# Patient Record
Sex: FEMALE | Race: WHITE | NOT HISPANIC OR LATINO | Employment: OTHER | URBAN - METROPOLITAN AREA
[De-identification: names, ages, dates, MRNs, and addresses within clinical notes are randomized per-mention and may not be internally consistent; named-entity substitution may affect disease eponyms.]

---

## 2018-02-07 ENCOUNTER — HOSPITAL ENCOUNTER (INPATIENT)
Facility: HOSPITAL | Age: 83
LOS: 5 days | Discharge: NURSING, REHAB OR LONG-TERM CARE | End: 2018-02-12
Attending: EMERGENCY MEDICINE | Admitting: INTERNAL MEDICINE

## 2018-02-07 ENCOUNTER — APPOINTMENT (OUTPATIENT)
Dept: GENERAL RADIOLOGY | Facility: HOSPITAL | Age: 83
End: 2018-02-07

## 2018-02-07 DIAGNOSIS — S72.142A CLOSED DISPLACED INTERTROCHANTERIC FRACTURE OF LEFT FEMUR, INITIAL ENCOUNTER (HCC): Primary | ICD-10-CM

## 2018-02-07 DIAGNOSIS — R26.2 DIFFICULTY WALKING: ICD-10-CM

## 2018-02-07 PROBLEM — I10 HTN (HYPERTENSION): Status: ACTIVE | Noted: 2018-02-07

## 2018-02-07 PROBLEM — E87.5 HYPERKALEMIA: Status: ACTIVE | Noted: 2018-02-07

## 2018-02-07 PROBLEM — N17.9 AKI (ACUTE KIDNEY INJURY): Status: ACTIVE | Noted: 2018-02-07

## 2018-02-07 LAB
ANION GAP SERPL CALCULATED.3IONS-SCNC: 13.5 MMOL/L
BASOPHILS # BLD AUTO: 0.06 10*3/MM3 (ref 0–0.2)
BASOPHILS NFR BLD AUTO: 0.3 % (ref 0–1.5)
BUN BLD-MCNC: 38 MG/DL (ref 8–23)
BUN/CREAT SERPL: 32.2 (ref 7–25)
CALCIUM SPEC-SCNC: 9.2 MG/DL (ref 8.6–10.5)
CHLORIDE SERPL-SCNC: 100 MMOL/L (ref 98–107)
CO2 SERPL-SCNC: 23.5 MMOL/L (ref 22–29)
CREAT BLD-MCNC: 1.18 MG/DL (ref 0.57–1)
DEPRECATED RDW RBC AUTO: 47.1 FL (ref 37–54)
EOSINOPHIL # BLD AUTO: 0.3 10*3/MM3 (ref 0–0.7)
EOSINOPHIL NFR BLD AUTO: 1.7 % (ref 0.3–6.2)
ERYTHROCYTE [DISTWIDTH] IN BLOOD BY AUTOMATED COUNT: 13.5 % (ref 11.7–13)
GFR SERPL CREATININE-BSD FRML MDRD: 44 ML/MIN/1.73
GLUCOSE BLD-MCNC: 117 MG/DL (ref 65–99)
HCT VFR BLD AUTO: 38.7 % (ref 35.6–45.5)
HGB BLD-MCNC: 12.5 G/DL (ref 11.9–15.5)
IMM GRANULOCYTES # BLD: 0.11 10*3/MM3 (ref 0–0.03)
IMM GRANULOCYTES NFR BLD: 0.6 % (ref 0–0.5)
INR PPP: 1.01 (ref 0.9–1.1)
LYMPHOCYTES # BLD AUTO: 2.88 10*3/MM3 (ref 0.9–4.8)
LYMPHOCYTES NFR BLD AUTO: 16.2 % (ref 19.6–45.3)
MCH RBC QN AUTO: 30.7 PG (ref 26.9–32)
MCHC RBC AUTO-ENTMCNC: 32.3 G/DL (ref 32.4–36.3)
MCV RBC AUTO: 95.1 FL (ref 80.5–98.2)
MONOCYTES # BLD AUTO: 1.46 10*3/MM3 (ref 0.2–1.2)
MONOCYTES NFR BLD AUTO: 8.2 % (ref 5–12)
NEUTROPHILS # BLD AUTO: 12.92 10*3/MM3 (ref 1.9–8.1)
NEUTROPHILS NFR BLD AUTO: 73 % (ref 42.7–76)
PLATELET # BLD AUTO: 302 10*3/MM3 (ref 140–500)
PMV BLD AUTO: 11.6 FL (ref 6–12)
POTASSIUM BLD-SCNC: 5.4 MMOL/L (ref 3.5–5.2)
PROTHROMBIN TIME: 13.1 SECONDS (ref 11.7–14.2)
RBC # BLD AUTO: 4.07 10*6/MM3 (ref 3.9–5.2)
SODIUM BLD-SCNC: 137 MMOL/L (ref 136–145)
WBC NRBC COR # BLD: 17.73 10*3/MM3 (ref 4.5–10.7)

## 2018-02-07 PROCEDURE — 93010 ELECTROCARDIOGRAM REPORT: CPT | Performed by: INTERNAL MEDICINE

## 2018-02-07 PROCEDURE — 80048 BASIC METABOLIC PNL TOTAL CA: CPT | Performed by: NURSE PRACTITIONER

## 2018-02-07 PROCEDURE — 99285 EMERGENCY DEPT VISIT HI MDM: CPT

## 2018-02-07 PROCEDURE — 93005 ELECTROCARDIOGRAM TRACING: CPT | Performed by: NURSE PRACTITIONER

## 2018-02-07 PROCEDURE — 36415 COLL VENOUS BLD VENIPUNCTURE: CPT

## 2018-02-07 PROCEDURE — 73502 X-RAY EXAM HIP UNI 2-3 VIEWS: CPT

## 2018-02-07 PROCEDURE — 25010000002 ONDANSETRON PER 1 MG: Performed by: NURSE PRACTITIONER

## 2018-02-07 PROCEDURE — 25010000002 HYDROMORPHONE PER 4 MG: Performed by: EMERGENCY MEDICINE

## 2018-02-07 PROCEDURE — 25010000002 HYDROMORPHONE PER 4 MG: Performed by: NURSE PRACTITIONER

## 2018-02-07 PROCEDURE — 85610 PROTHROMBIN TIME: CPT | Performed by: NURSE PRACTITIONER

## 2018-02-07 PROCEDURE — 85025 COMPLETE CBC W/AUTO DIFF WBC: CPT | Performed by: NURSE PRACTITIONER

## 2018-02-07 PROCEDURE — 71045 X-RAY EXAM CHEST 1 VIEW: CPT

## 2018-02-07 PROCEDURE — 25010000002 ONDANSETRON PER 1 MG: Performed by: INTERNAL MEDICINE

## 2018-02-07 RX ORDER — AMLODIPINE BESYLATE 5 MG/1
5 TABLET ORAL
Status: DISCONTINUED | OUTPATIENT
Start: 2018-02-08 | End: 2018-02-08

## 2018-02-07 RX ORDER — ONDANSETRON 2 MG/ML
4 INJECTION INTRAMUSCULAR; INTRAVENOUS ONCE
Status: COMPLETED | OUTPATIENT
Start: 2018-02-07 | End: 2018-02-07

## 2018-02-07 RX ORDER — SODIUM CHLORIDE 9 MG/ML
75 INJECTION, SOLUTION INTRAVENOUS CONTINUOUS
Status: DISCONTINUED | OUTPATIENT
Start: 2018-02-07 | End: 2018-02-09

## 2018-02-07 RX ORDER — HYDRALAZINE HYDROCHLORIDE 25 MG/1
25 TABLET, FILM COATED ORAL 3 TIMES DAILY
Status: DISCONTINUED | OUTPATIENT
Start: 2018-02-07 | End: 2018-02-12 | Stop reason: HOSPADM

## 2018-02-07 RX ORDER — ATORVASTATIN CALCIUM 10 MG/1
10 TABLET, FILM COATED ORAL DAILY
Status: DISCONTINUED | OUTPATIENT
Start: 2018-02-08 | End: 2018-02-12 | Stop reason: HOSPADM

## 2018-02-07 RX ORDER — ASPIRIN 81 MG/1
81 TABLET ORAL DAILY
COMMUNITY
End: 2018-02-12 | Stop reason: HOSPADM

## 2018-02-07 RX ORDER — SODIUM POLYSTYRENE SULFONATE 15 G/60ML
15 SUSPENSION ORAL; RECTAL ONCE
Status: COMPLETED | OUTPATIENT
Start: 2018-02-07 | End: 2018-02-07

## 2018-02-07 RX ORDER — AMLODIPINE BESYLATE 5 MG/1
5 TABLET ORAL DAILY
COMMUNITY
End: 2018-02-07

## 2018-02-07 RX ORDER — MELATONIN
1000 DAILY
Status: DISCONTINUED | OUTPATIENT
Start: 2018-02-08 | End: 2018-02-12 | Stop reason: HOSPADM

## 2018-02-07 RX ORDER — LISINOPRIL 20 MG/1
20 TABLET ORAL DAILY
COMMUNITY
End: 2018-02-12 | Stop reason: HOSPADM

## 2018-02-07 RX ORDER — LISINOPRIL AND HYDROCHLOROTHIAZIDE 25; 20 MG/1; MG/1
1 TABLET ORAL DAILY
COMMUNITY
End: 2018-02-12 | Stop reason: HOSPADM

## 2018-02-07 RX ORDER — PRAVASTATIN SODIUM 40 MG
40 TABLET ORAL NIGHTLY
COMMUNITY

## 2018-02-07 RX ORDER — HYDROMORPHONE HYDROCHLORIDE 1 MG/ML
0.5 INJECTION, SOLUTION INTRAMUSCULAR; INTRAVENOUS; SUBCUTANEOUS ONCE
Status: COMPLETED | OUTPATIENT
Start: 2018-02-07 | End: 2018-02-07

## 2018-02-07 RX ORDER — MELOXICAM 15 MG/1
15 TABLET ORAL DAILY
COMMUNITY
End: 2018-02-12 | Stop reason: HOSPADM

## 2018-02-07 RX ORDER — MELATONIN
1000 DAILY
COMMUNITY

## 2018-02-07 RX ORDER — SODIUM CHLORIDE 0.9 % (FLUSH) 0.9 %
1-10 SYRINGE (ML) INJECTION AS NEEDED
Status: DISCONTINUED | OUTPATIENT
Start: 2018-02-07 | End: 2018-02-12 | Stop reason: HOSPADM

## 2018-02-07 RX ORDER — LISINOPRIL 20 MG/1
20 TABLET ORAL DAILY
Status: DISCONTINUED | OUTPATIENT
Start: 2018-02-08 | End: 2018-02-08

## 2018-02-07 RX ORDER — HYDRALAZINE HYDROCHLORIDE 25 MG/1
25 TABLET, FILM COATED ORAL 3 TIMES DAILY
COMMUNITY
End: 2021-12-10 | Stop reason: SDUPTHER

## 2018-02-07 RX ORDER — ACETAMINOPHEN 325 MG/1
650 TABLET ORAL EVERY 4 HOURS PRN
Status: DISCONTINUED | OUTPATIENT
Start: 2018-02-07 | End: 2018-02-12 | Stop reason: HOSPADM

## 2018-02-07 RX ORDER — HYDROMORPHONE HYDROCHLORIDE 1 MG/ML
0.5 INJECTION, SOLUTION INTRAMUSCULAR; INTRAVENOUS; SUBCUTANEOUS
Status: DISCONTINUED | OUTPATIENT
Start: 2018-02-07 | End: 2018-02-08

## 2018-02-07 RX ORDER — AMLODIPINE BESYLATE 5 MG/1
5 TABLET ORAL 2 TIMES DAILY
COMMUNITY
End: 2018-02-12 | Stop reason: HOSPADM

## 2018-02-07 RX ORDER — SODIUM CHLORIDE 0.9 % (FLUSH) 0.9 %
10 SYRINGE (ML) INJECTION AS NEEDED
Status: DISCONTINUED | OUTPATIENT
Start: 2018-02-07 | End: 2018-02-12 | Stop reason: HOSPADM

## 2018-02-07 RX ADMIN — SODIUM POLYSTYRENE SULFONATE 15 G: 15 SUSPENSION ORAL; RECTAL at 22:14

## 2018-02-07 RX ADMIN — HYDROMORPHONE HYDROCHLORIDE 1 MG: 2 INJECTION INTRAMUSCULAR; INTRAVENOUS; SUBCUTANEOUS at 17:04

## 2018-02-07 RX ADMIN — ONDANSETRON 4 MG: 2 INJECTION INTRAMUSCULAR; INTRAVENOUS at 22:57

## 2018-02-07 RX ADMIN — HYDROMORPHONE HYDROCHLORIDE 0.5 MG: 10 INJECTION INTRAMUSCULAR; INTRAVENOUS; SUBCUTANEOUS at 21:09

## 2018-02-07 RX ADMIN — HYDROMORPHONE HYDROCHLORIDE 0.5 MG: 10 INJECTION INTRAMUSCULAR; INTRAVENOUS; SUBCUTANEOUS at 23:20

## 2018-02-07 RX ADMIN — HYDRALAZINE HYDROCHLORIDE 25 MG: 25 TABLET, FILM COATED ORAL at 22:14

## 2018-02-07 RX ADMIN — HYDROMORPHONE HYDROCHLORIDE 0.5 MG: 1 INJECTION, SOLUTION INTRAMUSCULAR; INTRAVENOUS; SUBCUTANEOUS at 17:37

## 2018-02-07 RX ADMIN — ONDANSETRON 4 MG: 2 INJECTION INTRAMUSCULAR; INTRAVENOUS at 16:53

## 2018-02-07 RX ADMIN — SODIUM CHLORIDE 75 ML/HR: 9 INJECTION, SOLUTION INTRAVENOUS at 22:14

## 2018-02-07 NOTE — ED NOTES
Pt updated. Face appears calm and relaxed. Pt thanked for their patience.      Debby Dela Cruz RN  02/07/18 3712

## 2018-02-07 NOTE — ED NOTES
IV attemp x2 without success. RN chanrge notified. IV Thx called     Debby Dela Cruz RN  02/07/18 9400

## 2018-02-07 NOTE — ED PROVIDER NOTES
EMERGENCY DEPARTMENT ENCOUNTER    Room Number:  38/38  Date seen:  2/7/2018  Time seen: 4:48 PM  PCP: Ambrose Ashley MD    HPI:  Chief complaint: L hip pain   Context:Vonda Jay is a 82 y.o. female who presents to the ED with c/o L hip pain s/p fall just PTA. Per EMS, the pt was tripped and that a chair she was using for assistance moved, causing her to fall. Pt states a Hx of hypertension controlled by medication. Pt denies a Hx of cardiac disease.     Timing:constant   Duration: since just PTA   Location:L hip   Radiation: none stated   Quality: pain   Intensity/Severity: moderate   Associated Symptoms: none stated   Aggravating Factors: none stated   Alleviating Factors: none stated   Previous Episodes: none stated   Treatment before arrival: none     MEDICAL RECORD REVIEW    ALLERGIES  Review of patient's allergies indicates no known allergies.    PAST MEDICAL HISTORY  Active Ambulatory Problems     Diagnosis Date Noted   • No Active Ambulatory Problems     Resolved Ambulatory Problems     Diagnosis Date Noted   • No Resolved Ambulatory Problems     Past Medical History:   Diagnosis Date   • Arthritis    • Hyperlipidemia    • Hypertension        PAST SURGICAL HISTORY  Past Surgical History:   Procedure Laterality Date   • BREAST LUMPECTOMY  1998   • CAROTID ARTERY ANGIOPLASTY  2000, 2013   • CHOLECYSTECTOMY  1979   • HYSTERECTOMY  1989       FAMILY HISTORY  History reviewed. No pertinent family history.    SOCIAL HISTORY  Social History     Social History   • Marital status:      Spouse name: N/A   • Number of children: N/A   • Years of education: N/A     Occupational History   • Not on file.     Social History Main Topics   • Smoking status: Not on file   • Smokeless tobacco: Not on file   • Alcohol use Not on file   • Drug use: Not on file   • Sexual activity: Not on file     Other Topics Concern   • Not on file     Social History Narrative   • No narrative on file       REVIEW OF  SYSTEMS  Review of Systems   Constitutional: Negative for activity change, appetite change, diaphoresis and fever.   HENT: Negative for trouble swallowing.    Eyes: Negative for visual disturbance.   Respiratory: Negative for cough, chest tightness, shortness of breath and wheezing.    Cardiovascular: Negative for chest pain, palpitations and leg swelling.   Gastrointestinal: Negative for abdominal pain, diarrhea, nausea and vomiting.   Genitourinary: Negative for dysuria.   Musculoskeletal: Positive for arthralgias (R hip). Negative for back pain.   Skin: Negative for rash.   Neurological: Negative for dizziness, speech difficulty and light-headedness.       PHYSICAL EXAM  ED Triage Vitals   Temp Pulse Resp BP SpO2   -- -- -- -- --             Temp src Heart Rate Source Patient Position BP Location FiO2 (%)   -- -- -- -- --            Physical Exam   Constitutional: She is oriented to person, place, and time. She appears distressed (pt crying out in pain).   HENT:   Head: Normocephalic and atraumatic.   Mouth/Throat: Uvula is midline and mucous membranes are normal.   Neck: Normal range of motion. Neck supple.   Cardiovascular: S1 normal, S2 normal and normal heart sounds.  Exam reveals no gallop and no friction rub.    No murmur heard.  Pulmonary/Chest: Effort normal and breath sounds normal. She has no decreased breath sounds. She has no wheezes. She has no rhonchi. She has no rales.   Abdominal: Soft. Normal appearance. There is no rebound and no guarding.   Musculoskeletal: Normal range of motion. She exhibits tenderness (L hip).   Pt immobilized in a sheet splint. RLE appears externally rotated.    Neurological: She is alert and oriented to person, place, and time.   Skin: Skin is warm, dry and intact.   Psychiatric: Affect and judgment normal.   Nursing note and vitals reviewed.      LAB RESULTS  Recent Results (from the past 24 hour(s))   CBC Auto Differential    Collection Time: 02/07/18  4:53 PM   Result  Value Ref Range    WBC 17.73 (H) 4.50 - 10.70 10*3/mm3    RBC 4.07 3.90 - 5.20 10*6/mm3    Hemoglobin 12.5 11.9 - 15.5 g/dL    Hematocrit 38.7 35.6 - 45.5 %    MCV 95.1 80.5 - 98.2 fL    MCH 30.7 26.9 - 32.0 pg    MCHC 32.3 (L) 32.4 - 36.3 g/dL    RDW 13.5 (H) 11.7 - 13.0 %    RDW-SD 47.1 37.0 - 54.0 fl    MPV 11.6 6.0 - 12.0 fL    Platelets 302 140 - 500 10*3/mm3    Neutrophil % 73.0 42.7 - 76.0 %    Lymphocyte % 16.2 (L) 19.6 - 45.3 %    Monocyte % 8.2 5.0 - 12.0 %    Eosinophil % 1.7 0.3 - 6.2 %    Basophil % 0.3 0.0 - 1.5 %    Immature Grans % 0.6 (H) 0.0 - 0.5 %    Neutrophils, Absolute 12.92 (H) 1.90 - 8.10 10*3/mm3    Lymphocytes, Absolute 2.88 0.90 - 4.80 10*3/mm3    Monocytes, Absolute 1.46 (H) 0.20 - 1.20 10*3/mm3    Eosinophils, Absolute 0.30 0.00 - 0.70 10*3/mm3    Basophils, Absolute 0.06 0.00 - 0.20 10*3/mm3    Immature Grans, Absolute 0.11 (H) 0.00 - 0.03 10*3/mm3   Basic Metabolic Panel    Collection Time: 02/07/18  6:15 PM   Result Value Ref Range    Glucose 117 (H) 65 - 99 mg/dL    BUN 38 (H) 8 - 23 mg/dL    Creatinine 1.18 (H) 0.57 - 1.00 mg/dL    Sodium 137 136 - 145 mmol/L    Potassium 5.4 (H) 3.5 - 5.2 mmol/L    Chloride 100 98 - 107 mmol/L    CO2 23.5 22.0 - 29.0 mmol/L    Calcium 9.2 8.6 - 10.5 mg/dL    eGFR Non African Amer 44 (L) >60 mL/min/1.73    BUN/Creatinine Ratio 32.2 (H) 7.0 - 25.0    Anion Gap 13.5 mmol/L   Protime-INR    Collection Time: 02/07/18  6:15 PM   Result Value Ref Range    Protime 13.1 11.7 - 14.2 Seconds    INR 1.01 0.90 - 1.10       I ordered the above labs and reviewed the results    RADIOLOGY  XR Hip With or Without Pelvis 2 - 3 View Left   Final Result      XR Chest 1 View    (Results Pending)   XR R hip: There is an acute intertrochanteric fracture involving the left proximal  femur without significant displacement.    I ordered the above noted radiological studies and reviewed the images on the PACS system.     MEDICATIONS GIVEN IN ER  Medications   sodium chloride  "0.9 % flush 10 mL (not administered)   ondansetron (ZOFRAN) injection 4 mg (4 mg Intravenous Given 2/7/18 1653)   HYDROmorphone (DILAUDID) injection 1 mg (1 mg Intravenous Given 2/7/18 1704)   HYDROmorphone (DILAUDID) injection 0.5 mg (0.5 mg Intravenous Given 2/7/18 1737)       EKG  EKG           EKG time: 1928  Rhythm/Rate: sinus rhythm 79  P waves and IA: normal IA  QRS, axis: normal QRS, normal axis   ST and T waves: no ST elevation      Interpreted Contemporaneously by me, independently viewed  No prior EKG    PROCEDURES  Procedures    COURSE & MEDICAL DECISION MAKING  Pertinent Labs and Imaging studies that were ordered and reviewed are noted above.  Results were reviewed/discussed with the patient and they were also made aware of online assess.  Pt also made aware that some labs, such as cultures, will not be resulted during ER visit and follow up with PMD is necessary.     PROGRESS AND CONSULTS    Progress Notes:    ED Course   1920  Rechecked pt, who is resting comfortably. Discussed the pt's XR findings of a L hip fracture. Plan to admit the pt for hip repair. Pt understands and agrees with the plan, and all questions were answered.   1928  Reviewed pt's history and workup with Dr. Lewis.  After a bedside evaluation; Dr Lewis agrees with the plan of care  1954  Received a call from Dr. Shepherd and discussed pt's case. Dr. Shepherd agreed to admit the pt.  1955  Based on the patient's lab findings and presenting symptoms, the doctor and I feel it is appropriate to admit the patient for further management, evaluation, and treatment.  I have discussed this with the admitting team.  I have also discussed this with the patient/family.  They are in agreement with admission.    2020  Received a call from Dr. Fleming and discussed pt's case. Dr. Fleming agreed to consult with the pt.    Disposition vitals:  /72  Pulse 76  Temp 97.3 °F (36.3 °C) (Oral)   Resp 18  Ht 157.5 cm (62\")  Wt 81.6 kg (180 lb)  SpO2 " 98%  BMI 32.92 kg/m2      DIAGNOSIS  Final diagnoses:   Closed displaced intertrochanteric fracture of left femur, initial encounter       ADMISSION  ADMISSION by Dr. Shepherd    Discussed treatment plan and reason for admission with pt/family and admitting physician.  Pt/family voiced understanding of the plan for admission for further testing/treatment as needed.     Documentation assistance provided by eugene Velazquez for ELVIE Cameron.  Information recorded by the eugene was done at my direction and has been verified and validated by me.  Electronically signed by Isac Velazquez on 2/7/2018 at time 7:55 PM        Isac Velazquez  02/07/18 2023       ELVIE Kennedy  02/07/18 9339

## 2018-02-08 ENCOUNTER — ANESTHESIA EVENT (OUTPATIENT)
Dept: PERIOP | Facility: HOSPITAL | Age: 83
End: 2018-02-08

## 2018-02-08 ENCOUNTER — ANESTHESIA (OUTPATIENT)
Dept: PERIOP | Facility: HOSPITAL | Age: 83
End: 2018-02-08

## 2018-02-08 ENCOUNTER — APPOINTMENT (OUTPATIENT)
Dept: GENERAL RADIOLOGY | Facility: HOSPITAL | Age: 83
End: 2018-02-08

## 2018-02-08 LAB
ABO GROUP BLD: NORMAL
ALBUMIN SERPL-MCNC: 4 G/DL (ref 3.5–5.2)
ALBUMIN/GLOB SERPL: 1.6 G/DL
ALP SERPL-CCNC: 59 U/L (ref 39–117)
ALT SERPL W P-5'-P-CCNC: 32 U/L (ref 1–33)
ANION GAP SERPL CALCULATED.3IONS-SCNC: 11.6 MMOL/L
ANION GAP SERPL CALCULATED.3IONS-SCNC: 13.3 MMOL/L
ANION GAP SERPL CALCULATED.3IONS-SCNC: 14.7 MMOL/L
AST SERPL-CCNC: 29 U/L (ref 1–32)
BACTERIA UR QL AUTO: ABNORMAL /HPF
BASOPHILS # BLD AUTO: 0.04 10*3/MM3 (ref 0–0.2)
BASOPHILS NFR BLD AUTO: 0.3 % (ref 0–1.5)
BILIRUB SERPL-MCNC: 0.4 MG/DL (ref 0.1–1.2)
BILIRUB UR QL STRIP: NEGATIVE
BLD GP AB SCN SERPL QL: NEGATIVE
BUN BLD-MCNC: 45 MG/DL (ref 8–23)
BUN BLD-MCNC: 46 MG/DL (ref 8–23)
BUN BLD-MCNC: 46 MG/DL (ref 8–23)
BUN/CREAT SERPL: 23.2 (ref 7–25)
BUN/CREAT SERPL: 24.3 (ref 7–25)
BUN/CREAT SERPL: 25.3 (ref 7–25)
CALCIUM SPEC-SCNC: 8.6 MG/DL (ref 8.6–10.5)
CALCIUM SPEC-SCNC: 8.6 MG/DL (ref 8.6–10.5)
CALCIUM SPEC-SCNC: 8.7 MG/DL (ref 8.6–10.5)
CHLORIDE SERPL-SCNC: 101 MMOL/L (ref 98–107)
CHLORIDE SERPL-SCNC: 104 MMOL/L (ref 98–107)
CHLORIDE SERPL-SCNC: 105 MMOL/L (ref 98–107)
CHLORIDE UR-SCNC: 110 MMOL/L
CK SERPL-CCNC: 68 U/L (ref 20–180)
CLARITY UR: CLEAR
CO2 SERPL-SCNC: 22.3 MMOL/L (ref 22–29)
CO2 SERPL-SCNC: 22.7 MMOL/L (ref 22–29)
CO2 SERPL-SCNC: 23.4 MMOL/L (ref 22–29)
COLOR UR: YELLOW
CREAT BLD-MCNC: 1.78 MG/DL (ref 0.57–1)
CREAT BLD-MCNC: 1.89 MG/DL (ref 0.57–1)
CREAT BLD-MCNC: 1.98 MG/DL (ref 0.57–1)
CREAT UR-MCNC: 73.7 MG/DL
DEPRECATED RDW RBC AUTO: 49.6 FL (ref 37–54)
EOSINOPHIL # BLD AUTO: 0.1 10*3/MM3 (ref 0–0.7)
EOSINOPHIL NFR BLD AUTO: 0.7 % (ref 0.3–6.2)
ERYTHROCYTE [DISTWIDTH] IN BLOOD BY AUTOMATED COUNT: 14.1 % (ref 11.7–13)
GFR SERPL CREATININE-BSD FRML MDRD: 24 ML/MIN/1.73
GFR SERPL CREATININE-BSD FRML MDRD: 25 ML/MIN/1.73
GFR SERPL CREATININE-BSD FRML MDRD: 27 ML/MIN/1.73
GLOBULIN UR ELPH-MCNC: 2.5 GM/DL
GLUCOSE BLD-MCNC: 109 MG/DL (ref 65–99)
GLUCOSE BLD-MCNC: 138 MG/DL (ref 65–99)
GLUCOSE BLD-MCNC: 144 MG/DL (ref 65–99)
GLUCOSE UR STRIP-MCNC: NEGATIVE MG/DL
HCT VFR BLD AUTO: 33.8 % (ref 35.6–45.5)
HGB BLD-MCNC: 10.7 G/DL (ref 11.9–15.5)
HGB UR QL STRIP.AUTO: NEGATIVE
HYALINE CASTS UR QL AUTO: ABNORMAL /LPF
IMM GRANULOCYTES # BLD: 0.07 10*3/MM3 (ref 0–0.03)
IMM GRANULOCYTES NFR BLD: 0.5 % (ref 0–0.5)
KETONES UR QL STRIP: NEGATIVE
LEUKOCYTE ESTERASE UR QL STRIP.AUTO: ABNORMAL
LYMPHOCYTES # BLD AUTO: 1.59 10*3/MM3 (ref 0.9–4.8)
LYMPHOCYTES NFR BLD AUTO: 10.5 % (ref 19.6–45.3)
MAGNESIUM SERPL-MCNC: 2.3 MG/DL (ref 1.6–2.4)
MCH RBC QN AUTO: 30.6 PG (ref 26.9–32)
MCHC RBC AUTO-ENTMCNC: 31.7 G/DL (ref 32.4–36.3)
MCV RBC AUTO: 96.6 FL (ref 80.5–98.2)
MONOCYTES # BLD AUTO: 1.33 10*3/MM3 (ref 0.2–1.2)
MONOCYTES NFR BLD AUTO: 8.8 % (ref 5–12)
NEUTROPHILS # BLD AUTO: 11.98 10*3/MM3 (ref 1.9–8.1)
NEUTROPHILS NFR BLD AUTO: 79.2 % (ref 42.7–76)
NITRITE UR QL STRIP: POSITIVE
PH UR STRIP.AUTO: 5.5 [PH] (ref 5–8)
PLATELET # BLD AUTO: 265 10*3/MM3 (ref 140–500)
PMV BLD AUTO: 11 FL (ref 6–12)
POTASSIUM BLD-SCNC: 5.3 MMOL/L (ref 3.5–5.2)
POTASSIUM BLD-SCNC: 5.3 MMOL/L (ref 3.5–5.2)
POTASSIUM BLD-SCNC: 6.3 MMOL/L (ref 3.5–5.2)
PROT SERPL-MCNC: 6.5 G/DL (ref 6–8.5)
PROT UR QL STRIP: NEGATIVE
RBC # BLD AUTO: 3.5 10*6/MM3 (ref 3.9–5.2)
RBC # UR: ABNORMAL /HPF
REF LAB TEST METHOD: ABNORMAL
RH BLD: POSITIVE
SODIUM BLD-SCNC: 136 MMOL/L (ref 136–145)
SODIUM BLD-SCNC: 141 MMOL/L (ref 136–145)
SODIUM BLD-SCNC: 141 MMOL/L (ref 136–145)
SODIUM UR-SCNC: 86 MMOL/L
SP GR UR STRIP: 1.01 (ref 1–1.03)
SQUAMOUS #/AREA URNS HPF: ABNORMAL /HPF
UROBILINOGEN UR QL STRIP: ABNORMAL
WBC NRBC COR # BLD: 15.11 10*3/MM3 (ref 4.5–10.7)
WBC UR QL AUTO: ABNORMAL /HPF

## 2018-02-08 PROCEDURE — 76000 FLUOROSCOPY <1 HR PHYS/QHP: CPT

## 2018-02-08 PROCEDURE — 25010000002 NEOSTIGMINE PER 0.5 MG: Performed by: ANESTHESIOLOGY

## 2018-02-08 PROCEDURE — 25010000002 ONDANSETRON PER 1 MG: Performed by: INTERNAL MEDICINE

## 2018-02-08 PROCEDURE — 84300 ASSAY OF URINE SODIUM: CPT | Performed by: INTERNAL MEDICINE

## 2018-02-08 PROCEDURE — 25010000002 HYDROMORPHONE PER 4 MG: Performed by: ANESTHESIOLOGY

## 2018-02-08 PROCEDURE — 82550 ASSAY OF CK (CPK): CPT | Performed by: INTERNAL MEDICINE

## 2018-02-08 PROCEDURE — 86900 BLOOD TYPING SEROLOGIC ABO: CPT | Performed by: ORTHOPAEDIC SURGERY

## 2018-02-08 PROCEDURE — 25010000002 MIDAZOLAM PER 1 MG: Performed by: ANESTHESIOLOGY

## 2018-02-08 PROCEDURE — 63710000001 INSULIN REGULAR HUMAN PER 5 UNITS: Performed by: INTERNAL MEDICINE

## 2018-02-08 PROCEDURE — C1713 ANCHOR/SCREW BN/BN,TIS/BN: HCPCS | Performed by: ORTHOPAEDIC SURGERY

## 2018-02-08 PROCEDURE — 25010000002 PROPOFOL 10 MG/ML EMULSION: Performed by: ANESTHESIOLOGY

## 2018-02-08 PROCEDURE — 86850 RBC ANTIBODY SCREEN: CPT | Performed by: ORTHOPAEDIC SURGERY

## 2018-02-08 PROCEDURE — 0QH706Z INSERTION OF INTRAMEDULLARY INTERNAL FIXATION DEVICE INTO LEFT UPPER FEMUR, OPEN APPROACH: ICD-10-PCS | Performed by: ORTHOPAEDIC SURGERY

## 2018-02-08 PROCEDURE — 81001 URINALYSIS AUTO W/SCOPE: CPT | Performed by: INTERNAL MEDICINE

## 2018-02-08 PROCEDURE — 25010000002 FUROSEMIDE PER 20 MG: Performed by: INTERNAL MEDICINE

## 2018-02-08 PROCEDURE — 25010000002 ONDANSETRON PER 1 MG: Performed by: ANESTHESIOLOGY

## 2018-02-08 PROCEDURE — 80053 COMPREHEN METABOLIC PANEL: CPT | Performed by: INTERNAL MEDICINE

## 2018-02-08 PROCEDURE — 82570 ASSAY OF URINE CREATININE: CPT | Performed by: INTERNAL MEDICINE

## 2018-02-08 PROCEDURE — 73502 X-RAY EXAM HIP UNI 2-3 VIEWS: CPT

## 2018-02-08 PROCEDURE — 85025 COMPLETE CBC W/AUTO DIFF WBC: CPT | Performed by: INTERNAL MEDICINE

## 2018-02-08 PROCEDURE — 82436 ASSAY OF URINE CHLORIDE: CPT | Performed by: INTERNAL MEDICINE

## 2018-02-08 PROCEDURE — 99222 1ST HOSP IP/OBS MODERATE 55: CPT | Performed by: ORTHOPAEDIC SURGERY

## 2018-02-08 PROCEDURE — 27245 TREAT THIGH FRACTURE: CPT | Performed by: ORTHOPAEDIC SURGERY

## 2018-02-08 PROCEDURE — 25010000002 FENTANYL CITRATE (PF) 100 MCG/2ML SOLUTION: Performed by: ANESTHESIOLOGY

## 2018-02-08 PROCEDURE — 25010000003 CEFAZOLIN IN DEXTROSE 2-4 GM/100ML-% SOLUTION: Performed by: ORTHOPAEDIC SURGERY

## 2018-02-08 PROCEDURE — 25010000002 HYDROMORPHONE PER 4 MG: Performed by: INTERNAL MEDICINE

## 2018-02-08 PROCEDURE — 83735 ASSAY OF MAGNESIUM: CPT | Performed by: INTERNAL MEDICINE

## 2018-02-08 PROCEDURE — 86901 BLOOD TYPING SEROLOGIC RH(D): CPT | Performed by: ORTHOPAEDIC SURGERY

## 2018-02-08 DEVICE — IMPLANTABLE DEVICE: Type: IMPLANTABLE DEVICE | Site: FEMUR | Status: FUNCTIONAL

## 2018-02-08 DEVICE — SCRW CANN TFN ADV TI 10.35X85MM STRL: Type: IMPLANTABLE DEVICE | Site: FEMUR | Status: FUNCTIONAL

## 2018-02-08 RX ORDER — FLUMAZENIL 0.1 MG/ML
0.2 INJECTION INTRAVENOUS AS NEEDED
Status: DISCONTINUED | OUTPATIENT
Start: 2018-02-08 | End: 2018-02-08 | Stop reason: HOSPADM

## 2018-02-08 RX ORDER — SODIUM CHLORIDE 0.9 % (FLUSH) 0.9 %
1-10 SYRINGE (ML) INJECTION AS NEEDED
Status: DISCONTINUED | OUTPATIENT
Start: 2018-02-08 | End: 2018-02-08 | Stop reason: HOSPADM

## 2018-02-08 RX ORDER — MEPERIDINE HYDROCHLORIDE 25 MG/ML
25 INJECTION INTRAMUSCULAR; INTRAVENOUS; SUBCUTANEOUS ONCE
Status: DISCONTINUED | OUTPATIENT
Start: 2018-02-08 | End: 2018-02-08 | Stop reason: HOSPADM

## 2018-02-08 RX ORDER — HYDRALAZINE HYDROCHLORIDE 20 MG/ML
5 INJECTION INTRAMUSCULAR; INTRAVENOUS
Status: DISCONTINUED | OUTPATIENT
Start: 2018-02-08 | End: 2018-02-08 | Stop reason: HOSPADM

## 2018-02-08 RX ORDER — ONDANSETRON 2 MG/ML
4 INJECTION INTRAMUSCULAR; INTRAVENOUS EVERY 6 HOURS PRN
Status: DISCONTINUED | OUTPATIENT
Start: 2018-02-08 | End: 2018-02-12 | Stop reason: HOSPADM

## 2018-02-08 RX ORDER — PROMETHAZINE HYDROCHLORIDE 25 MG/ML
12.5 INJECTION, SOLUTION INTRAMUSCULAR; INTRAVENOUS ONCE AS NEEDED
Status: DISCONTINUED | OUTPATIENT
Start: 2018-02-08 | End: 2018-02-08 | Stop reason: HOSPADM

## 2018-02-08 RX ORDER — SODIUM POLYSTYRENE SULFONATE 15 G/60ML
15 SUSPENSION ORAL; RECTAL ONCE
Status: DISCONTINUED | OUTPATIENT
Start: 2018-02-08 | End: 2018-02-11

## 2018-02-08 RX ORDER — LABETALOL HYDROCHLORIDE 5 MG/ML
5 INJECTION, SOLUTION INTRAVENOUS
Status: DISCONTINUED | OUTPATIENT
Start: 2018-02-08 | End: 2018-02-08 | Stop reason: HOSPADM

## 2018-02-08 RX ORDER — CEFAZOLIN SODIUM 2 G/100ML
2 INJECTION, SOLUTION INTRAVENOUS ONCE
Status: DISCONTINUED | OUTPATIENT
Start: 2018-02-08 | End: 2018-02-09 | Stop reason: HOSPADM

## 2018-02-08 RX ORDER — FAMOTIDINE 10 MG/ML
20 INJECTION, SOLUTION INTRAVENOUS ONCE
Status: COMPLETED | OUTPATIENT
Start: 2018-02-08 | End: 2018-02-08

## 2018-02-08 RX ORDER — MEPERIDINE HYDROCHLORIDE 50 MG/ML
INJECTION INTRAMUSCULAR; INTRAVENOUS; SUBCUTANEOUS
Status: COMPLETED
Start: 2018-02-08 | End: 2018-02-08

## 2018-02-08 RX ORDER — HYDROMORPHONE HCL 110MG/55ML
0.5 PATIENT CONTROLLED ANALGESIA SYRINGE INTRAVENOUS
Status: DISCONTINUED | OUTPATIENT
Start: 2018-02-08 | End: 2018-02-08 | Stop reason: HOSPADM

## 2018-02-08 RX ORDER — PROMETHAZINE HYDROCHLORIDE 25 MG/1
25 TABLET ORAL ONCE AS NEEDED
Status: DISCONTINUED | OUTPATIENT
Start: 2018-02-08 | End: 2018-02-08 | Stop reason: HOSPADM

## 2018-02-08 RX ORDER — HYDROCODONE BITARTRATE AND ACETAMINOPHEN 7.5; 325 MG/1; MG/1
1 TABLET ORAL ONCE AS NEEDED
Status: DISCONTINUED | OUTPATIENT
Start: 2018-02-08 | End: 2018-02-08 | Stop reason: HOSPADM

## 2018-02-08 RX ORDER — NALOXONE HCL 0.4 MG/ML
0.4 VIAL (ML) INJECTION
Status: DISCONTINUED | OUTPATIENT
Start: 2018-02-08 | End: 2018-02-12 | Stop reason: HOSPADM

## 2018-02-08 RX ORDER — ASPIRIN 325 MG
325 TABLET, DELAYED RELEASE (ENTERIC COATED) ORAL DAILY
Status: DISCONTINUED | OUTPATIENT
Start: 2018-02-09 | End: 2018-02-12 | Stop reason: HOSPADM

## 2018-02-08 RX ORDER — PROMETHAZINE HYDROCHLORIDE 25 MG/1
12.5 TABLET ORAL ONCE AS NEEDED
Status: DISCONTINUED | OUTPATIENT
Start: 2018-02-08 | End: 2018-02-08 | Stop reason: HOSPADM

## 2018-02-08 RX ORDER — BISACODYL 10 MG
10 SUPPOSITORY, RECTAL RECTAL DAILY PRN
Status: DISCONTINUED | OUTPATIENT
Start: 2018-02-08 | End: 2018-02-12 | Stop reason: HOSPADM

## 2018-02-08 RX ORDER — GLYCOPYRROLATE 0.2 MG/ML
INJECTION INTRAMUSCULAR; INTRAVENOUS AS NEEDED
Status: DISCONTINUED | OUTPATIENT
Start: 2018-02-08 | End: 2018-02-08 | Stop reason: SURG

## 2018-02-08 RX ORDER — MAGNESIUM HYDROXIDE 1200 MG/15ML
LIQUID ORAL AS NEEDED
Status: DISCONTINUED | OUTPATIENT
Start: 2018-02-08 | End: 2018-02-08 | Stop reason: HOSPADM

## 2018-02-08 RX ORDER — ONDANSETRON 4 MG/1
4 TABLET, FILM COATED ORAL EVERY 6 HOURS PRN
Status: DISCONTINUED | OUTPATIENT
Start: 2018-02-08 | End: 2018-02-12 | Stop reason: HOSPADM

## 2018-02-08 RX ORDER — LIDOCAINE HYDROCHLORIDE 10 MG/ML
0.5 INJECTION, SOLUTION EPIDURAL; INFILTRATION; INTRACAUDAL; PERINEURAL ONCE AS NEEDED
Status: DISCONTINUED | OUTPATIENT
Start: 2018-02-08 | End: 2018-02-08 | Stop reason: HOSPADM

## 2018-02-08 RX ORDER — ONDANSETRON 2 MG/ML
4 INJECTION INTRAMUSCULAR; INTRAVENOUS EVERY 4 HOURS PRN
Status: DISCONTINUED | OUTPATIENT
Start: 2018-02-08 | End: 2018-02-12 | Stop reason: HOSPADM

## 2018-02-08 RX ORDER — SODIUM CHLORIDE, SODIUM LACTATE, POTASSIUM CHLORIDE, CALCIUM CHLORIDE 600; 310; 30; 20 MG/100ML; MG/100ML; MG/100ML; MG/100ML
9 INJECTION, SOLUTION INTRAVENOUS CONTINUOUS
Status: DISCONTINUED | OUTPATIENT
Start: 2018-02-08 | End: 2018-02-09

## 2018-02-08 RX ORDER — DEXTROSE MONOHYDRATE 25 G/50ML
50 INJECTION, SOLUTION INTRAVENOUS ONCE
Status: COMPLETED | OUTPATIENT
Start: 2018-02-08 | End: 2018-02-08

## 2018-02-08 RX ORDER — SODIUM CHLORIDE 0.9 % (FLUSH) 0.9 %
1-10 SYRINGE (ML) INJECTION AS NEEDED
Status: DISCONTINUED | OUTPATIENT
Start: 2018-02-08 | End: 2018-02-12 | Stop reason: HOSPADM

## 2018-02-08 RX ORDER — MIDAZOLAM HYDROCHLORIDE 1 MG/ML
1 INJECTION INTRAMUSCULAR; INTRAVENOUS
Status: DISCONTINUED | OUTPATIENT
Start: 2018-02-08 | End: 2018-02-08 | Stop reason: HOSPADM

## 2018-02-08 RX ORDER — HYDROCODONE BITARTRATE AND ACETAMINOPHEN 7.5; 325 MG/1; MG/1
2 TABLET ORAL EVERY 4 HOURS PRN
Status: DISCONTINUED | OUTPATIENT
Start: 2018-02-08 | End: 2018-02-12

## 2018-02-08 RX ORDER — FENTANYL CITRATE 50 UG/ML
50 INJECTION, SOLUTION INTRAMUSCULAR; INTRAVENOUS
Status: DISCONTINUED | OUTPATIENT
Start: 2018-02-08 | End: 2018-02-08 | Stop reason: HOSPADM

## 2018-02-08 RX ORDER — LIDOCAINE HYDROCHLORIDE 20 MG/ML
INJECTION, SOLUTION INFILTRATION; PERINEURAL AS NEEDED
Status: DISCONTINUED | OUTPATIENT
Start: 2018-02-08 | End: 2018-02-08 | Stop reason: SURG

## 2018-02-08 RX ORDER — OXYCODONE AND ACETAMINOPHEN 7.5; 325 MG/1; MG/1
1 TABLET ORAL ONCE AS NEEDED
Status: DISCONTINUED | OUTPATIENT
Start: 2018-02-08 | End: 2018-02-08 | Stop reason: HOSPADM

## 2018-02-08 RX ORDER — CEFAZOLIN SODIUM 2 G/100ML
2 INJECTION, SOLUTION INTRAVENOUS ONCE
Status: COMPLETED | OUTPATIENT
Start: 2018-02-09 | End: 2018-02-09

## 2018-02-08 RX ORDER — NALOXONE HCL 0.4 MG/ML
0.1 VIAL (ML) INJECTION
Status: DISCONTINUED | OUTPATIENT
Start: 2018-02-08 | End: 2018-02-12 | Stop reason: HOSPADM

## 2018-02-08 RX ORDER — EPHEDRINE SULFATE 50 MG/ML
5 INJECTION, SOLUTION INTRAVENOUS ONCE AS NEEDED
Status: DISCONTINUED | OUTPATIENT
Start: 2018-02-08 | End: 2018-02-08 | Stop reason: HOSPADM

## 2018-02-08 RX ORDER — HYDROMORPHONE HYDROCHLORIDE 1 MG/ML
0.5 INJECTION, SOLUTION INTRAMUSCULAR; INTRAVENOUS; SUBCUTANEOUS
Status: DISCONTINUED | OUTPATIENT
Start: 2018-02-08 | End: 2018-02-12 | Stop reason: HOSPADM

## 2018-02-08 RX ORDER — PROMETHAZINE HYDROCHLORIDE 25 MG/1
25 SUPPOSITORY RECTAL ONCE AS NEEDED
Status: DISCONTINUED | OUTPATIENT
Start: 2018-02-08 | End: 2018-02-08 | Stop reason: HOSPADM

## 2018-02-08 RX ORDER — NALOXONE HCL 0.4 MG/ML
0.2 VIAL (ML) INJECTION AS NEEDED
Status: DISCONTINUED | OUTPATIENT
Start: 2018-02-08 | End: 2018-02-08 | Stop reason: HOSPADM

## 2018-02-08 RX ORDER — SODIUM CHLORIDE 9 MG/ML
100 INJECTION, SOLUTION INTRAVENOUS CONTINUOUS
Status: DISCONTINUED | OUTPATIENT
Start: 2018-02-08 | End: 2018-02-09

## 2018-02-08 RX ORDER — ONDANSETRON 4 MG/1
4 TABLET, ORALLY DISINTEGRATING ORAL EVERY 6 HOURS PRN
Status: DISCONTINUED | OUTPATIENT
Start: 2018-02-08 | End: 2018-02-12 | Stop reason: HOSPADM

## 2018-02-08 RX ORDER — MIDAZOLAM HYDROCHLORIDE 1 MG/ML
2 INJECTION INTRAMUSCULAR; INTRAVENOUS
Status: DISCONTINUED | OUTPATIENT
Start: 2018-02-08 | End: 2018-02-08 | Stop reason: HOSPADM

## 2018-02-08 RX ORDER — ONDANSETRON 2 MG/ML
4 INJECTION INTRAMUSCULAR; INTRAVENOUS ONCE AS NEEDED
Status: DISCONTINUED | OUTPATIENT
Start: 2018-02-08 | End: 2018-02-08 | Stop reason: HOSPADM

## 2018-02-08 RX ORDER — DIPHENHYDRAMINE HYDROCHLORIDE 50 MG/ML
12.5 INJECTION INTRAMUSCULAR; INTRAVENOUS
Status: DISCONTINUED | OUTPATIENT
Start: 2018-02-08 | End: 2018-02-08 | Stop reason: HOSPADM

## 2018-02-08 RX ORDER — MORPHINE SULFATE 2 MG/ML
4 INJECTION, SOLUTION INTRAMUSCULAR; INTRAVENOUS
Status: DISCONTINUED | OUTPATIENT
Start: 2018-02-08 | End: 2018-02-12 | Stop reason: HOSPADM

## 2018-02-08 RX ORDER — PROPOFOL 10 MG/ML
VIAL (ML) INTRAVENOUS AS NEEDED
Status: DISCONTINUED | OUTPATIENT
Start: 2018-02-08 | End: 2018-02-08 | Stop reason: SURG

## 2018-02-08 RX ORDER — ONDANSETRON 2 MG/ML
INJECTION INTRAMUSCULAR; INTRAVENOUS AS NEEDED
Status: DISCONTINUED | OUTPATIENT
Start: 2018-02-08 | End: 2018-02-08 | Stop reason: SURG

## 2018-02-08 RX ORDER — FUROSEMIDE 10 MG/ML
80 INJECTION INTRAMUSCULAR; INTRAVENOUS ONCE
Status: COMPLETED | OUTPATIENT
Start: 2018-02-08 | End: 2018-02-08

## 2018-02-08 RX ORDER — CEFAZOLIN SODIUM 2 G/100ML
2 INJECTION, SOLUTION INTRAVENOUS EVERY 8 HOURS
Status: COMPLETED | OUTPATIENT
Start: 2018-02-09 | End: 2018-02-09

## 2018-02-08 RX ORDER — ROCURONIUM BROMIDE 10 MG/ML
INJECTION, SOLUTION INTRAVENOUS AS NEEDED
Status: DISCONTINUED | OUTPATIENT
Start: 2018-02-08 | End: 2018-02-08 | Stop reason: SURG

## 2018-02-08 RX ORDER — DOCUSATE SODIUM 100 MG/1
100 CAPSULE, LIQUID FILLED ORAL 2 TIMES DAILY PRN
Status: DISCONTINUED | OUTPATIENT
Start: 2018-02-08 | End: 2018-02-11

## 2018-02-08 RX ADMIN — HYDROMORPHONE HYDROCHLORIDE 1 MG: 1 INJECTION, SOLUTION INTRAMUSCULAR; INTRAVENOUS; SUBCUTANEOUS at 10:30

## 2018-02-08 RX ADMIN — SODIUM CHLORIDE, POTASSIUM CHLORIDE, SODIUM LACTATE AND CALCIUM CHLORIDE 9 ML/HR: 600; 310; 30; 20 INJECTION, SOLUTION INTRAVENOUS at 17:40

## 2018-02-08 RX ADMIN — HYDROMORPHONE HYDROCHLORIDE 0.5 MG: 2 INJECTION INTRAMUSCULAR; INTRAVENOUS; SUBCUTANEOUS at 21:05

## 2018-02-08 RX ADMIN — HYDROMORPHONE HYDROCHLORIDE 0.5 MG: 2 INJECTION INTRAMUSCULAR; INTRAVENOUS; SUBCUTANEOUS at 20:44

## 2018-02-08 RX ADMIN — HYDROMORPHONE HYDROCHLORIDE 1 MG: 1 INJECTION, SOLUTION INTRAMUSCULAR; INTRAVENOUS; SUBCUTANEOUS at 12:47

## 2018-02-08 RX ADMIN — CEFAZOLIN SODIUM 2 G: 2 INJECTION, SOLUTION INTRAVENOUS at 23:40

## 2018-02-08 RX ADMIN — HYDROMORPHONE HYDROCHLORIDE 1 MG: 1 INJECTION, SOLUTION INTRAMUSCULAR; INTRAVENOUS; SUBCUTANEOUS at 14:35

## 2018-02-08 RX ADMIN — FAMOTIDINE 20 MG: 10 INJECTION, SOLUTION INTRAVENOUS at 18:00

## 2018-02-08 RX ADMIN — ONDANSETRON 4 MG: 2 INJECTION INTRAMUSCULAR; INTRAVENOUS at 10:31

## 2018-02-08 RX ADMIN — HYDROMORPHONE HYDROCHLORIDE 1 MG: 1 INJECTION, SOLUTION INTRAMUSCULAR; INTRAVENOUS; SUBCUTANEOUS at 21:33

## 2018-02-08 RX ADMIN — MEPERIDINE HYDROCHLORIDE 25 MG: 25 INJECTION INTRAMUSCULAR; INTRAVENOUS; SUBCUTANEOUS at 21:00

## 2018-02-08 RX ADMIN — FENTANYL CITRATE 50 MCG: 50 INJECTION INTRAMUSCULAR; INTRAVENOUS at 20:47

## 2018-02-08 RX ADMIN — ONDANSETRON 4 MG: 2 INJECTION INTRAMUSCULAR; INTRAVENOUS at 19:45

## 2018-02-08 RX ADMIN — HYDROMORPHONE HYDROCHLORIDE 1 MG: 1 INJECTION, SOLUTION INTRAMUSCULAR; INTRAVENOUS; SUBCUTANEOUS at 23:36

## 2018-02-08 RX ADMIN — MEPERIDINE HYDROCHLORIDE 25 MG: 50 INJECTION INTRAMUSCULAR; INTRAVENOUS; SUBCUTANEOUS at 20:25

## 2018-02-08 RX ADMIN — ONDANSETRON 4 MG: 2 INJECTION INTRAMUSCULAR; INTRAVENOUS at 14:35

## 2018-02-08 RX ADMIN — PROPOFOL 100 MG: 10 INJECTION, EMULSION INTRAVENOUS at 18:56

## 2018-02-08 RX ADMIN — GLYCOPYRROLATE 0.2 MG: 0.2 INJECTION INTRAMUSCULAR; INTRAVENOUS at 20:00

## 2018-02-08 RX ADMIN — FENTANYL CITRATE 50 MCG: 50 INJECTION INTRAMUSCULAR; INTRAVENOUS at 20:35

## 2018-02-08 RX ADMIN — FENTANYL CITRATE 50 MCG: 50 INJECTION INTRAMUSCULAR; INTRAVENOUS at 17:45

## 2018-02-08 RX ADMIN — SODIUM CHLORIDE 75 ML/HR: 9 INJECTION, SOLUTION INTRAVENOUS at 11:53

## 2018-02-08 RX ADMIN — HYDROMORPHONE HYDROCHLORIDE 0.5 MG: 10 INJECTION INTRAMUSCULAR; INTRAVENOUS; SUBCUTANEOUS at 02:04

## 2018-02-08 RX ADMIN — ROCURONIUM BROMIDE 20 MG: 10 INJECTION INTRAVENOUS at 18:56

## 2018-02-08 RX ADMIN — HUMAN INSULIN 8 UNITS: 100 INJECTION, SOLUTION SUBCUTANEOUS at 08:09

## 2018-02-08 RX ADMIN — DEXTROSE MONOHYDRATE 50 ML: 25 INJECTION, SOLUTION INTRAVENOUS at 08:09

## 2018-02-08 RX ADMIN — HYDROMORPHONE HYDROCHLORIDE 0.5 MG: 10 INJECTION INTRAMUSCULAR; INTRAVENOUS; SUBCUTANEOUS at 04:45

## 2018-02-08 RX ADMIN — HYDROMORPHONE HYDROCHLORIDE 1 MG: 1 INJECTION, SOLUTION INTRAMUSCULAR; INTRAVENOUS; SUBCUTANEOUS at 06:56

## 2018-02-08 RX ADMIN — LIDOCAINE HYDROCHLORIDE 60 MG: 20 INJECTION, SOLUTION INFILTRATION; PERINEURAL at 18:56

## 2018-02-08 RX ADMIN — MIDAZOLAM 1 MG: 1 INJECTION INTRAMUSCULAR; INTRAVENOUS at 17:45

## 2018-02-08 RX ADMIN — ONDANSETRON 4 MG: 2 INJECTION INTRAMUSCULAR; INTRAVENOUS at 21:33

## 2018-02-08 RX ADMIN — FUROSEMIDE 80 MG: 10 INJECTION, SOLUTION INTRAMUSCULAR; INTRAVENOUS at 08:21

## 2018-02-08 RX ADMIN — FENTANYL CITRATE 50 MCG: 50 INJECTION INTRAMUSCULAR; INTRAVENOUS at 19:26

## 2018-02-08 RX ADMIN — CEFAZOLIN SODIUM 2 G: 2 INJECTION, SOLUTION INTRAVENOUS at 23:36

## 2018-02-08 RX ADMIN — NEOSTIGMINE METHYLSULFATE 2 MG: 1 INJECTION INTRAMUSCULAR; INTRAVENOUS; SUBCUTANEOUS at 20:00

## 2018-02-08 NOTE — PROGRESS NOTES
Discharge Planning Assessment  [facility]     Patient Name: Vonda Jay  MRN: [id number]  Today's Date: 2/8/2018    Admit Date: 2/7/2018          Discharge Needs Assessment       02/08/18 1706    Living Environment    Lives With spouse    Living Arrangements house    Home Accessibility bed and bath on same level;no concerns    Stair Railings at Home outside, present at both sides    Transportation Available family or friend will provide;car    Living Environment    Quality Of Family Relationships supportive    Discharge Needs Assessment    Equipment Currently Used at Home none    Discharge Planning Comments Spoke with pt, her , and her son at bedside.  Facesheet and pharmacy info confirmed.  Pt lives in a house with her , is usually IADLs and can drive.  She has a cane and walker at home if needed.  No hx of HH or SNF.  Discussed DC options and pt requests referrals to [facility] and [facility]. Referrals called.              Discharge Plan       02/08/18 1710    Case Management/Social Work Plan    Plan Referrals to [facility] and [facility].  [place] pending.        Discharge Placement     Facility/Agency Request Status Selected? Address Phone Number Fax Number    [facility] Pending - Request Sent     [address] [address] [phone]    [facility] Pending - Request Sent     [address] [address] [phone]    [facility] Pending - Request Sent     [address] [address] [phone]                Demographic Summary       02/08/18 1703    Referral Information    Admission Type inpatient    Arrived From home or self-care    Referral Source admission list    Reason For Consult discharge planning    Record Reviewed medical record    Primary Care Physician Information    Name Ambrose Ashley MD             Functional Status       02/08/18 1705    Functional Status Current    Ambulation 4-->completely dependent    Transferring 4-->completely dependent    Toileting 4-->completely dependent    Bathing 2-->assistive person    Dressing 2-->assistive person    Eating 0-->independent    Communication 0-->understands/communicates without difficulty    Swallowing (if score 2 or more for any item, consult Rehab Services) 0-->swallows foods/liquids without difficulty    Functional Status Prior    Ambulation 0-->independent    Transferring 0-->independent    Toileting 0-->independent    Bathing 0-->independent    Dressing 0-->independent    Eating 0-->independent    Communication 0-->understands/communicates without difficulty    Swallowing 0-->swallows foods/liquids without difficulty    IADL    Medications independent    Meal Preparation independent    Housekeeping independent    Laundry independent    Shopping independent    Oral Care independent    Cognitive/Perceptual/Developmental    Current Mental Status/Cognitive Functioning no deficits noted            Patient Forms       02/08/18 1710    Patient Forms    Provider Choice List Delivered    Delivered to Patient    Method of delivery In person          Marissa Velasquez, RN

## 2018-02-08 NOTE — ED PROVIDER NOTES
Pt presents to the ED c/o L hip pain s/p fall just PTA. On exam, the pt's L leg is shortened and externally rotated, but has a good pulse.  Pt's L Hip XR shows acute intertrochanteric fracture with no other injuries.  The pt's pain seems to be controlled.  I agree with the plan to admit the pt for surgery of her L hip.      Attestation:  I supervised care provided by the midlevel provider.    We have discussed this patient's history, physical exam, and treatment plan.   I have reviewed the note and personally saw and examined the patient and agree with the plan of care.    Documentation assistance provided by eugene Carrasco for Dr. Lewis. Information recorded by the scribe was done at my direction and has been verified and validated by me.     So Carrasco  02/07/18 2000       Tera Lewis MD  02/07/18 0220

## 2018-02-08 NOTE — DISCHARGE PLACEMENT REQUEST
"ChelsieCatrachito birch (82 y.o. Female)     Date of Birth Social Security Number Address Home Phone MRN    1935  [id number]  [place] [address] [phone] [id number]    Anabaptism Marital Status          Mormonism        Admission Date Admission Type Admitting Provider Attending Provider Department, Room/Bed    2/7/18 Emergency Cyn Shepherd MD Schmitt, Christopher E, MD [address], P886/1    Discharge Date Discharge Disposition Discharge Destination                      Attending Provider: Mike Edmonds MD     Allergies:  No Known Allergies    Isolation:  None   Infection:  None   Code Status:  FULL    Ht:  157.5 cm (62\")   Wt:  83.2 kg (183 lb 8 oz)    Admission Cmt:  None   Principal Problem:  None                Active Insurance as of 2/7/2018     Primary Coverage     Payor Plan Insurance Group Employer/Plan Group    MEDICARE MEDICARE A & B      Payor Plan Address Payor Plan Phone Number Effective From Effective To    [address] [phone] 9/1/2000     [place] [address]       Subscriber Name Subscriber Birth Date Member ID       CATRACHITO JAY 1935 [id number]           Secondary Coverage     Payor Plan Insurance Group Employer/Plan Group    [facility] [id number]     Payor Plan Address Payor Plan Phone Number Effective From Effective To    [address]  12/1/2016     [place] [address]       Subscriber Name Subscriber Birth Date Member ID       CATRACHITO JAY 1935 [id number]                 Emergency Contacts      (Rel.) Home Phone Work Phone Mobile Phone    Geovany Jay (Spouse) [phone] -- --    MistyAngus (Son) [phone] -- --          "

## 2018-02-08 NOTE — PLAN OF CARE
Problem: Perioperative Period (Adult)  Goal: Signs and Symptoms of Listed Potential Problems Will be Absent or Manageable (Perioperative Period)  Outcome: Ongoing (interventions implemented as appropriate)   02/08/18 5655   Perioperative Period   Problems Assessed (Perioperative Period) pain;situational response   Problems Present (Perioperative Period) pain;situational response

## 2018-02-08 NOTE — ED NOTES
Attempted to call report.  Was told that nurse would have to call me back     Pk Sosa RN  02/07/18 2021

## 2018-02-08 NOTE — H&P
Internal medicine history and physical    INTERNAL MEDICINE   [facility]       Patient Identification:  Name: Vonda Jay  Age: 82 y.o.  Sex: female  :  1935  MRN: [id number]                   Primary Care Physician: Ambrose Ashley MD                                   Chief Complaint: Pain in the left hip after a fall while playing cards this afternoon.    History of Present Illness:   Patient is a 82-year-old female whose past medical history as noted below was feeling fine and was playing cards with her friends.  In the middle of the game she decided to get up and use the chair to support herself to turn around in the chair gave out and she landed on her left side on the ground.  Since that fall she could not get up and had severe pain in her left hip and was eventually brought to the emergency room revealed that she has a closed left intertrochanteric fracture.  Patient is being admitted for further care.  Prior to fall patient did not have any symptoms of chest pain shortness of breath nausea vomiting or diarrhea and since the fall she is nauseated after taking pain medications but denies any chest pain or shortness of breath.      Past Medical History:  Past Medical History:   Diagnosis Date   • Arthritis    • Hyperlipidemia    • Hypertension      Past Surgical History:  Past Surgical History:   Procedure Laterality Date   • BREAST LUMPECTOMY     • CAROTID ARTERY ANGIOPLASTY  ,    • CHOLECYSTECTOMY     • HYSTERECTOMY        Home Meds:    (Not in a hospital admission)  Current Meds:     Current Facility-Administered Medications:   •  Insert peripheral IV, , , Once **AND** sodium chloride 0.9 % flush 10 mL, 10 mL, Intravenous, PRN, Mer Garcia, APRN    Current Outpatient Prescriptions:   •  amLODIPine (NORVASC) 5 MG tablet, Take 5 mg by mouth 2 (Two) Times a Day., Disp: , Rfl:   •  aspirin 81 MG EC tablet, Take 81 mg by mouth Daily., Disp: , Rfl:   •   "cholecalciferol (VITAMIN D3) 1000 units tablet, Take 1,000 Units by mouth Daily., Disp: , Rfl:   •  CRANBERRY, VACC OXYCOCCUS, PO, Take 1 tablet by mouth Daily., Disp: , Rfl:   •  hydrALAZINE (APRESOLINE) 25 MG tablet, Take 25 mg by mouth 3 (Three) Times a Day., Disp: , Rfl:   •  lisinopril (PRINIVIL,ZESTRIL) 20 MG tablet, Take 20 mg by mouth Daily., Disp: , Rfl:   •  lisinopril-hydrochlorothiazide (PRINZIDE,ZESTORETIC) 20-25 MG per tablet, Take 1 tablet by mouth Daily., Disp: , Rfl:   •  meloxicam (MOBIC) 15 MG tablet, Take 15 mg by mouth Daily., Disp: , Rfl:   •  pravastatin (PRAVACHOL) 40 MG tablet, Take 40 mg by mouth Daily., Disp: , Rfl:   Allergies:  No Known Allergies  Social History:   Social History   Substance Use Topics   • Smoking status: Not on file   • Smokeless tobacco: Not on file   • Alcohol use Not on file      Family History:  History reviewed. No pertinent family history.       Review of Systems  See history of present illness and past medical history..  Constitutional: Negative for activity change, appetite change, diaphoresis and fever.   HENT: Negative for trouble swallowing.    Eyes: Negative for visual disturbance.   Respiratory: Negative for cough, chest tightness, shortness of breath and wheezing.    Cardiovascular: Negative for chest pain, palpitations and leg swelling.   Gastrointestinal: Negative for abdominal pain, diarrhea, nausea and vomiting.   Genitourinary: Negative for dysuria.   Musculoskeletal: Positive for arthralgias (R hip). Negative for back pain.   Skin: Negative for rash.   Neurological: Negative for dizziness, speech difficulty and light-headedness.        Vitals:   /72  Pulse 76  Temp 97.3 °F (36.3 °C) (Oral)   Resp 20  Ht 157.5 cm (62\")  Wt 81.6 kg (180 lb)  SpO2 98%  BMI 32.92 kg/m2  I/O: No intake or output data in the 24 hours ending 02/07/18 2012  Exam:  General Appearance:    Alert, cooperative, no distress, appears stated age   Head:    " Normocephalic, without obvious abnormality, atraumatic   Eyes:    PERRL, conjunctiva/corneas clear, EOM's intact, both eyes   Ears:    Normal external ear canals, both ears   Nose:   Nares normal, septum midline, mucosa normal, no drainage    or sinus tenderness   Throat:   Lips, tongue, gums normal; oral mucosa pink and moist   Neck:   Supple, symmetrical, trachea midline, no adenopathy;     thyroid:  no enlargement/tenderness/nodules; no carotid    bruit or JVD   Back:     Symmetric, no curvature, ROM normal, no CVA tenderness   Lungs:     Clear to auscultation bilaterally, respirations unlabored   Chest Wall:    No tenderness or deformity    Heart:    Regular rate and rhythm, S1 and S2 normal, no murmur, rub   or gallop   Abdomen:     Soft, non-tender, bowel sounds active all four quadrants,     no masses, no hepatomegaly, no splenomegaly   Extremities:    left lower extremity Externally rotated and tender    Pulses:   Pulses palpable in all extremities; symmetric all extremities   Skin:   Skin color normal, Skin is warm and dry,  no rashes or palpable lesions   Neurologic:   CNII-XII intact, motor strength grossly intact, sensation grossly intact to light touch, no focal deficits noted       Data Review:      I reviewed the patient's new clinical results.    Results from last 7 days  Lab Units 02/07/18  1653   WBC 10*3/mm3 17.73*   HEMOGLOBIN g/dL 12.5   PLATELETS 10*3/mm3 302       Results from last 7 days  Lab Units 02/07/18  1815   SODIUM mmol/L 137   POTASSIUM mmol/L 5.4*   CHLORIDE mmol/L 100   CO2 mmol/L 23.5   BUN mg/dL 38*   CREATININE mg/dL 1.18*   CALCIUM mg/dL 9.2   GLUCOSE mg/dL 117*     EKG shows sinus rhythm heart rate 79 bpm no acute ischemic changes noted.  Assessment:  Active Hospital Problems (** Indicates Principal Problem)    Diagnosis Date Noted   • Closed displaced intertrochanteric fracture of left femur [S72.142A] 02/07/2018   • SANYA (acute kidney injury) [N17.9] 02/07/2018   •  Hyperkalemia [E87.5] 02/07/2018   • HTN (hypertension) [I10] 02/07/2018      Resolved Hospital Problems    Diagnosis Date Noted Date Resolved   No resolved problems to display.       Plan:  Mechanical fall  Closed displaced left intertrochanteric fracture - uncontrolled orthopedic surgery consultation and DVT prophylaxis with SCDs.  Acute kidney injury with underlying chronic kidney disease and dehydration-IV fluids  Hypertension continue her home medications avoid Zestril and that could contribute to hyperkalemia instead use beta blockers.  Continue her Norvasc.  Hyperkalemia multifactorial-Kayexalate ×1 dose  Risk stratification and surgical clearance: Patient does not have any symptoms of cardiac decompensation should be able to undergo needed surgical repair of left intertrochanteric fracture and this cleared for surgery from internal medicine standpoint acceptable cardiovascular risk and close intraoperative and postoperative monitoring as well as electrolyte management.    Cyn Shepherd MD   2/7/2018  8:12 PM  Much of this encounter note is an electronic transcription/translation of spoken language to printed text. The electronic translation of spoken language may permit erroneous, or at times, nonsensical words or phrases to be inadvertently transcribed; Although I have reviewed the note for such errors, some may still exist

## 2018-02-08 NOTE — CONSULTS
Referring Provider: Dr. Martins   Reason for Consultation: SANYA/hyperkalemia    Subjective     Chief complaint   Chief Complaint   Patient presents with   • Fall   • Hip Pain       History of present illness:    82-year-old woman with history of hypertension who presented to the emergency by EMS after she has fallen and sustained a fracture to her left hip.  She was in severe pain.  She was admitted for further treatment and evaluation.  Surgery is evaluating her.  It was noted that her creatinine is up to 1.78 today from 1.1 and her potassium is 6.3.   is in severe pain.  Denied shortness of breath or chest pain.  She's been relatively healthy and quite active.  Her blood pressure has been on the low side, systolic between 1900.  Recorded 325 cc of urine output.  She has no recollection of any kidney disease no history of kidney stones.  There is no family history of kidney disease either.  Patient has orders to receive Kayexalate, D50 and insulin.   Among her home medications were lisinopril and potassium chloride over-the-counter which she was taking for leg cramps.  Did have one episode of vomiting just prior to her interview.    Past Medical History:   Diagnosis Date   • Arthritis    • Hyperlipidemia    • Hypertension      Past Surgical History:   Procedure Laterality Date   • BREAST LUMPECTOMY  1998   • CAROTID ARTERY ANGIOPLASTY  2000, 2013   • CHOLECYSTECTOMY  1979   • HYSTERECTOMY  1989     History reviewed. No pertinent family history.    Social History   Substance Use Topics   • Smoking status: None   • Smokeless tobacco: None   • Alcohol use None     Prescriptions Prior to Admission   Medication Sig Dispense Refill Last Dose   • amLODIPine (NORVASC) 5 MG tablet Take 5 mg by mouth 2 (Two) Times a Day.   2/7/2018 at 0900   • aspirin 81 MG EC tablet Take 81 mg by mouth Daily.   2/7/2018 at Unknown time   • cholecalciferol (VITAMIN D3) 1000 units tablet Take 1,000 Units by mouth Daily.   2/7/2018 at  "Unknown time   • CRANBERRY, VACC OXYCOCCUS, PO Take 1 tablet by mouth Daily.   2/7/2018 at Unknown time   • hydrALAZINE (APRESOLINE) 25 MG tablet Take 25 mg by mouth 3 (Three) Times a Day.   2/7/2018 at 1200   • lisinopril (PRINIVIL,ZESTRIL) 20 MG tablet Take 20 mg by mouth Daily.   2/7/2018 at Unknown time   • lisinopril-hydrochlorothiazide (PRINZIDE,ZESTORETIC) 20-25 MG per tablet Take 1 tablet by mouth Daily.   2/7/2018 at Unknown time   • meloxicam (MOBIC) 15 MG tablet Take 15 mg by mouth Daily.   2/7/2018 at Unknown time   • pravastatin (PRAVACHOL) 40 MG tablet Take 40 mg by mouth Daily.   2/6/2018 at 2100     Allergies:  Review of patient's allergies indicates no known allergies.    Review of Systems  Review of systems is negative other than what is mentioned in the history of present illness.    Objective     Vital Signs  Temp:  [97.3 °F (36.3 °C)-99.2 °F (37.3 °C)] 99.2 °F (37.3 °C)  Heart Rate:  [] 79  Resp:  [18-20] 18  BP: ()/(48-79) 103/58    Flowsheet Rows         First Filed Value    Admission Height  157.5 cm (62\") Documented at 02/07/2018 1656    Admission Weight  81.6 kg (180 lb) Documented at 02/07/2018 1656              I/O last 3 completed shifts:  In: 351.7 [I.V.:351.7]  Out: 325 [Urine:325]    Intake/Output Summary (Last 24 hours) at 02/08/18 1246  Last data filed at 02/08/18 0700   Gross per 24 hour   Intake            351.7 ml   Output              325 ml   Net             26.7 ml       Physical Exam:  Krueger is in severe pain.  Trying to avoid movement.  Oromucosa is dry.  Mild pallor.  No icterus.  No JVD.  Lungs are clear to auscultation bilaterally.  Heart is regular rate and rhythm slightly tachycardic.  Abdomen is soft, nontender, nondistended.  No hepatosplenomegaly.  Extremities without edema or calf tenderness.  I did not examine her left hip area.  mood and affect were appropriate.  Mental status is clear.    Results Review:  Results for orders placed or performed during " the hospital encounter of 02/07/18   Basic Metabolic Panel   Result Value Ref Range    Glucose 117 (H) 65 - 99 mg/dL    BUN 38 (H) 8 - 23 mg/dL    Creatinine 1.18 (H) 0.57 - 1.00 mg/dL    Sodium 137 136 - 145 mmol/L    Potassium 5.4 (H) 3.5 - 5.2 mmol/L    Chloride 100 98 - 107 mmol/L    CO2 23.5 22.0 - 29.0 mmol/L    Calcium 9.2 8.6 - 10.5 mg/dL    eGFR Non African Amer 44 (L) >60 mL/min/1.73    BUN/Creatinine Ratio 32.2 (H) 7.0 - 25.0    Anion Gap 13.5 mmol/L   Protime-INR   Result Value Ref Range    Protime 13.1 11.7 - 14.2 Seconds    INR 1.01 0.90 - 1.10   CBC Auto Differential   Result Value Ref Range    WBC 17.73 (H) 4.50 - 10.70 10*3/mm3    RBC 4.07 3.90 - 5.20 10*6/mm3    Hemoglobin 12.5 11.9 - 15.5 g/dL    Hematocrit 38.7 35.6 - 45.5 %    MCV 95.1 80.5 - 98.2 fL    MCH 30.7 26.9 - 32.0 pg    MCHC 32.3 (L) 32.4 - 36.3 g/dL    RDW 13.5 (H) 11.7 - 13.0 %    RDW-SD 47.1 37.0 - 54.0 fl    MPV 11.6 6.0 - 12.0 fL    Platelets 302 140 - 500 10*3/mm3    Neutrophil % 73.0 42.7 - 76.0 %    Lymphocyte % 16.2 (L) 19.6 - 45.3 %    Monocyte % 8.2 5.0 - 12.0 %    Eosinophil % 1.7 0.3 - 6.2 %    Basophil % 0.3 0.0 - 1.5 %    Immature Grans % 0.6 (H) 0.0 - 0.5 %    Neutrophils, Absolute 12.92 (H) 1.90 - 8.10 10*3/mm3    Lymphocytes, Absolute 2.88 0.90 - 4.80 10*3/mm3    Monocytes, Absolute 1.46 (H) 0.20 - 1.20 10*3/mm3    Eosinophils, Absolute 0.30 0.00 - 0.70 10*3/mm3    Basophils, Absolute 0.06 0.00 - 0.20 10*3/mm3    Immature Grans, Absolute 0.11 (H) 0.00 - 0.03 10*3/mm3   Comprehensive Metabolic Panel   Result Value Ref Range    Glucose 138 (H) 65 - 99 mg/dL    BUN 45 (H) 8 - 23 mg/dL    Creatinine 1.78 (H) 0.57 - 1.00 mg/dL    Sodium 136 136 - 145 mmol/L    Potassium 6.3 (C) 3.5 - 5.2 mmol/L    Chloride 101 98 - 107 mmol/L    CO2 23.4 22.0 - 29.0 mmol/L    Calcium 8.7 8.6 - 10.5 mg/dL    Total Protein 6.5 6.0 - 8.5 g/dL    Albumin 4.00 3.50 - 5.20 g/dL    ALT (SGPT) 32 1 - 33 U/L    AST (SGOT) 29 1 - 32 U/L    Alkaline  Phosphatase 59 39 - 117 U/L    Total Bilirubin 0.4 0.1 - 1.2 mg/dL    eGFR Non African Amer 27 (L) >60 mL/min/1.73    Globulin 2.5 gm/dL    A/G Ratio 1.6 g/dL    BUN/Creatinine Ratio 25.3 (H) 7.0 - 25.0    Anion Gap 11.6 mmol/L   CBC Auto Differential   Result Value Ref Range    WBC 15.11 (H) 4.50 - 10.70 10*3/mm3    RBC 3.50 (L) 3.90 - 5.20 10*6/mm3    Hemoglobin 10.7 (L) 11.9 - 15.5 g/dL    Hematocrit 33.8 (L) 35.6 - 45.5 %    MCV 96.6 80.5 - 98.2 fL    MCH 30.6 26.9 - 32.0 pg    MCHC 31.7 (L) 32.4 - 36.3 g/dL    RDW 14.1 (H) 11.7 - 13.0 %    RDW-SD 49.6 37.0 - 54.0 fl    MPV 11.0 6.0 - 12.0 fL    Platelets 265 140 - 500 10*3/mm3    Neutrophil % 79.2 (H) 42.7 - 76.0 %    Lymphocyte % 10.5 (L) 19.6 - 45.3 %    Monocyte % 8.8 5.0 - 12.0 %    Eosinophil % 0.7 0.3 - 6.2 %    Basophil % 0.3 0.0 - 1.5 %    Immature Grans % 0.5 0.0 - 0.5 %    Neutrophils, Absolute 11.98 (H) 1.90 - 8.10 10*3/mm3    Lymphocytes, Absolute 1.59 0.90 - 4.80 10*3/mm3    Monocytes, Absolute 1.33 (H) 0.20 - 1.20 10*3/mm3    Eosinophils, Absolute 0.10 0.00 - 0.70 10*3/mm3    Basophils, Absolute 0.04 0.00 - 0.20 10*3/mm3    Immature Grans, Absolute 0.07 (H) 0.00 - 0.03 10*3/mm3   Magnesium   Result Value Ref Range    Magnesium 2.3 1.6 - 2.4 mg/dL   Basic Metabolic Panel   Result Value Ref Range    Glucose 109 (H) 65 - 99 mg/dL    BUN 46 (H) 8 - 23 mg/dL    Creatinine 1.98 (H) 0.57 - 1.00 mg/dL    Sodium 141 136 - 145 mmol/L    Potassium 5.3 (H) 3.5 - 5.2 mmol/L    Chloride 105 98 - 107 mmol/L    CO2 22.7 22.0 - 29.0 mmol/L    Calcium 8.6 8.6 - 10.5 mg/dL    eGFR Non African Amer 24 (L) >60 mL/min/1.73    BUN/Creatinine Ratio 23.2 7.0 - 25.0    Anion Gap 13.3 mmol/L     Imaging Results (last 72 hours)     Procedure Component Value Units Date/Time    XR Hip With or Without Pelvis 2 - 3 View Left [[id number]] Collected:  02/07/18 1906     Updated:  02/07/18 1955    Narrative:       TWO-VIEW LEFT HIP AND 1-VIEW AP PELVIS     HISTORY: Fell. Pain.      There is an acute intertrochanteric fracture involving the left proximal  femur without significant displacement.     This report was finalized on 2/7/2018 7:52 PM by Dr. Mo Parker MD.       XR Chest 1 View [[id number]] Collected:  02/07/18 2003     Updated:  02/07/18 2023    Narrative:       ONE VIEW PORTABLE CHEST     HISTORY: Hip fracture. Fell. Chest pain.     The lungs are moderately well-expanded and grossly clear. There are  calcified right hilar and mediastinal lymph nodes. The heart is mildly  enlarged and no acute abnormality is seen.     This report was finalized on 2/7/2018 8:20 PM by Dr. Mo Parker MD.                 atorvastatin 10 mg Oral Daily   cholecalciferol 1,000 Units Oral Daily   hydrALAZINE 25 mg Oral TID   sodium polystyrene 15 g Oral Once       sodium chloride 75 mL/hr Last Rate: 75 mL/hr (02/08/18 1153)       Assessment/Plan     Active Problems:    Closed displaced intertrochanteric fracture of left femur    SANYA (acute kidney injury)    Hyperkalemia    HTN (hypertension)    - Acute kidney injury with life-threatening hyperkalemia in the setting of hip fracture and hypotension.  Patient will be given IV fluid to support blood pressure and monitor.  I ordered a bolus of 500 cc of normal saline and continue with 75 cc an hour after that.  We'll check CPK level.  Holding ACEI, meloxican and potassium supplements.  I also held her amlodipine due to hypotension.  Will check urinalysis.  Will insert Philip catheter as well.  I'm not sure if she has an underlying CK D.  - Hyperkalemia, treated aggressively. Kayexalate has not been given yet.  We'll hold off since she's going to the OR possibly this afternoon.  Repeat stat basic metabolic panel at 1:00 and I asked to be called with results.    - Anemia, multifactorial monitor.  Transfuse when necessary.    - History of hypertension, blood pressure is on the low side.  IV fluid support blood pressure for now.    - Left hip fracture, per  ortho.  Discussed with patient and daughter at bedside in details.  Discussed with nursing staff.      Thank you very much for consultation.  Will follow.    Phillip Dasilva MD  02/08/18  12:46 PM

## 2018-02-08 NOTE — CONSULTS
Orthopedic Consult      Patient: Vonda Jay    Date of Admission: 2/7/2018  4:42 PM    YOB: 1935    Medical Record Number: [id number]    Attending Physician: Mike Edmonds MD    Chief Complaints: Left intertrochanteric femur fracture    History of Present Illness: 82 y.o. female admitted to [facility] to services of Mike Edmonds MD with an unstable intertrochanteric femur fracture. Patient reports that injury was sustained in a fall while playing cards. She landed awkwardly on the affected hip. Noticed immediate pain and inability to bear weight on the leg. Was brought to the ER and diagnosed with a displaced intertrochanteric femur fracture. Localizes the pain to the groin. Describes pain as severe, constant, worse with any movement but alleviated by rest. Denies LOC. Denies any other associated injury or complaint.    Allergies: No Known Allergies    Medications:   Home Medications:    Current Facility-Administered Medications:   •  [MAR Hold] acetaminophen (TYLENOL) tablet 650 mg, 650 mg, Oral, Q4H PRN, Cyn Shepherd MD  •  atorvastatin (LIPITOR) tablet 10 mg, 10 mg, Oral, Daily, Cyn Shepherd MD  •  ceFAZolin in dextrose (ANCEF) IVPB solution 2 g, 2 g, Intravenous, Once, Zheng Fleming MD  •  cholecalciferol (VITAMIN D3) tablet 1,000 Units, 1,000 Units, Oral, Daily, Cyn Shepherd MD  •  fentaNYL citrate (PF) (SUBLIMAZE) injection 50 mcg, 50 mcg, Intravenous, Q10 Min PRN, Compa Chen MD, 50 mcg at 02/08/18 1745  •  hydrALAZINE (APRESOLINE) tablet 25 mg, 25 mg, Oral, TID, Cyn Shepherd MD, 25 mg at 02/07/18 2214  •  [MAR Hold] HYDROmorphone (DILAUDID) injection 1 mg, 1 mg, Intravenous, Q2H PRN, Pollo Martins MD, 1 mg at 02/08/18 1435  •  lactated ringers infusion, 9 mL/hr, Intravenous, Continuous, Compa Chen MD, Last Rate: 9 mL/hr at 02/08/18 1740, 9 mL/hr at 02/08/18 1740  •  lidocaine PF 1% (XYLOCAINE) injection 0.5 mL, 0.5 mL, Injection, Once  PRN, Compa Chen MD  •  midazolam (VERSED) injection 1 mg, 1 mg, Intravenous, Q5 Min PRN, 1 mg at 02/08/18 1745 **OR** midazolam (VERSED) injection 2 mg, 2 mg, Intravenous, Q5 Min PRN, Compa Chen MD  •  [MAR Hold] ondansetron (ZOFRAN) injection 4 mg, 4 mg, Intravenous, Q4H PRN, Mike Edmonds MD, 4 mg at 02/08/18 1435  •  [MAR Hold] sodium chloride 0.9 % flush 1-10 mL, 1-10 mL, Intravenous, PRN, Cyn Shepherd MD  •  sodium chloride 0.9 % flush 1-10 mL, 1-10 mL, Intravenous, PRN, Zheng Fleming MD  •  sodium chloride 0.9 % flush 1-10 mL, 1-10 mL, Intravenous, PRN, Compa Chen MD  •  Insert peripheral IV, , , Once **AND** sodium chloride 0.9 % flush 10 mL, 10 mL, Intravenous, PRN, Mer Garcia, APRN  •  sodium chloride 0.9 % infusion, 75 mL/hr, Intravenous, Continuous, Jawevaristo Shepherd MD, Last Rate: 75 mL/hr at 02/08/18 1153, 75 mL/hr at 02/08/18 1153  •  [MAR Hold] sodium polystyrene (KAYEXALATE) 15 GM/60ML suspension 15 g, 15 g, Oral, Once, Pollo Martins MD, Stopped at 02/08/18 1230    Current Medications:  Scheduled Meds:  atorvastatin 10 mg Oral Daily   ceFAZolin 2 g Intravenous Once   cholecalciferol 1,000 Units Oral Daily   hydrALAZINE 25 mg Oral TID   [MAR Hold] sodium polystyrene 15 g Oral Once     Continuous Infusions:  lactated ringers 9 mL/hr Last Rate: 9 mL/hr (02/08/18 1740)   sodium chloride 75 mL/hr Last Rate: 75 mL/hr (02/08/18 1153)     PRN Meds:.•  [MAR Hold] acetaminophen  •  fentanyl  •  [MAR Hold] HYDROmorphone  •  lidocaine PF 1%  •  midazolam **OR** midazolam  •  [MAR Hold] ondansetron  •  [MAR Hold] sodium chloride  •  sodium chloride  •  sodium chloride  •  Insert peripheral IV **AND** sodium chloride    Past Medical History:   Diagnosis Date   • Arthritis    • Hyperlipidemia    • Hypertension        Past Surgical History:   Procedure Laterality Date   • BREAST LUMPECTOMY  1998   • CAROTID ARTERY ANGIOPLASTY  2000, 2013   • CHOLECYSTECTOMY  1979    • HYSTERECTOMY  1989       Social History     Occupational History   • Not on file.     Social History Main Topics   • Smoking status: Not on file   • Smokeless tobacco: Not on file   • Alcohol use Not on file   • Drug use: Not on file   • Sexual activity: Defer    Social History     Social History Narrative       History reviewed. No pertinent family history.    Review of Systems:      Constitutional: Denies fever, shaking or chills   Eyes: Denies change in visual acuity   HEENT: Denies nasal congestion or sore throat   Respiratory: Denies cough or shortness of breath   Cardiovascular: Denies chest pain or edema  Endocrine: Denies tremors, palpitations, intolerance of heat or cold, polyuria, polydipsia.  GI: Denies abdominal pain, nausea, vomiting, bloody stools or diarrhea  : Denies frequency, urgency, incontinence, retention, or nocturia.  Musculoskeletal: Denies numbness tingling or loss of motor function except as above  Integument: Denies rash, lesion or ulceration   Neurologic: Denies headache or focal weakness, deficits  Heme: Denies epistaxis, spontaneous or excessive bleeding, epistaxis, hematuria, melena, fatigue, enlarged or tender lymph nodes.      All other pertinent positives and negatives as noted above in HPI.    Physical Exam: 82 y.o. female     Vitals:    02/08/18 1100 02/08/18 1356 02/08/18 1429 02/08/18 1741   BP: 103/58 (!) 88/58 135/64 131/68   BP Location: Right arm Right arm  Right arm   Patient Position: Lying Lying  Lying   Pulse: 79 81 90 93   Resp: 18 18  20   Temp: 99.2 °F (37.3 °C) 98.3 °F (36.8 °C)  98.5 °F (36.9 °C)   TempSrc: Oral Oral  Oral   SpO2: 100% 94% 99% 99%   Weight:       Height:         General:  Awake, alert. No acute distress.      Head/Neck:  Normocephalic, atraumatic.  Conjunctiva and sclera clear.  Hearing adequate for the exam.  Neck is supple with normal ROM.    Psych:  Affect and demeanor appropriate.    CV:  Regular rate and rhythm.  Hemodynamically  stable.    Lungs:  Good chest expansion, breathing unlabored.    Abdomen:  Soft.  Non-tender, non-distended.    Extremities:      Left lower extremity: Skin appears benign without obvious lacerations, ulcerations or lesions. Leg appears shortened and externally rotated. Compartments soft without evidence for DVT or compartment syndrome. No atrophy. No palpable masses or adenopathy. Focal TTP over hip. ROM of hip extremely limited and uncomfortable. No pain with passive motion of knee, ankle. Strength well-preserved distally. Sensation to light touch grossly intact distally. Good skin turgor, brisk cap refill and good pulses distally.    All other extremities atraumatic without gross abnormality. Contralateral leg demonstrates no bony tenderness. Good hip, knee motion. No instability. Good motor and sensory function distally with brisk cap refill.    Diagnostic Tests:    Admission on 02/07/2018   Component Date Value Ref Range Status   • Glucose 02/07/2018 117* 65 - 99 mg/dL Final   • BUN 02/07/2018 38* 8 - 23 mg/dL Final   • Creatinine 02/07/2018 1.18* 0.57 - 1.00 mg/dL Final   • Sodium 02/07/2018 137  136 - 145 mmol/L Final   • Potassium 02/07/2018 5.4* 3.5 - 5.2 mmol/L Final   • Chloride 02/07/2018 100  98 - 107 mmol/L Final   • CO2 02/07/2018 23.5  22.0 - 29.0 mmol/L Final   • Calcium 02/07/2018 9.2  8.6 - 10.5 mg/dL Final   • eGFR Non African Amer 02/07/2018 44* >60 mL/min/1.73 Final   • BUN/Creatinine Ratio 02/07/2018 32.2* 7.0 - 25.0 Final   • Anion Gap 02/07/2018 13.5  mmol/L Final   • Protime 02/07/2018 13.1  11.7 - 14.2 Seconds Final   • INR 02/07/2018 1.01  0.90 - 1.10 Final   • WBC 02/07/2018 17.73* 4.50 - 10.70 10*3/mm3 Final   • RBC 02/07/2018 4.07  3.90 - 5.20 10*6/mm3 Final   • Hemoglobin 02/07/2018 12.5  11.9 - 15.5 g/dL Final   • Hematocrit 02/07/2018 38.7  35.6 - 45.5 % Final   • MCV 02/07/2018 95.1  80.5 - 98.2 fL Final   • MCH 02/07/2018 30.7  26.9 - 32.0 pg Final   • MCHC 02/07/2018 32.3* 32.4 -  36.3 g/dL Final   • RDW 02/07/2018 13.5* 11.7 - 13.0 % Final   • RDW-SD 02/07/2018 47.1  37.0 - 54.0 fl Final   • MPV 02/07/2018 11.6  6.0 - 12.0 fL Final   • Platelets 02/07/2018 302  140 - 500 10*3/mm3 Final   • Neutrophil % 02/07/2018 73.0  42.7 - 76.0 % Final   • Lymphocyte % 02/07/2018 16.2* 19.6 - 45.3 % Final   • Monocyte % 02/07/2018 8.2  5.0 - 12.0 % Final   • Eosinophil % 02/07/2018 1.7  0.3 - 6.2 % Final   • Basophil % 02/07/2018 0.3  0.0 - 1.5 % Final   • Immature Grans % 02/07/2018 0.6* 0.0 - 0.5 % Final   • Neutrophils, Absolute 02/07/2018 12.92* 1.90 - 8.10 10*3/mm3 Final   • Lymphocytes, Absolute 02/07/2018 2.88  0.90 - 4.80 10*3/mm3 Final   • Monocytes, Absolute 02/07/2018 1.46* 0.20 - 1.20 10*3/mm3 Final   • Eosinophils, Absolute 02/07/2018 0.30  0.00 - 0.70 10*3/mm3 Final   • Basophils, Absolute 02/07/2018 0.06  0.00 - 0.20 10*3/mm3 Final   • Immature Grans, Absolute 02/07/2018 0.11* 0.00 - 0.03 10*3/mm3 Final   • Glucose 02/08/2018 138* 65 - 99 mg/dL Final   • BUN 02/08/2018 45* 8 - 23 mg/dL Final   • Creatinine 02/08/2018 1.78* 0.57 - 1.00 mg/dL Final   • Sodium 02/08/2018 136  136 - 145 mmol/L Final   • Potassium 02/08/2018 6.3* 3.5 - 5.2 mmol/L Final   • Chloride 02/08/2018 101  98 - 107 mmol/L Final   • CO2 02/08/2018 23.4  22.0 - 29.0 mmol/L Final   • Calcium 02/08/2018 8.7  8.6 - 10.5 mg/dL Final   • Total Protein 02/08/2018 6.5  6.0 - 8.5 g/dL Final   • Albumin 02/08/2018 4.00  3.50 - 5.20 g/dL Final   • ALT (SGPT) 02/08/2018 32  1 - 33 U/L Final   • AST (SGOT) 02/08/2018 29  1 - 32 U/L Final   • Alkaline Phosphatase 02/08/2018 59  39 - 117 U/L Final   • Total Bilirubin 02/08/2018 0.4  0.1 - 1.2 mg/dL Final   • eGFR Non African Amer 02/08/2018 27* >60 mL/min/1.73 Final   • Globulin 02/08/2018 2.5  gm/dL Final   • A/G Ratio 02/08/2018 1.6  g/dL Final   • BUN/Creatinine Ratio 02/08/2018 25.3* 7.0 - 25.0 Final   • Anion Gap 02/08/2018 11.6  mmol/L Final   • WBC 02/08/2018 15.11* 4.50 - 10.70  10*3/mm3 Final   • RBC 02/08/2018 3.50* 3.90 - 5.20 10*6/mm3 Final   • Hemoglobin 02/08/2018 10.7* 11.9 - 15.5 g/dL Final   • Hematocrit 02/08/2018 33.8* 35.6 - 45.5 % Final   • MCV 02/08/2018 96.6  80.5 - 98.2 fL Final   • MCH 02/08/2018 30.6  26.9 - 32.0 pg Final   • MCHC 02/08/2018 31.7* 32.4 - 36.3 g/dL Final   • RDW 02/08/2018 14.1* 11.7 - 13.0 % Final   • RDW-SD 02/08/2018 49.6  37.0 - 54.0 fl Final   • MPV 02/08/2018 11.0  6.0 - 12.0 fL Final   • Platelets 02/08/2018 265  140 - 500 10*3/mm3 Final   • Neutrophil % 02/08/2018 79.2* 42.7 - 76.0 % Final   • Lymphocyte % 02/08/2018 10.5* 19.6 - 45.3 % Final   • Monocyte % 02/08/2018 8.8  5.0 - 12.0 % Final   • Eosinophil % 02/08/2018 0.7  0.3 - 6.2 % Final   • Basophil % 02/08/2018 0.3  0.0 - 1.5 % Final   • Immature Grans % 02/08/2018 0.5  0.0 - 0.5 % Final   • Neutrophils, Absolute 02/08/2018 11.98* 1.90 - 8.10 10*3/mm3 Final   • Lymphocytes, Absolute 02/08/2018 1.59  0.90 - 4.80 10*3/mm3 Final   • Monocytes, Absolute 02/08/2018 1.33* 0.20 - 1.20 10*3/mm3 Final   • Eosinophils, Absolute 02/08/2018 0.10  0.00 - 0.70 10*3/mm3 Final   • Basophils, Absolute 02/08/2018 0.04  0.00 - 0.20 10*3/mm3 Final   • Immature Grans, Absolute 02/08/2018 0.07* 0.00 - 0.03 10*3/mm3 Final   • Magnesium 02/08/2018 2.3  1.6 - 2.4 mg/dL Final   • Glucose 02/08/2018 109* 65 - 99 mg/dL Final   • BUN 02/08/2018 46* 8 - 23 mg/dL Final   • Creatinine 02/08/2018 1.98* 0.57 - 1.00 mg/dL Final   • Sodium 02/08/2018 141  136 - 145 mmol/L Final   • Potassium 02/08/2018 5.3* 3.5 - 5.2 mmol/L Final   • Chloride 02/08/2018 105  98 - 107 mmol/L Final   • CO2 02/08/2018 22.7  22.0 - 29.0 mmol/L Final   • Calcium 02/08/2018 8.6  8.6 - 10.5 mg/dL Final   • eGFR Non African Amer 02/08/2018 24* >60 mL/min/1.73 Final   • BUN/Creatinine Ratio 02/08/2018 23.2  7.0 - 25.0 Final   • Anion Gap 02/08/2018 13.3  mmol/L Final   • Color, UA 02/08/2018 Yellow  Yellow, Straw Final   • Appearance, UA 02/08/2018 Clear   Clear Final   • pH, UA 02/08/2018 5.5  5.0 - 8.0 Final   • Specific Gravity, UA 02/08/2018 1.015  1.005 - 1.030 Final   • Glucose, UA 02/08/2018 Negative  Negative Final   • Ketones, UA 02/08/2018 Negative  Negative Final   • Bilirubin, UA 02/08/2018 Negative  Negative Final   • Blood, UA 02/08/2018 Negative  Negative Final   • Protein, UA 02/08/2018 Negative  Negative Final   • Leuk Esterase, UA 02/08/2018 Small (1+)* Negative Final   • Nitrite, UA 02/08/2018 Positive* Negative Final   • Urobilinogen, UA 02/08/2018 0.2 E.U./dL  0.2 - 1.0 E.U./dL Final   • Creatine Kinase 02/08/2018 68  20 - 180 U/L Final   • ABO Type 02/08/2018 O   Final   • RH type 02/08/2018 Positive   Final   • Antibody Screen 02/08/2018 Negative   Final   • Sodium, Urine 02/08/2018 86  mmol/L Final   • Creatinine, Urine 02/08/2018 73.7  mg/dL Final   • Chloride, Urine 02/08/2018 110  mmol/L Final   • RBC, UA 02/08/2018 None Seen  None Seen, 0-2 /HPF Final   • WBC, UA 02/08/2018 3-5* None Seen, 0-2 /HPF Final   • Bacteria, UA 02/08/2018 1+* None Seen /HPF Final   • Squamous Epithelial Cells, UA 02/08/2018 0-2  None Seen, 0-2 /HPF Final   • Hyaline Casts, UA 02/08/2018 0-2  None Seen /LPF Final   • Methodology 02/08/2018 Manual Light Microscopy   Final     Lab Results (last 24 hours)     Procedure Component Value Units Date/Time    Protime-INR [[id number]]  (Normal) Collected:  02/07/18 1815    Specimen:  Blood Updated:  02/07/18 1841     Protime 13.1 Seconds      INR 1.01    Basic Metabolic Panel [[id number]]  (Abnormal) Collected:  02/07/18 1815    Specimen:  Blood Updated:  02/07/18 1844     Glucose 117 (H) mg/dL      BUN 38 (H) mg/dL      Creatinine 1.18 (H) mg/dL      Sodium 137 mmol/L      Potassium 5.4 (H) mmol/L      Chloride 100 mmol/L      CO2 23.5 mmol/L      Calcium 9.2 mg/dL      eGFR Non African Amer 44 (L) mL/min/1.73      BUN/Creatinine Ratio 32.2 (H)     Anion Gap 13.5 mmol/L     Narrative:       The MDRD GFR formula is only valid  for adults with stable renal function between ages 18 and 70.    CBC Auto Differential [[id number]]  (Abnormal) Collected:  02/08/18 0355    Specimen:  Blood Updated:  02/08/18 0506     WBC 15.11 (H) 10*3/mm3      RBC 3.50 (L) 10*6/mm3      Hemoglobin 10.7 (L) g/dL      Hematocrit 33.8 (L) %      MCV 96.6 fL      MCH 30.6 pg      MCHC 31.7 (L) g/dL      RDW 14.1 (H) %      RDW-SD 49.6 fl      MPV 11.0 fL      Platelets 265 10*3/mm3      Neutrophil % 79.2 (H) %      Lymphocyte % 10.5 (L) %      Monocyte % 8.8 %      Eosinophil % 0.7 %      Basophil % 0.3 %      Immature Grans % 0.5 %      Neutrophils, Absolute 11.98 (H) 10*3/mm3      Lymphocytes, Absolute 1.59 10*3/mm3      Monocytes, Absolute 1.33 (H) 10*3/mm3      Eosinophils, Absolute 0.10 10*3/mm3      Basophils, Absolute 0.04 10*3/mm3      Immature Grans, Absolute 0.07 (H) 10*3/mm3     Magnesium [[id number]]  (Normal) Collected:  02/08/18 0355    Specimen:  Blood Updated:  02/08/18 0533     Magnesium 2.3 mg/dL     Comprehensive Metabolic Panel [[id number]]  (Abnormal) Collected:  02/08/18 0355    Specimen:  Blood Updated:  02/08/18 0540     Glucose 138 (H) mg/dL      BUN 45 (H) mg/dL      Creatinine 1.78 (H) mg/dL      Sodium 136 mmol/L      Potassium 6.3 (C) mmol/L      Chloride 101 mmol/L      CO2 23.4 mmol/L      Calcium 8.7 mg/dL      Total Protein 6.5 g/dL      Albumin 4.00 g/dL      ALT (SGPT) 32 U/L      AST (SGOT) 29 U/L      Alkaline Phosphatase 59 U/L      Total Bilirubin 0.4 mg/dL      eGFR Non African Amer 27 (L) mL/min/1.73      Globulin 2.5 gm/dL      A/G Ratio 1.6 g/dL      BUN/Creatinine Ratio 25.3 (H)     Anion Gap 11.6 mmol/L     Narrative:       The MDRD GFR formula is only valid for adults with stable renal function between ages 18 and 70.    Basic Metabolic Panel [[id number]]  (Abnormal) Collected:  02/08/18 1115    Specimen:  Blood Updated:  02/08/18 1227     Glucose 109 (H) mg/dL      BUN 46 (H) mg/dL      Creatinine 1.98 (H) mg/dL      Sodium  141 mmol/L      Potassium 5.3 (H) mmol/L      Chloride 105 mmol/L      CO2 22.7 mmol/L      Calcium 8.6 mg/dL      eGFR Non African Amer 24 (L) mL/min/1.73      BUN/Creatinine Ratio 23.2     Anion Gap 13.3 mmol/L     Narrative:       The MDRD GFR formula is only valid for adults with stable renal function between ages 18 and 70.    CK [[id number]]  (Normal) Collected:  02/08/18 1115    Specimen:  Blood Updated:  02/08/18 1300     Creatine Kinase 68 U/L     Urinalysis, Microscopic Only - Urine, Clean Catch [[id number]]  (Abnormal) Collected:  02/08/18 1215    Specimen:  Urine from Urine, Clean Catch Updated:  02/08/18 1508     RBC, UA None Seen /HPF      WBC, UA 3-5 (A) /HPF      Bacteria, UA 1+ (A) /HPF      Squamous Epithelial Cells, UA 0-2 /HPF      Hyaline Casts, UA 0-2 /LPF      Methodology Manual Light Microscopy    Urinalysis With / Microscopic If Indicated - Urine, Clean Catch [[id number]]  (Abnormal) Collected:  02/08/18 1215    Specimen:  Urine from Urine, Clean Catch Updated:  02/08/18 1508     Color, UA Yellow     Appearance, UA Clear     pH, UA 5.5     Specific Gravity, UA 1.015     Glucose, UA Negative     Ketones, UA Negative     Bilirubin, UA Negative     Blood, UA Negative     Protein, UA Negative     Leuk Esterase, UA Small (1+) (A)     Nitrite, UA Positive (A)     Urobilinogen, UA 0.2 E.U./dL    Creatinine, Urine, Random - Urine, Clean Catch [[id number]] Collected:  02/08/18 1215    Specimen:  Urine from Urine, Clean Catch Updated:  02/08/18 1535     Creatinine, Urine 73.7 mg/dL     Narrative:       Reference intervals for random urine have not been established.  Clinical usage is dependent upon physician's interpretation in combination with other laboratory tests.     Chloride, Urine, Random - Urine, Clean Catch [[id number]] Collected:  02/08/18 1215    Specimen:  Urine from Urine, Clean Catch Updated:  02/08/18 1535     Chloride, Urine 110 mmol/L     Narrative:       Reference intervals for random  urine have not been established.  Clinical usage is dependent upon physician's interpretation in combination with other laboratory tests.     Sodium, Urine, Random - Urine, Clean Catch [[id number]] Collected:  02/08/18 1215    Specimen:  Urine from Urine, Clean Catch Updated:  02/08/18 1535     Sodium, Urine 86 mmol/L     Narrative:       Reference intervals for random urine have not been established.  Clinical usage is dependent upon physician's interpretation in combination with other laboratory tests.         Imaging:  AP pelvis and lateral views of the left hip are available for review on the [employer] system along with the associated report. The films show an intertrochanteric femur fracture.    Assessment:  Left intertrochanteric femur fracture    Plan:  We had a thorough discussion regarding the risks, benefits and alternatives to non-surgical management versus surgery. I explained that surgical risks include infection, hematoma, hardware related complications including failure of fixation, loosening, fracture, persistent pain and/or loss of motion, nonunion, malunion, pain necessitating conversion to an arthroplasty, post-operative arthrofibrosis, iatrogenic nerve and/or blood vessel injury resulting in permanent weakness, numbness or dysfunction, RSD, DVT, PE, positioning related neuropraxia, and anesthesia related complications resulting in death. Lastly, we discussed the rehab and all that will be expected of the patient post operatively to ensure an optimal outcome. Ms. Jay voiced understanding of the risks, benefits, and alternative forms of treatment that were discussed and has consented to proceed with surgical fixation of the fracture.    Date: 2/8/2018    Zheng Fleming MD    CC: Cyn Shepherd MD

## 2018-02-08 NOTE — BRIEF OP NOTE
FEMUR INTRAMEDULLARY NAILING/RODDING  Progress Note    Vonda Jay  2/7/2018 - 2/8/2018    Pre-op Diagnosis:   Left intertrochanteric femur fracture       Post-Op Diagnosis Codes:   Left intertrochanteric femur fracture    Procedure/CPT® Codes:      Procedure(s):  FEMUR INTRAMEDULLARY NAILING/RODDING    Surgeon(s):  Zheng Fleming MD    Anesthesia: General    Staff:   Circulator: Adeola Latham RN  Scrub Person: Mateusz Porras    Estimated Blood Loss: minimal    Urine Voided: 100 mL    Specimens:                None      Drains:   Urethral Catheter 02/08/18 1059 16 (Active)   Daily Indications Required activity restriction from trauma, surgery, (e.g. unstable spine, fracture, hemodynamics) 2/8/2018  2:35 PM   Securement secured to upper leg with adhesive device 2/8/2018  2:35 PM   Urine Output (mL) 600 2/8/2018  3:00 PM           Findings: See dictation    Complications: None      Zheng Flemnig MD     Date: 2/8/2018  Time: 6:47 PM

## 2018-02-08 NOTE — PROGRESS NOTES
"  Name: Vonda Jay  ADMIT: 2018   Age/Sex: 82 y.o.female LOS:  LOS: 1 day    :    1935     ROOM: [facility]   MRN:    [id number]    PCP:    Ambrose Ashley MD     Subjective   Pain controlled. Had some emesis earlier but feels fine now after zofran.     Objective   Vital Signs  Temp:  [97.3 °F (36.3 °C)-98.3 °F (36.8 °C)] 97.7 °F (36.5 °C)  Heart Rate:  [] 90  Resp:  [18-20] 18  BP: ()/(48-79) 126/74  Body mass index is 33.56 kg/(m^2).    Objective:  General Appearance:  Comfortable and well-appearing.    Vital signs: (most recent): Blood pressure 126/74, pulse 90, temperature 97.7 °F (36.5 °C), temperature source Oral, resp. rate 18, height 157.5 cm (62\"), weight 83.2 kg (183 lb 8 oz), SpO2 100 %.  Vital signs are normal.    HEENT: Normal HEENT exam.    Lungs:  Normal effort.  There are decreased breath sounds.    Heart: Normal rate.  Regular rhythm.    Abdomen: Abdomen is soft and non-distended.  Bowel sounds are normal.   There is no abdominal tenderness.     Extremities: There is no deformity or dependent edema.    Neurological: Patient is alert and oriented to person, place and time.    Skin:  Warm and dry.  No rash.             Results Review:       I reviewed the patient's new clinical results.    Results from last 7 days  Lab Units 18  0355 18  1653   WBC 10*3/mm3 15.11* 17.73*   HEMOGLOBIN g/dL 10.7* 12.5   PLATELETS 10*3/mm3 265 302     Results from last 7 days  Lab Units 18  0355 18  1815   SODIUM mmol/L 136 137   POTASSIUM mmol/L 6.3* 5.4*   CHLORIDE mmol/L 101 100   CO2 mmol/L 23.4 23.5   BUN mg/dL 45* 38*   CREATININE mg/dL 1.78* 1.18*   GLUCOSE mg/dL 138* 117*   Estimated Creatinine Clearance: 24.3 mL/min (by C-G formula based on Cr of 1.78).  Results from last 7 days  Lab Units 18  0355 18  1815   CALCIUM mg/dL 8.7 9.2   ALBUMIN g/dL 4.00  --    MAGNESIUM mg/dL 2.3  --        RADIOLOGY  2018  Pending      atorvastatin 10 mg Oral " Daily   cholecalciferol 1,000 Units Oral Daily   hydrALAZINE 25 mg Oral TID   sodium polystyrene 15 g Oral Once       sodium chloride 75 mL/hr Last Rate: 75 mL/hr (02/08/18 0700)   NPO Diet  NPO Diet NPO Except: Sips With Meds      Assessment/Plan   Assessment:   Active Problems:    Closed displaced intertrochanteric fracture of left femur    SANYA (acute kidney injury)    Hyperkalemia    HTN (hypertension)        Plan:   1. Left hip fracture  - await ortho consult  - cont pain control  - try to improve K and renal function prior to surgery    2. SANYA  - check u/a, urine electrolytes   - on IVF currently  - defer the rest to renal    3. Hyperkalemia  - 2/2 #2 plus ACEi and NSAID use  - cont IVF  - given D50 and insulin plus 80mg IV lasix as well as kayexalate last night  - recheck later today   - agree with transfer to monitored unit    4. HTN  - hydralazine restarted  - monitor BP      Disposition  TBD.      Mike Edmonds MD  [facility]  02/08/18  11:10 AM

## 2018-02-08 NOTE — OP NOTE
Orthopaedic Operative Note    Facility: [facility]    Patient: Vonda Jay     Medical Record Number: [id number]     YOB: 1935     Dictating Surgeon: Zheng Fleming M.D.*     Primary Care Physician: Ambrose Ashley MD     Date of Operation: 2/8/2018     PREOPERATIVE DIAGNOSIS:  Left Intertrochanteric femur fracture.       POSTOPERATIVE DIAGNOSIS: Left Intertrochanteric femur fracture.       PROCEDURE PERFORMED: Cephalomedullary fixation of left intertrochanteric femur fracture      SURGEON: Zheng Fleming MD       ASSISTANT:       ANESTHESIA: General.       SPECIMENS: None.       COMPLICATIONS: None.       ESTIMATED BLOOD LOSS: Less than 50 mL.    IMPLANTS:   Synthes size 10 by 360 TFNA with 85 mm proximal compression screw and associated de-rotation screw.    INDICATIONS: The patient is a 82 y.o. female who sustained an intertrochanteric femur fracture. The risks, benefits, and alternatives to cephalomedullary fixation were discussed with the patient in detail including infection, wound healing problems, hematoma, nonunion, malunion, failure of fixation, cutout of the compression screw, positioning related injury or neuropraxia, iatrogenic injury to major nerves or blood vessels potentially resulting in permanent dysfunction or deficits, DVT, PE, and anesthesia related complications resulting in death or the need for further surgery. The patient acknowledged understanding of information and consented to proceed.     DESCRIPTION OF PROCEDURE: Patient and operative site were identified in the preoperative holding area. The surgical site was marked. Following this, the patient was taken to the operating room and placed in the supine position. Adequate general anesthesia was administered and then patient was repositioned on the operating table on the Brickeys bed. Timeout was taken. Preoperative antibiotics were administered. Following this, the non-operative leg was placed in a  well-padded well leg mart. The operative leg was placed into traction. A combination of slight traction, adduction, and internal rotation were utilized to perform a closed reduction of the hip. I confirmed this fluoroscopically in both the AP and lateral planes. Once I was satisfied that the hip was sufficiently well reduced, the hip was then prepped and draped in a standard sterile fashion. I cleaned the extremity with an alcohol solution. A Hibiclens scrub was performed and the extremity was prepped with ChloraPrep x2. I allowed those to dry for 3 minutes before the extremity was draped.     Next, an approximately 3 cm incision was fashioned just proximal to the tip of the greater trochanter. I carefully dissected down through skin and subcutaneous tissues. The fascia was split in line. A guide pin was guided down to the tip of the trochanter and then driven down the intramedullary canal to the level of the lesser under direct fluoroscopic guidance in both the AP and lateral planes. Once we had the guide pin in good position, I reamed an opening and then placed the guidewire. This is guided down to the level of the knee. I measured off that. I determined that a 360 length nail would be appropriate. I then directed my attention to the reaming process. I reamed up to an 10 where some chatter was achieved. I over-reamed to a 12 and elected to place an 10 nail. An appropriate length and diameter nail was selected for use. This was impacted down into position. The guidewire was removed. I directed my attention to placement of the proximal compression screw. A separate small incision was fashioned along the lateral aspect of the upper thigh. The guide for the pin was abutted directly adjacent to the bone and then the guide pin for the compression screw was carefully guided up into the center-center position of the femoral head taking care to maintain a tip to apex distance of as little as possible in the combined AP  and lateral planes. Once we had the guide pin in good position and that was confirmed radiographically, I reamed up into the head and placed the 85 mm compression screw which seemed to get excellent purchase in the bone and seat well. The de-rotation screw was applied and maximally tightened.     Having completed the placement of the hardware, final images were taken and saved for later review. I was satisfied we had an excellent reduction of the fracture. Hardware appeared to be well-positioned. I therefore directed my attention to closure. Final images were taken and saved. The wounds were copiously irrigated with sterile saline mixed with bacitracin. Wounds were sequentially closed in a layered fashion using Monocryl for the deep tissues and staples for the skin. Sterile dressings were applied to wound and drapes withdrawn. The patient was awakened, transferred to a standard bed, and taken to the recovery room. The patient tolerated the procedure well. There were no complications. The patient was taken to the recovery room in good condition.    Zheng Fleming M.D.    Cc: Ambrose Ashley MD  2/8/2018

## 2018-02-08 NOTE — PROGRESS NOTES
Clinical Pharmacy Services: Medication History    Vonda Jay is a 82 y.o. female presenting to [facility] for Closed displaced intertrochanteric fracture of left femur, initial encounter [V83.925M]    She  has a past medical history of Arthritis; Hyperlipidemia; and Hypertension.    Allergies as of 02/07/2018   • (No Known Allergies)       Medication information was obtained from: Patient, and home pharmacy  Pharmacy and Phone Number: [facility] [phone]    Prior to Admission Medications     Prescriptions Last Dose Informant Patient Reported? Taking?    amLODIPine (NORVASC) 5 MG tablet 2/7/2018 Self Yes Yes    Take 5 mg by mouth 2 (Two) Times a Day.    aspirin 81 MG EC tablet 2/7/2018  Yes Yes    Take 81 mg by mouth Daily.    cholecalciferol (VITAMIN D3) 1000 units tablet 2/7/2018  Yes Yes    Take 1,000 Units by mouth Daily.    CRANBERRY, VACC OXYCOCCUS, PO 2/7/2018 Self Yes Yes    Take 1 tablet by mouth Daily.    hydrALAZINE (APRESOLINE) 25 MG tablet 2/7/2018  Yes Yes    Take 25 mg by mouth 3 (Three) Times a Day.    lisinopril (PRINIVIL,ZESTRIL) 20 MG tablet 2/7/2018  Yes Yes    Take 20 mg by mouth Daily.    lisinopril-hydrochlorothiazide (PRINZIDE,ZESTORETIC) 20-25 MG per tablet 2/7/2018  Yes Yes    Take 1 tablet by mouth Daily.    meloxicam (MOBIC) 15 MG tablet 2/7/2018  Yes Yes    Take 15 mg by mouth Daily.    pravastatin (PRAVACHOL) 40 MG tablet 2/6/2018  Yes Yes    Take 40 mg by mouth Daily.            Medication notes:   Patient states taking both Lisinopril 20mg and Lisinopril-HCTZ 20-25mg once daily. Both were prescribed by PCP, Ambrose Oneill.    This medication list is complete to the best of my knowledge as of 2/7/2018    Marcia Espino, PharmD Candidate  2/7/2018 8:26 PM

## 2018-02-08 NOTE — PLAN OF CARE
Problem: Patient Care Overview (Adult)  Goal: Plan of Care Review  Outcome: Ongoing (interventions implemented as appropriate)   02/08/18 1631   Coping/Psychosocial Response Interventions   Plan Of Care Reviewed With patient   Outcome Evaluation   Outcome Summary/Follow up Plan Pt going to surgery soon, pre op checklist complete, family at bs, pt A&Ox4, intermittent nausea, prn pain meds and zofran given, pt refusing to log roll or turn/refusing full skin assessment of back/trunk, suction at bs, will monitor.    Patient Care Overview   Progress improving       Problem: Pain, Acute (Adult)  Goal: Identify Related Risk Factors and Signs and Symptoms  Outcome: Outcome(s) achieved Date Met: 02/08/18    Goal: Acceptable Pain Control/Comfort Level  Outcome: Ongoing (interventions implemented as appropriate)      Problem: Fall Risk (Adult)  Goal: Identify Related Risk Factors and Signs and Symptoms  Outcome: Outcome(s) achieved Date Met: 02/08/18    Goal: Absence of Falls  Outcome: Ongoing (interventions implemented as appropriate)      Problem: Renal Failure/Kidney Injury, Acute (Adult)  Goal: Signs and Symptoms of Listed Potential Problems Will be Absent or Manageable (Renal Failure/Kidney Injury, Acute)  Outcome: Ongoing (interventions implemented as appropriate)

## 2018-02-08 NOTE — ANESTHESIA PREPROCEDURE EVALUATION
Anesthesia Evaluation     Patient summary reviewed and Nursing notes reviewed   NPO Solid Status: > 8 hours  NPO Liquid Status: > 8 hours           Airway   Mallampati: II  TM distance: >3 FB  Neck ROM: full  Dental - normal exam     Pulmonary - negative pulmonary ROS and normal exam   Cardiovascular - normal exam    (+) hypertension, hyperlipidemia      Neuro/Psych- negative ROS  GI/Hepatic/Renal/Endo    (+) obesity,      Musculoskeletal     Abdominal    Substance History - negative use     OB/GYN negative ob/gyn ROS         Other   (+) arthritis                   Anesthesia Plan    ASA 3     general     Anesthetic plan and risks discussed with patient.

## 2018-02-08 NOTE — PLAN OF CARE
Problem: Patient Care Overview (Adult)  Goal: Plan of Care Review  Outcome: Ongoing (interventions implemented as appropriate)   02/08/18 0827   Coping/Psychosocial Response Interventions   Plan Of Care Reviewed With patient;family   Outcome Evaluation   Outcome Summary/Follow up Plan Pt VSS, c/o left hip pain continuously which is helped with IV pain med, also c/o nausea at times which is relieved with IV med, pt potassium high given medication PO with no result worse today, see MD orders, Renal consult, and also ortho consult to see today, will continue to monitor educated pt and family on renal function and labs and consults and medications that were ordered and given per MD. Will continue to monitor.        Problem: Pain, Acute (Adult)  Goal: Identify Related Risk Factors and Signs and Symptoms  Outcome: Ongoing (interventions implemented as appropriate)   02/08/18 0827   Pain, Acute   Related Risk Factors (Acute Pain) disease process;patient perception;persistent pain;positioning;trauma   Signs and Symptoms (Acute Pain) fatigue/weakness;fear of reinjury;guarding/abnormal posturing/positioning;impaired thought process/concentration;nausea/vomiting/anorexia;questions meaning of pain;sleep pattern alteration;verbalization of pain descriptors     Goal: Acceptable Pain Control/Comfort Level  Outcome: Ongoing (interventions implemented as appropriate)   02/08/18 0827   Pain, Acute (Adult)   Acceptable Pain Control/Comfort Level making progress toward outcome       Problem: Fall Risk (Adult)  Goal: Identify Related Risk Factors and Signs and Symptoms  Outcome: Ongoing (interventions implemented as appropriate)   02/08/18 0827   Fall Risk   Fall Risk: Related Risk Factors bladder function altered;fatigue/slow reaction;fear of falling;gait/mobility problems;history of falls;sleep pattern alteration;environment unfamiliar   Fall Risk: Signs and Symptoms presence of risk factors     Goal: Absence of Falls  Outcome:  Ongoing (interventions implemented as appropriate)   02/08/18 0827   Fall Risk (Adult)   Absence of Falls making progress toward outcome       Problem: Renal Failure/Kidney Injury, Acute (Adult)  Goal: Signs and Symptoms of Listed Potential Problems Will be Absent or Manageable (Renal Failure/Kidney Injury, Acute)  Outcome: Ongoing (interventions implemented as appropriate)   02/08/18 0827   Renal Failure/Kidney Injury, Acute   Problems Assessed (Acute Renal Failure/Kidney Injury) all   Problems Present (Acute Renal Failure/Kidney Injury) electrolyte imbalance;fluid imbalance;situational response

## 2018-02-09 LAB
ALBUMIN SERPL-MCNC: 3.5 G/DL (ref 3.5–5.2)
ANION GAP SERPL CALCULATED.3IONS-SCNC: 11.6 MMOL/L
BUN BLD-MCNC: 47 MG/DL (ref 8–23)
BUN/CREAT SERPL: 27.6 (ref 7–25)
CALCIUM SPEC-SCNC: 8.1 MG/DL (ref 8.6–10.5)
CHLORIDE SERPL-SCNC: 102 MMOL/L (ref 98–107)
CO2 SERPL-SCNC: 23.4 MMOL/L (ref 22–29)
CREAT BLD-MCNC: 1.7 MG/DL (ref 0.57–1)
DEPRECATED RDW RBC AUTO: 49.9 FL (ref 37–54)
ERYTHROCYTE [DISTWIDTH] IN BLOOD BY AUTOMATED COUNT: 13.8 % (ref 11.7–13)
GFR SERPL CREATININE-BSD FRML MDRD: 29 ML/MIN/1.73
GLUCOSE BLD-MCNC: 133 MG/DL (ref 65–99)
HCT VFR BLD AUTO: 31 % (ref 35.6–45.5)
HGB BLD-MCNC: 9.7 G/DL (ref 11.9–15.5)
MCH RBC QN AUTO: 30.8 PG (ref 26.9–32)
MCHC RBC AUTO-ENTMCNC: 31.3 G/DL (ref 32.4–36.3)
MCV RBC AUTO: 98.4 FL (ref 80.5–98.2)
PHOSPHATE SERPL-MCNC: 4.3 MG/DL (ref 2.5–4.5)
PLATELET # BLD AUTO: 203 10*3/MM3 (ref 140–500)
PMV BLD AUTO: 10.2 FL (ref 6–12)
POTASSIUM BLD-SCNC: 5.1 MMOL/L (ref 3.5–5.2)
RBC # BLD AUTO: 3.15 10*6/MM3 (ref 3.9–5.2)
SODIUM BLD-SCNC: 137 MMOL/L (ref 136–145)
WBC NRBC COR # BLD: 16.34 10*3/MM3 (ref 4.5–10.7)

## 2018-02-09 PROCEDURE — 25010000002 ONDANSETRON PER 1 MG: Performed by: ORTHOPAEDIC SURGERY

## 2018-02-09 PROCEDURE — 80069 RENAL FUNCTION PANEL: CPT | Performed by: INTERNAL MEDICINE

## 2018-02-09 PROCEDURE — 97110 THERAPEUTIC EXERCISES: CPT

## 2018-02-09 PROCEDURE — 25010000003 CEFAZOLIN IN DEXTROSE 2-4 GM/100ML-% SOLUTION: Performed by: ORTHOPAEDIC SURGERY

## 2018-02-09 PROCEDURE — 97162 PT EVAL MOD COMPLEX 30 MIN: CPT

## 2018-02-09 PROCEDURE — 25010000002 HYDROMORPHONE PER 4 MG: Performed by: INTERNAL MEDICINE

## 2018-02-09 PROCEDURE — 85027 COMPLETE CBC AUTOMATED: CPT | Performed by: INTERNAL MEDICINE

## 2018-02-09 RX ORDER — FAMOTIDINE 20 MG/1
20 TABLET, FILM COATED ORAL DAILY
Status: DISCONTINUED | OUTPATIENT
Start: 2018-02-09 | End: 2018-02-12 | Stop reason: HOSPADM

## 2018-02-09 RX ORDER — SODIUM CHLORIDE 9 MG/ML
75 INJECTION, SOLUTION INTRAVENOUS CONTINUOUS
Status: ACTIVE | OUTPATIENT
Start: 2018-02-09 | End: 2018-02-09

## 2018-02-09 RX ORDER — ALUMINA, MAGNESIA, AND SIMETHICONE 2400; 2400; 240 MG/30ML; MG/30ML; MG/30ML
30 SUSPENSION ORAL ONCE
Status: COMPLETED | OUTPATIENT
Start: 2018-02-09 | End: 2018-02-09

## 2018-02-09 RX ORDER — CALCIUM CARBONATE 200(500)MG
1 TABLET,CHEWABLE ORAL 3 TIMES DAILY PRN
Status: DISCONTINUED | OUTPATIENT
Start: 2018-02-09 | End: 2018-02-12 | Stop reason: HOSPADM

## 2018-02-09 RX ADMIN — ASPIRIN 325 MG: 325 TABLET, COATED ORAL at 08:02

## 2018-02-09 RX ADMIN — HYDRALAZINE HYDROCHLORIDE 25 MG: 25 TABLET, FILM COATED ORAL at 16:19

## 2018-02-09 RX ADMIN — VITAMIN D, TAB 1000IU (100/BT) 1000 UNITS: 25 TAB at 08:03

## 2018-02-09 RX ADMIN — HYDROCODONE BITARTRATE AND ACETAMINOPHEN 2 TABLET: 7.5; 325 TABLET ORAL at 17:43

## 2018-02-09 RX ADMIN — FAMOTIDINE 20 MG: 20 TABLET, FILM COATED ORAL at 17:43

## 2018-02-09 RX ADMIN — HYDRALAZINE HYDROCHLORIDE 25 MG: 25 TABLET, FILM COATED ORAL at 20:49

## 2018-02-09 RX ADMIN — HYDROCODONE BITARTRATE AND ACETAMINOPHEN 2 TABLET: 7.5; 325 TABLET ORAL at 00:34

## 2018-02-09 RX ADMIN — ONDANSETRON HYDROCHLORIDE 4 MG: 2 SOLUTION INTRAMUSCULAR; INTRAVENOUS at 04:04

## 2018-02-09 RX ADMIN — HYDROCODONE BITARTRATE AND ACETAMINOPHEN 2 TABLET: 7.5; 325 TABLET ORAL at 13:26

## 2018-02-09 RX ADMIN — Medication 1 TABLET: at 21:42

## 2018-02-09 RX ADMIN — HYDRALAZINE HYDROCHLORIDE 25 MG: 25 TABLET, FILM COATED ORAL at 08:03

## 2018-02-09 RX ADMIN — HYDROCODONE BITARTRATE AND ACETAMINOPHEN 2 TABLET: 7.5; 325 TABLET ORAL at 09:13

## 2018-02-09 RX ADMIN — ALUMINUM HYDROXIDE, MAGNESIUM HYDROXIDE, AND DIMETHICONE 30 ML: 400; 400; 40 SUSPENSION ORAL at 23:14

## 2018-02-09 RX ADMIN — HYDROMORPHONE HYDROCHLORIDE 1 MG: 1 INJECTION, SOLUTION INTRAMUSCULAR; INTRAVENOUS; SUBCUTANEOUS at 04:01

## 2018-02-09 RX ADMIN — ATORVASTATIN CALCIUM 10 MG: 10 TABLET, FILM COATED ORAL at 08:03

## 2018-02-09 RX ADMIN — HYDROMORPHONE HYDROCHLORIDE 1 MG: 1 INJECTION, SOLUTION INTRAMUSCULAR; INTRAVENOUS; SUBCUTANEOUS at 01:25

## 2018-02-09 RX ADMIN — HYDROMORPHONE HYDROCHLORIDE 0.5 MG: 10 INJECTION INTRAMUSCULAR; INTRAVENOUS; SUBCUTANEOUS at 06:01

## 2018-02-09 RX ADMIN — CEFAZOLIN SODIUM 2 G: 2 INJECTION, SOLUTION INTRAVENOUS at 08:02

## 2018-02-09 RX ADMIN — SODIUM CHLORIDE 75 ML/HR: 9 INJECTION, SOLUTION INTRAVENOUS at 11:30

## 2018-02-09 RX ADMIN — CEFAZOLIN SODIUM 2 G: 2 INJECTION, SOLUTION INTRAVENOUS at 16:19

## 2018-02-09 NOTE — PLAN OF CARE
Problem: Patient Care Overview (Adult)  Goal: Plan of Care Review  Outcome: Ongoing (interventions implemented as appropriate)   02/09/18 1593   Coping/Psychosocial Response Interventions   Plan Of Care Reviewed With patient   Outcome Evaluation   Outcome Summary/Follow up Plan Pt denies nausea today, giving po pain meds prn, pepcid added for c/o stomach acid, worked with PT up in chair now will monitor.    Patient Care Overview   Progress improving       Problem: Pain, Acute (Adult)  Goal: Acceptable Pain Control/Comfort Level  Outcome: Ongoing (interventions implemented as appropriate)      Problem: Fall Risk (Adult)  Goal: Absence of Falls  Outcome: Ongoing (interventions implemented as appropriate)      Problem: Renal Failure/Kidney Injury, Acute (Adult)  Goal: Signs and Symptoms of Listed Potential Problems Will be Absent or Manageable (Renal Failure/Kidney Injury, Acute)  Outcome: Ongoing (interventions implemented as appropriate)

## 2018-02-09 NOTE — PLAN OF CARE
Problem: Perioperative Period (Adult)  Goal: Signs and Symptoms of Listed Potential Problems Will be Absent or Manageable (Perioperative Period)  Outcome: Ongoing (interventions implemented as appropriate)  Pt very anxious in recovery.  Sleeping mostly but moaning and yelling in pain when awake, then drifting back to sleep.  Vital signs stable. Pain meds given as ordered.

## 2018-02-09 NOTE — PROGRESS NOTES
"   LOS: 2 days    Patient Care Team:  Ambrose Ashley MD as PCP - General (Family Medicine)    Chief Complaint:    Chief Complaint   Patient presents with   • Fall   • Hip Pain       Subjective     Interval History:    feeling a lot better. Little pain. Some nausea earlier.  S/p surgery yesterday. Tolerating po. No SOA.     Objective     Vital Signs  Temp:  [98 °F (36.7 °C)-99.3 °F (37.4 °C)] 98 °F (36.7 °C)  Heart Rate:  [] 77  Resp:  [16-20] 16  BP: (113-165)/() 123/70    Flowsheet Rows         First Filed Value    Admission Height  157.5 cm (62\") Documented at 02/07/2018 1656    Admission Weight  81.6 kg (180 lb) Documented at 02/07/2018 1656             I/O last 3 completed shifts:  In: 1260.7 [I.V.:1260.7]  Out: 1400 [Urine:1300; Blood:100]    Intake/Output Summary (Last 24 hours) at 02/09/18 1602  Last data filed at 02/08/18 2300   Gross per 24 hour   Intake              909 ml   Output              475 ml   Net              434 ml       Physical Exam:  Oromucosa is dry.  Mild pallor.  No icterus.  No JVD.  Lungs are clear to auscultation bilaterally.    Heart is regular rate and rhythm.  Abdomen is soft, nontender, nondistended.  No hepatosplenomegaly.  Extremities without edema or calf tenderness.  I did not examine her left hip area.  mood and affect were appropriate.  Mental status is clear.      Results Review:      Results from last 7 days  Lab Units 02/09/18  0444 02/08/18  2150 02/08/18  1115 02/08/18  0355   SODIUM mmol/L 137 141 141 136   POTASSIUM mmol/L 5.1 5.3* 5.3* 6.3*   CHLORIDE mmol/L 102 104 105 101   CO2 mmol/L 23.4 22.3 22.7 23.4   BUN mg/dL 47* 46* 46* 45*   CREATININE mg/dL 1.70* 1.89* 1.98* 1.78*   CALCIUM mg/dL 8.1* 8.6 8.6 8.7   BILIRUBIN mg/dL  --   --   --  0.4   ALK PHOS U/L  --   --   --  59   ALT (SGPT) U/L  --   --   --  32   AST (SGOT) U/L  --   --   --  29   GLUCOSE mg/dL 133* 144* 109* 138*       Estimated Creatinine Clearance: 25.5 mL/min (by C-G formula " based on Cr of 1.7).      Results from last 7 days  Lab Units 02/09/18  0444 02/08/18  0355   MAGNESIUM mg/dL  --  2.3   PHOSPHORUS mg/dL 4.3  --                Results from last 7 days  Lab Units 02/09/18  0444 02/08/18  0355 02/07/18  1653   WBC 10*3/mm3 16.34* 15.11* 17.73*   HEMOGLOBIN g/dL 9.7* 10.7* 12.5   PLATELETS 10*3/mm3 203 265 302         Results from last 7 days  Lab Units 02/07/18  1815   INR  1.01         Imaging Results (last 24 hours)     Procedure Component Value Units Date/Time    FL C Arm During Surgery [[id number]] Resulted:  02/08/18 2017     Updated:  02/08/18 2017    Narrative:       This procedure was auto-finalized with no dictation required.    XR Hip With or Without Pelvis 2 - 3 View Left [[id number]] Collected:  02/08/18 2058     Updated:  02/09/18 0745    Narrative:       LEFT HIP     HISTORY: IM nail placement.     TECHNIQUE: Four intraoperative views are obtained for localization and  control during Gamma nail fixation of the left proximal femoral  fracture. Fluoroscopy time 1 minute 7 seconds.     This report was finalized on 2/9/2018 7:42 AM by Dr. Aramis Nuñez MD.             aspirin 325 mg Oral Daily   atorvastatin 10 mg Oral Daily   ceFAZolin 2 g Intravenous Q8H   cholecalciferol 1,000 Units Oral Daily   famotidine 20 mg Oral Daily   hydrALAZINE 25 mg Oral TID   sodium polystyrene 15 g Oral Once       sodium chloride 75 mL/hr Last Rate: 75 mL/hr (02/09/18 1130)       Medication Review:   Current Facility-Administered Medications   Medication Dose Route Frequency Provider Last Rate Last Dose   • acetaminophen (TYLENOL) tablet 650 mg  650 mg Oral Q4H PRN Cyn Shepherd MD       • aspirin EC tablet 325 mg  325 mg Oral Daily Zheng Fleming MD   325 mg at 02/09/18 0802   • atorvastatin (LIPITOR) tablet 10 mg  10 mg Oral Daily Cyn Shepherd MD   10 mg at 02/09/18 0803   • bisacodyl (DULCOLAX) suppository 10 mg  10 mg Rectal Daily PRN Zheng Fleming MD       • calcium carbonate  (TUMS) chewable tablet 500 mg (200 mg elemental)  1 tablet Oral TID PRN Mike Edmonds MD       • ceFAZolin in dextrose (ANCEF) IVPB solution 2 g  2 g Intravenous Q8H Zheng Fleming MD   2 g at 02/09/18 0802   • cholecalciferol (VITAMIN D3) tablet 1,000 Units  1,000 Units Oral Daily Jawevaristo Shepherd MD   1,000 Units at 02/09/18 0803   • docusate sodium (COLACE) capsule 100 mg  100 mg Oral BID PRN Zheng Fleming MD       • famotidine (PEPCID) tablet 20 mg  20 mg Oral Daily iMke Edmonds MD       • hydrALAZINE (APRESOLINE) tablet 25 mg  25 mg Oral TID Jawevaristo Shepherd MD   25 mg at 02/09/18 0803   • HYDROcodone-acetaminophen (NORCO) 7.5-325 MG per tablet 2 tablet  2 tablet Oral Q4H PRN Zheng Fleming MD   2 tablet at 02/09/18 1326   • HYDROmorphone (DILAUDID) injection 0.5 mg  0.5 mg Intravenous Q2H PRN Zheng Fleming MD   0.5 mg at 02/09/18 0601    And   • naloxone (NARCAN) injection 0.1 mg  0.1 mg Intravenous Q5 Min PRN Zheng Fleming MD       • HYDROmorphone (DILAUDID) injection 1 mg  1 mg Intravenous Q2H PRN Pollo Martins MD   1 mg at 02/09/18 0401   • morphine injection 4 mg  4 mg Intravenous Q2H PRN Zheng Fleming MD        And   • naloxone (NARCAN) injection 0.4 mg  0.4 mg Intravenous Q5 Min PRN Zheng Fleming MD       • ondansetron (ZOFRAN) injection 4 mg  4 mg Intravenous Q4H PRN Mike Edmonds MD   4 mg at 02/08/18 2133   • ondansetron (ZOFRAN) tablet 4 mg  4 mg Oral Q6H PRN Zheng Fleming MD        Or   • ondansetron ODT (ZOFRAN-ODT) disintegrating tablet 4 mg  4 mg Oral Q6H PRN Zheng Fleming MD        Or   • ondansetron (ZOFRAN) injection 4 mg  4 mg Intravenous Q6H PRN Zheng Fleming MD   4 mg at 02/09/18 0404   • sodium chloride 0.9 % flush 1-10 mL  1-10 mL Intravenous PRN Cyn Shepherd MD       • sodium chloride 0.9 % flush 1-10 mL  1-10 mL Intravenous PRN Zheng Fleming MD       • sodium chloride 0.9 % flush 10 mL  10 mL Intravenous PRN Mer Garcia APRN        • sodium chloride 0.9 % infusion  75 mL/hr Intravenous Continuous Phillip Dasilva MD 75 mL/hr at 02/09/18 1130 75 mL/hr at 02/09/18 1130   • sodium polystyrene (KAYEXALATE) 15 GM/60ML suspension 15 g  15 g Oral Once Pollo Martins MD   Stopped at 02/08/18 1230       Assessment/Plan       Active Problems:    Closed displaced intertrochanteric fracture of left femur    SANYA (acute kidney injury)    Hyperkalemia    HTN (hypertension)    - SANYA with life-threatening hyperkalemia in the setting of hip fracture and hypotension. Better. Will stop IVF after this bag. Tolerating po. Holding ACEI, meloxican and potassium supplements.  I also held her amlodipine due to hypotension. UA is fairly bland. Philip is out. Possible underlying CKD.  - Hyperkalemia, treated aggressively. Resolved. Monitor.     - Anemia, multifactorial monitor. Transfuse when necessary.    - History of hypertension, blood pressure is controlled and no longer low.   - Left hip fracture, s/p ORIF, per ortho.  Discussed with patient and family.     Phillip Dasilva MD  02/09/18  4:02 PM

## 2018-02-09 NOTE — NURSING NOTE
Attempted to medicate pt shortly ago with PO pain meds but patient soon afterward became nauseas and vomited both the pills up.  Will remedicate with IV when time is due.

## 2018-02-09 NOTE — ANESTHESIA PROCEDURE NOTES
Airway  Urgency: elective      General Information and Staff    Patient location during procedure: OR  Anesthesiologist: RODNEY RODRIGUEZ    Indications and Patient Condition    Preoxygenated: yes      Final Airway Details  Final airway type: endotracheal airway      Successful airway: ETT  Cuffed: yes   Successful intubation technique: direct laryngoscopy  Endotracheal tube insertion site: oral  Blade: Amy  Blade size: #3  ETT size: 7.0 mm  Cormack-Lehane Classification: grade I - full view of glottis  Placement verified by: chest auscultation   Number of attempts at approach: 1

## 2018-02-09 NOTE — ANESTHESIA POSTPROCEDURE EVALUATION
"Patient: Vonda Jay    Procedure Summary     Date Anesthesia Start Anesthesia Stop Room / Location    02/08/18 1852 2023  [facility] /  [facility]       Procedure Diagnosis Surgeon Provider    FEMUR INTRAMEDULLARY NAILING (Left Thigh) No diagnosis on file. MD Louis Sharif MD          Anesthesia Type: general  Last vitals  BP   139/50 (02/08/18 2117)   Temp   37.4 °C (99.3 °F) (02/08/18 2117)   Pulse   82 (02/08/18 2117)   Resp   16 (02/08/18 2117)     SpO2   100 % (02/08/18 2117)     Post Anesthesia Care and Evaluation    Patient location during evaluation: bedside  Patient participation: complete - patient participated  Level of consciousness: awake  Pain score: 2  Pain management: adequate  Airway patency: patent  Anesthetic complications: No anesthetic complications  PONV Status: none  Cardiovascular status: acceptable  Respiratory status: acceptable  Hydration status: acceptable    Comments: /50 (BP Location: Right arm, Patient Position: Lying)  Pulse 82  Temp 37.4 °C (99.3 °F) (Oral)   Resp 16  Ht 157.5 cm (62\")  Wt 83.2 kg (183 lb 8 oz)  SpO2 100%  BMI 33.56 kg/m2        "

## 2018-02-09 NOTE — NURSING NOTE
"Pt returned from Surgery in an alarmed state; evidently a post- anesthesia \"like\" complication- per the PACU nurse Darlyn.  Pt with complaints of pain and nausea; ice pack applied, medicated with pain and nausea meds.  Pt furthermore, had a \"fattened\" left lower lip.  I have to assume this was where she bit her breathing tube constantly during surgery.  Family and patient reassured of her status.  Will monitor.  "

## 2018-02-09 NOTE — PROGRESS NOTES
Continued Stay Note  [facility]     Patient Name: Vonda Jay  MRN: [id number]  Today's Date: 2/9/2018    Admit Date: 2/7/2018          Discharge Plan       02/09/18 1043    Case Management/Social Work Plan    Plan Referrals to [facility].  [facility]vals pending.  Partial packet placed on chart.    Additional Comments Spoke with Marissa/ Benny - no beds available at [facility] over the weekend, but will followup on Monday.  [facility]vals pending for [facility].                 Marissa Velasquez RN

## 2018-02-09 NOTE — PLAN OF CARE
Problem: Patient Care Overview (Adult)  Goal: Plan of Care Review   02/09/18 1525   Coping/Psychosocial Response Interventions   Plan Of Care Reviewed With patient;spouse   Outcome Evaluation   Outcome Summary/Follow up Plan Pt presents with impaired bed mob, txfs, gait, L LE strength, & balance following L hip pinning. She will benefit from skilled PT to address deficits to facilitate recovery.       Problem: Inpatient Physical Therapy  Goal: Bed Mobility Goal LTG- PT   02/09/18 1525   Bed Mobility PT LTG   Bed Mobility PT LTG, Activity Type all bed mobility   Bed Mobility PT LTG, [place] Level minimum assist (75% patient effort)     Goal: Transfer Training Goal 1 LTG- PT   02/09/18 1525   Transfer Training PT LTG   Transfer Training PT LTG, Activity Type all transfers   Transfer Training PT LTG, [place] Level minimum assist (75% patient effort);2 person assist required   Transfer Training PT LTG, Assist Device walker, standard     Goal: Gait Training Goal LTG- PT   02/09/18 1525   Gait Training PT LTG   Gait Training Goal PT LTG, [place] Level contact guard assist;minimum assist (75% patient effort);2 person assist required   Gait Training Goal PT LTG, Assist Device walker, standard   Gait Training Goal PT LTG, Distance to Achieve 15

## 2018-02-09 NOTE — THERAPY EVALUATION
Acute Care - Physical Therapy Initial Evaluation  [facility]     Patient Name: Vonda Jay  : 1935  MRN: [id number]  Today's Date: 2018   Onset of Illness/Injury or Date of Surgery Date: 18  Date of Referral to PT: 18         Admit Date: 2018     Visit Dx:    ICD-10-CM ICD-9-CM   1. Closed displaced intertrochanteric fracture of left femur, initial encounter S72.142A 820.21   2. Difficulty walking R26.2 719.7     Patient Active Problem List   Diagnosis   • Closed displaced intertrochanteric fracture of left femur   • SANYA (acute kidney injury)   • Hyperkalemia   • HTN (hypertension)     Past Medical History:   Diagnosis Date   • Arthritis    • Hyperlipidemia    • Hypertension      Past Surgical History:   Procedure Laterality Date   • BREAST LUMPECTOMY     • CAROTID ARTERY ANGIOPLASTY  ,    • CHOLECYSTECTOMY     • FEMUR IM NAILING/RODDING Left 2018    Procedure: FEMUR INTRAMEDULLARY NAILING;  Surgeon: Zheng Fleming MD;  Location: [facility] MAIN OR;  Service:    • HYSTERECTOMY            PT ASSESSMENT (last 72 hours)      PT Evaluation       18 1518 18 1106    Rehab Evaluation    Document Type evaluation  -EF     Subjective Information agree to therapy;complains of;weakness;pain  -EF     Evaluation Not Performed  other (see comments)   OT ordered per ortho. Noted d/c plans are for snu. Defer OT consult until on snu  -SG    Patient Effort, Rehab Treatment good  -EF     Symptoms Noted During/After Treatment fatigue;increased pain  -EF     General Information    Patient Profile Review yes  -EF     Onset of Illness/Injury or Date of Surgery Date 18  -EF     General Observations supine in bed  -EF     Pertinent History Of Current Problem s/p L hip ORIF after fall  -EF     Precautions/Limitations fall precautions   WBAT L LE  -EF     Prior Level of Function independent:  -EF     Plans/Goals Discussed With patient and family;agreed upon  -EF      Clinical Impression    Date of Referral to PT 02/09/18  -EF     Criteria for Skilled Therapeutic Interventions Met yes;treatment indicated  -EF     Impairments Found (describe specific impairments) aerobic capacity/endurance;gait, locomotion, and balance;joint integrity and mobility;muscle performance  -EF     Rehab Potential good, to achieve stated therapy goals  -EF     Predicted Duration of Therapy Intervention (days/wks) 1 week  -EF     Pain Assessment    Pain Assessment 0-10  -EF     Pain Score 10   varies from 0 to 10  -EF     Pain Type Acute pain;Surgical pain  -EF     Pain Location Hip  -EF     Pain Orientation Left  -EF     Pain Intervention(s) Medication (See MAR);Repositioned;Ambulation/increased activity  -EF     Cognitive Assessment/Intervention    Current Cognitive/Communication Assessment functional  -EF     Orientation Status oriented x 4  -EF     Follows Commands/Answers Questions 100% of the time  -EF     Personal Safety --   needs assist of 2  -EF     Personal Safety Interventions fall prevention program maintained;gait belt;muscle strengthening facilitated;nonskid shoes/slippers when out of bed  -EF     ROM (Range of Motion)    General ROM Detail WFL except L hip  -EF     MMT (Manual Muscle Testing)    General MMT Assessment Detail WFL except L hip  -EF     Bed Mobility, Assessment/Treatment    Bed Mobility, Assistive Device bed rails;draw sheet  -EF     Bed Mob, Supine to Sit, [place] verbal cues required;nonverbal cues required (demo/gesture);moderate assist (50% patient effort);2 person assist required  -EF     Bed Mobility, Impairments ROM decreased;strength decreased;impaired balance;pain  -EF     Transfer Assessment/Treatment    Transfers, Bed-Chair [place] moderate assist (50% patient effort);2 person assist required  -EF     Transfers, Bed-Chair-Bed, Assist Device standard walker  -EF     Transfers, Sit-Stand [place] verbal cues required;moderate assist (50% patient  effort);maximum assist (25% patient effort);2 person assist required  -EF     Transfers, Stand-Sit [place] verbal cues required;moderate assist (50% patient effort);2 person assist required  -EF     Transfers, Sit-Stand-Sit, Assist Device standard walker  -EF     Transfer, Safety Issues sequencing ability decreased;step length decreased;weight-shifting ability decreased  -EF     Transfer, Impairments ROM decreased;strength decreased;impaired balance;pain  -EF     Gait Assessment/Treatment    Gait, [place] Level moderate assist (50% patient effort);2 person assist required  -EF     Gait, Assistive Device standard walker  -EF     Gait, Distance (Feet) --   scooted several small steps to chair  -EF     Gait, Gait Deviations sena decreased;left:;decreased heel strike;step length decreased;weight-shifting ability decreased  -EF     Gait, Safety Issues sequencing ability decreased;step length decreased;weight-shifting ability decreased  -EF     Gait, Impairments ROM decreased;strength decreased;impaired balance;pain  -EF     Therapy Exercises    Exercise Protocols hip ORIF  -EF     Hip ORIF Exercises left:;5 reps;with assist;ankle pumps/circles;glut set;hip abduction  -EF     Positioning and Restraints    Pre-Treatment Position in bed  -EF     Post Treatment Position chair  -EF     In Chair notified nsg;reclined;call light within reach;encouraged to call for assist  -EF       02/08/18 1706 02/08/18 0058    General Information    Equipment Currently Used at Home none  -SK     Living Environment    Lives With spouse  -SK spouse  -CS    Living Arrangements [place]  -SK house  -    Home Accessibility bed and bath on same level;no concerns  -SK no concerns  -CS    Stair Railings at Home outside, present at both sides  -SK outside, present at both sides  -    Type of Financial/Environmental Concern  none  -CS    Transportation Available family or friend will provide;car  -SK family or friend will provide;car  -CS       02/08/18 0056       General Information    Equipment Currently Used at Home none  -CS       User Key  (r) = Recorded By, (t) = Taken By, (c) = Cosigned By    Initials Name Provider Type    ARASH Ledesma, OTR Occupational Therapist     Mimi Venegas, PT Physical Therapist    CS Chasity Burns, RN Registered Nurse    SK Marissa Velasquez, DELILAH Case Manager          Physical Therapy Education     Title: PT OT SLP Therapies (Active)     Topic: Physical Therapy (Active)     Point: Mobility training (Active)    Learning Progress Summary    Learner Readiness Method Response Comment Documented by Status   Patient Acceptance E NR   02/09/18 1525 Active               Point: Home exercise program (Active)    Learning Progress Summary    Learner Readiness Method Response Comment Documented by Status   Patient Acceptance E NR   02/09/18 1525 Active               Point: Body mechanics (Active)    Learning Progress Summary    Learner Readiness Method Response Comment Documented by Status   Patient Acceptance E NR   02/09/18 1525 Active               Point: Precautions (Active)    Learning Progress Summary    Learner Readiness Method Response Comment Documented by Status   Patient Acceptance E NR   02/09/18 1525 Active                      User Key     Initials Effective Dates Name Provider Type Discipline     04/24/15 -  Mimi Venegas, PT Physical Therapist PT                PT Recommendation and Plan  Anticipated Discharge Disposition: skilled nursing facility  PT Frequency: daily  Plan of Care Review  Plan Of Care Reviewed With: patient, spouse  Outcome Summary/Follow up Plan: Pt presents with impaired bed mob, txfs, gait, L LE strength, & balance following L hip pinning. She will benefit from skilled PT to address deficits to facilitate recovery.          IP PT Goals       02/09/18 1525          Bed Mobility PT LTG    Bed Mobility PT LTG, Activity Type all bed mobility  -EF      Bed Mobility  PT LTG, [place] Level minimum assist (75% patient effort)  -EF      Transfer Training PT LTG    Transfer Training PT LTG, Activity Type all transfers  -EF      Transfer Training PT LTG, [place] Level minimum assist (75% patient effort);2 person assist required  -EF      Transfer Training PT LTG, Assist Device walker, standard  -EF      Gait Training PT LTG    Gait Training Goal PT LTG, [place] Level contact guard assist;minimum assist (75% patient effort);2 person assist required  -EF      Gait Training Goal PT LTG, Assist Device walker, standard  -EF      Gait Training Goal PT LTG, Distance to Achieve 15  -EF        User Key  (r) = Recorded By, (t) = Taken By, (c) = Cosigned By    Initials Name Provider Type    LI Venegas PT Physical Therapist                Outcome Measures       02/09/18 1500          How much help from another person do you currently need...    Turning from your back to your side while in flat bed without using bedrails? 2  -EF      Moving from lying on back to sitting on the side of a flat bed without bedrails? 2  -EF      Moving to and from a bed to a chair (including a wheelchair)? 2  -EF      Standing up from a chair using your arms (e.g., wheelchair, bedside chair)? 2  -EF      Climbing 3-5 steps with a railing? 1  -EF      To walk in hospital room? 2  -EF      AM-PAC 6 Clicks Score 11  -EF      Functional Assessment    Outcome Measure Options AM-PAC 6 Clicks Basic Mobility (PT)  -EF        User Key  (r) = Recorded By, (t) = Taken By, (c) = Cosigned By    Initials Name Provider Type    LI Venegas PT Physical Therapist           Time Calculation:         PT Charges       02/09/18 1528          Time Calculation    Start Time 1445  -EF      Stop Time 1515  -EF      Time Calculation (min) 30 min  -EF      PT Received On 02/09/18  -EF      PT - Next Appointment 02/10/18  -EF      PT Goal Re-Cert Due Date 02/16/18  -EF        User Key  (r) = Recorded By, (t) =  Taken By, (c) = Cosigned By    Initials Name Provider Type    EF Mmii Venegas, PT Physical Therapist          Therapy Charges for Today     Code Description Service Date Service Provider Modifiers Qty    66628535608 HC PT EVAL MOD COMPLEXITY 2 2/9/2018 Mimi Venegas, PT GP 1    46764490351 HC PT THER PROC EA 15 MIN 2/9/2018 Mimi Venegas, PT GP 1    35122910777 HC PT THER SUPP EA 15 MIN 2/9/2018 Mimi Venegas, PT GP 1          PT G-Codes  Outcome Measure Options: AM-PAC 6 Clicks Basic Mobility (PT)      Mimi Venegas, PT  2/9/2018

## 2018-02-09 NOTE — PROGRESS NOTES
"Patients Name:  Vonda Jay  YOB: 1935  Medical Records Number:  [id number]    HPI:  No complaints or issues.  Tolerating regular diet.  Pain adequately controlled.    Exam:  /70 (BP Location: Right arm, Patient Position: Lying)  Pulse 77  Temp 98 °F (36.7 °C) (Oral)   Resp 16  Ht 157.5 cm (62\")  Wt 83.2 kg (183 lb 8 oz)  SpO2 93%  BMI 33.56 kg/m2     Incision: Clean, intact.   Extremity:  Calf soft.  Negative Homans sign.  Good motor and sensory function in foot.  Good pedal pulses with brisk cap refill.    Lab Results (last 24 hours)     Procedure Component Value Units Date/Time    Basic Metabolic Panel [[id number]]  (Abnormal) Collected:  02/08/18 2150    Specimen:  Blood from Arm, Right Updated:  02/08/18 2230     Glucose 144 (H) mg/dL      BUN 46 (H) mg/dL      Creatinine 1.89 (H) mg/dL      Sodium 141 mmol/L      Potassium 5.3 (H) mmol/L      Chloride 104 mmol/L      CO2 22.3 mmol/L      Calcium 8.6 mg/dL      eGFR Non African Amer 25 (L) mL/min/1.73      BUN/Creatinine Ratio 24.3     Anion Gap 14.7 mmol/L     Narrative:       The MDRD GFR formula is only valid for adults with stable renal function between ages 18 and 70.    CBC (No Diff) [[id number]]  (Abnormal) Collected:  02/09/18 0444    Specimen:  Blood Updated:  02/09/18 0520     WBC 16.34 (H) 10*3/mm3      RBC 3.15 (L) 10*6/mm3      Hemoglobin 9.7 (L) g/dL      Hematocrit 31.0 (L) %      MCV 98.4 (H) fL      MCH 30.8 pg      MCHC 31.3 (L) g/dL      RDW 13.8 (H) %      RDW-SD 49.9 fl      MPV 10.2 fL      Platelets 203 10*3/mm3     Renal Function Panel [[id number]]  (Abnormal) Collected:  02/09/18 0444    Specimen:  Blood Updated:  02/09/18 0545     Glucose 133 (H) mg/dL      BUN 47 (H) mg/dL      Creatinine 1.70 (H) mg/dL      Sodium 137 mmol/L      Potassium 5.1 mmol/L      Chloride 102 mmol/L      CO2 23.4 mmol/L      Calcium 8.1 (L) mg/dL      Albumin 3.50 g/dL      Phosphorus 4.3 mg/dL      Anion Gap 11.6 mmol/L      " BUN/Creatinine Ratio 27.6 (H)     eGFR Non  Amer 29 (L) mL/min/1.73     Narrative:       The MDRD GFR formula is only valid for adults with stable renal function between ages 18 and 70.          Plan:   1.  POD#1 s/p CM fixation of left hip   2.  Continue PT for mobilization, ROM--weight bearing as tolerated   3.  ECASA for DVT prophylaxis    Zheng Fleming MD

## 2018-02-09 NOTE — PROGRESS NOTES
"  Name: Vonda Jay  ADMIT: 2018   Age/Sex: 82 y.o.female LOS:  LOS: 2 days    :    1935     ROOM: [facility]   MRN:    [id number]    PCP:    Ambrose Ashley MD     Subjective   Doing well today. Tolerating po. Pain controlled.     Objective   Vital Signs  Temp:  [98.1 °F (36.7 °C)-99.3 °F (37.4 °C)] 98.1 °F (36.7 °C)  Heart Rate:  [] 79  Resp:  [16-20] 18  BP: ()/() 121/53  Body mass index is 33.56 kg/(m^2).    Objective:  General Appearance:  Comfortable and well-appearing.    Vital signs: (most recent): Blood pressure 121/53, pulse 79, temperature 98.1 °F (36.7 °C), temperature source Oral, resp. rate 18, height 157.5 cm (62\"), weight 83.2 kg (183 lb 8 oz), SpO2 100 %.  Vital signs are normal.    HEENT: Normal HEENT exam.    Lungs:  Normal effort.  Breath sounds clear to auscultation.    Heart: Normal rate.  Regular rhythm.    Abdomen: Abdomen is soft and non-distended.  Bowel sounds are normal.   There is no abdominal tenderness.     Extremities: Normal range of motion.  There is no dependent edema.    Neurological: Patient is alert and oriented to person, place and time.    Skin:  Warm and dry.  No rash.             Results Review:       I reviewed the patient's new clinical results.    Results from last 7 days  Lab Units 18  0444 18  0355 18  1653   WBC 10*3/mm3 16.34* 15.11* 17.73*   HEMOGLOBIN g/dL 9.7* 10.7* 12.5   PLATELETS 10*3/mm3 203 265 302       Results from last 7 days  Lab Units 18  0444 18  2150 18  1115 18  0355 18  1815   SODIUM mmol/L 137 141 141 136 137   POTASSIUM mmol/L 5.1 5.3* 5.3* 6.3* 5.4*   CHLORIDE mmol/L 102 104 105 101 100   CO2 mmol/L 23.4 22.3 22.7 23.4 23.5   BUN mg/dL 47* 46* 46* 45* 38*   CREATININE mg/dL 1.70* 1.89* 1.98* 1.78* 1.18*   GLUCOSE mg/dL 133* 144* 109* 138* 117*   Estimated Creatinine Clearance: 25.5 mL/min (by C-G formula based on Cr of 1.7).    Results from last 7 days  Lab Units " 02/09/18  0444 02/08/18  2150 02/08/18  1115 02/08/18  0355 02/07/18  1815   CALCIUM mg/dL 8.1* 8.6 8.6 8.7 9.2   ALBUMIN g/dL 3.50  --   --  4.00  --    MAGNESIUM mg/dL  --   --   --  2.3  --    PHOSPHORUS mg/dL 4.3  --   --   --   --        RADIOLOGY  2/9/2018  Pending      aspirin 325 mg Oral Daily   atorvastatin 10 mg Oral Daily   ceFAZolin 2 g Intravenous Q8H   cholecalciferol 1,000 Units Oral Daily   hydrALAZINE 25 mg Oral TID   sodium polystyrene 15 g Oral Once       sodium chloride 75 mL/hr Last Rate: Stopped (02/08/18 2130)   Diet Regular      Assessment/Plan   Assessment:   Active Problems:    Closed displaced intertrochanteric fracture of left femur    SANYA (acute kidney injury)    Hyperkalemia    HTN (hypertension)        Plan:   1. Left hip fracture  - s/p Cephalomedullary fixation of left intertrochanteric femur fracture on 2/8  - cont pain control  - monitor H/H     2. SANYA  - Cr coming down. K improved  - cont IVF   - renal on board     3. Hyperkalemia  - 2/2 #2 plus ACEi, K supplements and NSAID use  - cont IVF  - given D50 and insulin plus 80mg IV lasix   - recheck later today   - improved     4. HTN  - hydralazine restarted  - monitor BP       Disposition  Ok to transfer to ortho floor in [facility]      Mike Edmonds MD  [facility]  02/09/18  9:21 AM

## 2018-02-09 NOTE — SIGNIFICANT NOTE
02/09/18 1106   Rehab Treatment   Discipline occupational therapist   Rehab Evaluation   Evaluation Not Performed other (see comments)  (OT ordered per ortho. Noted d/c plans are for snu. Defer OT consult until on snu)

## 2018-02-09 NOTE — PLAN OF CARE
Problem: Patient Care Overview (Adult)  Goal: Plan of Care Review  Outcome: Ongoing (interventions implemented as appropriate)   02/09/18 0258   Coping/Psychosocial Response Interventions   Plan Of Care Reviewed With patient   Outcome Evaluation   Outcome Summary/Follow up Plan Pt with frequent complaints of pain and nausea; wouldn't tolerate PO pain meds without immediately vomiting them up. Pt's grand-daughter at bedside to ensure safety. Will Mon   Patient Care Overview   Progress improving       Problem: Pain, Acute (Adult)  Goal: Acceptable Pain Control/Comfort Level  Outcome: Ongoing (interventions implemented as appropriate)   02/09/18 0258   Pain, Acute (Adult)   Acceptable Pain Control/Comfort Level making progress toward outcome       Problem: Fall Risk (Adult)  Goal: Absence of Falls  Outcome: Ongoing (interventions implemented as appropriate)   02/09/18 0258   Fall Risk (Adult)   Absence of Falls making progress toward outcome       Problem: Renal Failure/Kidney Injury, Acute (Adult)  Goal: Signs and Symptoms of Listed Potential Problems Will be Absent or Manageable (Renal Failure/Kidney Injury, Acute)  Outcome: Ongoing (interventions implemented as appropriate)   02/09/18 0258   Renal Failure/Kidney Injury, Acute   Problems Assessed (Acute Renal Failure/Kidney Injury) all   Problems Present (Acute Renal Failure/Kidney Injury) drug/nephrotoxicity;fluid imbalance

## 2018-02-10 PROBLEM — D72.829 LEUKOCYTOSIS: Status: ACTIVE | Noted: 2018-02-10

## 2018-02-10 PROBLEM — E87.5 HYPERKALEMIA: Status: RESOLVED | Noted: 2018-02-07 | Resolved: 2018-02-10

## 2018-02-10 PROBLEM — D62 ACUTE BLOOD LOSS ANEMIA: Status: ACTIVE | Noted: 2018-02-10

## 2018-02-10 LAB
ANION GAP SERPL CALCULATED.3IONS-SCNC: 8.9 MMOL/L
BASOPHILS # BLD AUTO: 0.04 10*3/MM3 (ref 0–0.2)
BASOPHILS NFR BLD AUTO: 0.3 % (ref 0–1.5)
BUN BLD-MCNC: 34 MG/DL (ref 8–23)
BUN/CREAT SERPL: 27 (ref 7–25)
CALCIUM SPEC-SCNC: 8.2 MG/DL (ref 8.6–10.5)
CHLORIDE SERPL-SCNC: 100 MMOL/L (ref 98–107)
CO2 SERPL-SCNC: 24.1 MMOL/L (ref 22–29)
CREAT BLD-MCNC: 1.26 MG/DL (ref 0.57–1)
DEPRECATED RDW RBC AUTO: 48.7 FL (ref 37–54)
EOSINOPHIL # BLD AUTO: 0.35 10*3/MM3 (ref 0–0.7)
EOSINOPHIL NFR BLD AUTO: 2.8 % (ref 0.3–6.2)
ERYTHROCYTE [DISTWIDTH] IN BLOOD BY AUTOMATED COUNT: 13.7 % (ref 11.7–13)
GFR SERPL CREATININE-BSD FRML MDRD: 41 ML/MIN/1.73
GLUCOSE BLD-MCNC: 112 MG/DL (ref 65–99)
HCT VFR BLD AUTO: 26.5 % (ref 35.6–45.5)
HGB BLD-MCNC: 8.4 G/DL (ref 11.9–15.5)
IMM GRANULOCYTES # BLD: 0.05 10*3/MM3 (ref 0–0.03)
IMM GRANULOCYTES NFR BLD: 0.4 % (ref 0–0.5)
LYMPHOCYTES # BLD AUTO: 1.74 10*3/MM3 (ref 0.9–4.8)
LYMPHOCYTES NFR BLD AUTO: 13.7 % (ref 19.6–45.3)
MCH RBC QN AUTO: 30.8 PG (ref 26.9–32)
MCHC RBC AUTO-ENTMCNC: 31.7 G/DL (ref 32.4–36.3)
MCV RBC AUTO: 97.1 FL (ref 80.5–98.2)
MONOCYTES # BLD AUTO: 1.38 10*3/MM3 (ref 0.2–1.2)
MONOCYTES NFR BLD AUTO: 10.9 % (ref 5–12)
NEUTROPHILS # BLD AUTO: 9.12 10*3/MM3 (ref 1.9–8.1)
NEUTROPHILS NFR BLD AUTO: 71.9 % (ref 42.7–76)
PLATELET # BLD AUTO: 172 10*3/MM3 (ref 140–500)
PMV BLD AUTO: 10.5 FL (ref 6–12)
POTASSIUM BLD-SCNC: 4.6 MMOL/L (ref 3.5–5.2)
RBC # BLD AUTO: 2.73 10*6/MM3 (ref 3.9–5.2)
SODIUM BLD-SCNC: 133 MMOL/L (ref 136–145)
WBC NRBC COR # BLD: 12.68 10*3/MM3 (ref 4.5–10.7)

## 2018-02-10 PROCEDURE — 85025 COMPLETE CBC W/AUTO DIFF WBC: CPT | Performed by: INTERNAL MEDICINE

## 2018-02-10 PROCEDURE — 97110 THERAPEUTIC EXERCISES: CPT

## 2018-02-10 PROCEDURE — 80048 BASIC METABOLIC PNL TOTAL CA: CPT | Performed by: INTERNAL MEDICINE

## 2018-02-10 PROCEDURE — 25010000002 ONDANSETRON PER 1 MG: Performed by: INTERNAL MEDICINE

## 2018-02-10 RX ORDER — ALUMINA, MAGNESIA, AND SIMETHICONE 2400; 2400; 240 MG/30ML; MG/30ML; MG/30ML
15 SUSPENSION ORAL EVERY 6 HOURS PRN
Status: DISCONTINUED | OUTPATIENT
Start: 2018-02-10 | End: 2018-02-12 | Stop reason: HOSPADM

## 2018-02-10 RX ADMIN — HYDROCODONE BITARTRATE AND ACETAMINOPHEN 2 TABLET: 7.5; 325 TABLET ORAL at 08:25

## 2018-02-10 RX ADMIN — HYDRALAZINE HYDROCHLORIDE 25 MG: 25 TABLET, FILM COATED ORAL at 08:25

## 2018-02-10 RX ADMIN — HYDRALAZINE HYDROCHLORIDE 25 MG: 25 TABLET, FILM COATED ORAL at 17:16

## 2018-02-10 RX ADMIN — ACETAMINOPHEN 650 MG: 325 TABLET, FILM COATED ORAL at 02:05

## 2018-02-10 RX ADMIN — DOCUSATE SODIUM 100 MG: 100 CAPSULE, LIQUID FILLED ORAL at 11:21

## 2018-02-10 RX ADMIN — HYDROCODONE BITARTRATE AND ACETAMINOPHEN 2 TABLET: 7.5; 325 TABLET ORAL at 21:14

## 2018-02-10 RX ADMIN — HYDROCODONE BITARTRATE AND ACETAMINOPHEN 2 TABLET: 7.5; 325 TABLET ORAL at 12:23

## 2018-02-10 RX ADMIN — ALUMINUM HYDROXIDE, MAGNESIUM HYDROXIDE, AND DIMETHICONE 15 ML: 400; 400; 40 SUSPENSION ORAL at 11:18

## 2018-02-10 RX ADMIN — ATORVASTATIN CALCIUM 10 MG: 10 TABLET, FILM COATED ORAL at 08:25

## 2018-02-10 RX ADMIN — ASPIRIN 325 MG: 325 TABLET, COATED ORAL at 08:25

## 2018-02-10 RX ADMIN — HYDRALAZINE HYDROCHLORIDE 25 MG: 25 TABLET, FILM COATED ORAL at 22:55

## 2018-02-10 RX ADMIN — HYDROCODONE BITARTRATE AND ACETAMINOPHEN 2 TABLET: 7.5; 325 TABLET ORAL at 15:47

## 2018-02-10 RX ADMIN — FAMOTIDINE 20 MG: 20 TABLET, FILM COATED ORAL at 08:25

## 2018-02-10 RX ADMIN — VITAMIN D, TAB 1000IU (100/BT) 1000 UNITS: 25 TAB at 08:25

## 2018-02-10 RX ADMIN — ONDANSETRON 4 MG: 2 INJECTION INTRAMUSCULAR; INTRAVENOUS at 18:58

## 2018-02-10 NOTE — PROGRESS NOTES
Continued Stay Note  [facility]     Patient Name: Vonda Jay  MRN: [id number]  Today's Date: 2/9/2018    Admit Date: 2/7/2018          Discharge Plan       02/09/18 1916    Case Management/Social Work Plan    Plan Plan is for skilled bed at [facility].  Bed available Monday 2/12/18.    Additional Comments Notified by Marissa/ Benny that bed will be available at [facility] on Monday.  Notified By Stephanie/ Terri that bed will be available at [facility] on Monday.   Pt notified and has decided on [facility].  Packet is in [facility] office          Marissa Velasquez RN

## 2018-02-10 NOTE — PLAN OF CARE
Problem: Patient Care Overview (Adult)  Goal: Plan of Care Review  Outcome: Ongoing (interventions implemented as appropriate)   02/10/18 1648 02/10/18 0309   Coping/Psychosocial Response Interventions   Plan Of Care Reviewed With patient --    Outcome Evaluation   Outcome Summary/Follow up Plan --  vitals stable. pain control issues today, not wanting to take pain medication, but crying in pain, educated patient on pain management. difficulty getting up with PT.    Patient Care Overview   Progress no change --        Problem: Pain, Acute (Adult)  Goal: Acceptable Pain Control/Comfort Level  Outcome: Ongoing (interventions implemented as appropriate)      Problem: Fall Risk (Adult)  Goal: Absence of Falls  Outcome: Ongoing (interventions implemented as appropriate)      Problem: Renal Failure/Kidney Injury, Acute (Adult)  Goal: Signs and Symptoms of Listed Potential Problems Will be Absent or Manageable (Renal Failure/Kidney Injury, Acute)  Outcome: Ongoing (interventions implemented as appropriate)

## 2018-02-10 NOTE — PLAN OF CARE
Problem: Patient Care Overview (Adult)  Goal: Plan of Care Review  Outcome: Ongoing (interventions implemented as appropriate)   02/10/18 0331   Coping/Psychosocial Response Interventions   Plan Of Care Reviewed With patient   Outcome Evaluation   Outcome Summary/Follow up Plan VSS; no complaints of pain noted; Pt C/O heartburn-received Nando and mylanta with positive results; IV fluid was discontinued; family at bedside; will continue to monitor   Patient Care Overview   Progress improving       Problem: Pain, Acute (Adult)  Goal: Acceptable Pain Control/Comfort Level  Outcome: Ongoing (interventions implemented as appropriate)      Problem: Fall Risk (Adult)  Goal: Absence of Falls  Outcome: Ongoing (interventions implemented as appropriate)      Problem: Renal Failure/Kidney Injury, Acute (Adult)  Goal: Signs and Symptoms of Listed Potential Problems Will be Absent or Manageable (Renal Failure/Kidney Injury, Acute)  Outcome: Ongoing (interventions implemented as appropriate)

## 2018-02-10 NOTE — PLAN OF CARE
Problem: Patient Care Overview (Adult)  Goal: Plan of Care Review   02/10/18 9398   Coping/Psychosocial Response Interventions   Plan Of Care Reviewed With patient   Outcome Evaluation   Outcome Summary/Follow up Plan Continued to require mod assist for supine to sit, and sit to stand. Was only able to pivot (versus raising feet) to 3 in 1 commode and then recliner.    Patient Care Overview   Progress no change

## 2018-02-10 NOTE — THERAPY TREATMENT NOTE
Acute Care - Physical Therapy Progress Note  [facility]     Patient Name: Vonda Jay  : 1935  MRN: [id number]  Today's Date: 2/10/2018  Onset of Illness/Injury or Date of Surgery Date: 18  Date of Referral to PT: 18       Admit Date: 2018    Visit Dx:    ICD-10-CM ICD-9-CM   1. Closed displaced intertrochanteric fracture of left femur, initial encounter S72.142A 820.21   2. Difficulty walking R26.2 719.7     Patient Active Problem List   Diagnosis   • Closed displaced intertrochanteric fracture of left femur   • SANYA (acute kidney injury)   • Hyperkalemia   • HTN (hypertension)               Adult Rehabilitation Note       02/10/18 1600          Rehab Assessment/Intervention    Discipline physical therapist  -MM      Document Type therapy note (daily note)  -MM      Subjective Information agree to therapy;complains of;pain  -MM      Patient Effort, Rehab Treatment adequate  -MM      Symptoms Noted During/After Treatment fatigue;increased pain  -MM      Precautions/Limitations fall precautions  -MM      Recorded by [MM] Sherrill Jesus, PT      Pain Assessment    Pain Assessment 0-10  -MM      Pain Score 8  -MM      Pain Type Acute pain;Surgical pain  -MM      Pain Location Hip  -MM      Pain Orientation Left  -MM      Pain Intervention(s) Medication (See MAR)  -MM      Recorded by [MM] Sherrill Jesus, PT      Cognitive Assessment/Intervention    Current Cognitive/Communication Assessment functional  -MM      Orientation Status oriented x 4  -MM      Follows Commands/Answers Questions 100% of the time  -MM      Personal Safety Interventions fall prevention program maintained  -MM      Recorded by [MM] Sherrill Jesus, PT      Transfer Assessment/Treatment    Transfers, Bed-Chair [place] moderate assist (50% patient effort);2 person assist required  -MM      Transfers, Bed-Chair-Bed, Assist Device rolling walker  -MM      Transfers, Sit-Stand [place] verbal cues  required;moderate assist (50% patient effort)  -MM      Transfers, Stand-Sit [place] moderate assist (50% patient effort)  -MM      Transfers, Sit-Stand-Sit, Assist Device standard walker  -MM      Recorded by [MM] Sherrill Jesus, PT      Gait Assessment/Treatment    Gait, [place] Level moderate assist (50% patient effort)  -MM      Gait, Assistive Device standard walker  -MM      Gait, Gait Deviations step length decreased  -MM      Recorded by [MM] Sherrill Jesus PT      Therapy Exercises    Exercise Protocols hip ORIF  -MM      Hip ORIF Exercises left:;10 reps;with assist  -MM      Recorded by [MM] Sherrill Jesus PT      Positioning and Restraints    Pre-Treatment Position in bed  -MM      Post Treatment Position chair  -MM      In Chair notified nsg;sitting;call light within reach;with family/caregiver  -MM      Recorded by [MM] Sherrill Jesus, PT        User Key  (r) = Recorded By, (t) = Taken By, (c) = Cosigned By    Initials Name Effective Dates    MM Sherrill Jesus, PT 07/05/16 -                 IP PT Goals       02/09/18 1525          Bed Mobility PT LTG    Bed Mobility PT LTG, Activity Type all bed mobility  -EF      Bed Mobility PT LTG, [place] Level minimum assist (75% patient effort)  -EF      Transfer Training PT LTG    Transfer Training PT LTG, Activity Type all transfers  -EF      Transfer Training PT LTG, [place] Level minimum assist (75% patient effort);2 person assist required  -EF      Transfer Training PT LTG, Assist Device walker, standard  -EF      Gait Training PT LTG    Gait Training Goal PT LTG, [place] Level contact guard assist;minimum assist (75% patient effort);2 person assist required  -EF      Gait Training Goal PT LTG, Assist Device walker, standard  -EF      Gait Training Goal PT LTG, Distance to Achieve 15  -EF        User Key  (r) = Recorded By, (t) = Taken By, (c) = Cosigned By    Initials Name Provider Type    LI Venegas, PT Physical  Therapist          Physical Therapy Education     Title: PT OT SLP Therapies (Active)     Topic: Physical Therapy (Active)     Point: Mobility training (Done)    Learning Progress Summary    Learner Readiness Method Response Comment Documented by Status   Patient Acceptance E VU,NR  MM 02/10/18 1647 Done    Acceptance E NR  EF 02/09/18 1525 Active               Point: Home exercise program (Active)    Learning Progress Summary    Learner Readiness Method Response Comment Documented by Status   Patient Acceptance E NR  EF 02/09/18 1525 Active               Point: Body mechanics (Active)    Learning Progress Summary    Learner Readiness Method Response Comment Documented by Status   Patient Acceptance E NR  EF 02/09/18 1525 Active               Point: Precautions (Active)    Learning Progress Summary    Learner Readiness Method Response Comment Documented by Status   Patient Acceptance E NR  EF 02/09/18 1525 Active                      User Key     Initials Effective Dates Name Provider Type Discipline     04/24/15 -  Mimi Venegas, PT Physical Therapist PT     07/05/16 -  Sherrill Jesus PT Physical Therapist PT                    PT Recommendation and Plan  Anticipated Discharge Disposition: skilled nursing facility  PT Frequency: daily  Plan of Care Review  Plan Of Care Reviewed With: patient  Progress: no change  Outcome Summary/Follow up Plan: Continued to require mod assist for supine to sit, and sit to stand.  Was only able to pivot  (versus raising feet) to 3 in 1 commode and then recliner.              Outcome Measures       02/10/18 1600 02/09/18 1500       How much help from another person do you currently need...    Turning from your back to your side while in flat bed without using bedrails? 2  -MM 2  -EF     Moving from lying on back to sitting on the side of a flat bed without bedrails? 2  -MM 2  -EF     Moving to and from a bed to a chair (including a wheelchair)? 2  -MM 2  -EF     Standing up  from a chair using your arms (e.g., wheelchair, bedside chair)? 2  -MM 2  -EF     Climbing 3-5 steps with a railing? 1  -MM 1  -EF     To walk in hospital room? 2  -MM 2  -EF     AM-PAC 6 Clicks Score 11  -MM 11  -EF     Functional Assessment    Outcome Measure Options AM-PAC 6 Clicks Basic Mobility (PT)  -MM AM-PAC 6 Clicks Basic Mobility (PT)  -EF       User Key  (r) = Recorded By, (t) = Taken By, (c) = Cosigned By    Initials Name Provider Type    EF Mimi Venegas, PT Physical Therapist    MM Sherrill Jesus, PT Physical Therapist           Time Calculation:         PT Charges       02/10/18 1649          Time Calculation    Start Time 1615  -MM      Stop Time 1645  -MM      Time Calculation (min) 30 min  -MM      PT Received On 02/10/18  -MM      PT - Next Appointment 02/11/18  -MM        User Key  (r) = Recorded By, (t) = Taken By, (c) = Cosigned By    Initials Name Provider Type    MM Sherrill Jesus, PT Physical Therapist          Therapy Charges for Today     Code Description Service Date Service Provider Modifiers Qty    58349740804 HC PT THER PROC EA 15 MIN 2/10/2018 Sherrill Jesus, PT GP 2          PT G-Codes  Outcome Measure Options: AM-PAC 6 Clicks Basic Mobility (PT)    Sherrill Jesus, PT  2/10/2018

## 2018-02-10 NOTE — PROGRESS NOTES
"   LOS: 3 days    Patient Care Team:  Ambrose Ashley MD as PCP - General (Family Medicine)    Chief Complaint:    Chief Complaint   Patient presents with   • Fall   • Hip Pain       Subjective     Interval History:    feeling a lot better. Little pain. No more nausea.    Tolerating po. No SOA.     Objective     Vital Signs  Temp:  [98 °F (36.7 °C)-99.1 °F (37.3 °C)] 98.9 °F (37.2 °C)  Heart Rate:  [77-89] 88  Resp:  [16-18] 18  BP: (116-134)/(56-70) 116/57    Flowsheet Rows         First Filed Value    Admission Height  157.5 cm (62\") Documented at 02/07/2018 1656    Admission Weight  81.6 kg (180 lb) Documented at 02/07/2018 1656             I/O last 3 completed shifts:  In: 1740 [P.O.:840; I.V.:900]  Out: 825 [Urine:725; Blood:100]    Intake/Output Summary (Last 24 hours) at 02/10/18 0802  Last data filed at 02/09/18 1800   Gross per 24 hour   Intake              840 ml   Output              350 ml   Net              490 ml       Physical Exam:  No distress.   Mild pallor.  No icterus.  No JVD.  Lungs are clear to auscultation bilaterally.    Heart is regular rate and rhythm.  Abdomen is soft, nontender, nondistended.  No hepatosplenomegaly.  Extremities without edema or calf tenderness.  I did not examine her left hip area.  mood and affect were appropriate.  Mental status is clear.      Results Review:      Results from last 7 days  Lab Units 02/10/18  0519 02/09/18  0444 02/08/18  2150  02/08/18  0355   SODIUM mmol/L 133* 137 141  < > 136   POTASSIUM mmol/L 4.6 5.1 5.3*  < > 6.3*   CHLORIDE mmol/L 100 102 104  < > 101   CO2 mmol/L 24.1 23.4 22.3  < > 23.4   BUN mg/dL 34* 47* 46*  < > 45*   CREATININE mg/dL 1.26* 1.70* 1.89*  < > 1.78*   CALCIUM mg/dL 8.2* 8.1* 8.6  < > 8.7   BILIRUBIN mg/dL  --   --   --   --  0.4   ALK PHOS U/L  --   --   --   --  59   ALT (SGPT) U/L  --   --   --   --  32   AST (SGOT) U/L  --   --   --   --  29   GLUCOSE mg/dL 112* 133* 144*  < > 138*   < > = values in this interval not " displayed.    Estimated Creatinine Clearance: 35.9 mL/min (by C-G formula based on Cr of 1.26).      Results from last 7 days  Lab Units 02/09/18  0444 02/08/18  0355   MAGNESIUM mg/dL  --  2.3   PHOSPHORUS mg/dL 4.3  --                Results from last 7 days  Lab Units 02/10/18  0519 02/09/18  0444 02/08/18  0355 02/07/18  1653   WBC 10*3/mm3 12.68* 16.34* 15.11* 17.73*   HEMOGLOBIN g/dL 8.4* 9.7* 10.7* 12.5   PLATELETS 10*3/mm3 172 203 265 302         Results from last 7 days  Lab Units 02/07/18  1815   INR  1.01         Imaging Results (last 24 hours)     ** No results found for the last 24 hours. **          aspirin 325 mg Oral Daily   atorvastatin 10 mg Oral Daily   cholecalciferol 1,000 Units Oral Daily   famotidine 20 mg Oral Daily   hydrALAZINE 25 mg Oral TID   sodium polystyrene 15 g Oral Once          Medication Review:   Current Facility-Administered Medications   Medication Dose Route Frequency Provider Last Rate Last Dose   • acetaminophen (TYLENOL) tablet 650 mg  650 mg Oral Q4H PRN Cyn Shepherd MD   650 mg at 02/10/18 0205   • aspirin EC tablet 325 mg  325 mg Oral Daily Zheng Fleming MD   325 mg at 02/09/18 0802   • atorvastatin (LIPITOR) tablet 10 mg  10 mg Oral Daily Cyn Shepherd MD   10 mg at 02/09/18 0803   • bisacodyl (DULCOLAX) suppository 10 mg  10 mg Rectal Daily PRN Zheng Fleming MD       • calcium carbonate (TUMS) chewable tablet 500 mg (200 mg elemental)  1 tablet Oral TID PRN Mike Edmonds MD   1 tablet at 02/09/18 2142   • cholecalciferol (VITAMIN D3) tablet 1,000 Units  1,000 Units Oral Daily Cyn Shepherd MD   1,000 Units at 02/09/18 0803   • docusate sodium (COLACE) capsule 100 mg  100 mg Oral BID PRN Zheng Fleming MD       • famotidine (PEPCID) tablet 20 mg  20 mg Oral Daily Mike Edmonds MD   20 mg at 02/09/18 1743   • hydrALAZINE (APRESOLINE) tablet 25 mg  25 mg Oral TID Cyn Shepherd MD   25 mg at 02/09/18 2049   • HYDROcodone-acetaminophen (NORCO) 7.5-325  MG per tablet 2 tablet  2 tablet Oral Q4H PRN Zheng Fleming MD   2 tablet at 02/09/18 1743   • HYDROmorphone (DILAUDID) injection 0.5 mg  0.5 mg Intravenous Q2H PRN Zheng Fleming MD   0.5 mg at 02/09/18 0601    And   • naloxone (NARCAN) injection 0.1 mg  0.1 mg Intravenous Q5 Min PRN Zheng Fleming MD       • HYDROmorphone (DILAUDID) injection 1 mg  1 mg Intravenous Q2H PRN Pollo Martins MD   1 mg at 02/09/18 0401   • morphine injection 4 mg  4 mg Intravenous Q2H PRN Zheng Fleming MD        And   • naloxone (NARCAN) injection 0.4 mg  0.4 mg Intravenous Q5 Min PRN Zheng Fleming MD       • ondansetron (ZOFRAN) injection 4 mg  4 mg Intravenous Q4H PRN Mike Edmonds MD   4 mg at 02/08/18 2133   • ondansetron (ZOFRAN) tablet 4 mg  4 mg Oral Q6H PRN Zheng Fleming MD        Or   • ondansetron ODT (ZOFRAN-ODT) disintegrating tablet 4 mg  4 mg Oral Q6H PRN Zheng Fleming MD        Or   • ondansetron (ZOFRAN) injection 4 mg  4 mg Intravenous Q6H PRN Zheng Fleming MD   4 mg at 02/09/18 0404   • sodium chloride 0.9 % flush 1-10 mL  1-10 mL Intravenous PRN Cyn Shepherd MD       • sodium chloride 0.9 % flush 1-10 mL  1-10 mL Intravenous PRN Zheng Fleming MD       • sodium chloride 0.9 % flush 10 mL  10 mL Intravenous PRN ELVIE Kennedy       • sodium polystyrene (KAYEXALATE) 15 GM/60ML suspension 15 g  15 g Oral Once Pollo Martins MD   Stopped at 02/08/18 1230       Assessment/Plan       Active Problems:    Closed displaced intertrochanteric fracture of left femur    SANYA (acute kidney injury)    Hyperkalemia    HTN (hypertension)    - SANYA with life-threatening hyperkalemia in the setting of hip fracture and hypotension. Better. Off IVF. Tolerating po. Holding ACEI, amlodipine, meloxicam and potassium supplements. UA is fairly bland. Unsure if she has underlying CKD. Cr is better.   - Hyperkalemia, treated aggressively. Resolved. Monitor.     - Anemia, multifactorial monitor.  Transfuse when necessary.    - History of hypertension, blood pressure is controlled and no longer low.   - Left hip fracture, s/p ORIF, per ortho.  Discussed with patient and family.   Will follow.     Phillip Dasilva MD  02/10/18  8:02 AM

## 2018-02-10 NOTE — PROGRESS NOTES
Ortho rounds with dr pope    Pod 2 s/p cm fixation of the left hip  Overall doing well.  Afeb. vss  Tolerating diet  Pain managed  No complaints  Dressing intact. Neuro intact.  Continue therapy / mobilization      jayme Grimaldo

## 2018-02-11 LAB
ANION GAP SERPL CALCULATED.3IONS-SCNC: 5.9 MMOL/L
BUN BLD-MCNC: 32 MG/DL (ref 8–23)
BUN/CREAT SERPL: 27.4 (ref 7–25)
CALCIUM SPEC-SCNC: 8.4 MG/DL (ref 8.6–10.5)
CHLORIDE SERPL-SCNC: 102 MMOL/L (ref 98–107)
CO2 SERPL-SCNC: 27.1 MMOL/L (ref 22–29)
CREAT BLD-MCNC: 1.17 MG/DL (ref 0.57–1)
DEPRECATED RDW RBC AUTO: 47.4 FL (ref 37–54)
ERYTHROCYTE [DISTWIDTH] IN BLOOD BY AUTOMATED COUNT: 13.4 % (ref 11.7–13)
GFR SERPL CREATININE-BSD FRML MDRD: 44 ML/MIN/1.73
GLUCOSE BLD-MCNC: 98 MG/DL (ref 65–99)
HCT VFR BLD AUTO: 27.4 % (ref 35.6–45.5)
HGB BLD-MCNC: 8.7 G/DL (ref 11.9–15.5)
MCH RBC QN AUTO: 30.7 PG (ref 26.9–32)
MCHC RBC AUTO-ENTMCNC: 31.8 G/DL (ref 32.4–36.3)
MCV RBC AUTO: 96.8 FL (ref 80.5–98.2)
PLATELET # BLD AUTO: 179 10*3/MM3 (ref 140–500)
PMV BLD AUTO: 10.2 FL (ref 6–12)
POTASSIUM BLD-SCNC: 4.9 MMOL/L (ref 3.5–5.2)
RBC # BLD AUTO: 2.83 10*6/MM3 (ref 3.9–5.2)
SODIUM BLD-SCNC: 135 MMOL/L (ref 136–145)
WBC NRBC COR # BLD: 11.32 10*3/MM3 (ref 4.5–10.7)

## 2018-02-11 PROCEDURE — 94799 UNLISTED PULMONARY SVC/PX: CPT

## 2018-02-11 PROCEDURE — 85027 COMPLETE CBC AUTOMATED: CPT | Performed by: INTERNAL MEDICINE

## 2018-02-11 PROCEDURE — 80048 BASIC METABOLIC PNL TOTAL CA: CPT | Performed by: INTERNAL MEDICINE

## 2018-02-11 PROCEDURE — 97110 THERAPEUTIC EXERCISES: CPT

## 2018-02-11 PROCEDURE — 25010000002 ONDANSETRON PER 1 MG: Performed by: INTERNAL MEDICINE

## 2018-02-11 RX ORDER — CALCIUM CARBONATE 200(500)MG
1 TABLET,CHEWABLE ORAL DAILY
Status: DISCONTINUED | OUTPATIENT
Start: 2018-02-11 | End: 2018-02-12 | Stop reason: HOSPADM

## 2018-02-11 RX ORDER — BISACODYL 10 MG
10 SUPPOSITORY, RECTAL RECTAL DAILY
Status: DISCONTINUED | OUTPATIENT
Start: 2018-02-11 | End: 2018-02-12 | Stop reason: HOSPADM

## 2018-02-11 RX ORDER — SENNA AND DOCUSATE SODIUM 50; 8.6 MG/1; MG/1
2 TABLET, FILM COATED ORAL 2 TIMES DAILY
Status: DISCONTINUED | OUTPATIENT
Start: 2018-02-11 | End: 2018-02-12 | Stop reason: HOSPADM

## 2018-02-11 RX ORDER — IPRATROPIUM BROMIDE AND ALBUTEROL SULFATE 2.5; .5 MG/3ML; MG/3ML
3 SOLUTION RESPIRATORY (INHALATION)
Status: DISCONTINUED | OUTPATIENT
Start: 2018-02-11 | End: 2018-02-12 | Stop reason: HOSPADM

## 2018-02-11 RX ORDER — LACTULOSE 10 G/15ML
10 SOLUTION ORAL 2 TIMES DAILY PRN
Status: DISCONTINUED | OUTPATIENT
Start: 2018-02-11 | End: 2018-02-12 | Stop reason: HOSPADM

## 2018-02-11 RX ADMIN — IPRATROPIUM BROMIDE AND ALBUTEROL SULFATE 3 ML: .5; 3 SOLUTION RESPIRATORY (INHALATION) at 12:20

## 2018-02-11 RX ADMIN — HYDROCODONE BITARTRATE AND ACETAMINOPHEN 2 TABLET: 7.5; 325 TABLET ORAL at 06:36

## 2018-02-11 RX ADMIN — HYDRALAZINE HYDROCHLORIDE 25 MG: 25 TABLET, FILM COATED ORAL at 21:16

## 2018-02-11 RX ADMIN — ONDANSETRON 4 MG: 4 TABLET, FILM COATED ORAL at 19:31

## 2018-02-11 RX ADMIN — HYDROCODONE BITARTRATE AND ACETAMINOPHEN 2 TABLET: 7.5; 325 TABLET ORAL at 10:48

## 2018-02-11 RX ADMIN — DOCUSATE SODIUM -SENNOSIDES 2 TABLET: 50; 8.6 TABLET, COATED ORAL at 14:05

## 2018-02-11 RX ADMIN — HYDROCODONE BITARTRATE AND ACETAMINOPHEN 2 TABLET: 7.5; 325 TABLET ORAL at 14:05

## 2018-02-11 RX ADMIN — ONDANSETRON 4 MG: 4 TABLET, FILM COATED ORAL at 10:49

## 2018-02-11 RX ADMIN — VITAMIN D, TAB 1000IU (100/BT) 1000 UNITS: 25 TAB at 08:23

## 2018-02-11 RX ADMIN — ONDANSETRON 4 MG: 2 INJECTION INTRAMUSCULAR; INTRAVENOUS at 06:39

## 2018-02-11 RX ADMIN — HYDRALAZINE HYDROCHLORIDE 25 MG: 25 TABLET, FILM COATED ORAL at 16:23

## 2018-02-11 RX ADMIN — LACTULOSE 10 G: 10 SOLUTION ORAL at 15:12

## 2018-02-11 RX ADMIN — ATORVASTATIN CALCIUM 10 MG: 10 TABLET, FILM COATED ORAL at 08:23

## 2018-02-11 RX ADMIN — HYDRALAZINE HYDROCHLORIDE 25 MG: 25 TABLET, FILM COATED ORAL at 08:23

## 2018-02-11 RX ADMIN — HYDROCODONE BITARTRATE AND ACETAMINOPHEN 2 TABLET: 7.5; 325 TABLET ORAL at 01:33

## 2018-02-11 RX ADMIN — Medication 1 TABLET: at 14:05

## 2018-02-11 RX ADMIN — BISACODYL 10 MG: 10 SUPPOSITORY RECTAL at 18:00

## 2018-02-11 RX ADMIN — DOCUSATE SODIUM -SENNOSIDES 2 TABLET: 50; 8.6 TABLET, COATED ORAL at 21:16

## 2018-02-11 RX ADMIN — IPRATROPIUM BROMIDE AND ALBUTEROL SULFATE 3 ML: .5; 3 SOLUTION RESPIRATORY (INHALATION) at 16:41

## 2018-02-11 RX ADMIN — HYDROCODONE BITARTRATE AND ACETAMINOPHEN 2 TABLET: 7.5; 325 TABLET ORAL at 19:31

## 2018-02-11 RX ADMIN — ONDANSETRON 4 MG: 2 INJECTION INTRAMUSCULAR; INTRAVENOUS at 01:38

## 2018-02-11 RX ADMIN — ASPIRIN 325 MG: 325 TABLET, COATED ORAL at 08:23

## 2018-02-11 RX ADMIN — FAMOTIDINE 20 MG: 20 TABLET, FILM COATED ORAL at 08:23

## 2018-02-11 RX ADMIN — IPRATROPIUM BROMIDE AND ALBUTEROL SULFATE 3 ML: .5; 3 SOLUTION RESPIRATORY (INHALATION) at 21:44

## 2018-02-11 NOTE — PLAN OF CARE
Problem: Patient Care Overview (Adult)  Goal: Plan of Care Review   02/11/18 1515   Coping/Psychosocial Response Interventions   Plan Of Care Reviewed With patient   Patient Care Overview   Progress improving

## 2018-02-11 NOTE — THERAPY TREATMENT NOTE
Acute Care - Physical Therapy Treatment Note  [facility]     Patient Name: Vonda Jay  : 1935  MRN: [id number]  Today's Date: 2018  Onset of Illness/Injury or Date of Surgery Date: 18  Date of Referral to PT: 18       Admit Date: 2018    Visit Dx:    ICD-10-CM ICD-9-CM   1. Closed displaced intertrochanteric fracture of left femur, initial encounter S72.142A 820.21   2. Difficulty walking R26.2 719.7     Patient Active Problem List   Diagnosis   • Closed displaced intertrochanteric fracture of left femur   • SANYA (acute kidney injury)   • HTN (hypertension)   • Acute blood loss anemia   • Leukocytosis               Adult Rehabilitation Note       18 1443 02/10/18 1600       Rehab Assessment/Intervention    Discipline physical therapist  -KH physical therapist  -MM     Document Type therapy note (daily note)  - therapy note (daily note)  -MM     Subjective Information agree to therapy;complains of;fatigue  -KH agree to therapy;complains of;pain  -MM     Patient Effort, Rehab Treatment good  -KH adequate  -MM     Symptoms Noted During/After Treatment fatigue   pt reports feeling like she may faint and feeling nauseous   -KH fatigue;increased pain  -MM     Precautions/Limitations fall precautions  -KH fall precautions  -MM     Recorded by [KH] Elvi Davis, PT [MM] Sherrill Jesus, PT     Vital Signs    Pre SpO2 (%) 96  -KH      O2 Delivery Pre Treatment room air   pt on room air upon arrival  -KH      Intra SpO2 (%) 92  -KH      O2 Delivery Intra Treatment room air  -KH      Post SpO2 (%) 95  -KH      O2 Delivery Post Treatment room air  -KH      Recorded by [KH] Elvi Davis, PT      Pain Assessment    Pain Assessment No/denies pain   reports had just taken pain medication   - 0-10  -MM     Pain Score  8  -MM     Pain Type  Acute pain;Surgical pain  -MM     Pain Location  Hip  -MM     Pain Orientation  Left  -MM     Pain Intervention(s) Medication (See MAR)  -  Medication (See MAR)  -MM     Recorded by [KH] Elvi Davis, PT [MM] Sherrill Jesus, PT     Cognitive Assessment/Intervention    Current Cognitive/Communication Assessment functional  -KH functional  -MM     Orientation Status oriented x 4  -KH oriented x 4  -MM     Follows Commands/Answers Questions 100% of the time  -% of the time  -MM     Personal Safety WNL/WFL  -KH      Personal Safety Interventions fall prevention program maintained;gait belt;nonskid shoes/slippers when out of bed  -KH fall prevention program maintained  -MM     Recorded by [KH] Elvi Davis, PT [MM] Sherrill Jesus, PT     Bed Mobility, Assessment/Treatment    Bed Mobility, Scoot/Bridge, [place] minimum assist (75% patient effort);verbal cues required  -      Bed Mob, Supine to Sit, [place] not tested   up in chair  -KH      Bed Mob, Sit to Supine, [place] minimum assist (75% patient effort);moderate assist (50% patient effort)   LLE  -KH      Bed Mobility, Impairments ROM decreased;strength decreased   endurance decreased  -KH      Recorded by [KH] Elvi Davis PT      Transfer Assessment/Treatment    Transfers, Bed-Chair [place] not tested   up in chair  - moderate assist (50% patient effort);2 person assist required  -MM     Transfers, Chair-Bed [place] verbal cues required;nonverbal cues required (demo/gesture);minimum assist (75% patient effort)  -      Transfers, Bed-Chair-Bed, Assist Device standard walker  -KH rolling walker  -MM     Transfers, Sit-Stand [place] verbal cues required;minimum assist (75% patient effort);moderate assist (50% patient effort)  - verbal cues required;moderate assist (50% patient effort)  -MM     Transfers, Stand-Sit [place] verbal cues required;minimum assist (75% patient effort)  - moderate assist (50% patient effort)  -MM     Transfers, Sit-Stand-Sit, Assist Device standard walker  - standard walker  -MM     Transfer, Safety Issues sequencing  ability decreased;weight-shifting ability decreased;step length decreased  -      Transfer, Impairments ROM decreased;strength decreased;impaired balance   decreased endurance   -      Transfer, Comment pt reported feeling as though she may faint during transfer. 2 person assist for safety  -      Recorded by [JAMILA] Elvi Davis, PT [MM] Sherrill Jesus, PT     Gait Assessment/Treatment    Gait, [place] Level minimum assist (75% patient effort);moderate assist (50% patient effort);verbal cues required  - moderate assist (50% patient effort)  -MM     Gait, Assistive Device standard walker  - standard walker  -MM     Gait, Distance (Feet) --   took a few small steps from chair to bed  -      Gait, Gait Deviations sen[place] decreased;step length decreased  - step length decreased  -     Gait, Safety Issues sequencing ability decreased;step length decreased  -      Recorded by [JAMILA] Elvi Davis, PT [MM] Sherrill Jesus, PT     Motor Skills/Interventions    Additional Documentation Balance Skills Training (Group)  -      Recorded by [JAMILA] Elvi Davis PT      Balance Skills Training    Standing-Level of Assistance Contact guard  -      Static Standing Balance Support assistive device  -      Standing-Balance Activities Weight Shift R-L  -      Recorded by [JAMILA] Elvi Davis, PT      Therapy Exercises    Exercise Protocols hip ORIF  - hip ORIF  -     Hip ORIF Exercises left:;10 reps  - left:;10 reps;with assist  -MM     Recorded by [JAMILA] Elvi Davis, PT [MM] Sherrill Jesus, PT     Positioning and Restraints    Pre-Treatment Position sitting in chair/recliner  - in bed  -     Post Treatment Position bed  - chair  -     In Bed supine;call light within reach;encouraged to call for assist;notified nsg  -      In Chair --  - notified nsg;sitting;call light within reach;with family/caregiver  -MM     Recorded by [JAMILA] Elvi Davis, PT [MM] Sherrill Jesus, PT        User Key  (r) = Recorded By, (t) = Taken By, (c) = Cosigned By    Initials Name Effective Dates    KH Elvi Davis, PT 12/01/15 -     MM Sherrill Jesus, PT 07/05/16 -                 IP PT Goals       02/09/18 1525          Bed Mobility PT LTG    Bed Mobility PT LTG, Activity Type all bed mobility  -EF      Bed Mobility PT LTG, [place] Level minimum assist (75% patient effort)  -EF      Transfer Training PT LTG    Transfer Training PT LTG, Activity Type all transfers  -EF      Transfer Training PT LTG, [place] Level minimum assist (75% patient effort);2 person assist required  -EF      Transfer Training PT LTG, Assist Device walker, standard  -EF      Gait Training PT LTG    Gait Training Goal PT LTG, [place] Level contact guard assist;minimum assist (75% patient effort);2 person assist required  -EF      Gait Training Goal PT LTG, Assist Device walker, standard  -EF      Gait Training Goal PT LTG, Distance to Achieve 15  -EF        User Key  (r) = Recorded By, (t) = Taken By, (c) = Cosigned By    Initials Name Provider Type    EF Mimi Venegas, PT Physical Therapist          Physical Therapy Education     Title: PT OT SLP Therapies (Active)     Topic: Physical Therapy (Active)     Point: Mobility training (Done)    Learning Progress Summary    Learner Readiness Method Response Comment Documented by Status   Patient Acceptance E VU  KH 02/11/18 1515 Done    Acceptance E LEN [place]  MM 02/10/18 1647 Done    Acceptance E NR  EF 02/09/18 1525 Active               Point: Home exercise program (Done)    Learning Progress Summary    Learner Readiness Method Response Comment Documented by Status   Patient Acceptance E VU  KH 02/11/18 1515 Done    Acceptance E NR  EF 02/09/18 1525 Active               Point: Body mechanics (Active)    Learning Progress Summary    Learner Readiness Method Response Comment Documented by Status   Patient Acceptance E NR  EF 02/09/18 1525 Active               Point:  Precautions (Active)    Learning Progress Summary    Learner Readiness Method Response Comment Documented by Status   Patient Acceptance E NR  EF 02/09/18 1525 Active                      User Key     Initials Effective Dates Name Provider Type Discipline    EF 04/24/15 -  Mimi Venegas, PT Physical Therapist PT     12/01/15 -  Elvi Davis, PT Physical Therapist PT    MM 07/05/16 -  Sherrill eJsus, PT Physical Therapist PT                    PT Recommendation and Plan  Anticipated Discharge Disposition: skilled nursing facility  PT Frequency: daily  Plan of Care Review  Plan Of Care Reviewed With: patient  Progress: improving          Outcome Measures       02/11/18 1515 02/10/18 1600 02/09/18 1500    How much help from another person do you currently need...    Turning from your back to your side while in flat bed without using bedrails? 2  -KH 2  -MM 2  -EF    Moving from lying on back to sitting on the side of a flat bed without bedrails? 2  -KH 2  -MM 2  -EF    Moving to and from a bed to a chair (including a wheelchair)? 3  -KH 2  -MM 2  -EF    Standing up from a chair using your arms (e.g., wheelchair, bedside chair)? 3  -KH 2  -MM 2  -EF    Climbing 3-5 steps with a railing? 1  -KH 1  -MM 1  -EF    To walk in hospital room? 2  -KH 2  -MM 2  -EF    AM-PAC 6 Clicks Score 13  -KH 11  -MM 11  -EF    Functional Assessment    Outcome Measure Options AM-PAC 6 Clicks Basic Mobility (PT)  -KH AM-PAC 6 Clicks Basic Mobility (PT)  -MM AM-PAC 6 Clicks Basic Mobility (PT)  -EF      User Key  (r) = Recorded By, (t) = Taken By, (c) = Cosigned By    Initials Name Provider Type    EF Mimi Venegas, PT Physical Therapist     Elvi Davis, PT Physical Therapist    MM Sherrill Jesus, PT Physical Therapist           Time Calculation:         PT Charges       02/11/18 1516          Time Calculation    Start Time 1435  -KH      Stop Time 1504  -KH      Time Calculation (min) 29 min  -KH      PT Received On  02/11/18  -JAMILA      PT - Next Appointment 02/12/18  -JAMILA        User Key  (r) = Recorded By, (t) = Taken By, (c) = Cosigned By    Initials Name Provider Type    JAMILA Davis PT Physical Therapist          Therapy Charges for Today     Code Description Service Date Service Provider Modifiers Qty    94613804151 HC PT THER PROC EA 15 MIN 2/11/2018 Elvi Davis, PT GP 2    71374172538 HC PT THER SUPP EA 15 MIN 2/11/2018 Elvi Davis, PT GP 2          PT G-Codes  Outcome Measure Options: AM-PAC 6 Clicks Basic Mobility (PT)    Elvi Davis, PT  2/11/2018

## 2018-02-11 NOTE — PROGRESS NOTES
"   LOS: 4 days    Patient Care Team:  mAbrose Ashley MD as PCP - General (Family Medicine)    Chief Complaint:    Chief Complaint   Patient presents with   • Fall   • Hip Pain       Subjective     Interval History:    feeling better. Little pain.    Tolerating po. No SOA.     Objective     Vital Signs  Temp:  [98.7 °F (37.1 °C)-99 °F (37.2 °C)] 99 °F (37.2 °C)  Heart Rate:  [78-85] 85  Resp:  [18-20] 18  BP: (113-150)/(58-86) 128/59    Flowsheet Rows         First Filed Value    Admission Height  157.5 cm (62\") Documented at 02/07/2018 1656    Admission Weight  81.6 kg (180 lb) Documented at 02/07/2018 1656             I/O last 3 completed shifts:  In: 220 [P.O.:220]  Out: 300 [Urine:300]    Intake/Output Summary (Last 24 hours) at 02/11/18 0845  Last data filed at 02/11/18 0643   Gross per 24 hour   Intake              220 ml   Output              300 ml   Net              -80 ml       Physical Exam:  No distress.   Mild pallor.  No icterus.  No JVD.  Lungs are clear to auscultation bilaterally.    Heart is regular rate and rhythm.  Abdomen is soft, nontender, nondistended.  No hepatosplenomegaly.  Extremities without edema or calf tenderness.  I did not examine her left hip area.  mood and affect were appropriate.  Mental status is clear.      Results Review:      Results from last 7 days  Lab Units 02/11/18  0554 02/10/18  0519 02/09/18  0444  02/08/18  0355   SODIUM mmol/L 135* 133* 137  < > 136   POTASSIUM mmol/L 4.9 4.6 5.1  < > 6.3*   CHLORIDE mmol/L 102 100 102  < > 101   CO2 mmol/L 27.1 24.1 23.4  < > 23.4   BUN mg/dL 32* 34* 47*  < > 45*   CREATININE mg/dL 1.17* 1.26* 1.70*  < > 1.78*   CALCIUM mg/dL 8.4* 8.2* 8.1*  < > 8.7   BILIRUBIN mg/dL  --   --   --   --  0.4   ALK PHOS U/L  --   --   --   --  59   ALT (SGPT) U/L  --   --   --   --  32   AST (SGOT) U/L  --   --   --   --  29   GLUCOSE mg/dL 98 112* 133*  < > 138*   < > = values in this interval not displayed.    Estimated Creatinine Clearance: " 38.7 mL/min (by C-G formula based on Cr of 1.17).      Results from last 7 days  Lab Units 02/09/18  0444 02/08/18  0355   MAGNESIUM mg/dL  --  2.3   PHOSPHORUS mg/dL 4.3  --                Results from last 7 days  Lab Units 02/11/18  0554 02/10/18  0519 02/09/18  0444 02/08/18  0355 02/07/18  1653   WBC 10*3/mm3 11.32* 12.68* 16.34* 15.11* 17.73*   HEMOGLOBIN g/dL 8.7* 8.4* 9.7* 10.7* 12.5   PLATELETS 10*3/mm3 179 172 203 265 302         Results from last 7 days  Lab Units 02/07/18  1815   INR  1.01         Imaging Results (last 24 hours)     ** No results found for the last 24 hours. **          aspirin 325 mg Oral Daily   atorvastatin 10 mg Oral Daily   cholecalciferol 1,000 Units Oral Daily   famotidine 20 mg Oral Daily   hydrALAZINE 25 mg Oral TID   sodium polystyrene 15 g Oral Once          Medication Review:   Current Facility-Administered Medications   Medication Dose Route Frequency Provider Last Rate Last Dose   • acetaminophen (TYLENOL) tablet 650 mg  650 mg Oral Q4H PRN Cyn Shepherd MD   650 mg at 02/10/18 0205   • aluminum-magnesium hydroxide-simethicone (MAALOX MAX) 400-400-40 MG/5ML suspension 15 mL  15 mL Oral Q6H PRN Annemarie Fagan MD   15 mL at 02/10/18 1118   • aspirin EC tablet 325 mg  325 mg Oral Daily Zheng Fleming MD   325 mg at 02/11/18 0823   • atorvastatin (LIPITOR) tablet 10 mg  10 mg Oral Daily Cyn Shepherd MD   10 mg at 02/11/18 0823   • bisacodyl (DULCOLAX) suppository 10 mg  10 mg Rectal Daily PRN Zheng Fleming MD       • calcium carbonate (TUMS) chewable tablet 500 mg (200 mg elemental)  1 tablet Oral TID PRN Mike Edmonds MD   1 tablet at 02/09/18 2142   • cholecalciferol (VITAMIN D3) tablet 1,000 Units  1,000 Units Oral Daily Cyn Shepherd MD   1,000 Units at 02/11/18 0823   • docusate sodium (COLACE) capsule 100 mg  100 mg Oral BID PRN Zheng Fleming MD   100 mg at 02/10/18 1121   • famotidine (PEPCID) tablet 20 mg  20 mg Oral Daily Mike Edmonds MD   20  mg at 02/11/18 0823   • hydrALAZINE (APRESOLINE) tablet 25 mg  25 mg Oral TID Annemarie Fagan MD   25 mg at 02/11/18 0823   • HYDROcodone-acetaminophen (NORCO) 7.5-325 MG per tablet 2 tablet  2 tablet Oral Q4H PRN Zheng Fleming MD   2 tablet at 02/11/18 0636   • HYDROmorphone (DILAUDID) injection 0.5 mg  0.5 mg Intravenous Q2H PRN Zheng Fleming MD   0.5 mg at 02/09/18 0601    And   • naloxone (NARCAN) injection 0.1 mg  0.1 mg Intravenous Q5 Min PRN Zheng Fleming MD       • HYDROmorphone (DILAUDID) injection 1 mg  1 mg Intravenous Q2H PRN Pollo Martins MD   1 mg at 02/09/18 0401   • morphine injection 4 mg  4 mg Intravenous Q2H PRN Zheng Fleming MD        And   • naloxone (NARCAN) injection 0.4 mg  0.4 mg Intravenous Q5 Min PRN Zheng Fleming MD       • ondansetron (ZOFRAN) injection 4 mg  4 mg Intravenous Q4H PRN Mike Edmonds MD   4 mg at 02/11/18 0639   • ondansetron (ZOFRAN) tablet 4 mg  4 mg Oral Q6H PRN Zheng Fleming MD        Or   • ondansetron ODT (ZOFRAN-ODT) disintegrating tablet 4 mg  4 mg Oral Q6H PRN Zheng Fleming MD        Or   • ondansetron (ZOFRAN) injection 4 mg  4 mg Intravenous Q6H PRN Zheng Fleming MD   4 mg at 02/09/18 0404   • sodium chloride 0.9 % flush 1-10 mL  1-10 mL Intravenous PRN Cyn Shepherd MD       • sodium chloride 0.9 % flush 1-10 mL  1-10 mL Intravenous PRN Zheng Fleming MD       • sodium chloride 0.9 % flush 10 mL  10 mL Intravenous PRN ELVIE Kennedy       • sodium polystyrene (KAYEXALATE) 15 GM/60ML suspension 15 g  15 g Oral Once Pollo Martins MD   Stopped at 02/08/18 1230       Assessment/Plan       Principal Problem:    Closed displaced intertrochanteric fracture of left femur  Active Problems:    SANYA (acute kidney injury)    HTN (hypertension)    Acute blood loss anemia    Leukocytosis    - SANYA with life-threatening hyperkalemia in the setting of hip fracture and hypotension. Better. Off IVF. Tolerating po. Holding ACEI,  amlodipine, meloxicam and potassium supplements. UA is fairly bland. Unsure if she has underlying CKD (probably mild CKD). Cr is stable.    - Hyperkalemia, treated aggressively. Resolved. Monitor.     - Anemia, multifactorial monitor. Transfuse when necessary.    - History of hypertension, blood pressure is controlled and no longer low.   - Left hip fracture, s/p ORIF, per ortho.  Discussed with patient and family.   If dc'd, would avoid k supplements and meloxicam or other NSAIDs.   I would like to see her in office in 4 wks or so for f/u.   Will follow.     Phillip Dasilva MD  02/11/18  8:45 AM

## 2018-02-11 NOTE — PROGRESS NOTES
"     LOS: 3 days   Primary Care Physician: Ambrose Ashley MD     Subjective   Doing okay.  Multiple family members at bedside.    Vital Signs  Body mass index is 36.34 kg/(m^2).  Temp:  [98.9 °F (37.2 °C)-99.1 °F (37.3 °C)] 98.9 °F (37.2 °C)  Heart Rate:  [78-89] 78  Resp:  [16-18] 18  BP: (116-135)/(56-86) 135/86      Objective:  General Appearance:  In no acute distress (Morbidly obese).    Vital signs: (most recent): Blood pressure 135/86, pulse 78, temperature 98.9 °F (37.2 °C), temperature source Oral, resp. rate 18, height 157.5 cm (62\"), weight 90.1 kg (198 lb 11.2 oz), SpO2 94 %.    Lungs:  There are decreased breath sounds.  No wheezes, rales or rhonchi.    Heart: Normal rate.  Regular rhythm.  Positive for murmur.  (3/6 early systolic murmur best heard right upper sternal border)  Abdomen: Abdomen is soft and non-distended.  Bowel sounds are normal.   There is no abdominal tenderness.   There is no splenomegaly. There is no hepatomegaly.   Extremities: There is dependent edema.  (Trace)  Neurological: Patient is alert.          Results Review:    I reviewed the patient's new clinical results.      Results from last 7 days  Lab Units 02/10/18  0519 02/09/18  0444   WBC 10*3/mm3 12.68* 16.34*   HEMOGLOBIN g/dL 8.4* 9.7*   PLATELETS 10*3/mm3 172 203       Results from last 7 days  Lab Units 02/10/18  0519 02/09/18  0444   SODIUM mmol/L 133* 137   POTASSIUM mmol/L 4.6 5.1   CHLORIDE mmol/L 100 102   CO2 mmol/L 24.1 23.4   BUN mg/dL 34* 47*   CREATININE mg/dL 1.26* 1.70*   CALCIUM mg/dL 8.2* 8.1*   GLUCOSE mg/dL 112* 133*       Results from last 7 days  Lab Units 02/07/18  1815   INR  1.01     Hemoglobin A1C:No results found for: HGBA1C    Glucose Range:No results found for: POCGLU    No results found for: [id number]    No results found for: TSH    Assessment & Plan      Medication Review: Yes    Active Hospital Problems (** Indicates Principal Problem)    Diagnosis Date Noted   • **Closed displaced " intertrochanteric fracture of left femur [S72.142A] 02/07/2018   • Acute blood loss anemia [D62] 02/10/2018   • Leukocytosis [D72.829] 02/10/2018   • SANYA (acute kidney injury) [N17.9] 02/07/2018   • HTN (hypertension) [I10] 02/07/2018      Resolved Hospital Problems    Diagnosis Date Noted Date Resolved   • Hyperkalemia [E87.5] 02/07/2018 02/10/2018       Assessment/Plan  1.  Acute kidney injury with hyperkalemia.  Improved.  Recheck labs in a.m.  Stay off lisinopril, potassium and diuretic.  Parameters written for which to hold hydralazine.  2.  History of resistant hypertension.  Discussed with patient and family members at length.  Continue to follow  3.  Expected acute blood loss anemia.  Asymptomatic.  Recheck in a.m.  4.  Leukocytosis, probably reactive.  Improving.  5.  Left femur fracture, postop day 2.  Plan transferred to [facility] for rehabilitation on 2/12/18    Annemarie Fagan MD  02/10/18  7:32 PM

## 2018-02-11 NOTE — PLAN OF CARE
Problem: Patient Care Overview (Adult)  Goal: Plan of Care Review  Outcome: Ongoing (interventions implemented as appropriate)   02/11/18 0146   Coping/Psychosocial Response Interventions   Plan Of Care Reviewed With patient   Outcome Evaluation   Outcome Summary/Follow up Plan VSS; pt received pain meds x2; zofran given x1; hydralazine changed to tid with new parameters; dressing is c/d/i; will continue to monitor   Patient Care Overview   Progress no change       Problem: Pain, Acute (Adult)  Goal: Acceptable Pain Control/Comfort Level  Outcome: Ongoing (interventions implemented as appropriate)      Problem: Fall Risk (Adult)  Goal: Absence of Falls  Outcome: Ongoing (interventions implemented as appropriate)      Problem: Renal Failure/Kidney Injury, Acute (Adult)  Goal: Signs and Symptoms of Listed Potential Problems Will be Absent or Manageable (Renal Failure/Kidney Injury, Acute)  Outcome: Ongoing (interventions implemented as appropriate)

## 2018-02-11 NOTE — PLAN OF CARE
Problem: Patient Care Overview (Adult)  Goal: Plan of Care Review  Outcome: Ongoing (interventions implemented as appropriate)   02/11/18 9313   Coping/Psychosocial Response Interventions   Plan Of Care Reviewed With patient   Outcome Evaluation   Outcome Summary/Follow up Plan vitals stable. still c/o pain, medicated frequently. also c/o nausea- po zofran given x1. c/o of constipation, lactulose and sennokot ordered as well as bisacodyl.    Patient Care Overview   Progress no change       Problem: Pain, Acute (Adult)  Goal: Acceptable Pain Control/Comfort Level  Outcome: Ongoing (interventions implemented as appropriate)      Problem: Fall Risk (Adult)  Goal: Absence of Falls  Outcome: Ongoing (interventions implemented as appropriate)      Problem: Renal Failure/Kidney Injury, Acute (Adult)  Goal: Signs and Symptoms of Listed Potential Problems Will be Absent or Manageable (Renal Failure/Kidney Injury, Acute)  Outcome: Ongoing (interventions implemented as appropriate)

## 2018-02-12 VITALS
BODY MASS INDEX: 34.04 KG/M2 | SYSTOLIC BLOOD PRESSURE: 136 MMHG | WEIGHT: 185 LBS | TEMPERATURE: 98.4 F | OXYGEN SATURATION: 98 % | HEART RATE: 88 BPM | DIASTOLIC BLOOD PRESSURE: 90 MMHG | HEIGHT: 62 IN | RESPIRATION RATE: 18 BRPM

## 2018-02-12 PROBLEM — K59.03 DRUG-INDUCED CONSTIPATION: Status: ACTIVE | Noted: 2018-02-12

## 2018-02-12 LAB
ANION GAP SERPL CALCULATED.3IONS-SCNC: 11.2 MMOL/L
BUN BLD-MCNC: 29 MG/DL (ref 8–23)
BUN/CREAT SERPL: 26.4 (ref 7–25)
CALCIUM SPEC-SCNC: 8.7 MG/DL (ref 8.6–10.5)
CHLORIDE SERPL-SCNC: 102 MMOL/L (ref 98–107)
CO2 SERPL-SCNC: 23.8 MMOL/L (ref 22–29)
CREAT BLD-MCNC: 1.1 MG/DL (ref 0.57–1)
GFR SERPL CREATININE-BSD FRML MDRD: 48 ML/MIN/1.73
GLUCOSE BLD-MCNC: 99 MG/DL (ref 65–99)
POTASSIUM BLD-SCNC: 4.7 MMOL/L (ref 3.5–5.2)
SODIUM BLD-SCNC: 137 MMOL/L (ref 136–145)

## 2018-02-12 PROCEDURE — 80048 BASIC METABOLIC PNL TOTAL CA: CPT | Performed by: INTERNAL MEDICINE

## 2018-02-12 PROCEDURE — 94799 UNLISTED PULMONARY SVC/PX: CPT

## 2018-02-12 RX ORDER — HYDROCODONE BITARTRATE AND ACETAMINOPHEN 5; 325 MG/1; MG/1
1 TABLET ORAL EVERY 4 HOURS PRN
Qty: 12 TABLET | Refills: 0 | Status: SHIPPED | OUTPATIENT
Start: 2018-02-12 | End: 2018-02-22

## 2018-02-12 RX ORDER — CALCIUM CARBONATE 200(500)MG
1 TABLET,CHEWABLE ORAL DAILY
Start: 2018-02-13 | End: 2018-06-01 | Stop reason: SDUPTHER

## 2018-02-12 RX ORDER — HYDROCODONE BITARTRATE AND ACETAMINOPHEN 5; 325 MG/1; MG/1
1 TABLET ORAL EVERY 4 HOURS PRN
Status: DISCONTINUED | OUTPATIENT
Start: 2018-02-12 | End: 2018-02-12 | Stop reason: HOSPADM

## 2018-02-12 RX ORDER — ACETAMINOPHEN 325 MG/1
650 TABLET ORAL EVERY 6 HOURS PRN
Start: 2018-02-12 | End: 2022-06-06

## 2018-02-12 RX ORDER — SENNA AND DOCUSATE SODIUM 50; 8.6 MG/1; MG/1
2 TABLET, FILM COATED ORAL 2 TIMES DAILY
Start: 2018-02-12 | End: 2021-06-04

## 2018-02-12 RX ORDER — IPRATROPIUM BROMIDE AND ALBUTEROL SULFATE 2.5; .5 MG/3ML; MG/3ML
3 SOLUTION RESPIRATORY (INHALATION)
Qty: 360 ML
Start: 2018-02-12 | End: 2018-02-15

## 2018-02-12 RX ORDER — FAMOTIDINE 20 MG/1
20 TABLET, FILM COATED ORAL DAILY
Start: 2018-02-13 | End: 2022-06-06

## 2018-02-12 RX ADMIN — IPRATROPIUM BROMIDE AND ALBUTEROL SULFATE 3 ML: .5; 3 SOLUTION RESPIRATORY (INHALATION) at 09:05

## 2018-02-12 RX ADMIN — HYDROCODONE BITARTRATE AND ACETAMINOPHEN 2 TABLET: 7.5; 325 TABLET ORAL at 08:14

## 2018-02-12 RX ADMIN — HYDROCODONE BITARTRATE AND ACETAMINOPHEN 2 TABLET: 7.5; 325 TABLET ORAL at 00:36

## 2018-02-12 RX ADMIN — HYDRALAZINE HYDROCHLORIDE 25 MG: 25 TABLET, FILM COATED ORAL at 08:15

## 2018-02-12 RX ADMIN — VITAMIN D, TAB 1000IU (100/BT) 1000 UNITS: 25 TAB at 08:16

## 2018-02-12 RX ADMIN — ATORVASTATIN CALCIUM 10 MG: 10 TABLET, FILM COATED ORAL at 08:16

## 2018-02-12 RX ADMIN — ONDANSETRON 4 MG: 4 TABLET, FILM COATED ORAL at 08:15

## 2018-02-12 RX ADMIN — DOCUSATE SODIUM -SENNOSIDES 2 TABLET: 50; 8.6 TABLET, COATED ORAL at 08:16

## 2018-02-12 RX ADMIN — Medication 1 TABLET: at 08:15

## 2018-02-12 RX ADMIN — ASPIRIN 325 MG: 325 TABLET, COATED ORAL at 08:15

## 2018-02-12 RX ADMIN — HYDROCODONE BITARTRATE AND ACETAMINOPHEN 1 TABLET: 5; 325 TABLET ORAL at 12:38

## 2018-02-12 RX ADMIN — FAMOTIDINE 20 MG: 20 TABLET, FILM COATED ORAL at 08:16

## 2018-02-12 NOTE — PROGRESS NOTES
Continued Stay Note  [facility]     Patient Name: Vonda Jay  MRN: [id number]  Today's Date: 2/12/2018    Admit Date: 2/7/2018          Discharge Plan       02/12/18 1546    Case Management/Social Work Plan    Additional Comments DC orders noted.  Spoke with Marissa/ [facility], bed remains available.  Spoke with pt who is in agreement with DC to [facility] today.  Rx x1 copied to scan and placed in pkt.  DC summary and DC pkt given to RN.  [facility] arranged for 1300.    Final Note    Final Note Pt DC'd to skilled bed at [facility]              Discharge Codes       02/12/18 1547    Discharge Codes    Discharge Codes 03  Discharged/transferred to skilled nursing facility (SNF) with Medicare certification in anticipation of skilled care   [facility]        Expected Discharge Date and Time     Expected Discharge Date Expected Discharge Time    Feb 12, 2018             Marissa Velasquez RN

## 2018-02-12 NOTE — PLAN OF CARE
Problem: Patient Care Overview (Adult)  Goal: Plan of Care Review  Outcome: Ongoing (interventions implemented as appropriate)   02/12/18 0400   Coping/Psychosocial Response Interventions   Plan Of Care Reviewed With patient;family   Outcome Evaluation   Outcome Summary/Follow up Plan Pt alert and oriented, extremely anxious especially right now about having a bowel movement, she had been given 3 different meds yesterday 2 PO an 1 suppository, pt did have small BM last night and again early this am, used bedpan, Pt c/o pain in left hip and nausea at times, PO pain meds seem to alleviate pain and zofran PO effective for nausea, pt hopeful to be d/c to [facility] today for rehab, will continue to monitor and follow md orders.        Problem: Pain, Acute (Adult)  Goal: Acceptable Pain Control/Comfort Level  Outcome: Ongoing (interventions implemented as appropriate)   02/12/18 0400   Pain, Acute (Adult)   Acceptable Pain Control/Comfort Level making progress toward outcome       Problem: Fall Risk (Adult)  Goal: Absence of Falls  Outcome: Ongoing (interventions implemented as appropriate)   02/12/18 0400   Fall Risk (Adult)   Absence of Falls making progress toward outcome       Problem: Renal Failure/Kidney Injury, Acute (Adult)  Goal: Signs and Symptoms of Listed Potential Problems Will be Absent or Manageable (Renal Failure/Kidney Injury, Acute)  Outcome: Ongoing (interventions implemented as appropriate)   02/12/18 0400   Renal Failure/Kidney Injury, Acute   Problems Assessed (Acute Renal Failure/Kidney Injury) all   Problems Present (Acute Renal Failure/Kidney Injury) fluid imbalance;hypertension;situational response

## 2018-02-12 NOTE — PROGRESS NOTES
Case Management Discharge Note    Final Note: Pt DC'd to skilled bed at [facility]    Discharge Placement     Facility/Agency Request Status Selected? Address Phone Number Fax Number    [facility] Accepted    Yes [address] [facility] [address] [phone]        Marissa Velasquez, RN 2/9/2018 10:41    Referral to Marissa.  No beds available over the weekend, but will followup on Monday.               [facility] Pending - Request Sent     [address] [address] [phone]        Marissa Velasquez, RN 2/9/2018 10:41    Referral to [facility] pending.               [facility] Pending - Request Sent     [address] [address] [phone]        Marissa Velasquez, RN 2/9/2018 10:41    Referral to Cyndy [facility]on pending.                        Discharge Codes: 03  Discharged/transferred to skilled nursing facility (SNF) with Medicare certification in anticipation of skilled care ([facility])

## 2018-02-12 NOTE — PROGRESS NOTES
"     LOS: 4 days   Primary Care Physician: Ambrose Ashley MD     Subjective   Constipated.  O2 sats drop.  She does snore.  Previous history of fairly heavy cigarette smoking    Vital Signs  Body mass index is 36.34 kg/(m^2).  Temp:  [98.5 °F (36.9 °C)-99 °F (37.2 °C)] 98.5 °F (36.9 °C)  Heart Rate:  [] 77  Resp:  [16-18] 18  BP: (128-150)/(52-67) 132/52      Objective:  General Appearance:  In no acute distress (Obese).    Vital signs: (most recent): Blood pressure 132/52, pulse 77, temperature 98.5 °F (36.9 °C), temperature source Oral, resp. rate 18, height 157.5 cm (62\"), weight 90.1 kg (198 lb 11.2 oz), SpO2 97 %.    HEENT: (No JVD.  Neck supple.  No lymphadenopathy.  Trachea midline)    Lungs:  There are decreased breath sounds.  No wheezes, rales or rhonchi.    Heart: Normal rate.  Regular rhythm.  Positive for murmur.    Abdomen: Abdomen is soft and distended.  (Obese)Bowel sounds are normal.   There is no abdominal tenderness.   There is no splenomegaly. There is no hepatomegaly.   Extremities: There is dependent edema.  (Trace)  Neurological: Patient is alert.          Results Review:    I reviewed the patient's new clinical results.      Results from last 7 days  Lab Units 02/11/18  0554 02/10/18  0519   WBC 10*3/mm3 11.32* 12.68*   HEMOGLOBIN g/dL 8.7* 8.4*   PLATELETS 10*3/mm3 179 172       Results from last 7 days  Lab Units 02/11/18  0554 02/10/18  0519   SODIUM mmol/L 135* 133*   POTASSIUM mmol/L 4.9 4.6   CHLORIDE mmol/L 102 100   CO2 mmol/L 27.1 24.1   BUN mg/dL 32* 34*   CREATININE mg/dL 1.17* 1.26*   CALCIUM mg/dL 8.4* 8.2*   GLUCOSE mg/dL 98 112*       Results from last 7 days  Lab Units 02/07/18  1815   INR  1.01     Hemoglobin A1C:No results found for: HGBA1C    Glucose Range:No results found for: POCGLU    No results found for: [id number]    No results found for: TSH    Assessment & Plan      Medication Review: Yes    Active Hospital Problems (** Indicates Principal Problem)    " Diagnosis Date Noted   • **Closed displaced intertrochanteric fracture of left femur [S72.142A] 02/07/2018   • Acute blood loss anemia [D62] 02/10/2018   • Leukocytosis [D72.829] 02/10/2018   • SANYA (acute kidney injury) [N17.9] 02/07/2018   • HTN (hypertension) [I10] 02/07/2018      Resolved Hospital Problems    Diagnosis Date Noted Date Resolved   • Hyperkalemia [E87.5] 02/07/2018 02/10/2018       Assessment/Plan  1.  Acute kidney injury, improving.  2.  Hypertension, controlled  3.  Expected acute blood loss anemia, hemoglobin stable and asymptomatic  4.  Leukocytosis, improving.  5.  Constipation.  Medications ordered  6.  Left hip fracture with fixation 2/8/18.  Tentatively to rehabilitation tomorrow    Annemarie Fagan MD  02/11/18  7:12 PM

## 2018-02-12 NOTE — DISCHARGE SUMMARY
Date of Admission: 2/7/2018  Date of Discharge:  2/12/2018  Primary Care Physician: Ambrose Ashley MD     Discharge Diagnosis:  Active Hospital Problems (** Indicates Principal Problem)    Diagnosis Date Noted   • **Closed displaced intertrochanteric fracture of left femur [S72.142A] 02/07/2018   • Drug-induced constipation [K59.03] 02/12/2018   • Acute blood loss anemia [D62] 02/10/2018   • Leukocytosis [D72.829] 02/10/2018   • SANYA (acute kidney injury) [N17.9] 02/07/2018   • HTN (hypertension) [I10] 02/07/2018      Resolved Hospital Problems    Diagnosis Date Noted Date Resolved   • Hyperkalemia [E87.5] 02/07/2018 02/10/2018       Presenting Problem/History of Present Illness:  Closed displaced intertrochanteric fracture of left femur, initial encounter [S72.142A]     Hospital Course:  The patient is a 82 y.o. woman who presented with left hip fracture and was found to be hyperkalemic with SANYA.  Potassium, lisinopril, Prinzide and low back were all discontinued. She was followed by nephrology.  Hyperkalemia resolved with treatment.  She had surgery and did well.  Kidney function has improved.  Constipation was treated yesterday with good results.  She is having some nausea from 7.5 mg hydrocodone tablets.  Dosage changed from 2 every 6 hours to a 5 mg tablet one every 4 hours.  She is ready for transfer to subacute rehabilitation.     Stable condition; good prognosis    Exam Today: Gets nausea after has pain medicines.  She is getting 2 tablets at a time.  She thinks may be a lower dose and more frequently will work better.  Bowels moved ×2 yesterday.  Vital signs noted.  No distress.  She is eating some dry toast.  Heart is regular with a grade 3/6 systolic murmur right and left sternal borders.  Lungs are clear, breath sounds decreased but equal.  Abdomen soft and nontender, obese but nondistended.  Extremities trace edema    Procedures Performed:  Procedure(s):  FEMUR INTRAMEDULLARY NAILING 2/8/18        Labs:  Lab Results   Component Value Date    WBC 11.32 (H) 02/11/2018    HGB 8.7 (L) 02/11/2018    HCT 27.4 (L) 02/11/2018     02/11/2018     Lab Results   Component Value Date     02/12/2018    K 4.7 02/12/2018     02/12/2018    CO2 23.8 02/12/2018    BUN 29 (H) 02/12/2018    CREATININE 1.10 (H) 02/12/2018    GLUCOSE 99 02/12/2018             Consults:   Dr. Zheng Dasilva     Discharge Disposition:  Skilled Nursing Facility ([place] - External)    Discharge Medications:   Vonda Jay   Home Medication Instructions ANALISA:[id number]    Printed on:02/12/18 0958   Medication Information                      acetaminophen (TYLENOL) 325 MG tablet  Take 2 tablets by mouth Every 6 (Six) Hours As Needed for Mild Pain .             aspirin  MG EC tablet  Take 1 tablet by mouth Daily.             calcium carbonate (TUMS) 500 MG chewable tablet  Chew 500 mg Daily.             cholecalciferol (VITAMIN D3) 1000 units tablet  Take 1,000 Units by mouth Daily.             famotidine (PEPCID) 20 MG tablet  Take 1 tablet by mouth Daily.             hydrALAZINE (APRESOLINE) 25 MG tablet  Take 25 mg by mouth 3 (Three) Times a Day.             HYDROcodone-acetaminophen (NORCO) 5-325 MG per tablet  Take 1 tablet by mouth Every 4 (Four) Hours As Needed for Moderate Pain  for up to 10 days.             ipratropium-albuterol (DUO-NEB) 0.5-2.5 mg/mL nebulizer  Take 3 mL by nebulization 4 (Four) Times a Day for 3 days.             pravastatin (PRAVACHOL) 40 MG tablet  Take 40 mg by mouth Daily.             sennosides-docusate sodium (SENOKOT-S) 8.6-50 MG tablet  Take 2 tablets by mouth 2 (Two) Times a Day.                 Discharge Diet:   Diet Instructions     Diet: Specialty Diet; Thin Liquids, No Restrictions; Low Fat       Discharge Diet:  Specialty Diet   Fluid Consistency:  Thin Liquids, No Restrictions   Specialty Diets:  Low Fat                 Activity at Discharge:   Activity Instructions      Other Instructions (Specify)       Activity and incision care per ortho                 Follow-up Appointments:  No future appointments.  Follow-up Information     Follow up with [facility] Follow up in 1 day(s).    Specialties:  Skilled Nursing Facility, Intermediate Care Facility    Contact information:    [address] [facility] [id number] [phone]        Follow up with Phillip Dasilva MD. Schedule an appointment as soon as possible for a visit in 1 month(s).    Specialty:  Nephrology    Contact information:    [address]  [address]  [place] [address]  [phone]          Follow up with Ambrose Ashley MD Follow up in 3 week(s).    Specialty:  Family Medicine    Why:  1-2wks after released from rehab    Contact information:    [address] [facility] [address]  [place] [address]  [phone]          Follow up with Zheng Fleming MD Follow up in 2 week(s).    Specialty:  Orthopedic Surgery    Why:  postop left hip    Contact information:    [address]  [facility] [address]  [place] [address]  [phone]              Test Results Pending at Discharge: None       Annemarie Fagan MD  02/12/18  9:33 AM    Time Spent on Discharge Activities: 35 minutes.  Discussed with patient and with CCP.  Medical record reviewed

## 2018-02-26 ENCOUNTER — OFFICE VISIT (OUTPATIENT)
Dept: ORTHOPEDIC SURGERY | Facility: CLINIC | Age: 83
End: 2018-02-26

## 2018-02-26 VITALS — WEIGHT: 180 LBS | BODY MASS INDEX: 33.13 KG/M2 | HEIGHT: 62 IN

## 2018-02-26 DIAGNOSIS — Z98.890 STATUS POST HIP SURGERY: Primary | ICD-10-CM

## 2018-02-26 PROCEDURE — 73502 X-RAY EXAM HIP UNI 2-3 VIEWS: CPT | Performed by: ORTHOPAEDIC SURGERY

## 2018-02-26 PROCEDURE — 99024 POSTOP FOLLOW-UP VISIT: CPT | Performed by: ORTHOPAEDIC SURGERY

## 2018-02-26 PROCEDURE — 73560 X-RAY EXAM OF KNEE 1 OR 2: CPT | Performed by: ORTHOPAEDIC SURGERY

## 2018-02-26 RX ORDER — AMLODIPINE BESYLATE 5 MG/1
5 TABLET ORAL
COMMUNITY
Start: 2018-01-02 | End: 2018-04-02

## 2018-02-27 NOTE — PROGRESS NOTES
Vonda Jay     : 1935     MRN: [id number]     DATE: 2018    CC:  2 weeks status post cephalomedullary fixation of femur fracture    There were no vitals filed for this visit.    HPI: Patient returns to clinic today, now 2 weeks out from surgery.  Reports doing well. Pain is well-controlled.  Denies any new problems or concerns.    Physical exam: Incisions are well-approximated.  No erythema or drainage.  Motion is limited but appropriate.  Negative Homans.  Normal motor and sensory function distally.  Good pedal pulses with brisk cap refill.    Imaging  2 views of the left hip and knee (including AP and lateral) are ordered and reviewed for comparison purposes and to evaluate healing.  X-rays show alignment unchanged.     Impression:  2 weeks s/p cephalomedullary fixation of left femur fracture    Plan:    1.  Continue PT  2.  ECASA for DVT prophylaxis  3.  Follow up in 4 weeks with repeat 2v x-rays of hip and knee.    Zheng Fleming MD

## 2018-04-13 ENCOUNTER — OFFICE VISIT (OUTPATIENT)
Dept: ORTHOPEDIC SURGERY | Facility: CLINIC | Age: 83
End: 2018-04-13

## 2018-04-13 VITALS — HEIGHT: 62 IN | TEMPERATURE: 97.3 F | WEIGHT: 180 LBS | BODY MASS INDEX: 33.13 KG/M2

## 2018-04-13 DIAGNOSIS — Z98.890 STATUS POST HIP SURGERY: Primary | ICD-10-CM

## 2018-04-13 DIAGNOSIS — Z09 SURGERY FOLLOW-UP: ICD-10-CM

## 2018-04-13 PROCEDURE — 73560 X-RAY EXAM OF KNEE 1 OR 2: CPT | Performed by: ORTHOPAEDIC SURGERY

## 2018-04-13 PROCEDURE — 73502 X-RAY EXAM HIP UNI 2-3 VIEWS: CPT | Performed by: ORTHOPAEDIC SURGERY

## 2018-04-13 PROCEDURE — 99024 POSTOP FOLLOW-UP VISIT: CPT | Performed by: ORTHOPAEDIC SURGERY

## 2018-04-13 RX ORDER — LISINOPRIL AND HYDROCHLOROTHIAZIDE 25; 20 MG/1; MG/1
1 TABLET ORAL DAILY
Refills: 2 | COMMUNITY
Start: 2018-03-08 | End: 2022-09-21 | Stop reason: HOSPADM

## 2018-04-16 NOTE — PROGRESS NOTES
Vonda Jay     : 1935     MRN: [id number]     DATE: 4/15/2018    CC:  6 weeks status post cephalo-medullary fixation of IT femur fracture    Vitals:    18 1207   Temp: 97.3 °F (36.3 °C)       HPI: Patient returns to clinic today, now 6 weeks out from surgery.  Reports doing well. Pain is well-controlled.  Ambulatory status is steadily improving.  Denies any new problems or concerns.    Physical exam:  Incisions are healed.  No erythema or drainage.  Motion is much better.  Negative Homans.  Normal motor and sensory function distally.  Good pedal pulses with brisk cap refill.      Imaging  2 views left hip and knee (including AP and lateral) are ordered and reviewed for comparison purposes and to evaluate healing.  X-rays show alignment unchanged. There has been some interval callus formation, particularly on the lateral x-ray.    Impression:  6 weeks s/p cephalo-medullary fixation of left IT femur fracture    Plan:    1.  Continue PT  2.  OK to discontinue DVT prophylaxis  3.  Follow up in 4 weeks with repeat 2v x-rays of hip and knee.

## 2018-05-22 ENCOUNTER — TELEPHONE (OUTPATIENT)
Dept: ORTHOPEDIC SURGERY | Facility: CLINIC | Age: 83
End: 2018-05-22

## 2018-06-01 ENCOUNTER — OFFICE VISIT (OUTPATIENT)
Dept: ORTHOPEDIC SURGERY | Facility: CLINIC | Age: 83
End: 2018-06-01

## 2018-06-01 VITALS — WEIGHT: 172 LBS | BODY MASS INDEX: 31.65 KG/M2 | HEIGHT: 62 IN

## 2018-06-01 DIAGNOSIS — Z09 SURGERY FOLLOW-UP: Primary | ICD-10-CM

## 2018-06-01 PROCEDURE — 99212 OFFICE O/P EST SF 10 MIN: CPT | Performed by: ORTHOPAEDIC SURGERY

## 2018-06-01 RX ORDER — MELOXICAM 15 MG/1
15 TABLET ORAL
COMMUNITY
Start: 2018-05-06 | End: 2022-09-21 | Stop reason: HOSPADM

## 2018-06-03 NOTE — PROGRESS NOTES
Vonda Jay     : 1935     MRN: [id number]     DATE: 6/3/2018    CC:  3 months status post cephalo-medullary fixation of IT femur fracture    There were no vitals filed for this visit.    HPI: Patient returns to clinic today, now 3 months out from surgery.  Reports doing well.  The hip pain is nearly resolved.  She is still having some intermittent knee pain.  Ambulatory status is steadily improving.  She is still using a cane.  Denies any new problems or concerns.    Physical exam:  Incisions are healed.  No erythema or drainage.    Hip motion is nearly normal.  She has mild medial joint line tenderness at the knee.  Full knee motion.  Normal motor and sensory function distally.  Good pedal pulses with brisk cap refill.      Imaging  2 views left hip and knee (including AP and lateral) are ordered and reviewed for comparison purposes and to evaluate healing.  X-rays show alignment unchanged.  There has been interval callous formation.     Impression:  3 months s/p cephalo-medullary fixation of left IT femur fracture    Plan:    Her fracture seems to be healing.  She can progress her activity as tolerated.  I offered her an injection for the knee but she declined this.  I will see her back in 3 months.

## 2018-09-07 ENCOUNTER — OFFICE VISIT (OUTPATIENT)
Dept: ORTHOPEDIC SURGERY | Facility: CLINIC | Age: 83
End: 2018-09-07

## 2018-09-07 VITALS — TEMPERATURE: 97.7 F | WEIGHT: 174 LBS | BODY MASS INDEX: 32.02 KG/M2 | HEIGHT: 62 IN

## 2018-09-07 DIAGNOSIS — Z09 SURGERY FOLLOW-UP: Primary | ICD-10-CM

## 2018-09-07 PROCEDURE — 73552 X-RAY EXAM OF FEMUR 2/>: CPT | Performed by: ORTHOPAEDIC SURGERY

## 2018-09-07 PROCEDURE — 99212 OFFICE O/P EST SF 10 MIN: CPT | Performed by: ORTHOPAEDIC SURGERY

## 2018-09-09 NOTE — PROGRESS NOTES
Chief Complaint:  Follow-up status post cephalo-medullary fixation of left femur fracture    HPI:  Ms. Jay is now 7 months out from surgery.  She reports that she is doing well.  She is no longer ambulating without any assist device.  She reports that her strength is still an issue but she does not have any significant pain at this point    Exam:  Her incisions are healed.  No tenderness.  Good hip motion without pain.  Gait is nonantalgic.    Imaging:  AP and lateral views of the left femur are ordered, reviewed, and compared to previous x-rays.  Hardware appears in unchanged position.  There does appear to be significant callous formation and bony healing.    Assessment:  7 months status post cephalo-medullary fixation of left femur fracture    Plan:  She is doing well.  I've encouraged her that she is to continue her therapy efforts.  She will follow-up with me as needed.    Zheng Fleming MD  09/07/2018

## 2019-01-28 ENCOUNTER — TELEPHONE (OUTPATIENT)
Dept: ORTHOPEDIC SURGERY | Facility: CLINIC | Age: 84
End: 2019-01-28

## 2019-01-28 NOTE — TELEPHONE ENCOUNTER
Patient says she has been having Rt knee pain. Patient's  has a new pat appt on 2/8 @ 3:20, and she would like to know if she could be seen for the Rt knee along with her . It was explained to the patient that there were no open appts on this day in order to scheldue her, but patient is very persistent on asking [facility] if she could be seen. Please Advise

## 2019-01-29 NOTE — TELEPHONE ENCOUNTER
Spoke with patient informing her that [facility] would see her on 2/8 for Rt knee pain. Patient's  has an appt at 3:20 and an appt was made at 3:30.

## 2019-02-08 ENCOUNTER — OFFICE VISIT (OUTPATIENT)
Dept: ORTHOPEDIC SURGERY | Facility: CLINIC | Age: 84
End: 2019-02-08

## 2019-02-08 VITALS — BODY MASS INDEX: 32.76 KG/M2 | HEIGHT: 62 IN | TEMPERATURE: 98.2 F | WEIGHT: 178 LBS

## 2019-02-08 DIAGNOSIS — M25.551 HIP PAIN, ACUTE, RIGHT: Primary | ICD-10-CM

## 2019-02-08 DIAGNOSIS — M25.552 LEFT HIP PAIN: ICD-10-CM

## 2019-02-08 PROCEDURE — 73502 X-RAY EXAM HIP UNI 2-3 VIEWS: CPT | Performed by: ORTHOPAEDIC SURGERY

## 2019-02-08 PROCEDURE — 99214 OFFICE O/P EST MOD 30 MIN: CPT | Performed by: ORTHOPAEDIC SURGERY

## 2019-02-08 RX ORDER — ALENDRONATE SODIUM 70 MG/1
TABLET ORAL
Refills: 1 | COMMUNITY
Start: 2019-01-11 | End: 2021-12-10 | Stop reason: SDUPTHER

## 2019-02-10 RX ORDER — TRAMADOL HYDROCHLORIDE 50 MG/1
50 TABLET ORAL EVERY 4 HOURS PRN
Qty: 30 TABLET | Refills: 0 | Status: SHIPPED | OUTPATIENT
Start: 2019-02-10 | End: 2021-06-04

## 2019-02-10 NOTE — PROGRESS NOTES
Patient: Vonda Jay    YOB: 1935    Medical Record Number: [id number]    Chief Complaints:  Back and left hip and leg pain    History of Present Illness:     83 y.o. female patient who presents for a new complaint.  She reports a 1 month history of pain shooting into the hip and buttock.  She describes pain as severe, intermittent and stabbing.  She has occasional spasms shooting down the leg as well.  Of note, she does have a history of spinal stenosis.  She has had epidurals in the past which helped.  She does get occasional numbness and paresthesias.  No weakness.  No bowel or bladder dysfunction.    Allergies: No Known Allergies    Home Medications:    Current Outpatient Medications:   •  acetaminophen (TYLENOL) 325 MG tablet, Take 2 tablets by mouth Every 6 (Six) Hours As Needed for Mild Pain ., Disp: , Rfl:   •  alendronate (FOSAMAX) 70 MG tablet, TAKE 1 TABLET PO EVERY 7 DAYS, Disp: , Rfl: 1  •  aspirin 81 MG tablet, Take 81 mg by mouth Daily., Disp: , Rfl:   •  Calcium Carb-Cholecalciferol 1000-800 MG-UNIT tablet, Take  by mouth., Disp: , Rfl:   •  cholecalciferol (VITAMIN D3) 1000 units tablet, Take 1,000 Units by mouth Daily., Disp: , Rfl:   •  famotidine (PEPCID) 20 MG tablet, Take 1 tablet by mouth Daily., Disp: , Rfl:   •  hydrALAZINE (APRESOLINE) 25 MG tablet, Take 25 mg by mouth 3 (Three) Times a Day., Disp: , Rfl:   •  lisinopril-hydrochlorothiazide (PRINZIDE,ZESTORETIC) 20-25 MG per tablet, Take 1 tablet by mouth Daily., Disp: , Rfl: 2  •  meloxicam (MOBIC) 15 MG tablet, Take 15 mg by mouth., Disp: , Rfl:   •  pravastatin (PRAVACHOL) 40 MG tablet, Take 40 mg by mouth Daily., Disp: , Rfl:   •  sennosides-docusate sodium (SENOKOT-S) 8.6-50 MG tablet, Take 2 tablets by mouth 2 (Two) Times a Day., Disp: , Rfl:     Past Medical History:   Diagnosis Date   • Arthritis    • Hyperlipidemia    • Hypertension        Past Surgical History:   Procedure Laterality Date   • BREAST LUMPECTOMY   "   • CAROTID ARTERY ANGIOPLASTY  ,    • CHOLECYSTECTOMY  1979   • FEMUR IM NAILING/RODDING Left 2018    Procedure: FEMUR INTRAMEDULLARY NAILING;  Surgeon: Zheng Fleming MD;  Location: [facility] OR;  Service:    • HYSTERECTOMY         Social History     Occupational History   • Not on file   Tobacco Use   • Smoking status: Former Smoker     Years: 47.00     Last attempt to quit:      Years since quittin.1   • Smokeless tobacco: Never Used   Substance and Sexual Activity   • Alcohol use: No   • Drug use: Defer   • Sexual activity: Defer      Social History     Social History Narrative   • Not on file       History reviewed. No pertinent family history.    Review of Systems:      Constitutional: Denies fever, shaking or chills   Eyes: Denies change in visual acuity   HEENT: Denies nasal congestion or sore throat   Respiratory: Denies cough or shortness of breath   Cardiovascular: Denies chest pain or edema  Endocrine: Denies tremors, palpitations, intolerance of heat or cold, polyuria, polydipsia.  GI: Denies abdominal pain, nausea, vomiting, bloody stools or diarrhea  : Denies frequency, urgency, incontinence, retention, or nocturia.  Musculoskeletal: Denies numbness, tingling or loss of motor function except as above  Integument: Denies rash, lesion or ulceration   Neurologic: Denies headache or focal weakness, deficits  Heme: Denies spontaneous or excessive bleeding, epistaxis, hematuria, melena, fatigue, enlarged or tender lymph nodes.      All other pertinent positives and negatives as noted above in HPI.    Physical Exam: 83 y.o. female  Vitals:    19 1621   Temp: 98.2 °F (36.8 °C)   Weight: 80.7 kg (178 lb)   Height: 157.5 cm (62\")       General:  Patient is awake and alert.  Appears in no acute distress or discomfort.    Psych:  Affect and demeanor are appropriate.    Eyes:  Conjunctiva and sclera appear grossly normal.  Eyes track well and EOM seem to be " intact.    Ears:  No gross abnormalities.  Hearing adequate for the exam.    Cardiovascular:  Regular rate and rhythm.    Lungs:  Good chest expansion.  Breathing unlabored.    Back: Nontender.  No step-offs.  Mild tenderness over the left SI joint.  No effusion.  A straight leg raise on the left does reproduce buttock and left-sided low back pain.    Extremities: Left hip is examined.  Skin is benign.  Incisions are healed.  Good hip motion.  Negative Stinchfield.  Good strength with hip flexion, knee extension, ankle and toe plantarflexion and dorsiflexion.  Sensation is intact throughout the leg.  Palpable pedal pulses.  Brisk capillary refill.  No clonus in the foot.    Imaging:  AP and lateral views of the left hip are ordered and reviewed.  These are compared to previous x-rays.  Her fracture appears healed.  Hardware appears in unchanged position.  Incidental note of mild to moderate low lumbar degenerative disc disease.    Assessment/Plan:  Lumbar spinal stenosis    I have agreed to refer her for a new MRI of the back.  She may be a candidate for repeat epidurals.  I told her that I or my nurse practitioner will contact her with the results of the MRI and we will come up with a plan.  I've agreed to give her a limited prescription for tramadol to take as needed.  Risk were discussed.    Zheng Fleming MD    02/08/2019

## 2019-02-14 ENCOUNTER — HOSPITAL ENCOUNTER (OUTPATIENT)
Dept: MRI IMAGING | Facility: HOSPITAL | Age: 84
Discharge: HOME | End: 2019-02-14
Admitting: ORTHOPAEDIC SURGERY

## 2019-02-14 DIAGNOSIS — M25.552 LEFT HIP PAIN: ICD-10-CM

## 2019-02-14 DIAGNOSIS — M25.551 HIP PAIN, ACUTE, RIGHT: ICD-10-CM

## 2019-02-14 PROCEDURE — 72148 MRI LUMBAR SPINE W/O DYE: CPT

## 2019-02-21 ENCOUNTER — TELEPHONE (OUTPATIENT)
Dept: ORTHOPEDIC SURGERY | Facility: CLINIC | Age: 84
End: 2019-02-21

## 2019-02-25 ENCOUNTER — TELEPHONE (OUTPATIENT)
Dept: ORTHOPEDIC SURGERY | Facility: CLINIC | Age: 84
End: 2019-02-25

## 2019-02-25 DIAGNOSIS — M51.36 DDD (DEGENERATIVE DISC DISEASE), LUMBAR: Primary | ICD-10-CM

## 2019-02-25 NOTE — TELEPHONE ENCOUNTER
Patient called to check on status of previous message from 02/21 - patient called to request results from Lumbar MRI done 02/14/19. Thanks /srh

## 2019-02-26 NOTE — TELEPHONE ENCOUNTER
I spoke to Ms. Misty.  She tells me she is no better.  Epidural injections have helped her in the past and she would like to have repeat injections.  She is requesting these to be done at the [facility] on [address].  Also, I recommended she have further evaluation by Dr. Mederos, but she would like to try the epidurals first.  I have entered the order for the epidurals.

## 2019-04-18 ENCOUNTER — TELEPHONE (OUTPATIENT)
Dept: ORTHOPEDIC SURGERY | Facility: CLINIC | Age: 84
End: 2019-04-18

## 2019-04-19 NOTE — TELEPHONE ENCOUNTER
I have reviewed the findings.  Please let her know that the x-rays and CT scan did not seem to suggest a new fracture.  I can see her week after next.  Thanks.

## 2019-04-24 ENCOUNTER — TELEPHONE (OUTPATIENT)
Dept: ORTHOPEDIC SURGERY | Facility: CLINIC | Age: 84
End: 2019-04-24

## 2019-04-24 NOTE — TELEPHONE ENCOUNTER
"Patient fell Mon 4/15 on LEFT HIP had XR & CT scan done 4/15 at [facility] in [place], [place] - brought CT scan in for [facility] to review; per [facility] message 4/19 \"the x-rays and CT scan did not seem to suggest a new fracture\" - but son Angus asking if [facility] wants to order MRI to be done prior to 5/03/19 appt? since patient is immobile & unable to weight bear on Left hip. Please advise. Thanks /srh  "

## 2019-04-24 NOTE — TELEPHONE ENCOUNTER
Unfortunately, insurance will not allow me to order tests until I have actually evaluated her for that specific problem.  I did review her x-rays and CT scan and saw no evidence for a new fracture.  We may very well have to get a MRI but this will have to wait until after I see her.  Thanks.n

## 2019-05-03 ENCOUNTER — OFFICE VISIT (OUTPATIENT)
Dept: ORTHOPEDIC SURGERY | Facility: CLINIC | Age: 84
End: 2019-05-03

## 2019-05-03 VITALS — TEMPERATURE: 97.3 F | BODY MASS INDEX: 32.76 KG/M2 | HEIGHT: 62 IN | WEIGHT: 178 LBS

## 2019-05-03 DIAGNOSIS — M48.00 SPINAL STENOSIS, UNSPECIFIED SPINAL REGION: ICD-10-CM

## 2019-05-03 DIAGNOSIS — M25.552 HIP PAIN, LEFT: Primary | ICD-10-CM

## 2019-05-03 PROCEDURE — 99213 OFFICE O/P EST LOW 20 MIN: CPT | Performed by: ORTHOPAEDIC SURGERY

## 2019-05-03 PROCEDURE — 73502 X-RAY EXAM HIP UNI 2-3 VIEWS: CPT | Performed by: ORTHOPAEDIC SURGERY

## 2019-05-03 NOTE — PROGRESS NOTES
Patient:Vonda Jay    YOB: 1935    Medical Record Number:[id number]    Chief Complaints: Left hip pain    History of Present Illness:     83 y.o. female patient who presents for her left hip.  She fell in her home on April 15, aggravating her left hip and buttock.  She had x-rays taken that were reportedly negative.  She reports pain from the low back shooting into the buttock.  Occasional pain down to the lower leg.  Of note, she had an epidural steroid injection in her lumbar spine on April 12.  She tells me that it helped tremendously.  She is scheduled for another epidural on May 21.  Current pain is described as moderate and aching.  Driving and walking seem to aggravate her pain.  Denies any weakness, numbness, bowel or bladder dysfunction.    Allergies:No Known Allergies    Home Medications:    Current Outpatient Medications:   •  acetaminophen (TYLENOL) 325 MG tablet, Take 2 tablets by mouth Every 6 (Six) Hours As Needed for Mild Pain ., Disp: , Rfl:   •  alendronate (FOSAMAX) 70 MG tablet, TAKE 1 TABLET PO EVERY 7 DAYS, Disp: , Rfl: 1  •  aspirin 81 MG tablet, Take 81 mg by mouth Daily., Disp: , Rfl:   •  Calcium Carb-Cholecalciferol 1000-800 MG-UNIT tablet, Take  by mouth., Disp: , Rfl:   •  cholecalciferol (VITAMIN D3) 1000 units tablet, Take 1,000 Units by mouth Daily., Disp: , Rfl:   •  famotidine (PEPCID) 20 MG tablet, Take 1 tablet by mouth Daily., Disp: , Rfl:   •  hydrALAZINE (APRESOLINE) 25 MG tablet, Take 25 mg by mouth 3 (Three) Times a Day., Disp: , Rfl:   •  lisinopril-hydrochlorothiazide (PRINZIDE,ZESTORETIC) 20-25 MG per tablet, Take 1 tablet by mouth Daily., Disp: , Rfl: 2  •  meloxicam (MOBIC) 15 MG tablet, Take 15 mg by mouth., Disp: , Rfl:   •  pravastatin (PRAVACHOL) 40 MG tablet, Take 40 mg by mouth Daily., Disp: , Rfl:   •  sennosides-docusate sodium (SENOKOT-S) 8.6-50 MG tablet, Take 2 tablets by mouth 2 (Two) Times a Day., Disp: , Rfl:   •  traMADol (ULTRAM) 50 MG  tablet, Take 1 tablet by mouth Every 4 (Four) Hours As Needed for Moderate Pain ., Disp: 30 tablet, Rfl: 0    Past Medical History:   Diagnosis Date   • Arthritis    • Hyperlipidemia    • Hypertension        Past Surgical History:   Procedure Laterality Date   • BREAST LUMPECTOMY     • CAROTID ARTERY ANGIOPLASTY  ,    • CHOLECYSTECTOMY  1979   • FEMUR IM NAILING/RODDING Left 2018    Procedure: FEMUR INTRAMEDULLARY NAILING;  Surgeon: Zheng Fleming MD;  Location: [facility] OR;  Service:    • HYSTERECTOMY         Social History     Occupational History   • Not on file   Tobacco Use   • Smoking status: Former Smoker     Years: 47.00     Last attempt to quit:      Years since quittin.3   • Smokeless tobacco: Never Used   Substance and Sexual Activity   • Alcohol use: No   • Drug use: Defer   • Sexual activity: Defer      Social History     Social History Narrative   • Not on file       History reviewed. No pertinent family history.    Review of Systems:      Constitutional: Denies fever, shaking or chills   Eyes: Denies change in visual acuity   HEENT: Denies nasal congestion or sore throat   Respiratory: Denies cough or shortness of breath   Cardiovascular: Denies chest pain or edema  Endocrine: Denies tremors, palpitations, intolerance of heat or cold, polyuria, polydipsia.  GI: Denies abdominal pain, nausea, vomiting, bloody stools or diarrhea  : Denies frequency, urgency, incontinence, retention, or nocturia.  Musculoskeletal: Denies numbness, tingling or loss of motor function except as above  Integument: Denies rash, lesion or ulceration   Neurologic: Denies headache or focal weakness, deficits  Heme: Denies spontaneous or excessive bleeding, epistaxis, hematuria, melena, fatigue, enlarged or tender lymph nodes.      All other pertinent positives and negatives as noted above in HPI.    Physical Exam:83 y.o. female  Vitals:    19 0826   Temp: 97.3 °F (36.3 °C)   Weight: 80.7  "kg (178 lb)   Height: 157.5 cm (62\")       General:  Patient is awake and alert.  Appears in no acute distress or discomfort.    Psych:  Affect and demeanor are appropriate.    Extremities: Left hip and back are examined.  The back is nontender.  There are no step-offs.  She is tender in her left buttock without any palpable defects or step-offs.  Tenderness seems to be predominantly over the ischial tuberosity and hamstring origin.  A straight leg raise does reproduce some buttock pain with no radicular pain down the leg.  Logrolling the hip is well-tolerated.  Stinchfield maneuver is negative.  Thigh and calf are soft.  She has good strength with hip flexion, knee extension, ankle and toe plantarflexion and dorsiflexion.  Sensation is intact.  No clonus.  Palpable pedal pulses.    Imaging: Multiple outside x-rays are brought in by the patient and reviewed.  I have also reviewed the CT scan of her left hip and the associated report.  I see no acute abnormalities.  The hardware appears to be well-positioned and her previous fracture appears to healed.  There is mention of a questionable abnormality at the lesser trochanter which the radiologist states is concerning for possible pathologic lesion given her history of breast cancer.  Looking at it myself, it looks like normal callus from healing.  I do not see any lytic lesions or obvious tumor.  I repeated AP and lateral views of the left hip today for comparison purposes.  Again, the fracture appears healed.  Hardware appears well-positioned.  No concerning findings noted.    Assessment/Plan: Left hip and buttock pain status post fall    I think she has a contusion and may be a hamstring injury but I will think there is a new fracture.  She has some pre-existing back problems which responded well to previous epidural.  Those may have been aggravated as well.  I think getting a new epidural is a good idea for her.  I recommend getting her into physical therapy as " well.  Going to have her come back in a month for repeat x-rays just to make sure that there is nothing concerning on the x-rays for pathologic lesion but I think that is very unlikely.  I do not think she has a tumor there but will keep an eye on it.  This was all discussed with her and her family.  They agree.    Zheng Fleming MD    05/03/2019

## 2019-06-14 ENCOUNTER — OFFICE VISIT (OUTPATIENT)
Dept: ORTHOPEDIC SURGERY | Facility: CLINIC | Age: 84
End: 2019-06-14

## 2019-06-14 VITALS — HEIGHT: 62 IN | BODY MASS INDEX: 32.02 KG/M2 | WEIGHT: 174 LBS

## 2019-06-14 DIAGNOSIS — M25.552 HIP PAIN, LEFT: Primary | ICD-10-CM

## 2019-06-14 PROCEDURE — 99212 OFFICE O/P EST SF 10 MIN: CPT | Performed by: ORTHOPAEDIC SURGERY

## 2019-06-14 PROCEDURE — 73502 X-RAY EXAM HIP UNI 2-3 VIEWS: CPT | Performed by: ORTHOPAEDIC SURGERY

## 2019-06-14 RX ORDER — HYDRALAZINE HYDROCHLORIDE 50 MG/1
TABLET, FILM COATED ORAL
COMMUNITY
Start: 2019-05-15 | End: 2021-12-10 | Stop reason: SDUPTHER

## 2019-06-14 RX ORDER — CEPHALEXIN 500 MG/1
CAPSULE ORAL
Refills: 0 | COMMUNITY
Start: 2019-05-14 | End: 2021-06-04

## 2019-06-14 RX ORDER — LISINOPRIL AND HYDROCHLOROTHIAZIDE 25; 20 MG/1; MG/1
1 TABLET ORAL DAILY
COMMUNITY
Start: 2019-05-15 | End: 2020-05-14

## 2019-06-14 NOTE — PROGRESS NOTES
Chief Complaint:  Left hip pain    HPI:  Ms. Jay reports that her hip pain is significantly better.  Therapy has helped.  She is still having some occasional back and buttock pain.  She is debating whether to get another epidural or not.    Exam: Left hip is examined.  No tenderness.  No swelling or masses.  She is ambulating with a cane today.  Gait is mildly antalgic.    Imaging: AP and lateral views of the left hip are ordered and reviewed.  I do not see any changes.  There was some concern about a possible tumor at the time of her last evaluation.  I do not see any progressive lysis or other evidence for a pathologic process.  Her fracture appears healed.    Assessment: Left hip pain, resolving    Plan: If her back and buttock pain persist, I recommend that she consider getting the epidural.  I do think that therapy is helping and I recommend she continue that as well.  I will release her to follow-up with me as needed.    Zheng Fleming MD  06/14/2019

## 2021-06-04 ENCOUNTER — OFFICE VISIT (OUTPATIENT)
Dept: ORTHOPEDIC SURGERY | Facility: CLINIC | Age: 86
End: 2021-06-04

## 2021-06-04 VITALS — HEIGHT: 62 IN | BODY MASS INDEX: 32.02 KG/M2 | WEIGHT: 174 LBS | TEMPERATURE: 98.4 F

## 2021-06-04 DIAGNOSIS — R52 PAIN: Primary | ICD-10-CM

## 2021-06-04 DIAGNOSIS — M75.120 COMPLETE TEAR OF ROTATOR CUFF, UNSPECIFIED LATERALITY, UNSPECIFIED WHETHER TRAUMATIC: ICD-10-CM

## 2021-06-04 PROCEDURE — 99214 OFFICE O/P EST MOD 30 MIN: CPT | Performed by: ORTHOPAEDIC SURGERY

## 2021-06-04 PROCEDURE — 20610 DRAIN/INJ JOINT/BURSA W/O US: CPT | Performed by: ORTHOPAEDIC SURGERY

## 2021-06-04 PROCEDURE — 73030 X-RAY EXAM OF SHOULDER: CPT | Performed by: ORTHOPAEDIC SURGERY

## 2021-06-04 RX ORDER — CITALOPRAM 10 MG/1
10 TABLET ORAL DAILY
COMMUNITY

## 2021-06-04 RX ADMIN — METHYLPREDNISOLONE ACETATE 80 MG: 80 INJECTION, SUSPENSION INTRA-ARTICULAR; INTRALESIONAL; INTRAMUSCULAR; SOFT TISSUE at 15:05

## 2021-06-04 NOTE — PROGRESS NOTES
Patient:Vonda Jay    YOB: 1935    Medical Record Number:[id number]    Chief Complaints: New complaint of bilateral shoulder pain    History of Present Illness:     85 y.o. female patient who presents for evaluation of bilateral shoulder pain.  She reports worsening problems over the past few years.  Both bother her but the right is the worst of the 2.  She reports weakness and trouble with overhead activities.  She does report some night pain.  No numbness or tingling.    Allergies:No Known Allergies    Home Medications:    Current Outpatient Medications:   •  acetaminophen (TYLENOL) 325 MG tablet, Take 2 tablets by mouth Every 6 (Six) Hours As Needed for Mild Pain ., Disp: , Rfl:   •  alendronate (FOSAMAX) 70 MG tablet, TAKE 1 TABLET PO EVERY 7 DAYS, Disp: , Rfl: 1  •  aspirin 81 MG tablet, Take 81 mg by mouth Daily., Disp: , Rfl:   •  Calcium Carb-Cholecalciferol 1000-800 MG-UNIT tablet, Take  by mouth., Disp: , Rfl:   •  cholecalciferol (VITAMIN D3) 1000 units tablet, Take 1,000 Units by mouth Daily., Disp: , Rfl:   •  citalopram (CeleXA) 10 MG tablet, Take 10 mg by mouth Daily., Disp: , Rfl:   •  famotidine (PEPCID) 20 MG tablet, Take 1 tablet by mouth Daily., Disp: , Rfl:   •  hydrALAZINE (APRESOLINE) 25 MG tablet, Take 25 mg by mouth 3 (Three) Times a Day., Disp: , Rfl:   •  hydrALAZINE (APRESOLINE) 50 MG tablet, TAKE 1 TABLET BY MOUTH THREE TIMES DAILY, Disp: , Rfl:   •  lisinopril-hydrochlorothiazide (PRINZIDE,ZESTORETIC) 20-25 MG per tablet, Take 1 tablet by mouth Daily., Disp: , Rfl: 2  •  meloxicam (MOBIC) 15 MG tablet, Take 15 mg by mouth., Disp: , Rfl:   •  pravastatin (PRAVACHOL) 40 MG tablet, Take 40 mg by mouth Daily., Disp: , Rfl:     Past Medical History:   Diagnosis Date   • Arthritis    • Hyperlipidemia    • Hypertension        Past Surgical History:   Procedure Laterality Date   • BREAST LUMPECTOMY  1998   • CAROTID ARTERY ANGIOPLASTY  2000, 2013   • CHOLECYSTECTOMY  1979   •  "FEMUR IM NAILING/RODDING Left 2018    Procedure: FEMUR INTRAMEDULLARY NAILING;  Surgeon: Zheng Fleming MD;  Location: [facility] OR;  Service:    • HYSTERECTOMY         Social History     Occupational History   • Not on file   Tobacco Use   • Smoking status: Former Smoker     Years: 47.00     Quit date:      Years since quittin.4   • Smokeless tobacco: Never Used   Vaping Use   • Vaping Use: Never used   Substance and Sexual Activity   • Alcohol use: No   • Drug use: Defer   • Sexual activity: Defer      Social History     Social History Narrative   • Not on file       History reviewed. No pertinent family history.    Review of Systems:      Constitutional: Denies fever, shaking or chills   Eyes: Denies change in visual acuity   HEENT: Denies nasal congestion or sore throat   Respiratory: Denies cough or shortness of breath   Cardiovascular: Denies chest pain or edema  Endocrine: Denies tremors, palpitations, intolerance of heat or cold, polyuria, polydipsia.  GI: Denies abdominal pain, nausea, vomiting, bloody stools or diarrhea  : Denies frequency, urgency, incontinence, retention, or nocturia.  Musculoskeletal: Denies numbness, tingling or loss of motor function except as above  Integument: Denies rash, lesion or ulceration   Neurologic: Denies headache or focal weakness, deficits  Heme: Denies spontaneous or excessive bleeding, epistaxis, hematuria, melena, fatigue, enlarged or tender lymph nodes.      All other pertinent positives and negatives as noted above in HPI.    Physical Exam:85 y.o. female  Vitals:    21 1437   Temp: 98.4 °F (36.9 °C)   Weight: 78.9 kg (174 lb)   Height: 157.5 cm (62\")       General:  Patient is awake and alert.  Appears in no acute distress or discomfort.    Psych:  Affect and demeanor are appropriate.    Extremities: Both shoulders are examined.  Skin is benign.  No atrophy, swelling or masses.  No focal areas of tenderness.  She has full motion of both " shoulders.  She has pain and weakness with elevation in the scapular plane bilaterally.  She has pain and weakness with external rotation on the right.  Negative external rotation lag sign bilaterally.  Intact motor and sensory function in her hands and wrists.  Palpable radial pulses bilaterally.  Brisk capillary refill.    Imaging: AP, scapular Y and axillary views of both shoulders are ordered and reviewed to evaluate her complaint.  No comparison films are available.  She has moderate rotator cuff tear arthropathy on the right with a diminished acromiohumeral interval, subchondral sclerosis and osteophyte formation.  She also has advanced acromioclavicular arthritis.  On the left, she has some hypertrophic bone at the rotator cuff insertion.  Acromiohumeral interval measures normal.  Incidental note of vascular clips in the left axilla.    Assessment/Plan: 1.  Right rotator cuff tear arthropathy 2.  Left shoulder pain and weakness, suspected rotator cuff tear    We discussed the natural history of both conditions and her options including both surgical and nonsurgical treatment.  She elected for bilateral subacromial injections.  The risk, benefits and alternatives were discussed.  She consented and the injections were performed as described below.  I offered to refer her to physical therapy but she declined.  She will follow-up as needed.    Zheng Fleming MD    06/04/2021    Large Joint Arthrocentesis: R subacromial bursa  Date/Time: 6/4/2021 3:05 PM  Consent given by: patient  Site marked: site marked  Timeout: Immediately prior to procedure a time out was called to verify the correct patient, procedure, equipment, support staff and site/side marked as required   Supporting Documentation  Indications: pain   Procedure Details  Location: shoulder - R subacromial bursa  Preparation: Patient was prepped and draped in the usual sterile fashion  Needle gauge: 21G.  Approach: posterior  Medications administered:  2 mL lidocaine (cardiac); 80 mg methylPREDNISolone acetate 80 MG/ML  Patient tolerance: patient tolerated the procedure well with no immediate complications    Large Joint Arthrocentesis: L subacromial bursa  Date/Time: 6/4/2021 3:05 PM  Consent given by: patient  Site marked: site marked  Timeout: Immediately prior to procedure a time out was called to verify the correct patient, procedure, equipment, support staff and site/side marked as required   Supporting Documentation  Indications: pain   Procedure Details  Location: shoulder - L subacromial bursa  Preparation: Patient was prepped and draped in the usual sterile fashion  Needle gauge: 21G.  Approach: posterior  Medications administered: 2 mL lidocaine (cardiac); 80 mg methylPREDNISolone acetate 80 MG/ML  Patient tolerance: patient tolerated the procedure well with no immediate complications

## 2021-06-07 RX ORDER — METHYLPREDNISOLONE ACETATE 80 MG/ML
80 INJECTION, SUSPENSION INTRA-ARTICULAR; INTRALESIONAL; INTRAMUSCULAR; SOFT TISSUE
Status: COMPLETED | OUTPATIENT
Start: 2021-06-04 | End: 2021-06-04

## 2021-10-28 ENCOUNTER — TELEPHONE (OUTPATIENT)
Dept: ORTHOPEDIC SURGERY | Facility: CLINIC | Age: 86
End: 2021-10-28

## 2021-10-28 NOTE — TELEPHONE ENCOUNTER
Provider: DR. CHOWDARY     Caller: PATIENT    Relationship to Patient: SELF       Phone Number: [phone] -HM#     Reason for Call:  PT. IS ASKING IF SHE CAN BE REFERRED TO PHYSICAL THERAPY. STATES THAT SHE JUST FEELS WEEK AND NEEDS TO INCREASE HER STRENGH- HAS GENERAL PAIN. SHE WAS LAST SEEN 06/04/21.   SHE IS ASKING IF A REFERRAL CAN BE SENT TO [facility] PHYSICAL THERAPY- # [phone]  PLEASE CALL TO ADVISE     When was the patient last seen:  06/04/21

## 2021-11-04 ENCOUNTER — TELEPHONE (OUTPATIENT)
Dept: ORTHOPEDIC SURGERY | Facility: CLINIC | Age: 86
End: 2021-11-04

## 2021-11-04 NOTE — TELEPHONE ENCOUNTER
Caller: CATRACHITO BARTLETT     Relationship to patient: SELF    Best call back number:  [phone]    Chief complaint: LEFT SHOULDER PAIN     Type of visit: NEW PROBLEM    Additional notes: PATIENT HURT LEFT SHOULDER IN FALL AND WENT TO NON [facility] AT [place], XRAYS DONE SAID BEEN FEW DAYS SINCE FALL

## 2021-11-10 ENCOUNTER — OFFICE VISIT (OUTPATIENT)
Dept: ORTHOPEDIC SURGERY | Facility: CLINIC | Age: 86
End: 2021-11-10

## 2021-11-10 VITALS — BODY MASS INDEX: 32.1 KG/M2 | HEIGHT: 61 IN | TEMPERATURE: 97.8 F | WEIGHT: 170 LBS

## 2021-11-10 DIAGNOSIS — M25.562 LEFT KNEE PAIN, UNSPECIFIED CHRONICITY: Primary | ICD-10-CM

## 2021-11-10 DIAGNOSIS — R26.89 BALANCE DISORDER: ICD-10-CM

## 2021-11-10 DIAGNOSIS — M25.512 LEFT SHOULDER PAIN, UNSPECIFIED CHRONICITY: ICD-10-CM

## 2021-11-10 DIAGNOSIS — R53.1 WEAKNESS: ICD-10-CM

## 2021-11-10 PROCEDURE — 73030 X-RAY EXAM OF SHOULDER: CPT | Performed by: ORTHOPAEDIC SURGERY

## 2021-11-10 PROCEDURE — 99214 OFFICE O/P EST MOD 30 MIN: CPT | Performed by: ORTHOPAEDIC SURGERY

## 2021-11-10 PROCEDURE — 20610 DRAIN/INJ JOINT/BURSA W/O US: CPT | Performed by: ORTHOPAEDIC SURGERY

## 2021-11-10 RX ORDER — METHYLPREDNISOLONE ACETATE 80 MG/ML
160 INJECTION, SUSPENSION INTRA-ARTICULAR; INTRALESIONAL; INTRAMUSCULAR; SOFT TISSUE
Status: COMPLETED | OUTPATIENT
Start: 2021-11-10 | End: 2021-11-10

## 2021-11-10 RX ADMIN — METHYLPREDNISOLONE ACETATE 160 MG: 80 INJECTION, SUSPENSION INTRA-ARTICULAR; INTRALESIONAL; INTRAMUSCULAR; SOFT TISSUE at 15:02

## 2021-11-10 NOTE — PROGRESS NOTES
Patient:Vonda Jay    YOB: 1935    Medical Record Number:[id number]    Chief Complaints:  New left shoulder injury    History of Present Illness:     86 y.o. female patient who presents for her left shoulder.  I saw her for this issue back in June and did an injection.  It helped tremendously.  She was doing well up until about 2 weeks ago.  She was helping a blind friend when she accidentally tripped and fell.  She attempted to catch herself with the left arm.  She had severe pain in the left arm at the time.  She went to [facility] emergency room and had x-rays taken which were reportedly negative.  The following day she developed bruising all down the arm.  She continues to have moderate to severe constant aching and throbbing pain.  Her biggest complaint is increased dysfunction.  She is now unable to raise her left arm without the assistance of the right.  She says that she could easily raise the arm overhead prior to the fall.    Allergies:No Known Allergies    Home Medications:    Current Outpatient Medications:   •  acetaminophen (TYLENOL) 325 MG tablet, Take 2 tablets by mouth Every 6 (Six) Hours As Needed for Mild Pain ., Disp: , Rfl:   •  alendronate (FOSAMAX) 70 MG tablet, TAKE 1 TABLET PO EVERY 7 DAYS, Disp: , Rfl: 1  •  aspirin 81 MG tablet, Take 81 mg by mouth Daily., Disp: , Rfl:   •  Calcium Carb-Cholecalciferol 1000-800 MG-UNIT tablet, Take  by mouth., Disp: , Rfl:   •  cholecalciferol (VITAMIN D3) 1000 units tablet, Take 1,000 Units by mouth Daily., Disp: , Rfl:   •  citalopram (CeleXA) 10 MG tablet, Take 10 mg by mouth Daily., Disp: , Rfl:   •  famotidine (PEPCID) 20 MG tablet, Take 1 tablet by mouth Daily., Disp: , Rfl:   •  hydrALAZINE (APRESOLINE) 25 MG tablet, Take 25 mg by mouth 3 (Three) Times a Day., Disp: , Rfl:   •  hydrALAZINE (APRESOLINE) 50 MG tablet, TAKE 1 TABLET BY MOUTH THREE TIMES DAILY, Disp: , Rfl:   •  lisinopril-hydrochlorothiazide (PRINZIDE,ZESTORETIC) 20-25 MG  per tablet, Take 1 tablet by mouth Daily., Disp: , Rfl: 2  •  meloxicam (MOBIC) 15 MG tablet, Take 15 mg by mouth., Disp: , Rfl:   •  pravastatin (PRAVACHOL) 40 MG tablet, Take 40 mg by mouth Daily., Disp: , Rfl:     Past Medical History:   Diagnosis Date   • Arthritis    • Hyperlipidemia    • Hypertension        Past Surgical History:   Procedure Laterality Date   • BREAST LUMPECTOMY     • CAROTID ARTERY ANGIOPLASTY  ,    • CHOLECYSTECTOMY  1979   • FEMUR IM NAILING/RODDING Left 2018    Procedure: FEMUR INTRAMEDULLARY NAILING;  Surgeon: Zheng Fleming MD;  [facility] OR;  Service:    • HYSTERECTOMY         Social History     Occupational History   • Not on file   Tobacco Use   • Smoking status: Former Smoker     Years: 47.00     Quit date:      Years since quittin.8   • Smokeless tobacco: Never Used   Vaping Use   • Vaping Use: Never used   Substance and Sexual Activity   • Alcohol use: No   • Drug use: Defer   • Sexual activity: Defer      Social History     Social History Narrative   • Not on file       History reviewed. No pertinent family history.    Review of Systems:      Constitutional: Denies fever, shaking or chills   Eyes: Denies change in visual acuity   HEENT: Denies nasal congestion or sore throat   Respiratory: Denies cough or shortness of breath   Cardiovascular: Denies chest pain or edema  Endocrine: Denies tremors, palpitations, intolerance of heat or cold, polyuria, polydipsia.  GI: Denies abdominal pain, nausea, vomiting, bloody stools or diarrhea  : Denies frequency, urgency, incontinence, retention, or nocturia.  Musculoskeletal: Denies numbness, tingling or loss of motor function except as above  Integument: Denies rash, lesion or ulceration   Neurologic: Denies headache or focal weakness, deficits  Heme: Denies spontaneous or excessive bleeding, epistaxis, hematuria, melena, fatigue, enlarged or tender lymph nodes.      All other pertinent positives  "and negatives as noted above in HPI.    Physical Exam:86 y.o. female  Vitals:    11/10/21 1413   Temp: 97.8 °F (36.6 °C)   Weight: 77.1 kg (170 lb)   Height: 154.9 cm (61\")       General:  Patient is awake and alert.  Appears in no acute distress or discomfort.    Psych:  Affect and demeanor are appropriate.    Extremities: Her left arm and shoulder are examined.  She has ecchymosis extending all the way down the arm to the forearm.  She has edema around the shoulder compared to the contralateral side.  Mild anterior tenderness without effusion.  She has good passive motion of the shoulder but her active motion is very limited.  She can abduct about 30 degrees and elevate about 30 degrees as well.  She has a positive external rotation lag sign.  Could not really assess her rotator cuff strength due to pain and guarding.  She does have intact axillary nerve sensation.    Imaging: AP and scapular Y views of the left shoulder are ordered and reviewed today to evaluate her complaint.  These are compared to her previous x-rays.  She brought in her ER records and I have reviewed those as well.  There is a report of x-rays of the left humerus and left shoulder that are included with those records.  I have reviewed those reports as well.  Her x-rays today show no significant changes relative to previous films.  Acromiohumeral interval measures 8 mm.  I do not see any acute abnormalities.  The report of her outside x-rays is negative for acute abnormality as well.    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  She very much wants to avoid any surgery.  I suggested we try another injection and some therapy.  The risk, benefits and alternatives to the injection were discussed.  She consented and the injection was performed as described below.  I am referring her to physical therapy.  I will check her again in 1 " month.    She also mentions she is having balance issues and generalized weakness.  She would like to try therapy for those issues as well.  I assured her that I will include those issues with the therapy order.    Large Joint Arthrocentesis: L subacromial bursa  Date/Time: 11/10/2021 3:02 PM  Consent given by: patient  Site marked: site marked  Timeout: Immediately prior to procedure a time out was called to verify the correct patient, procedure, equipment, support staff and site/side marked as required   Supporting Documentation  Indications: pain   Procedure Details  Location: shoulder - L subacromial bursa  Preparation: Patient was prepped and draped in the usual sterile fashion  Needle size: 25 G  Approach: posterior  Medications administered: 160 mg methylPREDNISolone acetate 80 MG/ML; 2 mL lidocaine (cardiac)  Patient tolerance: patient tolerated the procedure well with no immediate complications        Zheng Fleming MD    11/10/2021

## 2021-12-10 ENCOUNTER — OFFICE VISIT (OUTPATIENT)
Dept: ORTHOPEDIC SURGERY | Facility: CLINIC | Age: 86
End: 2021-12-10

## 2021-12-10 VITALS — BODY MASS INDEX: 32.1 KG/M2 | HEIGHT: 61 IN | WEIGHT: 170 LBS | TEMPERATURE: 97.8 F

## 2021-12-10 DIAGNOSIS — G89.29 CHRONIC LEFT SHOULDER PAIN: Primary | ICD-10-CM

## 2021-12-10 DIAGNOSIS — M25.512 CHRONIC LEFT SHOULDER PAIN: Primary | ICD-10-CM

## 2021-12-10 PROCEDURE — 99212 OFFICE O/P EST SF 10 MIN: CPT | Performed by: ORTHOPAEDIC SURGERY

## 2021-12-10 RX ORDER — ALENDRONATE SODIUM 70 MG/1
1 TABLET ORAL
COMMUNITY
Start: 2021-10-10 | End: 2022-10-10

## 2021-12-10 RX ORDER — HYDRALAZINE HYDROCHLORIDE 100 MG/1
100 TABLET, FILM COATED ORAL 3 TIMES DAILY
COMMUNITY

## 2021-12-10 NOTE — PROGRESS NOTES
CC: Follow-up left rotator cuff tear    Ms. Jay follows up for her left shoulder.  The therapy seems to be helping.  She says it is better.  The shot helped with the pain and still seems to be working fairly well.    Her motion is better today.  She still hikes her shoulder when she tries to elevate but she can raise her arm up to about 120 degrees.  She can briefly maintain it.  She can abduct about 60.    Assessment: Left rotator cuff tear    Plan: We will have her continue the therapy as it seems to be helping quite a bit.  I told her we can consider another injection for her in the future if her pain recurs.  She would like to go ahead and set up an appointment for that purpose.  We can always cancel it if her symptoms are improved.  No plans for surgery at this point given her improvement.    Zheng Fleming MD

## 2022-02-11 ENCOUNTER — OFFICE VISIT (OUTPATIENT)
Dept: ORTHOPEDIC SURGERY | Facility: CLINIC | Age: 87
End: 2022-02-11

## 2022-02-11 VITALS — WEIGHT: 175 LBS | RESPIRATION RATE: 18 BRPM | TEMPERATURE: 97.1 F | HEIGHT: 61 IN | BODY MASS INDEX: 33.04 KG/M2

## 2022-02-11 DIAGNOSIS — G89.29 CHRONIC LEFT SHOULDER PAIN: Primary | ICD-10-CM

## 2022-02-11 DIAGNOSIS — M25.512 CHRONIC LEFT SHOULDER PAIN: Primary | ICD-10-CM

## 2022-02-11 PROCEDURE — 20610 DRAIN/INJ JOINT/BURSA W/O US: CPT | Performed by: ORTHOPAEDIC SURGERY

## 2022-02-11 PROCEDURE — 99212 OFFICE O/P EST SF 10 MIN: CPT | Performed by: ORTHOPAEDIC SURGERY

## 2022-02-11 RX ADMIN — TRIAMCINOLONE ACETONIDE 80 MG: 40 INJECTION, SUSPENSION INTRA-ARTICULAR; INTRAMUSCULAR at 17:06

## 2022-02-11 NOTE — PROGRESS NOTES
CC:  Left shoulder pain and weakness    Ms. Jay follows up for her left shoulder.  She has been going to therapy.  She feels like she has plateaued.  She continues to have significant pain and dysfunction.  She struggles to raise the arm overhead.    Her skin is benign.  No effusion.  She has good passive motion but active motion is a struggle for her.  She can abduct about 60 or 70.  She can raise her arm up to about 100 degrees but only if she leans back and kind of swings the arm up.  Her deltoid does fire.  Axillary nerve sensation is intact.    Assessment: Left rotator cuff tear    Plan: We discussed her options.  She does not want to even consider the idea of any surgical options.  She would like to try another injection.  Her last injection was in November so it would be fine to repeat that today.  The risk, benefits and alternatives were discussed but she consented and the injection was performed as described below.  I encouraged her to continue the therapy.  I will have her follow-up with me as needed.    Zheng Fleming MD     Large Joint Arthrocentesis: L subacromial bursa  Date/Time: 2/11/2022 5:06 PM  Consent given by: patient  Site marked: site marked  Timeout: Immediately prior to procedure a time out was called to verify the correct patient, procedure, equipment, support staff and site/side marked as required   Supporting Documentation  Indications: pain   Procedure Details  Location: shoulder - L subacromial bursa  Preparation: Patient was prepped and draped in the usual sterile fashion  Needle gauge: 21G.  Approach: posterior  Medications administered: 80 mg triamcinolone acetonide 40 MG/ML; 2 mL lidocaine (cardiac)  Patient tolerance: patient tolerated the procedure well with no immediate complications

## 2022-02-15 RX ORDER — TRIAMCINOLONE ACETONIDE 40 MG/ML
80 INJECTION, SUSPENSION INTRA-ARTICULAR; INTRAMUSCULAR
Status: COMPLETED | OUTPATIENT
Start: 2022-02-11 | End: 2022-02-11

## 2022-03-25 ENCOUNTER — OFFICE VISIT (OUTPATIENT)
Dept: CARDIOLOGY | Facility: CLINIC | Age: 87
End: 2022-03-25

## 2022-03-25 VITALS
SYSTOLIC BLOOD PRESSURE: 124 MMHG | HEIGHT: 61 IN | WEIGHT: 169 LBS | DIASTOLIC BLOOD PRESSURE: 80 MMHG | BODY MASS INDEX: 31.91 KG/M2 | HEART RATE: 63 BPM

## 2022-03-25 DIAGNOSIS — R01.1 SYSTOLIC MURMUR: Primary | ICD-10-CM

## 2022-03-25 DIAGNOSIS — R09.89 LABILE BLOOD PRESSURE: ICD-10-CM

## 2022-03-25 DIAGNOSIS — I10 ESSENTIAL HYPERTENSION: ICD-10-CM

## 2022-03-25 DIAGNOSIS — R77.8 ELEVATED TROPONIN: ICD-10-CM

## 2022-03-25 PROCEDURE — 99204 OFFICE O/P NEW MOD 45 MIN: CPT | Performed by: INTERNAL MEDICINE

## 2022-03-25 PROCEDURE — 93000 ELECTROCARDIOGRAM COMPLETE: CPT | Performed by: INTERNAL MEDICINE

## 2022-03-25 NOTE — PROGRESS NOTES
Date of Office Visit: 2022  Encounter Provider: Vidal Clifford MD  Place of Service: [facility] CARDIOLOGY  Patient Name: Vonda Jay  :1935    Chief complaint: Recently elevated troponin, hypertension, systolic murmur.    History of Present Illness:     I had the pleasure of seeing the patient in cardiology office on 3/25/2022.  She is a very pleasant 86 year-old female with a history of carotid artery disease, hypertension, and prior breast cancer who presents for evaluation.  I also have seen the patient's sister for many years, and she has multiple cardiac issues.    The patient states that her blood pressure has been labile at times recently.  It can spike without warning, although it mainly remains fairly well controlled.  She recently had an abscessed tooth, and her systolic blood pressure spiked to 240 mmHg at home.  She felt flushed and had a headache at the time.  She subsequently went to the [facility].  Her troponin was elevated in the emergency department, although she had no chest pain.  She did not have any EKG changes.  There were 3 separate high-sensitivity troponins, all of which were elevated.  Since discharge, her blood pressure has been much better.  Today, it is 124/80.  She had her tooth pulled, and it seems to have improved.  She does have a significant systolic murmur which is suggestive of aortic stenosis.  Again, she has not had any symptoms consistent with aortic stenosis.  It was recommended that she be transferred for further evaluation, although she did not desire to be admitted at that time.    Past Medical History:   Diagnosis Date   • Arthritis    • History of breast cancer    • History of carotid artery disease    • Hyperlipidemia    • Hypertension        Past Surgical History:   Procedure Laterality Date   • BREAST LUMPECTOMY     • CAROTID ARTERY ANGIOPLASTY  ,    • CHOLECYSTECTOMY     •  FEMUR IM NAILING/RODDING Left 2018    Procedure: FEMUR INTRAMEDULLARY NAILING;  Surgeon: Zheng Fleming MD;  Location: [facility];  Service:    • HYSTERECTOMY  1989       Current Outpatient Medications on File Prior to Visit   Medication Sig Dispense Refill   • acetaminophen (TYLENOL) 325 MG tablet Take 2 tablets by mouth Every 6 (Six) Hours As Needed for Mild Pain .     • alendronate (FOSAMAX) 70 MG tablet Take 1 tablet by mouth Every 7 (Seven) Days.     • aspirin 81 MG tablet Take 81 mg by mouth Daily.     • B Complex Vitamins (VITAMIN B COMPLEX PO) Take  by mouth.     • Calcium Carb-Cholecalciferol 1000-800 MG-UNIT tablet Take  by mouth.     • cholecalciferol (VITAMIN D3) 1000 units tablet Take 1,000 Units by mouth Daily.     • famotidine (PEPCID) 20 MG tablet Take 1 tablet by mouth Daily. (Patient taking differently: Take 20 mg by mouth As Needed.)     • hydrALAZINE (APRESOLINE) 100 MG tablet Take 100 mg by mouth 3 (Three) Times a Day.     • lisinopril-hydrochlorothiazide (PRINZIDE,ZESTORETIC) 20-25 MG per tablet Take 1 tablet by mouth Daily.  2   • meloxicam (MOBIC) 15 MG tablet Take 15 mg by mouth.     • pravastatin (PRAVACHOL) 40 MG tablet Take 40 mg by mouth Daily.     • citalopram (CeleXA) 10 MG tablet Take 10 mg by mouth Daily.       No current facility-administered medications on file prior to visit.     Allergies as of 2022   • (No Known Allergies)     Social History     Socioeconomic History   • Marital status:    Tobacco Use   • Smoking status: Former Smoker     Years: 47.00     Quit date:      Years since quittin.2   • Smokeless tobacco: Never Used   • Tobacco comment: caffeine use- coffee   Vaping Use   • Vaping Use: Never used   Substance and Sexual Activity   • Alcohol use: No   • Drug use: Defer   • Sexual activity: Defer     Family History   Problem Relation Age of Onset   • Hypertension Mother    • Heart valve disorder Sister    • Lung cancer Sister    • Lymphoma  "Sister    • Skin cancer Sister    • Stroke Brother 57       Review of Systems   Neurological: Positive for headaches.   All other systems reviewed and are negative.     Objective:     Vitals:    03/25/22 1316   BP: 124/80   BP Location: Left arm   Pulse: 63   Weight: 76.7 kg (169 lb)   Height: 154.9 cm (61\")     Body mass index is 31.93 kg/m².    Constitutional:       Appearance: Well-developed.   Eyes:      Conjunctiva/sclera: Conjunctivae normal.   HENT:      Head: Normocephalic and atraumatic.   Pulmonary:      Effort: Pulmonary effort is normal.      Breath sounds: Normal breath sounds.   Cardiovascular:      Normal rate. Regular rhythm.      Murmurs: There is a grade 3/6 harsh mid to late systolic murmur at the URSB and ULSB.      No gallop.   Edema:     Peripheral edema absent.   Abdominal:      Palpations: Abdomen is soft.      Tenderness: There is no abdominal tenderness.   Musculoskeletal:      Cervical back: Neck supple. Skin:     General: Skin is warm.   Neurological:      Mental Status: Alert and oriented to person, place, and time.   Psychiatric:         Behavior: Behavior normal.       Lab Review:     ECG 12 Lead    Date/Time: 3/25/2022 2:39 PM  Performed by: Vidal Clifford MD  Authorized by: Vidal Clifford MD   Comparison: compared with previous ECG from 2/7/2018  Similar to previous ECG  Rhythm: sinus rhythm  Rate: normal  BPM: 63    Clinical impression: normal ECG            Cardiac Procedures:      Assessment:       Diagnosis Plan   1. Systolic murmur  Adult Transthoracic Echo Complete w/ Color, Spectral and Contrast if Necessary Per Protocol   2. Elevated troponin  Adult Transthoracic Echo Complete w/ Color, Spectral and Contrast if Necessary Per Protocol   3. Labile blood pressure  Adult Transthoracic Echo Complete w/ Color, Spectral and Contrast if Necessary Per Protocol   4. Essential hypertension  Adult Transthoracic Echo Complete w/ Color, Spectral and Contrast if Necessary Per " Protocol     Plan:       I suspect that the patient's blood pressure spiked because of the abscessed tooth.  I told her that it is fairly normal to have labile blood pressure at her age.  However, since the tooth was pulled, her blood pressure has been much better.  Her troponin was elevated in the emergency department on 3 separate sets.  However, she had not had any chest discomfort or shortness of breath.  She has a murmur consistent with severe aortic stenosis, and her sister also has had a TAVR.  I suspect that she had a hypertensive emergency, which coupled with the severe aortic stenosis, caused the troponin elevation from a supply demand mismatch.  She certainly warrants an echocardiogram at this time.  This will be not only to evaluate the left ventricular wall motion and function, but also to evaluate the aortic stenosis.  I discussed this with the patient and her son today, and she was in agreement with the plan.  Further plans will be made pending the results of the echocardiogram.

## 2022-04-11 ENCOUNTER — HOSPITAL ENCOUNTER (OUTPATIENT)
Dept: CARDIOLOGY | Facility: HOSPITAL | Age: 87
Discharge: HOME | End: 2022-04-11
Admitting: INTERNAL MEDICINE

## 2022-04-11 VITALS
WEIGHT: 169 LBS | SYSTOLIC BLOOD PRESSURE: 158 MMHG | BODY MASS INDEX: 31.91 KG/M2 | HEART RATE: 84 BPM | HEIGHT: 61 IN | DIASTOLIC BLOOD PRESSURE: 72 MMHG

## 2022-04-11 DIAGNOSIS — R77.8 ELEVATED TROPONIN: ICD-10-CM

## 2022-04-11 DIAGNOSIS — R01.1 SYSTOLIC MURMUR: ICD-10-CM

## 2022-04-11 DIAGNOSIS — R09.89 LABILE BLOOD PRESSURE: ICD-10-CM

## 2022-04-11 DIAGNOSIS — I10 ESSENTIAL HYPERTENSION: ICD-10-CM

## 2022-04-11 LAB
AORTIC ARCH: 2.2 CM
AORTIC DIMENSIONLESS INDEX: 0.3 (DI)
BH CV ECHO LEFT VENTRICLE GLOBAL LONGITUDINAL STRAIN: -26.1 %
BH CV ECHO MEAS - ACS: 0.88 CM
BH CV ECHO MEAS - AO MAX PG: 52 MMHG
BH CV ECHO MEAS - AO MEAN PG: 29 MMHG
BH CV ECHO MEAS - AO ROOT DIAM: 2.8 CM
BH CV ECHO MEAS - AO V2 MAX: 360.2 CM/SEC
BH CV ECHO MEAS - AO V2 VTI: 85.1 CM
BH CV ECHO MEAS - AVA(I,D): 0.95 CM2
BH CV ECHO MEAS - EDV(CUBED): 48.7 ML
BH CV ECHO MEAS - EDV(MOD-SP2): 55 ML
BH CV ECHO MEAS - EDV(MOD-SP4): 67 ML
BH CV ECHO MEAS - EF(MOD-BP): 68 %
BH CV ECHO MEAS - EF(MOD-SP2): 61.8 %
BH CV ECHO MEAS - EF(MOD-SP4): 71.6 %
BH CV ECHO MEAS - ESV(CUBED): 13.6 ML
BH CV ECHO MEAS - ESV(MOD-SP2): 21 ML
BH CV ECHO MEAS - ESV(MOD-SP4): 19 ML
BH CV ECHO MEAS - FS: 34.6 %
BH CV ECHO MEAS - IVS/LVPW: 1.02 CM
BH CV ECHO MEAS - IVSD: 1.1 CM
BH CV ECHO MEAS - LAT PEAK E' VEL: 10 CM/SEC
BH CV ECHO MEAS - LV DIASTOLIC VOL/BSA (35-75): 38.1 CM2
BH CV ECHO MEAS - LV MASS(C)D: 124.1 GRAMS
BH CV ECHO MEAS - LV MAX PG: 4.3 MMHG
BH CV ECHO MEAS - LV MEAN PG: 2.8 MMHG
BH CV ECHO MEAS - LV SYSTOLIC VOL/BSA (12-30): 10.8 CM2
BH CV ECHO MEAS - LV V1 MAX: 103.3 CM/SEC
BH CV ECHO MEAS - LV V1 VTI: 25.2 CM
BH CV ECHO MEAS - LVIDD: 3.7 CM
BH CV ECHO MEAS - LVIDS: 2.39 CM
BH CV ECHO MEAS - LVOT AREA: 3.2 CM2
BH CV ECHO MEAS - LVOT DIAM: 2.02 CM
BH CV ECHO MEAS - LVPWD: 1.07 CM
BH CV ECHO MEAS - MED PEAK E' VEL: 9.2 CM/SEC
BH CV ECHO MEAS - MV A DUR: 0.18 SEC
BH CV ECHO MEAS - MV A MAX VEL: 142.1 CM/SEC
BH CV ECHO MEAS - MV DEC SLOPE: 475.3 CM/SEC2
BH CV ECHO MEAS - MV DEC TIME: 0.15 MSEC
BH CV ECHO MEAS - MV E MAX VEL: 129 CM/SEC
BH CV ECHO MEAS - MV E/A: 0.91
BH CV ECHO MEAS - MV MAX PG: 9.2 MMHG
BH CV ECHO MEAS - MV MEAN PG: 4 MMHG
BH CV ECHO MEAS - MV V2 VTI: 40.4 CM
BH CV ECHO MEAS - MVA(VTI): 2.01 CM2
BH CV ECHO MEAS - PA ACC TIME: 0.11 SEC
BH CV ECHO MEAS - PA PR(ACCEL): 30.3 MMHG
BH CV ECHO MEAS - PA V2 MAX: 120.8 CM/SEC
BH CV ECHO MEAS - PULM A REVS DUR: 0.14 SEC
BH CV ECHO MEAS - PULM A REVS VEL: 35.5 CM/SEC
BH CV ECHO MEAS - PULM DIAS VEL: 54.2 CM/SEC
BH CV ECHO MEAS - PULM SYS VEL: 92.5 CM/SEC
BH CV ECHO MEAS - RAP SYSTOLE: 3 MMHG
BH CV ECHO MEAS - RV MAX PG: 2.7 MMHG
BH CV ECHO MEAS - RV V1 MAX: 82.7 CM/SEC
BH CV ECHO MEAS - RV V1 VTI: 18.7 CM
BH CV ECHO MEAS - RVOT DIAM: 1.87 CM
BH CV ECHO MEAS - SI(MOD-SP2): 19.3 ML/M2
BH CV ECHO MEAS - SI(MOD-SP4): 27.3 ML/M2
BH CV ECHO MEAS - SUP REN AO DIAM: 1.9 CM
BH CV ECHO MEAS - SV(LVOT): 81.1 ML
BH CV ECHO MEAS - SV(MOD-SP2): 34 ML
BH CV ECHO MEAS - SV(MOD-SP4): 48 ML
BH CV ECHO MEAS - SV(RVOT): 51.5 ML
BH CV ECHO MEAS - TAPSE (>1.6): 2.7 CM
BH CV ECHO MEASUREMENTS AVERAGE E/E' RATIO: 13.44
BH CV XLRA - RV BASE: 2.9 CM
BH CV XLRA - RV LENGTH: 6.4 CM
BH CV XLRA - RV MID: 2.5 CM
BH CV XLRA - TDI S': 15.9 CM/SEC
LEFT ATRIUM VOLUME INDEX: 15.5 ML/M2
MAXIMAL PREDICTED HEART RATE: 134 BPM
SINUS: 2.16 CM
STJ: 1.99 CM
STRESS TARGET HR: 114 BPM

## 2022-04-11 PROCEDURE — 93356 MYOCRD STRAIN IMG SPCKL TRCK: CPT

## 2022-04-11 PROCEDURE — 93356 MYOCRD STRAIN IMG SPCKL TRCK: CPT | Performed by: INTERNAL MEDICINE

## 2022-04-11 PROCEDURE — 93306 TTE W/DOPPLER COMPLETE: CPT

## 2022-04-11 PROCEDURE — 93306 TTE W/DOPPLER COMPLETE: CPT | Performed by: INTERNAL MEDICINE

## 2022-04-13 NOTE — PROGRESS NOTES
I called and gave her the results.  Moderate aortic stenosis and asymptomatic.  She needs another echocardiogram in 1 year.  I will arrange for 6-month follow-up with me.    CHRISTO

## 2022-05-31 ENCOUNTER — TELEPHONE (OUTPATIENT)
Dept: CARDIOLOGY | Facility: CLINIC | Age: 87
End: 2022-05-31

## 2022-05-31 RX ORDER — AMLODIPINE BESYLATE 5 MG/1
5 TABLET ORAL DAILY
Qty: 30 TABLET | Refills: 11 | Status: SHIPPED | OUTPATIENT
Start: 2022-05-31 | End: 2022-07-07 | Stop reason: SINTOL

## 2022-06-01 NOTE — TELEPHONE ENCOUNTER
Sent Vonda Laird message reviewing Dr. Clifford's recommendations as I have been unable to get in contact with her via phone.    Thank you,  Lainey Ybarra RN  Triage Nurse G

## 2022-06-01 NOTE — TELEPHONE ENCOUNTER
Vonda Misty returned call.  Patient stated she picked up the new medication.  Patient's questions were answered and she verbalized understanding.     Encouraged patient to contact office with any additional questions or concerns.  Vonda stated she will.    Thank you,  Lainey Ybarra RN  Triage Nurse [facility]

## 2022-06-01 NOTE — TELEPHONE ENCOUNTER
Left voicemail for Vonda Jay requesting callback.    Thank you,  Lainey Ybarra RN  Triage Nurse G

## 2022-06-06 ENCOUNTER — OFFICE VISIT (OUTPATIENT)
Dept: CARDIOLOGY | Facility: CLINIC | Age: 87
End: 2022-06-06

## 2022-06-06 ENCOUNTER — PATIENT MESSAGE (OUTPATIENT)
Dept: CARDIOLOGY | Facility: CLINIC | Age: 87
End: 2022-06-06

## 2022-06-06 VITALS
HEART RATE: 72 BPM | SYSTOLIC BLOOD PRESSURE: 192 MMHG | HEIGHT: 62 IN | OXYGEN SATURATION: 99 % | BODY MASS INDEX: 31.73 KG/M2 | WEIGHT: 172.4 LBS | DIASTOLIC BLOOD PRESSURE: 76 MMHG

## 2022-06-06 DIAGNOSIS — I10 ESSENTIAL HYPERTENSION: Primary | ICD-10-CM

## 2022-06-06 PROCEDURE — 99214 OFFICE O/P EST MOD 30 MIN: CPT | Performed by: NURSE PRACTITIONER

## 2022-06-06 RX ORDER — NEBIVOLOL 5 MG/1
5 TABLET ORAL DAILY
Qty: 30 TABLET | Refills: 11 | Status: SHIPPED | OUTPATIENT
Start: 2022-06-06 | End: 2022-06-07 | Stop reason: SDUPTHER

## 2022-06-06 NOTE — PROGRESS NOTES
"    CARDIOLOGY        Patient Name: Vonda Jay  :1935  Age: 86 y.o.  Primary Cardiologist: Juan Clifford MD  Encounter Provider:  ELVIE Archibald    Date of Service: 22          CHIEF COMPLAINT / REASON FOR OFFICE VISIT     Hypertension (1 wk f/u/)      HISTORY OF PRESENT ILLNESS       HPI  Vonda Jay is a 86 y.o. female who presents today for reevaluation of hypertension.     Pt has a  history significant for breast cancer, SANYA, hypertension, hyperlipidemia.    Patient called the office last week with complaints of uncontrolled blood pressure.  At that time amlodipine was added to her regimen.  Patient states that even with adding the amlodipine her blood pressure is still uncontrolled averaging in the 170s-180s at home.  She is also compliant with hydralazine 100 mg 3 times daily and lisinopril HCTZ 20/25 mg/day.  Patient states that at times she feels lightheaded and flushed.  We did go through patient's daily intake of food where she is eating foods high in sodium such as bologna, ham, pickles.  Denies chest pain, dyspnea, palpitations, edema, fatigue.      The following portions of the patient's history were reviewed and updated as appropriate: allergies, current medications, past family history, past medical history, past social history, past surgical history and problem list.      VITAL SIGNS     Visit Vitals  BP (!) 192/76 (BP Location: Right arm, Patient Position: Sitting, Cuff Size: Adult)   Pulse 72   Ht 157.5 cm (62\")   Wt 78.2 kg (172 lb 6.4 oz)   SpO2 99%   BMI 31.53 kg/m²         Wt Readings from Last 3 Encounters:   22 78.2 kg (172 lb 6.4 oz)   22 76.7 kg (169 lb)   22 76.7 kg (169 lb)     Body mass index is 31.53 kg/m².      REVIEW OF SYSTEMS   Review of Systems   Constitutional: Negative for chills, fever, weight gain and weight loss.   Cardiovascular: Negative for leg swelling.   Respiratory: Negative for cough, snoring and wheezing.  "   Hematologic/Lymphatic: Negative for bleeding problem. Does not bruise/bleed easily.   Skin: Negative for color change.   Musculoskeletal: Negative for falls, joint pain and myalgias.   Gastrointestinal: Negative for melena.   Genitourinary: Negative for hematuria.   Neurological: Positive for light-headedness. Negative for excessive daytime sleepiness.   Psychiatric/Behavioral: Negative for depression. The patient is not nervous/anxious.            PHYSICAL EXAMINATION     Vitals and nursing note reviewed.   Constitutional:       Appearance: Normal appearance. Well-developed.   Eyes:      Conjunctiva/sclera: Conjunctivae normal.   Neck:      Vascular: No carotid bruit.   Pulmonary:      Breath sounds: Normal breath sounds.   Cardiovascular:      Normal rate. Regular rhythm. Normal S1 with normal intensity. Normal S2 with normal intensity.      Murmurs: There is a grade 2/6 systolic murmur.      No gallop. No click. No rub.   Edema:     Peripheral edema absent.   Musculoskeletal: Normal range of motion. Skin:     General: Skin is warm and dry.   Neurological:      Mental Status: Alert and oriented to person, place, and time.      GCS: GCS eye subscore is 4. GCS verbal subscore is 5. GCS motor subscore is 6.   Psychiatric:         Speech: Speech normal.         Behavior: Behavior normal.         Thought Content: Thought content normal.         Judgment: Judgment normal.           REVIEWED DATA     Procedures    Cardiac Procedures:  1. Echocardiogram 4/11/2022.  LVEF 66-70%.  Mild LVH.  Grade 1 diastolic dysfunction.  Moderate to severe aortic valve stenosis with mean pressure gradient 29 mmHg.  Moderate MAC.  Mild mitral valve stenosis.  Mild mitral valve regurgitation.      BUN   Date Value Ref Range Status   11/24/2021 44 (H) 7 - 20 mg/dL Final     Creatinine   Date Value Ref Range Status   11/24/2021 1.2 0.7 - 1.5 mg/dL Final     Potassium   Date Value Ref Range Status   11/24/2021 5.1 3.5 - 5.1 mmol/L Final      ALT (SGPT)   Date Value Ref Range Status   11/21/2019 9 (L) 13 - 69 U/L Final     AST (SGOT)   Date Value Ref Range Status   11/21/2019 17 15 - 46 U/L Final           ASSESSMENT & PLAN      Diagnosis Plan   1. Essential hypertension           SUMMARY/DISCUSSION  1. Hypertension.  Patient's blood pressure remains elevated at 192/76.  Patient does not feel that she is eating much salt because she is not using a saltshaker however, most of her daily intake is foods that are high in sodium such as bologna, pickles, ham.  We discussed the importance of reading labels for sodium and patient states that she will attempt to reduce her sodium intake.  At this point she will continue amlodipine 5 mg/day, hydralazine 100 mg 3 times daily, lisinopril HCTZ 20/25 mg/day.  I am hesitant to increase her amlodipine as I am afraid it will cause lower extremity edema.  I cannot increase lisinopril portion of lisinopril HCTZ as her most recent creatinine is slightly elevated.  Patient has been on metoprolol succinate in the past and this was discontinued secondary to adverse effects.  I do think that she would benefit from Bystolic 5 mg/day.  I prefer this over other beta-blockers as it will not have the adverse effect of fatigue in this elderly patient and I will also not lower her heart rate.  I am hopeful that this will be able to be afforded with her current prescription plan.  They will call the office if there are any cost constraints.  2. Follow-up with me in 1 month for reevaluation of hypertension.    Time Spent: I spent 46 minutes caring for Vonda on this date of service. This time includes time spent by me in the following activities: preparing for the visit, reviewing tests, performing a medically appropriate examination and/or evaluation, counseling and educating the patient/family/caregiver, ordering medications, tests, or procedures, referring and communicating with other health care professionals, documenting  information in the medical record, independently interpreting results and communicating that information with the patient/family/caregiver and care coordination.             MEDICATIONS         Discharge Medications          Accurate as of June 6, 2022  3:21 PM. If you have any questions, ask your nurse or doctor.            New Medications      Instructions Start Date   nebivolol 5 MG tablet  Commonly known as: Bystolic  Started by: ELVIE Archibald   5 mg, Oral, Daily         Continue These Medications      Instructions Start Date   alendronate 70 MG tablet  Commonly known as: FOSAMAX   1 tablet, Oral, Every 7 Days      amLODIPine 5 MG tablet  Commonly known as: NORVASC   5 mg, Oral, Daily      aspirin 81 MG tablet   81 mg, Oral, Daily      Calcium Carb-Cholecalciferol 1000-800 MG-UNIT tablet   Oral      cholecalciferol 25 MCG (1000 UT) tablet  Commonly known as: VITAMIN D3   1,000 Units, Oral, Daily      citalopram 10 MG tablet  Commonly known as: CeleXA   10 mg, Oral, Daily      hydrALAZINE 100 MG tablet  Commonly known as: APRESOLINE   100 mg, Oral, 3 Times Daily      lisinopril-hydrochlorothiazide 20-25 MG per tablet  Commonly known as: PRINZIDE,ZESTORETIC   1 tablet, Oral, Daily      meloxicam 15 MG tablet  Commonly known as: MOBIC   15 mg, Oral      pravastatin 40 MG tablet  Commonly known as: PRAVACHOL   40 mg, Oral, Daily         Stop These Medications    acetaminophen 325 MG tablet  Commonly known as: TYLENOL  Stopped by: ELVIE Archibald     famotidine 20 MG tablet  Commonly known as: PEPCID  Stopped by: ELVIE Archibald     VITAMIN B COMPLEX PO  Stopped by: ELVIE Archibald                **Dragon Disclaimer:   Much of this encounter note is an electronic transcription/translation of spoken language to printed text. The electronic translation of spoken language may permit erroneous, or at times, nonsensical words or phrases to be inadvertently transcribed. Although I have  reviewed the note for such errors, some may still exist.

## 2022-06-07 RX ORDER — NEBIVOLOL 5 MG/1
5 TABLET ORAL DAILY
Qty: 30 TABLET | Refills: 11 | Status: SHIPPED | OUTPATIENT
Start: 2022-06-07 | End: 2022-10-11 | Stop reason: SDUPTHER

## 2022-06-12 ENCOUNTER — APPOINTMENT (OUTPATIENT)
Dept: CT IMAGING | Facility: HOSPITAL | Age: 87
End: 2022-06-12

## 2022-06-12 ENCOUNTER — HOSPITAL ENCOUNTER (EMERGENCY)
Facility: HOSPITAL | Age: 87
Discharge: HOME | End: 2022-06-12
Attending: EMERGENCY MEDICINE | Admitting: EMERGENCY MEDICINE

## 2022-06-12 VITALS
HEART RATE: 62 BPM | OXYGEN SATURATION: 95 % | TEMPERATURE: 97.5 F | DIASTOLIC BLOOD PRESSURE: 84 MMHG | SYSTOLIC BLOOD PRESSURE: 187 MMHG | RESPIRATION RATE: 18 BRPM

## 2022-06-12 DIAGNOSIS — S09.90XA INJURY OF HEAD, INITIAL ENCOUNTER: ICD-10-CM

## 2022-06-12 DIAGNOSIS — S00.03XA CONTUSION OF SCALP, INITIAL ENCOUNTER: Primary | ICD-10-CM

## 2022-06-12 PROCEDURE — 99282 EMERGENCY DEPT VISIT SF MDM: CPT

## 2022-06-12 PROCEDURE — 70450 CT HEAD/BRAIN W/O DYE: CPT

## 2022-06-12 NOTE — ED PROVIDER NOTES
EMERGENCY DEPARTMENT ENCOUNTER    Room Number:  23/23  PCP: Ambrose Ashley MD  Historian: Patient  History Limited By: Nothing      HPI  Chief Complaint: Head injury  Context: Vonda Jay is a 86 y.o. female who presents to the ED c/o head injury.  Patient states she fell and hit the back of her head.  States person in front of her fell back into her and caused her to fall.  Patient did not lose consciousness but did feel dizzy afterwards.  Patient has no neck pain.  No chest pain or abdominal pain.  Has no pain in her shoulders or hips.  Patient is not on blood thinners.      Location: Head injury  Radiation: None  Character: Aching  Duration: 2 hours  Severity: Moderate  Progression: Not improving  Aggravating Factors: Direct pressure  Alleviating Factors: Nothing        MEDICAL RECORD REVIEW    Patient with history of hypertension          PAST MEDICAL HISTORY  Active Ambulatory Problems     Diagnosis Date Noted   • Closed displaced intertrochanteric fracture of left femur (HCC) 02/07/2018   • SANYA (acute kidney injury) (HCC) 02/07/2018   • HTN (hypertension) 02/07/2018   • Acute blood loss anemia 02/10/2018   • Leukocytosis 02/10/2018   • Drug-induced constipation 02/12/2018     Resolved Ambulatory Problems     Diagnosis Date Noted   • Hyperkalemia 02/07/2018     Past Medical History:   Diagnosis Date   • Arthritis    • History of breast cancer    • History of carotid artery disease    • Hyperlipidemia    • Hypertension          PAST SURGICAL HISTORY  Past Surgical History:   Procedure Laterality Date   • BREAST LUMPECTOMY  1998   • CAROTID ARTERY ANGIOPLASTY  2000, 2013   • CHOLECYSTECTOMY  1979   • FEMUR IM NAILING/RODDING Left 2/8/2018    Procedure: FEMUR INTRAMEDULLARY NAILING;  Surgeon: Zheng Fleming MD;  Location: [facility] OR;  Service:    • HYSTERECTOMY  1989         FAMILY HISTORY  Family History   Problem Relation Age of Onset   • Hypertension Mother    • Heart valve disorder Sister    •  Lung cancer Sister    • Lymphoma Sister    • Skin cancer Sister    • Stroke Brother 57         SOCIAL HISTORY  Social History     Socioeconomic History   • Marital status:    Tobacco Use   • Smoking status: Former Smoker     Years: 47.00     Quit date:      Years since quittin.4   • Smokeless tobacco: Never Used   • Tobacco comment: caffeine use- coffee   Vaping Use   • Vaping Use: Never used   Substance and Sexual Activity   • Alcohol use: No   • Drug use: Defer   • Sexual activity: Defer         ALLERGIES  Patient has no known allergies.        REVIEW OF SYSTEMS  Review of Systems   Constitutional: Negative for fever.   HENT: Negative for sore throat.    Eyes: Negative.    Respiratory: Negative for cough and shortness of breath.    Cardiovascular: Negative for chest pain.   Gastrointestinal: Negative for abdominal pain, diarrhea and vomiting.   Genitourinary: Negative for dysuria.   Musculoskeletal: Negative for neck pain.   Skin: Negative for rash.   Allergic/Immunologic: Negative.    Neurological: Positive for headaches. Negative for weakness and numbness.   Hematological: Negative.    Psychiatric/Behavioral: Negative.    All other systems reviewed and are negative.           PHYSICAL EXAM  ED Triage Vitals [22 1841]   Temp Heart Rate Resp BP SpO2   97.5 °F (36.4 °C) 69 16 -- 96 %      Temp src Heart Rate Source Patient Position BP Location FiO2 (%)   Tympanic Monitor -- -- --       Physical Exam  Vitals and nursing note reviewed.   Constitutional:       General: She is not in acute distress.  HENT:      Head: Normocephalic.      Comments: Hematoma to posterior scalp  Eyes:      Pupils: Pupils are equal, round, and reactive to light.   Cardiovascular:      Rate and Rhythm: Normal rate and regular rhythm.      Heart sounds: Normal heart sounds.   Pulmonary:      Effort: Pulmonary effort is normal. No respiratory distress.      Breath sounds: Normal breath sounds.   Abdominal:       Palpations: Abdomen is soft.      Tenderness: There is no abdominal tenderness. There is no guarding or rebound.   Musculoskeletal:         General: Normal range of motion.      Cervical back: Normal range of motion and neck supple.   Skin:     General: Skin is warm and dry.      Findings: No rash.   Neurological:      Mental Status: She is alert and oriented to person, place, and time.      Sensory: Sensation is intact. No sensory deficit.      Motor: No weakness.   Psychiatric:         Mood and Affect: Mood and affect normal.       Patient was wearing a face mask when I entered the room and they continued to wear a mask throughout their stay in the ED.  I wore PPE, including  gloves, face mask with shield or face mask with goggles whenever I was in the room with patient.       LAB RESULTS  No results found for this or any previous visit (from the past 24 hour(s)).    Ordered the above labs and reviewed the results.        RADIOLOGY  CT Head Without Contrast   Final Result   No acute intracranial findings.       Radiation dose reduction techniques were utilized, including automated   exposure control and exposure modulation based on body size.       This report was finalized on 6/12/2022 9:30 PM by Dr. Nedra Rodrigues M.D.               Ordered the above noted radiological studies. Reviewed by me in PACS.          PROCEDURES  Procedures            MEDICATIONS GIVEN IN ER  Medications - No data to display          PROGRESS AND CONSULTS  ED Course as of 06/12/22 2351   Sun Jun 12, 2022   2154 21:54 EDT  Patient with fall and head injury.  Patient has scalp hematoma however CT scan negative for fracture or bleeding.  Patient has no neck pain or tenderness.  Patient will be discharged home.  Instructed to follow-up with primary doctor.  Ice. [SL]      ED Course User Index  [SL] Dario Hong MD           MEDICAL DECISION MAKING      MDM  Number of Diagnoses or Management Options     Amount and/or Complexity of  Data Reviewed  Tests in the radiology section of CPT®: reviewed and ordered (CT head negative)               DIAGNOSIS  Final diagnoses:   Contusion of scalp, initial encounter   Injury of head, initial encounter           DISPOSITION  DISCHARGE    Patient discharged in stable condition.    Reviewed implications of results, diagnosis, meds, responsibility to follow up, warning signs and symptoms of possible worsening, potential complications and reasons to return to ER, including worsening symptoms.    Patient/Family voiced understanding of above instructions.    Discussed plan for discharge, as there is no emergent indication for admission. Patient referred to primary care provider for BP management due to today's BP. Pt/family is agreeable and understands need for follow up and repeat testing.  Pt is aware that discharge does not mean that nothing is wrong but it indicates no emergency is present that requires admission and they must continue care with follow-up as given below or physician of their choice.     FOLLOW-UP  Ambrose Ashley MD  [address] [place] [address]  [phone]    Schedule an appointment as soon as possible for a visit            Medication List      No changes were made to your prescriptions during this visit.             Latest Documented Vital Signs:  As of 23:51 EDT  BP- (!) 187/84 HR- 62 Temp- 97.5 °F (36.4 °C) (Tympanic) O2 sat- 95%                       Dario Hong MD  06/12/22 9735

## 2022-06-12 NOTE — ED TRIAGE NOTES
Pt states that she tripped and fell backwards hitting the back of her head. States that she did not get knocked out but did see stars. Denies blood thinners. Complains of a headache.     Patient masked at arrival and triage staff wore all appropriate PPE during entire encounter with patient.

## 2022-07-02 ENCOUNTER — TELEPHONE (OUTPATIENT)
Dept: CARDIOLOGY | Facility: CLINIC | Age: 87
End: 2022-07-02

## 2022-07-03 NOTE — TELEPHONE ENCOUNTER
Patient's daughter, Nusrat paged the service [employer].  The patient is also on speaker phone.  They are concerned that she continues to have worsening lower extremity edema to the point that the patient is unable to walk.  Her amlodipine was discontinued on 6/27 and it has not improved, actually worsen.    I am concerned that she has chronic kidney disease with her last creatinine of 1.4 back in March.  I am hesitant to prescribe a diuretic more than the HCTZ she is already on over the telephone without actually assessing the ankle or checking her creatinine.    Her blood pressure currently is running 170 systolic.  I offered increasing the bisoprolol from 5 to 10 mg daily, but explained that this would help with blood pressure, but not necessarily the swelling.  They feel that she is following a low-sodium diet.    I have recommended that she present to the ED for evaluation of this lower extremity edema which may require IV diuretics and we definitely need to check her creatinine.  The patient told her daughter that she will not go to the ED.  I urged them to reassess in the morning.    If not, the patient would like to be seen on Tuesday by ELVIE Blank.  She may also be able to come to our [facility] that day and I will pass the message along.

## 2022-07-07 ENCOUNTER — HOSPITAL ENCOUNTER (OUTPATIENT)
Dept: CARDIOLOGY | Facility: HOSPITAL | Age: 87
Discharge: HOME | End: 2022-07-07

## 2022-07-07 ENCOUNTER — LAB (OUTPATIENT)
Dept: LAB | Facility: HOSPITAL | Age: 87
End: 2022-07-07

## 2022-07-07 ENCOUNTER — OFFICE VISIT (OUTPATIENT)
Dept: CARDIOLOGY | Facility: CLINIC | Age: 87
End: 2022-07-07

## 2022-07-07 VITALS
SYSTOLIC BLOOD PRESSURE: 142 MMHG | WEIGHT: 174 LBS | HEIGHT: 62 IN | BODY MASS INDEX: 32.02 KG/M2 | DIASTOLIC BLOOD PRESSURE: 72 MMHG | HEART RATE: 54 BPM | OXYGEN SATURATION: 97 %

## 2022-07-07 DIAGNOSIS — I35.0 NONRHEUMATIC AORTIC VALVE STENOSIS: ICD-10-CM

## 2022-07-07 DIAGNOSIS — I50.33 ACUTE ON CHRONIC DIASTOLIC CHF (CONGESTIVE HEART FAILURE): ICD-10-CM

## 2022-07-07 DIAGNOSIS — I50.33 ACUTE ON CHRONIC DIASTOLIC CHF (CONGESTIVE HEART FAILURE): Primary | ICD-10-CM

## 2022-07-07 DIAGNOSIS — I10 PRIMARY HYPERTENSION: ICD-10-CM

## 2022-07-07 DIAGNOSIS — I35.0 NONRHEUMATIC AORTIC VALVE STENOSIS: Primary | ICD-10-CM

## 2022-07-07 LAB
ANION GAP SERPL CALCULATED.3IONS-SCNC: 12 MMOL/L (ref 5–15)
AORTIC ARCH: 2.6 CM
AORTIC DIMENSIONLESS INDEX: 0.3 (DI)
ASCENDING AORTA: 3.3 CM
BH CV ECHO LEFT ATRIAL STRAIN: 20.6 %
BH CV ECHO MEAS - ACS: 0.87 CM
BH CV ECHO MEAS - AO MAX PG: 55.8 MMHG
BH CV ECHO MEAS - AO MEAN PG: 31.7 MMHG
BH CV ECHO MEAS - AO ROOT DIAM: 2.8 CM
BH CV ECHO MEAS - AO V2 MAX: 373.4 CM/SEC
BH CV ECHO MEAS - AO V2 VTI: 104.6 CM
BH CV ECHO MEAS - AVA(I,D): 1.02 CM2
BH CV ECHO MEAS - EDV(CUBED): 66.9 ML
BH CV ECHO MEAS - EDV(MOD-SP2): 59 ML
BH CV ECHO MEAS - EDV(MOD-SP4): 65 ML
BH CV ECHO MEAS - EF(MOD-BP): 69 %
BH CV ECHO MEAS - EF(MOD-SP2): 67.8 %
BH CV ECHO MEAS - EF(MOD-SP4): 69.2 %
BH CV ECHO MEAS - EF_3D-VOL: 56 %
BH CV ECHO MEAS - ESV(CUBED): 23 ML
BH CV ECHO MEAS - ESV(MOD-SP2): 19 ML
BH CV ECHO MEAS - ESV(MOD-SP4): 20 ML
BH CV ECHO MEAS - FS: 29.9 %
BH CV ECHO MEAS - IVS/LVPW: 1.01 CM
BH CV ECHO MEAS - IVSD: 1.09 CM
BH CV ECHO MEAS - LA 3D VOL INDEX: 57
BH CV ECHO MEAS - LAT PEAK E' VEL: 5.8 CM/SEC
BH CV ECHO MEAS - LV DIASTOLIC VOL/BSA (35-75): 36.1 CM2
BH CV ECHO MEAS - LV MASS(C)D: 146.4 GRAMS
BH CV ECHO MEAS - LV MAX PG: 4.2 MMHG
BH CV ECHO MEAS - LV MEAN PG: 2.6 MMHG
BH CV ECHO MEAS - LV SYSTOLIC VOL/BSA (12-30): 11.1 CM2
BH CV ECHO MEAS - LV V1 MAX: 102.2 CM/SEC
BH CV ECHO MEAS - LV V1 VTI: 31.6 CM
BH CV ECHO MEAS - LVIDD: 4.1 CM
BH CV ECHO MEAS - LVIDS: 2.8 CM
BH CV ECHO MEAS - LVOT AREA: 3.4 CM2
BH CV ECHO MEAS - LVOT DIAM: 2.07 CM
BH CV ECHO MEAS - LVPWD: 1.08 CM
BH CV ECHO MEAS - MED PEAK E' VEL: 6.4 CM/SEC
BH CV ECHO MEAS - MV A DUR: 0.12 SEC
BH CV ECHO MEAS - MV A MAX VEL: 106.9 CM/SEC
BH CV ECHO MEAS - MV DEC SLOPE: 514 CM/SEC2
BH CV ECHO MEAS - MV DEC TIME: 0.24 MSEC
BH CV ECHO MEAS - MV E MAX VEL: 131 CM/SEC
BH CV ECHO MEAS - MV E/A: 1.23
BH CV ECHO MEAS - MV MAX PG: 8.9 MMHG
BH CV ECHO MEAS - MV MEAN PG: 3.5 MMHG
BH CV ECHO MEAS - MV P1/2T: 79.7 MSEC
BH CV ECHO MEAS - MV V2 VTI: 53.2 CM
BH CV ECHO MEAS - MVA(P1/2T): 2.8 CM2
BH CV ECHO MEAS - MVA(VTI): 2 CM2
BH CV ECHO MEAS - PA ACC TIME: 0.08 SEC
BH CV ECHO MEAS - PA PR(ACCEL): 42.6 MMHG
BH CV ECHO MEAS - PA V2 MAX: 107.2 CM/SEC
BH CV ECHO MEAS - PULM A REVS DUR: 0.12 SEC
BH CV ECHO MEAS - PULM A REVS VEL: 28.1 CM/SEC
BH CV ECHO MEAS - PULM DIAS VEL: 52.8 CM/SEC
BH CV ECHO MEAS - PULM S/D: 1.36
BH CV ECHO MEAS - PULM SYS VEL: 71.9 CM/SEC
BH CV ECHO MEAS - QP/QS: 0.61
BH CV ECHO MEAS - RAP SYSTOLE: 8 MMHG
BH CV ECHO MEAS - RV MAX PG: 4.1 MMHG
BH CV ECHO MEAS - RV V1 MAX: 101.6 CM/SEC
BH CV ECHO MEAS - RV V1 VTI: 21.1 CM
BH CV ECHO MEAS - RVOT DIAM: 1.97 CM
BH CV ECHO MEAS - RVSP: 25 MMHG
BH CV ECHO MEAS - SI(MOD-SP2): 22.2 ML/M2
BH CV ECHO MEAS - SI(MOD-SP4): 25 ML/M2
BH CV ECHO MEAS - SUP REN AO DIAM: 1.8 CM
BH CV ECHO MEAS - SV(LVOT): 106.2 ML
BH CV ECHO MEAS - SV(MOD-SP2): 40 ML
BH CV ECHO MEAS - SV(MOD-SP4): 45 ML
BH CV ECHO MEAS - SV(RVOT): 64.5 ML
BH CV ECHO MEAS - TAPSE (>1.6): 2.12 CM
BH CV ECHO MEAS - TR MAX PG: 17 MMHG
BH CV ECHO MEAS - TR MAX VEL: 206.3 CM/SEC
BH CV ECHO MEASUREMENTS AVERAGE E/E' RATIO: 21.48
BH CV VAS BP RIGHT ARM: NORMAL MMHG
BH CV XLRA - RV BASE: 3 CM
BH CV XLRA - RV LENGTH: 7.6 CM
BH CV XLRA - RV MID: 3.7 CM
BH CV XLRA - TDI S': 11.1 CM/SEC
BUN SERPL-MCNC: 30 MG/DL (ref 8–23)
BUN/CREAT SERPL: 23.4 (ref 7–25)
CALCIUM SPEC-SCNC: 9.6 MG/DL (ref 8.6–10.5)
CHLORIDE SERPL-SCNC: 102 MMOL/L (ref 98–107)
CO2 SERPL-SCNC: 24 MMOL/L (ref 22–29)
CREAT SERPL-MCNC: 1.28 MG/DL (ref 0.57–1)
EGFRCR SERPLBLD CKD-EPI 2021: 40.9 ML/MIN/1.73
GLUCOSE SERPL-MCNC: 99 MG/DL (ref 65–99)
LEFT ATRIUM VOLUME INDEX: 38.2 ML/M2
MAXIMAL PREDICTED HEART RATE: 134 BPM
POTASSIUM SERPL-SCNC: 5.2 MMOL/L (ref 3.5–5.2)
SINUS: 2.5 CM
SODIUM SERPL-SCNC: 138 MMOL/L (ref 136–145)
STJ: 3 CM
STRESS TARGET HR: 114 BPM

## 2022-07-07 PROCEDURE — 36415 COLL VENOUS BLD VENIPUNCTURE: CPT

## 2022-07-07 PROCEDURE — 93306 TTE W/DOPPLER COMPLETE: CPT

## 2022-07-07 PROCEDURE — 93306 TTE W/DOPPLER COMPLETE: CPT | Performed by: INTERNAL MEDICINE

## 2022-07-07 PROCEDURE — 99214 OFFICE O/P EST MOD 30 MIN: CPT | Performed by: INTERNAL MEDICINE

## 2022-07-07 PROCEDURE — 80048 BASIC METABOLIC PNL TOTAL CA: CPT

## 2022-07-07 RX ORDER — POTASSIUM CHLORIDE 750 MG/1
10 TABLET, FILM COATED, EXTENDED RELEASE ORAL DAILY
Qty: 30 TABLET | Refills: 11 | Status: SHIPPED | OUTPATIENT
Start: 2022-07-07 | End: 2022-09-21 | Stop reason: HOSPADM

## 2022-07-07 RX ORDER — FUROSEMIDE 20 MG/1
20 TABLET ORAL DAILY
Qty: 30 TABLET | Refills: 11 | Status: SHIPPED | OUTPATIENT
Start: 2022-07-07 | End: 2022-09-21 | Stop reason: HOSPADM

## 2022-07-07 NOTE — PROGRESS NOTES
I called and gave her the results.  Renal function is reasonable currently.  I am going to start her on Lasix 20 mg/day along with potassium 10 mEq/day.  I will plan on rechecking a BMP in 1 week.    GM

## 2022-07-14 ENCOUNTER — LAB (OUTPATIENT)
Dept: LAB | Facility: HOSPITAL | Age: 87
End: 2022-07-14

## 2022-07-14 DIAGNOSIS — I50.33 ACUTE ON CHRONIC DIASTOLIC CHF (CONGESTIVE HEART FAILURE): ICD-10-CM

## 2022-07-14 LAB
ANION GAP SERPL CALCULATED.3IONS-SCNC: 12.3 MMOL/L (ref 5–15)
BUN SERPL-MCNC: 40 MG/DL (ref 8–23)
BUN/CREAT SERPL: 25.8 (ref 7–25)
CALCIUM SPEC-SCNC: 9.5 MG/DL (ref 8.6–10.5)
CHLORIDE SERPL-SCNC: 100 MMOL/L (ref 98–107)
CO2 SERPL-SCNC: 26.7 MMOL/L (ref 22–29)
CREAT SERPL-MCNC: 1.55 MG/DL (ref 0.57–1)
EGFRCR SERPLBLD CKD-EPI 2021: 32.5 ML/MIN/1.73
GLUCOSE SERPL-MCNC: 109 MG/DL (ref 65–99)
POTASSIUM SERPL-SCNC: 5 MMOL/L (ref 3.5–5.2)
SODIUM SERPL-SCNC: 139 MMOL/L (ref 136–145)

## 2022-07-14 PROCEDURE — 80048 BASIC METABOLIC PNL TOTAL CA: CPT

## 2022-07-14 PROCEDURE — 36415 COLL VENOUS BLD VENIPUNCTURE: CPT

## 2022-07-15 DIAGNOSIS — I50.32 CHRONIC DIASTOLIC CONGESTIVE HEART FAILURE: Primary | ICD-10-CM

## 2022-07-15 NOTE — PROGRESS NOTES
I tried calling her several times before I went out of town, although I was unsuccessful in reaching her.  Her renal function is slightly worse, although not terrible.  However, I wanted her to start taking the Lasix 20 mg every other day instead of every day.  I wanted to check a basic metabolic panel approximately 1 week after she makes this change.  I have placed the order for the BMP.  Could you please let her know about the plan?  I also went to see if her edema was better since starting the Lasix.  Thanks.    GM

## 2022-07-18 NOTE — PROGRESS NOTES
Reviewed results and recommendations with Vonda Jay.  Patient verbalized understanding of results and recommendations.    Patient stated she will start taking lasix every other day and will come for lab draw next week.       Thank you,  Lainey Ybarra RN  Triage Nurse JAKI

## 2022-07-18 NOTE — PROGRESS NOTES
Date of Office Visit: 2022  Encounter Provider: Vidal Clifford MD  Place of Service: [facility] CARDIOLOGY  Patient Name: Vonda Jay  :1935    Chief complaint: Follow-up for aortic stenosis, hypertension.    History of Present Illness:    I again had the pleasure of seeing the patient in cardiology office on 2022.  She is a very pleasant 86 year-old female with a history of carotid artery disease, hypertension, prior breast cancer, and aortic stenosis who presents for follow-up.  I also take care of the patient's sister.    I initially saw her in the office on 3/25/2022.  She had been having labile blood pressure, and her blood pressure had been spiking without warning.  She had recently had an abscessed tooth, and her systolic blood pressure had spiked to 240 mmHg at home.  She felt flushed and had a headache at the same time.  She was seen at the [facility], where her troponin was elevated, although she had not had any chest pain.  She also had no EKG changes.  There were 3 separate high-sensitivity troponins, all of which were elevated.  It was felt that this may have been from a hypertensive emergency which caused a supply demand mismatch.  She also had a significant systolic murmur, and a subsequent echocardiogram on 2022 revealed moderate to severe aortic stenosis with a calculated aortic valve area of 0.95 cm², peak gradient 52 mmHg, and mean gradient 39 mmHg.    The patient presents today for follow-up.  She has had several issues.  She has still had very labile blood pressure which has mainly been elevated.  When she was seen in the office at the end of May 2022, her systolic blood pressure was over 190.  She has also had intermittent lower extremity edema, although she had been placed on amlodipine, which made this worse.  It is better since stopping this.  She has not had any chest pain or shortness of breath.  Her blood  pressure is reasonable today, although does vary.    Past Medical History:   Diagnosis Date   • Arthritis    • History of breast cancer    • History of carotid artery disease    • Hyperlipidemia    • Hypertension        Past Surgical History:   Procedure Laterality Date   • BREAST LUMPECTOMY     • CAROTID ARTERY ANGIOPLASTY  ,    • CHOLECYSTECTOMY  1979   • FEMUR IM NAILING/RODDING Left 2018    Procedure: FEMUR INTRAMEDULLARY NAILING;  Surgeon: Zheng Fleming MD;  Location: [facility];  Service:    • HYSTERECTOMY         Current Outpatient Medications on File Prior to Visit   Medication Sig Dispense Refill   • alendronate (FOSAMAX) 70 MG tablet Take 1 tablet by mouth Every 7 (Seven) Days.     • aspirin 81 MG tablet Take 81 mg by mouth Daily.     • Calcium Carb-Cholecalciferol 1000-800 MG-UNIT tablet Take  by mouth.     • cholecalciferol (VITAMIN D3) 1000 units tablet Take 1,000 Units by mouth Daily.     • hydrALAZINE (APRESOLINE) 100 MG tablet Take 100 mg by mouth 3 (Three) Times a Day.     • lisinopril-hydrochlorothiazide (PRINZIDE,ZESTORETIC) 20-25 MG per tablet Take 1 tablet by mouth Daily.  2   • meloxicam (MOBIC) 15 MG tablet Take 15 mg by mouth.     • nebivolol (Bystolic) 5 MG tablet Take 1 tablet by mouth Daily. 30 tablet 11   • pravastatin (PRAVACHOL) 40 MG tablet Take 40 mg by mouth Daily.     • citalopram (CeleXA) 10 MG tablet Take 10 mg by mouth Daily.       No current facility-administered medications on file prior to visit.     Allergies as of 2022   • (No Known Allergies)     Social History     Socioeconomic History   • Marital status:    Tobacco Use   • Smoking status: Former Smoker     Years: 47.00     Quit date:      Years since quittin.5   • Smokeless tobacco: Never Used   • Tobacco comment: caffeine use- coffee   Vaping Use   • Vaping Use: Never used   Substance and Sexual Activity   • Alcohol use: No   • Drug use: Defer   • Sexual activity: Defer  "    Family History   Problem Relation Age of Onset   • Hypertension Mother    • Heart valve disorder Sister    • Lung cancer Sister    • Lymphoma Sister    • Skin cancer Sister    • Stroke Brother 57       Review of Systems   Constitutional: Positive for malaise/fatigue.   Cardiovascular: Positive for leg swelling.   All other systems reviewed and are negative.     Objective:     Vitals:    07/07/22 1006   BP: 142/72   Pulse: 54   SpO2: 97%   Weight: 78.9 kg (174 lb)   Height: 157.5 cm (62.01\")     Body mass index is 31.82 kg/m².    Constitutional:       Appearance: Healthy appearance. Well-developed.   Eyes:      Conjunctiva/sclera: Conjunctivae normal.   HENT:      Head: Normocephalic and atraumatic.   Pulmonary:      Effort: Pulmonary effort is normal.      Breath sounds: Normal breath sounds.   Cardiovascular:      Normal rate. Regular rhythm.      Murmurs: There is a grade 3/6 systolic murmur at the URSB and ULSB.      No gallop.   Edema:     Peripheral edema absent.   Abdominal:      Palpations: Abdomen is soft.      Tenderness: There is no abdominal tenderness.   Musculoskeletal:      Cervical back: Neck supple.      Comments: 1+ edema of the lower extremities bilaterally Skin:     General: Skin is warm.   Neurological:      Mental Status: Alert and oriented to person, place, and time.   Psychiatric:         Behavior: Behavior normal.       Lab Review:   Procedures       Cardiac Procedures:  1.  Echocardiogram on 4/11/2022: The ejection fraction was 65 to 70%.  There was mild LVH.  There was grade 1 diastolic dysfunction.  There was moderate to severe aortic stenosis (calculated aortic valve area 0.95 cm², peak gradient 52 mmHg, mean gradient 39 mmHg).  There was moderate mitral annular calcification.  There was mild mitral regurgitation.  There was a small pericardial effusion.  2.  Echocardiogram on 7/7/2022: The ejection fraction was 65 to 70%.  There was mild LVH.  There was grade 2 diastolic dysfunction. "  The left atrium was moderately enlarged.  There was moderate to severe aortic stenosis (calculated aortic valve area 1.02 cm², peak gradient 56 mmHg, mean gradient 32 mmHg).  There was moderate mitral annular calcification.  There was mild mitral regurgitation.    Assessment:       Diagnosis Plan   1. Nonrheumatic aortic valve stenosis  Adult Transthoracic Echo Complete w/ Color, Spectral and Contrast if Necessary Per Protocol    Basic Metabolic Panel   2. Acute on chronic diastolic CHF (congestive heart failure) (HCC)  Adult Transthoracic Echo Complete w/ Color, Spectral and Contrast if Necessary Per Protocol    Basic Metabolic Panel   3. Primary hypertension       Plan:       Again, the patient has had multiple issues with her blood pressure since I initially saw her.  She was placed on amlodipine, which actually made her lower extremity edema much worse.  This has been stopped on 6/27/2022, and Bystolic was started.  The edema is somewhat better, although her blood pressure is still labile.  She does have edema today, and I am going to start her on a loop diuretic, which may also help her blood pressure to some degree.  I am going to start her on Lasix 20 mg/day.  I will check a basic metabolic panel in the next 1 to 2 weeks.  In addition, she will remain on the hydralazine 100 mg 3 times a day, Zestoretic 20/25 mg/day, and Bystolic 5 mg/day.      I feel that some of this is from diastolic congestive heart failure.  I did recheck an echocardiogram today, and reviewed this personally before she went home.  There is really no difference in the aortic stenosis, which is still moderate to severe.  I do not feel that this is from symptomatic aortic stenosis at this point, although it needs to be watched closely.  She did have grade 2 diastolic dysfunction on this study as opposed to grade 1 on the previous study.  She may need some evaluation of her renal arteries if her blood pressure continues to be labile.  She  already has a follow-up with ELVIE Blank, within the next several weeks.

## 2022-07-20 ENCOUNTER — OFFICE VISIT (OUTPATIENT)
Dept: CARDIOLOGY | Facility: CLINIC | Age: 87
End: 2022-07-20

## 2022-07-20 VITALS
HEART RATE: 57 BPM | BODY MASS INDEX: 31.95 KG/M2 | SYSTOLIC BLOOD PRESSURE: 156 MMHG | DIASTOLIC BLOOD PRESSURE: 74 MMHG | HEIGHT: 61 IN | OXYGEN SATURATION: 97 % | WEIGHT: 169.2 LBS

## 2022-07-20 DIAGNOSIS — I10 PRIMARY HYPERTENSION: Primary | ICD-10-CM

## 2022-07-20 DIAGNOSIS — I50.32 CHRONIC DIASTOLIC CONGESTIVE HEART FAILURE: ICD-10-CM

## 2022-07-20 DIAGNOSIS — I35.0 NONRHEUMATIC AORTIC VALVE STENOSIS: ICD-10-CM

## 2022-07-20 PROCEDURE — 99214 OFFICE O/P EST MOD 30 MIN: CPT | Performed by: NURSE PRACTITIONER

## 2022-07-20 NOTE — PROGRESS NOTES
"    CARDIOLOGY        Patient Name: Vonda Jay  :1935  Age: 86 y.o.  Primary Cardiologist: Juan Clifford MD  Encounter Provider:  ELVIE Archibald    Date of Service: 22            CHIEF COMPLAINT / REASON FOR OFFICE VISIT     Congestive Heart Failure (2 wk f/u)      HISTORY OF PRESENT ILLNESS       HPI  Vonda Jay is a 86 y.o. female who presents today for 2-week reevaluation.     Pt has a  history significant for carotid artery disease, hypertension, prior breast cancer, aortic valve stenosis.    Patient was recently evaluated by Dr. Clifford on 2022.  At that time she was having difficulty with labile blood pressure as well as lower extremity edema.  She was placed on furosemide 20 mg/day.  Basic metabolic panel revealed elevated creatinine and patient's dose was decreased down to 20 mg every other day.  Patient presents today for follow-up.  She brings with her a very detailed blood pressure diary where after medications blood pressure has been averaging in the 140s.  She states that since starting furosemide she has not noticed any further lower extremity edema.  She reports that she has not yet had her midday dose of hydralazine today.  Denies any chest discomfort, dyspnea at rest or with exertion, palpitations, lightheadedness, edema, fatigue.      The following portions of the patient's history were reviewed and updated as appropriate: allergies, current medications, past family history, past medical history, past social history, past surgical history and problem list.      VITAL SIGNS     Visit Vitals  /74 (BP Location: Right arm, Patient Position: Sitting, Cuff Size: Adult)   Pulse 57   Ht 154.9 cm (61\")   Wt 76.7 kg (169 lb 3.2 oz)   SpO2 97%   BMI 31.97 kg/m²         Wt Readings from Last 3 Encounters:   22 76.7 kg (169 lb 3.2 oz)   22 78.9 kg (174 lb)   22 78.2 kg (172 lb 6.4 oz)     Body mass index is 31.97 kg/m².      REVIEW OF SYSTEMS "   Review of Systems   Constitutional: Negative for chills, fever, weight gain and weight loss.   Cardiovascular: Negative for leg swelling.   Respiratory: Negative for cough, snoring and wheezing.    Hematologic/Lymphatic: Negative for bleeding problem. Does not bruise/bleed easily.   Skin: Negative for color change.   Musculoskeletal: Negative for falls, joint pain and myalgias.   Gastrointestinal: Negative for melena.   Genitourinary: Negative for hematuria.   Neurological: Negative for excessive daytime sleepiness.   Psychiatric/Behavioral: Negative for depression. The patient is not nervous/anxious.            PHYSICAL EXAMINATION     Vitals and nursing note reviewed.   Constitutional:       Appearance: Normal appearance. Well-developed.   Eyes:      Conjunctiva/sclera: Conjunctivae normal.   Neck:      Vascular: No carotid bruit.   Pulmonary:      Breath sounds: Normal breath sounds.   Cardiovascular:      Normal rate. Regular rhythm. Normal S1 with normal intensity. Normal S2 with normal intensity.      Murmurs: There is a grade 3/6 systolic murmur at the URSB and ULSB.      No gallop. No click. No rub.   Edema:     Peripheral edema absent.   Musculoskeletal: Normal range of motion. Skin:     General: Skin is warm and dry.   Neurological:      Mental Status: Alert and oriented to person, place, and time.      GCS: GCS eye subscore is 4. GCS verbal subscore is 5. GCS motor subscore is 6.   Psychiatric:         Speech: Speech normal.         Behavior: Behavior normal.         Thought Content: Thought content normal.         Judgment: Judgment normal.           REVIEWED DATA     Procedures    Cardiac Procedures:  1. Echocardiogram 7/7/2022.  LVEF 66-70%.  Mild LVH.  Grade 2 diastolic dysfunction.  Moderate to severe aortic valve stenosis with maximum pressure gradient 55.8 mmHg, mean pressure gradient 31.7 mmHg.  Calculated RVSP 25 mmHg.      BUN   Date Value Ref Range Status   07/14/2022 40 (H) 8 - 23 mg/dL  Final   11/24/2021 44 (H) 7 - 20 mg/dL Final     Creatinine   Date Value Ref Range Status   07/14/2022 1.55 (H) 0.57 - 1.00 mg/dL Final   11/24/2021 1.2 0.7 - 1.5 mg/dL Final     Potassium   Date Value Ref Range Status   07/14/2022 5.0 3.5 - 5.2 mmol/L Final   11/24/2021 5.1 3.5 - 5.1 mmol/L Final     ALT (SGPT)   Date Value Ref Range Status   11/21/2019 9 (L) 13 - 69 U/L Final     AST (SGOT)   Date Value Ref Range Status   11/21/2019 17 15 - 46 U/L Final           ASSESSMENT & PLAN     Diagnoses and all orders for this visit:    1. Primary hypertension (Primary)  · Blood pressure slightly elevated today at 156/74, however patient has not had midday dose of hydralazine today  · Blood pressure at home after medications is averaging in the 140s  · Continue Bystolic 5 mg/day, hydralazine 100 mg 3 times daily, lisinopril HCTZ 20/25 mg/day, furosemide 20 mg every other day  · Repeat BMP in 1 week after reduction of furosemide dosing    2. Chronic diastolic congestive heart failure (HCC)  · Denies dyspnea at rest or with exertion.  Appears euvolemic on exam  · Continue Bystolic, lisinopril HCTZ, furosemide    3. Nonrheumatic aortic valve stenosis  · Moderate to severe on most recent echocardiogram  · Currently asymptomatic  · Plan for surveillance echocardiogram annually          Return in about 4 months (around 11/20/2022) for Dr. Singleton- Routine.  Patient is transitioning care from Dr. Clifford.    Future Appointments       Provider Department Center    11/23/2022 9:40 AM Vidal Clifford MD [facility] CARDIOLOGY [place]    12/6/2022 1:40 PM Adam Singleton MD [facility] CARDIOLOGY [place]                MEDICATIONS         Discharge Medications          Accurate as of July 20, 2022  3:33 PM. If you have any questions, ask your nurse or doctor.            Continue These Medications      Instructions Start Date   alendronate 70 MG tablet  Commonly known as: FOSAMAX   1 tablet, Oral,  Every 7 Days      aspirin 81 MG tablet   81 mg, Oral, Daily      Calcium Carb-Cholecalciferol 1000-800 MG-UNIT tablet   Oral      cholecalciferol 25 MCG (1000 UT) tablet  Commonly known as: VITAMIN D3   1,000 Units, Oral, Daily      citalopram 10 MG tablet  Commonly known as: CeleXA   10 mg, Oral, Daily      furosemide 20 MG tablet  Commonly known as: LASIX   20 mg, Oral, Daily      hydrALAZINE 100 MG tablet  Commonly known as: APRESOLINE   100 mg, Oral, 3 Times Daily      lisinopril-hydrochlorothiazide 20-25 MG per tablet  Commonly known as: PRINZIDE,ZESTORETIC   1 tablet, Oral, Daily      meloxicam 15 MG tablet  Commonly known as: MOBIC   15 mg, Oral      nebivolol 5 MG tablet  Commonly known as: Bystolic   5 mg, Oral, Daily      potassium chloride 10 MEQ CR tablet   10 mEq, Oral, Daily      pravastatin 40 MG tablet  Commonly known as: PRAVACHOL   40 mg, Oral, Daily                 **Treon Disclaimer:   Much of this encounter note is an electronic transcription/translation of spoken language to printed text. The electronic translation of spoken language may permit erroneous, or at times, nonsensical words or phrases to be inadvertently transcribed. Although I have reviewed the note for such errors, some may still exist.

## 2022-07-25 ENCOUNTER — LAB (OUTPATIENT)
Dept: LAB | Facility: HOSPITAL | Age: 87
End: 2022-07-25

## 2022-07-25 DIAGNOSIS — I50.32 CHRONIC DIASTOLIC CONGESTIVE HEART FAILURE: ICD-10-CM

## 2022-07-25 LAB
ANION GAP SERPL CALCULATED.3IONS-SCNC: 12 MMOL/L (ref 5–15)
BUN SERPL-MCNC: 49 MG/DL (ref 8–23)
BUN/CREAT SERPL: 29.5 (ref 7–25)
CALCIUM SPEC-SCNC: 9.7 MG/DL (ref 8.6–10.5)
CHLORIDE SERPL-SCNC: 103 MMOL/L (ref 98–107)
CO2 SERPL-SCNC: 25 MMOL/L (ref 22–29)
CREAT SERPL-MCNC: 1.66 MG/DL (ref 0.57–1)
EGFRCR SERPLBLD CKD-EPI 2021: 29.9 ML/MIN/1.73
GLUCOSE SERPL-MCNC: 103 MG/DL (ref 65–99)
POTASSIUM SERPL-SCNC: 5 MMOL/L (ref 3.5–5.2)
SODIUM SERPL-SCNC: 140 MMOL/L (ref 136–145)

## 2022-07-25 PROCEDURE — 36415 COLL VENOUS BLD VENIPUNCTURE: CPT

## 2022-07-25 PROCEDURE — 80048 BASIC METABOLIC PNL TOTAL CA: CPT

## 2022-07-26 DIAGNOSIS — I50.33 ACUTE ON CHRONIC DIASTOLIC CHF (CONGESTIVE HEART FAILURE): Primary | ICD-10-CM

## 2022-07-28 NOTE — PROGRESS NOTES
I called and spoke with her on the phone on 7/26/2022.  I am having her hold the Lasix until 8/2/2022.  I will repeat a BMP at that time.  I may have her drop the Lasix at some point down to twice a week as it does seem to help her, although her renal function seems to be very sensitive to this.    GM

## 2022-08-02 ENCOUNTER — LAB (OUTPATIENT)
Dept: LAB | Facility: HOSPITAL | Age: 87
End: 2022-08-02

## 2022-08-02 DIAGNOSIS — I50.32 CHRONIC DIASTOLIC CONGESTIVE HEART FAILURE: Primary | ICD-10-CM

## 2022-08-02 DIAGNOSIS — I50.33 ACUTE ON CHRONIC DIASTOLIC CHF (CONGESTIVE HEART FAILURE): ICD-10-CM

## 2022-08-02 LAB
ANION GAP SERPL CALCULATED.3IONS-SCNC: 8.4 MMOL/L (ref 5–15)
BUN SERPL-MCNC: 39 MG/DL (ref 8–23)
BUN/CREAT SERPL: 25.5 (ref 7–25)
CALCIUM SPEC-SCNC: 9 MG/DL (ref 8.6–10.5)
CHLORIDE SERPL-SCNC: 103 MMOL/L (ref 98–107)
CO2 SERPL-SCNC: 25.6 MMOL/L (ref 22–29)
CREAT SERPL-MCNC: 1.53 MG/DL (ref 0.57–1)
EGFRCR SERPLBLD CKD-EPI 2021: 33 ML/MIN/1.73
GLUCOSE SERPL-MCNC: 109 MG/DL (ref 65–99)
POTASSIUM SERPL-SCNC: 4.9 MMOL/L (ref 3.5–5.2)
SODIUM SERPL-SCNC: 137 MMOL/L (ref 136–145)

## 2022-08-02 PROCEDURE — 36415 COLL VENOUS BLD VENIPUNCTURE: CPT

## 2022-08-02 PROCEDURE — 80048 BASIC METABOLIC PNL TOTAL CA: CPT

## 2022-08-02 NOTE — PROGRESS NOTES
Reviewed results and recommendations with Vonda Jay.  Patient verbalized understanding of results and recommendations, stating she will do them.    Thank you,  Lainey Ybarra RN  Triage Nurse [facility] [place]

## 2022-08-02 NOTE — PROGRESS NOTES
Can you please let her know that her kidney function is better, but still not back to her baseline?  I would like her to start taking the Lasix 1 time a week only.  I would like to repeat a basic metabolic panel in 2 weeks, which I have ordered.  Thanks.    GM

## 2022-08-16 ENCOUNTER — TELEPHONE (OUTPATIENT)
Dept: CARDIOLOGY | Facility: CLINIC | Age: 87
End: 2022-08-16

## 2022-08-16 ENCOUNTER — LAB (OUTPATIENT)
Dept: LAB | Facility: HOSPITAL | Age: 87
End: 2022-08-16

## 2022-08-16 DIAGNOSIS — I50.32 CHRONIC DIASTOLIC CONGESTIVE HEART FAILURE: Primary | ICD-10-CM

## 2022-08-16 DIAGNOSIS — I50.32 CHRONIC DIASTOLIC CONGESTIVE HEART FAILURE: ICD-10-CM

## 2022-08-16 LAB
ANION GAP SERPL CALCULATED.3IONS-SCNC: 9.8 MMOL/L (ref 5–15)
BUN SERPL-MCNC: 36 MG/DL (ref 8–23)
BUN/CREAT SERPL: 25.7 (ref 7–25)
CALCIUM SPEC-SCNC: 9.5 MG/DL (ref 8.6–10.5)
CHLORIDE SERPL-SCNC: 102 MMOL/L (ref 98–107)
CO2 SERPL-SCNC: 26.2 MMOL/L (ref 22–29)
CREAT SERPL-MCNC: 1.4 MG/DL (ref 0.57–1)
EGFRCR SERPLBLD CKD-EPI 2021: 36.7 ML/MIN/1.73
GLUCOSE SERPL-MCNC: 87 MG/DL (ref 65–99)
POTASSIUM SERPL-SCNC: 5.4 MMOL/L (ref 3.5–5.2)
SODIUM SERPL-SCNC: 138 MMOL/L (ref 136–145)

## 2022-08-16 PROCEDURE — 36415 COLL VENOUS BLD VENIPUNCTURE: CPT

## 2022-08-16 PROCEDURE — 80048 BASIC METABOLIC PNL TOTAL CA: CPT

## 2022-08-16 NOTE — PROGRESS NOTES
Can you please let her know that her renal function is slowly getting better, but still is not back to normal?  At this point, I want her to stop the Lasix altogether (she was only taking it once a week recently). I ordered a repeat lab for 2 weeks if she could please come get that checked at that time.  Thanks    GM

## 2022-08-16 NOTE — TELEPHONE ENCOUNTER
Patient returned call, gave her renal function results.  Also let her know GM wants her to stop lasix completely and get labs rechecked at the outpatient lab in 2 weeks.  She verbalizes understanding and agrees with the plan.    Salma Best RN  [facility] Triage  08/16/22 16:34 EDT'

## 2022-08-16 NOTE — TELEPHONE ENCOUNTER
----- Message from Vidal Clifford MD sent at 8/16/2022  2:27 PM EDT -----  Can you please let her know that her renal function is slowly getting better, but still is not back to normal?  At this point, I want her to stop the Lasix altogether (she was only taking it once a week recently). I ordered a repeat lab for 2 weeks if she could please come get that checked at that time.  Thanks    GM

## 2022-08-30 ENCOUNTER — LAB (OUTPATIENT)
Dept: LAB | Facility: HOSPITAL | Age: 87
End: 2022-08-30

## 2022-08-30 DIAGNOSIS — I50.32 CHRONIC DIASTOLIC CONGESTIVE HEART FAILURE: ICD-10-CM

## 2022-08-30 LAB
ANION GAP SERPL CALCULATED.3IONS-SCNC: 14.1 MMOL/L (ref 5–15)
BUN SERPL-MCNC: 41 MG/DL (ref 8–23)
BUN/CREAT SERPL: 25.2 (ref 7–25)
CALCIUM SPEC-SCNC: 9.1 MG/DL (ref 8.6–10.5)
CHLORIDE SERPL-SCNC: 101 MMOL/L (ref 98–107)
CO2 SERPL-SCNC: 22.9 MMOL/L (ref 22–29)
CREAT SERPL-MCNC: 1.63 MG/DL (ref 0.57–1)
EGFRCR SERPLBLD CKD-EPI 2021: 30.6 ML/MIN/1.73
GLUCOSE SERPL-MCNC: 101 MG/DL (ref 65–99)
POTASSIUM SERPL-SCNC: 5 MMOL/L (ref 3.5–5.2)
SODIUM SERPL-SCNC: 138 MMOL/L (ref 136–145)

## 2022-08-30 PROCEDURE — 36415 COLL VENOUS BLD VENIPUNCTURE: CPT

## 2022-08-30 PROCEDURE — 80048 BASIC METABOLIC PNL TOTAL CA: CPT

## 2022-09-01 ENCOUNTER — TELEPHONE (OUTPATIENT)
Dept: CARDIOLOGY | Facility: CLINIC | Age: 87
End: 2022-09-01

## 2022-09-01 NOTE — TELEPHONE ENCOUNTER
----- Message from Vidal Clifford MD sent at 9/1/2022  3:23 PM EDT -----  Can you please let her know that her kidney function is still not normal?  It really never recovered after starting the Lasix remotely.  I would like her to go see a nephrologist if she is amenable to this.  I can make a referral for her if she is ok with this.  Thanks     GM

## 2022-09-01 NOTE — PROGRESS NOTES
Can you please let her know that her kidney function is still not normal?  It really never recovered after starting the Lasix remotely.  I would like her to go see a nephrologist if she is amenable to this.  I can make a referral for her if she is ok with this.  Thanks     CHRISTO

## 2022-09-02 DIAGNOSIS — N18.30 STAGE 3 CHRONIC KIDNEY DISEASE, UNSPECIFIED WHETHER STAGE 3A OR 3B CKD: Primary | ICD-10-CM

## 2022-09-02 NOTE — TELEPHONE ENCOUNTER
Reviewed results and recommendations with Vonda Jay.  Patient verbalized understanding of results and recommendations.    Patient is agreeable to seeing a nephrologist, but stated she is not familiar with any providers.  Please place referral.     Thank you,  Lainey Ybarra RN  Triage Nurse [facility] [place]

## 2022-09-02 NOTE — TELEPHONE ENCOUNTER
Lainey,    I placed a referral for her with Dr. Pollo Fagan.  She should get a call from that office soon to schedule an appointment.  Would you mind letting him know?  Thanks     GM

## 2022-09-02 NOTE — TELEPHONE ENCOUNTER
Notified Vonda Jay that nephrology referral was sent to Dr. Pollo Fagan.  Patient verbalized understanding.    Thank you,  Lainey Ybarra RN  Triage Nurse [facility] [place]

## 2022-09-17 ENCOUNTER — APPOINTMENT (OUTPATIENT)
Dept: GENERAL RADIOLOGY | Facility: HOSPITAL | Age: 87
End: 2022-09-17

## 2022-09-17 ENCOUNTER — HOSPITAL ENCOUNTER (INPATIENT)
Facility: HOSPITAL | Age: 87
LOS: 4 days | Discharge: HOME HEALTH | End: 2022-09-21
Attending: EMERGENCY MEDICINE | Admitting: INTERNAL MEDICINE

## 2022-09-17 DIAGNOSIS — N39.0 ACUTE UTI: ICD-10-CM

## 2022-09-17 DIAGNOSIS — I50.33 ACUTE ON CHRONIC DIASTOLIC CHF (CONGESTIVE HEART FAILURE): ICD-10-CM

## 2022-09-17 DIAGNOSIS — J96.01 ACUTE HYPOXEMIC RESPIRATORY FAILURE DUE TO COVID-19: Primary | ICD-10-CM

## 2022-09-17 DIAGNOSIS — U07.1 ACUTE HYPOXEMIC RESPIRATORY FAILURE DUE TO COVID-19: Primary | ICD-10-CM

## 2022-09-17 PROBLEM — I50.40 COMBINED SYSTOLIC AND DIASTOLIC CONGESTIVE HEART FAILURE: Status: ACTIVE | Noted: 2022-09-17

## 2022-09-17 PROBLEM — R82.90 ABNORMAL URINALYSIS: Status: ACTIVE | Noted: 2022-09-17

## 2022-09-17 PROBLEM — D64.9 ANEMIA: Status: ACTIVE | Noted: 2022-09-17

## 2022-09-17 PROBLEM — N18.9 CKD (CHRONIC KIDNEY DISEASE): Status: ACTIVE | Noted: 2022-09-17

## 2022-09-17 LAB
ALBUMIN SERPL-MCNC: 4.2 G/DL (ref 3.5–5.2)
ALBUMIN SERPL-MCNC: 4.4 G/DL (ref 3.5–5.2)
ALBUMIN/GLOB SERPL: 1.8 G/DL
ALP SERPL-CCNC: 61 U/L (ref 39–117)
ALP SERPL-CCNC: 63 U/L (ref 39–117)
ALT SERPL W P-5'-P-CCNC: 22 U/L (ref 1–33)
ALT SERPL W P-5'-P-CCNC: 23 U/L (ref 1–33)
ANION GAP SERPL CALCULATED.3IONS-SCNC: 11 MMOL/L (ref 5–15)
AST SERPL-CCNC: 26 U/L (ref 1–32)
AST SERPL-CCNC: 29 U/L (ref 1–32)
BACTERIA UR QL AUTO: ABNORMAL /HPF
BASOPHILS # BLD AUTO: 0.05 10*3/MM3 (ref 0–0.2)
BASOPHILS NFR BLD AUTO: 0.5 % (ref 0–1.5)
BILIRUB CONJ SERPL-MCNC: <0.2 MG/DL (ref 0–0.3)
BILIRUB INDIRECT SERPL-MCNC: NORMAL MG/DL
BILIRUB SERPL-MCNC: 0.4 MG/DL (ref 0–1.2)
BILIRUB SERPL-MCNC: 0.4 MG/DL (ref 0–1.2)
BILIRUB UR QL STRIP: NEGATIVE
BUN SERPL-MCNC: 26 MG/DL (ref 8–23)
BUN/CREAT SERPL: 20.8 (ref 7–25)
CALCIUM SPEC-SCNC: 9 MG/DL (ref 8.6–10.5)
CHLORIDE SERPL-SCNC: 98 MMOL/L (ref 98–107)
CLARITY UR: ABNORMAL
CO2 SERPL-SCNC: 24 MMOL/L (ref 22–29)
COLOR UR: YELLOW
CREAT SERPL-MCNC: 1.23 MG/DL (ref 0.57–1)
CREAT SERPL-MCNC: 1.25 MG/DL (ref 0.57–1)
DEPRECATED RDW RBC AUTO: 43.8 FL (ref 37–54)
EGFRCR SERPLBLD CKD-EPI 2021: 41.8 ML/MIN/1.73
EGFRCR SERPLBLD CKD-EPI 2021: 42.6 ML/MIN/1.73
EOSINOPHIL # BLD AUTO: 0.02 10*3/MM3 (ref 0–0.4)
EOSINOPHIL NFR BLD AUTO: 0.2 % (ref 0.3–6.2)
ERYTHROCYTE [DISTWIDTH] IN BLOOD BY AUTOMATED COUNT: 13.2 % (ref 12.3–15.4)
GLOBULIN UR ELPH-MCNC: 2.3 GM/DL
GLUCOSE SERPL-MCNC: 141 MG/DL (ref 65–99)
GLUCOSE UR STRIP-MCNC: NEGATIVE MG/DL
HCT VFR BLD AUTO: 33.3 % (ref 34–46.6)
HGB BLD-MCNC: 11 G/DL (ref 12–15.9)
HGB UR QL STRIP.AUTO: NEGATIVE
HYALINE CASTS UR QL AUTO: ABNORMAL /LPF
IMM GRANULOCYTES # BLD AUTO: 0.05 10*3/MM3 (ref 0–0.05)
IMM GRANULOCYTES NFR BLD AUTO: 0.5 % (ref 0–0.5)
KETONES UR QL STRIP: ABNORMAL
LEUKOCYTE ESTERASE UR QL STRIP.AUTO: ABNORMAL
LYMPHOCYTES # BLD AUTO: 0.44 10*3/MM3 (ref 0.7–3.1)
LYMPHOCYTES NFR BLD AUTO: 4.7 % (ref 19.6–45.3)
MCH RBC QN AUTO: 29.9 PG (ref 26.6–33)
MCHC RBC AUTO-ENTMCNC: 33 G/DL (ref 31.5–35.7)
MCV RBC AUTO: 90.5 FL (ref 79–97)
MONOCYTES # BLD AUTO: 0.79 10*3/MM3 (ref 0.1–0.9)
MONOCYTES NFR BLD AUTO: 8.5 % (ref 5–12)
NEUTROPHILS NFR BLD AUTO: 7.94 10*3/MM3 (ref 1.7–7)
NEUTROPHILS NFR BLD AUTO: 85.6 % (ref 42.7–76)
NITRITE UR QL STRIP: POSITIVE
NRBC BLD AUTO-RTO: 0 /100 WBC (ref 0–0.2)
NT-PROBNP SERPL-MCNC: 5616 PG/ML (ref 0–1800)
PH UR STRIP.AUTO: 7 [PH] (ref 5–8)
PLATELET # BLD AUTO: 170 10*3/MM3 (ref 140–450)
PMV BLD AUTO: 11.2 FL (ref 6–12)
POTASSIUM SERPL-SCNC: 4.2 MMOL/L (ref 3.5–5.2)
PROT SERPL-MCNC: 6.5 G/DL (ref 6–8.5)
PROT SERPL-MCNC: 6.9 G/DL (ref 6–8.5)
PROT UR QL STRIP: ABNORMAL
RBC # BLD AUTO: 3.68 10*6/MM3 (ref 3.77–5.28)
RBC # UR STRIP: ABNORMAL /HPF
REF LAB TEST METHOD: ABNORMAL
SARS-COV-2 RNA RESP QL NAA+PROBE: DETECTED
SODIUM SERPL-SCNC: 133 MMOL/L (ref 136–145)
SP GR UR STRIP: 1.02 (ref 1–1.03)
SQUAMOUS #/AREA URNS HPF: ABNORMAL /HPF
UROBILINOGEN UR QL STRIP: ABNORMAL
WBC # UR STRIP: ABNORMAL /HPF
WBC NRBC COR # BLD: 9.29 10*3/MM3 (ref 3.4–10.8)

## 2022-09-17 PROCEDURE — 87086 URINE CULTURE/COLONY COUNT: CPT | Performed by: NURSE PRACTITIONER

## 2022-09-17 PROCEDURE — 82248 BILIRUBIN DIRECT: CPT | Performed by: NURSE PRACTITIONER

## 2022-09-17 PROCEDURE — 25010000002 ONDANSETRON PER 1 MG: Performed by: INTERNAL MEDICINE

## 2022-09-17 PROCEDURE — U0005 INFEC AGEN DETEC AMPLI PROBE: HCPCS | Performed by: NURSE PRACTITIONER

## 2022-09-17 PROCEDURE — 25010000002 DEXAMETHASONE PER 1 MG: Performed by: NURSE PRACTITIONER

## 2022-09-17 PROCEDURE — 25010000002 HEPARIN (PORCINE) PER 1000 UNITS: Performed by: INTERNAL MEDICINE

## 2022-09-17 PROCEDURE — 25010000002 FUROSEMIDE PER 20 MG: Performed by: INTERNAL MEDICINE

## 2022-09-17 PROCEDURE — 99285 EMERGENCY DEPT VISIT HI MDM: CPT

## 2022-09-17 PROCEDURE — 80053 COMPREHEN METABOLIC PANEL: CPT | Performed by: NURSE PRACTITIONER

## 2022-09-17 PROCEDURE — 85025 COMPLETE CBC W/AUTO DIFF WBC: CPT | Performed by: NURSE PRACTITIONER

## 2022-09-17 PROCEDURE — U0003 INFECTIOUS AGENT DETECTION BY NUCLEIC ACID (DNA OR RNA); SEVERE ACUTE RESPIRATORY SYNDROME CORONAVIRUS 2 (SARS-COV-2) (CORONAVIRUS DISEASE [COVID-19]), AMPLIFIED PROBE TECHNIQUE, MAKING USE OF HIGH THROUGHPUT TECHNOLOGIES AS DESCRIBED BY CMS-2020-01-R: HCPCS | Performed by: NURSE PRACTITIONER

## 2022-09-17 PROCEDURE — 25010000002 FUROSEMIDE PER 20 MG: Performed by: NURSE PRACTITIONER

## 2022-09-17 PROCEDURE — 87077 CULTURE AEROBIC IDENTIFY: CPT | Performed by: NURSE PRACTITIONER

## 2022-09-17 PROCEDURE — 25010000002 CEFTRIAXONE PER 250 MG: Performed by: NURSE PRACTITIONER

## 2022-09-17 PROCEDURE — 82565 ASSAY OF CREATININE: CPT | Performed by: NURSE PRACTITIONER

## 2022-09-17 PROCEDURE — 36415 COLL VENOUS BLD VENIPUNCTURE: CPT | Performed by: NURSE PRACTITIONER

## 2022-09-17 PROCEDURE — 81001 URINALYSIS AUTO W/SCOPE: CPT | Performed by: NURSE PRACTITIONER

## 2022-09-17 PROCEDURE — 71045 X-RAY EXAM CHEST 1 VIEW: CPT

## 2022-09-17 PROCEDURE — 87186 SC STD MICRODIL/AGAR DIL: CPT | Performed by: NURSE PRACTITIONER

## 2022-09-17 PROCEDURE — 83880 ASSAY OF NATRIURETIC PEPTIDE: CPT | Performed by: NURSE PRACTITIONER

## 2022-09-17 PROCEDURE — 25010000002 REMDESIVIR 100 MG/20ML SOLUTION 1 EACH VIAL: Performed by: NURSE PRACTITIONER

## 2022-09-17 RX ORDER — SODIUM CHLORIDE 0.9 % (FLUSH) 0.9 %
10 SYRINGE (ML) INJECTION AS NEEDED
Status: DISCONTINUED | OUTPATIENT
Start: 2022-09-17 | End: 2022-09-21 | Stop reason: HOSPADM

## 2022-09-17 RX ORDER — FUROSEMIDE 10 MG/ML
80 INJECTION INTRAMUSCULAR; INTRAVENOUS ONCE
Status: COMPLETED | OUTPATIENT
Start: 2022-09-17 | End: 2022-09-17

## 2022-09-17 RX ORDER — MELATONIN
1000 DAILY
Status: DISCONTINUED | OUTPATIENT
Start: 2022-09-17 | End: 2022-09-21 | Stop reason: HOSPADM

## 2022-09-17 RX ORDER — HYDRALAZINE HYDROCHLORIDE 50 MG/1
100 TABLET, FILM COATED ORAL 3 TIMES DAILY
Status: DISCONTINUED | OUTPATIENT
Start: 2022-09-17 | End: 2022-09-21 | Stop reason: HOSPADM

## 2022-09-17 RX ORDER — HEPARIN SODIUM 5000 [USP'U]/ML
5000 INJECTION, SOLUTION INTRAVENOUS; SUBCUTANEOUS EVERY 12 HOURS SCHEDULED
Status: DISCONTINUED | OUTPATIENT
Start: 2022-09-17 | End: 2022-09-21 | Stop reason: HOSPADM

## 2022-09-17 RX ORDER — ASPIRIN 81 MG/1
81 TABLET ORAL EVERY 4 HOURS PRN
Status: DISCONTINUED | OUTPATIENT
Start: 2022-09-17 | End: 2022-09-17

## 2022-09-17 RX ORDER — PRAVASTATIN SODIUM 40 MG
40 TABLET ORAL DAILY
Status: DISCONTINUED | OUTPATIENT
Start: 2022-09-17 | End: 2022-09-21 | Stop reason: HOSPADM

## 2022-09-17 RX ORDER — ACETAMINOPHEN 325 MG/1
650 TABLET ORAL EVERY 4 HOURS PRN
Status: DISCONTINUED | OUTPATIENT
Start: 2022-09-17 | End: 2022-09-21 | Stop reason: HOSPADM

## 2022-09-17 RX ORDER — CITALOPRAM 10 MG/1
10 TABLET ORAL DAILY
Status: DISCONTINUED | OUTPATIENT
Start: 2022-09-17 | End: 2022-09-21 | Stop reason: HOSPADM

## 2022-09-17 RX ORDER — ACETAMINOPHEN 650 MG/1
650 SUPPOSITORY RECTAL EVERY 4 HOURS PRN
Status: DISCONTINUED | OUTPATIENT
Start: 2022-09-17 | End: 2022-09-21 | Stop reason: HOSPADM

## 2022-09-17 RX ORDER — LABETALOL HYDROCHLORIDE 5 MG/ML
10 INJECTION, SOLUTION INTRAVENOUS ONCE
Status: COMPLETED | OUTPATIENT
Start: 2022-09-17 | End: 2022-09-17

## 2022-09-17 RX ORDER — ACETAMINOPHEN 160 MG/5ML
650 SOLUTION ORAL EVERY 4 HOURS PRN
Status: DISCONTINUED | OUTPATIENT
Start: 2022-09-17 | End: 2022-09-21 | Stop reason: HOSPADM

## 2022-09-17 RX ORDER — NEBIVOLOL 5 MG/1
5 TABLET ORAL DAILY
Status: DISCONTINUED | OUTPATIENT
Start: 2022-09-17 | End: 2022-09-21 | Stop reason: HOSPADM

## 2022-09-17 RX ORDER — DEXAMETHASONE SODIUM PHOSPHATE 10 MG/ML
6 INJECTION INTRAMUSCULAR; INTRAVENOUS ONCE
Status: COMPLETED | OUTPATIENT
Start: 2022-09-17 | End: 2022-09-17

## 2022-09-17 RX ORDER — DEXAMETHASONE SODIUM PHOSPHATE 10 MG/ML
6 INJECTION INTRAMUSCULAR; INTRAVENOUS DAILY
Status: DISCONTINUED | OUTPATIENT
Start: 2022-09-18 | End: 2022-09-20

## 2022-09-17 RX ORDER — ONDANSETRON 2 MG/ML
4 INJECTION INTRAMUSCULAR; INTRAVENOUS EVERY 6 HOURS PRN
Status: DISCONTINUED | OUTPATIENT
Start: 2022-09-17 | End: 2022-09-21 | Stop reason: HOSPADM

## 2022-09-17 RX ORDER — ASPIRIN 81 MG/1
81 TABLET ORAL DAILY
Status: DISCONTINUED | OUTPATIENT
Start: 2022-09-17 | End: 2022-09-21 | Stop reason: HOSPADM

## 2022-09-17 RX ORDER — NITROGLYCERIN 0.4 MG/1
0.4 TABLET SUBLINGUAL
Status: DISCONTINUED | OUTPATIENT
Start: 2022-09-17 | End: 2022-09-21 | Stop reason: HOSPADM

## 2022-09-17 RX ORDER — LABETALOL HYDROCHLORIDE 5 MG/ML
10 INJECTION, SOLUTION INTRAVENOUS EVERY 8 HOURS PRN
Status: DISCONTINUED | OUTPATIENT
Start: 2022-09-17 | End: 2022-09-21 | Stop reason: HOSPADM

## 2022-09-17 RX ORDER — HYDRALAZINE HYDROCHLORIDE 50 MG/1
50 TABLET, FILM COATED ORAL ONCE
Status: COMPLETED | OUTPATIENT
Start: 2022-09-17 | End: 2022-09-17

## 2022-09-17 RX ORDER — FUROSEMIDE 10 MG/ML
80 INJECTION INTRAMUSCULAR; INTRAVENOUS
Status: DISCONTINUED | OUTPATIENT
Start: 2022-09-17 | End: 2022-09-19

## 2022-09-17 RX ORDER — SODIUM CHLORIDE 0.9 % (FLUSH) 0.9 %
10 SYRINGE (ML) INJECTION EVERY 12 HOURS SCHEDULED
Status: DISCONTINUED | OUTPATIENT
Start: 2022-09-17 | End: 2022-09-21 | Stop reason: HOSPADM

## 2022-09-17 RX ADMIN — DEXAMETHASONE SODIUM PHOSPHATE 6 MG: 10 INJECTION INTRAMUSCULAR; INTRAVENOUS at 19:01

## 2022-09-17 RX ADMIN — NITROGLYCERIN 2 INCH: 20 OINTMENT TOPICAL at 20:50

## 2022-09-17 RX ADMIN — CITALOPRAM 10 MG: 10 TABLET, FILM COATED ORAL at 20:49

## 2022-09-17 RX ADMIN — CEFTRIAXONE SODIUM 1 G: 1 INJECTION, POWDER, FOR SOLUTION INTRAMUSCULAR; INTRAVENOUS at 12:23

## 2022-09-17 RX ADMIN — PRAVASTATIN SODIUM 40 MG: 40 TABLET ORAL at 20:49

## 2022-09-17 RX ADMIN — HEPARIN SODIUM 5000 UNITS: 5000 INJECTION, SOLUTION INTRAVENOUS; SUBCUTANEOUS at 20:49

## 2022-09-17 RX ADMIN — CALCIUM CARBONATE-VITAMIN D TAB 500 MG-200 UNIT 1 TABLET: 500-200 TAB at 20:50

## 2022-09-17 RX ADMIN — FUROSEMIDE 80 MG: 10 INJECTION, SOLUTION INTRAMUSCULAR; INTRAVENOUS at 20:49

## 2022-09-17 RX ADMIN — HYDRALAZINE HYDROCHLORIDE 50 MG: 50 TABLET, FILM COATED ORAL at 12:21

## 2022-09-17 RX ADMIN — NEBIVOLOL 5 MG: 5 TABLET ORAL at 20:49

## 2022-09-17 RX ADMIN — Medication 10 ML: at 20:48

## 2022-09-17 RX ADMIN — LABETALOL HYDROCHLORIDE 10 MG: 5 INJECTION, SOLUTION INTRAVENOUS at 14:57

## 2022-09-17 RX ADMIN — HYDRALAZINE HYDROCHLORIDE 100 MG: 50 TABLET, FILM COATED ORAL at 20:49

## 2022-09-17 RX ADMIN — ONDANSETRON 4 MG: 2 INJECTION INTRAMUSCULAR; INTRAVENOUS at 22:03

## 2022-09-17 RX ADMIN — ASPIRIN 81 MG: 81 TABLET, COATED ORAL at 20:49

## 2022-09-17 RX ADMIN — LISINOPRIL: 20 TABLET ORAL at 20:48

## 2022-09-17 RX ADMIN — FUROSEMIDE 80 MG: 10 INJECTION, SOLUTION INTRAMUSCULAR; INTRAVENOUS at 14:02

## 2022-09-17 RX ADMIN — Medication 1000 UNITS: at 20:49

## 2022-09-17 RX ADMIN — REMDESIVIR 200 MG: 100 INJECTION, POWDER, LYOPHILIZED, FOR SOLUTION INTRAVENOUS at 19:01

## 2022-09-17 NOTE — ED NOTES
Nursing report ED to floor  Vonda Jay  87 y.o.  female    HPI :   Chief Complaint   Patient presents with   • Weakness - Generalized   • Hypertension   • Shortness of Breath       Admitting doctor:   Cyn Shepherd MD    Admitting diagnosis:   The primary encounter diagnosis was Acute hypoxemic respiratory failure due to COVID-19 ([facility]). Diagnoses of Acute on chronic diastolic CHF (congestive heart failure) (HCC) and Acute UTI were also pertinent to this visit.    Code status:   Current Code Status     Date Active Code Status Order ID Comments User Context       Prior    Advance Care Planning Activity          Allergies:   Patient has no known allergies.    Intake and Output  No intake or output data in the 24 hours ending 09/17/22 1444    Weight:   There were no vitals filed for this visit.    Most recent vitals:   Vitals:    09/17/22 1102 09/17/22 1115 09/17/22 1402 09/17/22 1422   BP:  (!) 205/88 (!) 223/97    Pulse: 72  71 74   Resp: 16      Temp: 99.7 °F (37.6 °C)      TempSrc: Tympanic      SpO2: 94%   94%       Active LDAs/IV Access:   Lines, Drains & Airways     Active LDAs     Name Placement date Placement time Site Days    Peripheral IV 09/17/22 1142 Right Antecubital 09/17/22  1142  Antecubital  less than 1                Labs (abnormal labs have a star):   Labs Reviewed   COVID-19, [facility] IN-HOUSE CEPHEID/ERICA, NP SWAB IN TRANSPORT MEDIA 8-12 HR TAT - Abnormal; Notable for the following components:       Result Value    COVID19 Detected (*)     All other components within normal limits    Narrative:     Fact sheet for providers: https://www.fda.gov/media/720180/download     Fact sheet for patients: https://www.fda.gov/media/162506/download   COMPREHENSIVE METABOLIC PANEL - Abnormal; Notable for the following components:    Glucose 141 (*)     BUN 26 (*)     Creatinine 1.25 (*)     Sodium 133 (*)     eGFR 41.8 (*)     All other components within normal limits    Narrative:     GFR Normal >60  Chronic  Kidney Disease <60  Kidney Failure <15     URINALYSIS W/ MICROSCOPIC IF INDICATED (NO CULTURE) - Abnormal; Notable for the following components:    Appearance, UA Cloudy (*)     Ketones, UA Trace (*)     Protein, UA >=300 mg/dL (3+) (*)     Leuk Esterase, UA Small (1+) (*)     Nitrite, UA Positive (*)     All other components within normal limits   BNP (IN-HOUSE) - Abnormal; Notable for the following components:    proBNP 5,616.0 (*)     All other components within normal limits    Narrative:     Among patients with dyspnea, NT-proBNP is highly sensitive for the detection of acute congestive heart failure. In addition NT-proBNP of <300 pg/ml effectively rules out acute congestive heart failure with 99% negative predictive value.    Results may be falsely decreased if patient taking Biotin.     CBC WITH AUTO DIFFERENTIAL - Abnormal; Notable for the following components:    RBC 3.68 (*)     Hemoglobin 11.0 (*)     Hematocrit 33.3 (*)     Neutrophil % 85.6 (*)     Lymphocyte % 4.7 (*)     Eosinophil % 0.2 (*)     Neutrophils, Absolute 7.94 (*)     Lymphocytes, Absolute 0.44 (*)     All other components within normal limits   URINALYSIS, MICROSCOPIC ONLY - Abnormal; Notable for the following components:    WBC, UA 21-30 (*)     Bacteria, UA 4+ (*)     Squamous Epithelial Cells, UA 3-6 (*)     All other components within normal limits   COVID PRE-OP / PRE-PROCEDURE SCREENING ORDER (NO ISOLATION)    Narrative:     The following orders were created for panel order COVID PRE-OP / PRE-PROCEDURE SCREENING ORDER (NO ISOLATION) - Swab, Nasopharynx.  Procedure                               Abnormality         Status                     ---------                               -----------         ------                     COVID-19,[facility] IN-HOUSE...[[id number]]  Abnormal            Final result                 Please view results for these tests on the individual orders.   URINE CULTURE   CREATININE, SERUM   HEPATIC FUNCTION PANEL    CBC AND DIFFERENTIAL    Narrative:     The following orders were created for panel order CBC & Differential.  Procedure                               Abnormality         Status                     ---------                               -----------         ------                     CBC Auto Differential[[id number]]        Abnormal            Final result                 Please view results for these tests on the individual orders.       EKG:   No orders to display       Meds given in ED:   Medications   sodium chloride 0.9 % flush 10 mL (has no administration in time range)   dexamethasone (DECADRON) injection 6 mg (has no administration in time range)   Pharmacy Consult - Remdesivir (has no administration in time range)   remdesivir 200 mg in sodium chloride 0.9 % 290 mL IVPB (powder vial) (has no administration in time range)     Followed by   remdesivir 100 mg in sodium chloride 0.9 % 250 mL IVPB (powder vial) (has no administration in time range)   hydrALAZINE (APRESOLINE) tablet 50 mg (50 mg Oral Given 22 1221)   cefTRIAXone (ROCEPHIN) 1 g in sodium chloride 0.9 % 100 mL IVPB-VTB (0 g Intravenous Stopped 22 1310)   furosemide (LASIX) injection 80 mg (80 mg Intravenous Given 22 1402)       Imaging results:  No radiology results for the last day    Ambulatory status:   - assist    Social issues:   Social History     Socioeconomic History   • Marital status:    Tobacco Use   • Smoking status: Former Smoker     Years: 47.00     Quit date:      Years since quittin.7   • Smokeless tobacco: Never Used   • Tobacco comment: caffeine use- coffee   Vaping Use   • Vaping Use: Never used   Substance and Sexual Activity   • Alcohol use: No   • Drug use: Defer   • Sexual activity: Defer       NIH Stroke Scale:        Nursing report ED to floor:

## 2022-09-17 NOTE — H&P
Internal medicine history and physical  INTERNAL MEDICINE   [facility]       Patient Identification:  Name: Vonda Jay  Age: 87 y.o.  Sex: female  :  1935  MRN: [id number]                   Primary Care Physician: Ambrose Ashley MD                               Date of admission:2022    Chief Complaint: Progressive weakness sob for cough headache and nausea and vomiting since Wednesday night.    History of Present Illness:   Patient is 87-year-old female who is fairly active was in her usual state of her health and played dominoes with her girlfriends on Wednesday afternoon.  Later on Wednesday evening she started noticing cough headache nausea and was not feeling very well and had generalized weakness.  She did get a call from her friends who was diagnosed with COVID-19 infection later on Wednesday evening.  Prior to Wednesday patient has similar routines of hanging out with her friends which includes playing cards and this past  she did go to [employer].  She does not recall anybody else from prior day's activities have been sick but she is not sure.  She has been struggling with the symptoms and was progressively getting weaker to the point that she decided to come to the emergency room for further evaluation.  In the emergency room she was noted to be hypoxic along with positive COVID-19 assay and was also noted to have abnormal urinalysis consistent with urinary tract infection.  Patient was treated with oxygen supplementation and IV antibiotics and because she was noted to have BNP of 5616 IV Lasix was added as her chest x-ray did show vascular congestion as possible explanation for her hypoxia.  Patient is feeling somewhat better after treatment in the emergency room.      Past Medical History:  Past Medical History:   Diagnosis Date   • Arthritis    • History of breast cancer    • History of carotid artery disease    • Hyperlipidemia    • Hypertension      Past  Surgical History:  Past Surgical History:   Procedure Laterality Date   • BREAST LUMPECTOMY  1998   • CAROTID ARTERY ANGIOPLASTY  2000, 2013   • CHOLECYSTECTOMY  1979   • FEMUR IM NAILING/RODDING Left 2/8/2018    Procedure: FEMUR INTRAMEDULLARY NAILING;  Surgeon: Zheng Fleming MD;  Location: [facility];  Service:    • HYSTERECTOMY  1989      Home Meds:  Medications Prior to Admission   Medication Sig Dispense Refill Last Dose   • alendronate (FOSAMAX) 70 MG tablet Take 1 tablet by mouth Every 7 (Seven) Days.      • aspirin 81 MG tablet Take 81 mg by mouth Daily.      • Calcium Carb-Cholecalciferol 1000-800 MG-UNIT tablet Take  by mouth.      • cholecalciferol (VITAMIN D3) 1000 units tablet Take 1,000 Units by mouth Daily.      • hydrALAZINE (APRESOLINE) 100 MG tablet Take 100 mg by mouth 3 (Three) Times a Day.      • lisinopril-hydrochlorothiazide (PRINZIDE,ZESTORETIC) 20-25 MG per tablet Take 1 tablet by mouth Daily.  2    • meloxicam (MOBIC) 15 MG tablet Take 15 mg by mouth.      • nebivolol (Bystolic) 5 MG tablet Take 1 tablet by mouth Daily. 30 tablet 11    • pravastatin (PRAVACHOL) 40 MG tablet Take 40 mg by mouth Daily.      • citalopram (CeleXA) 10 MG tablet Take 10 mg by mouth Daily.      • furosemide (LASIX) 20 MG tablet Take 1 tablet by mouth Daily. 30 tablet 11    • potassium chloride 10 MEQ CR tablet Take 1 tablet by mouth Daily. 30 tablet 11      Current Meds:     Current Facility-Administered Medications:   •  Pharmacy Consult - Remdesivir, , Does not apply, Continuous PRN, Mer Oviedo APRN  •  remdesivir 200 mg in sodium chloride 0.9 % 290 mL IVPB (powder vial), 200 mg, Intravenous, Q24H, 200 mg at 09/17/22 1901 **FOLLOWED BY** [START ON 9/18/2022] remdesivir 100 mg in sodium chloride 0.9 % 250 mL IVPB (powder vial), 100 mg, Intravenous, Q24H, Mer Oviedo APRN  •  [COMPLETED] Insert peripheral IV, , , Once **AND** sodium chloride 0.9 % flush 10 mL, 10 mL, Intravenous, PRN, Preet,  Mer Melara, APRN  Allergies:  No Known Allergies  Social History:   Social History     Tobacco Use   • Smoking status: Former Smoker     Years: 47.00     Quit date:      Years since quittin.7   • Smokeless tobacco: Never Used   • Tobacco comment: caffeine use- coffee   Substance Use Topics   • Alcohol use: No      Family History:  Family History   Problem Relation Age of Onset   • Hypertension Mother    • Heart valve disorder Sister    • Lung cancer Sister    • Lymphoma Sister    • Skin cancer Sister    • Stroke Brother 57          Review of Systems  See history of present illness and past medical history.   Constitutional:Remarkable for generalized body aches and generalized weakness with her not feeling very well overall.  Cardiovascular: Remarkable for shortness of breath dyspnea on exertion denies any orthopnea or PND  Respiratory: Remarkable for sore throat but no hemoptysis or pleuritic chest pain  GI: Remarkable for nausea vomiting diarrhea decreased appetite  : Remarkable for no flank pain denies any burning in urination but admits to frequency  Neurological: Remarkable for generalized weakness but no focal weakness of arm or legs.  Musculoskeletal: Remarkable for no new joint aches and pain.  Remainder of ROS is negative.      Vitals:   BP (!) 224/86   Pulse 75   Temp 98 °F (36.7 °C) (Oral)   Resp 18   SpO2 91%   I/O:     Intake/Output Summary (Last 24 hours) at 2022  Last data filed at 2022 190  Gross per 24 hour   Intake --   Output 1700 ml   Net -1700 ml     Exam:  Patient is examined using the personal protective equipment as per guidelines from infection control for this particular patient as enacted.  Hand washing was performed before and after patient interaction.  General Appearance:   Elderly female who appears in younger than stated age does not appear to be in any acute distress and claims to be feeling better.  Does appear ill.   Head:    Normocephalic, without  obvious abnormality, atraumatic   Eyes:    PERRL, conjunctiva/corneas clear, EOM's intact, both eyes   Ears:    Normal external ear canals, both ears   Nose:   Nares normal, septum midline, mucosa normal, no drainage    or sinus tenderness   Throat:   Lips, tongue, gums normal; oral mucosa pink and moist   Neck:   Supple, symmetrical, trachea midline, no adenopathy;     thyroid:  no enlargement/tenderness/nodules; no carotid    bruit or JVD   Back:     Symmetric, no curvature, ROM normal, no CVA tenderness   Lungs:    Bilateral air entry no obvious use of accessory muscles of breathing noted bibasilar crackles noted   Chest Wall:    No tenderness or deformity    Heart:   S1-S2 regular   Abdomen:    Soft nontender   Extremities:   Extremities normal, atraumatic, no cyanosis or edema   Pulses:   Pulses palpable in all extremities; symmetric all extremities   Skin:   Skin color normal, Skin is warm and dry,  no rashes or palpable lesions   Neurologic:  Alert and oriented and grossly nonfocal examination       Data Review:      I reviewed the patient's new clinical results.  Results from last 7 days   Lab Units 09/17/22  1143   WBC 10*3/mm3 9.29   HEMOGLOBIN g/dL 11.0*   PLATELETS 10*3/mm3 170     Results from last 7 days   Lab Units 09/17/22  1143   SODIUM mmol/L 133*   POTASSIUM mmol/L 4.2   CHLORIDE mmol/L 98   CO2 mmol/L 24.0   BUN mg/dL 26*   CREATININE mg/dL 1.25*   CALCIUM mg/dL 9.0   GLUCOSE mg/dL 141*     Microbiology Results (last 10 days)     Procedure Component Value - Date/Time    COVID PRE-OP / PRE-PROCEDURE SCREENING ORDER (NO ISOLATION) - Swab, Nasopharynx [[id number]]  (Abnormal) Collected: 09/17/22 1222    Lab Status: Final result Specimen: Swab from Nasopharynx Updated: 09/17/22 1328    Narrative:      The following orders were created for panel order COVID PRE-OP / PRE-PROCEDURE SCREENING ORDER (NO ISOLATION) - Swab, Nasopharynx.  Procedure                               Abnormality         Status                      ---------                               -----------         ------                     COVID-19,[facility] IN-HOUSE...[[id number]]  Abnormal            Final result                 Please view results for these tests on the individual orders.    COVID-19,[facility] IN-HOUSE CEPHEID/ERICA NP SWAB IN TRANSPORT MEDIA 8-12 HR TAT - Swab, Nasopharynx [[id number]]  (Abnormal) Collected: 09/17/22 1222    Lab Status: Final result Specimen: Swab from Nasopharynx Updated: 09/17/22 1328     COVID19 Detected    Narrative:      Fact sheet for providers: https://www.fda.gov/media/395820/download     Fact sheet for patients: https://www.fda.gov/media/546964/download        No radiology results for the last 30 days.  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  · Continue with dexamethasone and remdesivir and closely monitor need for oxygen supplementation  · Add Nitropaste to her regimen to control her blood pressure and assist in management of possible exacerbation of congestive heart failure  · Keep a  close eye on her renal function  · Further management as her condition evolves.  Cyn Shepherd MD   9/17/2022  19:09 EDT  Much of this encounter note is an electronic transcription/translation of spoken language to printed text. The electronic translation of spoken language may permit erroneous, or at times, nonsensical words or phrases to be inadvertently transcribed; Although I have reviewed the note for such errors, some may still exist

## 2022-09-17 NOTE — ED TRIAGE NOTES
Vomit, diarrhea, HA, soa and sbp was 209 this am.  She is on multiple bp meds.  She feels weak.  Recent exposure to covid    Patient was placed in face mask during first look triage.  Patient was wearing a face mask throughout encounter.  I wore personal protective equipment throughout the encounter.  Hand hygiene was performed before and after patient encounter.

## 2022-09-17 NOTE — ED PROVIDER NOTES
"EMERGENCY DEPARTMENT ENCOUNTER    Room Number:  05/05  Date seen:  9/17/2022  Time seen: 11:11 EDT  PCP: Ambrose Ashley MD  Historian: pt  HPI:  Chief complaint:\"I think I have covid\"  A complete HPI/ROS/PMH/PSH/SH/FH are unobtainable due to: n/a  Context:Vonda Jay is a 87 y.o. female with past medicla history hypertension on multiple medications, aortic valve stenosis and acute on chronic diastolic CHF who presents to the ED with c/o headache and n/v/d stating, \"I'm sick\".  She reports covid exposure on this past Wednesday.  She has been vaccinated and had booster for covid.  She denies any shortness of breath or chest pain.  She has not had these problems before.  She reports being on 5 medications for her blood pressure and it is usually high.  She reports compliance with her BP medications.     Patient was placed in face mask in first look. Patient was wearing facemask when I entered the room and throughout our encounter. I wore full protective equipment throughout this patient encounter including a N95 face mask, eye shield and gloves. Hand hygiene/washing of hands was performed before donning protective equipment and after removal when leaving the room.    MEDICAL RECORD REVIEW    ALLERGIES  Patient has no known allergies.    PAST MEDICAL HISTORY  Active Ambulatory Problems     Diagnosis Date Noted   • Closed displaced intertrochanteric fracture of left femur (HCC) 02/07/2018   • SANYA (acute kidney injury) ([facility]) 02/07/2018   • HTN (hypertension) 02/07/2018   • Acute blood loss anemia 02/10/2018   • Leukocytosis 02/10/2018   • Drug-induced constipation 02/12/2018     Resolved Ambulatory Problems     Diagnosis Date Noted   • Hyperkalemia 02/07/2018     Past Medical History:   Diagnosis Date   • Arthritis    • History of breast cancer    • History of carotid artery disease    • Hyperlipidemia    • Hypertension        PAST SURGICAL HISTORY  Past Surgical History:   Procedure Laterality Date   • BREAST " LUMPECTOMY     • CAROTID ARTERY ANGIOPLASTY  ,    • CHOLECYSTECTOMY  1979   • FEMUR IM NAILING/RODDING Left 2018    Procedure: FEMUR INTRAMEDULLARY NAILING;  Surgeon: Zheng Fleming MD;  Location: [facility] OR;  Service:    • HYSTERECTOMY         FAMILY HISTORY  Family History   Problem Relation Age of Onset   • Hypertension Mother    • Heart valve disorder Sister    • Lung cancer Sister    • Lymphoma Sister    • Skin cancer Sister    • Stroke Brother 57       SOCIAL HISTORY  Social History     Socioeconomic History   • Marital status:    Tobacco Use   • Smoking status: Former Smoker     Years: 47.00     Quit date:      Years since quittin.7   • Smokeless tobacco: Never Used   • Tobacco comment: caffeine use- coffee   Vaping Use   • Vaping Use: Never used   Substance and Sexual Activity   • Alcohol use: No   • Drug use: Defer   • Sexual activity: Defer       REVIEW OF SYSTEMS  Review of Systems    All systems reviewed and negative except for those discussed in HPI.     PHYSICAL EXAM    ED Triage Vitals [22 1102]   Temp Heart Rate Resp BP SpO2   99.7 °F (37.6 °C) 72 16 -- 94 %      Temp src Heart Rate Source Patient Position BP Location FiO2 (%)   Tympanic Monitor -- -- --     Physical Exam    I have reviewed the triage vital signs and nursing notes.      GENERAL: not distressed  HENT: nares patent  EYES: no scleral icterus  NECK: no ROM limitations  CV: regular rhythm, regular rate, murmur  RESPIRATORY: normal effort, CTAB, no audible wheezing  ABDOMEN: soft  : deferred  MUSCULOSKELETAL: no deformity  NEURO: alert, moves all extremities, follows commands  SKIN: warm, dry    LAB RESULTS  Recent Results (from the past 24 hour(s))   Comprehensive Metabolic Panel    Collection Time: 22 11:43 AM    Specimen: Blood   Result Value Ref Range    Glucose 141 (H) 65 - 99 mg/dL    BUN 26 (H) 8 - 23 mg/dL    Creatinine 1.25 (H) 0.57 - 1.00 mg/dL    Sodium 133 (L) 136 - 145  mmol/L    Potassium 4.2 3.5 - 5.2 mmol/L    Chloride 98 98 - 107 mmol/L    CO2 24.0 22.0 - 29.0 mmol/L    Calcium 9.0 8.6 - 10.5 mg/dL    Total Protein 6.5 6.0 - 8.5 g/dL    Albumin 4.20 3.50 - 5.20 g/dL    ALT (SGPT) 23 1 - 33 U/L    AST (SGOT) 26 1 - 32 U/L    Alkaline Phosphatase 61 39 - 117 U/L    Total Bilirubin 0.4 0.0 - 1.2 mg/dL    Globulin 2.3 gm/dL    A/G Ratio 1.8 g/dL    BUN/Creatinine Ratio 20.8 7.0 - 25.0    Anion Gap 11.0 5.0 - 15.0 mmol/L    eGFR 41.8 (L) >60.0 mL/min/1.73   BNP    Collection Time: 09/17/22 11:43 AM    Specimen: Blood   Result Value Ref Range    proBNP 5,616.0 (H) 0.0 - 1,800.0 pg/mL   CBC Auto Differential    Collection Time: 09/17/22 11:43 AM    Specimen: Blood   Result Value Ref Range    WBC 9.29 3.40 - 10.80 10*3/mm3    RBC 3.68 (L) 3.77 - 5.28 10*6/mm3    Hemoglobin 11.0 (L) 12.0 - 15.9 g/dL    Hematocrit 33.3 (L) 34.0 - 46.6 %    MCV 90.5 79.0 - 97.0 fL    MCH 29.9 26.6 - 33.0 pg    MCHC 33.0 31.5 - 35.7 g/dL    RDW 13.2 12.3 - 15.4 %    RDW-SD 43.8 37.0 - 54.0 fl    MPV 11.2 6.0 - 12.0 fL    Platelets 170 140 - 450 10*3/mm3    Neutrophil % 85.6 (H) 42.7 - 76.0 %    Lymphocyte % 4.7 (L) 19.6 - 45.3 %    Monocyte % 8.5 5.0 - 12.0 %    Eosinophil % 0.2 (L) 0.3 - 6.2 %    Basophil % 0.5 0.0 - 1.5 %    Immature Grans % 0.5 0.0 - 0.5 %    Neutrophils, Absolute 7.94 (H) 1.70 - 7.00 10*3/mm3    Lymphocytes, Absolute 0.44 (L) 0.70 - 3.10 10*3/mm3    Monocytes, Absolute 0.79 0.10 - 0.90 10*3/mm3    Eosinophils, Absolute 0.02 0.00 - 0.40 10*3/mm3    Basophils, Absolute 0.05 0.00 - 0.20 10*3/mm3    Immature Grans, Absolute 0.05 0.00 - 0.05 10*3/mm3    nRBC 0.0 0.0 - 0.2 /100 WBC   Urinalysis With Microscopic If Indicated (No Culture) - Urine, Clean Catch    Collection Time: 09/17/22 11:53 AM    Specimen: Urine, Clean Catch   Result Value Ref Range    Color, UA Yellow Yellow, Straw    Appearance, UA Cloudy (A) Clear    pH, UA 7.0 5.0 - 8.0    Specific Gravity, UA 1.020 1.005 - 1.030     Glucose, UA Negative Negative    Ketones, UA Trace (A) Negative    Bilirubin, UA Negative Negative    Blood, UA Negative Negative    Protein, UA >=300 mg/dL (3+) (A) Negative    Leuk Esterase, UA Small (1+) (A) Negative    Nitrite, UA Positive (A) Negative    Urobilinogen, UA 0.2 E.U./dL 0.2 - 1.0 E.U./dL   Urinalysis, Microscopic Only - Urine, Clean Catch    Collection Time: 09/17/22 11:53 AM    Specimen: Urine, Clean Catch   Result Value Ref Range    RBC, UA None Seen None Seen, 0-2 /HPF    WBC, UA 21-30 (A) None Seen, 0-2 /HPF    Bacteria, UA 4+ (A) None Seen /HPF    Squamous Epithelial Cells, UA 3-6 (A) None Seen, 0-2 /HPF    Hyaline Casts, UA 7-12 None Seen /LPF    Methodology Manual Light Microscopy    COVID-19,[facility] IN-HOUSE CEPHEID/ERICA NP SWAB IN TRANSPORT MEDIA 8-12 HR TAT - Swab, Nasopharynx    Collection Time: 09/17/22 12:22 PM    Specimen: Nasopharynx; Swab   Result Value Ref Range    COVID19 Detected (C) Not Detected - Ref. Range         RADIOLOGY RESULTS  XR Chest 1 View    Result Date: 9/17/2022  ONE VIEW PORTABLE CHEST  HISTORY: Shortness of breath. Covid 19 exposure.  FINDINGS: There is cardiomegaly with mild vascular congestion and slight interstitial haziness that is more prominent since a study of 02/07/2018 and suspicious for changes of mild congestive heart failure.  This report was finalized on 9/17/2022 12:25 PM by Dr. Mo Parker M.D.           PROGRESS, DATA ANALYSIS, CONSULTS AND MEDICAL DECISION MAKING  All labs have been independently reviewed by me.  All radiology studies have been reviewed by me and discussed with radiologist dictating the report.  EKG's independently viewed and interpreted by me unless stated otherwise. Discussion below represents my analysis of pertinent findings related to patient's condition, differential diagnosis, treatment plan and final disposition.     ED Course as of 09/17/22 1530   Sat Sep 17, 2022   1213 Nitrite, UA(!): Positive [EW]   1214 I have  ordered dose of Rocephin for UTI.  Per RN, the patient became a bit hypoxic with oxygen saturations 87% when he ambulated her to bathroom.  He put the patient on NC 2 liters/minute. [EW]   1342 I have updated the patient on plan for admission.  She has several issues today including exacerbation of her CHF, covid +.  She was hypoxic and is requiring supplemental oxygen.  She also has UTI which I treated with Rocephin.  Will admit to [facility] [EW]   1342 I discussed admission with Dr. Shepherd [facility].  He agrees to admit the patient.  He requested dexamethasone and Remdesevir which I have ordered.  [EW]      ED Course User Index  [EW] Mer Oviedo, APRN     DDX: covid, CHF exacerbation, generalized weakness    MDM:  Pt will be admitted.  She does have covid and had some hypoxia requiring oxygen in ER.  Her BNP is more elevated than usual.  She also has UTI which I have treated with Rocephin     Reviewed pt's history and workup with Dr. Lobo.  After a bedside evaluation, Dr. Lobo agrees with the plan of care.    Based on the patient's lab findings and presenting symptoms, the doctor and I feel it is appropriate to admit the patient for further management, evaluation, and treatment.  I have discussed this with the admitting team.  I have also discussed this with the patient/family.  They are in agreement with admission.          Disposition vitals:  /60   Pulse 74   Temp 99.7 °F (37.6 °C) (Tympanic)   Resp 16   SpO2 94%       DIAGNOSIS  Final diagnoses:   Acute hypoxemic respiratory failure due to COVID-19 (HCC)   Acute on chronic diastolic CHF (congestive heart failure) ([facility])   Acute UTI       Admission     Mer Oviedo, ELVIE  09/17/22 7422

## 2022-09-17 NOTE — ED PROVIDER NOTES
MD ATTESTATION NOTE    The MARY ELLEN and I have discussed this patient's history, physical exam, and treatment plan.  I have reviewed the documentation and personally had a face to face interaction with the patient. I affirm the documentation and agree with the treatment and plan.  The attached note describes my personal findings.      I provided a substantive portion of the care of the patient.  I personally performed the physical exam in its entirety, and below are my findings.  For this patient encounter, the patient wore surgical mask, I wore full protective PPE including N95 and eye protection.      Brief HPI: Patient presents with complaint of not feeling well.  She has had some vomiting or green emesis.  She reports feeling generally ill.  She had recent exposure to COVID-19.  She has been vaccinated.  She has a history of aortic stenosis and diastolic CHF.    PHYSICAL EXAM  ED Triage Vitals   Temp Heart Rate Resp BP SpO2   09/17/22 1102 09/17/22 1102 09/17/22 1102 09/17/22 1115 09/17/22 1102   99.7 °F (37.6 °C) 72 16 (!) 205/88 94 %      Temp src Heart Rate Source Patient Position BP Location FiO2 (%)   09/17/22 1102 09/17/22 1102 -- -- --   Tympanic Monitor            GENERAL: Awake and alert, no acute distress  HENT: nares patent  EYES: no scleral icterus, EOMI  CV: regular rhythm, normal rate, systolic murmur present  RESPIRATORY: normal effort, diminished breath sounds at lung bases bilaterally  ABDOMEN: soft, nondistended, nontender throughout  MUSCULOSKELETAL: no deformity, no peripheral edema  NEURO: alert, moves all extremities, follows commands, cranial nerves II through XII gross intact, speech fluent and clear  PSYCH:  calm, cooperative  SKIN: warm, dry    Vital signs and nursing notes reviewed.        Plan: Chest x-ray appears to show vascular congestion consistent with mild pulmonary edema.  She is hypertensive.  She was given 50 p.o. hydralazine.  She will be admitted for diuresis.  She was given  Rocephin due to suspected UTI.  COVID testing is currently pending.  I wore an N95 mask, face shield, and gloves during this patient encounter.  Patient also wearing a surgical mask.  Hand hygeine performed before and after seeing the patient.      Lane Lobo MD  09/17/22 2639

## 2022-09-18 ENCOUNTER — APPOINTMENT (OUTPATIENT)
Dept: ULTRASOUND IMAGING | Facility: HOSPITAL | Age: 87
End: 2022-09-18

## 2022-09-18 PROBLEM — I35.0 AORTIC STENOSIS: Status: ACTIVE | Noted: 2022-09-18

## 2022-09-18 PROBLEM — N39.0 UTI (URINARY TRACT INFECTION): Status: ACTIVE | Noted: 2022-09-18

## 2022-09-18 PROBLEM — I50.33 ACUTE ON CHRONIC DIASTOLIC CHF (CONGESTIVE HEART FAILURE): Status: ACTIVE | Noted: 2022-09-18

## 2022-09-18 LAB
ALBUMIN SERPL-MCNC: 3.9 G/DL (ref 3.5–5.2)
ALBUMIN/GLOB SERPL: 1.6 G/DL
ALP SERPL-CCNC: 55 U/L (ref 39–117)
ALT SERPL W P-5'-P-CCNC: 19 U/L (ref 1–33)
ANION GAP SERPL CALCULATED.3IONS-SCNC: 16.3 MMOL/L (ref 5–15)
AST SERPL-CCNC: 26 U/L (ref 1–32)
BASOPHILS # BLD AUTO: 0.02 10*3/MM3 (ref 0–0.2)
BASOPHILS NFR BLD AUTO: 0.3 % (ref 0–1.5)
BILIRUB CONJ SERPL-MCNC: <0.2 MG/DL (ref 0–0.3)
BILIRUB SERPL-MCNC: 0.3 MG/DL (ref 0–1.2)
BUN SERPL-MCNC: 34 MG/DL (ref 8–23)
BUN/CREAT SERPL: 18.8 (ref 7–25)
CALCIUM SPEC-SCNC: 9.6 MG/DL (ref 8.6–10.5)
CHLORIDE SERPL-SCNC: 94 MMOL/L (ref 98–107)
CO2 SERPL-SCNC: 25.7 MMOL/L (ref 22–29)
CREAT SERPL-MCNC: 1.81 MG/DL (ref 0.57–1)
DEPRECATED RDW RBC AUTO: 42.9 FL (ref 37–54)
EGFRCR SERPLBLD CKD-EPI 2021: 26.8 ML/MIN/1.73
EOSINOPHIL # BLD AUTO: 0 10*3/MM3 (ref 0–0.4)
EOSINOPHIL NFR BLD AUTO: 0 % (ref 0.3–6.2)
ERYTHROCYTE [DISTWIDTH] IN BLOOD BY AUTOMATED COUNT: 13.3 % (ref 12.3–15.4)
GLOBULIN UR ELPH-MCNC: 2.4 GM/DL
GLUCOSE SERPL-MCNC: 122 MG/DL (ref 65–99)
HCT VFR BLD AUTO: 34.5 % (ref 34–46.6)
HGB BLD-MCNC: 11.6 G/DL (ref 12–15.9)
IMM GRANULOCYTES # BLD AUTO: 0.02 10*3/MM3 (ref 0–0.05)
IMM GRANULOCYTES NFR BLD AUTO: 0.3 % (ref 0–0.5)
LYMPHOCYTES # BLD AUTO: 0.53 10*3/MM3 (ref 0.7–3.1)
LYMPHOCYTES NFR BLD AUTO: 8.7 % (ref 19.6–45.3)
MCH RBC QN AUTO: 29.6 PG (ref 26.6–33)
MCHC RBC AUTO-ENTMCNC: 33.6 G/DL (ref 31.5–35.7)
MCV RBC AUTO: 88 FL (ref 79–97)
MONOCYTES # BLD AUTO: 0.47 10*3/MM3 (ref 0.1–0.9)
MONOCYTES NFR BLD AUTO: 7.8 % (ref 5–12)
NEUTROPHILS NFR BLD AUTO: 5.02 10*3/MM3 (ref 1.7–7)
NEUTROPHILS NFR BLD AUTO: 82.9 % (ref 42.7–76)
NRBC BLD AUTO-RTO: 0 /100 WBC (ref 0–0.2)
PLATELET # BLD AUTO: 157 10*3/MM3 (ref 140–450)
PMV BLD AUTO: 11.1 FL (ref 6–12)
POTASSIUM SERPL-SCNC: 3.9 MMOL/L (ref 3.5–5.2)
PROCALCITONIN SERPL-MCNC: 0.33 NG/ML (ref 0–0.25)
PROT SERPL-MCNC: 6.3 G/DL (ref 6–8.5)
RBC # BLD AUTO: 3.92 10*6/MM3 (ref 3.77–5.28)
SODIUM SERPL-SCNC: 136 MMOL/L (ref 136–145)
URATE SERPL-MCNC: 9.4 MG/DL (ref 2.4–5.7)
WBC NRBC COR # BLD: 6.06 10*3/MM3 (ref 3.4–10.8)

## 2022-09-18 PROCEDURE — 85025 COMPLETE CBC W/AUTO DIFF WBC: CPT | Performed by: INTERNAL MEDICINE

## 2022-09-18 PROCEDURE — 76775 US EXAM ABDO BACK WALL LIM: CPT

## 2022-09-18 PROCEDURE — 25010000002 REMDESIVIR 100 MG/20ML SOLUTION 1 EACH VIAL: Performed by: NURSE PRACTITIONER

## 2022-09-18 PROCEDURE — 99222 1ST HOSP IP/OBS MODERATE 55: CPT | Performed by: INTERNAL MEDICINE

## 2022-09-18 PROCEDURE — 25010000002 DEXAMETHASONE PER 1 MG: Performed by: INTERNAL MEDICINE

## 2022-09-18 PROCEDURE — 25010000002 HEPARIN (PORCINE) PER 1000 UNITS: Performed by: INTERNAL MEDICINE

## 2022-09-18 PROCEDURE — 82248 BILIRUBIN DIRECT: CPT | Performed by: NURSE PRACTITIONER

## 2022-09-18 PROCEDURE — 84145 PROCALCITONIN (PCT): CPT | Performed by: INTERNAL MEDICINE

## 2022-09-18 PROCEDURE — 80053 COMPREHEN METABOLIC PANEL: CPT | Performed by: INTERNAL MEDICINE

## 2022-09-18 PROCEDURE — XW033E5 INTRODUCTION OF REMDESIVIR ANTI-INFECTIVE INTO PERIPHERAL VEIN, PERCUTANEOUS APPROACH, NEW TECHNOLOGY GROUP 5: ICD-10-PCS | Performed by: INTERNAL MEDICINE

## 2022-09-18 PROCEDURE — 25010000002 CEFTRIAXONE PER 250 MG: Performed by: INTERNAL MEDICINE

## 2022-09-18 PROCEDURE — 25010000002 FUROSEMIDE PER 20 MG: Performed by: INTERNAL MEDICINE

## 2022-09-18 PROCEDURE — 84550 ASSAY OF BLOOD/URIC ACID: CPT | Performed by: INTERNAL MEDICINE

## 2022-09-18 RX ORDER — GUAIFENESIN 600 MG/1
600 TABLET, EXTENDED RELEASE ORAL EVERY 12 HOURS SCHEDULED
Status: DISCONTINUED | OUTPATIENT
Start: 2022-09-18 | End: 2022-09-21 | Stop reason: HOSPADM

## 2022-09-18 RX ORDER — CLONIDINE 0.1 MG/24H
1 PATCH, EXTENDED RELEASE TRANSDERMAL WEEKLY
Status: DISCONTINUED | OUTPATIENT
Start: 2022-09-18 | End: 2022-09-21 | Stop reason: HOSPADM

## 2022-09-18 RX ADMIN — PRAVASTATIN SODIUM 40 MG: 40 TABLET ORAL at 10:14

## 2022-09-18 RX ADMIN — NEBIVOLOL 5 MG: 5 TABLET ORAL at 10:13

## 2022-09-18 RX ADMIN — HYDRALAZINE HYDROCHLORIDE 100 MG: 50 TABLET, FILM COATED ORAL at 10:13

## 2022-09-18 RX ADMIN — CITALOPRAM 10 MG: 10 TABLET, FILM COATED ORAL at 10:13

## 2022-09-18 RX ADMIN — HYDRALAZINE HYDROCHLORIDE 100 MG: 50 TABLET, FILM COATED ORAL at 18:34

## 2022-09-18 RX ADMIN — HEPARIN SODIUM 5000 UNITS: 5000 INJECTION, SOLUTION INTRAVENOUS; SUBCUTANEOUS at 22:05

## 2022-09-18 RX ADMIN — CALCIUM CARBONATE-VITAMIN D TAB 500 MG-200 UNIT 1 TABLET: 500-200 TAB at 22:05

## 2022-09-18 RX ADMIN — NITROGLYCERIN 2 INCH: 20 OINTMENT TOPICAL at 18:38

## 2022-09-18 RX ADMIN — FUROSEMIDE 80 MG: 10 INJECTION, SOLUTION INTRAMUSCULAR; INTRAVENOUS at 10:14

## 2022-09-18 RX ADMIN — FUROSEMIDE 80 MG: 10 INJECTION, SOLUTION INTRAMUSCULAR; INTRAVENOUS at 18:34

## 2022-09-18 RX ADMIN — CEFTRIAXONE SODIUM 1 G: 1 INJECTION, POWDER, FOR SOLUTION INTRAMUSCULAR; INTRAVENOUS at 22:04

## 2022-09-18 RX ADMIN — Medication 10 ML: at 10:15

## 2022-09-18 RX ADMIN — HEPARIN SODIUM 5000 UNITS: 5000 INJECTION, SOLUTION INTRAVENOUS; SUBCUTANEOUS at 10:14

## 2022-09-18 RX ADMIN — CALCIUM CARBONATE-VITAMIN D TAB 500 MG-200 UNIT 1 TABLET: 500-200 TAB at 10:13

## 2022-09-18 RX ADMIN — LISINOPRIL: 20 TABLET ORAL at 10:13

## 2022-09-18 RX ADMIN — DEXAMETHASONE SODIUM PHOSPHATE 6 MG: 10 INJECTION INTRAMUSCULAR; INTRAVENOUS at 10:15

## 2022-09-18 RX ADMIN — NITROGLYCERIN 2 INCH: 20 OINTMENT TOPICAL at 15:39

## 2022-09-18 RX ADMIN — ASPIRIN 81 MG: 81 TABLET, COATED ORAL at 10:13

## 2022-09-18 RX ADMIN — Medication 1000 UNITS: at 10:13

## 2022-09-18 RX ADMIN — GUAIFENESIN 600 MG: 600 TABLET, EXTENDED RELEASE ORAL at 22:04

## 2022-09-18 RX ADMIN — NITROGLYCERIN 2 INCH: 20 OINTMENT TOPICAL at 06:09

## 2022-09-18 RX ADMIN — REMDESIVIR 100 MG: 100 INJECTION, POWDER, LYOPHILIZED, FOR SOLUTION INTRAVENOUS at 18:38

## 2022-09-18 RX ADMIN — Medication 10 ML: at 22:05

## 2022-09-18 RX ADMIN — NITROGLYCERIN 2 INCH: 20 OINTMENT TOPICAL at 00:20

## 2022-09-18 NOTE — CONSULTS
Date of Consultation: 22    Referral Provider: Cyn Shepherd MD     Reason for Consultation: CHF, uncontrolled hypertension.    Encounter Provider: Vidal Clifford MD    Group of Service: [facility]     Patient Name: Vonda Jay    :1935    Chief complaint:  Vomiting, diarrhea, headache, hypertension.    History of Present Illness:    Vonda Jay is an 87 year old female with a history of carotid artery disease, hypertension, prior breast cancer, and aortic stenosis.     She has historically had difficult to control and labile blood pressures. She is currently on nebivolol, lisinopril-hydrochlorothiazide, hydralazine, and lasix at home for management.  Most recent echocardiogram is from 22 which showed normal LV systolic function, LVEF 66-70%, mild concentric LVH, grade II diastolic dysfunction, and moderate to severe aortic valve stenosis.     She presented to the emergency department with complaints of diarrhea, vomiting, headache, shortness of air, and elevated systolic blood pressure. She reported recent COVID exposure. Upon ED assessment, patient denied shortness of breath or chest pain.  BP on arrival was 205/88, other vital signs stable. CXR on arrival showed cardiomegaly and mild vascular congestion, BNP 5,616.0. She was found to be COVID +, and UA was indicative of a UTI.    In the ER, she received ceftriaxone for possible UTI, dexamethasone for COVID, 80mg IV lasix, 50mg PO hydralazine, and 10mg IV labetolol for her blood pressure. She is admitted for UTI and COVID.  She is feeling better, although her renal function is slightly worse.  She has had no chest pain.  Her shortness of breath has improved slightly.    Cardiac testing:  ECHO 22  · Left ventricular ejection fraction appears to be 66 - 70%. Left ventricular systolic function is normal.  · Left ventricular wall thickness is consistent with mild concentric hypertrophy  · Left ventricular diastolic  function is consistent with (grade II w/high LAP) pseudonormalization.  · The right ventricular cavity is borderline dilated. Normal right ventricular systolic function noted.  · The left atrial cavity is moderately dilated.  · Moderate to severe aortic valve stenosis is present. Aortic valve area is 1.02 cm2. Peak velocity of the flow distal to the aortic valve is 373.4 cm/s. Aortic valve maximum pressure gradient is 55.8 mmHg. Aortic valve mean pressure gradient is 31.7 mmHg. Aortic valve dimensionless index is 0.30  · There is moderate mitral annular calcification.  · Mild mitral valve regurgitation is present.  · Calculated right ventricular systolic pressure from tricuspid regurgitation is 25.0 mmHg.  · There is no evidence of pericardial effusion.    Past Medical History:   Diagnosis Date   • Arthritis    • History of breast cancer    • History of carotid artery disease    • Hyperlipidemia    • Hypertension          Past Surgical History:   Procedure Laterality Date   • BREAST LUMPECTOMY  1998   • CAROTID ARTERY ANGIOPLASTY  2000, 2013   • CHOLECYSTECTOMY  1979   • FEMUR IM NAILING/RODDING Left 2/8/2018    Procedure: FEMUR INTRAMEDULLARY NAILING;  Surgeon: Zheng Fleming MD;  Location: [facility];  Service:    • HYSTERECTOMY  1989         No Known Allergies      No current facility-administered medications on file prior to encounter.     Current Outpatient Medications on File Prior to Encounter   Medication Sig Dispense Refill   • alendronate (FOSAMAX) 70 MG tablet Take 1 tablet by mouth Every 7 (Seven) Days.     • aspirin 81 MG tablet Take 81 mg by mouth Daily.     • Calcium Carb-Cholecalciferol 1000-800 MG-UNIT tablet Take  by mouth.     • cholecalciferol (VITAMIN D3) 1000 units tablet Take 1,000 Units by mouth Daily.     • hydrALAZINE (APRESOLINE) 100 MG tablet Take 100 mg by mouth 3 (Three) Times a Day.     • lisinopril-hydrochlorothiazide (PRINZIDE,ZESTORETIC) 20-25 MG per tablet Take 1 tablet by  "mouth Daily.  2   • meloxicam (MOBIC) 15 MG tablet Take 15 mg by mouth.     • nebivolol (Bystolic) 5 MG tablet Take 1 tablet by mouth Daily. 30 tablet 11   • pravastatin (PRAVACHOL) 40 MG tablet Take 40 mg by mouth Daily.     • citalopram (CeleXA) 10 MG tablet Take 10 mg by mouth Daily.     • furosemide (LASIX) 20 MG tablet Take 1 tablet by mouth Daily. 30 tablet 11   • potassium chloride 10 MEQ CR tablet Take 1 tablet by mouth Daily. 30 tablet 11         Social History     Socioeconomic History   • Marital status:    Tobacco Use   • Smoking status: Former Smoker     Years: 47.00     Quit date:      Years since quittin.7   • Smokeless tobacco: Never Used   • Tobacco comment: caffeine use- coffee   Vaping Use   • Vaping Use: Never used   Substance and Sexual Activity   • Alcohol use: No   • Drug use: Defer   • Sexual activity: Defer         Family History   Problem Relation Age of Onset   • Hypertension Mother    • Heart valve disorder Sister    • Lung cancer Sister    • Lymphoma Sister    • Skin cancer Sister    • Stroke Brother 57       REVIEW OF SYSTEMS:   Pertinent positives are noted in the HPI above.  Otherwise, all other systems were reviewed, and are negative.     Objective:     Vitals:    22 2344 22 0835 22 1557 22 1700   BP: 140/80 161/74 166/68    BP Location: Left arm Left arm Left arm    Patient Position: Lying Lying Lying    Pulse: 74 67 63    Resp:     Temp: 99.7 °F (37.6 °C) 99 °F (37.2 °C)  99 °F (37.2 °C)   TempSrc: Oral Oral Oral Oral   SpO2: 96% 96% 96%    Weight:       Height:         Body mass index is 31.93 kg/m².  Flowsheet Rows    Flowsheet Row First Filed Value   Admission Height 154.9 cm (61\") Documented at 2022   Admission Weight 76.7 kg (169 lb) Documented at 2022           General:    No acute distress, alert and oriented x4, pleasant                   Head:    Normocephalic, atraumatic.   Eyes:          Conjunctivae " and sclerae normal, no icterus, PERRLA   Throat:   No oral lesions, no thrush, oral mucosa moist.    Neck:   Supple, trachea midline.   Lungs:     Clear to auscultation bilaterally     Heart:    Regular rhythm and normal rate.  III/VI SM RUSB and LUSB.   Abdomen:     Soft, non-tender, non-distended, positive bowel sounds.    Extremities:  Trace edema of the lower extremities.   Pulses:   Pulses palpable and equal bilaterally.    Skin:   No bleeding or rash.   Neuro:   Non-focal.  Moves all extremities well.    Psychiatric:   Normal mood and affect.           Lab Review:                Results from last 7 days   Lab Units 09/18/22  0557   SODIUM mmol/L 136   POTASSIUM mmol/L 3.9   CHLORIDE mmol/L 94*   CO2 mmol/L 25.7   BUN mg/dL 34*   CREATININE mg/dL 1.81*   GLUCOSE mg/dL 122*   CALCIUM mg/dL 9.6         Results from last 7 days   Lab Units 09/18/22  0557   WBC 10*3/mm3 6.06   HEMOGLOBIN g/dL 11.6*   HEMATOCRIT % 34.5   PLATELETS 10*3/mm3 157                       EKG (reviewed by me personally):        Baseline EKG      Assessment:   1.  Acute hypoxic respiratory failure  2.  COVID-19 infection  3.  Acute on chronic diastolic CHF, EF 65 to 70% on 7/7/2022  4.  Moderate to severe aortic stenosis by echo on 7/7/2022  5.  Acute kidney injury with stage III chronic kidney disease  6.  Acute urinary tract infection  7.  Hypertension, historically labile and difficult to control  8.  History of breast cancer  9.  Moderate bilateral carotid artery stenosis    Plan:       She has multiple issues currently.  The COVID-19 is likely driving much of this.  She has historically difficult to control blood pressure, and I suspect that steroids are going to make this more difficult to control.  She is currently on hydralazine 100 mg 3 times a day, Zestoretic 20/25 mg/day, Bystolic 5 mg/day, and nitroglycerin paste 2 inches every 6 hours.    I cannot increase the Bystolic because of her heart rate.  I do not want to drop her  pressure too low because of her aortic stenosis.  However, I would like to try her on a clonidine patch with close watch on her heart rate.  Her renal function precludes the use of many agents.    Her renal function is worse, and if appropriate from A standpoint, I would request a nephrology consult for assistance with diuretics and medication management.  She is on lisinopril HCTZ, and I am going to continue her on this for now until I see what her blood pressure and renal function will do.  I agree with diuresing her with 80 mg of IV Lasix twice a day.  Unfortunately, I had a difficult time with diuretics as an outpatient, and her renal function declined with taking these.  This will need to be watched very closely.    Continue aspirin and pravastatin for the carotid artery disease.  She is also on remdesivir and Decadron for the COVID infection.    Thank you very much for this consult.    Juan Clifford MD

## 2022-09-18 NOTE — PLAN OF CARE
Goal Outcome Evaluation:  Plan of Care Reviewed With: patient        Progress: no change  Outcome Evaluation: Patients blood pressure elevated, all other vitals stable. Patient denies pain this shift. Patient given lasix this shift. nitro paste adminstered to chest this shift. patient on isolation precautions for covid. Will continue to monitor.

## 2022-09-18 NOTE — PROGRESS NOTES
Name: Vonda Jay ADMIT: 2022   : 1935  PCP: Ambrose Ashley MD    MRN: [id number] LOS: 1 days   AGE/SEX: 87 y.o. female  ROOM: [facility]     Subjective   Subjective   Patient reports improved weakness.  Decreased lower extremity edema.  No fever or chills.  No chest pain.  No palpitation.  No cough.  No wheeze.  No hemoptysis.    Review of Systems  GI.  No abdominal pain.  No nausea or vomiting.  No bowel movement for the last 2 days.  .  Positive urge incontinence and frequency.  No dysuria or hematuria.  CNS.  No dizziness or loss of consciousness.  No focal neurological symptoms.       Objective   Objective   Vital Signs  Temp:  [99 °F (37.2 °C)-100.3 °F (37.9 °C)] 99 °F (37.2 °C)  Heart Rate:  [63-74] 63  Resp:  [18] 18  BP: (140-187)/(68-80) 166/68  SpO2:  [93 %-96 %] 96 %  on  Flow (L/min):  [2-3] 2;   Device (Oxygen Therapy): room air    Intake/Output Summary (Last 24 hours) at 2022 1906  Last data filed at 2022 1835  Gross per 24 hour   Intake --   Output 1450 ml   Net -1450 ml     Body mass index is 31.93 kg/m².      22   Weight: 76.7 kg (169 lb)     Physical Exam  General.  Elderly female.  She is obese.  Alert and oriented x3.  No apparent pain/distress/diaphoresis.  Normal mood and affect.  Eyes.  Pupils equal round and reactive.  Intact extraocular musculature.  No pallor or jaundice.  Normal trajectory and lids.  Oral cavity.  Moist mucous membrane.  Neck.  Supple.  No JVD.  No lymphadenopathy or thyromegaly.  Cardiovascular.  Regular rate and rhythm and grade 2 systolic murmur.  Chest.  Poor bilateral air entry with very fine minimal rhonchi at the bases.  No wheeze.  Abdomen.  Soft lax.  No tenderness.  No organomegaly.  No guarding or rebound.  Extremities.  +1 bilateral lower extremity edema.  CNS.  No acute focal neurological deficits.    Results Review:      Results from last 7 days   Lab Units 22  0557 22  1848 22  1143   SODIUM  mmol/L 136  --  133*   POTASSIUM mmol/L 3.9  --  4.2   CHLORIDE mmol/L 94*  --  98   CO2 mmol/L 25.7  --  24.0   BUN mg/dL 34*  --  26*   CREATININE mg/dL 1.81* 1.23* 1.25*   GLUCOSE mg/dL 122*  --  141*   CALCIUM mg/dL 9.6  --  9.0   AST (SGOT) U/L 26 29 26   ALT (SGPT) U/L 19 22 23     Estimated Creatinine Clearance: 20.5 mL/min (A) (by C-G formula based on SCr of 1.81 mg/dL (H)).              Results from last 7 days   Lab Units 09/17/22  1143   PROBNP pg/mL 5,616.0*               Invalid input(s):  PHOS        Invalid input(s): LDLCALC  Results from last 7 days   Lab Units 09/18/22  0557 09/17/22  1143   WBC 10*3/mm3 6.06 9.29   HEMOGLOBIN g/dL 11.6* 11.0*   HEMATOCRIT % 34.5 33.3*   PLATELETS 10*3/mm3 157 170   MCV fL 88.0 90.5   MCH pg 29.6 29.9   MCHC g/dL 33.6 33.0   RDW % 13.3 13.2   RDW-SD fl 42.9 43.8   MPV fL 11.1 11.2   NEUTROPHIL % % 82.9* 85.6*   LYMPHOCYTE % % 8.7* 4.7*   MONOCYTES % % 7.8 8.5   EOSINOPHIL % % 0.0* 0.2*   BASOPHIL % % 0.3 0.5   IMM GRAN % % 0.3 0.5   NEUTROS ABS 10*3/mm3 5.02 7.94*   LYMPHS ABS 10*3/mm3 0.53* 0.44*   MONOS ABS 10*3/mm3 0.47 0.79   EOS ABS 10*3/mm3 0.00 0.02   BASOS ABS 10*3/mm3 0.02 0.05   IMMATURE GRANS (ABS) 10*3/mm3 0.02 0.05   NRBC /100 WBC 0.0 0.0             Results from last 7 days   Lab Units 09/18/22  0557   PROCALCITONIN ng/mL 0.33*             Results from last 7 days   Lab Units 09/17/22  1153   URINECX  >100,000 CFU/mL Gram Negative Bacilli*         Results from last 7 days   Lab Units 09/17/22  1153   NITRITE UA  Positive*   WBC UA /HPF 21-30*   BACTERIA UA /HPF 4+*   SQUAM EPITHEL UA /HPF 3-6*   URINECX  >100,000 CFU/mL Gram Negative Bacilli*           Imaging:  Imaging Results (Last 24 Hours)     ** No results found for the last 24 hours. **             I reviewed the patient's new clinical results / labs / tests / procedures      Assessment/Plan     Active Hospital Problems    Diagnosis  POA   • **Acute hypoxemic respiratory failure due to COVID-19  ([facility]) [U07.1, J96.01]  Yes   • Acute on chronic diastolic CHF (congestive heart failure) ([facility]) [I50.33]  Yes   • Aortic stenosis [I35.0]  Yes   • UTI (urinary tract infection) [N39.0]  Yes   • CKD (chronic kidney disease) [N18.9]  Yes   • COVID-19 virus infection [U07.1]  Yes   • Abnormal urinalysis [R82.90]  Yes   • Anemia [D64.9]  Yes   • HTN (hypertension) [I10]  Yes      Resolved Hospital Problems   No resolved problems to display.           · Acute hypoxemic respiratory failure secondary to acute on chronic diastolic congestive heart failure exacerbation in a patient with a history of diastolic congestive heart failure/moderate to severe AS/difficult to control hypertension.  Elevated BNP.  No angina.  Chest x-ray with vascular congestion.  Last 2D echo on 7/22 revealing a normal ejection fraction with diastolic grade 2 dysfunction associated with left ventricular hypertrophy and severe to moderate AS.  Cardiology is following and continuing Zestoretic/hydralazine/Bystolic/nitroglycerin paste/aspirin/Pravachol.  Cardiology added a clonidine patch.  Currently on IV diuresis with clinical improvement.  Secondary to difficulty controlling the blood pressure and renal insufficiency I will check renal Doppler.  Because of diastolic congestive heart failure is mostly COVID in a patient with moderate to severe AS and uncontrolled hypertension  · COVID-19 infection.  No respiratory symptoms except objective hypoxemia.  Chest x-ray without infiltrates.  Will check.  CRP.  Will monitor CBC and CMP.  We will continue Decadron and remdesivir.  Monitor respiratory status.    · Acute on top of stage IIIb chronic renal failure.  Baseline creatinine between 1.4 and 1.6.  Patient is volume overloaded.  Acute component is most likely secondary to exacerbation of diastolic congestive heart failure and volume overload.  We will IV diuresis and monitor basic metabolic profile and renal function.  We will check urine  electrolytes/uric acid/renal ultrasound.  Consult nephrology.  · UTI.  Urine culture with gram-negative bacilli.  We will continue Rocephin.  · Mild anemia.  Hemoglobin around baseline of 9-12.  Will monitor.  · VTE prophylaxis.  On heparin subcu.    Discussed my findings and plan of treatment with the patient.  Disposition.  Hopefully back home in 2 to 3 days.        Shakila Mendez MD  [facility]  09/18/22  19:06 EDT

## 2022-09-18 NOTE — NURSING NOTE
Continuing to diurese. VSS. AOx4. Now on RA. Second dose Remdesivir given today. IV lasix with good UOP. Hopefully home soon.

## 2022-09-19 ENCOUNTER — APPOINTMENT (OUTPATIENT)
Dept: CARDIOLOGY | Facility: HOSPITAL | Age: 87
End: 2022-09-19

## 2022-09-19 PROBLEM — N18.30 ANEMIA OF CHRONIC RENAL FAILURE, STAGE 3 (MODERATE): Status: ACTIVE | Noted: 2022-09-19

## 2022-09-19 PROBLEM — N18.30 STAGE 3 CHRONIC KIDNEY DISEASE: Status: ACTIVE | Noted: 2022-09-17

## 2022-09-19 PROBLEM — D63.1 ANEMIA OF CHRONIC RENAL FAILURE, STAGE 3 (MODERATE): Status: ACTIVE | Noted: 2022-09-19

## 2022-09-19 LAB
ALBUMIN SERPL-MCNC: 3.9 G/DL (ref 3.5–5.2)
ALP SERPL-CCNC: 58 U/L (ref 39–117)
ALT SERPL W P-5'-P-CCNC: 21 U/L (ref 1–33)
ANION GAP SERPL CALCULATED.3IONS-SCNC: 11.4 MMOL/L (ref 5–15)
AST SERPL-CCNC: 26 U/L (ref 1–32)
B PARAPERT DNA SPEC QL NAA+PROBE: NOT DETECTED
B PERT DNA SPEC QL NAA+PROBE: NOT DETECTED
BACTERIA SPEC AEROBE CULT: ABNORMAL
BASOPHILS # BLD AUTO: 0.01 10*3/MM3 (ref 0–0.2)
BASOPHILS NFR BLD AUTO: 0.1 % (ref 0–1.5)
BH CV ECHO MEAS - DIST REN A EDV LEFT: 8.6 CM/S
BH CV ECHO MEAS - DIST REN A PSV LEFT: 83.5 CM/S
BH CV ECHO MEAS - MID REN A EDV LEFT: 12.4 CM/S
BH CV ECHO MEAS - MID REN A PSV LEFT: 117.7 CM/S
BH CV ECHO MEAS - PROX REN A EDV LEFT: 6.5 CM/S
BH CV ECHO MEAS - PROX REN A PSV LEFT: 79 CM/S
BH CV VAS RENAL AORTIC MID EDV: 13.6 CM/S
BH CV VAS RENAL AORTIC MID PSV: 174 CM/S
BH CV VAS RENAL HILUM LEFT EDV: 5.8 CM/S
BH CV VAS RENAL HILUM LEFT PSV: 35.1 CM/S
BH CV VAS RENAL HILUM RIGHT EDV: 4.67 CM/S
BH CV VAS RENAL HILUM RIGHT PSV: 38.4 CM/S
BH CV XLRA MEAS - KID L LEFT: 9.9 CM
BH CV XLRA MEAS DIST REN A EDV RIGHT: 17.5 CM/S
BH CV XLRA MEAS DIST REN A PSV RIGHT: 112 CM/S
BH CV XLRA MEAS KID L RIGHT: 9.2 CM
BH CV XLRA MEAS KID W RIGHT: 4.7 CM
BH CV XLRA MEAS MID REN A EDV RIGHT: -16.9 CM/S
BH CV XLRA MEAS MID REN A PSV RIGHT: -97.8 CM/S
BH CV XLRA MEAS PROX REN A EDV RIGHT: 9.5 CM/S
BH CV XLRA MEAS PROX REN A PSV RIGHT: 84.3 CM/S
BH CV XLRA MEAS RAR LEFT: 0.68
BH CV XLRA MEAS RAR RIGHT: 0.64
BH CV XLRA MEAS RENAL A ORG EDV LEFT: 7.2 CM/S
BH CV XLRA MEAS RENAL A ORG EDV RIGHT: 13.2 CM/S
BH CV XLRA MEAS RENAL A ORG PSV LEFT: 61.6 CM/S
BH CV XLRA MEAS RENAL A ORG PSV RIGHT: 109.5 CM/S
BILIRUB CONJ SERPL-MCNC: <0.2 MG/DL (ref 0–0.3)
BILIRUB INDIRECT SERPL-MCNC: NORMAL MG/DL
BILIRUB SERPL-MCNC: 0.2 MG/DL (ref 0–1.2)
BUN SERPL-MCNC: 55 MG/DL (ref 8–23)
BUN/CREAT SERPL: 21 (ref 7–25)
C PNEUM DNA NPH QL NAA+NON-PROBE: NOT DETECTED
CALCIUM SPEC-SCNC: 9.6 MG/DL (ref 8.6–10.5)
CHLORIDE SERPL-SCNC: 93 MMOL/L (ref 98–107)
CO2 SERPL-SCNC: 30.6 MMOL/L (ref 22–29)
CREAT SERPL-MCNC: 2.62 MG/DL (ref 0.57–1)
CREAT UR-MCNC: 23 MG/DL
CRP SERPL-MCNC: 7.76 MG/DL (ref 0–0.5)
DEPRECATED RDW RBC AUTO: 44.5 FL (ref 37–54)
EGFRCR SERPLBLD CKD-EPI 2021: 17.2 ML/MIN/1.73
EOSINOPHIL # BLD AUTO: 0 10*3/MM3 (ref 0–0.4)
EOSINOPHIL NFR BLD AUTO: 0 % (ref 0.3–6.2)
ERYTHROCYTE [DISTWIDTH] IN BLOOD BY AUTOMATED COUNT: 13.5 % (ref 12.3–15.4)
FLUAV SUBTYP SPEC NAA+PROBE: NOT DETECTED
FLUBV RNA ISLT QL NAA+PROBE: NOT DETECTED
GLUCOSE SERPL-MCNC: 123 MG/DL (ref 65–99)
HADV DNA SPEC NAA+PROBE: NOT DETECTED
HCOV 229E RNA SPEC QL NAA+PROBE: NOT DETECTED
HCOV HKU1 RNA SPEC QL NAA+PROBE: NOT DETECTED
HCOV NL63 RNA SPEC QL NAA+PROBE: NOT DETECTED
HCOV OC43 RNA SPEC QL NAA+PROBE: NOT DETECTED
HCT VFR BLD AUTO: 35.7 % (ref 34–46.6)
HGB BLD-MCNC: 11.6 G/DL (ref 12–15.9)
HMPV RNA NPH QL NAA+NON-PROBE: NOT DETECTED
HPIV1 RNA ISLT QL NAA+PROBE: NOT DETECTED
HPIV2 RNA SPEC QL NAA+PROBE: NOT DETECTED
HPIV3 RNA NPH QL NAA+PROBE: NOT DETECTED
HPIV4 P GENE NPH QL NAA+PROBE: NOT DETECTED
IMM GRANULOCYTES # BLD AUTO: 0.04 10*3/MM3 (ref 0–0.05)
IMM GRANULOCYTES NFR BLD AUTO: 0.5 % (ref 0–0.5)
LEFT KIDNEY WIDTH: 5.1 CM
LEFT RENAL UPPER PARENCHYMA MAX: 25.1 CM/S
LEFT RENAL UPPER PARENCHYMA MIN: 4.82 CM/S
LEFT RENAL UPPER PARENCHYMA RI: 0.81
LYMPHOCYTES # BLD AUTO: 0.89 10*3/MM3 (ref 0.7–3.1)
LYMPHOCYTES NFR BLD AUTO: 11.8 % (ref 19.6–45.3)
M PNEUMO IGG SER IA-ACNC: NOT DETECTED
MAXIMAL PREDICTED HEART RATE: 133 BPM
MCH RBC QN AUTO: 29.1 PG (ref 26.6–33)
MCHC RBC AUTO-ENTMCNC: 32.5 G/DL (ref 31.5–35.7)
MCV RBC AUTO: 89.7 FL (ref 79–97)
MONOCYTES # BLD AUTO: 1.16 10*3/MM3 (ref 0.1–0.9)
MONOCYTES NFR BLD AUTO: 15.4 % (ref 5–12)
NEUTROPHILS NFR BLD AUTO: 5.42 10*3/MM3 (ref 1.7–7)
NEUTROPHILS NFR BLD AUTO: 72.2 % (ref 42.7–76)
NRBC BLD AUTO-RTO: 0 /100 WBC (ref 0–0.2)
OSMOLALITY UR: 306 MOSM/KG
PLATELET # BLD AUTO: 177 10*3/MM3 (ref 140–450)
PMV BLD AUTO: 11.9 FL (ref 6–12)
POTASSIUM SERPL-SCNC: 4.6 MMOL/L (ref 3.5–5.2)
PROT SERPL-MCNC: 6.8 G/DL (ref 6–8.5)
RBC # BLD AUTO: 3.98 10*6/MM3 (ref 3.77–5.28)
RHINOVIRUS RNA SPEC NAA+PROBE: NOT DETECTED
RIGHT RENAL UPPER PARENCHYMA MAX: 13.5 CM/S
RIGHT RENAL UPPER PARENCHYMA MIN: 4.39 CM/S
RIGHT RENAL UPPER PARENCHYMA RI: 0.67
RSV RNA NPH QL NAA+NON-PROBE: NOT DETECTED
SARS-COV-2 RNA NPH QL NAA+NON-PROBE: DETECTED
SODIUM SERPL-SCNC: 135 MMOL/L (ref 136–145)
SODIUM UR-SCNC: 104 MMOL/L
STRESS TARGET HR: 113 BPM
WBC NRBC COR # BLD: 7.52 10*3/MM3 (ref 3.4–10.8)

## 2022-09-19 PROCEDURE — 25010000002 HEPARIN (PORCINE) PER 1000 UNITS: Performed by: INTERNAL MEDICINE

## 2022-09-19 PROCEDURE — 0202U NFCT DS 22 TRGT SARS-COV-2: CPT | Performed by: INTERNAL MEDICINE

## 2022-09-19 PROCEDURE — 80048 BASIC METABOLIC PNL TOTAL CA: CPT | Performed by: INTERNAL MEDICINE

## 2022-09-19 PROCEDURE — 80076 HEPATIC FUNCTION PANEL: CPT | Performed by: NURSE PRACTITIONER

## 2022-09-19 PROCEDURE — 93975 VASCULAR STUDY: CPT

## 2022-09-19 PROCEDURE — 82570 ASSAY OF URINE CREATININE: CPT | Performed by: INTERNAL MEDICINE

## 2022-09-19 PROCEDURE — 25010000002 REMDESIVIR 100 MG/20ML SOLUTION 1 EACH VIAL: Performed by: NURSE PRACTITIONER

## 2022-09-19 PROCEDURE — 36415 COLL VENOUS BLD VENIPUNCTURE: CPT | Performed by: INTERNAL MEDICINE

## 2022-09-19 PROCEDURE — 83935 ASSAY OF URINE OSMOLALITY: CPT | Performed by: INTERNAL MEDICINE

## 2022-09-19 PROCEDURE — 84300 ASSAY OF URINE SODIUM: CPT | Performed by: INTERNAL MEDICINE

## 2022-09-19 PROCEDURE — 85025 COMPLETE CBC W/AUTO DIFF WBC: CPT | Performed by: INTERNAL MEDICINE

## 2022-09-19 PROCEDURE — 25010000002 CEFTRIAXONE PER 250 MG: Performed by: INTERNAL MEDICINE

## 2022-09-19 PROCEDURE — 99231 SBSQ HOSP IP/OBS SF/LOW 25: CPT | Performed by: INTERNAL MEDICINE

## 2022-09-19 PROCEDURE — 86140 C-REACTIVE PROTEIN: CPT | Performed by: INTERNAL MEDICINE

## 2022-09-19 PROCEDURE — 25010000002 DEXAMETHASONE PER 1 MG: Performed by: INTERNAL MEDICINE

## 2022-09-19 PROCEDURE — 25010000002 FUROSEMIDE PER 20 MG: Performed by: INTERNAL MEDICINE

## 2022-09-19 RX ORDER — AMLODIPINE BESYLATE 5 MG/1
5 TABLET ORAL
Status: DISCONTINUED | OUTPATIENT
Start: 2022-09-19 | End: 2022-09-21 | Stop reason: HOSPADM

## 2022-09-19 RX ADMIN — FUROSEMIDE 80 MG: 10 INJECTION, SOLUTION INTRAMUSCULAR; INTRAVENOUS at 09:37

## 2022-09-19 RX ADMIN — REMDESIVIR 100 MG: 100 INJECTION, POWDER, LYOPHILIZED, FOR SOLUTION INTRAVENOUS at 18:27

## 2022-09-19 RX ADMIN — DEXAMETHASONE SODIUM PHOSPHATE 6 MG: 10 INJECTION INTRAMUSCULAR; INTRAVENOUS at 09:38

## 2022-09-19 RX ADMIN — Medication 10 ML: at 09:38

## 2022-09-19 RX ADMIN — CEFTRIAXONE SODIUM 1 G: 1 INJECTION, POWDER, FOR SOLUTION INTRAMUSCULAR; INTRAVENOUS at 21:06

## 2022-09-19 RX ADMIN — GUAIFENESIN 600 MG: 600 TABLET, EXTENDED RELEASE ORAL at 09:37

## 2022-09-19 RX ADMIN — HEPARIN SODIUM 5000 UNITS: 5000 INJECTION, SOLUTION INTRAVENOUS; SUBCUTANEOUS at 21:06

## 2022-09-19 RX ADMIN — NEBIVOLOL 5 MG: 5 TABLET ORAL at 09:37

## 2022-09-19 RX ADMIN — HYDRALAZINE HYDROCHLORIDE 100 MG: 50 TABLET, FILM COATED ORAL at 09:37

## 2022-09-19 RX ADMIN — PRAVASTATIN SODIUM 40 MG: 40 TABLET ORAL at 09:37

## 2022-09-19 RX ADMIN — HEPARIN SODIUM 5000 UNITS: 5000 INJECTION, SOLUTION INTRAVENOUS; SUBCUTANEOUS at 09:37

## 2022-09-19 RX ADMIN — ASPIRIN 81 MG: 81 TABLET, COATED ORAL at 09:37

## 2022-09-19 RX ADMIN — Medication 1000 UNITS: at 12:17

## 2022-09-19 RX ADMIN — Medication 10 ML: at 21:16

## 2022-09-19 RX ADMIN — CITALOPRAM 10 MG: 10 TABLET, FILM COATED ORAL at 09:37

## 2022-09-19 RX ADMIN — CALCIUM CARBONATE-VITAMIN D TAB 500 MG-200 UNIT 1 TABLET: 500-200 TAB at 09:37

## 2022-09-19 RX ADMIN — GUAIFENESIN 600 MG: 600 TABLET, EXTENDED RELEASE ORAL at 21:08

## 2022-09-19 RX ADMIN — NITROGLYCERIN 2 INCH: 20 OINTMENT TOPICAL at 18:27

## 2022-09-19 RX ADMIN — HYDRALAZINE HYDROCHLORIDE 100 MG: 50 TABLET, FILM COATED ORAL at 18:27

## 2022-09-19 RX ADMIN — NITROGLYCERIN 2 INCH: 20 OINTMENT TOPICAL at 12:17

## 2022-09-19 RX ADMIN — AMLODIPINE BESYLATE 5 MG: 5 TABLET ORAL at 18:27

## 2022-09-19 RX ADMIN — LISINOPRIL: 20 TABLET ORAL at 09:37

## 2022-09-19 RX ADMIN — CALCIUM CARBONATE-VITAMIN D TAB 500 MG-200 UNIT 1 TABLET: 500-200 TAB at 21:08

## 2022-09-19 NOTE — PLAN OF CARE
Goal Outcome Evaluation:              Outcome Evaluation: vss, no c/o pain, remains on ra, tolerating po, completed renal duplex scan, urine sent, will continue to monitor

## 2022-09-19 NOTE — PROGRESS NOTES
[facility]  Progress note: 2022    Patient Identification:  Name:Vonda Jay  Age:87 y.o.  Sex: female  :  1935  MRN: [id number]           CC:  Congestive heart failure, renal insufficiency, COVID 19 infection    Interval history:  Modest diuresis, renal labs worse Zestoretic held along with diuretics and nephrology consult pending.  No chest pain shortness of breath per nursing staff    Vital Signs:   Temp:  [97.7 °F (36.5 °C)-99 °F (37.2 °C)] 97.9 °F (36.6 °C)  Heart Rate:  [53-74] 53  Resp:  [16-20] 16  BP: (125-204)/(54-94) 155/54    Intake/Output Summary (Last 24 hours) at 2022 1541  Last data filed at 2022 0845  Gross per 24 hour   Intake --   Output 1950 ml   Net -1950 ml       Physical Examination:  Not performed to limit disease dissemination.  Discussed clinical scenario with patient's nurse  Lab Review:  Personally reviewed the labs, radiology imaging and other cardiac procedures.   Results from last 7 days   Lab Units 22  0559   SODIUM mmol/L 135*   POTASSIUM mmol/L 4.6   CHLORIDE mmol/L 93*   CO2 mmol/L 30.6*   BUN mg/dL 55*   CREATININE mg/dL 2.62*   CALCIUM mg/dL 9.6   BILIRUBIN mg/dL 0.2   ALK PHOS U/L 58   ALT (SGPT) U/L 21   AST (SGOT) U/L 26   GLUCOSE mg/dL 123*         Results from last 7 days   Lab Units 22  0559 22  0557 22  1143   WBC 10*3/mm3 7.52 6.06 9.29   HEMOGLOBIN g/dL 11.6* 11.6* 11.0*   HEMATOCRIT % 35.7 34.5 33.3*   PLATELETS 10*3/mm3 177 157 170         Medication Review:   Meds reviewed  Scheduled Meds:amLODIPine, 5 mg, Oral, Q24H  aspirin, 81 mg, Oral, Daily  calcium-vitamin D, 1 tablet, Oral, BID  cefTRIAXone, 1 g, Intravenous, Q24H  cholecalciferol, 1,000 Units, Oral, Daily  citalopram, 10 mg, Oral, Daily  cloNIDine, 1 patch, Transdermal, Weekly  dexamethasone, 6 mg, Intravenous, Daily  guaiFENesin, 600 mg, Oral, Q12H  heparin (porcine), 5,000 Units, Subcutaneous, Q12H  hydrALAZINE, 100 mg, Oral, TID  nebivolol, 5 mg,  Oral, Daily  nitroglycerin, 2 inch, Topical, Q6H  pravastatin, 40 mg, Oral, Daily  remdesivir, 100 mg, Intravenous, Q24H  sodium chloride, 10 mL, Intravenous, Q12H      I personally viewed and interpreted the patient's EKG/Telemetry data    Assessment and Plan  1.  Acute respiratory failure with COVID-19 infection. On remdesivir and steroids.  No hypoxia  2.  Acute on chronic diastolic heart failure, ejection fraction 65 to 70% on 7/7/2022.  Complicated by aortic stenosis, bradycardia and renal insufficiency.  Nephrology consult pending.  3.  Moderate severe aortic valve stenosis by echo 7/2022.  Treat medically  4.  Acute renal injury with UTI.  Nephrology consult pending.  5.  Hypertension. Amlodipine added today and would avoid hypotension given aortic stenosis  6.  History of breast cancer  7.  Moderate bilateral carotid artery stenosis      Mini Vang  9/19/202215:41 EDT  15min spent in reviewing records, discussion and examination of the patient and discussion with other members of the patient's medical team.     Dictated utilizing Dragon dictation

## 2022-09-19 NOTE — PROGRESS NOTES
Name: Vonda Jay ADMIT: 2022   : 1935  PCP: Ambrose Ashley MD    MRN: [id number] LOS: 2 days   AGE/SEX: 87 y.o. female  ROOM: [facility]     Subjective   Subjective   Weakness continues to improve..  Resolved lower extremity edema.  No fever or chills.  No chest pain.  No palpitation.  Positive occasional dry cough and wheeze.  No hemoptysis.  No PND.    Review of Systems  GI.  No abdominal pain.  No nausea or vomiting.  No bowel movement for the last 3 days.  .  Positive urge incontinence and frequency.  No dysuria or hematuria.  CNS.  No dizziness or loss of consciousness.  No focal neurological symptoms.       Objective   Objective   Vital Signs  Temp:  [97.7 °F (36.5 °C)-99 °F (37.2 °C)] 97.9 °F (36.6 °C)  Heart Rate:  [53-74] 53  Resp:  [16-20] 16  BP: (125-204)/(54-94) 155/54  SpO2:  [90 %-99 %] 92 %  on  Flow (L/min):  [2-3] 2;   Device (Oxygen Therapy): room air    Intake/Output Summary (Last 24 hours) at 2022 1423  Last data filed at 2022 0845  Gross per 24 hour   Intake --   Output 1950 ml   Net -1950 ml     Body mass index is 31.93 kg/m².      22   Weight: 76.7 kg (169 lb)     Physical Exam    General.  Elderly female.  She is obese.  Alert and oriented x3.  No apparent pain/distress/diaphoresis.  Normal mood and affect.  Eyes.  Pupils equal round and reactive.  Intact extraocular musculature.  No pallor or jaundice.  Normal trajectory and lids.  Oral cavity.  Moist mucous membrane.  Neck.  Supple.  No JVD.  No lymphadenopathy or thyromegaly.  Cardiovascular.  Regular rate and rhythm and grade 2 systolic murmur.  Chest.  Poor bilateral air entry with scattered bilateral expiratory wheeze but no rhonchi or crackles.  Abdomen.  Soft lax.  No tenderness.  No organomegaly.  No guarding or rebound.  Extremities.  Trace bilateral lower extremity edema.  CNS.  No acute focal neurological deficits.    Results Review:      Results from last 7 days   Lab Units  09/19/22  0559 09/18/22  0557 09/17/22  1848 09/17/22  1143   SODIUM mmol/L 135* 136  --  133*   POTASSIUM mmol/L 4.6 3.9  --  4.2   CHLORIDE mmol/L 93* 94*  --  98   CO2 mmol/L 30.6* 25.7  --  24.0   BUN mg/dL 55* 34*  --  26*   CREATININE mg/dL 2.62* 1.81* 1.23* 1.25*   GLUCOSE mg/dL 123* 122*  --  141*   CALCIUM mg/dL 9.6 9.6  --  9.0   AST (SGOT) U/L 26 26 29 26   ALT (SGPT) U/L 21 19 22 23     Estimated Creatinine Clearance: 14.2 mL/min (A) (by C-G formula based on SCr of 2.62 mg/dL (H)).              Results from last 7 days   Lab Units 09/17/22  1143   PROBNP pg/mL 5,616.0*               Invalid input(s):  PHOS        Invalid input(s): LDLCALC  Results from last 7 days   Lab Units 09/19/22  0559 09/18/22  0557 09/17/22  1143   WBC 10*3/mm3 7.52 6.06 9.29   HEMOGLOBIN g/dL 11.6* 11.6* 11.0*   HEMATOCRIT % 35.7 34.5 33.3*   PLATELETS 10*3/mm3 177 157 170   MCV fL 89.7 88.0 90.5   MCH pg 29.1 29.6 29.9   MCHC g/dL 32.5 33.6 33.0   RDW % 13.5 13.3 13.2   RDW-SD fl 44.5 42.9 43.8   MPV fL 11.9 11.1 11.2   NEUTROPHIL % % 72.2 82.9* 85.6*   LYMPHOCYTE % % 11.8* 8.7* 4.7*   MONOCYTES % % 15.4* 7.8 8.5   EOSINOPHIL % % 0.0* 0.0* 0.2*   BASOPHIL % % 0.1 0.3 0.5   IMM GRAN % % 0.5 0.3 0.5   NEUTROS ABS 10*3/mm3 5.42 5.02 7.94*   LYMPHS ABS 10*3/mm3 0.89 0.53* 0.44*   MONOS ABS 10*3/mm3 1.16* 0.47 0.79   EOS ABS 10*3/mm3 0.00 0.00 0.02   BASOS ABS 10*3/mm3 0.01 0.02 0.05   IMMATURE GRANS (ABS) 10*3/mm3 0.04 0.02 0.05   NRBC /100 WBC 0.0 0.0 0.0             Results from last 7 days   Lab Units 09/18/22  0557   PROCALCITONIN ng/mL 0.33*     Results from last 7 days   Lab Units 09/19/22  0559   CRP mg/dL 7.76*         Results from last 7 days   Lab Units 09/17/22  1153   URINECX  >100,000 CFU/mL Escherichia coli*         Results from last 7 days   Lab Units 09/17/22  1153   NITRITE UA  Positive*   WBC UA /HPF 21-30*   BACTERIA UA /HPF 4+*   SQUAM EPITHEL UA /HPF 3-6*   URINECX  >100,000 CFU/mL Escherichia coli*     Results  from last 7 days   Lab Units 09/18/22 2027   URIC ACID mg/dL 9.4*       Imaging:  Imaging Results (Last 24 Hours)     Procedure Component Value Units Date/Time    US Renal Bilateral [[id number]] Collected: 09/19/22 0004     Updated: 09/19/22 0008    Narrative:      PROCEDURE: US RENAL BILATERAL-     HISTORY: Acute on chronic renal failure.     TECHNIQUE:  A renal ultrasound was performed.     COMPARISON:  None     MEASUREMENTS:     Right renal length: 9.1 cm  Left renal length: 9.5 cm     RENAL FINDINGS: The kidneys are normal in size and echogenicity. There  is no hydronephrosis. No shadowing stones are seen.     BLADDER: The bladder is mildly distended and within normal limits.  Bilateral ureteral jets are visualized.          Impression:         No shadowing renal stones or hydronephrosis.       This report was finalized on 9/19/2022 12:05 AM by Dr. Mer Villalba M.D.                I reviewed the patient's new clinical results / labs / tests / procedures      Assessment/Plan     Active Hospital Problems    Diagnosis  POA   • **Acute hypoxemic respiratory failure due to COVID-19 ([facility]) [U07.1, J96.01]  Yes   • Acute on chronic diastolic CHF (congestive heart failure) ([facility]) [I50.33]  Yes   • Aortic stenosis [I35.0]  Yes   • UTI (urinary tract infection) [N39.0]  Yes   • CKD (chronic kidney disease) [N18.9]  Yes   • COVID-19 virus infection [U07.1]  Yes   • Abnormal urinalysis [R82.90]  Yes   • Anemia [D64.9]  Yes   • HTN (hypertension) [I10]  Yes      Resolved Hospital Problems   No resolved problems to display.           · Acute hypoxemic respiratory failure secondary to acute on chronic diastolic congestive heart failure exacerbation in a patient with a history of diastolic congestive heart failure/moderate to severe AS/difficult to control hypertension.  Elevated BNP.  No angina.  Chest x-ray with vascular congestion.  Last 2D echo on 7/22 revealing a normal ejection fraction with diastolic grade 2 dysfunction  associated with left ventricular hypertrophy and severe to moderate AS.  Cardiology is following and continuing Zestoretic/hydralazine/Bystolic/nitroglycerin paste/aspirin/Pravachol.  Cardiology added a clonidine patch.  On IV Lasix.  Positive clinical improvement.  Blood pressure improved but still suboptimal.  Continue above medication except DC Zestoretic/IV Lasix because of worsening renal failure and secondary to improvement of volume overload.  Awaiting renal arterial Doppler study to rule out renal artery stenosis the cause of diastolic congestive heart failure is mostly COVID in a patient with moderate to severe AS and uncontrolled hypertension  · COVID-19 infection.  No respiratory symptoms except occasional dry cough and wheeze.  Resolved hypoxemia.  Chest x-ray without infiltrates.  Elevated CRP.  Will monitor CBC and CMP.  continue Decadron and remdesivir.  Monitor respiratory status.    · Acute on top of stage IIIb chronic renal failure.  Baseline creatinine between 1.4 and 1.6.  Patient volume overload seemingly has resolved.  Worsening acute kidney failure today and might be related to overdiuresis in view of ACE inhibitor.  We will stop Zestoretic and hold IV diuretic until nephrology is seen.  Renal ultrasound shows no obstruction.  Awaiting urine electrolytes.  Uric acid is elevated.    · E. coli UTI.  Continue Rocephin.  Positive symptomatology of UTI.  · Mild anemia.  Hemoglobin around baseline of 9-12.  Stable hemoglobin.  Will monitor.  · VTE prophylaxis.  On heparin subcu.    Discussed my findings and plan of treatment with the patient.  Disposition.  Hopefully back home in 2 to 3 days.        Shakila Mendez MD  [facility]  09/19/22  14:23 EDT

## 2022-09-19 NOTE — PLAN OF CARE
Goal Outcome Evaluation:  Plan of Care Reviewed With: patient        Progress: no change  Outcome Evaluation: No change throughout the night. 2L NC while sleeping. IV antibiobic given. Will CTM

## 2022-09-19 NOTE — CONSULTS
[facility] Consult Note      Patient Name: Vonda Jay  : 1935  MRN: [id number]  Primary Care Physician:  Ambrose Ashley MD  Referring Physician: Cyn Shepherd MD  Date of admission: 2022    Subjective     Reason for Consult: Acute kidney injury on top of chronic kidney disease stage III    HPI:   Vonda Jay is a 87 y.o. female who got admitted to the hospital on 2022.    The patient has past medical history of breast cancer, hyperlipidemia, hypertension, carotid artery disease, osteoarthritis and chronic kidney disease stage III.    The patient was doing well till 2022.  She started having cough with headache and nausea.  She was not feeling well and developed generalized weakness.  Apparently one of her friends that she was close to was diagnosed with COVID-19 infection.  The patient presented to the emergency room on 2022 with worsening generalized weakness and cough.  In the emergency room, she was found to be hypoxic along with positive COVID-19 assay and abnormal urine analysis possibly consistent with urinary tract infection.  The patient was started on oxygen supplementation and IV antibiotics.  Because her BNP was elevated, IV Lasix was started especially that the chest x-ray showed vascular congestion.    Her creatinine at baseline is around 1.3-1.6.  It was 1.25 on admission to the hospital.  It went up to 1.81 yesterday and went up again to 2.62 today.  The patient is still making urine.    Her blood pressure is fluctuating.  It was up to 204/82 this morning but then improved after that.  She has no fever.    She feels a little bit better today.  She had cough last night with minimal sputum.  She feels tired.  No leg swelling.    Review of Systems:   14 point review of systems is otherwise negative except for mentioned above on HPI    Personal History     Past Medical History:   Diagnosis Date   • Arthritis     • History of breast cancer    • History of carotid artery disease    • Hyperlipidemia    • Hypertension        Past Surgical History:   Procedure Laterality Date   • BREAST LUMPECTOMY  1998   • CAROTID ARTERY ANGIOPLASTY  2000, 2013   • CHOLECYSTECTOMY  1979   • FEMUR IM NAILING/RODDING Left 2/8/2018    Procedure: FEMUR INTRAMEDULLARY NAILING;  Surgeon: Zheng Fleming MD;  Location: [facility];  Service:    • HYSTERECTOMY  1989       Family History: family history includes Heart valve disorder in her sister; Hypertension in her mother; Lung cancer in her sister; Lymphoma in her sister; Skin cancer in her sister; Stroke (age of onset: 57) in her brother.    Social History:  reports that she quit smoking about 23 years ago. She quit after 47.00 years of use. She has never used smokeless tobacco. Drug use questions deferred to the physician. She reports that she does not drink alcohol.    Home Medications:  Prior to Admission medications    Medication Sig Start Date End Date Taking? Authorizing Provider   alendronate (FOSAMAX) 70 MG tablet Take 1 tablet by mouth Every 7 (Seven) Days. 10/10/21 10/10/22 Yes Lianne King MD   aspirin 81 MG tablet Take 81 mg by mouth Daily.   Yes Lianne King MD   Calcium Carb-Cholecalciferol 1000-800 MG-UNIT tablet Take  by mouth.   Yes Lianne King MD   cholecalciferol (VITAMIN D3) 1000 units tablet Take 1,000 Units by mouth Daily.   Yes Lianne King MD   hydrALAZINE (APRESOLINE) 100 MG tablet Take 100 mg by mouth 3 (Three) Times a Day. 12/8/21  Yes Lianne King MD   lisinopril-hydrochlorothiazide (PRINZIDE,ZESTORETIC) 20-25 MG per tablet Take 1 tablet by mouth Daily. 3/8/18  Yes Lianne King MD   meloxicam (MOBIC) 15 MG tablet Take 15 mg by mouth. 5/6/18  Yes Lianne King MD   nebivolol (Bystolic) 5 MG tablet Take 1 tablet by mouth Daily. 6/7/22  Yes Jaye Hough APRN   pravastatin (PRAVACHOL) 40 MG tablet Take  40 mg by mouth Daily.   Yes Provider, MD Lianne   citalopram (CeleXA) 10 MG tablet Take 10 mg by mouth Daily. 1/25/21 1/25/22  Lianne King MD   furosemide (LASIX) 20 MG tablet Take 1 tablet by mouth Daily. 7/7/22   Vidal Clifford MD   potassium chloride 10 MEQ CR tablet Take 1 tablet by mouth Daily. 7/7/22   Vidal Clifford MD       Allergies:  No Known Allergies    Objective     Vitals:   Temp:  [97.7 °F (36.5 °C)-98.5 °F (36.9 °C)] 97.9 °F (36.6 °C)  Heart Rate:  [53-74] 53  Resp:  [16-20] 16  BP: (125-204)/(54-94) 155/54  Flow (L/min):  [2] 2    Intake/Output Summary (Last 24 hours) at 9/19/2022 1712  Last data filed at 9/19/2022 1500  Gross per 24 hour   Intake --   Output 2100 ml   Net -2100 ml       Physical Exam:   Constitutional: Awake, alert, no acute distress.  HEENT: Sclera anicteric, no conjunctival injection  Neck: Supple, no thyromegaly, no lymphadenopathy, trachea at midline, no JVD  Respiratory: Clear to auscultation bilaterally, nonlabored respiration  Cardiovascular: RRR, she has systolic murmur, no rubs or gallops, no carotid bruit  Gastrointestinal: Positive bowel sounds, abdomen is soft, nontender and nondistended  : No palpable bladder  Musculoskeletal: No edema, no clubbing or cyanosis  Psychiatric: Appropriate affect, cooperative  Neurologic: Oriented x3, moving all extremities, normal speech and mental status  Skin: Warm and dry       Scheduled Meds:     amLODIPine, 5 mg, Oral, Q24H  aspirin, 81 mg, Oral, Daily  calcium-vitamin D, 1 tablet, Oral, BID  cefTRIAXone, 1 g, Intravenous, Q24H  cholecalciferol, 1,000 Units, Oral, Daily  citalopram, 10 mg, Oral, Daily  cloNIDine, 1 patch, Transdermal, Weekly  dexamethasone, 6 mg, Intravenous, Daily  guaiFENesin, 600 mg, Oral, Q12H  heparin (porcine), 5,000 Units, Subcutaneous, Q12H  hydrALAZINE, 100 mg, Oral, TID  nebivolol, 5 mg, Oral, Daily  nitroglycerin, 2 inch, Topical, Q6H  pravastatin, 40 mg, Oral,  Daily  remdesivir, 100 mg, Intravenous, Q24H  sodium chloride, 10 mL, Intravenous, Q12H      IV Meds:        Results Reviewed:   I have personally reviewed the results from the time of this admission to 9/19/2022 17:12 EDT     Lab Results   Component Value Date    GLUCOSE 123 (H) 09/19/2022    CALCIUM 9.6 09/19/2022     (L) 09/19/2022    K 4.6 09/19/2022    CO2 30.6 (H) 09/19/2022    CL 93 (L) 09/19/2022    BUN 55 (H) 09/19/2022    CREATININE 2.62 (H) 09/19/2022    EGFRIFNONA 48 (L) 02/12/2018    BCR 21.0 09/19/2022    ANIONGAP 11.4 09/19/2022      Lab Results   Component Value Date    MG 2.3 02/08/2018    PHOS 4.3 02/09/2018    ALBUMIN 3.90 09/19/2022     US Renal Bilateral    Result Date: 9/19/2022  PROCEDURE: US RENAL BILATERAL-  HISTORY: Acute on chronic renal failure.  TECHNIQUE:  A renal ultrasound was performed.  COMPARISON:  None  MEASUREMENTS:  Right renal length: 9.1 cm Left renal length: 9.5 cm  RENAL FINDINGS: The kidneys are normal in size and echogenicity. There is no hydronephrosis. No shadowing stones are seen.  BLADDER: The bladder is mildly distended and within normal limits. Bilateral ureteral jets are visualized.       Impression:  No shadowing renal stones or hydronephrosis.   This report was finalized on 9/19/2022 12:05 AM by Dr. Mer Villalba M.D.         Assessment / Plan     ASSESSMENT:  1.  Acute kidney injury.  Renal ultrasound was reviewed and was unremarkable  2.  Chronic kidney disease stage III with baseline creatinine of 1.3-1.6  3.  Anemia of chronic kidney disease  4.  Hypertension  5.  COVID-19 infection  6.  Hyperlipidemia  7.  E. coli UTI    PLAN:  I agree with holding diuretics.  No need for IV fluids at this point  I agree with holding ACE inhibitor's at this point  Continue amlodipine, clonidine, hydralazine and nebivolol.  Monitor blood pressure closely  Monitor hemoglobin  Continue IV ceftriaxone  The patient underwent renal arterial Doppler today.  I will follow on  the results    Thank you for involving us in the care of Vonda Jay.  Please feel free to call with any questions.    Deysi Ruff MD  09/19/22  17:12 EDT    [facility]  [phone]      Much of this encounter note is an electronic transcription/translation of spoken language to printed text. The electronic translation of spoken language may permit erroneous, or at times, nonsensical words or phrases to be inadvertently transcribed; Although I have reviewed the note for such errors, some may still exist.

## 2022-09-20 LAB
ALBUMIN SERPL-MCNC: 3.8 G/DL (ref 3.5–5.2)
ALP SERPL-CCNC: 53 U/L (ref 39–117)
ALT SERPL W P-5'-P-CCNC: 18 U/L (ref 1–33)
ANION GAP SERPL CALCULATED.3IONS-SCNC: 15 MMOL/L (ref 5–15)
AST SERPL-CCNC: 25 U/L (ref 1–32)
BASOPHILS # BLD AUTO: 0.01 10*3/MM3 (ref 0–0.2)
BASOPHILS NFR BLD AUTO: 0.1 % (ref 0–1.5)
BILIRUB CONJ SERPL-MCNC: <0.2 MG/DL (ref 0–0.3)
BILIRUB INDIRECT SERPL-MCNC: NORMAL MG/DL
BILIRUB SERPL-MCNC: 0.2 MG/DL (ref 0–1.2)
BUN SERPL-MCNC: 74 MG/DL (ref 8–23)
BUN/CREAT SERPL: 27.3 (ref 7–25)
CALCIUM SPEC-SCNC: 9.9 MG/DL (ref 8.6–10.5)
CHLORIDE SERPL-SCNC: 90 MMOL/L (ref 98–107)
CO2 SERPL-SCNC: 29 MMOL/L (ref 22–29)
CREAT SERPL-MCNC: 2.71 MG/DL (ref 0.57–1)
CRP SERPL-MCNC: 4.51 MG/DL (ref 0–0.5)
DEPRECATED RDW RBC AUTO: 43.3 FL (ref 37–54)
EGFRCR SERPLBLD CKD-EPI 2021: 16.5 ML/MIN/1.73
EOSINOPHIL # BLD AUTO: 0 10*3/MM3 (ref 0–0.4)
EOSINOPHIL NFR BLD AUTO: 0 % (ref 0.3–6.2)
ERYTHROCYTE [DISTWIDTH] IN BLOOD BY AUTOMATED COUNT: 13.5 % (ref 12.3–15.4)
GLUCOSE SERPL-MCNC: 113 MG/DL (ref 65–99)
HCT VFR BLD AUTO: 35.4 % (ref 34–46.6)
HGB BLD-MCNC: 12 G/DL (ref 12–15.9)
IMM GRANULOCYTES # BLD AUTO: 0.04 10*3/MM3 (ref 0–0.05)
IMM GRANULOCYTES NFR BLD AUTO: 0.6 % (ref 0–0.5)
LYMPHOCYTES # BLD AUTO: 1.24 10*3/MM3 (ref 0.7–3.1)
LYMPHOCYTES NFR BLD AUTO: 17.4 % (ref 19.6–45.3)
MCH RBC QN AUTO: 29.6 PG (ref 26.6–33)
MCHC RBC AUTO-ENTMCNC: 33.9 G/DL (ref 31.5–35.7)
MCV RBC AUTO: 87.4 FL (ref 79–97)
MONOCYTES # BLD AUTO: 0.94 10*3/MM3 (ref 0.1–0.9)
MONOCYTES NFR BLD AUTO: 13.2 % (ref 5–12)
NEUTROPHILS NFR BLD AUTO: 4.88 10*3/MM3 (ref 1.7–7)
NEUTROPHILS NFR BLD AUTO: 68.7 % (ref 42.7–76)
NRBC BLD AUTO-RTO: 0 /100 WBC (ref 0–0.2)
PLATELET # BLD AUTO: 198 10*3/MM3 (ref 140–450)
PMV BLD AUTO: 11.4 FL (ref 6–12)
POTASSIUM SERPL-SCNC: 4.2 MMOL/L (ref 3.5–5.2)
PROT SERPL-MCNC: 6.4 G/DL (ref 6–8.5)
RBC # BLD AUTO: 4.05 10*6/MM3 (ref 3.77–5.28)
SODIUM SERPL-SCNC: 134 MMOL/L (ref 136–145)
WBC NRBC COR # BLD: 7.11 10*3/MM3 (ref 3.4–10.8)

## 2022-09-20 PROCEDURE — 80076 HEPATIC FUNCTION PANEL: CPT | Performed by: NURSE PRACTITIONER

## 2022-09-20 PROCEDURE — 86140 C-REACTIVE PROTEIN: CPT | Performed by: INTERNAL MEDICINE

## 2022-09-20 PROCEDURE — 25010000002 DEXAMETHASONE PER 1 MG: Performed by: INTERNAL MEDICINE

## 2022-09-20 PROCEDURE — 99231 SBSQ HOSP IP/OBS SF/LOW 25: CPT | Performed by: INTERNAL MEDICINE

## 2022-09-20 PROCEDURE — 97116 GAIT TRAINING THERAPY: CPT

## 2022-09-20 PROCEDURE — 36415 COLL VENOUS BLD VENIPUNCTURE: CPT | Performed by: INTERNAL MEDICINE

## 2022-09-20 PROCEDURE — 97162 PT EVAL MOD COMPLEX 30 MIN: CPT

## 2022-09-20 PROCEDURE — 80048 BASIC METABOLIC PNL TOTAL CA: CPT | Performed by: INTERNAL MEDICINE

## 2022-09-20 PROCEDURE — 25010000002 REMDESIVIR 100 MG/20ML SOLUTION 1 EACH VIAL: Performed by: NURSE PRACTITIONER

## 2022-09-20 PROCEDURE — 25010000002 HEPARIN (PORCINE) PER 1000 UNITS: Performed by: INTERNAL MEDICINE

## 2022-09-20 PROCEDURE — 85025 COMPLETE CBC W/AUTO DIFF WBC: CPT | Performed by: INTERNAL MEDICINE

## 2022-09-20 PROCEDURE — 25010000002 CEFTRIAXONE PER 250 MG: Performed by: INTERNAL MEDICINE

## 2022-09-20 RX ORDER — DEXAMETHASONE 6 MG/1
6 TABLET ORAL
Status: DISCONTINUED | OUTPATIENT
Start: 2022-09-21 | End: 2022-09-21 | Stop reason: HOSPADM

## 2022-09-20 RX ORDER — DEXAMETHASONE 6 MG/1
6 TABLET ORAL
Status: DISCONTINUED | OUTPATIENT
Start: 2022-09-20 | End: 2022-09-20

## 2022-09-20 RX ADMIN — HYDRALAZINE HYDROCHLORIDE 100 MG: 50 TABLET, FILM COATED ORAL at 08:23

## 2022-09-20 RX ADMIN — HYDRALAZINE HYDROCHLORIDE 100 MG: 50 TABLET, FILM COATED ORAL at 21:23

## 2022-09-20 RX ADMIN — HEPARIN SODIUM 5000 UNITS: 5000 INJECTION, SOLUTION INTRAVENOUS; SUBCUTANEOUS at 21:21

## 2022-09-20 RX ADMIN — NEBIVOLOL 5 MG: 5 TABLET ORAL at 08:23

## 2022-09-20 RX ADMIN — CALCIUM CARBONATE-VITAMIN D TAB 500 MG-200 UNIT 1 TABLET: 500-200 TAB at 08:23

## 2022-09-20 RX ADMIN — HYDRALAZINE HYDROCHLORIDE 100 MG: 50 TABLET, FILM COATED ORAL at 16:06

## 2022-09-20 RX ADMIN — Medication 1000 UNITS: at 08:23

## 2022-09-20 RX ADMIN — CITALOPRAM 10 MG: 10 TABLET, FILM COATED ORAL at 08:23

## 2022-09-20 RX ADMIN — REMDESIVIR 100 MG: 100 INJECTION, POWDER, LYOPHILIZED, FOR SOLUTION INTRAVENOUS at 19:52

## 2022-09-20 RX ADMIN — AMLODIPINE BESYLATE 5 MG: 5 TABLET ORAL at 08:23

## 2022-09-20 RX ADMIN — NITROGLYCERIN 2 INCH: 20 OINTMENT TOPICAL at 18:06

## 2022-09-20 RX ADMIN — CALCIUM CARBONATE-VITAMIN D TAB 500 MG-200 UNIT 1 TABLET: 500-200 TAB at 21:22

## 2022-09-20 RX ADMIN — Medication 10 ML: at 08:28

## 2022-09-20 RX ADMIN — Medication 10 ML: at 21:59

## 2022-09-20 RX ADMIN — NITROGLYCERIN 2 INCH: 20 OINTMENT TOPICAL at 12:03

## 2022-09-20 RX ADMIN — LABETALOL HYDROCHLORIDE 10 MG: 5 INJECTION, SOLUTION INTRAVENOUS at 05:24

## 2022-09-20 RX ADMIN — HEPARIN SODIUM 5000 UNITS: 5000 INJECTION, SOLUTION INTRAVENOUS; SUBCUTANEOUS at 08:24

## 2022-09-20 RX ADMIN — GUAIFENESIN 600 MG: 600 TABLET, EXTENDED RELEASE ORAL at 08:23

## 2022-09-20 RX ADMIN — CEFTRIAXONE SODIUM 1 G: 1 INJECTION, POWDER, FOR SOLUTION INTRAMUSCULAR; INTRAVENOUS at 21:21

## 2022-09-20 RX ADMIN — DEXAMETHASONE SODIUM PHOSPHATE 6 MG: 10 INJECTION INTRAMUSCULAR; INTRAVENOUS at 08:23

## 2022-09-20 RX ADMIN — PRAVASTATIN SODIUM 40 MG: 40 TABLET ORAL at 08:23

## 2022-09-20 RX ADMIN — ASPIRIN 81 MG: 81 TABLET, COATED ORAL at 08:23

## 2022-09-20 RX ADMIN — NITROGLYCERIN 2 INCH: 20 OINTMENT TOPICAL at 05:05

## 2022-09-20 RX ADMIN — GUAIFENESIN 600 MG: 600 TABLET, EXTENDED RELEASE ORAL at 21:23

## 2022-09-20 NOTE — PLAN OF CARE
Goal Outcome Evaluation:              Outcome Evaluation: Pt AOx4, no c/o pain this shift. Bp up and down this shift. Will CTM the rest of my shift

## 2022-09-20 NOTE — THERAPY EVALUATION
Patient Name: Vonda Jay  : 1935    MRN: [id number]                              Today's Date: 2022       Admit Date: 2022    Visit Dx:     ICD-10-CM ICD-9-CM   1. Acute hypoxemic respiratory failure due to COVID-19 ([facility])  U07.1 518.81    J96.01 079.89     799.02   2. Acute on chronic diastolic CHF (congestive heart failure) ([facility])  I50.33 428.33     428.0   3. Acute UTI  N39.0 599.0     Patient Active Problem List   Diagnosis   • Closed displaced intertrochanteric fracture of left femur ([facility])   • SANYA (acute kidney injury) ([facility])   • HTN (hypertension)   • Acute blood loss anemia   • Leukocytosis   • Drug-induced constipation   • Acute hypoxemic respiratory failure due to COVID-19 ([facility])   • Stage 3 chronic kidney disease ([facility])   • COVID-19 virus infection   • Abnormal urinalysis   • Anemia   • Acute on chronic diastolic CHF (congestive heart failure) ([facility])   • Aortic stenosis   • UTI (urinary tract infection)   • Anemia of chronic renal failure, stage 3 (moderate) ([facility])     Past Medical History:   Diagnosis Date   • Arthritis    • History of breast cancer    • History of carotid artery disease    • Hyperlipidemia    • Hypertension      Past Surgical History:   Procedure Laterality Date   • BREAST LUMPECTOMY     • CAROTID ARTERY ANGIOPLASTY  ,    • CHOLECYSTECTOMY  1979   • FEMUR IM NAILING/RODDING Left 2018    Procedure: FEMUR INTRAMEDULLARY NAILING;  Surgeon: Zheng Fleming MD;  Location: [facility] OR;  Service:    • HYSTERECTOMY        General Information     Row Name 22 1322          Physical Therapy Time and Intention    Document Type evaluation (P)   -TT     Mode of Treatment physical therapy (P)   -TT     Row Name 22 1322          General Information    Patient Profile Reviewed yes (P)   -TT     Prior Level of Function independent:;all household mobility;community mobility;gait;transfer;bed mobility;ADL's;dressing;grooming;bathing (P)   -TT     Existing  Precautions/Restrictions fall (P)   -TT     Barriers to Rehab none identified (P)   -TT     Row Name 09/20/22 1322          Living Environment    People in Home alone (P)   -TT     Row Name 09/20/22 1322          Stairs Within Home, Primary    Number of Stairs, Within Home, Primary twelve (P)   -TT     Stair Railings, Within Home, Primary railings safe and in good condition (P)   -TT     Stairs Comment, Within Home, Primary IND to basement (P)   -TT     Row Name 09/20/22 1322          Cognition    Orientation Status (Cognition) oriented x 4 (P)   -TT     Row Name 09/20/22 1322          Safety Issues, Functional Mobility    Impairments Affecting Function (Mobility) strength;range of motion (ROM) (P)   -TT           User Key  (r) = Recorded By, (t) = Taken By, (c) = Cosigned By    Initials Name Provider Type    TT Leora Leone, PT Student PT Student               Mobility     Row Name 09/20/22 1323          Bed Mobility    Bed Mobility supine-sit (P)   -TT     Supine-Sit [place] (Bed Mobility) standby assist (P)   -TT     Assistive Device (Bed Mobility) bed rails (P)   -TT     Row Name 09/20/22 1323          Sit-Stand Transfer    Sit-Stand [place] (Transfers) standby assist (P)   -TT     Assistive Device (Sit-Stand Transfers) walker, 4-wheeled (P)   -TT     Comment, (Sit-Stand Transfer) experienced light headedness, resolved after deep breaths and standing in place (P)   -TT     Row Name 09/20/22 1323          Gait/Stairs (Locomotion)    [place] Level (Gait) standby assist (P)   -TT     Assistive Device (Gait) walker, 4-wheeled (P)   -TT     Distance in Feet (Gait) 200 (P)   -TT     Bilateral Gait Deviations forward flexed posture (P)   -TT           User Key  (r) = Recorded By, (t) = Taken By, (c) = Cosigned By    Initials Name Provider Type    TT Leora Leone, PT Student PT Student               Obj/Interventions     Row Name 09/20/22 1325          Range of Motion Comprehensive    General Range of  Motion no range of motion deficits identified (P)   -TT     Row Name 09/20/22 1325          Strength Comprehensive (MMT)    General Manual Muscle Testing (MMT) Assessment no strength deficits identified (P)   -TT     Row Name 09/20/22 1325          Balance    Balance Assessment sitting static balance;standing static balance;standing dynamic balance (P)   -TT     Static Sitting Balance independent (P)   -TT     Position, Sitting Balance unsupported;sitting edge of bed (P)   -TT     Static Standing Balance standby assist (P)   -TT     Dynamic Standing Balance standby assist (P)   -TT           User Key  (r) = Recorded By, (t) = Taken By, (c) = Cosigned By    Initials Name Provider Type    TT Leora Leone, PT Student PT Student               Goals/Plan    No documentation.                Clinical Impression     Row Name 09/20/22 1325          Pain    Pretreatment Pain Rating 0/10 - no pain (P)   -TT     Pain Location - knee (P)   -TT     Pre/Posttreatment Pain Comment stiffness in knees from laying down (P)   -TT     Row Name 09/20/22 1325          Plan of Care Review    Plan of Care Reviewed With patient (P)   -TT     Progress improving (P)   -TT     Outcome Evaluation Pt reports to hospital with a HA, SOA, CHF and HTN. Pt recieved supine in bed and is agreeable to PT treatment at this time. Pt lives home alone in a single story house with a flight of stairs to the basement. Pt PLOF is IND in all mobility, transfers, stairs and ADLs. Pt was SBA for supine > sit, STS and ambulated 200ft SBA with a rollator. No unsteadiness/LOB noted. Pt appears to be at baseline and does not needs skilled PT at this time. Please re-order if pt status changes. Pt left in room with [facility] and all needs in reach. (P)   -TT     Row Name 09/20/22 1327          Therapy Assessment/Plan (PT)    Patient/Family Therapy Goals Statement (PT) to go home (P)   -TT     Criteria for Skilled Interventions Met (PT) no (P)   -TT     Therapy  Frequency (PT) evaluation only (P)   -TT     Row Name 09/20/22 1325          Positioning and Restraints    Pre-Treatment Position in bed (P)   -TT     Post Treatment Position bed (P)   -TT     In Bed notified nsg;sitting EOB;call light within reach;encouraged to call for assist;with nsg (P)   -TT           User Key  (r) = Recorded By, (t) = Taken By, (c) = Cosigned By    Initials Name Provider Type    Leora Alfaro, PT Student PT Student               Outcome Measures     Row Name 09/20/22 1330 09/20/22 0822       How much help from another person do you currently need...    Turning from your back to your side while in flat bed without using bedrails? 4 (P)   -TT 4  -RG    Moving from lying on back to sitting on the side of a flat bed without bedrails? 4 (P)   -TT 4  -RG    Moving to and from a bed to a chair (including a wheelchair)? 4 (P)   -TT 3  -RG    Standing up from a chair using your arms (e.g., wheelchair, bedside chair)? 4 (P)   -TT 4  -RG    Climbing 3-5 steps with a railing? 4 (P)   -TT 3  -RG    To walk in hospital room? 4 (P)   -TT 3  -RG    AM-PAC 6 Clicks Score (PT) 24 (P)   -TT 21  -RG    Highest level of mobility 8 --> Walked 250 feet or more (P)   -TT 6 --> Walked 10 steps or more  -RG          User Key  (r) = Recorded By, (t) = Taken By, (c) = Cosigned By    Initials Name Provider Type    Romi Rodas RN Registered Nurse    Leora Alfaro, PT Student PT Student                               PT Recommendation and Plan     Plan of Care Reviewed With: (P) patient  Progress: (P) improving  Outcome Evaluation: (P) Pt reports to hospital with a HA, SOA, CHF and HTN. Pt recieved supine in bed and is agreeable to PT treatment at this time. Pt lives home alone in a single story house with a flight of stairs to the basement. Pt PLOF is IND in all mobility, transfers, stairs and ADLs. Pt was SBA for supine > sit, STS and ambulated 200ft SBA with a rollator. No unsteadiness/LOB noted. Pt appears  to be at baseline and does not needs skilled PT at this time. Please re-order if pt status changes. Pt left in room with nsg staff and all needs in reach.     Time Calculation:    PT Charges     Row Name 09/20/22 1331             Time Calculation    Start Time 1145 (P)   -TT      Stop Time 1204 (P)   -TT      Time Calculation (min) 19 min (P)   -TT      PT Received On 09/20/22 (P)   -TT              Time Calculation- PT    Total Timed Code Minutes- PT 16 minute(s) (P)   -TT            User Key  (r) = Recorded By, (t) = Taken By, (c) = Cosigned By    Initials Name Provider Type    TT Leora Leone, PT Student PT Student              Therapy Charges for Today     Code Description Service Date Service Provider Modifiers Qty    30769629546 HC PT EVAL MOD COMPLEXITY 2 9/20/2022 Leora Leone, PT Student GP 1    98247912278 HC GAIT TRAINING EA 15 MIN 9/20/2022 Leora Leone, PT Student GP 1          PT G-Codes  AM-PAC 6 Clicks Score (PT): (P) 24    Leora Leone PT Student  9/20/2022

## 2022-09-20 NOTE — PROGRESS NOTES
Name: Vonda Jay ADMIT: 2022   : 1935  PCP: Ambrose Ashley MD    MRN: [id number] LOS: 3 days   AGE/SEX: 87 y.o. female  ROOM: [facility]     Subjective   Subjective   Patient tach continues with weakness.  Positive postural dizziness.  Resolved lower extremity edema.  No fever or chills.  No chest pain.  No palpitation.  Positive occasional dry cough.  No wheeze.  No hemoptysis.  No PND.    Review of Systems  GI.  No abdominal pain.  No nausea or vomiting.  No bowel movement for the last 4 days.  .  Positive urge incontinence and frequency.  No dysuria or hematuria.  CNS.  No dizziness or loss of consciousness.  No focal neurological symptoms.       Objective   Objective   Vital Signs  Temp:  [98 °F (36.7 °C)] 98 °F (36.7 °C)  Heart Rate:  [52-64] 52  Resp:  [16-18] 18  BP: (116-179)/(54-76) 168/66  SpO2:  [91 %-98 %] 94 %  on  Flow (L/min):  [2] 2;   Device (Oxygen Therapy): nasal cannula    Intake/Output Summary (Last 24 hours) at 2022 0950  Last data filed at 2022 210  Gross per 24 hour   Intake 100 ml   Output 550 ml   Net -450 ml     Body mass index is 31.93 kg/m².      22   Weight: 76.7 kg (169 lb)     Physical Exam    General.  Elderly female.  She is obese.  Alert and oriented x3.  No apparent pain/distress/diaphoresis.  Normal mood and affect.  Eyes.  Pupils equal round and reactive.  Intact extraocular musculature.  No pallor or jaundice.  Normal trajectory and lids.  Oral cavity.  Moist mucous membrane.  Neck.  Supple.  No JVD.  No lymphadenopathy or thyromegaly.  Cardiovascular.  Regular rate and rhythm and grade 2 systolic murmur.  Chest.  Poor bilateral air entry with no added sounds.    Abdomen.  Soft lax.  No tenderness.  No organomegaly.  No guarding or rebound.  Extremities.  Trace bilateral lower extremity edema.  CNS.  No acute focal neurological deficits.    Results Review:      Results from last 7 days   Lab Units 22  0543 22  0559  09/18/22  0557 09/17/22  1848 09/17/22  1143   SODIUM mmol/L 134* 135* 136  --  133*   POTASSIUM mmol/L 4.2 4.6 3.9  --  4.2   CHLORIDE mmol/L 90* 93* 94*  --  98   CO2 mmol/L 29.0 30.6* 25.7  --  24.0   BUN mg/dL 74* 55* 34*  --  26*   CREATININE mg/dL 2.71* 2.62* 1.81* 1.23* 1.25*   GLUCOSE mg/dL 113* 123* 122*  --  141*   CALCIUM mg/dL 9.9 9.6 9.6  --  9.0   AST (SGOT) U/L 25 26 26 29 26   ALT (SGPT) U/L 18 21 19 22 23     Estimated Creatinine Clearance: 13.7 mL/min (A) (by C-G formula based on SCr of 2.71 mg/dL (H)).              Results from last 7 days   Lab Units 09/17/22  1143   PROBNP pg/mL 5,616.0*               Invalid input(s):  PHOS        Invalid input(s): LDLCALC  Results from last 7 days   Lab Units 09/20/22  0543 09/19/22  0559 09/18/22  0557 09/17/22  1143   WBC 10*3/mm3 7.11 7.52 6.06 9.29   HEMOGLOBIN g/dL 12.0 11.6* 11.6* 11.0*   HEMATOCRIT % 35.4 35.7 34.5 33.3*   PLATELETS 10*3/mm3 198 177 157 170   MCV fL 87.4 89.7 88.0 90.5   MCH pg 29.6 29.1 29.6 29.9   MCHC g/dL 33.9 32.5 33.6 33.0   RDW % 13.5 13.5 13.3 13.2   RDW-SD fl 43.3 44.5 42.9 43.8   MPV fL 11.4 11.9 11.1 11.2   NEUTROPHIL % % 68.7 72.2 82.9* 85.6*   LYMPHOCYTE % % 17.4* 11.8* 8.7* 4.7*   MONOCYTES % % 13.2* 15.4* 7.8 8.5   EOSINOPHIL % % 0.0* 0.0* 0.0* 0.2*   BASOPHIL % % 0.1 0.1 0.3 0.5   IMM GRAN % % 0.6* 0.5 0.3 0.5   NEUTROS ABS 10*3/mm3 4.88 5.42 5.02 7.94*   LYMPHS ABS 10*3/mm3 1.24 0.89 0.53* 0.44*   MONOS ABS 10*3/mm3 0.94* 1.16* 0.47 0.79   EOS ABS 10*3/mm3 0.00 0.00 0.00 0.02   BASOS ABS 10*3/mm3 0.01 0.01 0.02 0.05   IMMATURE GRANS (ABS) 10*3/mm3 0.04 0.04 0.02 0.05   NRBC /100 WBC 0.0 0.0 0.0 0.0             Results from last 7 days   Lab Units 09/18/22  0557   PROCALCITONIN ng/mL 0.33*     Results from last 7 days   Lab Units 09/20/22  0543 09/19/22  0559   CRP mg/dL 4.51* 7.76*         Results from last 7 days   Lab Units 09/17/22  1153   URINECX  >100,000 CFU/mL Escherichia coli*     Results from last 7 days   Lab  Units 09/19/22  1454   ADENOVIRUS DETECTION BY PCR  Not Detected   CORONAVIRUS 229E  Not Detected   CORONAVIRUS HKU1  Not Detected   CORONAVIRUS NL63  Not Detected   CORONAVIRUS OC43  Not Detected   HUMAN METAPNEUMOVIRUS  Not Detected   HUMAN RHINOVIRUS/ENTEROVIRUS  Not Detected   INFLUENZA B PCR  Not Detected   PARAINFLUENZA 1  Not Detected   PARAINFLUENZA VIRUS 2  Not Detected   PARAINFLUENZA VIRUS 3  Not Detected   PARAINFLUENZA VIRUS 4  Not Detected   BORDETELLA PERTUSSIS PCR  Not Detected   CHLAMYDOPHILA PNEUMONIAE PCR  Not Detected   MYCOPLAMA PNEUMO PCR  Not Detected   INFLUENZA A PCR  Not Detected   RSV, PCR  Not Detected     Results from last 7 days   Lab Units 09/17/22  1153   NITRITE UA  Positive*   WBC UA /HPF 21-30*   BACTERIA UA /HPF 4+*   SQUAM EPITHEL UA /HPF 3-6*   URINECX  >100,000 CFU/mL Escherichia coli*     Results from last 7 days   Lab Units 09/19/22  1455 09/18/22  2027   SODIUM UR mmol/L 104  --    CREATININE UR mg/dL 23.0  --    OSMOLALITY UR mOsm/kg 306  --    URIC ACID mg/dL  --  9.4*       Imaging:  Imaging Results (Last 24 Hours)     ** No results found for the last 24 hours. **             I reviewed the patient's new clinical results / labs / tests / procedures      Assessment/Plan     Active Hospital Problems    Diagnosis  POA   • **Acute hypoxemic respiratory failure due to COVID-19 ([facility]) [U07.1, J96.01]  Yes   • Anemia of chronic renal failure, stage 3 (moderate) ([facility]) [N18.30, D63.1]  Unknown   • Acute on chronic diastolic CHF (congestive heart failure) ([facility]) [I50.33]  Yes   • Aortic stenosis [I35.0]  Yes   • UTI (urinary tract infection) [N39.0]  Yes   • Stage 3 chronic kidney disease ([facility]) [N18.30]  Yes   • COVID-19 virus infection [U07.1]  Yes   • Abnormal urinalysis [R82.90]  Yes   • Anemia [D64.9]  Yes   • HTN (hypertension) [I10]  Yes   • SANYA (acute kidney injury) ([facility]) [N17.9]  Yes      Resolved Hospital Problems   No resolved problems to display.           · Acute  hypoxemic respiratory failure secondary to acute on chronic diastolic congestive heart failure exacerbation in a patient with a history of diastolic congestive heart failure/moderate to severe AS/difficult to control hypertension.  Improved blood pressure control.  Avoid significant reduction secondary to orthostasis and history of AS.  No angina.  Chest x-ray with vascular congestion.  Last 2D echo on 7/22 revealing a normal ejection fraction with diastolic grade 2 dysfunction associated with left ventricular hypertrophy and severe to moderate AS.  Cardiology is following and continuing hydralazine/Bystolic/nitroglycerin paste/aspirin/Pravachol/clonidine.  Status post IV Lasix diuresis.  Cardiology recommends medical treatment for AS..  Continue above medication.  Zestoretic/IV Lasix D/Heri  because of worsening renal failure and secondary to improvement of volume overload.  Negative renal arterial Doppler for renal artery stenosis.  The cause of diastolic congestive heart failure is mostly COVID in a patient with moderate to severe AS and uncontrolled hypertension  · COVID-19 infection.  No respiratory symptoms except occasional dry cough .  Resolved hypoxemia.  Chest x-ray without infiltrates.  Improving CRP.  Will monitor CBC and CMP.  continue Decadron (switch to p.o.) and remdesivir.  Monitor respiratory status.    · Acute on top of stage IIIb chronic renal failure.  Baseline creatinine between 1.4 and 1.6.  Patient volume overload seemingly has resolved.  Worsening acute kidney failure after IV diuresis.  This is mostly related to overdiuresis in view of ACE inhibitor that has been DC'd.  Nephrology saw the patient and agree with current management.. Renal ultrasound shows no obstruction.  Urine electrolytes noted.   · E. coli UTI.  Continue Rocephin.  Positive symptomatology of UTI.  · Mild anemia.  Hemoglobin around baseline of 9-12.  Stable hemoglobi.  Resolved n.  Will monitor.  · VTE prophylaxis.  On  heparin subcu.    Discussed my findings and plan of treatment with the patient.  Disposition.  Hopefully back home in 1 to 2 days.  Will ask Physical therapy to evaluate for discharge needs.      Shakila Mendez MD  [facility]  09/20/22  09:50 EDT

## 2022-09-20 NOTE — PROGRESS NOTES
[facility]  Progress note: 2022    Patient Identification:  Name:Vonda Jay  Age:87 y.o.  Sex: female  :  1935  MRN: [id number]           CC:  Congestive heart failure    Interval history:  Vital stable overnight.  Renal labs slightly worse.  O2 saturations stable on room air.    Vital Signs:   Temp:  [97.5 °F (36.4 °C)-98 °F (36.7 °C)] 97.5 °F (36.4 °C)  Heart Rate:  [52-64] 64  Resp:  [16-18] 16  BP: (116-179)/(56-76) 150/56    Intake/Output Summary (Last 24 hours) at 2022 191  Last data filed at 2022 1617  Gross per 24 hour   Intake 540 ml   Output 300 ml   Net 240 ml       Physical Examination:    Lab Review:  Personally reviewed the labs, radiology imaging and other cardiac procedures.   Results from last 7 days   Lab Units 22  0543   SODIUM mmol/L 134*   POTASSIUM mmol/L 4.2   CHLORIDE mmol/L 90*   CO2 mmol/L 29.0   BUN mg/dL 74*   CREATININE mg/dL 2.71*   CALCIUM mg/dL 9.9   BILIRUBIN mg/dL 0.2   ALK PHOS U/L 53   ALT (SGPT) U/L 18   AST (SGOT) U/L 25   GLUCOSE mg/dL 113*         Results from last 7 days   Lab Units 22  0543 22  0559 22  0557   WBC 10*3/mm3 7.11 7.52 6.06   HEMOGLOBIN g/dL 12.0 11.6* 11.6*   HEMATOCRIT % 35.4 35.7 34.5   PLATELETS 10*3/mm3 198 177 157         Medication Review:   Meds reviewed  Scheduled Meds:amLODIPine, 5 mg, Oral, Q24H  aspirin, 81 mg, Oral, Daily  calcium-vitamin D, 1 tablet, Oral, BID  cefTRIAXone, 1 g, Intravenous, Q24H  cholecalciferol, 1,000 Units, Oral, Daily  citalopram, 10 mg, Oral, Daily  cloNIDine, 1 patch, Transdermal, Weekly  [START ON 2022] dexamethasone, 6 mg, Oral, Daily With Breakfast  guaiFENesin, 600 mg, Oral, Q12H  heparin (porcine), 5,000 Units, Subcutaneous, Q12H  hydrALAZINE, 100 mg, Oral, TID  nebivolol, 5 mg, Oral, Daily  nitroglycerin, 2 inch, Topical, Q6H  pravastatin, 40 mg, Oral, Daily  remdesivir, 100 mg, Intravenous, Q24H  sodium chloride, 10 mL, Intravenous, Q12H      I  personally viewed and interpreted the patient's EKG/Telemetry data    Assessment and Plan  1.  Acute respiratory failure with COVID-19 infection. On remdesivir and steroids.  No hypoxia  2.  Acute on chronic diastolic heart failure, ejection fraction 65 to 70% on 7/7/2022.  Complicated by aortic stenosis, bradycardia and renal insufficiency.    Nephrology directing diuresis. She is to transition to oral diuretics tomorrow.  3.  Moderate severe aortic valve stenosis by echo 7/2022.  Treat medically and remained stable.  We will sign off for now and have her follow-up with Dr. Clifford's APRN in office in about 3 weeks time.  Please call with additional questions/concerns.  4.  Acute renal injury with UTI.    As above.  Remains on antibiotic therapy  5.  Hypertension.  Steadily improving.  7.  Moderate bilateral carotid artery stenosis       Mini Vang  9/20/202219:12 EDT  15min spent in reviewing records, discussion and examination of the patient and discussion with other members of the patient's medical team.     Dictated utilizing Dragon dictation

## 2022-09-20 NOTE — PLAN OF CARE
Goal Outcome Evaluation:  Plan of Care Reviewed With: patient        Progress: improving  Outcome Evaluation: Pt A&Ox4 and VSS on RA. No c/o pain this shift. BP improving. Up with standby assist and use of walker. Ambulated in room with PT and with nurse > 200 feet today. Enhanced droplet precautions maintained. Will CTM the rest of my shift.

## 2022-09-20 NOTE — PLAN OF CARE
Goal Outcome Evaluation:  Plan of Care Reviewed With: (P) patient        Progress: (P) improving  Outcome Evaluation: (P) Pt reports to hospital with a HA, SOA, CHF and HTN. Pt recieved supine in bed and is agreeable to PT treatment at this time. Pt lives home alone in a single story house with a flight of stairs to the basement. Pt PLOF is IND in all mobility, transfers, stairs and ADLs. Pt was SBA for supine > sit, STS and ambulated 200ft SBA with a rollator. No unsteadiness/LOB noted. Pt appears to be at baseline and does not needs skilled PT at this time. Please re-order if pt status changes. Pt left in room with nsg staff and all needs in reach.

## 2022-09-20 NOTE — PROGRESS NOTES
[facility] Progress Note      Patient Name: Vonda Jay  : 1935  MRN: [id number]  Primary Care Physician:  Ambrose Ashley MD  Date of admission: 2022    Subjective     Interval History:   Follow up SANYA on CKD III.  Feels ok.  Oxygen sat 95% on RA.  Minimal cough. Eating fair.  Urinated a lot yesterday.  Being weighed now.   No bm .  Review of Systems:   As noted above    Objective     Vitals:   Temp:  [97.5 °F (36.4 °C)-98 °F (36.7 °C)] 97.5 °F (36.4 °C)  Heart Rate:  [52-64] 58  Resp:  [16-18] 16  BP: (116-179)/(57-76) 140/65  Flow (L/min):  [2] 2    Intake/Output Summary (Last 24 hours) at 2022 1339  Last data filed at 2022 0822  Gross per 24 hour   Intake 220 ml   Output 650 ml   Net -430 ml       Physical Exam:    General Appearance: alert, oriented x 3, no acute distress   Skin: warm and dry  HEENT: oral mucosa normal, nonicteric sclera  Neck: supple, no JVD  Lungs: Clear to auscultation. Unlabored.   Heart: RRR, normal S1 and S2  Abdomen: soft, nontender, nondistended. + bs  Extremities:no lower ext edema.   Neuro: normal speech and mental status     Scheduled Meds:     amLODIPine, 5 mg, Oral, Q24H  aspirin, 81 mg, Oral, Daily  calcium-vitamin D, 1 tablet, Oral, BID  cefTRIAXone, 1 g, Intravenous, Q24H  cholecalciferol, 1,000 Units, Oral, Daily  citalopram, 10 mg, Oral, Daily  cloNIDine, 1 patch, Transdermal, Weekly  [START ON 2022] dexamethasone, 6 mg, Oral, Daily With Breakfast  guaiFENesin, 600 mg, Oral, Q12H  heparin (porcine), 5,000 Units, Subcutaneous, Q12H  hydrALAZINE, 100 mg, Oral, TID  nebivolol, 5 mg, Oral, Daily  nitroglycerin, 2 inch, Topical, Q6H  pravastatin, 40 mg, Oral, Daily  remdesivir, 100 mg, Intravenous, Q24H  sodium chloride, 10 mL, Intravenous, Q12H      IV Meds:        Results Reviewed:   I have personally reviewed the results from the time of this admission to 2022 13:39 EDT     Results from last 7 days   Lab  Units 09/20/22  0543 09/19/22  0559 09/18/22  0557   SODIUM mmol/L 134* 135* 136   POTASSIUM mmol/L 4.2 4.6 3.9   CHLORIDE mmol/L 90* 93* 94*   CO2 mmol/L 29.0 30.6* 25.7   BUN mg/dL 74* 55* 34*   CREATININE mg/dL 2.71* 2.62* 1.81*   CALCIUM mg/dL 9.9 9.6 9.6   BILIRUBIN mg/dL 0.2 0.2 0.3   ALK PHOS U/L 53 58 55   ALT (SGPT) U/L 18 21 19   AST (SGOT) U/L 25 26 26   GLUCOSE mg/dL 113* 123* 122*       Estimated Creatinine Clearance: 13.7 mL/min (A) (by C-G formula based on SCr of 2.71 mg/dL (H)).          Results from last 7 days   Lab Units 09/18/22  2027   URIC ACID mg/dL 9.4*       Results from last 7 days   Lab Units 09/20/22  0543 09/19/22  0559 09/18/22  0557 09/17/22  1143   WBC 10*3/mm3 7.11 7.52 6.06 9.29   HEMOGLOBIN g/dL 12.0 11.6* 11.6* 11.0*   PLATELETS 10*3/mm3 198 177 157 170             Assessment / Plan     ASSESSMENT:  1. Edouard on CKD III.  Creatinine at plateau today. Renal US negative. Renal artery dopplers normal.  Prerenal azotemia due to steroid . Volume status improved.  Po diuretic tomorrow.  2. Acute on chronic diastolic heart failure . Compensated.   3. COVID 19.  On remdesivir and steroid.   4. HTN overall better today .  5. Aortic stenosis, moderate to severe.     PLAN:  1. Po diuretic tomorrow . Lasix 40 mg daily .    Earlene Quintana MD  09/20/22  13:39 EDT    [facility]  [phone]

## 2022-09-20 NOTE — CASE MANAGEMENT/SOCIAL WORK
Discharge Planning Assessment  [facility]     Patient Name: Vonda Jay  MRN: [id number]  Today's Date: 9/20/2022    Admit Date: 9/17/2022    Plan: Home vs home w/ HH   Discharge Needs Assessment     Row Name 09/20/22 1456       Living Environment    People in Home alone    Current Living Arrangements home    Primary Care Provided by self    Provides Primary Care For no one    Family Caregiver if Needed child(natalio), adult    Able to Return to Prior Arrangements yes       Resource/Environmental Concerns    Resource/Environmental Concerns none       Transition Planning    Patient/Family Anticipates Transition to home;home with help/services    Patient/Family Anticipated Services at Transition none;home health care    Transportation Anticipated family or friend will provide       Discharge Needs Assessment    Equipment Currently Used at Home rollator;cane, straight;ramp;other (see comments)  medical alert necklace    Concerns to be Addressed discharge planning    Discharge Facility/Level of Care Needs home with home health               Discharge Plan     Row Name 09/20/22 3947       Plan    Plan Home vs home w/ HH    Patient/Family in Agreement with Plan yes    Plan Comments Due to patient being in covid-19 isolation, ccp attempted to screen by calling her in the rroom;she did not answer. Ccp spoke to patient's son over the phone; explained role, verified facesheet, and discussed dc plan. Patient uses a medical alert necklace, cane when she leaves the house, and a rollator only sometimes. She lives alone in a 3 level home with a ramp to enter & no steps to her bedroom. She has no history of HH. She has [facility]. She walked 200ft w/ PT. Based on interdisciplinary assessments, the recommended discharge plan is home vs home w/ HH. JAZLYN SHOOK              Continued Care and Services - Admitted Since 9/17/2022    Coordination has not been started for this encounter.       Expected Discharge Date and Time      Expected Discharge Date Expected Discharge Time    Sep 21, 2022          Demographic Summary     Row Name 09/20/22 1455       General Information    Admission Type inpatient    Arrived From home    Referral Source admission list    Reason for Consult discharge planning    Preferred Language English               Functional Status     Row Name 09/20/22 1455       Functional Status    Usual Activity Tolerance good    Current Activity Tolerance good       Functional Status, IADL    Medications assistive equipment    Meal Preparation assistive equipment    Housekeeping assistive equipment    Laundry assistive equipment    Shopping assistive equipment       Mental Status    General Appearance WDL WDL               Psychosocial    No documentation.                Abuse/Neglect    No documentation.                Legal    No documentation.                Substance Abuse    No documentation.                Patient Forms    No documentation.                   JAZLYN MENDOZA

## 2022-09-21 ENCOUNTER — READMISSION MANAGEMENT (OUTPATIENT)
Dept: CALL CENTER | Facility: HOSPITAL | Age: 87
End: 2022-09-21

## 2022-09-21 VITALS
HEART RATE: 57 BPM | TEMPERATURE: 97.9 F | WEIGHT: 161.8 LBS | DIASTOLIC BLOOD PRESSURE: 73 MMHG | SYSTOLIC BLOOD PRESSURE: 175 MMHG | HEIGHT: 61 IN | RESPIRATION RATE: 18 BRPM | BODY MASS INDEX: 30.55 KG/M2 | OXYGEN SATURATION: 90 %

## 2022-09-21 PROBLEM — U07.1 ACUTE HYPOXEMIC RESPIRATORY FAILURE DUE TO COVID-19: Status: RESOLVED | Noted: 2022-09-17 | Resolved: 2022-09-21

## 2022-09-21 PROBLEM — J96.01 ACUTE HYPOXEMIC RESPIRATORY FAILURE DUE TO COVID-19: Status: RESOLVED | Noted: 2022-09-17 | Resolved: 2022-09-21

## 2022-09-21 LAB
ALBUMIN SERPL-MCNC: 4.2 G/DL (ref 3.5–5.2)
ALP SERPL-CCNC: 53 U/L (ref 39–117)
ALT SERPL W P-5'-P-CCNC: 17 U/L (ref 1–33)
ANION GAP SERPL CALCULATED.3IONS-SCNC: 15 MMOL/L (ref 5–15)
AST SERPL-CCNC: 21 U/L (ref 1–32)
BASOPHILS # BLD AUTO: 0.01 10*3/MM3 (ref 0–0.2)
BASOPHILS NFR BLD AUTO: 0.1 % (ref 0–1.5)
BILIRUB CONJ SERPL-MCNC: <0.2 MG/DL (ref 0–0.3)
BILIRUB INDIRECT SERPL-MCNC: NORMAL MG/DL
BILIRUB SERPL-MCNC: <0.2 MG/DL (ref 0–1.2)
BUN SERPL-MCNC: 78 MG/DL (ref 8–23)
BUN/CREAT SERPL: 35.5 (ref 7–25)
CALCIUM SPEC-SCNC: 10.2 MG/DL (ref 8.6–10.5)
CHLORIDE SERPL-SCNC: 92 MMOL/L (ref 98–107)
CO2 SERPL-SCNC: 27 MMOL/L (ref 22–29)
CREAT SERPL-MCNC: 2.2 MG/DL (ref 0.57–1)
CRP SERPL-MCNC: 3.05 MG/DL (ref 0–0.5)
DEPRECATED RDW RBC AUTO: 44.2 FL (ref 37–54)
EGFRCR SERPLBLD CKD-EPI 2021: 21.2 ML/MIN/1.73
EOSINOPHIL # BLD AUTO: 0 10*3/MM3 (ref 0–0.4)
EOSINOPHIL NFR BLD AUTO: 0 % (ref 0.3–6.2)
ERYTHROCYTE [DISTWIDTH] IN BLOOD BY AUTOMATED COUNT: 13.7 % (ref 12.3–15.4)
GLUCOSE SERPL-MCNC: 101 MG/DL (ref 65–99)
HCT VFR BLD AUTO: 36.4 % (ref 34–46.6)
HGB BLD-MCNC: 12.1 G/DL (ref 12–15.9)
IMM GRANULOCYTES # BLD AUTO: 0.08 10*3/MM3 (ref 0–0.05)
IMM GRANULOCYTES NFR BLD AUTO: 1 % (ref 0–0.5)
LYMPHOCYTES # BLD AUTO: 1.29 10*3/MM3 (ref 0.7–3.1)
LYMPHOCYTES NFR BLD AUTO: 15.8 % (ref 19.6–45.3)
MCH RBC QN AUTO: 29.3 PG (ref 26.6–33)
MCHC RBC AUTO-ENTMCNC: 33.2 G/DL (ref 31.5–35.7)
MCV RBC AUTO: 88.1 FL (ref 79–97)
MONOCYTES # BLD AUTO: 1.32 10*3/MM3 (ref 0.1–0.9)
MONOCYTES NFR BLD AUTO: 16.2 % (ref 5–12)
NEUTROPHILS NFR BLD AUTO: 5.47 10*3/MM3 (ref 1.7–7)
NEUTROPHILS NFR BLD AUTO: 66.9 % (ref 42.7–76)
NRBC BLD AUTO-RTO: 0.1 /100 WBC (ref 0–0.2)
PLATELET # BLD AUTO: 224 10*3/MM3 (ref 140–450)
PMV BLD AUTO: 11.4 FL (ref 6–12)
POTASSIUM SERPL-SCNC: 3.5 MMOL/L (ref 3.5–5.2)
PROT SERPL-MCNC: 6.5 G/DL (ref 6–8.5)
RBC # BLD AUTO: 4.13 10*6/MM3 (ref 3.77–5.28)
SODIUM SERPL-SCNC: 134 MMOL/L (ref 136–145)
WBC NRBC COR # BLD: 8.17 10*3/MM3 (ref 3.4–10.8)

## 2022-09-21 PROCEDURE — 80076 HEPATIC FUNCTION PANEL: CPT | Performed by: NURSE PRACTITIONER

## 2022-09-21 PROCEDURE — 85025 COMPLETE CBC W/AUTO DIFF WBC: CPT | Performed by: INTERNAL MEDICINE

## 2022-09-21 PROCEDURE — 25010000002 HEPARIN (PORCINE) PER 1000 UNITS: Performed by: INTERNAL MEDICINE

## 2022-09-21 PROCEDURE — 25010000002 REMDESIVIR 100 MG/20ML SOLUTION 1 EACH VIAL: Performed by: NURSE PRACTITIONER

## 2022-09-21 PROCEDURE — 80048 BASIC METABOLIC PNL TOTAL CA: CPT | Performed by: INTERNAL MEDICINE

## 2022-09-21 PROCEDURE — 36415 COLL VENOUS BLD VENIPUNCTURE: CPT | Performed by: INTERNAL MEDICINE

## 2022-09-21 PROCEDURE — 86140 C-REACTIVE PROTEIN: CPT | Performed by: INTERNAL MEDICINE

## 2022-09-21 RX ORDER — FUROSEMIDE 40 MG/1
40 TABLET ORAL DAILY
Qty: 30 TABLET | Refills: 3 | Status: SHIPPED | OUTPATIENT
Start: 2022-09-21 | End: 2022-10-11 | Stop reason: SDUPTHER

## 2022-09-21 RX ORDER — DOCUSATE SODIUM 100 MG/1
100 CAPSULE, LIQUID FILLED ORAL 2 TIMES DAILY
Status: DISCONTINUED | OUTPATIENT
Start: 2022-09-21 | End: 2022-09-21 | Stop reason: HOSPADM

## 2022-09-21 RX ORDER — POLYETHYLENE GLYCOL 3350 17 G/17G
17 POWDER, FOR SOLUTION ORAL DAILY
Qty: 30 PACKET | Refills: 3 | Status: SHIPPED | OUTPATIENT
Start: 2022-09-22 | End: 2023-01-12

## 2022-09-21 RX ORDER — NITROGLYCERIN 40 MG/1
1 PATCH TRANSDERMAL DAILY
Qty: 30 PATCH | Refills: 3 | Status: SHIPPED | OUTPATIENT
Start: 2022-09-21 | End: 2022-10-11 | Stop reason: SDUPTHER

## 2022-09-21 RX ORDER — POTASSIUM CHLORIDE 20 MEQ/1
20 TABLET, EXTENDED RELEASE ORAL DAILY
Qty: 30 TABLET | Refills: 3 | Status: SHIPPED | OUTPATIENT
Start: 2022-09-22 | End: 2022-10-11 | Stop reason: SDUPTHER

## 2022-09-21 RX ORDER — POTASSIUM CHLORIDE 750 MG/1
40 TABLET, FILM COATED, EXTENDED RELEASE ORAL ONCE
Status: COMPLETED | OUTPATIENT
Start: 2022-09-21 | End: 2022-09-21

## 2022-09-21 RX ORDER — POTASSIUM CHLORIDE 750 MG/1
20 TABLET, FILM COATED, EXTENDED RELEASE ORAL DAILY
Status: DISCONTINUED | OUTPATIENT
Start: 2022-09-22 | End: 2022-09-21 | Stop reason: HOSPADM

## 2022-09-21 RX ORDER — DEXAMETHASONE 6 MG/1
6 TABLET ORAL
Qty: 6 TABLET | Refills: 0 | Status: SHIPPED | OUTPATIENT
Start: 2022-09-22 | End: 2022-09-28

## 2022-09-21 RX ORDER — FUROSEMIDE 40 MG/1
40 TABLET ORAL DAILY
Status: DISCONTINUED | OUTPATIENT
Start: 2022-09-21 | End: 2022-09-21 | Stop reason: HOSPADM

## 2022-09-21 RX ORDER — AMOXICILLIN 500 MG/1
500 CAPSULE ORAL 2 TIMES DAILY
Qty: 8 CAPSULE | Refills: 0 | Status: SHIPPED | OUTPATIENT
Start: 2022-09-21 | End: 2022-09-25

## 2022-09-21 RX ORDER — CLONIDINE 0.1 MG/24H
1 PATCH, EXTENDED RELEASE TRANSDERMAL WEEKLY
Qty: 4 PATCH | Refills: 3 | Status: SHIPPED | OUTPATIENT
Start: 2022-09-25 | End: 2022-10-11 | Stop reason: SDUPTHER

## 2022-09-21 RX ORDER — PSEUDOEPHEDRINE HCL 30 MG
100 TABLET ORAL 2 TIMES DAILY
Qty: 60 CAPSULE | Refills: 3 | Status: SHIPPED | OUTPATIENT
Start: 2022-09-21 | End: 2023-01-16 | Stop reason: HOSPADM

## 2022-09-21 RX ORDER — AMLODIPINE BESYLATE 5 MG/1
5 TABLET ORAL
Qty: 30 TABLET | Refills: 3 | Status: SHIPPED | OUTPATIENT
Start: 2022-09-22 | End: 2022-10-11 | Stop reason: SDUPTHER

## 2022-09-21 RX ORDER — POLYETHYLENE GLYCOL 3350 17 G/17G
17 POWDER, FOR SOLUTION ORAL DAILY
Status: DISCONTINUED | OUTPATIENT
Start: 2022-09-21 | End: 2022-09-21 | Stop reason: HOSPADM

## 2022-09-21 RX ADMIN — GUAIFENESIN 600 MG: 600 TABLET, EXTENDED RELEASE ORAL at 09:39

## 2022-09-21 RX ADMIN — NITROGLYCERIN 2 INCH: 20 OINTMENT TOPICAL at 05:53

## 2022-09-21 RX ADMIN — Medication 1000 UNITS: at 09:39

## 2022-09-21 RX ADMIN — DOCUSATE SODIUM 100 MG: 100 CAPSULE, LIQUID FILLED ORAL at 11:28

## 2022-09-21 RX ADMIN — Medication 10 ML: at 09:40

## 2022-09-21 RX ADMIN — FUROSEMIDE 40 MG: 40 TABLET ORAL at 17:55

## 2022-09-21 RX ADMIN — POLYETHYLENE GLYCOL 3350 17 G: 17 POWDER, FOR SOLUTION ORAL at 11:28

## 2022-09-21 RX ADMIN — CITALOPRAM 10 MG: 10 TABLET, FILM COATED ORAL at 09:39

## 2022-09-21 RX ADMIN — HEPARIN SODIUM 5000 UNITS: 5000 INJECTION, SOLUTION INTRAVENOUS; SUBCUTANEOUS at 09:39

## 2022-09-21 RX ADMIN — NEBIVOLOL 5 MG: 5 TABLET ORAL at 09:39

## 2022-09-21 RX ADMIN — HYDRALAZINE HYDROCHLORIDE 100 MG: 50 TABLET, FILM COATED ORAL at 09:39

## 2022-09-21 RX ADMIN — NITROGLYCERIN 2 INCH: 20 OINTMENT TOPICAL at 11:29

## 2022-09-21 RX ADMIN — HYDRALAZINE HYDROCHLORIDE 100 MG: 50 TABLET, FILM COATED ORAL at 17:06

## 2022-09-21 RX ADMIN — REMDESIVIR 100 MG: 100 INJECTION, POWDER, LYOPHILIZED, FOR SOLUTION INTRAVENOUS at 17:07

## 2022-09-21 RX ADMIN — AMLODIPINE BESYLATE 5 MG: 5 TABLET ORAL at 09:39

## 2022-09-21 RX ADMIN — POTASSIUM CHLORIDE 40 MEQ: 750 TABLET, EXTENDED RELEASE ORAL at 17:06

## 2022-09-21 RX ADMIN — DEXAMETHASONE 6 MG: 6 TABLET ORAL at 09:39

## 2022-09-21 RX ADMIN — CALCIUM CARBONATE-VITAMIN D TAB 500 MG-200 UNIT 1 TABLET: 500-200 TAB at 09:39

## 2022-09-21 RX ADMIN — ASPIRIN 81 MG: 81 TABLET, COATED ORAL at 09:39

## 2022-09-21 RX ADMIN — PRAVASTATIN SODIUM 40 MG: 40 TABLET ORAL at 09:39

## 2022-09-21 NOTE — PROGRESS NOTES
[facility] Progress Note      Patient Name: Vonda Jay  : 1935  MRN: [id number]  Primary Care Physician:  Ambrose Ashley MD  Date of admission: 2022    Subjective     Interval History:   Follow up SANYA on CKD III.  Feels a little nauseated.  Oxygen sat 95% on RA.  Minimal cough. Eating fair.  No bm.       Review of Systems:   As noted above    Objective     Vitals:   Temp:  [97.6 °F (36.4 °C)-97.9 °F (36.6 °C)] 97.9 °F (36.6 °C)  Heart Rate:  [50-64] 57  Resp:  [18] 18  BP: (109-175)/(55-73) 175/73    Intake/Output Summary (Last 24 hours) at 2022 1419  Last data filed at 2022 0512  Gross per 24 hour   Intake 120 ml   Output 800 ml   Net -680 ml       Physical Exam:    General Appearance: alert, oriented x 3, no acute distress   Skin: warm and dry  HEENT: oral mucosa normal, nonicteric sclera  Neck: supple, no JVD  Lungs: Clear to auscultation. Unlabored.   Heart: RRR, normal S1 and S2  Abdomen: soft, nontender, nondistended. + bs  Extremities:no lower ext edema.   Neuro: normal speech and mental status     Scheduled Meds:     amLODIPine, 5 mg, Oral, Q24H  aspirin, 81 mg, Oral, Daily  calcium-vitamin D, 1 tablet, Oral, BID  cefTRIAXone, 1 g, Intravenous, Q24H  cholecalciferol, 1,000 Units, Oral, Daily  citalopram, 10 mg, Oral, Daily  cloNIDine, 1 patch, Transdermal, Weekly  dexamethasone, 6 mg, Oral, Daily With Breakfast  docusate sodium, 100 mg, Oral, BID  guaiFENesin, 600 mg, Oral, Q12H  heparin (porcine), 5,000 Units, Subcutaneous, Q12H  hydrALAZINE, 100 mg, Oral, TID  nebivolol, 5 mg, Oral, Daily  nitroglycerin, 2 inch, Topical, Q6H  polyethylene glycol, 17 g, Oral, Daily  pravastatin, 40 mg, Oral, Daily  remdesivir, 100 mg, Intravenous, Q24H  sodium chloride, 10 mL, Intravenous, Q12H      IV Meds:        Results Reviewed:   I have personally reviewed the results from the time of this admission to 2022 14:19 EDT     Results from last 7 days   Lab  Units 09/21/22  0547 09/20/22  0543 09/19/22  0559   SODIUM mmol/L 134* 134* 135*   POTASSIUM mmol/L 3.5 4.2 4.6   CHLORIDE mmol/L 92* 90* 93*   CO2 mmol/L 27.0 29.0 30.6*   BUN mg/dL 78* 74* 55*   CREATININE mg/dL 2.20* 2.71* 2.62*   CALCIUM mg/dL 10.2 9.9 9.6   BILIRUBIN mg/dL <0.2 0.2 0.2   ALK PHOS U/L 53 53 58   ALT (SGPT) U/L 17 18 21   AST (SGOT) U/L 21 25 26   GLUCOSE mg/dL 101* 113* 123*       Estimated Creatinine Clearance: 16.5 mL/min (A) (by C-G formula based on SCr of 2.2 mg/dL (H)).          Results from last 7 days   Lab Units 09/18/22 2027   URIC ACID mg/dL 9.4*       Results from last 7 days   Lab Units 09/21/22  0547 09/20/22  0543 09/19/22  0559 09/18/22  0557 09/17/22  1143   WBC 10*3/mm3 8.17 7.11 7.52 6.06 9.29   HEMOGLOBIN g/dL 12.1 12.0 11.6* 11.6* 11.0*   PLATELETS 10*3/mm3 224 198 177 157 170             Assessment / Plan     ASSESSMENT:  1. Edouard on CKD III. Baseline 1.4-1.6.  Creatinine much improved. 2.2 today . Renal US negative. Renal artery dopplers normal.  Prerenal azotemia due to steroid . Volume status improved.  Po diuretic today .  2. Acute on chronic diastolic heart failure . Compensated.   3. COVID 19.  On last dose remdesivir today and steroid.   4. HTN overall better today .  5. Aortic stenosis, moderate to severe.     PLAN:  1. Po diuretic Lasix 40 mg daily .  Kdur 20 meq daily .  2. Has follow up with Dr. Pollo Block 14 at 10: 15 am.  3. Advised to avoid all NSAIDS.  No meloxicam, advil, ibuprofen, aleve, etc.  Only tylenol for pain .  4. Needs lab BMP at outpatient [facility] lab Monday, Oct 3 with results faxed to [phone] ATTN Dr. Pollo Fagan.   OR home health can draw.   Earlene Quintana MD  09/21/22  14:19 EDT    [facility]  [phone]

## 2022-09-21 NOTE — NURSING NOTE
Discharge instructions provided to and discussed with patient and daughter. Scripts sent to [facility]. Patient and daughter verbalize understanding and have no questions at this time. Discharged by wheelchair to private vehicle.

## 2022-09-21 NOTE — PROGRESS NOTES
Case Management Discharge Note      Final Note: DC home with family. HH orders noted, [facility] referral.         Selected Continued Care - Admitted Since 9/17/2022     Destination    No services have been selected for the patient.              Durable Medical Equipment    No services have been selected for the patient.              Dialysis/Infusion    No services have been selected for the patient.              Home Medical Care Coordination complete.    Service Provider Selected Services Address Phone Fax Patient Preferred    [facility]  [facility] [address] [place] [address] [phone] --          Therapy    No services have been selected for the patient.              Community Resources    No services have been selected for the patient.              Community & DME    No services have been selected for the patient.                       Final Discharge Disposition Code: 06 - home with home health care

## 2022-09-21 NOTE — DISCHARGE PLACEMENT REQUEST
"Chelsiequeta Catrachito (87 y.o. Female)             Date of Birth   1935    Social Security Number       Address   [address] [place] [address]    Home Phone   [phone]    MRN   [id number]       Restorationist   Confucianist    Marital Status                               Admission Date   9/17/22    Admission Type   Emergency    Admitting Provider   Cyn Shepherd MD    Attending Provider   Shakila Mendez MD    Department, Room/Bed   [address], N[address]/1       Discharge Date       Discharge Disposition   Home-Health Care [facility] [place]    Discharge Destination                               Attending Provider: Shakila Mendez MD    Allergies: No Known Allergies    Isolation: Enh Drop/Con   Infection: COVID (confirmed) (09/17/22)   Code Status: CPR   Advance Care Planning Activity    Ht: 154.9 cm (61\")   Wt: 73.4 kg (161 lb 12.8 oz)    Admission Cmt: None   Principal Problem: Acute hypoxemic respiratory failure due to COVID-19 (formerly [facility]) [U07.1,J96.01]                 Active Insurance as of 9/17/2022     Primary Coverage     Payor Plan Insurance Group Employer/Plan Group    MEDICARE MEDICARE A & B      Payor Plan Address Payor Plan Phone Number Payor Plan Fax Number Effective Dates    [address] [phone]  9/1/2000 - None Entered    [facility] [id number]       Subscriber Name Subscriber Birth Date Member ID       CATRACHITO BARTLETT 1935 [id number]           Secondary Coverage     Payor Plan Insurance Group Employer/Plan Group    ANTHEM BLUE CROSS [facility] SUPP [id number]     Payor Plan Address Payor Plan Phone Number Payor Plan Fax Number Effective Dates    [address]   12/1/2016 - None Entered    [facility] [id number]       Subscriber Name Subscriber Birth Date Member ID       CATRACHITO BARTLETT 1935 [id number]                 Emergency Contacts      (Rel.) Home Phone Work Phone Mobile Phone    LukeAngsu prabhakar (Son) [phone] -- [phone]              "

## 2022-09-21 NOTE — PLAN OF CARE
Goal Outcome Evaluation:              Outcome Evaluation: Pt aox4 VSS, on RA no c/o pain this shift. Will CTM the rest of my shift

## 2022-09-21 NOTE — DISCHARGE SUMMARY
Patient Name: Vonda Jay  : 1935  MRN: [id number]    Date of Admission: 2022  Date of Discharge:  2022  Primary Care Physician: Ambrose Ashley MD      Discharge Diagnoses     Active Hospital Problems    Diagnosis  POA   • Anemia of chronic renal failure, stage 3 (moderate) (HCC) [N18.30, D63.1]  Yes   • Acute on chronic diastolic CHF (congestive heart failure) (HCC) [I50.33]  Yes   • Aortic stenosis [I35.0]  Yes   • UTI (urinary tract infection) [N39.0]  Yes   • Stage 3 chronic kidney disease (HCC) [N18.30]  Yes   • COVID-19 virus infection [U07.1]  Yes   • Anemia [D64.9]  Yes   • HTN (hypertension) [I10]  Yes   • SANYA (acute kidney injury) (HCC) [N17.9]  Yes      Resolved Hospital Problems    Diagnosis Date Resolved POA   • **Acute hypoxemic respiratory failure due to COVID-19 (HCC) [U07.1, J96.01] 2022 Yes        Hospital Course   Brief admission history and physical.  Please refer to the H&P for full details.  A pleasant 87 years old white female with a past history of chronic diastolic congestive heart failure/status post breast lumpectomy/carotid peripheral vascular disease s/p angioplasty/status post hysterectomy/hypertension/stage III chronic renal failure.  Patient presents to the emergency department with progressive weakness/cough/headache/nausea and no other significant symptomatology.  Admission physical examination was remarkable for blood pressure 224/86 pulse 75 temperature 98 respiratory rate of 18 and O2 sats of 91% on oxygen.  The rest of the examination is remarkable for an elderly female that appears chronically ill bibasilar crackles in the chest/great 3 systolic murmur.  Otherwise no significant abnormalities  Hospital course.  Initial ER evaluation included a CBC that was normal except a hemoglobin of 11.  proBNP elevated at 5616.  Basic metabolic profile significant for a blood sugar of 141, BUN 26, creatinine 1.25, sodium 133, GFR of 41.  UA significant  for UTI.  COVID was positive.  Hepatic panel was normal.  Procalcitonin elevated at 0.33.  Chest x-ray revealed cardiomegaly with mild vascular congestion.  Patient was admitted with acute hypoxemic respiratory failure thought to be secondary to acute on chronic diastolic congestive heart exacerbation in a patient with a history of chronic diastolic congestive heart failure/moderate to severe AS/uncontrolled hypertension.  Attempts to improve the blood pressure were done and cardiology was consulted.  There was no evidence of active angina.  Her last 2D echo was on 722 revealing a normal ejection fraction with grade 2 diastolic dysfunction and left ventricular hypertrophy and moderate to severe AS.  Cardiology continue the hydralazine/Bystolic/Zestoretic.  She was started on nitroglycerin paste.  We continued the aspirin and Pravachol.  Cardiology started the clonidine.  Titration of blood pressure medications was done and the cardiologist added Norvasc.  Just to reticulocyte and IV Lasix were subsequently DC'd as her kidney function worsened.  Renal arterial Doppler was negative for renal artery stenosis.  The cause of the diastolic congestive heart failure is mostly a COVID.  After adequate diuresis the patient felt better with resolution of the shortness of breath.  Cardiology released the patient for discharge after reinitiation of Lasix and she is to follow-up with them.  It is worth mentioning that her blood pressure has greatly improved.  Cardiology recommends medical treatment for the aortic stenosis.  As far as her COVID-19 infection she was initially hypoxemic.  Chest x-ray was without infiltrate.  CRP was monitored and continued to improve.  We were able to wean her off oxygen during the hospital stay.  She was empirically started on IV Decadron and remdesivir and she finished 5 days of the remdesivir.  We will switch her to p.o. Decadron to complete a 10 days course of of the Decadron.  She developed an  acute on top of her care stage IIIb chronic renal failure.  Her baseline creatinine is around 1.4 and 1.6.  Initially this was thought to be secondary to volume overdose however with diuresis this has gotten worse and IV diuresis has been DC'd together with her ACE inhibitors.  Nephrology was consulted and continue to agree with the current management.  Renal ultrasound revealed no obstruction.  Her kidney function has been starting to improve and nephrology started her on Lasix prior to discharge.  However the patient is not back to her baseline creatinine yet and home health will monitor a Chem-7 and fax it to the nephrology.  She was noted to have a UTI and she was started on Rocephin the culture of the urine revealed E. coli sensitive to penicillin and she was switched to p.o. amoxicillin at the time of discharge to complete a total of 7 days.  She was noted to have mild anemia and her hemoglobin remained stable and has actually normalized by the time of discharge.  At the time of discharge patient was without shortness of breath and hemodynamically stable.  Prior to discharge physical therapy evaluated the patient and deemed her safe and appropriate to go home with no need for skilled facility.  I discussed the diagnosis and management with the patient and her daughter over the phone.  Patient is hemodynamically stable at the time of discharge.  Both nephrology and cardiology okayed the discharge.  Consultants     Consult Orders (all) (From admission, onward)     Start     Ordered    09/18/22 1910  Inpatient Nephrology Consult  Once        Specialty:  Nephrology  Provider:  Deysi Ruff MD    09/18/22 1910 09/17/22 1914  Inpatient Cardiology Consult  Once        Specialty:  Cardiology  Provider:  Vidal Clifford MD    09/17/22 1915 09/17/22 1339  [facility] (on-call MD unless specified) Details  Once        Specialty:  Hospitalist  Provider:  (Not yet assigned)    09/17/22 1339              Procedures      Imaging Results (All)     Procedure Component Value Units Date/Time    US Renal Bilateral [[id number]] Collected: 09/19/22 0004     Updated: 09/19/22 0008    Narrative:      PROCEDURE: US RENAL BILATERAL-     HISTORY: Acute on chronic renal failure.     TECHNIQUE:  A renal ultrasound was performed.     COMPARISON:  None     MEASUREMENTS:     Right renal length: 9.1 cm  Left renal length: 9.5 cm     RENAL FINDINGS: The kidneys are normal in size and echogenicity. There  is no hydronephrosis. No shadowing stones are seen.     BLADDER: The bladder is mildly distended and within normal limits.  Bilateral ureteral jets are visualized.          Impression:         No shadowing renal stones or hydronephrosis.       This report was finalized on 9/19/2022 12:05 AM by Dr. Mer Villalba M.D.       XR Chest 1 View [[id number]] Collected: 09/17/22 1224     Updated: 09/17/22 1228    Narrative:      ONE VIEW PORTABLE CHEST     HISTORY: Shortness of breath. Covid 19 exposure.     FINDINGS: There is cardiomegaly with mild vascular congestion and slight  interstitial haziness that is more prominent since a study of 02/07/2018  and suspicious for changes of mild congestive heart failure.     This report was finalized on 9/17/2022 12:25 PM by Dr. Mo Parker M.D.             Pertinent Labs     Results from last 7 days   Lab Units 09/21/22  0547 09/20/22  0543 09/19/22  0559 09/18/22  0557   WBC 10*3/mm3 8.17 7.11 7.52 6.06   HEMOGLOBIN g/dL 12.1 12.0 11.6* 11.6*   PLATELETS 10*3/mm3 224 198 177 157     Results from last 7 days   Lab Units 09/21/22  0547 09/20/22  0543 09/19/22  0559 09/18/22  0557   SODIUM mmol/L 134* 134* 135* 136   POTASSIUM mmol/L 3.5 4.2 4.6 3.9   CHLORIDE mmol/L 92* 90* 93* 94*   CO2 mmol/L 27.0 29.0 30.6* 25.7   BUN mg/dL 78* 74* 55* 34*   CREATININE mg/dL 2.20* 2.71* 2.62* 1.81*   GLUCOSE mg/dL 101* 113* 123* 122*   Estimated Creatinine Clearance: 16.5 mL/min (A) (by C-G formula based on SCr of 2.2 mg/dL  (H)).  Results from last 7 days   Lab Units 09/21/22  0547 09/20/22  0543 09/19/22  0559 09/18/22  0557   ALBUMIN g/dL 4.20 3.80 3.90 3.90   BILIRUBIN mg/dL <0.2 0.2 0.2 0.3   ALK PHOS U/L 53 53 58 55   AST (SGOT) U/L 21 25 26 26   ALT (SGPT) U/L 17 18 21 19     Results from last 7 days   Lab Units 09/21/22  0547 09/20/22  0543 09/19/22  0559 09/18/22  0557   CALCIUM mg/dL 10.2 9.9 9.6 9.6   ALBUMIN g/dL 4.20 3.80 3.90 3.90       Results from last 7 days   Lab Units 09/17/22  1143   PROBNP pg/mL 5,616.0*     Results from last 7 days   Lab Units 09/19/22  1455 09/18/22  2027   SODIUM UR mmol/L 104  --    CREATININE UR mg/dL 23.0  --    OSMOLALITY UR mOsm/kg 306  --    URIC ACID mg/dL  --  9.4*         Invalid input(s): LDLCALC  Results from last 7 days   Lab Units 09/17/22  1153   URINECX  >100,000 CFU/mL Escherichia coli*     Imaging Results (Last 24 Hours)     ** No results found for the last 24 hours. **          Test Results Pending at Discharge         Discharge Exam   Physical Exam  Vitals.  Temperature 97.9 a pulse of 57 respiratory rate of 18 and blood pressure 175/73 and O2 sats between 90 and 95 on room air.  General.  Elderly female.  She is obese.  Alert and oriented x3.  No apparent pain/distress/diaphoresis.  Normal mood and affect.  Eyes.  Pupils equal round and reactive.  Intact extraocular musculature.  No pallor or jaundice.  Normal trajectory and lids.  Oral cavity.  Moist mucous membrane.  Neck.  Supple.  No JVD.  No lymphadenopathy or thyromegaly.  Cardiovascular.  Regular rate and rhythm and grade 3 systolic murmur.  Chest.  Poor bilateral air entry with no added sounds.    Abdomen.  Soft lax.  No tenderness.  No organomegaly.  No guarding or rebound.  Extremities.    No clubbing cyanosis or edema.    CNS.  No acute focal neurological deficits.  Discharge Details        Discharge Medications      New Medications      Instructions Start Date   amLODIPine 5 MG tablet  Commonly known as: NORVASC    5 mg, Oral, Every 24 Hours Scheduled   Start Date: September 22, 2022     amoxicillin 500 MG capsule  Commonly known as: AMOXIL   500 mg, Oral, 2 Times Daily      cloNIDine 0.1 MG/24HR patch  Commonly known as: CATAPRES-TTS   1 patch, Transdermal, Weekly   Start Date: September 25, 2022     dexamethasone 6 MG tablet  Commonly known as: DECADRON   6 mg, Oral, Daily With Breakfast   Start Date: September 22, 2022     docusate sodium 100 MG capsule   100 mg, Oral, 2 Times Daily      nitroglycerin 0.2 MG/HR patch  Commonly known as: NITRODUR   1 patch, Transdermal, Daily      polyethylene glycol 17 g packet  Commonly known as: MIRALAX   17 g, Oral, Daily   Start Date: September 22, 2022     potassium chloride 20 MEQ CR tablet  Commonly known as: K-DURKLOR-CON   20 mEq, Oral, Daily   Start Date: September 22, 2022        Changes to Medications      Instructions Start Date   furosemide 40 MG tablet  Commonly known as: LASIX  What changed:   · medication strength  · how much to take   40 mg, Oral, Daily         Continue These Medications      Instructions Start Date   alendronate 70 MG tablet  Commonly known as: FOSAMAX   1 tablet, Oral, Every 7 Days      aspirin 81 MG tablet   81 mg, Oral, Daily      Calcium Carb-Cholecalciferol 1000-800 MG-UNIT tablet   Oral      cholecalciferol 25 MCG (1000 UT) tablet  Commonly known as: VITAMIN D3   1,000 Units, Oral, Daily      citalopram 10 MG tablet  Commonly known as: CeleXA   10 mg, Oral, Daily      hydrALAZINE 100 MG tablet  Commonly known as: APRESOLINE   100 mg, Oral, 3 Times Daily      nebivolol 5 MG tablet  Commonly known as: Bystolic   5 mg, Oral, Daily      pravastatin 40 MG tablet  Commonly known as: PRAVACHOL   40 mg, Oral, Daily         Stop These Medications    lisinopril-hydrochlorothiazide 20-25 MG per tablet  Commonly known as: PRINZIDE,ZESTORETIC     meloxicam 15 MG tablet  Commonly known as: MOBIC     potassium chloride 10 MEQ CR tablet            No Known  Allergies      Discharge Disposition:  Condition: Stable    Diet:   Diet Order   Procedures   • Diet Regular; Cardiac   /Renal    Activity:   Activity Instructions     Activity as Tolerated      Other Activity Instructions      Activity Instructions: Ambulate with walker          Counseling : No nonsteroidal anti-inflammatory medication    CODE STATUS:    Code Status and Medical Interventions:   Ordered at: 09/17/22 1915     Code Status (Patient has no pulse and is not breathing):    CPR (Attempt to Resuscitate)     Medical Interventions (Patient has pulse or is breathing):    Full Support       Future Appointments   Date Time Provider Department Center   12/6/2022  1:40 PM Adam Singleton MD [facility]     Additional Instructions for the Follow-ups that You Need to Schedule     Ambulatory Referral to Home Health   As directed      Face to Face Visit Date: 9/21/2022    Follow-up provider for Plan of Care?: I treated the patient in an acute care facility and will not continue treatment after discharge.    Follow-up provider: AMBROSE ASHLEY [[id number]]    Reason/Clinical Findings: Congestive heart failure/COVID    Describe mobility limitations that make leaving home difficult: As above    Nursing/Therapeutic Services Requested: Skilled Nursing    Skilled nursing orders: Medication education CHF management    Frequency: 1 Week 1         Call MD With Problems / Concerns   As directed      Instructions: Call MD or return to ER if chest pain/shortness of breath/cough/fever chills/nausea or vomiting/lower extremity edema/dizziness    Order Comments: Instructions: Call MD or return to ER if chest pain/shortness of breath/cough/fever chills/nausea or vomiting/lower extremity edema/dizziness          Discharge Follow-up with PCP   As directed       Currently Documented PCP:    Ambrose Ashley MD    PCP Phone Number:    [phone]     Follow Up Details: Primary MD.  1 week.  Acute on chronic diastolic  congestive heart failure/AS/hypertension/COVID-19 infection/acute hypoxemic respiratory failure/constipation/acute on chronic stage III renal failure         Discharge Follow-up with Specified Provider: Acute on chronic diastolic congestive heart failure/AS/hypertension; 1 Month   As directed      To: Acute on chronic diastolic congestive heart failure/AS/hypertension    Follow Up: 1 Month         Discharge Follow-up with Specified Provider: Nephrology.  Dr. Fagan.   As directed      To: Nephrology.  Dr. Fagan.    Follow Up Details: On 11/14/2022.  Acute on chronic stage III heart failure            Follow-up Information     Pollo Fagan MD. Go on 11/14/2022.    Specialty: Nephrology  Why: Has follow up with Dr. Pollo Fagan Nov. 14 at 10:15 am.  Contact information:  [address]  [facility] [address]  [place] [id number] [phone]             Ambrose Ashley MD .    Specialty: Family Medicine  Why: Primary MD.  1 week.  Acute on chronic diastolic congestive heart failure/AS/hypertension/COVID-19 infection/acute hypoxemic respiratory failure/constipation/acute on chronic stage III renal failure  Contact information:  [address] [facility] [address]  [facility] [id number] [phone]                           Time Spent on Discharge:  Greater than 30 minutes      Shakila Mendez MD  [facility]  09/21/22  15:13 EDT

## 2022-09-22 ENCOUNTER — READMISSION MANAGEMENT (OUTPATIENT)
Dept: CALL CENTER | Facility: HOSPITAL | Age: 87
End: 2022-09-22

## 2022-09-22 ENCOUNTER — NURSE TRIAGE (OUTPATIENT)
Dept: CALL CENTER | Facility: HOSPITAL | Age: 87
End: 2022-09-22

## 2022-09-22 NOTE — TELEPHONE ENCOUNTER
"Asking which HH is coming,has not on AVS, told her [facility], and gave her their phone number if they do not call tomorrow.   Reason for Disposition  • [1] Follow-up call to recent contact AND [2] information only call, no triage required    Additional Information  • Negative: [1] Caller is not with the adult (patient) AND [2] reporting urgent symptoms  • Negative: Lab result questions  • Negative: Medication questions  • Negative: Caller can't be reached by phone  • Negative: Caller has already spoken to PCP or another triager  • Negative: RN needs further essential information from caller in order to complete triage  • Negative: Requesting regular office appointment  • Negative: [1] Caller requesting NON-URGENT health information AND [2] PCP's office is the best resource  • Negative: Health Information question, no triage required and triager able to answer question  • Negative: General information question, no triage required and triager able to answer question  • Negative: Question about upcoming scheduled test, no triage required and triager able to answer question  • Negative: [1] Caller is not with the adult (patient) AND [2] probable NON-URGENT symptoms    Answer Assessment - Initial Assessment Questions  1. REASON FOR CALL or QUESTION: \"What is your reason for calling today?\" or \"How can I best help you?\" or \"What question do you have that I can help answer?\"      Asking about HH    Protocols used: INFORMATION ONLY CALL - NO TRIAGE-ADULT-      " 4/17 CG:  CT scan of the abdomen and chest did not show any acute process.  There is no acute pancreatitis and nothing to suggest why she is having such extreme abdominal pain.  She was also positive for cocaine and methamphetamines.  She does have a pronounced acute kidney injury and she was aggressively rehydrated in the emergency department.  4/18:  renal function improved with fluids.  Patient remains lethargic and resting this morning.   4/19:  awaiting am labs.  Patient more alert.  Will ensure tolerating PO and will have PT/OT work with patient for potential DC soon.  4/20:  Patient did have some weakness yesterday prompting further inpatient monitoring.  Blood pressure has stabilized.  Oxygen saturations in the upper 90s.  Will reassess function status and needs an potential discharge soon.

## 2022-09-22 NOTE — OUTREACH NOTE
"COVID-19 Week 1 Survey    Flowsheet Row Responses   [facility] patient discharged from? [facility]   Does the patient have one of the following disease processes/diagnoses(primary or secondary)? COVID-19   COVID-19 underlying condition? None   Call Number Call 1   Week 1 Call successful? Yes   Call start time 1119   Call end time 1122   Discharge diagnosis Acute hypoxemic respiratory failure due to COVID-19    Meds reviewed with patient/caregiver? Yes   Does the patient have all medications ordered at discharge? No   Nursing Interventions No intervention needed   Prescription comments PATIENT STATES SOME OF THE PRESCRIPTIONS WERE NOT READY. SHE STATES SHE WILL HAVE SOMEONE GO GET THEM WHEN THEY ARE READY   Does the patient have a primary care provider?  Yes   Comments regarding PCP PATIENT STATES SHE HAS A FOLLOW UP APPOINTMENT SCHEDULED, \"IN A WEEK AND A HALF.\"    Does the patient have an appointment with their PCP or specialist within 7 days of discharge? Greater than 7 days   What is preventing the patient from scheduling follow up appointments within 7 days of discharge? Earlier appointment not available   Has the patient kept scheduled appointments due by today? N/A   What is the Home health agency?  [facility]   Has home health visited the patient within 72 hours of discharge? Call prior to 72 hours   Psychosocial issues? No   Did the patient receive a copy of their discharge instructions? Yes   Did the patient receive a copy of COVID-19 specific instructions? Yes   Nursing interventions Reviewed instructions with patient   What is the patient's perception of their health status since discharge? Improving   Does the patient have any of the following symptoms? Cough   Nursing Interventions Nurse provided patient education   Pulse Ox monitoring None  [DAUGHTER WAS IN THE BACKGROUND AND STATES SHE WILL GET HER A PULSE OXIMETER]   Is the patient/caregiver able to teach back steps to recovery at home? " Set small, achievable goals for return to baseline health, Rest and rebuild strength, gradually increase activity, Make a list of questions for provider's appointment   If the patient is a current smoker, are they able to teach back resources for cessation? Not a smoker   Is the patient/caregiver able to teach back the hierarchy of who to call/visit for symptoms/problems? PCP, Specialist, Home health nurse, Urgent Care, ED, 911 Yes   COVID-19 call completed? Yes          CONSTANTNIE LOVE - Licensed Nurse

## 2022-09-22 NOTE — OUTREACH NOTE
Prep Survey    Flowsheet Row Responses   [facility] discharged from? [place]   Is LACE score < 7 ? No   Emergency Room discharge w/ pulse ox? No   Eligibility Readm Mgmt   Discharge diagnosis Acute hypoxemic respiratory failure due to COVID-19    Does the patient have one of the following disease processes/diagnoses(primary or secondary)? COVID-19   Does the patient have Home health ordered? Yes   What is the Home health agency?  [facility]   Is there a DME ordered? No   Prep survey completed? Yes          MIGUEL SIMEON - Registered Nurse

## 2022-09-24 ENCOUNTER — READMISSION MANAGEMENT (OUTPATIENT)
Dept: CALL CENTER | Facility: HOSPITAL | Age: 87
End: 2022-09-24

## 2022-09-24 NOTE — OUTREACH NOTE
COVID-19 Week 1 Survey    Flowsheet Row Responses   [facility] patient discharged from? [place]   Does the patient have one of the following disease processes/diagnoses(primary or secondary)? COVID-19   COVID-19 underlying condition? None   Call Number Call 2   Week 1 Call successful? Yes   Call start time 0817   Call end time 0821   Discharge diagnosis Acute hypoxemic respiratory failure due to COVID-19    List who call center can speak with pt   Meds reviewed with patient/caregiver? Yes   Prescription comments Pt reports bp medication not available at this time.   Has home health visited the patient within 72 hours of discharge? Yes   Does the patient have any of the following symptoms? None   Pulse Ox monitoring None   COVID-19 call completed? Yes   Wrap up additional comments Pt reports feeling well. Pt to [facility] through September 25, 2022. HH is visiting. Bp medication unavailable per pharmacy at this time. Pt does check bp often, currently wnl.          LUCIE SIMEON - Registered Nurse

## 2022-10-01 ENCOUNTER — READMISSION MANAGEMENT (OUTPATIENT)
Dept: CALL CENTER | Facility: HOSPITAL | Age: 87
End: 2022-10-01

## 2022-10-01 NOTE — OUTREACH NOTE
COVID-19 Week 2 Survey    Flowsheet Row Responses   [facility], [place], operated by [facility] patient discharged from? [place]   Does the patient have one of the following disease processes/diagnoses(primary or secondary)? COVID-19   COVID-19 underlying condition? None   Call Number Call 1   COVID-19 Week 2: Call 1 attempt successful? Yes   Call start time 1513   Call end time 1517   Discharge diagnosis Acute hypoxemic respiratory failure due to COVID-19    Meds reviewed with patient/caregiver? Yes   Is the patient having any side effects they believe may be caused by any medication additions or changes? No   Does the patient have all medications ordered at discharge? Yes   Is the patient taking all medications as directed (includes completed medication regime)? Yes   Does the patient have a primary care provider?  Yes   Has the patient kept scheduled appointments due by today? N/A   Comments appt next week with PCP   What is the Home health agency?  [facility]   Has home health visited the patient within 72 hours of discharge? Yes   Psychosocial issues? No   Did the patient receive a copy of their discharge instructions? Yes   Did the patient receive a copy of COVID-19 specific instructions? Yes   Nursing interventions Reviewed instructions with patient   What is the patient's perception of their health status since discharge? Improving   Does the patient have any of the following symptoms? None   Nursing Interventions Nurse provided patient education   Pulse Ox monitoring None   Is the patient/caregiver able to teach back steps to recovery at home? Set small, achievable goals for return to baseline health, Rest and rebuild strength, gradually increase activity, Make a list of questions for provider's appointment, Eat a well-balance diet   If the patient is a current smoker, are they able to teach back resources for cessation? Not a smoker   Is the patient/caregiver able to teach back the hierarchy of who to call/visit for  symptoms/problems? PCP, Specialist, Home health nurse, Urgent Care, ED, 911 Yes   COVID-19 call completed? Yes   Wrap up additional comments She is feeling some better but still with stomach issues.          BRODY RAYMUNDO - Registered Nurse

## 2022-10-07 ENCOUNTER — READMISSION MANAGEMENT (OUTPATIENT)
Dept: CALL CENTER | Facility: HOSPITAL | Age: 87
End: 2022-10-07

## 2022-10-07 NOTE — OUTREACH NOTE
COVID-19 Week 3 Survey    Flowsheet Row Responses   [facility] facility patient discharged from? [place]   Does the patient have one of the following disease processes/diagnoses(primary or secondary)? COVID-19   COVID-19 underlying condition? None   Call Number Call 1   COVID-19 Week 3: Call 1 attempt successful? No   Discharge diagnosis Acute hypoxemic respiratory failure due to COVID-19           NAIF LOVE - Registered Nurse

## 2022-10-11 ENCOUNTER — OFFICE VISIT (OUTPATIENT)
Dept: CARDIOLOGY | Facility: CLINIC | Age: 87
End: 2022-10-11

## 2022-10-11 VITALS
BODY MASS INDEX: 31.73 KG/M2 | DIASTOLIC BLOOD PRESSURE: 70 MMHG | HEIGHT: 60 IN | HEART RATE: 61 BPM | WEIGHT: 161.6 LBS | OXYGEN SATURATION: 98 % | SYSTOLIC BLOOD PRESSURE: 122 MMHG

## 2022-10-11 DIAGNOSIS — I50.32 CHRONIC DIASTOLIC CONGESTIVE HEART FAILURE: Primary | ICD-10-CM

## 2022-10-11 DIAGNOSIS — I10 PRIMARY HYPERTENSION: ICD-10-CM

## 2022-10-11 DIAGNOSIS — I35.0 NONRHEUMATIC AORTIC VALVE STENOSIS: ICD-10-CM

## 2022-10-11 DIAGNOSIS — N18.30 STAGE 3 CHRONIC KIDNEY DISEASE, UNSPECIFIED WHETHER STAGE 3A OR 3B CKD: ICD-10-CM

## 2022-10-11 PROCEDURE — 99214 OFFICE O/P EST MOD 30 MIN: CPT | Performed by: NURSE PRACTITIONER

## 2022-10-11 RX ORDER — NITROGLYCERIN 40 MG/1
1 PATCH TRANSDERMAL DAILY
Qty: 90 PATCH | Refills: 3 | Status: SHIPPED | OUTPATIENT
Start: 2022-10-11 | End: 2023-01-12

## 2022-10-11 RX ORDER — POTASSIUM CHLORIDE 20 MEQ/1
20 TABLET, EXTENDED RELEASE ORAL DAILY
Qty: 90 TABLET | Refills: 3 | Status: SHIPPED | OUTPATIENT
Start: 2022-10-11 | End: 2023-01-12

## 2022-10-11 RX ORDER — CLONIDINE 0.1 MG/24H
1 PATCH, EXTENDED RELEASE TRANSDERMAL WEEKLY
Qty: 4 PATCH | Refills: 3 | Status: SHIPPED | OUTPATIENT
Start: 2022-10-11 | End: 2023-01-12

## 2022-10-11 RX ORDER — NEBIVOLOL 5 MG/1
5 TABLET ORAL DAILY
Qty: 90 TABLET | Refills: 3 | Status: SHIPPED | OUTPATIENT
Start: 2022-10-11

## 2022-10-11 RX ORDER — AMLODIPINE BESYLATE 5 MG/1
5 TABLET ORAL
Qty: 90 TABLET | Refills: 3 | Status: SHIPPED | OUTPATIENT
Start: 2022-10-11 | End: 2023-01-05 | Stop reason: SDUPTHER

## 2022-10-11 RX ORDER — FUROSEMIDE 40 MG/1
40 TABLET ORAL DAILY
Qty: 90 TABLET | Refills: 3 | Status: SHIPPED | OUTPATIENT
Start: 2022-10-11

## 2022-10-11 NOTE — PROGRESS NOTES
"    CARDIOLOGY        Patient Name: Vonda Jay  :1935  Age: 87 y.o.  Primary Cardiologist: Juan Clifford MD  Encounter Provider:  ELVIE Archiblad    Date of Service: 10/11/22            CHIEF COMPLAINT / REASON FOR OFFICE VISIT     Hypertension (3 wk f/u)      HISTORY OF PRESENT ILLNESS       Hypertension  Associated symptoms include malaise/fatigue.     Vonda Jay is a 87 y.o. female who presents today for 2-week reevaluation.     Pt has a  history significant for carotid artery disease, hypertension, prior breast cancer, aortic valve stenosis.    Review of medical records reveals patient hospitalized  - 2022.  Patient presented with progressive weakness, cough, headache, nausea.  Patient was found to have mild vascular congestion on chest x-ray.  Patient medications were adjusted for better blood pressure management.  She was reinitiated on furosemide which had been decreased in the outpatient setting secondary to renal insufficiency.  Patient's medications were significantly adjusted at time of discharge.    Patient reports that she has done well on this current regimen.  Reports that shortness of breath with exertion as well as fatigue are significantly improved.  Denies chest pain, dyspnea at rest, palpitations, lightheadedness, edema.  Patient has been prescribed Bystolic for blood pressure control.  She cannot tolerate atenolol, carvedilol, metoprolol secondary to baseline low heart rates and generalized fatigue.      The following portions of the patient's history were reviewed and updated as appropriate: allergies, current medications, past family history, past medical history, past social history, past surgical history and problem list.      VITAL SIGNS     Visit Vitals  /70 (BP Location: Right arm, Patient Position: Sitting, Cuff Size: Adult)   Pulse 61   Ht 152.4 cm (60\")   Wt 73.3 kg (161 lb 9.6 oz)   SpO2 98%   BMI 31.56 kg/m²         Wt Readings from Last 3 " Encounters:   10/11/22 73.3 kg (161 lb 9.6 oz)   09/21/22 73.4 kg (161 lb 12.8 oz)   07/20/22 76.7 kg (169 lb 3.2 oz)     Body mass index is 31.56 kg/m².      REVIEW OF SYSTEMS   Review of Systems   Constitutional: Positive for malaise/fatigue. Negative for chills, fever, weight gain and weight loss.   Cardiovascular: Positive for dyspnea on exertion. Negative for leg swelling.   Respiratory: Negative for cough, snoring and wheezing.    Hematologic/Lymphatic: Negative for bleeding problem. Does not bruise/bleed easily.   Skin: Negative for color change.   Musculoskeletal: Negative for falls, joint pain and myalgias.   Gastrointestinal: Negative for melena.   Genitourinary: Negative for hematuria.   Neurological: Negative for excessive daytime sleepiness.   Psychiatric/Behavioral: Negative for depression. The patient is not nervous/anxious.            PHYSICAL EXAMINATION     Vitals and nursing note reviewed.   Constitutional:       Appearance: Normal appearance. Well-developed.   Eyes:      Conjunctiva/sclera: Conjunctivae normal.   Neck:      Vascular: No carotid bruit.   Pulmonary:      Breath sounds: Normal breath sounds.   Cardiovascular:      Normal rate. Regular rhythm. Normal S1 with normal intensity. Normal S2 with normal intensity.      Murmurs: There is a grade 3/6 systolic murmur at the URSB and ULSB.      No gallop. No click. No rub.   Edema:     Peripheral edema absent.   Musculoskeletal: Normal range of motion. Skin:     General: Skin is warm and dry.   Neurological:      Mental Status: Alert and oriented to person, place, and time.      GCS: GCS eye subscore is 4. GCS verbal subscore is 5. GCS motor subscore is 6.   Psychiatric:         Speech: Speech normal.         Behavior: Behavior normal.         Thought Content: Thought content normal.         Judgment: Judgment normal.           REVIEWED DATA     Procedures    Cardiac Procedures:  1. Echocardiogram 7/7/2022.  LVEF 66-70%.  Mild LVH.  Grade 2  diastolic dysfunction.  Moderate to severe aortic valve stenosis with maximum pressure gradient 55.8 mmHg, mean pressure gradient 31.7 mmHg.  Calculated RVSP 25 mmHg.      BUN   Date Value Ref Range Status   09/21/2022 78 (H) 8 - 23 mg/dL Final   11/24/2021 44 (H) 7 - 20 mg/dL Final     Creatinine   Date Value Ref Range Status   09/21/2022 2.20 (H) 0.57 - 1.00 mg/dL Final   11/24/2021 1.2 0.7 - 1.5 mg/dL Final     Potassium   Date Value Ref Range Status   09/21/2022 3.5 3.5 - 5.2 mmol/L Final   11/24/2021 5.1 3.5 - 5.1 mmol/L Final     ALT (SGPT)   Date Value Ref Range Status   09/21/2022 17 1 - 33 U/L Final   11/21/2019 9 (L) 13 - 69 U/L Final     AST (SGOT)   Date Value Ref Range Status   09/21/2022 21 1 - 32 U/L Final   11/21/2019 17 15 - 46 U/L Final           ASSESSMENT & PLAN     Diagnoses and all orders for this visit:    1. Chronic diastolic congestive heart failure (HCC) (Primary)  · Patient states that breathing and fatigue have improved  · She denies edema  · Most recent creatinine stable at 1.8.  This is patient's new baseline  · Continue Bystolic 5 mg/day, furosemide 40 mg/day    2. Primary hypertension  · Blood pressure controlled at 122/70  · Continue Bystolic 5 mg/day.  Patient cannot tolerate other beta-blockers such as atenolol, carvedilol, metoprolol secondary to fatigue and baseline heart rate in the mid 50s-60s.  · Continue hydralazine 100 mg 3 times daily, amlodipine 5 mg/day, clonidine 0.1 mg patch changed weekly, furosemide 40 mg/day    3. Nonrheumatic aortic valve stenosis  · Moderate to severe on echocardiogram  · Symptoms currently stable    4. Stage 3 chronic kidney disease, unspecified whether stage 3a or 3b CKD (HCC)  · Most recent creatinine 1.8  · Continue to follow with Dr. Fagan    Other orders  -     amLODIPine (NORVASC) 5 MG tablet; Take 1 tablet by mouth Daily.  Dispense: 90 tablet; Refill: 3  -     cloNIDine (CATAPRES-TTS) 0.1 MG/24HR patch; Place 1 patch on the skin as  directed by provider 1 (One) Time Per Week.  Dispense: 4 patch; Refill: 3  -     furosemide (LASIX) 40 MG tablet; Take 1 tablet by mouth Daily.  Dispense: 90 tablet; Refill: 3  -     nitroglycerin (NITRODUR) 0.2 MG/HR patch; Place 1 patch on the skin as directed by provider Daily.  Dispense: 90 patch; Refill: 3  -     potassium chloride (K-DUR,KLOR-CON) 20 MEQ CR tablet; Take 1 tablet by mouth Daily.  Dispense: 90 tablet; Refill: 3  -     nebivolol (Bystolic) 5 MG tablet; Take 1 tablet by mouth Daily.  Dispense: 90 tablet; Refill: 3        Return for Next scheduled follow up.    Future Appointments       Provider Department Center    12/6/2022 1:40 PM Aadm Singleton MD [facility] CARDIOLOGY [place]                MEDICATIONS         Discharge Medications          Accurate as of October 11, 2022 11:48 AM. If you have any questions, ask your nurse or doctor.            Continue These Medications      Instructions Start Date   amLODIPine 5 MG tablet  Commonly known as: NORVASC   5 mg, Oral, Every 24 Hours Scheduled      aspirin 81 MG tablet   81 mg, Oral, Daily      Calcium Carb-Cholecalciferol 1000-800 MG-UNIT tablet   Oral      cholecalciferol 25 MCG (1000 UT) tablet  Commonly known as: VITAMIN D3   1,000 Units, Oral, Daily      citalopram 10 MG tablet  Commonly known as: CeleXA   10 mg, Oral, Daily      cloNIDine 0.1 MG/24HR patch  Commonly known as: CATAPRES-TTS   1 patch, Transdermal, Weekly      docusate sodium 100 MG capsule   100 mg, Oral, 2 Times Daily      furosemide 40 MG tablet  Commonly known as: LASIX   40 mg, Oral, Daily      hydrALAZINE 100 MG tablet  Commonly known as: APRESOLINE   100 mg, Oral, 3 Times Daily      nebivolol 5 MG tablet  Commonly known as: Bystolic   5 mg, Oral, Daily      nitroglycerin 0.2 MG/HR patch  Commonly known as: NITRODUR   1 patch, Transdermal, Daily      polyethylene glycol 17 g packet  Commonly known as: MIRALAX   17 g, Oral, Daily      potassium chloride  20 MEQ CR tablet  Commonly known as: K-DURKLOR-CON   20 mEq, Oral, Daily      pravastatin 40 MG tablet  Commonly known as: PRAVACHOL   40 mg, Oral, Daily                 **Dragon Disclaimer:   Much of this encounter note is an electronic transcription/translation of spoken language to printed text. The electronic translation of spoken language may permit erroneous, or at times, nonsensical words or phrases to be inadvertently transcribed. Although I have reviewed the note for such errors, some may still exist.

## 2022-10-14 ENCOUNTER — READMISSION MANAGEMENT (OUTPATIENT)
Dept: CALL CENTER | Facility: HOSPITAL | Age: 87
End: 2022-10-14

## 2022-10-14 NOTE — OUTREACH NOTE
COVID-19 Week 4 Survey    Flowsheet Row Responses   [facility] patient discharged from? [place]   Does the patient have one of the following disease processes/diagnoses(primary or secondary)? COVID-19   COVID-19 underlying condition? None   Call Number Call 1   COVID-19 Week 4: Call 1 attempt successful? Yes   Call start time 1556   Call end time 1557   Discharge diagnosis Acute hypoxemic respiratory failure due to COVID-19    Meds reviewed with patient/caregiver? Yes   Is the patient having any side effects they believe may be caused by any medication additions or changes? No   Does the patient have all medications ordered at discharge? Yes   Is the patient taking all medications as directed (includes completed medication regime)? Yes   Has the patient kept scheduled appointments due by today? N/A   Is the patient still receiving Home Health Services? N/A   What is the patient's perception of their health status since discharge? Improving   Does the patient have any of the following symptoms? None   Nursing Interventions Nurse provided patient education   Pulse Ox monitoring None   Is the patient/caregiver able to teach back steps to recovery at home? Set small, achievable goals for return to baseline health, Rest and rebuild strength, gradually increase activity, Eat a well-balance diet   Is the patient/caregiver able to teach back the hierarchy of who to call/visit for symptoms/problems? PCP, Specialist, Home health nurse, Urgent Care, ED, 911 Yes   Is the patient interested in additional calls from an ambulatory ?  NOTE:  applies to high risk patients requiring additional follow-up. No   Did the patient feel the follow up calls were helpful during their recovery period? Yes   Was the number of calls appropriate? Yes          NAIF LOVE - Registered Nurse

## 2023-01-05 RX ORDER — AMLODIPINE BESYLATE 5 MG/1
5 TABLET ORAL
Qty: 90 TABLET | Refills: 3 | Status: SHIPPED | OUTPATIENT
Start: 2023-01-05 | End: 2023-02-03

## 2023-01-12 ENCOUNTER — APPOINTMENT (OUTPATIENT)
Dept: GENERAL RADIOLOGY | Facility: HOSPITAL | Age: 88
DRG: 690 | End: 2023-01-12
Payer: MEDICARE

## 2023-01-12 ENCOUNTER — HOSPITAL ENCOUNTER (INPATIENT)
Facility: HOSPITAL | Age: 88
LOS: 4 days | Discharge: NURSING, REHAB OR LONG-TERM CARE | DRG: 690 | End: 2023-01-16
Attending: EMERGENCY MEDICINE | Admitting: INTERNAL MEDICINE
Payer: MEDICARE

## 2023-01-12 ENCOUNTER — APPOINTMENT (OUTPATIENT)
Dept: CT IMAGING | Facility: HOSPITAL | Age: 88
DRG: 690 | End: 2023-01-12
Payer: MEDICARE

## 2023-01-12 DIAGNOSIS — R26.81 GAIT INSTABILITY: ICD-10-CM

## 2023-01-12 DIAGNOSIS — D72.829 LEUKOCYTOSIS, UNSPECIFIED TYPE: ICD-10-CM

## 2023-01-12 DIAGNOSIS — D64.9 ANEMIA, UNSPECIFIED TYPE: ICD-10-CM

## 2023-01-12 DIAGNOSIS — N39.0 ACUTE UTI (URINARY TRACT INFECTION): Primary | ICD-10-CM

## 2023-01-12 DIAGNOSIS — S32.592A INFERIOR PUBIC RAMUS FRACTURE, LEFT, CLOSED, INITIAL ENCOUNTER: ICD-10-CM

## 2023-01-12 DIAGNOSIS — R09.89 PULMONARY VASCULAR CONGESTION: ICD-10-CM

## 2023-01-12 DIAGNOSIS — G47.34 NOCTURNAL HYPOXEMIA: ICD-10-CM

## 2023-01-12 PROBLEM — N32.1 COLOVESICAL FISTULA: Status: ACTIVE | Noted: 2023-01-12

## 2023-01-12 LAB
ABO GROUP BLD: NORMAL
ALBUMIN SERPL-MCNC: 4 G/DL (ref 3.5–5.2)
ALBUMIN/GLOB SERPL: 1.3 G/DL
ALP SERPL-CCNC: 60 U/L (ref 39–117)
ALT SERPL W P-5'-P-CCNC: 9 U/L (ref 1–33)
ANION GAP SERPL CALCULATED.3IONS-SCNC: 10.6 MMOL/L (ref 5–15)
AST SERPL-CCNC: 12 U/L (ref 1–32)
BACTERIA UR QL AUTO: ABNORMAL /HPF
BASOPHILS # BLD AUTO: 0.07 10*3/MM3 (ref 0–0.2)
BASOPHILS NFR BLD AUTO: 0.4 % (ref 0–1.5)
BILIRUB SERPL-MCNC: 0.3 MG/DL (ref 0–1.2)
BILIRUB UR QL STRIP: NEGATIVE
BLD GP AB SCN SERPL QL: NEGATIVE
BUN SERPL-MCNC: 34 MG/DL (ref 8–23)
BUN/CREAT SERPL: 20.1 (ref 7–25)
CALCIUM SPEC-SCNC: 9.5 MG/DL (ref 8.6–10.5)
CHLORIDE SERPL-SCNC: 96 MMOL/L (ref 98–107)
CLARITY UR: ABNORMAL
CO2 SERPL-SCNC: 27.4 MMOL/L (ref 22–29)
COLOR UR: YELLOW
CREAT SERPL-MCNC: 1.69 MG/DL (ref 0.57–1)
DEPRECATED RDW RBC AUTO: 41 FL (ref 37–54)
EGFRCR SERPLBLD CKD-EPI 2021: 29.1 ML/MIN/1.73
EOSINOPHIL # BLD AUTO: 0.09 10*3/MM3 (ref 0–0.4)
EOSINOPHIL NFR BLD AUTO: 0.5 % (ref 0.3–6.2)
ERYTHROCYTE [DISTWIDTH] IN BLOOD BY AUTOMATED COUNT: 12.1 % (ref 12.3–15.4)
FLUAV SUBTYP SPEC NAA+PROBE: NOT DETECTED
FLUBV RNA ISLT QL NAA+PROBE: NOT DETECTED
GLOBULIN UR ELPH-MCNC: 3.2 GM/DL
GLUCOSE SERPL-MCNC: 140 MG/DL (ref 65–99)
GLUCOSE UR STRIP-MCNC: NEGATIVE MG/DL
HCT VFR BLD AUTO: 28.7 % (ref 34–46.6)
HGB BLD-MCNC: 9.4 G/DL (ref 12–15.9)
HGB UR QL STRIP.AUTO: NEGATIVE
HYALINE CASTS UR QL AUTO: ABNORMAL /LPF
IMM GRANULOCYTES # BLD AUTO: 0.14 10*3/MM3 (ref 0–0.05)
IMM GRANULOCYTES NFR BLD AUTO: 0.8 % (ref 0–0.5)
KETONES UR QL STRIP: ABNORMAL
LEUKOCYTE ESTERASE UR QL STRIP.AUTO: ABNORMAL
LYMPHOCYTES # BLD AUTO: 1.5 10*3/MM3 (ref 0.7–3.1)
LYMPHOCYTES NFR BLD AUTO: 8.3 % (ref 19.6–45.3)
MCH RBC QN AUTO: 30.3 PG (ref 26.6–33)
MCHC RBC AUTO-ENTMCNC: 32.8 G/DL (ref 31.5–35.7)
MCV RBC AUTO: 92.6 FL (ref 79–97)
MONOCYTES # BLD AUTO: 1.9 10*3/MM3 (ref 0.1–0.9)
MONOCYTES NFR BLD AUTO: 10.5 % (ref 5–12)
NEUTROPHILS NFR BLD AUTO: 14.31 10*3/MM3 (ref 1.7–7)
NEUTROPHILS NFR BLD AUTO: 79.5 % (ref 42.7–76)
NITRITE UR QL STRIP: NEGATIVE
NRBC BLD AUTO-RTO: 0 /100 WBC (ref 0–0.2)
NT-PROBNP SERPL-MCNC: 2990 PG/ML (ref 0–1800)
PH UR STRIP.AUTO: <=5 [PH] (ref 5–8)
PLATELET # BLD AUTO: 222 10*3/MM3 (ref 140–450)
PMV BLD AUTO: 10.6 FL (ref 6–12)
POTASSIUM SERPL-SCNC: 3.6 MMOL/L (ref 3.5–5.2)
PROT SERPL-MCNC: 7.2 G/DL (ref 6–8.5)
PROT UR QL STRIP: ABNORMAL
RBC # BLD AUTO: 3.1 10*6/MM3 (ref 3.77–5.28)
RBC # UR STRIP: ABNORMAL /HPF
REF LAB TEST METHOD: ABNORMAL
RH BLD: POSITIVE
SARS-COV-2 RNA PNL SPEC NAA+PROBE: NOT DETECTED
SODIUM SERPL-SCNC: 134 MMOL/L (ref 136–145)
SP GR UR STRIP: 1.02 (ref 1–1.03)
SQUAMOUS #/AREA URNS HPF: ABNORMAL /HPF
T&S EXPIRATION DATE: NORMAL
TROPONIN T SERPL-MCNC: <0.01 NG/ML (ref 0–0.03)
UROBILINOGEN UR QL STRIP: ABNORMAL
WBC # UR STRIP: ABNORMAL /HPF
WBC NRBC COR # BLD: 18.01 10*3/MM3 (ref 3.4–10.8)

## 2023-01-12 PROCEDURE — 85025 COMPLETE CBC W/AUTO DIFF WBC: CPT | Performed by: PHYSICIAN ASSISTANT

## 2023-01-12 PROCEDURE — 25010000002 PIPERACILLIN SOD-TAZOBACTAM PER 1 G: Performed by: INTERNAL MEDICINE

## 2023-01-12 PROCEDURE — 71045 X-RAY EXAM CHEST 1 VIEW: CPT

## 2023-01-12 PROCEDURE — 25010000002 CEFTRIAXONE PER 250 MG: Performed by: PHYSICIAN ASSISTANT

## 2023-01-12 PROCEDURE — 25010000002 FUROSEMIDE PER 20 MG: Performed by: PHYSICIAN ASSISTANT

## 2023-01-12 PROCEDURE — 74176 CT ABD & PELVIS W/O CONTRAST: CPT

## 2023-01-12 PROCEDURE — 87086 URINE CULTURE/COLONY COUNT: CPT | Performed by: PHYSICIAN ASSISTANT

## 2023-01-12 PROCEDURE — 86900 BLOOD TYPING SEROLOGIC ABO: CPT | Performed by: PHYSICIAN ASSISTANT

## 2023-01-12 PROCEDURE — 80053 COMPREHEN METABOLIC PANEL: CPT | Performed by: PHYSICIAN ASSISTANT

## 2023-01-12 PROCEDURE — 84484 ASSAY OF TROPONIN QUANT: CPT | Performed by: PHYSICIAN ASSISTANT

## 2023-01-12 PROCEDURE — 83880 ASSAY OF NATRIURETIC PEPTIDE: CPT | Performed by: PHYSICIAN ASSISTANT

## 2023-01-12 PROCEDURE — 86850 RBC ANTIBODY SCREEN: CPT | Performed by: PHYSICIAN ASSISTANT

## 2023-01-12 PROCEDURE — P9612 CATHETERIZE FOR URINE SPEC: HCPCS

## 2023-01-12 PROCEDURE — 36415 COLL VENOUS BLD VENIPUNCTURE: CPT

## 2023-01-12 PROCEDURE — 81001 URINALYSIS AUTO W/SCOPE: CPT | Performed by: PHYSICIAN ASSISTANT

## 2023-01-12 PROCEDURE — 99285 EMERGENCY DEPT VISIT HI MDM: CPT

## 2023-01-12 PROCEDURE — 87077 CULTURE AEROBIC IDENTIFY: CPT | Performed by: PHYSICIAN ASSISTANT

## 2023-01-12 PROCEDURE — 87040 BLOOD CULTURE FOR BACTERIA: CPT | Performed by: PHYSICIAN ASSISTANT

## 2023-01-12 PROCEDURE — 86901 BLOOD TYPING SEROLOGIC RH(D): CPT | Performed by: PHYSICIAN ASSISTANT

## 2023-01-12 PROCEDURE — 87186 SC STD MICRODIL/AGAR DIL: CPT | Performed by: PHYSICIAN ASSISTANT

## 2023-01-12 PROCEDURE — 73502 X-RAY EXAM HIP UNI 2-3 VIEWS: CPT

## 2023-01-12 PROCEDURE — 87636 SARSCOV2 & INF A&B AMP PRB: CPT | Performed by: PHYSICIAN ASSISTANT

## 2023-01-12 RX ORDER — ACETAMINOPHEN 160 MG/5ML
650 SOLUTION ORAL EVERY 4 HOURS PRN
Status: DISCONTINUED | OUTPATIENT
Start: 2023-01-12 | End: 2023-01-16 | Stop reason: HOSPADM

## 2023-01-12 RX ORDER — MELATONIN
1000 DAILY
Status: DISCONTINUED | OUTPATIENT
Start: 2023-01-13 | End: 2023-01-16 | Stop reason: HOSPADM

## 2023-01-12 RX ORDER — HYDRALAZINE HYDROCHLORIDE 50 MG/1
100 TABLET, FILM COATED ORAL ONCE
Status: COMPLETED | OUTPATIENT
Start: 2023-01-12 | End: 2023-01-12

## 2023-01-12 RX ORDER — SODIUM CHLORIDE 0.9 % (FLUSH) 0.9 %
10 SYRINGE (ML) INJECTION AS NEEDED
Status: DISCONTINUED | OUTPATIENT
Start: 2023-01-12 | End: 2023-01-16 | Stop reason: HOSPADM

## 2023-01-12 RX ORDER — NEBIVOLOL 5 MG/1
5 TABLET ORAL DAILY
Status: DISCONTINUED | OUTPATIENT
Start: 2023-01-12 | End: 2023-01-16 | Stop reason: HOSPADM

## 2023-01-12 RX ORDER — ONDANSETRON 2 MG/ML
4 INJECTION INTRAMUSCULAR; INTRAVENOUS EVERY 6 HOURS PRN
Status: DISCONTINUED | OUTPATIENT
Start: 2023-01-12 | End: 2023-01-16 | Stop reason: HOSPADM

## 2023-01-12 RX ORDER — SODIUM CHLORIDE 9 MG/ML
40 INJECTION, SOLUTION INTRAVENOUS AS NEEDED
Status: DISCONTINUED | OUTPATIENT
Start: 2023-01-12 | End: 2023-01-16 | Stop reason: HOSPADM

## 2023-01-12 RX ORDER — ACETAMINOPHEN 650 MG/1
650 SUPPOSITORY RECTAL EVERY 4 HOURS PRN
Status: DISCONTINUED | OUTPATIENT
Start: 2023-01-12 | End: 2023-01-16 | Stop reason: HOSPADM

## 2023-01-12 RX ORDER — HYDRALAZINE HYDROCHLORIDE 50 MG/1
100 TABLET, FILM COATED ORAL 3 TIMES DAILY
Status: DISCONTINUED | OUTPATIENT
Start: 2023-01-12 | End: 2023-01-16 | Stop reason: HOSPADM

## 2023-01-12 RX ORDER — ACETAMINOPHEN 325 MG/1
650 TABLET ORAL EVERY 4 HOURS PRN
Status: DISCONTINUED | OUTPATIENT
Start: 2023-01-12 | End: 2023-01-16 | Stop reason: HOSPADM

## 2023-01-12 RX ORDER — AMLODIPINE BESYLATE 5 MG/1
5 TABLET ORAL DAILY
Status: DISCONTINUED | OUTPATIENT
Start: 2023-01-13 | End: 2023-01-16 | Stop reason: HOSPADM

## 2023-01-12 RX ORDER — NALOXONE HCL 0.4 MG/ML
0.4 VIAL (ML) INJECTION
Status: DISCONTINUED | OUTPATIENT
Start: 2023-01-12 | End: 2023-01-16 | Stop reason: HOSPADM

## 2023-01-12 RX ORDER — MORPHINE SULFATE 2 MG/ML
1 INJECTION, SOLUTION INTRAMUSCULAR; INTRAVENOUS EVERY 4 HOURS PRN
Status: DISCONTINUED | OUTPATIENT
Start: 2023-01-12 | End: 2023-01-16 | Stop reason: HOSPADM

## 2023-01-12 RX ORDER — NITROGLYCERIN 0.4 MG/1
0.4 TABLET SUBLINGUAL
Status: DISCONTINUED | OUTPATIENT
Start: 2023-01-12 | End: 2023-01-16 | Stop reason: HOSPADM

## 2023-01-12 RX ORDER — SODIUM CHLORIDE 0.9 % (FLUSH) 0.9 %
10 SYRINGE (ML) INJECTION EVERY 12 HOURS SCHEDULED
Status: DISCONTINUED | OUTPATIENT
Start: 2023-01-12 | End: 2023-01-16 | Stop reason: HOSPADM

## 2023-01-12 RX ORDER — MULTIPLE VITAMINS W/ MINERALS TAB 9MG-400MCG
1 TAB ORAL DAILY
COMMUNITY

## 2023-01-12 RX ORDER — ALENDRONATE SODIUM 70 MG/1
70 TABLET ORAL
COMMUNITY

## 2023-01-12 RX ORDER — FUROSEMIDE 10 MG/ML
40 INJECTION INTRAMUSCULAR; INTRAVENOUS ONCE
Status: COMPLETED | OUTPATIENT
Start: 2023-01-12 | End: 2023-01-12

## 2023-01-12 RX ORDER — FUROSEMIDE 40 MG/1
40 TABLET ORAL DAILY
Status: DISCONTINUED | OUTPATIENT
Start: 2023-01-12 | End: 2023-01-16 | Stop reason: HOSPADM

## 2023-01-12 RX ORDER — CITALOPRAM 10 MG/1
10 TABLET ORAL DAILY
Status: DISCONTINUED | OUTPATIENT
Start: 2023-01-13 | End: 2023-01-16 | Stop reason: HOSPADM

## 2023-01-12 RX ADMIN — FUROSEMIDE 40 MG: 40 TABLET ORAL at 20:45

## 2023-01-12 RX ADMIN — CEFTRIAXONE SODIUM 1 G: 1 INJECTION, POWDER, FOR SOLUTION INTRAMUSCULAR; INTRAVENOUS at 13:58

## 2023-01-12 RX ADMIN — NEBIVOLOL 5 MG: 5 TABLET ORAL at 20:45

## 2023-01-12 RX ADMIN — Medication 10 ML: at 20:44

## 2023-01-12 RX ADMIN — FUROSEMIDE 40 MG: 10 INJECTION, SOLUTION INTRAMUSCULAR; INTRAVENOUS at 15:52

## 2023-01-12 RX ADMIN — ACETAMINOPHEN 650 MG: 325 TABLET, FILM COATED ORAL at 20:46

## 2023-01-12 RX ADMIN — HYDRALAZINE HYDROCHLORIDE 100 MG: 50 TABLET, FILM COATED ORAL at 15:49

## 2023-01-12 RX ADMIN — TAZOBACTAM SODIUM AND PIPERACILLIN SODIUM 3.38 G: 375; 3 INJECTION, SOLUTION INTRAVENOUS at 20:46

## 2023-01-12 RX ADMIN — HYDRALAZINE HYDROCHLORIDE 100 MG: 50 TABLET, FILM COATED ORAL at 20:45

## 2023-01-12 NOTE — ED TRIAGE NOTES
From home with c/o nausea, weakness, L thigh pain x 3 days.  Had fall 3 days ago.      Pt wearing mask on arrival

## 2023-01-12 NOTE — PLAN OF CARE
Goal Outcome Evaluation:  Plan of Care Reviewed With: patient        Patient is AOX4. Patient is nauseated and shivering. Patient did vomit. Pt is pleasant.

## 2023-01-12 NOTE — ED PROVIDER NOTES
EMERGENCY DEPARTMENT ENCOUNTER    Room Number:  29/29  Date of encounter:  1/12/2023  PCP: Ambrose Ashley MD  Historian: Patient  Full history not obtainable due to: None    HPI:  Chief Complaint: Fatigue    Context: Vonda Jay is a 87 y.o. female with a PMH significant for hypertension, chronic kidney disease, congestive heart failure, aortic stenosis who presents to the ED c/o generalized fatigue over the past couple of days.  The family noticed that the patient was having difficulty ambulating 2 days ago and has been using her Rollator to stabilize her self during ambulation since then.  She did have a fall from the couch a couple of days ago which resulted in her landing on her left side and she has been complaining of left hip pain since then.  Family reports that the patient was complaining of some urinary frequency a few days ago in the bought a UTI test kit from their local grocery store which was positive.  The patient does have a history of recurrent UTIs.  There is been no fever, chills.  She has had some nausea.  There is no flank pain.  The pain in her left hip is aching and nonradiating, worse with range of motion and weightbearing.      MEDICAL RECORD REVIEW:    Upon review of the medical record it appears the patient was most recently evaluated in the office with pain medicine on December 1, 2022 for displacement of lumbar intervertebral disc with radiculopathy    PAST MEDICAL HISTORY    Active Ambulatory Problems     Diagnosis Date Noted   • Closed displaced intertrochanteric fracture of left femur ([facility]) 02/07/2018   • SANYA (acute kidney injury) ([facility]) 02/07/2018   • HTN (hypertension) 02/07/2018   • Acute blood loss anemia 02/10/2018   • Leukocytosis 02/10/2018   • Drug-induced constipation 02/12/2018   • Stage 3 chronic kidney disease ([facility]) 09/17/2022   • COVID-19 virus infection 09/17/2022   • Anemia 09/17/2022   • Acute on chronic diastolic CHF (congestive heart failure) ([facility])  2022   • Aortic stenosis 2022   • UTI (urinary tract infection) 2022   • Anemia of chronic renal failure, stage 3 (moderate) ([facility]) 2022     Resolved Ambulatory Problems     Diagnosis Date Noted   • Hyperkalemia 2018   • Acute hypoxemic respiratory failure due to COVID-19 ([facility]) 2022     Past Medical History:   Diagnosis Date   • Arthritis    • History of breast cancer    • History of carotid artery disease    • Hyperlipidemia    • Hypertension          PAST SURGICAL HISTORY  Past Surgical History:   Procedure Laterality Date   • BREAST LUMPECTOMY     • CAROTID ARTERY ANGIOPLASTY  ,    • CHOLECYSTECTOMY     • FEMUR IM NAILING/RODDING Left 2018    Procedure: FEMUR INTRAMEDULLARY NAILING;  Surgeon: Zheng Fleming MD;  Location: [facility] OR;  Service:    • HYSTERECTOMY           FAMILY HISTORY  Family History   Problem Relation Age of Onset   • Hypertension Mother    • Heart valve disorder Sister    • Lung cancer Sister    • Lymphoma Sister    • Skin cancer Sister    • Stroke Brother 57         SOCIAL HISTORY  Social History     Socioeconomic History   • Marital status:    Tobacco Use   • Smoking status: Former     Years: 47.00     Types: Cigarettes     Quit date:      Years since quittin.0   • Smokeless tobacco: Never   • Tobacco comments:     caffeine use- coffee   Vaping Use   • Vaping Use: Never used   Substance and Sexual Activity   • Alcohol use: No   • Drug use: Defer   • Sexual activity: Defer         ALLERGIES  Patient has no known allergies.        REVIEW OF SYSTEMS    All systems reviewed and marked as negative except as listed in HPI     PHYSICAL EXAM    I have reviewed the triage vital signs and nursing notes.    ED Triage Vitals   Temp Heart Rate Resp BP SpO2   23 1116 23 1115 23 1115 -- 23 1115   98.9 °F (37.2 °C) 71 16  94 %      Temp src Heart Rate Source Patient Position BP Location FiO2 (%)    01/12/23 1116 -- -- -- --   Tympanic           Physical Exam  Constitutional:       General: She is not in acute distress.     Appearance: She is well-developed.   HENT:      Head: Normocephalic and atraumatic.   Eyes:      General: No scleral icterus.     Conjunctiva/sclera: Conjunctivae normal.   Neck:      Trachea: No tracheal deviation.   Cardiovascular:      Rate and Rhythm: Normal rate and regular rhythm.      Heart sounds: Murmur heard.    Systolic murmur is present.  Pulmonary:      Effort: Pulmonary effort is normal.      Breath sounds: Normal breath sounds.   Abdominal:      Palpations: Abdomen is soft.      Tenderness: There is no abdominal tenderness.   Musculoskeletal:         General: No deformity.      Cervical back: Normal range of motion.      Left hip: Tenderness present. No deformity. Decreased range of motion.   Lymphadenopathy:      Cervical: No cervical adenopathy.   Skin:     General: Skin is warm and dry.   Neurological:      Mental Status: She is alert and oriented to person, place, and time.   Psychiatric:         Behavior: Behavior normal.         Vital signs and nursing notes reviewed.            LAB RESULTS  Recent Results (from the past 24 hour(s))   Comprehensive Metabolic Panel    Collection Time: 01/12/23 12:12 PM    Specimen: Blood   Result Value Ref Range    Glucose 140 (H) 65 - 99 mg/dL    BUN 34 (H) 8 - 23 mg/dL    Creatinine 1.69 (H) 0.57 - 1.00 mg/dL    Sodium 134 (L) 136 - 145 mmol/L    Potassium 3.6 3.5 - 5.2 mmol/L    Chloride 96 (L) 98 - 107 mmol/L    CO2 27.4 22.0 - 29.0 mmol/L    Calcium 9.5 8.6 - 10.5 mg/dL    Total Protein 7.2 6.0 - 8.5 g/dL    Albumin 4.0 3.5 - 5.2 g/dL    ALT (SGPT) 9 1 - 33 U/L    AST (SGOT) 12 1 - 32 U/L    Alkaline Phosphatase 60 39 - 117 U/L    Total Bilirubin 0.3 0.0 - 1.2 mg/dL    Globulin 3.2 gm/dL    A/G Ratio 1.3 g/dL    BUN/Creatinine Ratio 20.1 7.0 - 25.0    Anion Gap 10.6 5.0 - 15.0 mmol/L    eGFR 29.1 (L) >60.0 mL/min/1.73   Troponin     Collection Time: 01/12/23 12:12 PM    Specimen: Blood   Result Value Ref Range    Troponin T <0.010 0.000 - 0.030 ng/mL   BNP    Collection Time: 01/12/23 12:12 PM    Specimen: Blood   Result Value Ref Range    proBNP 2,990.0 (H) 0.0 - 1,800.0 pg/mL   CBC Auto Differential    Collection Time: 01/12/23 12:12 PM    Specimen: Blood   Result Value Ref Range    WBC 18.01 (H) 3.40 - 10.80 10*3/mm3    RBC 3.10 (L) 3.77 - 5.28 10*6/mm3    Hemoglobin 9.4 (L) 12.0 - 15.9 g/dL    Hematocrit 28.7 (L) 34.0 - 46.6 %    MCV 92.6 79.0 - 97.0 fL    MCH 30.3 26.6 - 33.0 pg    MCHC 32.8 31.5 - 35.7 g/dL    RDW 12.1 (L) 12.3 - 15.4 %    RDW-SD 41.0 37.0 - 54.0 fl    MPV 10.6 6.0 - 12.0 fL    Platelets 222 140 - 450 10*3/mm3    Neutrophil % 79.5 (H) 42.7 - 76.0 %    Lymphocyte % 8.3 (L) 19.6 - 45.3 %    Monocyte % 10.5 5.0 - 12.0 %    Eosinophil % 0.5 0.3 - 6.2 %    Basophil % 0.4 0.0 - 1.5 %    Immature Grans % 0.8 (H) 0.0 - 0.5 %    Neutrophils, Absolute 14.31 (H) 1.70 - 7.00 10*3/mm3    Lymphocytes, Absolute 1.50 0.70 - 3.10 10*3/mm3    Monocytes, Absolute 1.90 (H) 0.10 - 0.90 10*3/mm3    Eosinophils, Absolute 0.09 0.00 - 0.40 10*3/mm3    Basophils, Absolute 0.07 0.00 - 0.20 10*3/mm3    Immature Grans, Absolute 0.14 (H) 0.00 - 0.05 10*3/mm3    nRBC 0.0 0.0 - 0.2 /100 WBC   Urinalysis With Culture If Indicated - Urine, Catheter    Collection Time: 01/12/23 12:19 PM    Specimen: Urine, Catheter   Result Value Ref Range    Color, UA Yellow Yellow, Straw    Appearance, UA Cloudy (A) Clear    pH, UA <=5.0 5.0 - 8.0    Specific Gravity, UA 1.019 1.005 - 1.030    Glucose, UA Negative Negative    Ketones, UA Trace (A) Negative    Bilirubin, UA Negative Negative    Blood, UA Negative Negative    Protein,  mg/dL (2+) (A) Negative    Leuk Esterase, UA Moderate (2+) (A) Negative    Nitrite, UA Negative Negative    Urobilinogen, UA 0.2 E.U./dL 0.2 - 1.0 E.U./dL   Urinalysis, Microscopic Only - Urine, Catheter    Collection Time: 01/12/23  12:19 PM    Specimen: Urine, Catheter   Result Value Ref Range    RBC, UA 0-2 None Seen, 0-2 /HPF    WBC, UA 21-30 (A) None Seen, 0-2 /HPF    Bacteria, UA 4+ (A) None Seen /HPF    Squamous Epithelial Cells, UA 3-6 (A) None Seen, 0-2 /HPF    Hyaline Casts, UA 7-12 None Seen /LPF    Methodology Automated Microscopy    COVID-19 and FLU A/B PCR - Swab, Nasopharynx    Collection Time: 01/12/23 12:20 PM    Specimen: Nasopharynx; Swab   Result Value Ref Range    COVID19 Not Detected Not Detected - Ref. Range    Influenza A PCR Not Detected Not Detected    Influenza B PCR Not Detected Not Detected       Ordered the above labs and independently reviewed the results.        RADIOLOGY  XR Chest 1 View    Result Date: 1/12/2023  XR CHEST 1 VW-  HISTORY: Female who is 87 years-old,  cough  TECHNIQUE: Frontal view of the chest  COMPARISON: 9/17/2022  FINDINGS: The heart size is normal. Aorta is calcified. Pulmonary vasculature is slightly congested, but less than on the prior exam. Likely atelectasis or scarring the right mid and lower lung. No focal pulmonary consolidation, pleural effusion, or pneumothorax. No acute osseous process.      Likely atelectasis or scarring the right mid and lower lung. Slight pulmonary vascular congestion. Follow-up as clinical indications persist.  This report was finalized on 1/12/2023 1:03 PM by Dr. Clay Jordan M.D.      XR Hip With or Without Pelvis 2 - 3 View Left    Result Date: 1/12/2023  XR HIP W OR WO PELVIS 2-3 VIEW LEFT-  INDICATIONS: Pain  TECHNIQUE: Frontal views of the pelvis, 3 views of the left hip  COMPARISON: 02/07/2018  FINDINGS:  Deformity of the left inferior pubic ramus appears chronic, but new from the prior exam, correlate clinically. Surgical rods of the left femur appear intact. No other evidence of fracture is identified. No dislocation. Arterial calcification is present. Degenerative changes seen in the partly included lumbar spine. Follow-up/further evaluation can  be obtained as indications persist.       As described.  This report was finalized on 1/12/2023 1:05 PM by Dr. Clay Jordan M.D.        I ordered the above noted radiological studies. Independently reviewed by me and discussed with radiologist.  See dictation above for official radiology interpretation.      PROCEDURES    Procedures        MEDICATIONS GIVEN IN ER    Medications   sodium chloride 0.9 % flush 10 mL (has no administration in time range)   cefTRIAXone (ROCEPHIN) 1 g in sodium chloride 0.9 % 100 mL IVPB-VTB (1 g Intravenous New Bag 1/12/23 1358)         PROGRESS, DATA ANALYSIS, CONSULTS, AND MEDICAL DECISION MAKING    All labs have been independently reviewed by me.  All radiology studies have been reviewed by me.   EKG's independently reviewed by me.  Discussion below represents my analysis of pertinent findings related to patient's condition, differential diagnosis, treatment plan and final disposition.    - Chronic or social conditions impacting care: Gait instability      DIFFERENTIAL DIAGNOSIS INCLUDE BUT NOT LIMITED TO:     UTI, pneumonia, viral syndrome, COVID, influenza, dehydration, electrolyte disturbance      Orders placed during this visit:  Orders Placed This Encounter   Procedures   • COVID-19 and FLU A/B PCR - Swab, Nasopharynx   • Urine Culture - Urine,   • Blood Culture - Blood,   • Blood Culture - Blood,   • XR Chest 1 View   • XR Hip With or Without Pelvis 2 - 3 View Left   • CT Abdomen Pelvis With Contrast   • Comprehensive Metabolic Panel   • Urinalysis With Culture If Indicated - Urine, Catheter   • Troponin   • BNP   • CBC Auto Differential   • Urinalysis, Microscopic Only - Urine, Clean Catch   • Cardiac Monitoring   • [facility] (on-call MD unless specified) Details   • Type & Screen   • Insert Peripheral IV   • Inpatient Admission   • CBC & Differential         ED Course as of 01/12/23 1412   Thu Jan 12, 2023   1410 I discussed the case with MD Joao with [facility] at this time  regarding the patient.  I discussed work-up, results, concerns.  I discussed the consulting provider's desire for tele admit.   [DC]      ED Course User Index  [DC] Louis Mercado PA       AS OF 14:12 EST VITALS:    BP -    HR - 71  TEMP - 98.9 °F (37.2 °C) (Tympanic)  02 SATS - 94%    1413 I rechecked the patient.  I discussed the patient's labs, radiology findings (including all incidental findings), diagnosis, and plan for admission.  The patient understands and agrees with the plan, and is ready for admit.  All questions answered.        DIAGNOSIS  Final diagnoses:   Acute UTI (urinary tract infection)   Leukocytosis, unspecified type   Gait instability   Anemia, unspecified type         DISPOSITION  Admit    Pt masked in first look. I wore a surgical mask throughout my encounters with the pt. I performed hand hygiene on entry into the pt room and upon exit.     Dictated utilizing Dragon dictation     Note Disclaimer: At [facility], we believe that sharing information builds trust and better relationships. You are receiving this note because you recently visited [facility]. It is possible you will see health information before a provider has talked with you about it. This kind of information can be easy to misunderstand. To help you fully understand what it means for your health, we urge you to discuss this note with your provider.      Louis Mercado PA  01/19/23 0844

## 2023-01-12 NOTE — ED NOTES
Nursing report ED to floor  Vonda Jay  87 y.o.  female    HPI (triage note):   Chief Complaint   Patient presents with    Weakness - Generalized       Admitting doctor:   Cyn Shepherd MD    Admitting diagnosis:   The primary encounter diagnosis was Acute UTI (urinary tract infection). Diagnoses of Leukocytosis, unspecified type, Gait instability, Anemia, unspecified type, Pulmonary vascular congestion, and Inferior pubic ramus fracture, left, closed, initial encounter (HCC) were also pertinent to this visit.    Code status:   Current Code Status       Date Active Code Status Order ID Comments User Context       Prior            Allergies:   Patient has no known allergies.    Past Medical History:  Past Medical History:   Diagnosis Date    Arthritis     History of breast cancer     History of carotid artery disease     Hyperlipidemia     Hypertension         Weight:   There were no vitals filed for this visit.    Most recent vitals:   Vitals:    01/12/23 1331 01/12/23 1400 01/12/23 1401 01/12/23 1431   BP: 125/48  131/54 143/75   Pulse: 72 69 70 76   Resp:       Temp:       TempSrc:       SpO2: 95% 94% 93% 96%       Active LDAs/IV Access:   Lines, Drains & Airways       Active LDAs       Name Placement date Placement time Site Days    Peripheral IV 09/17/22 1142 Right Antecubital 09/17/22  1142  Antecubital  117    Peripheral IV 01/12/23 1220 Right;Lateral Antecubital 01/12/23  1220  Antecubital  less than 1    Peripheral IV 01/12/23 1358 Left;Posterior Hand 01/12/23  1358  Hand  less than 1                    Labs (abnormal labs have a star):   Labs Reviewed   COMPREHENSIVE METABOLIC PANEL - Abnormal; Notable for the following components:       Result Value    Glucose 140 (*)     BUN 34 (*)     Creatinine 1.69 (*)     Sodium 134 (*)     Chloride 96 (*)     eGFR 29.1 (*)     All other components within normal limits    Narrative:     GFR Normal >60  Chronic Kidney Disease <60  Kidney Failure <15    The GFR  formula is only valid for adults with stable renal function between ages 18 and 70.   URINALYSIS W/ CULTURE IF INDICATED - Abnormal; Notable for the following components:    Appearance, UA Cloudy (*)     Ketones, UA Trace (*)     Protein,  mg/dL (2+) (*)     Leuk Esterase, UA Moderate (2+) (*)     All other components within normal limits    Narrative:     In absence of clinical symptoms, the presence of pyuria, bacteria, and/or nitrites on the urinalysis result does not correlate with infection.   BNP (IN-HOUSE) - Abnormal; Notable for the following components:    proBNP 2,990.0 (*)     All other components within normal limits    Narrative:     Among patients with dyspnea, NT-proBNP is highly sensitive for the detection of acute congestive heart failure. In addition NT-proBNP of <300 pg/ml effectively rules out acute congestive heart failure with 99% negative predictive value.    Results may be falsely decreased if patient taking Biotin.     CBC WITH AUTO DIFFERENTIAL - Abnormal; Notable for the following components:    WBC 18.01 (*)     RBC 3.10 (*)     Hemoglobin 9.4 (*)     Hematocrit 28.7 (*)     RDW 12.1 (*)     Neutrophil % 79.5 (*)     Lymphocyte % 8.3 (*)     Immature Grans % 0.8 (*)     Neutrophils, Absolute 14.31 (*)     Monocytes, Absolute 1.90 (*)     Immature Grans, Absolute 0.14 (*)     All other components within normal limits   URINALYSIS, MICROSCOPIC ONLY - Abnormal; Notable for the following components:    WBC, UA 21-30 (*)     Bacteria, UA 4+ (*)     Squamous Epithelial Cells, UA 3-6 (*)     All other components within normal limits   COVID-19 AND FLU A/B, NP SWAB IN TRANSPORT MEDIA 8-12 HR TAT - Normal    Narrative:     Fact sheet for providers: https://www.fda.gov/media/647496/download    Fact sheet for patients: https://www.fda.gov/media/781819/download    Test performed by PCR.   TROPONIN (IN-HOUSE) - Normal    Narrative:     Troponin T Reference Range:  <= 0.03 ng/mL-   Negative for  AMI  >0.03 ng/mL-     Abnormal for myocardial necrosis.  Clinicians would have to utilize clinical acumen, EKG, Troponin and serial changes to determine if it is an Acute Myocardial Infarction or myocardial injury due to an underlying chronic condition.       Results may be falsely decreased if patient taking Biotin.     URINE CULTURE   BLOOD CULTURE   BLOOD CULTURE   TYPE AND SCREEN   CBC AND DIFFERENTIAL    Narrative:     The following orders were created for panel order CBC & Differential.  Procedure                               Abnormality         Status                     ---------                               -----------         ------                     CBC Auto Differential[[id number]]        Abnormal            Final result                 Please view results for these tests on the individual orders.       EKG:   No orders to display       Meds given in ED:   Medications   sodium chloride 0.9 % flush 10 mL (has no administration in time range)   cefTRIAXone (ROCEPHIN) 1 g in sodium chloride 0.9 % 100 mL IVPB-VTB (0 g Intravenous Stopped 23 1428)   furosemide (LASIX) injection 40 mg (40 mg Intravenous Given 23 1552)   hydrALAZINE (APRESOLINE) tablet 100 mg (100 mg Oral Given 23 1549)       Imaging results:  XR Chest 1 View    Result Date: 2023  Likely atelectasis or scarring the right mid and lower lung. Slight pulmonary vascular congestion. Follow-up as clinical indications persist.  This report was finalized on 2023 1:03 PM by Dr. Clay Jordan M.D.      XR Hip With or Without Pelvis 2 - 3 View Left    Result Date: 2023   As described.  This report was finalized on 2023 1:05 PM by Dr. Clay Jordan M.D.       Ambulatory status:   - assist x1    Social issues:   Social History     Socioeconomic History    Marital status:    Tobacco Use    Smoking status: Former     Years: 47.00     Types: Cigarettes     Quit date:      Years since quittin.0     Smokeless tobacco: Never    Tobacco comments:     caffeine use- coffee   Vaping Use    Vaping Use: Never used   Substance and Sexual Activity    Alcohol use: No    Drug use: Defer    Sexual activity: Defer          NIH Stroke Scale:         Matt Driscoll RN  01/12/23 15:53 EST

## 2023-01-12 NOTE — H&P
Internal medicine history and physical  INTERNAL MEDICINE   [facility]       Patient Identification:  Name: Vonda Jay  Age: 87 y.o.  Sex: female  :  1935  MRN: [id number]                   Primary Care Physician: Ambrose Ashley MD                               Date of admission:2023    Chief Complaint: Nausea and vomiting and weakness last 3 to 4 days and discomfort in her left thigh after a fall 3 days ago.    History of Present Illness:   Patient is 87-year-old female with past medical history as noted below including history of prior falls and pelvic fractures has underlying history of hypertension chronic kidney disease congestive heart failure has not been feeling very well for the last couple days.  Patient recalls to have a fall from the couch 3 days ago and landed on her left side and has been complaining of discomfort in her left hip since.  Patient also has had frequency and burning in urination and hold get for UTI from the grocery store was used few days ago that came back positive.  Patient does have history of recurrent urinary tract infection.  Work-up in the emergency room revealed chronic changes in the pelvic skeleton new from previous x-rays concerning for healing fracture.  Patient is able to ambulate and bear weight on the left leg though with discomfort.  Work-up in the emergency room confirmed presence of urinary tract infection with abnormal urinalysis but imaging studies revealed abnormal findings in the pelvis concerning for cystitis and possible colovesical fistula and adjacent colitis.  Blood cultures have been sent and patient has been started on IV antibiotics.  X-rays of the pelvis and left hip did not show any acute fracture.  Patient has preserved appetite and denies any diarrhea or abdominal pain.      Past Medical History:  Past Medical History:   Diagnosis Date   • Arthritis    • History of breast cancer    • History of carotid artery  disease    • Hyperlipidemia    • Hypertension      Past Surgical History:  Past Surgical History:   Procedure Laterality Date   • BREAST LUMPECTOMY  1998   • CAROTID ARTERY ANGIOPLASTY  2000, 2013   • CHOLECYSTECTOMY  1979   • FEMUR IM NAILING/RODDING Left 2/8/2018    Procedure: FEMUR INTRAMEDULLARY NAILING;  Surgeon: Zheng Fleming MD;  [facility];  Service:    • HYSTERECTOMY  1989      Home Meds:  Medications Prior to Admission   Medication Sig Dispense Refill Last Dose   • alendronate (FOSAMAX) 70 MG tablet Take 70 mg by mouth Every 7 (Seven) Days. Saturday      • amLODIPine (NORVASC) 5 MG tablet Take 1 tablet by mouth Daily. (Patient taking differently: Take 5 mg by mouth Daily.) 90 tablet 3 1/12/2023   • aspirin 81 MG tablet Take 81 mg by mouth Daily.   1/12/2023   • cholecalciferol (VITAMIN D3) 1000 units tablet Take 1,000 Units by mouth Daily.      • citalopram (CeleXA) 10 MG tablet Take 10 mg by mouth Daily.   1/12/2023   • Dihydroxyaluminum Sod Carb (ACID RELIEF PO) Take 1 tablet by mouth As Needed.      • docusate sodium 100 MG capsule Take 1 capsule by mouth 2 (Two) Times a Day. (Patient taking differently: Take 100 mg by mouth As Needed.) 60 capsule 3    • furosemide (LASIX) 40 MG tablet Take 1 tablet by mouth Daily. 90 tablet 3 1/11/2023   • hydrALAZINE (APRESOLINE) 100 MG tablet Take 100 mg by mouth 3 (Three) Times a Day.   1/12/2023   • Multiple Minerals-Vitamins (CALCIUM CITRATE PLUS/MAGNESIUM PO) Take 1 tablet by mouth Daily.   1/12/2023   • multivitamin with minerals (CENTRUM SILVER 50+WOMEN PO) Take 1 tablet by mouth Daily.   1/12/2023   • nebivolol (Bystolic) 5 MG tablet Take 1 tablet by mouth Daily. 90 tablet 3 1/11/2023   • pravastatin (PRAVACHOL) 40 MG tablet Take 40 mg by mouth Every Night.   1/11/2023     Current Meds:     Current Facility-Administered Medications:   •  [COMPLETED] Insert Peripheral IV, , , Once **AND** sodium chloride 0.9 % flush 10 mL, 10 mL, Intravenous,  "Jess RANDLE David J, PA  Allergies:  No Known Allergies  Social History:   Social History     Tobacco Use   • Smoking status: Former     Years: 47.00     Types: Cigarettes     Quit date:      Years since quittin.0   • Smokeless tobacco: Never   • Tobacco comments:     caffeine use- coffee   Substance Use Topics   • Alcohol use: No      Family History:  Family History   Problem Relation Age of Onset   • Hypertension Mother    • Heart valve disorder Sister    • Lung cancer Sister    • Lymphoma Sister    • Skin cancer Sister    • Stroke Brother 57          Review of Systems  See history of present illness and past medical history.    At present patient is feeling cold and wants to be covered with multiple blankets.    As described in the history of presenting illness.  Remainder of ROS is negative.      Vitals:   /68 (BP Location: Right arm, Patient Position: Lying)   Pulse 89   Temp 99.2 °F (37.3 °C) (Oral)   Resp 18   Ht 152.4 cm (60\")   Wt 73.3 kg (161 lb 9.6 oz)   SpO2 99%   BMI 31.56 kg/m²   I/O: No intake or output data in the 24 hours ending 23  Exam:  Patient is examined using the personal protective equipment as per guidelines from infection control for this particular patient as enacted.  Hand washing was performed before and after patient interaction.  General Appearance:    Alert, cooperative, no distress, appears stated age   Head:    Normocephalic, without obvious abnormality, atraumatic   Eyes:    PERRL, conjunctiva/corneas clear, EOM's intact, both eyes   Ears:    Normal external ear canals, both ears   Nose:   Nares normal, septum midline, mucosa normal, no drainage    or sinus tenderness   Throat:   Lips, tongue, gums normal; oral mucosa pink and moist   Neck:   Supple, symmetrical, trachea midline, no adenopathy;     thyroid:  no enlargement/tenderness/nodules; no carotid    bruit or JVD   Back:     Symmetric, no curvature, ROM normal, no CVA tenderness   Lungs:     " Clear to auscultation bilaterally, respirations unlabored   Chest Wall:    No tenderness or deformity    Heart:   S1-S2 regular   Abdomen:    Soft no guarding rigidity or rebound noted mild left-sided suprapubic tenderness   Extremities:  Tenderness and some evidence of contusion of the left lateral proximal thigh with normal range of motion of the left hip and knee.   Pulses:   Pulses palpable in all extremities; symmetric all extremities   Skin:  Chronic changes noted   Neurologic:  Awake oriented interactive female       Data Review:      I reviewed the patient's new clinical results.  Results from last 7 days   Lab Units 01/12/23  1212   WBC 10*3/mm3 18.01*   HEMOGLOBIN g/dL 9.4*   PLATELETS 10*3/mm3 222     Results from last 7 days   Lab Units 01/12/23  1212   SODIUM mmol/L 134*   POTASSIUM mmol/L 3.6   CHLORIDE mmol/L 96*   CO2 mmol/L 27.4   BUN mg/dL 34*   CREATININE mg/dL 1.69*   CALCIUM mg/dL 9.5   GLUCOSE mg/dL 140*     CT Abdomen Pelvis Without Contrast    Result Date: 1/12/2023  1. Wall thickening of the left side of the dome of the bladder. There is adjacent sigmoid diverticulosis without evidence of active diverticulitis. There is a tract of tissue bridging the bladder and the adjacent sigmoid colon as well as a tiny extraluminal gas bubble adjacent to the bladder wall. These findings may reflect sequela of recent episode of diverticulitis. A colovesical fistula cannot be excluded given this appearance. 2. Additional findings as described  Radiation dose reduction techniques were utilized, including automated exposure control and exposure modulation based on body size.  This report was finalized on 1/12/2023 4:02 PM by Dr. Gene Martinez M.D.      XR Chest 1 View    Result Date: 1/12/2023  Likely atelectasis or scarring the right mid and lower lung. Slight pulmonary vascular congestion. Follow-up as clinical indications persist.  This report was finalized on 1/12/2023 1:03 PM by Dr. Clay Jordan  M.D.      XR Hip With or Without Pelvis 2 - 3 View Left    Result Date: 1/12/2023   As described.  This report was finalized on 1/12/2023 1:05 PM by Dr. Clay Jordan M.D.      Microbiology Results (last 10 days)     Procedure Component Value - Date/Time    COVID-19 and FLU A/B PCR - Swab, Nasopharynx [[id number]]  (Normal) Collected: 01/12/23 1220    Lab Status: Final result Specimen: Swab from Nasopharynx Updated: 01/12/23 1250     COVID19 Not Detected     Influenza A PCR Not Detected     Influenza B PCR Not Detected    Narrative:      Fact sheet for providers: https://www.fda.gov/media/568934/download    Fact sheet for patients: https://www.fda.gov/media/156574/download    Test performed by PCR.        Brief Urine Lab Results  (Last result in the past 365 days)      Color   Clarity   Blood   Leuk Est   Nitrite   Protein   CREAT   Urine HCG        01/12/23 1219 Yellow   Cloudy   Negative   Moderate (2+)   Negative   100 mg/dL (2+)                 Assessment:  Active Hospital Problems    Diagnosis  POA   • **Acute UTI (urinary tract infection) [N39.0]  Yes   • Colovesical fistula - possible [N32.1]  Unknown   • Aortic stenosis [I35.0]  Yes   • Anemia [D64.9]  Yes   • Stage 3 chronic kidney disease (HCC) [N18.30]  Yes   • Leukocytosis [D72.829]  Yes   • SANYA (acute kidney injury) (HCC) [N17.9]  Yes   • HTN (hypertension) [I10]  Yes       Medical decision making/care plan: See admitting orders  Acute urinary tract infection manifesting as generalized weakness propensity to fall frequency and urgency with abnormal urinalysis-plan is to admit the patient, follow-up on urine culture results empiric IV Zosyn normally to address UTI but also to address following concern with adjustment of antibiotic therapy based on culture results and evaluation and recommendation by general surgery and urology service.  Systemic markers of infection with leukocytosis and subjective feeling of cold and chills while being evaluated  along with abnormal findings on the CT scan of the abdomen pelvis concerning for cystitis and adjacent involving sigmoid colon with possibility of colovesical fistula with leukocytosis-continue with IV Zosyn urology and general surgery consultation and follow-up on blood culture results and urine culture results.  Acute kidney injury on chronic kidney disease- monitor renal function and avoid nephrotoxic agent and hypotensive episodes.  Elevated BNP with x-ray evidence of vascular congestion the patient does not have any specific symptoms of shortness of breath or hypoxia-patient did receive a dose of Lasix in the emergency room plan is to monitor her clinical course and avoid IV fluid given the concern for congestion and reassess based on her symptoms and volume status whether or not IV fluid needs to be administered and or her Lasix to be held.  Recent fall likely secondary to systemic illness with weakness and discomfort in the left proximal thigh with negative x-rays-reassessment and physical therapy evaluation.    Cyn Shepherd MD   1/12/2023  18:35 EST    Parts of this note may be an electronic transcription/translation of spoken language to printed text using the Dragon dictation system.

## 2023-01-12 NOTE — PROGRESS NOTES
Clinical Pharmacy Services: Medication History    Vonda Jay is a 87 y.o. female presenting to [facility] for   Chief Complaint   Patient presents with   • Weakness - Generalized       She  has a past medical history of Arthritis, History of breast cancer, History of carotid artery disease, Hyperlipidemia, and Hypertension.    Allergies as of 01/12/2023   • (No Known Allergies)       Medication information was obtained from: Patient  Pharmacy and Phone Number:     Prior to Admission Medications     Prescriptions Last Dose Informant Patient Reported? Taking?    alendronate (FOSAMAX) 70 MG tablet  Self Yes Yes    Take 70 mg by mouth Every 7 (Seven) Days. Saturday    amLODIPine (NORVASC) 5 MG tablet 1/12/2023 Self No Yes    Take 1 tablet by mouth Daily.    Patient taking differently:  Take 5 mg by mouth Daily.    aspirin 81 MG tablet 1/12/2023 Self Yes Yes    Take 81 mg by mouth Daily.    cholecalciferol (VITAMIN D3) 1000 units tablet  Self Yes Yes    Take 1,000 Units by mouth Daily.    citalopram (CeleXA) 10 MG tablet 1/12/2023 Self Yes Yes    Take 10 mg by mouth Daily.    Dihydroxyaluminum Sod Carb (ACID RELIEF PO)  Self Yes Yes    Take 1 tablet by mouth As Needed.    docusate sodium 100 MG capsule  Self No Yes    Take 1 capsule by mouth 2 (Two) Times a Day.    Patient taking differently:  Take 100 mg by mouth As Needed.    furosemide (LASIX) 40 MG tablet 1/11/2023 Self No Yes    Take 1 tablet by mouth Daily.    hydrALAZINE (APRESOLINE) 100 MG tablet 1/12/2023 Self Yes Yes    Take 100 mg by mouth 3 (Three) Times a Day.    Multiple Minerals-Vitamins (CALCIUM CITRATE PLUS/MAGNESIUM PO) 1/12/2023 Self Yes Yes    Take 1 tablet by mouth Daily.    multivitamin with minerals (CENTRUM SILVER 50+WOMEN PO) 1/12/2023 Self Yes Yes    Take 1 tablet by mouth Daily.    nebivolol (Bystolic) 5 MG tablet 1/11/2023 Self No Yes    Take 1 tablet by mouth Daily.    pravastatin (PRAVACHOL) 40 MG tablet 1/11/2023 Self Yes  Yes    Take 40 mg by mouth Every Night.            Medication notes:     This medication list is complete to the best of my knowledge as of 1/12/2023    Please call if questions.    Janay Aguilar  Medication History Technician   [phone]    1/12/2023 14:55 EST

## 2023-01-13 PROBLEM — D63.1 ANEMIA DUE TO CHRONIC KIDNEY DISEASE: Status: ACTIVE | Noted: 2022-09-17

## 2023-01-13 PROBLEM — I50.30 (HFPEF) HEART FAILURE WITH PRESERVED EJECTION FRACTION: Status: ACTIVE | Noted: 2023-01-13

## 2023-01-13 PROBLEM — N18.32 STAGE 3B CHRONIC KIDNEY DISEASE: Status: ACTIVE | Noted: 2022-09-17

## 2023-01-13 PROBLEM — N18.9 ANEMIA DUE TO CHRONIC KIDNEY DISEASE: Status: ACTIVE | Noted: 2022-09-17

## 2023-01-13 PROBLEM — E66.9 OBESITY (BMI 30-39.9): Status: ACTIVE | Noted: 2023-01-13

## 2023-01-13 PROBLEM — E87.6 HYPOKALEMIA: Status: ACTIVE | Noted: 2023-01-13

## 2023-01-13 LAB
ALBUMIN SERPL-MCNC: 3.2 G/DL (ref 3.5–5.2)
ALBUMIN/GLOB SERPL: 0.9 G/DL
ALP SERPL-CCNC: 58 U/L (ref 39–117)
ALT SERPL W P-5'-P-CCNC: 8 U/L (ref 1–33)
ANION GAP SERPL CALCULATED.3IONS-SCNC: 12.6 MMOL/L (ref 5–15)
AST SERPL-CCNC: 10 U/L (ref 1–32)
BASOPHILS # BLD AUTO: 0.07 10*3/MM3 (ref 0–0.2)
BASOPHILS NFR BLD AUTO: 0.5 % (ref 0–1.5)
BILIRUB SERPL-MCNC: 0.3 MG/DL (ref 0–1.2)
BUN SERPL-MCNC: 33 MG/DL (ref 8–23)
BUN/CREAT SERPL: 20.6 (ref 7–25)
CALCIUM SPEC-SCNC: 8.8 MG/DL (ref 8.6–10.5)
CHLORIDE SERPL-SCNC: 98 MMOL/L (ref 98–107)
CO2 SERPL-SCNC: 25.4 MMOL/L (ref 22–29)
CREAT SERPL-MCNC: 1.6 MG/DL (ref 0.57–1)
DEPRECATED RDW RBC AUTO: 37.9 FL (ref 37–54)
EGFRCR SERPLBLD CKD-EPI 2021: 31.1 ML/MIN/1.73
EOSINOPHIL # BLD AUTO: 0.37 10*3/MM3 (ref 0–0.4)
EOSINOPHIL NFR BLD AUTO: 2.5 % (ref 0.3–6.2)
ERYTHROCYTE [DISTWIDTH] IN BLOOD BY AUTOMATED COUNT: 11.6 % (ref 12.3–15.4)
GLOBULIN UR ELPH-MCNC: 3.5 GM/DL
GLUCOSE SERPL-MCNC: 98 MG/DL (ref 65–99)
HCT VFR BLD AUTO: 27.8 % (ref 34–46.6)
HGB BLD-MCNC: 9.4 G/DL (ref 12–15.9)
IMM GRANULOCYTES # BLD AUTO: 0.09 10*3/MM3 (ref 0–0.05)
IMM GRANULOCYTES NFR BLD AUTO: 0.6 % (ref 0–0.5)
LYMPHOCYTES # BLD AUTO: 1.83 10*3/MM3 (ref 0.7–3.1)
LYMPHOCYTES NFR BLD AUTO: 12.5 % (ref 19.6–45.3)
MCH RBC QN AUTO: 29.6 PG (ref 26.6–33)
MCHC RBC AUTO-ENTMCNC: 33.8 G/DL (ref 31.5–35.7)
MCV RBC AUTO: 87.4 FL (ref 79–97)
MONOCYTES # BLD AUTO: 1.73 10*3/MM3 (ref 0.1–0.9)
MONOCYTES NFR BLD AUTO: 11.8 % (ref 5–12)
NEUTROPHILS NFR BLD AUTO: 10.57 10*3/MM3 (ref 1.7–7)
NEUTROPHILS NFR BLD AUTO: 72.1 % (ref 42.7–76)
NRBC BLD AUTO-RTO: 0 /100 WBC (ref 0–0.2)
PLATELET # BLD AUTO: 223 10*3/MM3 (ref 140–450)
PMV BLD AUTO: 11.3 FL (ref 6–12)
POTASSIUM SERPL-SCNC: 3.4 MMOL/L (ref 3.5–5.2)
PROT SERPL-MCNC: 6.7 G/DL (ref 6–8.5)
RBC # BLD AUTO: 3.18 10*6/MM3 (ref 3.77–5.28)
SODIUM SERPL-SCNC: 136 MMOL/L (ref 136–145)
WBC NRBC COR # BLD: 14.66 10*3/MM3 (ref 3.4–10.8)

## 2023-01-13 PROCEDURE — 97530 THERAPEUTIC ACTIVITIES: CPT

## 2023-01-13 PROCEDURE — 97535 SELF CARE MNGMENT TRAINING: CPT | Performed by: OCCUPATIONAL THERAPIST

## 2023-01-13 PROCEDURE — 97116 GAIT TRAINING THERAPY: CPT

## 2023-01-13 PROCEDURE — 85025 COMPLETE CBC W/AUTO DIFF WBC: CPT | Performed by: INTERNAL MEDICINE

## 2023-01-13 PROCEDURE — 25010000002 PIPERACILLIN SOD-TAZOBACTAM PER 1 G: Performed by: INTERNAL MEDICINE

## 2023-01-13 PROCEDURE — 80053 COMPREHEN METABOLIC PANEL: CPT | Performed by: INTERNAL MEDICINE

## 2023-01-13 PROCEDURE — 97166 OT EVAL MOD COMPLEX 45 MIN: CPT | Performed by: OCCUPATIONAL THERAPIST

## 2023-01-13 PROCEDURE — 97162 PT EVAL MOD COMPLEX 30 MIN: CPT

## 2023-01-13 RX ORDER — POTASSIUM CHLORIDE 750 MG/1
40 TABLET, FILM COATED, EXTENDED RELEASE ORAL AS NEEDED
Status: DISCONTINUED | OUTPATIENT
Start: 2023-01-13 | End: 2023-01-16 | Stop reason: SDUPTHER

## 2023-01-13 RX ORDER — POTASSIUM CHLORIDE 1.5 G/1.77G
40 POWDER, FOR SOLUTION ORAL AS NEEDED
Status: DISCONTINUED | OUTPATIENT
Start: 2023-01-13 | End: 2023-01-16 | Stop reason: SDUPTHER

## 2023-01-13 RX ORDER — POTASSIUM CHLORIDE 7.45 MG/ML
10 INJECTION INTRAVENOUS
Status: DISCONTINUED | OUTPATIENT
Start: 2023-01-13 | End: 2023-01-16 | Stop reason: SDUPTHER

## 2023-01-13 RX ORDER — MAGNESIUM SULFATE HEPTAHYDRATE 40 MG/ML
2 INJECTION, SOLUTION INTRAVENOUS AS NEEDED
Status: DISCONTINUED | OUTPATIENT
Start: 2023-01-13 | End: 2023-01-16 | Stop reason: SDUPTHER

## 2023-01-13 RX ORDER — MAGNESIUM SULFATE HEPTAHYDRATE 40 MG/ML
4 INJECTION, SOLUTION INTRAVENOUS AS NEEDED
Status: DISCONTINUED | OUTPATIENT
Start: 2023-01-13 | End: 2023-01-16 | Stop reason: SDUPTHER

## 2023-01-13 RX ADMIN — NEBIVOLOL 5 MG: 5 TABLET ORAL at 09:04

## 2023-01-13 RX ADMIN — FUROSEMIDE 40 MG: 40 TABLET ORAL at 09:04

## 2023-01-13 RX ADMIN — TAZOBACTAM SODIUM AND PIPERACILLIN SODIUM 3.38 G: 375; 3 INJECTION, SOLUTION INTRAVENOUS at 06:02

## 2023-01-13 RX ADMIN — POTASSIUM CHLORIDE 40 MEQ: 750 TABLET, EXTENDED RELEASE ORAL at 17:50

## 2023-01-13 RX ADMIN — Medication 1000 UNITS: at 09:04

## 2023-01-13 RX ADMIN — POTASSIUM CHLORIDE 40 MEQ: 750 TABLET, EXTENDED RELEASE ORAL at 14:24

## 2023-01-13 RX ADMIN — CITALOPRAM 10 MG: 10 TABLET, FILM COATED ORAL at 09:04

## 2023-01-13 RX ADMIN — HYDRALAZINE HYDROCHLORIDE 100 MG: 50 TABLET, FILM COATED ORAL at 09:04

## 2023-01-13 RX ADMIN — TAZOBACTAM SODIUM AND PIPERACILLIN SODIUM 3.38 G: 375; 3 INJECTION, SOLUTION INTRAVENOUS at 13:52

## 2023-01-13 RX ADMIN — HYDRALAZINE HYDROCHLORIDE 100 MG: 50 TABLET, FILM COATED ORAL at 17:50

## 2023-01-13 RX ADMIN — AMLODIPINE BESYLATE 5 MG: 5 TABLET ORAL at 09:04

## 2023-01-13 RX ADMIN — Medication 10 ML: at 09:04

## 2023-01-13 RX ADMIN — HYDRALAZINE HYDROCHLORIDE 100 MG: 50 TABLET, FILM COATED ORAL at 20:08

## 2023-01-13 RX ADMIN — Medication 10 ML: at 20:09

## 2023-01-13 RX ADMIN — TAZOBACTAM SODIUM AND PIPERACILLIN SODIUM 3.38 G: 375; 3 INJECTION, SOLUTION INTRAVENOUS at 20:08

## 2023-01-13 NOTE — PLAN OF CARE
Goal Outcome Evaluation:  Plan of Care Reviewed With: patient, daughter        Progress: improving  Outcome Evaluation: pt worked w OT/PT in co treat session for evaluation. pt admitted from home where she was independent and used a sock aide for LBD socks therefore was dep w socks today. pt was min A w UBD. pt completed sup to sit w min A and SBA for sit to sup. pt sat EOB SBA. pt stood w minx2 and walker. pt walkedw min 1-2 and walker, and req VC to slow down at times as she would also veer to the L and req cues to adjust. pt uses a rollator at home and plans to return home w HH upon dc. pt cont to benefit from OT to incr ADL, strength, balance and tsf. rec home w family for a few days at least and HH and have therapy at home help determime when pt safe to be home alone again.

## 2023-01-13 NOTE — PROGRESS NOTES
Name: Vonda Jay ADMIT: 2023   : 1935  PCP: Ambrose Ashley MD    MRN: [id number] LOS: 1 days   AGE/SEX: 87 y.o. female  ROOM: [facility]     Subjective   Subjective   Patient seen and examined this morning. Hospital day 1. Family at bedside. At time of my examination, patient is awake, alert, resting comfortably in bed. Discussed with nursing, no reported acute events overnight. Patient continues to endorse ongoing generalized fatigue and weakness.    Review of Systems   Constitutional: Positive for fatigue. Negative for chills and fever.   Respiratory: Negative for cough and shortness of breath.    Cardiovascular: Negative for chest pain and palpitations.   Gastrointestinal: Negative for abdominal distention and abdominal pain.   Genitourinary: Positive for frequency. Negative for difficulty urinating and dysuria.   Neurological: Positive for weakness. Negative for dizziness and light-headedness.        Objective   Objective   Vital Signs  Temp:  [98.4 °F (36.9 °C)-102 °F (38.9 °C)] 98.4 °F (36.9 °C)  Heart Rate:  [66-89] 66  Resp:  [16-18] 18  BP: ()/(44-75) 129/49  SpO2:  [90 %-99 %] 97 %  on  Flow (L/min):  [2] 2;   Device (Oxygen Therapy): nasal cannula  Body mass index is 31.56 kg/m².  Physical Exam  Vitals and nursing note reviewed.   Constitutional:       General: She is not in acute distress.     Appearance: She is obese. She is ill-appearing.   HENT:      Head: Atraumatic.      Right Ear: External ear normal.      Left Ear: External ear normal.   Eyes:      General: No scleral icterus.     Conjunctiva/sclera: Conjunctivae normal.   Cardiovascular:      Rate and Rhythm: Normal rate and regular rhythm.      Pulses: Normal pulses.      Heart sounds: Normal heart sounds.   Pulmonary:      Effort: Pulmonary effort is normal. No respiratory distress.      Breath sounds: Normal breath sounds.      Comments: On 2L O2 via NC  Abdominal:      General: Bowel sounds are normal.       Palpations: Abdomen is soft.      Tenderness: There is no abdominal tenderness.   Skin:     General: Skin is warm and dry.   Neurological:      Mental Status: She is alert.       Results Review:  I reviewed the patient's new clinical results.  I reviewed the patient's new imaging results and agree with the interpretation.  I reviewed the patient's other test results and agree with the interpretation  I personally viewed and interpreted the patient's EKG/Telemetry data    Results from last 7 days   Lab Units 01/13/23  0615 01/12/23  1212   WBC 10*3/mm3 14.66* 18.01*   HEMOGLOBIN g/dL 9.4* 9.4*   PLATELETS 10*3/mm3 223 222     Results from last 7 days   Lab Units 01/13/23  0615 01/12/23  1212   SODIUM mmol/L 136 134*   POTASSIUM mmol/L 3.4* 3.6   CHLORIDE mmol/L 98 96*   CO2 mmol/L 25.4 27.4   BUN mg/dL 33* 34*   CREATININE mg/dL 1.60* 1.69*   GLUCOSE mg/dL 98 140*   EGFR mL/min/1.73 31.1* 29.1*     Results from last 7 days   Lab Units 01/13/23  0615 01/12/23  1212   ALBUMIN g/dL 3.2* 4.0   BILIRUBIN mg/dL 0.3 0.3   ALK PHOS U/L 58 60   AST (SGOT) U/L 10 12   ALT (SGPT) U/L 8 9     Results from last 7 days   Lab Units 01/13/23  0615 01/12/23  1212   CALCIUM mg/dL 8.8 9.5   ALBUMIN g/dL 3.2* 4.0       No results found for: HGBA1C, POCGLU    CT Abdomen Pelvis Without Contrast    Result Date: 1/12/2023  1. Wall thickening of the left side of the dome of the bladder. There is adjacent sigmoid diverticulosis without evidence of active diverticulitis. There is a tract of tissue bridging the bladder and the adjacent sigmoid colon as well as a tiny extraluminal gas bubble adjacent to the bladder wall. These findings may reflect sequela of recent episode of diverticulitis. A colovesical fistula cannot be excluded given this appearance. 2. Additional findings as described  Radiation dose reduction techniques were utilized, including automated exposure control and exposure modulation based on body size.  This report was  finalized on 2023 4:02 PM by Dr. Gene Martinez M.D.      XR Chest 1 View    Result Date: 2023  Likely atelectasis or scarring the right mid and lower lung. Slight pulmonary vascular congestion. Follow-up as clinical indications persist.  This report was finalized on 2023 1:03 PM by Dr. Clay Jordan M.D.      XR Hip With or Without Pelvis 2 - 3 View Left    Result Date: 2023   As described.  This report was finalized on 2023 1:05 PM by Dr. Clay Jordan M.D.      Scheduled Medications  amLODIPine, 5 mg, Oral, Daily  cholecalciferol, 1,000 Units, Oral, Daily  citalopram, 10 mg, Oral, Daily  furosemide, 40 mg, Oral, Daily  hydrALAZINE, 100 mg, Oral, TID  nebivolol, 5 mg, Oral, Daily  piperacillin-tazobactam, 3.375 g, Intravenous, Q8H  sodium chloride, 10 mL, Intravenous, Q12H    Infusions   Diet  Diet: Liquid Diets; Clear Liquid; Texture: Regular Texture (IDDSI 7); Fluid Consistency: Thin (IDDSI 0)       Assessment/Plan     Active Hospital Problems    Diagnosis  POA   • **Acute UTI (urinary tract infection) [N39.0]  Yes   • (HFpEF) heart failure with preserved ejection fraction (HCC) [I50.30]  Yes   • Obesity (BMI 30-39.9) [E66.9]  Yes   • Hypokalemia [E87.6]  No   • Colovesical fistula - possible [N32.1]  Clinically Undetermined   • Aortic stenosis [I35.0]  Yes   • Anemia due to chronic kidney disease [N18.9, D63.1]  Yes   • Stage 3b chronic kidney disease (HCC) [N18.32]  Yes   • Leukocytosis [D72.829]  Yes   • HTN (hypertension) [I10]  Yes      Resolved Hospital Problems   No resolved problems to display.       87 y.o. female admitted with Acute UTI (urinary tract infection).    Acute UTI with leukocytosis  - UA on admission showin+ leukocyte esterase, 21-30 WBC, 4+ bacteria. WBC elevated to 18.01 on admission.  - Started on empiric Zosyn by admitting physician, will continue as prescribed while awaiting results of patient's work-up.  - Blood and urine cultures pending.  -  "Renally dose medications in setting of CKD  - Acute urinary retention protocol.  - Order repeat CBC in AM for reassessment.    Abnormal CT AP with concern for colovesical fistula  - CT AP on admission reviewed, showing: \"Wall thickening of the left side of the dome of the bladder. There is a tract of tissue bridging the bladder and the adjacent sigmoid colon as well as a tiny extraluminal gas bubble adjacent to the bladder wall. These findings may reflect sequela of recent episode of diverticulitis. A colovesical fistula cannot be excluded given this appearance\"  - Urology consulted following admission, evaluated patient, per note: Patient asymptomatic without fecaluria or pneumaturia, UA not C/F fistula. \"Pt will need cystoscopy on outpatient basis to further evaluate bladder wall thickening and Cherise's. Will schedule outpatient follow up for cystoscopy. Schedulers will call to set up f/u appt\"  - General Surgery consulted. Will continue to follow their plans/recommendations. Greatly appreciate their help.    Hypokalemia  - K low at 3.4; Will replete via electrolyte protocol. Follow up repeat labs to guide ongoing management decisions. Replete PRN per protocol. Continue to monitor    Chronic kidney disease stage 3B  - On most recent labs, patient's creatinine found to be 1.60, appears stable. Baseline per review of outside records show recent creatinine values ~1.8 to 1.9 on average, with eGFR in low 30's range, however, most recently values have been in high 20's range, concerning for possible progression to CKD stage IV.   - No indications to warrant acute changes and/or intervention at this time.  - Order repeat BMP in AM for reassessment of renal function.   - Patient will need outpatient Nephrology referral for evaluation after discharge.  - Will continue to monitor and trend creatinine to guide ongoing management decisions. Avoid nephrotoxic medications, IVF, strict I/O, daily weights.    HFpEF  Aortic " stenosis  - Most recent 2D Echo (07/22) showing EF 66-70%, mild concentric LVH, Left ventricular diastolic function is consistent with (grade II w/high LAP) pseudonormalization. The left atrial cavity is moderately dilated. Moderate to severe aortic valve stenosis is present. Aortic valve area is 1.02 cm2. Peak velocity of the flow distal to the aortic valve is 373.4 cm/s. Aortic valve maximum pressure gradient is 55.8 mmHg. Aortic valve mean pressure gradient is 31.7 mmHg. Aortic valve dimensionless index is 0.30.  - BNP elevated on admission, however, this is likely skewed in setting of patient's significant renal impairment. Imaging showing slight pulmonary vascular congestion, however, not overtly hypervolemic warranting IV diuresis at present.  - Continue current medications as prescribed. Continue to closely monitor with telemetry, strict I/O, daily weights, low sodium diet.    Hypertension  - BP acceptable acutely. Appears stable. No indications to warrant acute changes/intervention at this time.  - Continue current medications as prescribed. Trend BP to guide ongoing management decisions.    Obesity  - BMI 31.56 kg/m2. Complicating all medical problems.    · SCDs for DVT prophylaxis.  · Full code.  · Discussed with patient, family, nursing staff, consulting provider, CCP and care team on multidisciplinary rounds.  · Anticipate discharge TBD timing yet to be determined.      Armando Mckenzie DO  [facility]  01/13/23  09:28 EST

## 2023-01-13 NOTE — THERAPY EVALUATION
Patient Name: Vonda Jay  : 1935    MRN: [id number]                              Today's Date: 2023       Admit Date: 2023    Visit Dx:     ICD-10-CM ICD-9-CM   1. Acute UTI (urinary tract infection)  N39.0 599.0   2. Leukocytosis, unspecified type  D72.829 288.60   3. Gait instability  R26.81 781.2   4. Anemia, unspecified type  D64.9 285.9   5. Pulmonary vascular congestion  R09.89 514   6. Inferior pubic ramus fracture, left, closed, initial encounter ([facility])  S32.592A 808.2     Patient Active Problem List   Diagnosis   • Closed displaced intertrochanteric fracture of left femur ([facility])   • SANYA (acute kidney injury) ([facility])   • HTN (hypertension)   • Acute blood loss anemia   • Leukocytosis   • Drug-induced constipation   • Stage 3b chronic kidney disease ([facility])   • COVID-19 virus infection   • Anemia due to chronic kidney disease   • Acute on chronic diastolic CHF (congestive heart failure) ([facility])   • Aortic stenosis   • Anemia of chronic renal failure, stage 3 (moderate) ([facility])   • Acute UTI (urinary tract infection)   • Colovesical fistula - possible   • (HFpEF) heart failure with preserved ejection fraction ([facility])   • Obesity (BMI 30-39.9)   • Hypokalemia     Past Medical History:   Diagnosis Date   • Arthritis    • History of breast cancer    • History of carotid artery disease    • Hyperlipidemia    • Hypertension      Past Surgical History:   Procedure Laterality Date   • BREAST LUMPECTOMY     • CAROTID ARTERY ANGIOPLASTY  ,    • CHOLECYSTECTOMY  1979   • FEMUR IM NAILING/RODDING Left 2018    Procedure: FEMUR INTRAMEDULLARY NAILING;  Surgeon: Zheng Fleming MD;  Location: [facility] OR;  Service:    • HYSTERECTOMY        General Information     Row Name 23 1331          OT Time and Intention    Document Type evaluation  -     Mode of Treatment co-treatment;occupational therapy;physical therapy  -     Row Name 23 1331          General Information    Patient  Profile Reviewed yes  -     Prior Level of Function independent:;community mobility;all household mobility;gait;transfer;ADL's;bed mobility  but doesn't walk far in the community. like not up her street and back  -     Existing Precautions/Restrictions fall  -     Barriers to Rehab none identified  -     Row Name 01/13/23 1331          Living Environment    People in Home alone  -[facility] Name 01/13/23 1331          Cognition    Orientation Status (Cognition) oriented x 3  -     Row Name 01/13/23 1331          Safety Issues, Functional Mobility    Impairments Affecting Function (Mobility) balance;endurance/activity tolerance;strength  -           User Key  (r) = Recorded By, (t) = Taken By, (c) = Cosigned By    Initials Name Provider Type     Elvi Fuentes, OTR Occupational Therapist                 Mobility/ADL's     Row Name 01/13/23 1332          Bed Mobility    Bed Mobility supine-sit;sit-supine  -     Supine-Sit Elwin (Bed Mobility) set up;verbal cues;minimum assist (75% patient effort)  -     Sit-Supine Elwin (Bed Mobility) set up;standby assist;verbal cues  -     Assistive Device (Bed Mobility) bed rails;head of bed elevated  -     Row Name 01/13/23 1332          Transfers    Transfers stand-sit transfer;sit-stand transfer  -[facility] Name 01/13/23 1332          Sit-Stand Transfer    Sit-Stand Elwin (Transfers) set up;verbal cues  -     Assistive Device (Sit-Stand Transfers) walker, front-wheeled  -[facility] Name 01/13/23 1332          Stand-Sit Transfer    Stand-Sit Elwin (Transfers) set up;verbal cues;minimum assist (75% patient effort)  -     Assistive Device (Stand-Sit Transfers) walker, front-wheeled  -[facility] Name 01/13/23 1332          Functional Mobility    Functional Mobility- Ind. Level set up required;verbal cues required;minimum assist (75% patient effort);1 person;2 person assist required  -     Functional Mobility- Device  walker, front-wheeled  -     Functional Mobility- Comment in room, hallway and back to bed  -     Row Name 01/13/23 1332          Activities of Daily Living    BADL Assessment/Intervention lower body dressing;upper body dressing  -[facility] Name 01/13/23 1332          Mobility    Extremity Weight-bearing Status left lower extremity  -KP     Left Lower Extremity (Weight-bearing Status) weight-bearing as tolerated (WBAT)  -[facility] Name 01/13/23 1332          Lower Body Dressing Assessment/Training    Sebastian Level (Lower Body Dressing) lower body dressing skills;don;socks;dependent (less than 25% patient effort)  -     Position (Lower Body Dressing) edge of bed sitting  -     Comment, (Lower Body Dressing) uses her sock aide at home.  -[facility] Name 01/13/23 1332          Upper Body Dressing Assessment/Training    Sebastian Level (Upper Body Dressing) upper body dressing skills;doff;don;pajama/robe;minimum assist (75% patient effort);set up  -     Position (Upper Body Dressing) edge of bed sitting  -           User Key  (r) = Recorded By, (t) = Taken By, (c) = Cosigned By    Initials Name Provider Type     Elvi Fuentes, OTR Occupational Therapist               Obj/Interventions     [facility] Name 01/13/23 1336          Range of Motion Comprehensive    General Range of Motion bilateral upper extremity ROM WNL  -     Comment, General Range of Motion B UE 8/8  -[facility] Name 01/13/23 1336          Strength Comprehensive (MMT)    General Manual Muscle Testing (MMT) Assessment upper extremity strength deficits identified  -     Comment, General Manual Muscle Testing (MMT) Assessment B UE 3+/5  -[facility] Name 01/13/23 1336          Motor Skills    Motor Skills coordination;functional endurance  -     Coordination WFL  -     Functional Endurance fair +  -[facility] Name 01/13/23 1336          Balance    Balance Assessment sitting static balance;standing static balance  -      Static Sitting Balance set-up;standby assist  -KP     Position, Sitting Balance sitting edge of bed  -     Static Standing Balance set-up;contact guard;minimal assist  -KP     Position/Device Used, Standing Balance walker, front-wheeled  -     Balance Interventions sitting;standing;sit to stand;supported;occupation based/functional task;static;dynamic  -           User Key  (r) = Recorded By, (t) = Taken By, (c) = Cosigned By    Initials Name Provider Type     Elvi Fuentes, OTDIANNE Occupational Therapist               Goals/Plan     Row Name 01/13/23 1349          Transfer Goal 1 (OT)    Activity/Assistive Device (Transfer Goal 1, OT) sit-to-stand/stand-to-sit;bed-to-chair/chair-to-bed;toilet  -KP     [place] Level/Cues Needed (Transfer Goal 1, OT) standby assist  -KP     Time Frame (Transfer Goal 1, OT) short term goal (STG);2 weeks  -KP     Progress/Outcome (Transfer Goal 1, OT) goal ongoing  -     Row Name 01/13/23 1349          Dressing Goal 1 (OT)    Activity/Device (Dressing Goal 1, OT) upper body dressing  -KP     [place]/Cues Needed (Dressing Goal 1, OT) standby assist  -KP     Time Frame (Dressing Goal 1, OT) short term goal (STG);2 weeks  -KP     Progress/Outcome (Dressing Goal 1, OT) goal ongoing  -     Row Name 01/13/23 1349          Strength Goal 1 (OT)    Strength Goal 1 (OT) incr UE to 4/5  -KP     Time Frame (Strength Goal 1, OT) short term goal (STG);2 weeks  -KP     Progress/Outcome (Strength Goal 1, OT) goal ongoing  -     Row Name 01/13/23 1349          Therapy Assessment/Plan (OT)    Planned Therapy Interventions (OT) activity tolerance training;functional balance retraining;occupation/activity based interventions;strengthening exercise;ROM/therapeutic exercise;transfer/mobility retraining;BADL retraining;patient/caregiver education/training  -           User Key  (r) = Recorded By, (t) = Taken By, (c) = Cosigned By    Initials Name Provider Type    KAYLEIGH Fuentes  Elvi Izquierdo, OTR Occupational Therapist               Clinical Impression     Row Name 01/13/23 4733          Pain Assessment    Pretreatment Pain Rating 0/10 - no pain  -     Posttreatment Pain Rating 0/10 - no pain  -     Row Name 01/13/23 2488          Plan of Care Review    Plan of Care Reviewed With patient;daughter  -     Progress improving  -     Outcome Evaluation pt worked w OT/PT in co treat session for evaluation. pt admitted from home where she was independent and used a sock aide for LBD socks therefore was dep w socks today. pt was min A w UBD. pt completed sup to sit w min A and SBA for sit to sup. pt sat EOB SBA. pt stood w minx2 and walker. pt walkedw min 1-2 and walker, and req VC to slow down at times as she would also veer to the L and req cues to adjust. pt uses a rollator at home and plans to return home w HH upon dc. pt cont to benefit from OT to incr ADL, strength, balance and tsf. rec home w family for a few days at least and HH and have therapy at home help determime when pt safe to be home alone again.  -     Row Name 01/13/23 1532          Therapy Assessment/Plan (OT)    Rehab Potential (OT) good, to achieve stated therapy goals  -     Therapy Frequency (OT) 5 times/wk  -     Row Name 01/13/23 5677          Therapy Plan Review/Discharge Plan (OT)    Anticipated Discharge Disposition (OT) home with assist;home with home health  -     Row Name 01/13/23 9037          Positioning and Restraints    Pre-Treatment Position in bed  -     Post Treatment Position bed  -     In Bed call light within reach;encouraged to call for assist;fowlers;exit alarm on;with family/caregiver  -           User Key  (r) = Recorded By, (t) = Taken By, (c) = Cosigned By    Initials Name Provider Type    Elvi Jimenez, OTR Occupational Therapist               Outcome Measures     Row Name 01/13/23 8817          How much help from another is currently needed...    Putting on and  taking off regular lower body clothing? 2  -KP     Toileting (which includes using toilet bed pan or urinal) 2  -KP     Putting on and taking off regular upper body clothing 3  -KP     Taking care of personal grooming (such as brushing teeth) 3  -KP     Eating meals 3  -     Row Name 01/13/23 1350          Functional Assessment    Outcome Measure Options AM-PAC 6 Clicks Daily Activity (OT)  -KP           User Key  (r) = Recorded By, (t) = Taken By, (c) = Cosigned By    Initials Name Provider Type    Elvi Jimenez OTR Occupational Therapist                Occupational Therapy Education     Title: PT OT SLP Therapies (Done)     Topic: Occupational Therapy (Done)     Point: ADL training (Done)     Description:   Instruct learner(s) on proper safety adaptation and remediation techniques during self care or transfers.   Instruct in proper use of assistive devices.              Learning Progress Summary           Patient Acceptance, E,TB, VU,DU by  at 1/13/2023 1352    Comment: ed pt on role of OT. benefit of therapy, POC w. OT ed on safety w ADL and tsf. pt demo tsf min 1-2                   Point: Home exercise program (Done)     Description:   Instruct learner(s) on appropriate technique for monitoring, assisting and/or progressing therapeutic exercises/activities.              Learning Progress Summary           Patient Acceptance, E,TB, VU,DU by  at 1/13/2023 1352    Comment: ed pt on role of OT. benefit of therapy, POC w. OT ed on safety w ADL and tsf. pt demo tsf min 1-2                   Point: Precautions (Done)     Description:   Instruct learner(s) on prescribed precautions during self-care and functional transfers.              Learning Progress Summary           Patient Acceptance, E,TB, VU,DU by  at 1/13/2023 1352    Comment: ed pt on role of OT. benefit of therapy, POC w. OT ed on safety w ADL and tsf. pt demo tsf min 1-2                   Point: Body mechanics (Done)     Description:    Instruct learner(s) on proper positioning and spine alignment during self-care, functional mobility activities and/or exercises.              Learning Progress Summary           Patient Acceptance, E,TB, VU,DU by  at 1/13/2023 1352    Comment: ed pt on role of OT. benefit of therapy, POC w. OT ed on safety w ADL and tsf. pt demo tsf min 1-2                               User Key     Initials Effective Dates Name Provider Type Discipline     06/16/21 -  Elvi Fuentes, OTR Occupational Therapist OT              OT Recommendation and Plan  Planned Therapy Interventions (OT): activity tolerance training, functional balance retraining, occupation/activity based interventions, strengthening exercise, ROM/therapeutic exercise, transfer/mobility retraining, BADL retraining, patient/caregiver education/training  Therapy Frequency (OT): 5 times/wk  Plan of Care Review  Plan of Care Reviewed With: patient, daughter  Progress: improving  Outcome Evaluation: pt worked w OT/PT in co treat session for evaluation. pt admitted from home where she was independent and used a sock aide for LBD socks therefore was dep w socks today. pt was min A w UBD. pt completed sup to sit w min A and SBA for sit to sup. pt sat EOB SBA. pt stood w minx2 and walker. pt walkedw min 1-2 and walker, and req VC to slow down at times as she would also veer to the L and req cues to adjust. pt uses a rollator at home and plans to return home w HH upon dc. pt cont to benefit from OT to incr ADL, strength, balance and tsf. rec home w family for a few days at least and HH and have therapy at home help determime when pt safe to be home alone again.     Time Calculation:    Time Calculation- OT     Row Name 01/13/23 3626             Time Calculation- OT    OT Start Time 1136  -      OT Stop Time 1201  -      OT Time Calculation (min) 25 min  -      Total Timed Code Minutes- OT 10 minute(s)  -      OT Received On 01/13/23  -      OT - Next  Appointment 01/16/23  -      OT Goal Re-Cert Due Date 01/27/23  -         Timed Charges    88578 - OT Self Care/Mgmt Minutes 10  -KP         Untimed Charges    OT Eval/Re-eval Minutes 15  -KP         Total Minutes    Timed Charges Total Minutes 10  -KP      Untimed Charges Total Minutes 15  -KP       Total Minutes 25  -KP            User Key  (r) = Recorded By, (t) = Taken By, (c) = Cosigned By    Initials Name Provider Type     Elvi Fuentes OTR Occupational Therapist              Therapy Charges for Today     Code Description Service Date Service Provider Modifiers Qty    53925892918 HC OT SELF CARE/MGMT/TRAIN EA 15 MIN 1/13/2023 Elvi Fuentes OTR GO 1    46252080117 HC OT EVAL MOD COMPLEXITY 2 1/13/2023 Elvi Fuentes OTR GO 1               FLAQUITO Harry  1/13/2023

## 2023-01-13 NOTE — CASE MANAGEMENT/SOCIAL WORK
Discharge Planning Assessment  [facility]     Patient Name: Vonda Jay  MRN: [id number]  Today's Date: 1/13/2023    Admit Date: 1/12/2023    Plan: Home with HH   Discharge Needs Assessment     Row Name 01/13/23 1441       Living Environment    People in Home alone    Current Living Arrangements home    Primary Care Provided by self    Provides Primary Care For no one    Family Caregiver if Needed child(natalio), adult    Quality of Family Relationships supportive;helpful    Able to Return to Prior Arrangements yes       Resource/Environmental Concerns    Resource/Environmental Concerns none       Transition Planning    Patient/Family Anticipates Transition to home with help/services    Patient/Family Anticipated Services at Transition home health care    Transportation Anticipated family or friend will provide;car, drives self       Discharge Needs Assessment    Readmission Within the Last 30 Days no previous admission in last 30 days    Equipment Currently Used at Home cane, straight;walker, rolling;rollator    Concerns to be Addressed discharge planning    Anticipated Changes Related to Illness none    Provided Post Acute Provider List? N/A    Provided Post Acute Provider Quality & Resource List? N/A               Discharge Plan     Row Name 01/13/23 1442       Plan    Plan Home with HH    Plan Comments S/W pt and her son Angus at bedside.  Facesheet info confirmed.  Pt lives alone in a cap [place] style house, is IADLs and can drive.  Home DME includes a cane, walker and rollator.  Pt has used  in the past but does not recall [facility] agency.  She has been to SNF at [place].  Pt states she plans to return home upon DC and is in agreement w/ HH if needed.  She has no preference of  agency.  Referral called to [facility] - eval pending. Angus states he and his siblings live locally and check on pt at [facility] weekly.  He states they can check on pt more often for a while after she goes home.  CCP will continue to follow and  assist if needed .........Marissa BAR/ TITI              Continued Care and Services - Admitted Since 1/12/2023     Home Medical Care     Service Provider Request Status Selected Services Address Phone Fax Patient Preferred     [facility] Pending - Request Sent N/A [address] [address] [phone] --                 Demographic Summary     Row Name 01/13/23 1439       General Information    Admission Type inpatient    Arrived From home    Referral Source admission list    Reason for Consult discharge planning    Preferred Language English               Functional Status     Row Name 01/13/23 1440       Functional Status    Usual Activity Tolerance moderate    Current Activity Tolerance moderate       Functional Status, IADL    Medications independent    Meal Preparation independent    Housekeeping independent    Laundry independent    Shopping independent       Mental Status    General Appearance WDL WDL       Mental Status Summary    Recent Changes in Mental Status/Cognitive Functioning no changes                         Marissa Velasquez, RN

## 2023-01-13 NOTE — CONSULTS
FIRST UROLOGY CONSULT      Patient Identification:  NAME:  Vonda Jay  Age:  87 y.o.   Sex:  female   :  1935   MRN:  [id number]       Chief complaint:   Recurrent UTI    History of present illness:  Vonda Jay is a 87 y.o. female admitted for weakness and discomfort in there left thigh after fall found to have a UA with leukocytosis and imaging concerning for colovesical fistula.    1. Cherise- UA w/o blood or nitrites, symptoms dysuria, pressure, and urgency. Culture from 2022: E. Coli, unsure if her UTI's are culture proven    2. Urge urinary incontinence- occasionally has to wear pads, urgency worse in the AM    3. CKD- sCr 1.69 on admission, baseline 1.2-1.6    Denies pneumaturia or fecaluria  No urine per rectum  No abdominal pain  UA w/ only leuks, no blood or nitrites  No CP or SOB    CT read and reviewed: no air in bladder small tissue bridge between colon and bladder on the left dome, no air in bladder thickening of bladder wall, no hydronephrosis.    Lab Results   Component Value Date    CREATININE 1.69 (H) 2023     Lab Results   Component Value Date    WBC 18.01 (H) 2023       Past medical history:  Past Medical History:   Diagnosis Date   • Arthritis    • History of breast cancer    • History of carotid artery disease    • Hyperlipidemia    • Hypertension        Past surgical history:  Past Surgical History:   Procedure Laterality Date   • BREAST LUMPECTOMY     • CAROTID ARTERY ANGIOPLASTY  ,    • CHOLECYSTECTOMY  1979   • FEMUR IM NAILING/RODDING Left 2018    Procedure: FEMUR INTRAMEDULLARY NAILING;  Surgeon: Zheng Fleming MD;  Location: [facility];  Service:    • HYSTERECTOMY         Allergies:  Patient has no known allergies.    Home medications:  Medications Prior to Admission   Medication Sig Dispense Refill Last Dose   • alendronate (FOSAMAX) 70 MG tablet Take 70 mg by mouth Every 7 (Seven) Days. Saturday      • amLODIPine (NORVASC) 5 MG  tablet Take 1 tablet by mouth Daily. (Patient taking differently: Take 5 mg by mouth Daily.) 90 tablet 3 1/12/2023   • aspirin 81 MG tablet Take 81 mg by mouth Daily.   1/12/2023   • cholecalciferol (VITAMIN D3) 1000 units tablet Take 1,000 Units by mouth Daily.      • citalopram (CeleXA) 10 MG tablet Take 10 mg by mouth Daily.   1/12/2023   • Dihydroxyaluminum Sod Carb (ACID RELIEF PO) Take 1 tablet by mouth As Needed.      • docusate sodium 100 MG capsule Take 1 capsule by mouth 2 (Two) Times a Day. (Patient taking differently: Take 100 mg by mouth As Needed.) 60 capsule 3    • furosemide (LASIX) 40 MG tablet Take 1 tablet by mouth Daily. 90 tablet 3 1/11/2023   • hydrALAZINE (APRESOLINE) 100 MG tablet Take 100 mg by mouth 3 (Three) Times a Day.   1/12/2023   • Multiple Minerals-Vitamins (CALCIUM CITRATE PLUS/MAGNESIUM PO) Take 1 tablet by mouth Daily.   1/12/2023   • multivitamin with minerals (CENTRUM SILVER 50+WOMEN PO) Take 1 tablet by mouth Daily.   1/12/2023   • nebivolol (Bystolic) 5 MG tablet Take 1 tablet by mouth Daily. 90 tablet 3 1/11/2023   • pravastatin (PRAVACHOL) 40 MG tablet Take 40 mg by mouth Every Night.   1/11/2023        Hospital medications:  amLODIPine, 5 mg, Oral, Daily  cholecalciferol, 1,000 Units, Oral, Daily  citalopram, 10 mg, Oral, Daily  furosemide, 40 mg, Oral, Daily  hydrALAZINE, 100 mg, Oral, TID  nebivolol, 5 mg, Oral, Daily  piperacillin-tazobactam, 3.375 g, Intravenous, Q8H  sodium chloride, 10 mL, Intravenous, Q12H         •  acetaminophen **OR** acetaminophen **OR** acetaminophen  •  Morphine **AND** naloxone  •  nitroglycerin  •  ondansetron  •  [COMPLETED] Insert Peripheral IV **AND** sodium chloride  •  sodium chloride  •  sodium chloride    Family history:  Family History   Problem Relation Age of Onset   • Hypertension Mother    • Heart valve disorder Sister    • Lung cancer Sister    • Lymphoma Sister    • Skin cancer Sister    • Stroke Brother 57       Social  history:  Social History     Tobacco Use   • Smoking status: Former     Years: 47.00     Types: Cigarettes     Quit date:      Years since quittin.0   • Smokeless tobacco: Never   • Tobacco comments:     caffeine use- coffee   Vaping Use   • Vaping Use: Never used   Substance Use Topics   • Alcohol use: No   • Drug use: Defer       REVIEW OF SYSTEMS:  Constitutional - Negative for fevers/chills  Eyes/Ears/Nose/Mouth/Throat - Negative for changes in vision  Cardiovascular - Negative for chest pain, dysrhythmia  Respiratory - Negative for dyspnea  Gastrointestinal - Negative for nausea or vomiting  Genitourinary - Negative for dysuria  Hematologic/Lymphatic - Negative for bruising  Skin - Negative for erythema  Endocrine - Negative for polyuria    Objective:  TMax 24 hours:   Temp (24hrs), Av.6 °F (37.6 °C), Min:98.7 °F (37.1 °C), Max:102 °F (38.9 °C)      Vitals Ranges:   Temp:  [98.7 °F (37.1 °C)-102 °F (38.9 °C)] 99.9 °F (37.7 °C)  Heart Rate:  [66-89] 66  Resp:  [16-18] 18  BP: ()/(44-75) 121/49    Intake/Output Last 3 shifts:  I/O last 3 completed shifts:  In: -   Out: 700 [Urine:700]     Physical Exam:    General Appearance:    Alert, cooperative, NAD   HEENT:    No trauma, pupils reactive, hearing intact   Back:     No CVA tenderness   Lungs:     Respirations unlabored, no wheezing    Heart:    RRR, intact peripheral pulses   Abdomen:     Soft, NDNT, no masses, no guarding   :    Normal female genitalia   Extremities:   No edema, no deformity   Lymphatic:   No neck or groin LAD   Skin:   No bleeding, bruising or rashes   Neuro/Psych:   Orientation intact, mood/affect pleasant, no focal findings       Results review:   I reviewed the patient's new clinical results.    Data review:  Lab Results (last 24 hours)     Procedure Component Value Units Date/Time    CBC Auto Differential [[id number]] Collected: 23    Specimen: Blood Updated: 23    Comprehensive Metabolic Panel  [[id number]] Collected: 01/13/23 0615    Specimen: Blood Updated: 01/13/23 0640    Blood Culture - Blood, Arm, Right [[id number]] Collected: 01/12/23 1351    Specimen: Blood from Arm, Right Updated: 01/12/23 1449    Blood Culture - Blood, Arm, Left [[id number]] Collected: 01/12/23 1351    Specimen: Blood from Arm, Left Updated: 01/12/23 1449    Comprehensive Metabolic Panel [[id number]]  (Abnormal) Collected: 01/12/23 1212    Specimen: Blood Updated: 01/12/23 1254     Glucose 140 mg/dL      BUN 34 mg/dL      Creatinine 1.69 mg/dL      Sodium 134 mmol/L      Potassium 3.6 mmol/L      Chloride 96 mmol/L      CO2 27.4 mmol/L      Calcium 9.5 mg/dL      Total Protein 7.2 g/dL      Albumin 4.0 g/dL      ALT (SGPT) 9 U/L      AST (SGOT) 12 U/L      Alkaline Phosphatase 60 U/L      Total Bilirubin 0.3 mg/dL      Globulin 3.2 gm/dL      A/G Ratio 1.3 g/dL      BUN/Creatinine Ratio 20.1     Anion Gap 10.6 mmol/L      eGFR 29.1 mL/min/1.73      Comment: National Kidney Foundation and American Society of Nephrology (ASN) Task Force recommended calculation based on the Chronic Kidney Disease Epidemiology Collaboration (CKD-EPI) equation refit without adjustment for race.       Narrative:      GFR Normal >60  Chronic Kidney Disease <60  Kidney Failure <15    The GFR formula is only valid for adults with stable renal function between ages 18 and 70.    Troponin [[id number]]  (Normal) Collected: 01/12/23 1212    Specimen: Blood Updated: 01/12/23 1254     Troponin T <0.010 ng/mL     Narrative:      Troponin T Reference Range:  <= 0.03 ng/mL-   Negative for AMI  >0.03 ng/mL-     Abnormal for myocardial necrosis.  Clinicians would have to utilize clinical acumen, EKG, Troponin and serial changes to determine if it is an Acute Myocardial Infarction or myocardial injury due to an underlying chronic condition.       Results may be falsely decreased if patient taking Biotin.      BNP [[id number]]  (Abnormal) Collected: 01/12/23 1212     Specimen: Blood Updated: 01/12/23 1251     proBNP 2,990.0 pg/mL     Narrative:      Among patients with dyspnea, NT-proBNP is highly sensitive for the detection of acute congestive heart failure. In addition NT-proBNP of <300 pg/ml effectively rules out acute congestive heart failure with 99% negative predictive value.    Results may be falsely decreased if patient taking Biotin.      COVID-19 and FLU A/B PCR - Swab, Nasopharynx [[id number]]  (Normal) Collected: 01/12/23 1220    Specimen: Swab from Nasopharynx Updated: 01/12/23 1250     COVID19 Not Detected     Influenza A PCR Not Detected     Influenza B PCR Not Detected    Narrative:      Fact sheet for providers: https://www.fda.gov/media/494424/download    Fact sheet for patients: https://www.fda.gov/media/401744/download    Test performed by PCR.    Urinalysis With Culture If Indicated - Urine, Catheter [[id number]]  (Abnormal) Collected: 01/12/23 1219    Specimen: Urine, Catheter Updated: 01/12/23 1236     Color, UA Yellow     Appearance, UA Cloudy     pH, UA <=5.0     Specific Gravity, UA 1.019     Glucose, UA Negative     Ketones, UA Trace     Bilirubin, UA Negative     Blood, UA Negative     Protein,  mg/dL (2+)     Leuk Esterase, UA Moderate (2+)     Nitrite, UA Negative     Urobilinogen, UA 0.2 E.U./dL    Narrative:      In absence of clinical symptoms, the presence of pyuria, bacteria, and/or nitrites on the urinalysis result does not correlate with infection.    Urinalysis, Microscopic Only - Urine, Catheter [[id number]]  (Abnormal) Collected: 01/12/23 1219    Specimen: Urine, Catheter Updated: 01/12/23 1236     RBC, UA 0-2 /HPF      WBC, UA 21-30 /HPF      Bacteria, UA 4+ /HPF      Squamous Epithelial Cells, UA 3-6 /HPF      Hyaline Casts, UA 7-12 /LPF      Methodology Automated Microscopy    Urine Culture - Urine, Urine, Catheter [[id number]] Collected: 01/12/23 1219    Specimen: Urine, Catheter Updated: 01/12/23 1236    CBC & Differential  [[id number]]  (Abnormal) Collected: 01/12/23 1212    Specimen: Blood Updated: 01/12/23 1228    Narrative:      The following orders were created for panel order CBC & Differential.  Procedure                               Abnormality         Status                     ---------                               -----------         ------                     CBC Auto Differential[[id number]]        Abnormal            Final result                 Please view results for these tests on the individual orders.    CBC Auto Differential [[id number]]  (Abnormal) Collected: 01/12/23 1212    Specimen: Blood Updated: 01/12/23 1228     WBC 18.01 10*3/mm3      RBC 3.10 10*6/mm3      Hemoglobin 9.4 g/dL      Hematocrit 28.7 %      MCV 92.6 fL      MCH 30.3 pg      MCHC 32.8 g/dL      RDW 12.1 %      RDW-SD 41.0 fl      MPV 10.6 fL      Platelets 222 10*3/mm3      Neutrophil % 79.5 %      Lymphocyte % 8.3 %      Monocyte % 10.5 %      Eosinophil % 0.5 %      Basophil % 0.4 %      Immature Grans % 0.8 %      Neutrophils, Absolute 14.31 10*3/mm3      Lymphocytes, Absolute 1.50 10*3/mm3      Monocytes, Absolute 1.90 10*3/mm3      Eosinophils, Absolute 0.09 10*3/mm3      Basophils, Absolute 0.07 10*3/mm3      Immature Grans, Absolute 0.14 10*3/mm3      nRBC 0.0 /100 WBC            Imaging:  Imaging Results (Last 24 Hours)     Procedure Component Value Units Date/Time    CT Abdomen Pelvis Without Contrast [[id number]] Collected: 01/12/23 1554     Updated: 01/12/23 1605    Narrative:      CT ABDOMEN AND PELVIS WITHOUT IV CONTRAST     HISTORY: Leukocytosis, urinary tract infection     TECHNIQUE: Radiation dose reduction techniques were utilized, including  automated exposure control and exposure modulation based on body size.  Axial images were obtained through the abdomen and pelvis without the  administration of IV contrast. Coronal and sagittal reformatted images  were obtained.     COMPARISON: None available     FINDINGS:       ABDOMEN:  Lung bases are clear. Tiny hiatal hernia. The liver is unremarkable.  Prior cholecystectomy. Spleen is unremarkable. The kidneys are  unremarkable. No hydronephrosis. Adrenal glands are unremarkable. The  pancreas is unremarkable.     PELVIS:  There is thickening of the wall of the left-sided dome of the bladder.  There is a tract of tissue bridging the bladder and the adjacent sigmoid  colon as well as a tiny extraluminal gas bubble adjacent to the bladder  wall. Sigmoid diverticulosis is present. There there are no findings to  suggest acute diverticulitis. No free fluid in the pelvis or abscess.  Bone windows show intramedullary maya and screw fixation of the left hip.  Old pubic rami fractures on the left. Areas of sclerosis are seen in the  sacrum bilaterally likely from insufficiency fractures.       Impression:      1. Wall thickening of the left side of the dome of the bladder. There is  adjacent sigmoid diverticulosis without evidence of active  diverticulitis. There is a tract of tissue bridging the bladder and the  adjacent sigmoid colon as well as a tiny extraluminal gas bubble  adjacent to the bladder wall. These findings may reflect sequela of  recent episode of diverticulitis. A colovesical fistula cannot be  excluded given this appearance.  2. Additional findings as described     Radiation dose reduction techniques were utilized, including automated  exposure control and exposure modulation based on body size.     This report was finalized on 1/12/2023 4:02 PM by Dr. Gene Martinez M.D.       XR Hip With or Without Pelvis 2 - 3 View Left [[id number]] Collected: 01/12/23 1303     Updated: 01/12/23 1309    Narrative:      XR HIP W OR WO PELVIS 2-3 VIEW LEFT-     INDICATIONS: Pain     TECHNIQUE: Frontal views of the pelvis, 3 views of the left hip     COMPARISON: 02/07/2018     FINDINGS:     Deformity of the left inferior pubic ramus appears chronic, but new from  the prior exam, correlate  clinically. Surgical rods of the left femur  appear intact. No other evidence of fracture is identified. No  dislocation. Arterial calcification is present. Degenerative changes  seen in the partly included lumbar spine. Follow-up/further evaluation  can be obtained as indications persist.       Impression:         As described.     This report was finalized on 1/12/2023 1:05 PM by Dr. Clay Jordan M.D.       XR Chest 1 View [[id number]] Collected: 01/12/23 1302     Updated: 01/12/23 1306    Narrative:      XR CHEST 1 VW-     HISTORY: Female who is 87 years-old,  cough     TECHNIQUE: Frontal view of the chest     COMPARISON: 9/17/2022     FINDINGS: The heart size is normal. Aorta is calcified. Pulmonary  vasculature is slightly congested, but less than on the prior exam.  Likely atelectasis or scarring the right mid and lower lung. No focal  pulmonary consolidation, pleural effusion, or pneumothorax. No acute  osseous process.       Impression:      Likely atelectasis or scarring the right mid and lower lung.  Slight pulmonary vascular congestion. Follow-up as clinical indications  persist.     This report was finalized on 1/12/2023 1:03 PM by Dr. Clay Jordan M.D.                Assessment:       Acute UTI (urinary tract infection)    SANYA (acute kidney injury) (HCC)    HTN (hypertension)    Leukocytosis    Stage 3 chronic kidney disease (HCC)    Anemia    Aortic stenosis    Colovesical fistula - possible      1. Cherise- UA w/o blood or nitrites, symptoms dysuria, pressure, and urgency. Culture from 9/2022: E. Coli, unsure if her UTI's are culture proven    2. Urge urinary incontinence- occasionally has to wear pads, urgency worse in the AM    3. CKD- sCr 1.69 on admission, baseline 1.2-1.6    Plan:   CT reviewed w/ pt, tissue bridge between colon and bladder with air seen in tissue. Pt asymptomatic without fecaluria or pneumaturia. UA not c/f fistula as there are only leuks.  Cont antibiotics, f/u UCx  narrow as sensitivities allow.  Pt will need cystoscopy on outpatient basis to further evaluate bladder wall thickening and Cherise's  Will schedule outpatient follow up for cystoscopy. Schedulers will call to set up f/u appt    Mina Madrigal MD  01/13/23  07:04 EST

## 2023-01-13 NOTE — THERAPY EVALUATION
Patient Name: Vonda Jay  : 1935    MRN: [id number]                              Today's Date: 2023       Admit Date: 2023    Visit Dx:     ICD-10-CM ICD-9-CM   1. Acute UTI (urinary tract infection)  N39.0 599.0   2. Leukocytosis, unspecified type  D72.829 288.60   3. Gait instability  R26.81 781.2   4. Anemia, unspecified type  D64.9 285.9   5. Pulmonary vascular congestion  R09.89 514   6. Inferior pubic ramus fracture, left, closed, initial encounter ([facility])  S32.593D 808.2     Patient Active Problem List   Diagnosis   • Closed displaced intertrochanteric fracture of left femur ([facility])   • SANYA (acute kidney injury) ([facility])   • HTN (hypertension)   • Acute blood loss anemia   • Leukocytosis   • Drug-induced constipation   • Stage 3b chronic kidney disease ([facility])   • COVID-19 virus infection   • Anemia due to chronic kidney disease   • Acute on chronic diastolic CHF (congestive heart failure) ([facility])   • Aortic stenosis   • Anemia of chronic renal failure, stage 3 (moderate) ([facility])   • Acute UTI (urinary tract infection)   • Colovesical fistula - possible   • (HFpEF) heart failure with preserved ejection fraction ([facility])   • Obesity (BMI 30-39.9)   • Hypokalemia     Past Medical History:   Diagnosis Date   • Arthritis    • History of breast cancer    • History of carotid artery disease    • Hyperlipidemia    • Hypertension      Past Surgical History:   Procedure Laterality Date   • BREAST LUMPECTOMY     • CAROTID ARTERY ANGIOPLASTY  ,    • CHOLECYSTECTOMY  1979   • FEMUR IM NAILING/RODDING Left 2018    Procedure: FEMUR INTRAMEDULLARY NAILING;  Surgeon: Zheng Fleming MD;  Location: [facility] OR;  Service:    • HYSTERECTOMY        General Information     Row Name 23 1433          Physical Therapy Time and Intention    Document Type evaluation  -MG     Mode of Treatment co-treatment;occupational therapy;physical therapy  -MG     Row Name 23 1433          General  Information    Patient Profile Reviewed yes  -MG     Prior Level of Function independent:;all household mobility;community mobility;bed mobility;gait  Uses rollator. No oxygen at home.  -MG     Existing Precautions/Restrictions fall  -MG     Barriers to Rehab none identified  -MG     Row Name 01/13/23 1433          Living Environment    People in Home alone  -MG     Row Name 01/13/23 1433          Home Main Entrance    Number of Stairs, Main Entrance none  Ramped entrance  -MG     Row Name 01/13/23 1433          Stairs Within Home, Primary    Stairs, Within Home, Primary Full flight down to basement where her laundry is kept.  -MG     Number of Stairs, Within Home, Primary twelve  -MG     Stair Railings, Within Home, Primary railings safe and in good condition;railing on right side (ascending)  -MG     Row Name 01/13/23 1433          Cognition    Orientation Status (Cognition) oriented x 3  -MG     Row Name 01/13/23 1433          Safety Issues, Functional Mobility    Impairments Affecting Function (Mobility) balance;endurance/activity tolerance;strength;pain  -MG     Comment, Safety Issues/Impairments (Mobility) Gait belt and non-skid socks donned. PT/OT co-tx due to anticipation of two skilled hands required for safe functional mobility and anticipation of poor activity tolerance.  -MG           User Key  (r) = Recorded By, (t) = Taken By, (c) = Cosigned By    Initials Name Provider Type    MG Halley Bailey, PT Physical Therapist               Mobility     Row Name 01/13/23 1436          Bed Mobility    Supine-Sit [place] (Bed Mobility) minimum assist (75% patient effort);verbal cues;nonverbal cues (demo/gesture)  -MG     Sit-Supine [place] (Bed Mobility) standby assist;verbal cues;nonverbal cues (demo/gesture)  -MG     Assistive Device (Bed Mobility) bed rails;head of bed elevated  -MG     Comment, (Bed Mobility) Increased time required. L hip/pelvic pain with increased hip flexion.  -MG     Row Name  01/13/23 1435          Sit-Stand Transfer    Sit-Stand [place] (Transfers) minimum assist (75% patient effort);2 person assist;verbal cues;nonverbal cues (demo/gesture)  -MG     Assistive Device (Sit-Stand Transfers) walker, front-wheeled  -MG     Row Name 01/13/23 1435          Gait/Stairs (Locomotion)    [place] Level (Gait) contact guard;minimum assist (75% patient effort);2 person assist;verbal cues;nonverbal cues (demo/gesture)  -MG     Assistive Device (Gait) walker, front-wheeled  -MG     Distance in Feet (Gait) 120  -MG     Deviations/Abnormal Patterns (Gait) gait speed decreased;left sided deviations  -MG     Comment, (Gait/Stairs) Pt ambulating in room and hallway with RW. Pt intermittently veering to the L requiring cues for directioning to avoid obstacles. Denies any pain while ambulating, only with increased hip flexion >90degrees.  -MG     Row Name 01/13/23 1435          Mobility    Extremity Weight-bearing Status left lower extremity  -MG     Left Lower Extremity (Weight-bearing Status) weight-bearing as tolerated (WBAT)  -MG           User Key  (r) = Recorded By, (t) = Taken By, (c) = Cosigned By    Initials Name Provider Type    MG Halley Bailey, PT Physical Therapist               Obj/Interventions     Row Name 01/13/23 1437          Range of Motion Comprehensive    General Range of Motion bilateral lower extremity ROM WFL  -MG     Row Name 01/13/23 1437          Strength Comprehensive (MMT)    General Manual Muscle Testing (MMT) Assessment lower extremity strength deficits identified  -MG     Comment, General Manual Muscle Testing (MMT) Assessment Grossly 4/5  -MG     Row Name 01/13/23 1437          Balance    Balance Assessment standing dynamic balance  -MG     Static Sitting Balance standby assist  -MG     Position, Sitting Balance sitting edge of bed  -MG     Static Standing Balance contact guard;minimal assist  -MG     Dynamic Standing Balance contact guard;minimal assist;1-person  assist;2-person assist  -MG     Position/Device Used, Standing Balance walker, front-wheeled  -MG     Comment, Balance Mildly unsteady at times with RW.  -MG     Row Name 01/13/23 1437          Sensory Assessment (Somatosensory)    Sensory Assessment (Somatosensory) LE sensation intact  Denies N/T. LT in tact.  -MG           User Key  (r) = Recorded By, (t) = Taken By, (c) = Cosigned By    Initials Name Provider Type    MG Halley Bailey, PT Physical Therapist               Goals/Plan     Row Name 01/13/23 1443          Bed Mobility Goal 1 (PT)    Activity/Assistive Device (Bed Mobility Goal 1, PT) bed mobility activities, all  -MG     [place] Level/Cues Needed (Bed Mobility Goal 1, PT) standby assist  -MG     Time Frame (Bed Mobility Goal 1, PT) 1 week  -MG     Row Name 01/13/23 1443          Transfer Goal 1 (PT)    Activity/Assistive Device (Transfer Goal 1, PT) transfers, all  -MG     [place] Level/Cues Needed (Transfer Goal 1, PT) modified independence  -MG     Time Frame (Transfer Goal 1, PT) 1 week  -MG     Row Name 01/13/23 1443          Gait Training Goal 1 (PT)    Activity/Assistive Device (Gait Training Goal 1, PT) gait (walking locomotion)  -MG     [place] Level (Gait Training Goal 1, PT) modified independence  -MG     Time Frame (Gait Training Goal 1, PT) 1 week  -MG     Row Name 01/13/23 1443          Stairs Goal 1 (PT)    Activity/Assistive Device (Stairs Goal 1, PT) stairs, all skills  -MG     [place] Level/Cues Needed (Stairs Goal 1, PT) standby assist;contact guard required  -MG     Number of Stairs (Stairs Goal 1, PT) 12  -MG     Time Frame (Stairs Goal 1, PT) 1 week  -MG     Row Name 01/13/23 1443          Therapy Assessment/Plan (PT)    Planned Therapy Interventions (PT) balance training;bed mobility training;gait training;home exercise program;transfer training;ROM (range of motion);stair training;strengthening;patient/family education;stretching  -MG           User Key  (r)  = Recorded By, (t) = Taken By, (c) = Cosigned By    Initials Name Provider Type    MG Halley Bailey, PT Physical Therapist               Clinical Impression     Row Name 01/13/23 1438          Pain    Pretreatment Pain Rating 0/10 - no pain  -MG     Posttreatment Pain Rating 0/10 - no pain  -MG     Pre/Posttreatment Pain Comment Denies pain. Only pain in L hip/pelvis with hip flexion >90degrees.  -MG     Pain Intervention(s) Medication (See MAR);Ambulation/increased activity;Repositioned;Rest  -MG     Row Name 01/13/23 1438          Plan of Care Review    Plan of Care Reviewed With patient;daughter  -MG     Progress improving  -MG     Outcome Evaluation Pt is a 86 y/o F who presented to ED from home with c/o nausea, weakness, L thigh pain and fall from 3 days ago. Imaging (-) for acute hip fx, but has healing L pelvic fx. Pt reports she lives alone in a house with a ramped entrance, uses a rollator, and has to descend a full flight of stairs to the basement for laundry. Pt is currently Hannah for bed mobility, Hannah x1-2 for STS tsf to RW and CG/Hannah x1-2 for ambulation of 120' with RW. Pt mildly unsteady at times as she tends to veer to the L requiring cueing to correct, but no overt LOB noted. Pt and dee dee plans for pt to return home at [place] with family staying with pt first few days. Pt currently presents with decreased strength, balance and endurance. Pt will benefit from skilled PT services during this admission to progress her functional mobility and decrease falls risk. Rec HH PT with family assist at [place] to further address deficits listed above.  -MG     Row Name 01/13/23 1438          Therapy Assessment/Plan (PT)    Rehab Potential (PT) good, to achieve stated therapy goals  -MG     Criteria for Skilled Interventions Met (PT) yes;meets criteria  -MG     Therapy Frequency (PT) 6 times/wk  -MG     Row Name 01/13/23 1438          Vital Signs    Pre SpO2 (%) 97  2L  -MG     O2 Delivery Pre Treatment nasal cannula   -MG     Intra SpO2 (%) 93  -MG     O2 Delivery Intra Treatment room air  -MG     Post SpO2 (%) 97  -MG     O2 Delivery Post Treatment room air  -MG     Pre Patient Position Supine  -MG     Intra Patient Position Standing  -MG     Post Patient Position Supine  -MG     Row Name 01/13/23 1438          Positioning and Restraints    Pre-Treatment Position in bed  -MG     Post Treatment Position bed  -MG     In Bed notified nsg;supine;fowlers;call light within reach;encouraged to call for assist;exit alarm on;with family/caregiver;side rails up x2  -MG           User Key  (r) = Recorded By, (t) = Taken By, (c) = Cosigned By    Initials Name Provider Type    Halley Chang, PT Physical Therapist               Outcome Measures     Row Name 01/13/23 1444          How much help from another person do you currently need...    Turning from your back to your side while in flat bed without using bedrails? 3  -MG     Moving from lying on back to sitting on the side of a flat bed without bedrails? 3  -MG     Moving to and from a bed to a chair (including a wheelchair)? 3  -MG     Standing up from a chair using your arms (e.g., wheelchair, bedside chair)? 3  -MG     Climbing 3-5 steps with a railing? 2  -MG     To walk in hospital room? 3  -MG     AM-PAC 6 Clicks Score (PT) 17  -MG     Highest level of mobility 5 --> Static standing  -MG     Row Name 01/13/23 1444 01/13/23 1350       Functional Assessment    Outcome Measure Options AM-PAC 6 Clicks Basic Mobility (PT)  -MG AM-PAC 6 Clicks Daily Activity (OT)  -KP          User Key  (r) = Recorded By, (t) = Taken By, (c) = Cosigned By    Initials Name Provider Type    Elvi Jimenez, OTR Occupational Therapist    Halley Chang, PT Physical Therapist                             Physical Therapy Education     Title: PT OT SLP Therapies (In Progress)     Topic: Physical Therapy (In Progress)     Point: Mobility training (Done)     Learning Progress Summary            Patient Acceptance, E, VU by MG at 1/13/2023 1444   Family Acceptance, E, VU by MG at 1/13/2023 1444                   Point: Home exercise program (Not Started)     Learner Progress:  Not documented in this visit.          Point: Body mechanics (Done)     Learning Progress Summary           Patient Acceptance, E, VU by MG at 1/13/2023 1444   Family Acceptance, E, VU by MG at 1/13/2023 1444                   Point: Precautions (Done)     Learning Progress Summary           Patient Acceptance, E, VU by MG at 1/13/2023 1444   Family Acceptance, E, VU by MG at 1/13/2023 1444                               User Key     Initials Effective Dates Name Provider Type Discipline     05/24/22 -  Halley Bailey, PT Physical Therapist PT              PT Recommendation and Plan  Planned Therapy Interventions (PT): balance training, bed mobility training, gait training, home exercise program, transfer training, ROM (range of motion), stair training, strengthening, patient/family education, stretching  Plan of Care Reviewed With: patient, daughter  Progress: improving  Outcome Evaluation: Pt is a 88 y/o F who presented to ED from home with c/o nausea, weakness, L thigh pain and fall from 3 days ago. Imaging (-) for acute hip fx, but has healing L pelvic fx. Pt reports she lives alone in a house with a ramped entrance, uses a rollator, and has to descend a full flight of stairs to the basement for laundry. Pt is currently Hannah for bed mobility, Hannah x1-2 for STS tsf to RW and CG/Hannah x1-2 for ambulation of 120' with RW. Pt mildly unsteady at times as she tends to veer to the L requiring cueing to correct, but no overt LOB noted. Pt and daugher plans for pt to return home at [place] with family staying with pt first few days. Pt currently presents with decreased strength, balance and endurance. Pt will benefit from skilled PT services during this admission to progress her functional mobility and decrease falls risk. Rec HH PT with  family assist at [place] to further address deficits listed above.     Time Calculation:    PT Charges     Row Name 01/13/23 1444             Time Calculation    Start Time 1136  -MG      Stop Time 1206  -MG      Time Calculation (min) 30 min  -MG      PT Received On 01/13/23  -MG      PT - Next Appointment 01/14/23  -MG      PT Goal Re-Cert Due Date 01/20/23  -MG         Time Calculation- PT    Total Timed Code Minutes- PT 25 minute(s)  -MG            User Key  (r) = Recorded By, (t) = Taken By, (c) = Cosigned By    Initials Name Provider Type    MG Halley Bailey, PT Physical Therapist              Therapy Charges for Today     Code Description Service Date Service Provider Modifiers Qty    41081279191 HC PT EVAL MOD COMPLEXITY 2 1/13/2023 Halley Bailey, PT GP 1    61065407639 HC PT THERAPEUTIC ACT EA 15 MIN 1/13/2023 Halley Bailey, PT GP 1    10215148193 HC GAIT TRAINING EA 15 MIN 1/13/2023 Halley Bailey, PT GP 1          PT G-Codes  Outcome Measure Options: AM-PAC 6 Clicks Basic Mobility (PT)  AM-PAC 6 Clicks Score (PT): 17  PT Discharge Summary  Anticipated Discharge Disposition (PT): home with home health    Halley Bailey, PT  1/13/2023

## 2023-01-13 NOTE — DISCHARGE PLACEMENT REQUEST
"Catrachito Bartlett (87 y.o. Female)     Date of Birth   1935    Social Security Number       [place]   [address]  [place] [address]    Home Phone   [phone]    MRN   [id number]       Mormon   Mosque    Marital Status                               Admission Date   1/12/23    Admission Type   Emergency    Admitting Provider   Cyn Shepherd MD    Attending Provider   Armando Mckenzie DO    Department, Room/Bed   [address], S521/1       Discharge Date       Discharge Disposition       Discharge Destination                               Attending Provider: Armando Mckenzie DO    Allergies: No Known Allergies    Isolation: None   Infection: None   Code Status: CPR    Ht: 152.4 cm (60\")   Wt: 73.3 kg (161 lb 9.6 oz)    Admission Cmt: None   Principal Problem: Acute UTI (urinary tract infection) [N39.0]                 Active Insurance as of 1/12/2023     Primary Coverage     Payor Plan Insurance Group Employer/Plan Group    MEDICARE MEDICARE A & B      Payor Plan Address Payor Plan Phone Number Payor Plan Fax Number Effective Dates    [address] [phone]  9/1/2000 - None Entered    [facility] [id number]       Subscriber Name Subscriber Birth Date Member ID       CATRACHITO BARTLETT 1935 [id number]           Secondary Coverage     Payor Plan Insurance Group Employer/Plan Group    [facility] SUPP [id number]     Payor Plan Address Payor Plan Phone Number Payor Plan Fax Number Effective Dates    [address]   12/1/2016 - None Entered    [facility] [id number]       Subscriber Name Subscriber Birth Date Member ID       CATRACHITO BARTLETT 1935 [id number]                 Emergency Contacts      (Rel.) Home Phone Work Phone Mobile Phone    MistyAngus (Son) [phone] -- [phone]    MistyNusrat (Daughter) -- -- [phone]              "

## 2023-01-13 NOTE — NURSING NOTE
Called inpatient general surgery consult placed 2nd today. Consult was also placed yesterday night.

## 2023-01-13 NOTE — PLAN OF CARE
Goal Outcome Evaluation:  Plan of Care Reviewed With: patient, daughter        Progress: improving  Outcome Evaluation: Pt is a 88 y/o F who presented to ED from home with c/o nausea, weakness, L thigh pain and fall from 3 days ago. Imaging (-) for acute hip fx, but has healing L pelvic fx. Pt reports she lives alone in a house with a ramped entrance, uses a rollator, and has to descend a full flight of stairs to the basement for laundry. Pt is currently Hannah for bed mobility, Hannah x1-2 for STS tsf to RW and CG/Hannah x1-2 for ambulation of 120' with RW. Pt mildly unsteady at times as she tends to veer to the L requiring cueing to correct, but no overt LOB noted. Pt and dee dee plans for pt to return home at [place] with family staying with pt first few days. Pt currently presents with decreased strength, balance and endurance. Pt will benefit from skilled PT services during this admission to progress her functional mobility and decrease falls risk. Rec HH PT with family assist at [place] to further address deficits listed above.

## 2023-01-14 LAB
ANION GAP SERPL CALCULATED.3IONS-SCNC: 9.2 MMOL/L (ref 5–15)
B PARAPERT DNA SPEC QL NAA+PROBE: NOT DETECTED
B PERT DNA SPEC QL NAA+PROBE: NOT DETECTED
BACTERIA SPEC AEROBE CULT: ABNORMAL
BASOPHILS # BLD AUTO: 0.05 10*3/MM3 (ref 0–0.2)
BASOPHILS NFR BLD AUTO: 0.4 % (ref 0–1.5)
BUN SERPL-MCNC: 30 MG/DL (ref 8–23)
BUN/CREAT SERPL: 18.4 (ref 7–25)
C PNEUM DNA NPH QL NAA+NON-PROBE: NOT DETECTED
CALCIUM SPEC-SCNC: 8.1 MG/DL (ref 8.6–10.5)
CHLORIDE SERPL-SCNC: 96 MMOL/L (ref 98–107)
CO2 SERPL-SCNC: 25.8 MMOL/L (ref 22–29)
CREAT SERPL-MCNC: 1.63 MG/DL (ref 0.57–1)
DEPRECATED RDW RBC AUTO: 38.9 FL (ref 37–54)
EGFRCR SERPLBLD CKD-EPI 2021: 30.4 ML/MIN/1.73
EOSINOPHIL # BLD AUTO: 0.37 10*3/MM3 (ref 0–0.4)
EOSINOPHIL NFR BLD AUTO: 2.8 % (ref 0.3–6.2)
ERYTHROCYTE [DISTWIDTH] IN BLOOD BY AUTOMATED COUNT: 11.7 % (ref 12.3–15.4)
FLUAV SUBTYP SPEC NAA+PROBE: NOT DETECTED
FLUBV RNA ISLT QL NAA+PROBE: NOT DETECTED
GLUCOSE SERPL-MCNC: 113 MG/DL (ref 65–99)
HADV DNA SPEC NAA+PROBE: NOT DETECTED
HCOV 229E RNA SPEC QL NAA+PROBE: NOT DETECTED
HCOV HKU1 RNA SPEC QL NAA+PROBE: NOT DETECTED
HCOV NL63 RNA SPEC QL NAA+PROBE: NOT DETECTED
HCOV OC43 RNA SPEC QL NAA+PROBE: NOT DETECTED
HCT VFR BLD AUTO: 26 % (ref 34–46.6)
HGB BLD-MCNC: 8.6 G/DL (ref 12–15.9)
HMPV RNA NPH QL NAA+NON-PROBE: NOT DETECTED
HPIV1 RNA ISLT QL NAA+PROBE: NOT DETECTED
HPIV2 RNA SPEC QL NAA+PROBE: NOT DETECTED
HPIV3 RNA NPH QL NAA+PROBE: NOT DETECTED
HPIV4 P GENE NPH QL NAA+PROBE: NOT DETECTED
IMM GRANULOCYTES # BLD AUTO: 0.1 10*3/MM3 (ref 0–0.05)
IMM GRANULOCYTES NFR BLD AUTO: 0.8 % (ref 0–0.5)
LYMPHOCYTES # BLD AUTO: 1.57 10*3/MM3 (ref 0.7–3.1)
LYMPHOCYTES NFR BLD AUTO: 11.9 % (ref 19.6–45.3)
M PNEUMO IGG SER IA-ACNC: NOT DETECTED
MAGNESIUM SERPL-MCNC: 2.2 MG/DL (ref 1.6–2.4)
MCH RBC QN AUTO: 30.1 PG (ref 26.6–33)
MCHC RBC AUTO-ENTMCNC: 33.1 G/DL (ref 31.5–35.7)
MCV RBC AUTO: 90.9 FL (ref 79–97)
MONOCYTES # BLD AUTO: 1.67 10*3/MM3 (ref 0.1–0.9)
MONOCYTES NFR BLD AUTO: 12.7 % (ref 5–12)
NEUTROPHILS NFR BLD AUTO: 71.4 % (ref 42.7–76)
NEUTROPHILS NFR BLD AUTO: 9.41 10*3/MM3 (ref 1.7–7)
NRBC BLD AUTO-RTO: 0 /100 WBC (ref 0–0.2)
PHOSPHATE SERPL-MCNC: 2.6 MG/DL (ref 2.5–4.5)
PLATELET # BLD AUTO: 235 10*3/MM3 (ref 140–450)
PMV BLD AUTO: 10.3 FL (ref 6–12)
POTASSIUM SERPL-SCNC: 3.9 MMOL/L (ref 3.5–5.2)
RBC # BLD AUTO: 2.86 10*6/MM3 (ref 3.77–5.28)
RHINOVIRUS RNA SPEC NAA+PROBE: NOT DETECTED
RSV RNA NPH QL NAA+NON-PROBE: NOT DETECTED
SARS-COV-2 RNA NPH QL NAA+NON-PROBE: NOT DETECTED
SODIUM SERPL-SCNC: 131 MMOL/L (ref 136–145)
WBC NRBC COR # BLD: 13.17 10*3/MM3 (ref 3.4–10.8)

## 2023-01-14 PROCEDURE — 99221 1ST HOSP IP/OBS SF/LOW 40: CPT | Performed by: SURGERY

## 2023-01-14 PROCEDURE — 83735 ASSAY OF MAGNESIUM: CPT | Performed by: STUDENT IN AN ORGANIZED HEALTH CARE EDUCATION/TRAINING PROGRAM

## 2023-01-14 PROCEDURE — 84100 ASSAY OF PHOSPHORUS: CPT | Performed by: STUDENT IN AN ORGANIZED HEALTH CARE EDUCATION/TRAINING PROGRAM

## 2023-01-14 PROCEDURE — 25010000002 PIPERACILLIN SOD-TAZOBACTAM PER 1 G: Performed by: INTERNAL MEDICINE

## 2023-01-14 PROCEDURE — 85025 COMPLETE CBC W/AUTO DIFF WBC: CPT | Performed by: STUDENT IN AN ORGANIZED HEALTH CARE EDUCATION/TRAINING PROGRAM

## 2023-01-14 PROCEDURE — 97110 THERAPEUTIC EXERCISES: CPT

## 2023-01-14 PROCEDURE — 80048 BASIC METABOLIC PNL TOTAL CA: CPT | Performed by: STUDENT IN AN ORGANIZED HEALTH CARE EDUCATION/TRAINING PROGRAM

## 2023-01-14 PROCEDURE — 0202U NFCT DS 22 TRGT SARS-COV-2: CPT | Performed by: STUDENT IN AN ORGANIZED HEALTH CARE EDUCATION/TRAINING PROGRAM

## 2023-01-14 RX ORDER — BENZONATATE 100 MG/1
200 CAPSULE ORAL 3 TIMES DAILY PRN
Status: DISCONTINUED | OUTPATIENT
Start: 2023-01-14 | End: 2023-01-16 | Stop reason: HOSPADM

## 2023-01-14 RX ORDER — GUAIFENESIN 200 MG/1
200 TABLET ORAL EVERY 6 HOURS PRN
Status: DISCONTINUED | OUTPATIENT
Start: 2023-01-14 | End: 2023-01-16 | Stop reason: HOSPADM

## 2023-01-14 RX ADMIN — Medication 1000 UNITS: at 08:03

## 2023-01-14 RX ADMIN — HYDRALAZINE HYDROCHLORIDE 100 MG: 50 TABLET, FILM COATED ORAL at 20:14

## 2023-01-14 RX ADMIN — FUROSEMIDE 40 MG: 40 TABLET ORAL at 08:03

## 2023-01-14 RX ADMIN — Medication 10 ML: at 08:04

## 2023-01-14 RX ADMIN — CITALOPRAM 10 MG: 10 TABLET, FILM COATED ORAL at 08:03

## 2023-01-14 RX ADMIN — Medication 10 ML: at 20:14

## 2023-01-14 RX ADMIN — HYDRALAZINE HYDROCHLORIDE 100 MG: 50 TABLET, FILM COATED ORAL at 08:03

## 2023-01-14 RX ADMIN — AMLODIPINE BESYLATE 5 MG: 5 TABLET ORAL at 08:03

## 2023-01-14 RX ADMIN — NEBIVOLOL 5 MG: 5 TABLET ORAL at 08:03

## 2023-01-14 RX ADMIN — HYDRALAZINE HYDROCHLORIDE 100 MG: 50 TABLET, FILM COATED ORAL at 15:13

## 2023-01-14 RX ADMIN — TAZOBACTAM SODIUM AND PIPERACILLIN SODIUM 3.38 G: 375; 3 INJECTION, SOLUTION INTRAVENOUS at 20:14

## 2023-01-14 RX ADMIN — TAZOBACTAM SODIUM AND PIPERACILLIN SODIUM 3.38 G: 375; 3 INJECTION, SOLUTION INTRAVENOUS at 04:00

## 2023-01-14 RX ADMIN — TAZOBACTAM SODIUM AND PIPERACILLIN SODIUM 3.38 G: 375; 3 INJECTION, SOLUTION INTRAVENOUS at 12:30

## 2023-01-14 NOTE — PLAN OF CARE
Goal Outcome Evaluation:  Plan of Care Reviewed With: patient, other (see comments) (dtr-n-law)        Progress: improving  Outcome Evaluation: Pt agreed to amb, cues to slow pace , unsteady and impulsive when she is fast-paced, also has incr SOA w/incr amb speed ; pt yarely incr amb dist to 150ft, but req 3 standing rests to complete; pt perf LE exer EOB prior to amb to loosen up before trying to walk in ri[place]; pt plans home w/HH and assist from fam initially , may need O2 at home pending MD recommendation    Patient was intermittently wearing a face mask during this therapy encounter. Therapist used appropriate personal protective equipment including eye protection, mask, and gloves.  Mask used was standard procedure mask. Appropriate PPE was worn during the entire therapy session. Hand hygiene was completed before and after therapy session. Patient is not in enhanced droplet precautions.    Beka Olea, PT Tech present

## 2023-01-14 NOTE — ED PROVIDER NOTES
MD ATTESTATION NOTE    The MARY ELLEN and I have discussed this patient's history, physical exam, and treatment plan.  I have reviewed the documentation and personally had a face to face interaction with the patient. I affirm the documentation and agree with the treatment and plan.  The attached note describes my personal findings.      I provided a substantive portion of the care of the patient.  I personally performed the physical exam in its entirety, and below are my findings.  For this patient encounter, the patient wore surgical mask, I wore full protective PPE including N95 and eye protection.      Brief HPI: This patient presents for evaluation of worsening fatigue and decreased activity tolerance for the past several days.  She accidentally fell off her couch and landed on her left buttocks a couple of days ago and has been experiencing persistent pain in the left hip area.  She is also had some reported urinary frequency symptoms and apparently her family members purchased a UTI test kit from the grocery store which reported a positive finding for this patient's urine at home.    PHYSICAL EXAM  ED Triage Vitals   Temp Heart Rate Resp BP SpO2   01/12/23 1116 01/12/23 1115 01/12/23 1115 01/12/23 1125 01/12/23 1115   98.9 °F (37.2 °C) 71 16 137/54 94 %      Temp src Heart Rate Source Patient Position BP Location FiO2 (%)   01/12/23 1116 01/12/23 1648 01/12/23 1648 01/12/23 1648 --   Tympanic Monitor Lying Right arm          GENERAL: Pleasant, elderly lady, no diaphoresis, appears fatigued no acute distress  HENT: nares patent, normocephalic and atraumatic  EYES: no scleral icterus, normal conjunctiva  CV: Normal pulses, normal rate  RESPIRATORY: normal effort, no stridor  ABDOMEN: soft, no masses  MUSCULOSKELETAL: no deformity evident but there is mild to moderate tenderness to the left hip and pelvis and proximal thigh region.  NEURO: alert, moves all extremities, follows commands, no focal deficits.  PSYCH:  calm,  cooperative  SKIN: warm, dry    Vital signs and nursing notes reviewed.        Plan: Patient with left hip pain from a fall but also worsening weakness and malaise symptoms.  Initiated infectious work-up while checking COVID and influenza swabs and urinalysis.  Urinalysis is certainly indicative of UTI with 4+ bacteria and patient noted to have a leukocytosis on CBC.  I viewed the chest x-ray and my impression is mild vascular congestion but no consolidation or infiltrative pattern.  Given patient's advanced age and multiple symptoms here we will plan to admit for IV antibiotic therapy and continued supportive care.     Lionel Prince MD  01/13/23 2052

## 2023-01-14 NOTE — CONSULTS
Note: Apparently this consult was entered 6 PM Thursday night 1/12/2023.  Even as of now, I was not notified of this consult, nor apparently were any of my partners.  I happened to find her on the consult list.    Cc: Abnormal CT, urinary tract infection, concern for possible colovesical fistula    History of presenting illness:   This is a nice 87-year-old lady with a history of multiple urinary tract infections who was admitted with weakness and chills.  She was found to have a urinary tract infection and underwent a CT of the abdomen and pelvis.  There are extensive diverticular changes without evidence of acute diverticulitis.  There is 1 diverticulum which extends out towards the left dome of the bladder.  There is no air seen within the bladder but there is an air bubble near the thickened wall of the left side of the bladder.  The patient is overall feeling well, she denies any abdominal pain or change in bowel habits.  She denies any pneumaturia.    Past Medical History: Arthritis, breast cancer, peripheral vascular disease, hypertension, hyperlipidemia, urinary tract infections    Past Surgical History: Breast lumpectomy in the 1990s, carotid artery angioplasty, open cholecystectomy in the 1970s, hysterectomy in the 1980s apparently for benign disease    Medications: Reviewed and reconciled in epic, no anticoagulants    Allergies: None known    Social History: Former smoker who quit in 1999    Family History: Negative for known colon or rectal cancer    Review of Systems:  Constitutional: Negative for fever but complains of chills and shakes.  Negative for weight loss.  Neck: no swollen glands or dysphagia or odynophagia  Respiratory: negative for SOB, cough, hemoptysis or wheezing  Cardiovascular: negative for chest pain, palpitations or peripheral edema  Gastrointestinal: Positive for alternating constipation and diarrhea.  Genitourinary: Positive for burning and occasional difficulty getting the stream  started.  She specifically denies pneumaturia or passing granular material in her urine.      Physical Exam:  BMI: 31.6  Temperature 98.2 heart rate 69 blood pressure 154/65  General: alert and oriented, appropriate, no acute distress  Eyes: No scleral icterus, extraocular movements are intact  Neck: Supple without lymphadenopathy or thyromegaly, trachea is in the midline  Respiratory: There is good bilateral chest expansion, no use of accessory muscles is noted  Cardiovascular: No jugular venous distention or peripheral edema is seen  Gastrointestinal: Soft and benign, nontender    Laboratory data: White blood cell count improved to 13.1 hemoglobin 8.6 chemistries are in reasonable order    Imaging data: CT images are reviewed by me.  There are extensive sigmoid colon diverticular changes but no evidence of acute diverticulitis.  There is 1 diverticulum which appears to extend over to the left dome of the bladder.  There is a focus of air which may be an extended diverticulum near the bladder wall but I do not see any air within the bladder itself.      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  It is difficult to entirely rule out this as the source of recurrent urinary tract infections, but given her age and functional status, colon resection is more likely to do harm than good.  Would recommend continued medical management.  Please reconsult as needed.      Von Garcia MD, FACS  General, Minimally Invasive and Endoscopic Surgery  [facility]    [address], [address]  [place], [place], [address]  P: [phone]  F: [phone]

## 2023-01-14 NOTE — PLAN OF CARE
Problem: Adult Inpatient Plan of Care  Goal: Plan of Care Review  Outcome: Ongoing, Progressing   Goal Outcome Evaluation:  VSS. 2L nasal cannula at night while sleeping. Pt with low grade fever over night. Pt rested throughout the night with no issues or complaints.

## 2023-01-14 NOTE — THERAPY TREATMENT NOTE
Patient Name: Vonda Jay  : 1935    MRN: [id number]                              Today's Date: 2023       Admit Date: 2023    Visit Dx:     ICD-10-CM ICD-9-CM   1. Acute UTI (urinary tract infection)  N39.0 599.0   2. Leukocytosis, unspecified type  D72.829 288.60   3. Gait instability  R26.81 781.2   4. Anemia, unspecified type  D64.9 285.9   5. Pulmonary vascular congestion  R09.89 514   6. Inferior pubic ramus fracture, left, closed, initial encounter ([facility])  S32.592A 808.2     Patient Active Problem List   Diagnosis   • Closed displaced intertrochanteric fracture of left femur ([facility])   • SANYA (acute kidney injury) ([facility])   • HTN (hypertension)   • Acute blood loss anemia   • Leukocytosis   • Drug-induced constipation   • Stage 3b chronic kidney disease ([facility])   • COVID-19 virus infection   • Anemia due to chronic kidney disease   • Acute on chronic diastolic CHF (congestive heart failure) ([facility])   • Aortic stenosis   • Anemia of chronic renal failure, stage 3 (moderate) ([facility])   • Acute UTI (urinary tract infection)   • Colovesical fistula - possible   • (HFpEF) heart failure with preserved ejection fraction ([facility])   • Obesity (BMI 30-39.9)   • Hypokalemia     Past Medical History:   Diagnosis Date   • Arthritis    • History of breast cancer    • History of carotid artery disease    • Hyperlipidemia    • Hypertension      Past Surgical History:   Procedure Laterality Date   • BREAST LUMPECTOMY     • CAROTID ARTERY ANGIOPLASTY  ,    • CHOLECYSTECTOMY  1979   • FEMUR IM NAILING/RODDING Left 2018    Procedure: FEMUR INTRAMEDULLARY NAILING;  Surgeon: Zheng Fleming MD;  Location: [facility] OR;  Service:    • HYSTERECTOMY        General Information     Row Name 23 1339          Physical Therapy Time and Intention    Document Type therapy note (daily note)  -     Mode of Treatment individual therapy;physical therapy  -     Row Name 23 1339          General  Information    Patient Profile Reviewed yes  -     Existing Precautions/Restrictions fall;oxygen therapy device and L/min  pt took off to trial during amb since no O2 prior to admission  -     Row Name 01/14/23 1339          Living Environment    People in Home alone;child(natalio), adult  adult children coming to stay initially once home  -     Row Name 01/14/23 1339          Cognition    Orientation Status (Cognition) oriented x 3  -     Row Name 01/14/23 1339          Safety Issues, Functional Mobility    Safety Issues Affecting Function (Mobility) judgment;positioning of assistive device;problem-solving;safety precaution awareness  -     Impairments Affecting Function (Mobility) balance;coordination;endurance/activity tolerance;strength  -           User Key  (r) = Recorded By, (t) = Taken By, (c) = Cosigned By    Initials Name Provider Type    Marlyn Garner PTA Physical Therapist Assistant               Mobility     Row Name 01/14/23 1340          Bed Mobility    Supine-Sit [place] (Bed Mobility) 2 person assist;minimum assist (75% patient effort)  -     Sit-Supine [place] (Bed Mobility) standby assist  -     Comment, (Bed Mobility) did not allow use of rail exiting bed, but fam stated that she does have bed rail and lift chair if needed  -     Row Name 01/14/23 1340          Sit-Stand Transfer    Sit-Stand [place] (Transfers) 2 person assist;contact guard;verbal cues;nonverbal cues (demo/gesture)  -     Assistive Device (Sit-Stand Transfers) walker, 4-wheeled  -     Comment, (Sit-Stand Transfer) pt needed support to staedy rwx due to brakes not working completely-educ on rollator needing to be serviced before safe, she has rwx also she can use initially-but she still prefers rollator-educ offered again on unsafe if brakes not working  -     Row Name 01/14/23 1345          Gait/Stairs (Locomotion)    [place] Level (Gait) 2 person assist;1 person assist;contact  guard  improved on second attempt  -     Assistive Device (Gait) walker, 4-wheeled  -     Distance in Feet (Gait) 150ft, 3 standing rests to complete, suggested 2 of the 3 rests due to impulsivity and not steering rwx properly  -           User Key  (r) = Recorded By, (t) = Taken By, (c) = Cosigned By    Initials Name Provider Type    Marlyn Garner PTA Physical Therapist Assistant               Obj/Interventions     Row Name 01/14/23 1349          Motor Skills    Therapeutic Exercise --  LAQS, seated MIP x10, educ to slow pace for safety and benefit  -           User Key  (r) = Recorded By, (t) = Taken By, (c) = Cosigned By    Initials Name Provider Type    Marlyn Garner PTA Physical Therapist Assistant               Goals/Plan    No documentation.                Clinical Impression     Row Name 01/14/23 4592          Pain    Pretreatment Pain Rating 0/10 - no pain  -     Posttreatment Pain Rating 0/10 - no pain  -     Pain Intervention(s) Repositioned;Ambulation/increased activity  -[facility] Name 01/14/23 7048          Plan of Care Review    Plan of Care Reviewed With patient;other (see comments)  dtr-n-law  -     Progress improving  -     Outcome Evaluation Pt agreed to amb, cues to slow pace , unsteady and impulsive when she is fast-paced, also has incr SOA w/incr amb speed ; pt yarely incr amb dist to 150ft, but req 3 standing rests to complete; pt perf LE exer EOB prior to amb to loosen up before trying to walk in ri[place]; pt plans home w/HH and assist [facility] initially , may need O2 at home pending MD recommendation  -     Row Name 01/14/23 1896          Therapy Assessment/Plan (PT)    Rehab Potential (PT) good, to achieve stated therapy goals  -     Criteria for Skilled Interventions Met (PT) yes  -     Row Name 01/14/23 9065          Vital Signs    Pre SpO2 (%) 93  -     O2 Delivery Pre Treatment supplemental O2  -     Intra SpO2 (%) 90  -     O2 Delivery Intra  Treatment room air  -     Post SpO2 (%) 94  -JM     O2 Delivery Post Treatment supplemental O2  -     Row Name 01/14/23 1346          Positioning and Restraints    Pre-Treatment Position in bed  -     Post Treatment Position bed  -JM     In Bed fowlers;call light within reach;encouraged to call for assist;exit alarm on;with family/caregiver;notified nsg  eating lunch upon exit, fam assisting  -           User Key  (r) = Recorded By, (t) = Taken By, (c) = Cosigned By    Initials Name Provider Type    Marlyn Garner PTA Physical Therapist Assistant               Outcome Measures     Row Name 01/14/23 1350 01/14/23 0804       How much help from another person do you currently need...    Turning from your back to your side while in flat bed without using bedrails? 3  -JM 3  -CW    Moving from lying on back to sitting on the side of a flat bed without bedrails? 2  -JM 3  -CW    Moving to and from a bed to a chair (including a wheelchair)? 3  -JM 3  -CW    Standing up from a chair using your arms (e.g., wheelchair, bedside chair)? 3  - 3  -CW    Climbing 3-5 steps with a railing? 2  -JM 2  -CW    To walk in hospital room? 3  - 3  -CW    AM-PAC 6 Clicks Score (PT) 16  -JM 17  -CW    Highest level of mobility 5 --> Static standing  - 5 --> Static standing  -CW          User Key  (r) = Recorded By, (t) = Taken By, (c) = Cosigned By    Initials Name Provider Type    Marlyn Garner PTA Physical Therapist Assistant    Denise Rucker, RN Registered Nurse                             Physical Therapy Education     Title: PT OT SLP Therapies (Done)     Topic: Physical Therapy (Done)     Point: Mobility training (Done)     Learning Progress Summary           Patient Lynn, LUCHO,JESSICA,D, VU,NR by LIZZY at 1/14/2023 1351    Acceptance, E, VU by MG at 1/13/2023 1444   Family LUCHO Bailey,JESSICA,D, VU,NR by LIZZY at 1/14/2023 1351    Acceptance, E, VU by MG at 1/13/2023 1444                   Point: Home exercise program  (Done)     Learning Progress Summary           Patient Eager, E,TB,D, VU,NR by  at 1/14/2023 1351   Family Eager, E,TB,D, VU,NR by  at 1/14/2023 1351                   Point: Body mechanics (Done)     Learning Progress Summary           Patient Eager, E,TB,D, VU,NR by  at 1/14/2023 1351    Acceptance, E, VU by MG at 1/13/2023 1444   Family Eager, E,TB,D, VU,NR by  at 1/14/2023 1351    Acceptance, E, VU by MG at 1/13/2023 1444                   Point: Precautions (Done)     Learning Progress Summary           Patient Eager, E,TB,D, VU,NR by  at 1/14/2023 1351    Acceptance, E, VU by MG at 1/13/2023 1444   Family Eager, E,TB,D, VU,NR by  at 1/14/2023 1351    Acceptance, E, VU by MG at 1/13/2023 1444                               User Key     Initials Effective Dates Name Provider Type Discipline     03/07/18 -  Marlyn Ballard PTA Physical Therapist Assistant PT     05/24/22 -  Halley Bailey, PT Physical Therapist PT              PT Recommendation and Plan     Plan of Care Reviewed With: patient, other (see comments) (dtr-n-law)  Progress: improving  Outcome Evaluation: Pt agreed to amb, cues to slow pace , unsteady and impulsive when she is fast-paced, also has incr SOA w/incr amb speed ; pt yarely incr amb dist to 150ft, but req 3 standing rests to complete; pt perf LE exer EOB prior to amb to loosen up before trying to walk in [place]; pt plans home w/HH and assist from fam initially , may need O2 at home pending MD recommendation     Time Calculation:    PT Charges     Row Name 01/14/23 5998             Time Calculation    Start Time 1120  -      Stop Time 1150  -      Time Calculation (min) 30 min  -      PT Received On 01/14/23  -      PT - Next Appointment 01/16/23  -            User Key  (r) = Recorded By, (t) = Taken By, (c) = Cosigned By    Initials Name Provider Type    Marlyn Garner PTA Physical Therapist Assistant              Therapy Charges for Today     Code Description  Service Date Service Provider Modifiers Qty    39690577909 HC PT THER PROC EA 15 MIN 1/14/2023 Marlyn Ballard PTA GP 2    78039066826 HC PT THER SUPP EA 15 MIN 1/14/2023 Marlyn Ballard PTA GP 2          PT G-Codes  Outcome Measure Options: AM-PAC 6 Clicks Basic Mobility (PT)  AM-PAC 6 Clicks Score (PT): 16  PT Discharge Summary  Anticipated Discharge Disposition (PT): home with assist, home with home health    Marlyn Ballard PTA  1/14/2023

## 2023-01-14 NOTE — NURSING NOTE
Left message for on call general surgery to call back regarding pt has not been seen for consult as of yet

## 2023-01-15 PROBLEM — R09.02 HYPOXIA: Status: ACTIVE | Noted: 2023-01-15

## 2023-01-15 PROBLEM — B96.20 E. COLI UTI (URINARY TRACT INFECTION): Status: ACTIVE | Noted: 2023-01-12

## 2023-01-15 PROBLEM — R05.9 COUGH: Status: ACTIVE | Noted: 2023-01-15

## 2023-01-15 PROBLEM — N39.0 ACUTE UTI (URINARY TRACT INFECTION): Status: ACTIVE | Noted: 2023-01-15

## 2023-01-15 LAB
ANION GAP SERPL CALCULATED.3IONS-SCNC: 9 MMOL/L (ref 5–15)
BASOPHILS # BLD AUTO: 0.07 10*3/MM3 (ref 0–0.2)
BASOPHILS NFR BLD AUTO: 0.6 % (ref 0–1.5)
BUN SERPL-MCNC: 27 MG/DL (ref 8–23)
BUN/CREAT SERPL: 19.6 (ref 7–25)
CALCIUM SPEC-SCNC: 8 MG/DL (ref 8.6–10.5)
CHLORIDE SERPL-SCNC: 97 MMOL/L (ref 98–107)
CO2 SERPL-SCNC: 27 MMOL/L (ref 22–29)
CREAT SERPL-MCNC: 1.38 MG/DL (ref 0.57–1)
DEPRECATED RDW RBC AUTO: 38.1 FL (ref 37–54)
EGFRCR SERPLBLD CKD-EPI 2021: 37.1 ML/MIN/1.73
EOSINOPHIL # BLD AUTO: 0.44 10*3/MM3 (ref 0–0.4)
EOSINOPHIL NFR BLD AUTO: 3.7 % (ref 0.3–6.2)
ERYTHROCYTE [DISTWIDTH] IN BLOOD BY AUTOMATED COUNT: 11.8 % (ref 12.3–15.4)
GLUCOSE SERPL-MCNC: 134 MG/DL (ref 65–99)
HCT VFR BLD AUTO: 25.2 % (ref 34–46.6)
HGB BLD-MCNC: 8.3 G/DL (ref 12–15.9)
IMM GRANULOCYTES # BLD AUTO: 0.08 10*3/MM3 (ref 0–0.05)
IMM GRANULOCYTES NFR BLD AUTO: 0.7 % (ref 0–0.5)
LYMPHOCYTES # BLD AUTO: 1.55 10*3/MM3 (ref 0.7–3.1)
LYMPHOCYTES NFR BLD AUTO: 13.1 % (ref 19.6–45.3)
MAGNESIUM SERPL-MCNC: 3.5 MG/DL (ref 1.6–2.4)
MCH RBC QN AUTO: 29.5 PG (ref 26.6–33)
MCHC RBC AUTO-ENTMCNC: 32.9 G/DL (ref 31.5–35.7)
MCV RBC AUTO: 89.7 FL (ref 79–97)
MONOCYTES # BLD AUTO: 1.47 10*3/MM3 (ref 0.1–0.9)
MONOCYTES NFR BLD AUTO: 12.5 % (ref 5–12)
NEUTROPHILS NFR BLD AUTO: 69.4 % (ref 42.7–76)
NEUTROPHILS NFR BLD AUTO: 8.19 10*3/MM3 (ref 1.7–7)
NRBC BLD AUTO-RTO: 0 /100 WBC (ref 0–0.2)
PHOSPHATE SERPL-MCNC: 2.9 MG/DL (ref 2.5–4.5)
PLATELET # BLD AUTO: 250 10*3/MM3 (ref 140–450)
PMV BLD AUTO: 10.6 FL (ref 6–12)
POTASSIUM SERPL-SCNC: 3.3 MMOL/L (ref 3.5–5.2)
RBC # BLD AUTO: 2.81 10*6/MM3 (ref 3.77–5.28)
SODIUM SERPL-SCNC: 133 MMOL/L (ref 136–145)
WBC NRBC COR # BLD: 11.8 10*3/MM3 (ref 3.4–10.8)

## 2023-01-15 PROCEDURE — 94761 N-INVAS EAR/PLS OXIMETRY MLT: CPT

## 2023-01-15 PROCEDURE — 94799 UNLISTED PULMONARY SVC/PX: CPT

## 2023-01-15 PROCEDURE — 94762 N-INVAS EAR/PLS OXIMTRY CONT: CPT

## 2023-01-15 PROCEDURE — 84100 ASSAY OF PHOSPHORUS: CPT | Performed by: STUDENT IN AN ORGANIZED HEALTH CARE EDUCATION/TRAINING PROGRAM

## 2023-01-15 PROCEDURE — 80048 BASIC METABOLIC PNL TOTAL CA: CPT | Performed by: STUDENT IN AN ORGANIZED HEALTH CARE EDUCATION/TRAINING PROGRAM

## 2023-01-15 PROCEDURE — 83735 ASSAY OF MAGNESIUM: CPT | Performed by: STUDENT IN AN ORGANIZED HEALTH CARE EDUCATION/TRAINING PROGRAM

## 2023-01-15 PROCEDURE — 85025 COMPLETE CBC W/AUTO DIFF WBC: CPT | Performed by: STUDENT IN AN ORGANIZED HEALTH CARE EDUCATION/TRAINING PROGRAM

## 2023-01-15 PROCEDURE — 25010000002 PIPERACILLIN SOD-TAZOBACTAM PER 1 G: Performed by: INTERNAL MEDICINE

## 2023-01-15 RX ADMIN — HYDRALAZINE HYDROCHLORIDE 100 MG: 50 TABLET, FILM COATED ORAL at 15:17

## 2023-01-15 RX ADMIN — HYDRALAZINE HYDROCHLORIDE 100 MG: 50 TABLET, FILM COATED ORAL at 08:23

## 2023-01-15 RX ADMIN — FUROSEMIDE 40 MG: 40 TABLET ORAL at 08:23

## 2023-01-15 RX ADMIN — AMLODIPINE BESYLATE 5 MG: 5 TABLET ORAL at 08:23

## 2023-01-15 RX ADMIN — POTASSIUM CHLORIDE 40 MEQ: 750 TABLET, EXTENDED RELEASE ORAL at 15:47

## 2023-01-15 RX ADMIN — TAZOBACTAM SODIUM AND PIPERACILLIN SODIUM 3.38 G: 375; 3 INJECTION, SOLUTION INTRAVENOUS at 13:20

## 2023-01-15 RX ADMIN — POTASSIUM CHLORIDE 40 MEQ: 750 TABLET, EXTENDED RELEASE ORAL at 08:39

## 2023-01-15 RX ADMIN — TAZOBACTAM SODIUM AND PIPERACILLIN SODIUM 3.38 G: 375; 3 INJECTION, SOLUTION INTRAVENOUS at 04:21

## 2023-01-15 RX ADMIN — Medication 10 ML: at 20:24

## 2023-01-15 RX ADMIN — NEBIVOLOL 5 MG: 5 TABLET ORAL at 08:23

## 2023-01-15 RX ADMIN — CITALOPRAM 10 MG: 10 TABLET, FILM COATED ORAL at 08:23

## 2023-01-15 RX ADMIN — Medication 10 ML: at 08:23

## 2023-01-15 RX ADMIN — TAZOBACTAM SODIUM AND PIPERACILLIN SODIUM 3.38 G: 375; 3 INJECTION, SOLUTION INTRAVENOUS at 20:24

## 2023-01-15 RX ADMIN — HYDRALAZINE HYDROCHLORIDE 100 MG: 50 TABLET, FILM COATED ORAL at 20:24

## 2023-01-15 RX ADMIN — Medication 1000 UNITS: at 08:23

## 2023-01-15 NOTE — PROGRESS NOTES
Name: Vonda Jay ADMIT: 2023   : 1935  PCP: Ambrose Ashley MD    MRN: [id number] LOS: 3 days   AGE/SEX: 87 y.o. female  ROOM: [facility]     Subjective   Subjective   Patient seen and examined this morning. Hospital day 3. At time of my examination, patient is awake, alert, resting comfortably in bed. Discussed with nursing, concerned that patient is still requiring supplemental oxygen, stating that she does have oxygen desaturations while sleeping. Patient fatigue/weakness still present, but improved from prior.  Cough improved.  Ordered RVP yesterday, resulted negative.  She is currently on 2 L O2 via NC with O2 sats greater than 90%.    Review of Systems   Constitutional: Positive for fatigue. Negative for chills and fever.   Respiratory: Positive for cough. Negative for shortness of breath.    Cardiovascular: Negative for chest pain and palpitations.   Gastrointestinal: Negative for abdominal pain.   Genitourinary: Positive for frequency. Negative for difficulty urinating.   Neurological: Positive for weakness.        Objective   Objective   Vital Signs  Temp:  [98.3 °F (36.8 °C)-99.4 °F (37.4 °C)] 98.4 °F (36.9 °C)  Heart Rate:  [68-75] 68  Resp:  [16-20] 18  BP: (127-145)/(52-63) 142/52  SpO2:  [91 %-97 %] 97 %  on  Flow (L/min):  [2] 2;   Device (Oxygen Therapy): nasal cannula  Body mass index is 31.56 kg/m².  Physical Exam  Vitals and nursing note reviewed.   Constitutional:       General: She is not in acute distress.     Appearance: She is obese. She is ill-appearing.   Eyes:      General: No scleral icterus.  Cardiovascular:      Rate and Rhythm: Normal rate and regular rhythm.      Pulses: Normal pulses.      Heart sounds: Normal heart sounds.   Pulmonary:      Effort: Pulmonary effort is normal. No respiratory distress.      Breath sounds: Normal breath sounds. No wheezing.      Comments: On 2L O2 via NC  Abdominal:      General: Bowel sounds are normal. There is no distension.       Palpations: Abdomen is soft.      Tenderness: There is no abdominal tenderness.   Skin:     General: Skin is warm and dry.   Neurological:      Mental Status: She is alert and oriented to person, place, and time.       Results Review:  I reviewed the patient's new clinical results.  I personally viewed and interpreted the patient's EKG/Telemetry data    Results from last 7 days   Lab Units 01/15/23  0626 01/14/23  0550 01/13/23  0615 01/12/23  1212   WBC 10*3/mm3 11.80* 13.17* 14.66* 18.01*   HEMOGLOBIN g/dL 8.3* 8.6* 9.4* 9.4*   PLATELETS 10*3/mm3 250 235 223 222     Results from last 7 days   Lab Units 01/15/23  0626 01/14/23  0550 01/13/23  0615 01/12/23  1212   SODIUM mmol/L 133* 131* 136 134*   POTASSIUM mmol/L 3.3* 3.9 3.4* 3.6   CHLORIDE mmol/L 97* 96* 98 96*   CO2 mmol/L 27.0 25.8 25.4 27.4   BUN mg/dL 27* 30* 33* 34*   CREATININE mg/dL 1.38* 1.63* 1.60* 1.69*   GLUCOSE mg/dL 134* 113* 98 140*   EGFR mL/min/1.73 37.1* 30.4* 31.1* 29.1*     Results from last 7 days   Lab Units 01/13/23  0615 01/12/23  1212   ALBUMIN g/dL 3.2* 4.0   BILIRUBIN mg/dL 0.3 0.3   ALK PHOS U/L 58 60   AST (SGOT) U/L 10 12   ALT (SGPT) U/L 8 9     Results from last 7 days   Lab Units 01/15/23  0626 01/14/23  0550 01/13/23  0615 01/12/23  1212   CALCIUM mg/dL 8.0* 8.1* 8.8 9.5   ALBUMIN g/dL  --   --  3.2* 4.0   MAGNESIUM mg/dL 3.5* 2.2  --   --    PHOSPHORUS mg/dL 2.9 2.6  --   --        No results found for: HGBA1C, POCGLU    CT Abdomen Pelvis Without Contrast    Result Date: 1/12/2023  1. Wall thickening of the left side of the dome of the bladder. There is adjacent sigmoid diverticulosis without evidence of active diverticulitis. There is a tract of tissue bridging the bladder and the adjacent sigmoid colon as well as a tiny extraluminal gas bubble adjacent to the bladder wall. These findings may reflect sequela of recent episode of diverticulitis. A colovesical fistula cannot be excluded given this appearance. 2. Additional  findings as described  Radiation dose reduction techniques were utilized, including automated exposure control and exposure modulation based on body size.  This report was finalized on 1/12/2023 4:02 PM by Dr. Gene Martienz M.D.      XR Chest 1 View    Result Date: 1/12/2023  Likely atelectasis or scarring the right mid and lower lung. Slight pulmonary vascular congestion. Follow-up as clinical indications persist.  This report was finalized on 1/12/2023 1:03 PM by Dr. Clay Jordan M.D.      XR Hip With or Without Pelvis 2 - 3 View Left    Result Date: 1/12/2023   As described.  This report was finalized on 1/12/2023 1:05 PM by Dr. Clay Jordan M.D.      Scheduled Medications  amLODIPine, 5 mg, Oral, Daily  cholecalciferol, 1,000 Units, Oral, Daily  citalopram, 10 mg, Oral, Daily  furosemide, 40 mg, Oral, Daily  hydrALAZINE, 100 mg, Oral, TID  nebivolol, 5 mg, Oral, Daily  piperacillin-tazobactam, 3.375 g, Intravenous, Q8H  sodium chloride, 10 mL, Intravenous, Q12H    Infusions   Diet  Diet: Cardiac Diets; Healthy Heart (2-3 Na+); Texture: Regular Texture (IDDSI 7); Fluid Consistency: Thin (IDDSI 0)       Assessment/Plan     Active Hospital Problems    Diagnosis  POA   • **Acute UTI (urinary tract infection) [N39.0]  Yes   • Cough [R05.9]  No   • Hypoxia [R09.02]  Yes   • (HFpEF) heart failure with preserved ejection fraction (HCC) [I50.30]  Yes   • Obesity (BMI 30-39.9) [E66.9]  Yes   • Hypokalemia [E87.6]  No   • E. coli UTI (urinary tract infection) [N39.0, B96.20]  Yes   • Colovesical fistula - possible [N32.1]  Clinically Undetermined   • Aortic stenosis [I35.0]  Yes   • Anemia due to chronic kidney disease [N18.9, D63.1]  Yes   • Stage 3b chronic kidney disease (HCC) [N18.32]  Yes   • Leukocytosis [D72.829]  Yes   • HTN (hypertension) [I10]  Yes      Resolved Hospital Problems   No resolved problems to display.       87 y.o. female admitted with Acute UTI (urinary tract infection).    Acute UTI  secondary to E. Coli with leukocytosis  - UA on admission showin+ leukocyte esterase, 21-30 WBC, 4+ bacteria. WBC elevated to 18.01 on admission.  - Started on empiric Zosyn by admitting physician, while awaiting results of infectious workup.  - Urine culture returned + for >100K CFU/mL E. Coli, sensitive to Zosyn.  - WBC remains elevated, but improving. Patient afebrile. Seems to be improving on current regimen.  - Continue Zosyn as prescribed. If ready for discharge before antibiotics completed, will transition to PO.  - Renally dose medications in setting of CKD  - Acute urinary retention protocol.  - Order repeat CBC in AM for reassessment.    Cough  Hypoxia  - Patient with complaints of ongoing cough on , was COVID and Flu negative on admission, however, family asked for repeat RVP, which was ordered and resulted negative.  Added supportive medications for cough, related to improvement.  - Is on 2L O2 via NC, however, does not have significant respiratory complaints at present and oxygen saturations are well above 90%. Discussed with nursing, concerned that patient is still requiring supplemental oxygen, stating that she does have oxygen desaturations while sleeping.  - Order nocturnal oximetry testing.  - Home O2 evaluation ordered, to be completed prior to discharge.  - Continue supportive medications, wean 02 as tolerated.  - Closely monitor, pulse oximetry, telemetry    Hypokalemia  - K low at 3.3; will replete via electrolyte protocol. Follow up repeat labs to guide ongoing management decisions. Replete PRN per protocol. Continue to monitor    Normocytic Anemia  - Hemoglobin low at 8.3, stable from prior, but as slightly decreased from 9.4 on admission. Etiology most likely 2/2 underlying CKD, however, will work up further.  No evidence of overt blood loss. No indications for acute intervention at this time.    - Order iron profile, ferritin, B12, folate, reticulocyte count, LDH.  - Order repeat  "CBC in AM for reassessment. Continue to monitor, transfuse for hemoglobin <7.    Abnormal CT AP with concern for colovesical fistula  - CT AP on admission reviewed, showing: \"Wall thickening of the left side of the dome of the bladder. There is a tract of tissue bridging the bladder and the adjacent sigmoid colon as well as a tiny extraluminal gas bubble adjacent to the bladder wall. These findings may reflect sequela of recent episode of diverticulitis. A colovesical fistula cannot be excluded given this appearance\"  - Urology consulted following admission, evaluated patient, per note: Patient asymptomatic without fecaluria or pneumaturia, UA not C/F fistula. \"Pt will need cystoscopy on outpatient basis to further evaluate bladder wall thickening and Cherise's. Will schedule outpatient follow up for cystoscopy. Schedulers will call to set up f/u appt\"  - General Surgery consulted. Reviewed their note, recommending medical management at this time, appreciate their help.    Chronic kidney disease stage 3B  - On most recent labs, patient's creatinine found to be 1.38, has been continually improving. Baseline per review of outside records show recent creatinine values ~1.8 to 1.9 on average, with eGFR in low 30's range, however, most recently values have been in high 20's range, concerning for possible progression to CKD stage IV.   - No indications to warrant acute changes and/or intervention at this time.  - Order repeat BMP in AM for reassessment of renal function.   - Patient will need outpatient Nephrology referral for evaluation after discharge.  - Will continue to monitor and trend creatinine to guide ongoing management decisions. Avoid nephrotoxic medications, IVF, strict I/O, daily weights.    HFpEF  Aortic stenosis  - Most recent 2D Echo (07/22) showing EF 66-70%, mild concentric LVH, Left ventricular diastolic function is consistent with (grade II w/high LAP) pseudonormalization. The left atrial cavity is " moderately dilated. Moderate to severe aortic valve stenosis is present. Aortic valve area is 1.02 cm2. Peak velocity of the flow distal to the aortic valve is 373.4 cm/s. Aortic valve maximum pressure gradient is 55.8 mmHg. Aortic valve mean pressure gradient is 31.7 mmHg. Aortic valve dimensionless index is 0.30.  - BNP elevated on admission, however, this is likely skewed in setting of patient's significant renal impairment. Imaging showing slight pulmonary vascular congestion, however, not overtly hypervolemic warranting IV diuresis at present.  - Continue current medications as prescribed. Continue to closely monitor with telemetry, strict I/O, daily weights, low sodium diet.    Hypertension  - BP acceptable acutely. Appears stable. No indications to warrant acute changes/intervention at this time.  - Continue current medications as prescribed. Trend BP to guide ongoing management decisions.    Obesity  - BMI 31.56 kg/m2. Complicating all medical problems.    · SCDs for DVT prophylaxis.  · Full code.  · Discussed with patient and nursing staff.  · Anticipate discharge home with home health in 1-2 days.      Armando Mckenzie DO  [facility]  01/15/23

## 2023-01-15 NOTE — PROGRESS NOTES
Name: Vonda Jay ADMIT: 2023   : 1935  PCP: Ambrose Ashley MD    MRN: [id number] LOS: 2 days   AGE/SEX: 87 y.o. female  ROOM: [facility]     Subjective   Subjective   Patient seen and examined this morning. Hospital day 2. Family at bedside. At time of my examination, patient is awake, alert, resting comfortably in bed. Discussed with nursing, no reported acute events overnight. Patient fatigue/weakness still present, but stable from prior. Is endorsing ongoing cough, family wondering about further testing at this time.    Review of Systems   Constitutional: Positive for fatigue. Negative for chills and fever.   Respiratory: Positive for cough. Negative for shortness of breath.    Cardiovascular: Negative for chest pain and palpitations.   Gastrointestinal: Negative for abdominal pain.   Genitourinary: Positive for frequency. Negative for difficulty urinating and dysuria.   Neurological: Positive for weakness. Negative for dizziness and light-headedness.        Objective   Objective   Vital Signs  Temp:  [98.2 °F (36.8 °C)-100.3 °F (37.9 °C)] 98.7 °F (37.1 °C)  Heart Rate:  [68-75] 75  Resp:  [16-18] 16  BP: (115-154)/(55-65) 127/56  SpO2:  [93 %-96 %] 93 %  on  Flow (L/min):  [2] 2;   Device (Oxygen Therapy): nasal cannula  Body mass index is 31.56 kg/m².  Physical Exam  Vitals and nursing note reviewed.   Constitutional:       General: She is not in acute distress.     Appearance: She is obese. She is ill-appearing.   Eyes:      General: No scleral icterus.  Cardiovascular:      Rate and Rhythm: Normal rate and regular rhythm.      Pulses: Normal pulses.      Heart sounds: Normal heart sounds.   Pulmonary:      Effort: Pulmonary effort is normal. No respiratory distress.      Breath sounds: Normal breath sounds. No wheezing.      Comments: On 2L O2 via NC  Abdominal:      General: Bowel sounds are normal.      Palpations: Abdomen is soft.      Tenderness: There is no abdominal tenderness.    Skin:     General: Skin is warm and dry.   Neurological:      Mental Status: She is alert.       Results Review:  I reviewed the patient's new clinical results.  I personally viewed and interpreted the patient's EKG/Telemetry data    Results from last 7 days   Lab Units 01/14/23  0550 01/13/23  0615 01/12/23  1212   WBC 10*3/mm3 13.17* 14.66* 18.01*   HEMOGLOBIN g/dL 8.6* 9.4* 9.4*   PLATELETS 10*3/mm3 235 223 222     Results from last 7 days   Lab Units 01/14/23  0550 01/13/23  0615 01/12/23  1212   SODIUM mmol/L 131* 136 134*   POTASSIUM mmol/L 3.9 3.4* 3.6   CHLORIDE mmol/L 96* 98 96*   CO2 mmol/L 25.8 25.4 27.4   BUN mg/dL 30* 33* 34*   CREATININE mg/dL 1.63* 1.60* 1.69*   GLUCOSE mg/dL 113* 98 140*   EGFR mL/min/1.73 30.4* 31.1* 29.1*     Results from last 7 days   Lab Units 01/13/23  0615 01/12/23  1212   ALBUMIN g/dL 3.2* 4.0   BILIRUBIN mg/dL 0.3 0.3   ALK PHOS U/L 58 60   AST (SGOT) U/L 10 12   ALT (SGPT) U/L 8 9     Results from last 7 days   Lab Units 01/14/23  0550 01/13/23  0615 01/12/23  1212   CALCIUM mg/dL 8.1* 8.8 9.5   ALBUMIN g/dL  --  3.2* 4.0   MAGNESIUM mg/dL 2.2  --   --    PHOSPHORUS mg/dL 2.6  --   --        No results found for: HGBA1C, POCGLU    CT Abdomen Pelvis Without Contrast    Result Date: 1/12/2023  1. Wall thickening of the left side of the dome of the bladder. There is adjacent sigmoid diverticulosis without evidence of active diverticulitis. There is a tract of tissue bridging the bladder and the adjacent sigmoid colon as well as a tiny extraluminal gas bubble adjacent to the bladder wall. These findings may reflect sequela of recent episode of diverticulitis. A colovesical fistula cannot be excluded given this appearance. 2. Additional findings as described  Radiation dose reduction techniques were utilized, including automated exposure control and exposure modulation based on body size.  This report was finalized on 1/12/2023 4:02 PM by Dr. Gene Martinez M.D.      XR Chest  1 View    Result Date: 2023  Likely atelectasis or scarring the right mid and lower lung. Slight pulmonary vascular congestion. Follow-up as clinical indications persist.  This report was finalized on 2023 1:03 PM by Dr. Clay Jordan M.D.      XR Hip With or Without Pelvis 2 - 3 View Left    Result Date: 2023   As described.  This report was finalized on 2023 1:05 PM by Dr. Clay Jordan M.D.      Scheduled Medications  amLODIPine, 5 mg, Oral, Daily  cholecalciferol, 1,000 Units, Oral, Daily  citalopram, 10 mg, Oral, Daily  furosemide, 40 mg, Oral, Daily  hydrALAZINE, 100 mg, Oral, TID  nebivolol, 5 mg, Oral, Daily  piperacillin-tazobactam, 3.375 g, Intravenous, Q8H  sodium chloride, 10 mL, Intravenous, Q12H    Infusions   Diet  Diet: Cardiac Diets; Healthy Heart (2-3 Na+); Texture: Regular Texture (IDDSI 7); Fluid Consistency: Thin (IDDSI 0)       Assessment/Plan     Active Hospital Problems    Diagnosis  POA   • **Acute UTI (urinary tract infection) [N39.0]  Yes   • (HFpEF) heart failure with preserved ejection fraction (HCC) [I50.30]  Yes   • Obesity (BMI 30-39.9) [E66.9]  Yes   • Hypokalemia [E87.6]  No   • Colovesical fistula - possible [N32.1]  Clinically Undetermined   • Aortic stenosis [I35.0]  Yes   • Anemia due to chronic kidney disease [N18.9, D63.1]  Yes   • Stage 3b chronic kidney disease (HCC) [N18.32]  Yes   • Leukocytosis [D72.829]  Yes   • HTN (hypertension) [I10]  Yes      Resolved Hospital Problems   No resolved problems to display.       87 y.o. female admitted with Acute UTI (urinary tract infection).    Acute UTI secondary to E. Coli with leukocytosis  - UA on admission showin+ leukocyte esterase, 21-30 WBC, 4+ bacteria. WBC elevated to 18.01 on admission.  - Started on empiric Zosyn by admitting physician, while awaiting results of infectious workup.  - Urine culture returned + for >100K CFU/mL E. Coli, sensitive to Zosyn.  - WBC remains elevated, but  "improving. Patient afebrile. Seems to be improving on current regimen.  - Continue Zosyn as prescribed. If ready for discharge before antibiotics completed, will transition to PO.  - Renally dose medications in setting of CKD  - Acute urinary retention protocol.  - Order repeat CBC in AM for reassessment.    Cough  - Patient with complaints of ongoing cough. Is on 2L O2 via NC, however, does not have significant respiratory complaints at present. Was COVID and Flu negative on admission, however, family asking about further workup for assessment.  - I will order complete respiratory viral panel at this time and follow up results to guide management decisions.  - Will add supportive medications for cough.  - Home O2 prior to discharge if still requiring oxygen.  - Closely monitor, pulse oximetry, telemetry    Abnormal CT AP with concern for colovesical fistula  - CT AP on admission reviewed, showing: \"Wall thickening of the left side of the dome of the bladder. There is a tract of tissue bridging the bladder and the adjacent sigmoid colon as well as a tiny extraluminal gas bubble adjacent to the bladder wall. These findings may reflect sequela of recent episode of diverticulitis. A colovesical fistula cannot be excluded given this appearance\"  - Urology consulted following admission, evaluated patient, per note: Patient asymptomatic without fecaluria or pneumaturia, UA not C/F fistula. \"Pt will need cystoscopy on outpatient basis to further evaluate bladder wall thickening and Cherise's. Will schedule outpatient follow up for cystoscopy. Schedulers will call to set up f/u appt\"  - General Surgery consulted. Reviewed their note, recommending medical management at this time, appreciate their help.    Chronic kidney disease stage 3B  - On most recent labs, patient's creatinine found to be 1.63, appears stable. Baseline per review of outside records show recent creatinine values ~1.8 to 1.9 on average, with eGFR in low 30's " range, however, most recently values have been in high 20's range, concerning for possible progression to CKD stage IV.   - No indications to warrant acute changes and/or intervention at this time.  - Order repeat BMP in AM for reassessment of renal function.   - Patient will need outpatient Nephrology referral for evaluation after discharge.  - Will continue to monitor and trend creatinine to guide ongoing management decisions. Avoid nephrotoxic medications, IVF, strict I/O, daily weights.    HFpEF  Aortic stenosis  - Most recent 2D Echo (07/22) showing EF 66-70%, mild concentric LVH, Left ventricular diastolic function is consistent with (grade II w/high LAP) pseudonormalization. The left atrial cavity is moderately dilated. Moderate to severe aortic valve stenosis is present. Aortic valve area is 1.02 cm2. Peak velocity of the flow distal to the aortic valve is 373.4 cm/s. Aortic valve maximum pressure gradient is 55.8 mmHg. Aortic valve mean pressure gradient is 31.7 mmHg. Aortic valve dimensionless index is 0.30.  - BNP elevated on admission, however, this is likely skewed in setting of patient's significant renal impairment. Imaging showing slight pulmonary vascular congestion, however, not overtly hypervolemic warranting IV diuresis at present.  - Continue current medications as prescribed. Continue to closely monitor with telemetry, strict I/O, daily weights, low sodium diet.    Hypertension  - BP acceptable acutely. Appears stable. No indications to warrant acute changes/intervention at this time.  - Continue current medications as prescribed. Trend BP to guide ongoing management decisions.    Obesity  - BMI 31.56 kg/m2. Complicating all medical problems.    Hypokalemia, resolved  - K low at 3.4; repleted via electrolyte protocol. Follow up repeat labs to guide ongoing management decisions. Replete PRN per protocol. Continue to monitor    · SCDs for DVT prophylaxis.  · Full code.  · Discussed with patient,  family, nursing staff and consulting provider.  · Anticipate discharge home with home health in 1-2 days.      Armando Mckenzie DO  [facility]  01/14/23         24-Jan-2018 15:24

## 2023-01-15 NOTE — PLAN OF CARE
Goal Outcome Evaluation:  Plan of Care Reviewed With: patient      Pt is a very pleasant lady with no complaints of pain during the day. Pt does get easily fatigue even while sitting pt will do persued lip breathing. Family at bedside states that the pt does it all the time and that they do not think the pt realizes she is doing it. Pt states that she does not wear oxygen at home. When pt does not wear oxygen she saturations will be in high 80s and even low 90s for a short time. When oxygen is applied pt recovers easily. While sleeping pt is in mid to highs 80s. Oxygen is applied and pt recovers. Pt is still taking IV antibiotics. Pt ambulates with a walker.   Progress: no change

## 2023-01-15 NOTE — PLAN OF CARE
Problem: Adult Inpatient Plan of Care  Goal: Plan of Care Review  Outcome: Ongoing, Progressing   Goal Outcome Evaluation:  VSS. 2L nasal cannula while sleeping. No new events over night. Pt rested throughout the night with no issues or complaints.

## 2023-01-16 VITALS
RESPIRATION RATE: 18 BRPM | SYSTOLIC BLOOD PRESSURE: 136 MMHG | HEIGHT: 60 IN | BODY MASS INDEX: 31.73 KG/M2 | WEIGHT: 161.6 LBS | OXYGEN SATURATION: 92 % | HEART RATE: 69 BPM | DIASTOLIC BLOOD PRESSURE: 51 MMHG | TEMPERATURE: 98.6 F

## 2023-01-16 PROBLEM — E83.39 HYPOPHOSPHATEMIA: Status: ACTIVE | Noted: 2023-01-16

## 2023-01-16 PROBLEM — E87.6 HYPOKALEMIA: Status: RESOLVED | Noted: 2023-01-13 | Resolved: 2023-01-16

## 2023-01-16 PROBLEM — G47.34 NOCTURNAL HYPOXEMIA: Status: ACTIVE | Noted: 2023-01-15

## 2023-01-16 LAB
ANION GAP SERPL CALCULATED.3IONS-SCNC: 8.1 MMOL/L (ref 5–15)
BASOPHILS # BLD AUTO: 0.08 10*3/MM3 (ref 0–0.2)
BASOPHILS NFR BLD AUTO: 0.7 % (ref 0–1.5)
BUN SERPL-MCNC: 24 MG/DL (ref 8–23)
BUN/CREAT SERPL: 16.2 (ref 7–25)
CALCIUM SPEC-SCNC: 8.2 MG/DL (ref 8.6–10.5)
CHLORIDE SERPL-SCNC: 100 MMOL/L (ref 98–107)
CO2 SERPL-SCNC: 25.9 MMOL/L (ref 22–29)
CREAT SERPL-MCNC: 1.48 MG/DL (ref 0.57–1)
DEPRECATED RDW RBC AUTO: 38.6 FL (ref 37–54)
EGFRCR SERPLBLD CKD-EPI 2021: 34.1 ML/MIN/1.73
EOSINOPHIL # BLD AUTO: 0.39 10*3/MM3 (ref 0–0.4)
EOSINOPHIL NFR BLD AUTO: 3.6 % (ref 0.3–6.2)
ERYTHROCYTE [DISTWIDTH] IN BLOOD BY AUTOMATED COUNT: 11.8 % (ref 12.3–15.4)
FERRITIN SERPL-MCNC: 550 NG/ML (ref 13–150)
FOLATE SERPL-MCNC: >20 NG/ML (ref 4.78–24.2)
GLUCOSE SERPL-MCNC: 163 MG/DL (ref 65–99)
HCT VFR BLD AUTO: 25.5 % (ref 34–46.6)
HGB BLD-MCNC: 8.2 G/DL (ref 12–15.9)
IMM GRANULOCYTES # BLD AUTO: 0.09 10*3/MM3 (ref 0–0.05)
IMM GRANULOCYTES NFR BLD AUTO: 0.8 % (ref 0–0.5)
IRON 24H UR-MRATE: 16 MCG/DL (ref 37–145)
IRON SATN MFR SERPL: 10 % (ref 20–50)
LYMPHOCYTES # BLD AUTO: 1.27 10*3/MM3 (ref 0.7–3.1)
LYMPHOCYTES NFR BLD AUTO: 11.7 % (ref 19.6–45.3)
MAGNESIUM SERPL-MCNC: 2.3 MG/DL (ref 1.6–2.4)
MCH RBC QN AUTO: 29.3 PG (ref 26.6–33)
MCHC RBC AUTO-ENTMCNC: 32.2 G/DL (ref 31.5–35.7)
MCV RBC AUTO: 91.1 FL (ref 79–97)
MONOCYTES # BLD AUTO: 1.29 10*3/MM3 (ref 0.1–0.9)
MONOCYTES NFR BLD AUTO: 11.9 % (ref 5–12)
NEUTROPHILS NFR BLD AUTO: 7.74 10*3/MM3 (ref 1.7–7)
NEUTROPHILS NFR BLD AUTO: 71.3 % (ref 42.7–76)
NRBC BLD AUTO-RTO: 0 /100 WBC (ref 0–0.2)
PHOSPHATE SERPL-MCNC: 2.4 MG/DL (ref 2.5–4.5)
PLATELET # BLD AUTO: 275 10*3/MM3 (ref 140–450)
PMV BLD AUTO: 10.3 FL (ref 6–12)
POTASSIUM SERPL-SCNC: 4.2 MMOL/L (ref 3.5–5.2)
RBC # BLD AUTO: 2.8 10*6/MM3 (ref 3.77–5.28)
RETICS # AUTO: 0.02 10*6/MM3 (ref 0.02–0.13)
RETICS/RBC NFR AUTO: 0.71 % (ref 0.7–1.9)
SODIUM SERPL-SCNC: 134 MMOL/L (ref 136–145)
TIBC SERPL-MCNC: 158 MCG/DL (ref 298–536)
TRANSFERRIN SERPL-MCNC: 106 MG/DL (ref 200–360)
VIT B12 BLD-MCNC: 1161 PG/ML (ref 211–946)
WBC NRBC COR # BLD: 10.86 10*3/MM3 (ref 3.4–10.8)

## 2023-01-16 PROCEDURE — 82746 ASSAY OF FOLIC ACID SERUM: CPT | Performed by: STUDENT IN AN ORGANIZED HEALTH CARE EDUCATION/TRAINING PROGRAM

## 2023-01-16 PROCEDURE — 80048 BASIC METABOLIC PNL TOTAL CA: CPT | Performed by: STUDENT IN AN ORGANIZED HEALTH CARE EDUCATION/TRAINING PROGRAM

## 2023-01-16 PROCEDURE — 84100 ASSAY OF PHOSPHORUS: CPT | Performed by: STUDENT IN AN ORGANIZED HEALTH CARE EDUCATION/TRAINING PROGRAM

## 2023-01-16 PROCEDURE — 83540 ASSAY OF IRON: CPT | Performed by: STUDENT IN AN ORGANIZED HEALTH CARE EDUCATION/TRAINING PROGRAM

## 2023-01-16 PROCEDURE — 94618 PULMONARY STRESS TESTING: CPT

## 2023-01-16 PROCEDURE — 25010000002 PIPERACILLIN SOD-TAZOBACTAM PER 1 G: Performed by: INTERNAL MEDICINE

## 2023-01-16 PROCEDURE — 85025 COMPLETE CBC W/AUTO DIFF WBC: CPT | Performed by: STUDENT IN AN ORGANIZED HEALTH CARE EDUCATION/TRAINING PROGRAM

## 2023-01-16 PROCEDURE — 82728 ASSAY OF FERRITIN: CPT | Performed by: STUDENT IN AN ORGANIZED HEALTH CARE EDUCATION/TRAINING PROGRAM

## 2023-01-16 PROCEDURE — 85045 AUTOMATED RETICULOCYTE COUNT: CPT | Performed by: STUDENT IN AN ORGANIZED HEALTH CARE EDUCATION/TRAINING PROGRAM

## 2023-01-16 PROCEDURE — 82607 VITAMIN B-12: CPT | Performed by: STUDENT IN AN ORGANIZED HEALTH CARE EDUCATION/TRAINING PROGRAM

## 2023-01-16 PROCEDURE — 83735 ASSAY OF MAGNESIUM: CPT | Performed by: STUDENT IN AN ORGANIZED HEALTH CARE EDUCATION/TRAINING PROGRAM

## 2023-01-16 PROCEDURE — 84466 ASSAY OF TRANSFERRIN: CPT | Performed by: STUDENT IN AN ORGANIZED HEALTH CARE EDUCATION/TRAINING PROGRAM

## 2023-01-16 RX ORDER — MAGNESIUM SULFATE HEPTAHYDRATE 40 MG/ML
4 INJECTION, SOLUTION INTRAVENOUS AS NEEDED
Status: DISCONTINUED | OUTPATIENT
Start: 2023-01-16 | End: 2023-01-16 | Stop reason: HOSPADM

## 2023-01-16 RX ORDER — CEPHALEXIN 500 MG/1
500 CAPSULE ORAL 2 TIMES DAILY
Qty: 4 CAPSULE | Refills: 0 | Status: SHIPPED | OUTPATIENT
Start: 2023-01-16 | End: 2023-01-18

## 2023-01-16 RX ORDER — MAGNESIUM SULFATE HEPTAHYDRATE 40 MG/ML
2 INJECTION, SOLUTION INTRAVENOUS AS NEEDED
Status: DISCONTINUED | OUTPATIENT
Start: 2023-01-16 | End: 2023-01-16 | Stop reason: HOSPADM

## 2023-01-16 RX ORDER — BENZONATATE 200 MG/1
200 CAPSULE ORAL 3 TIMES DAILY PRN
Qty: 30 CAPSULE | Refills: 0 | Status: SHIPPED | OUTPATIENT
Start: 2023-01-16 | End: 2023-02-03

## 2023-01-16 RX ORDER — FERROUS SULFATE 325(65) MG
325 TABLET ORAL EVERY OTHER DAY
Status: DISCONTINUED | OUTPATIENT
Start: 2023-01-17 | End: 2023-01-16 | Stop reason: HOSPADM

## 2023-01-16 RX ORDER — GUAIFENESIN 200 MG/1
200 TABLET ORAL EVERY 6 HOURS PRN
Qty: 30 TABLET | Refills: 0 | Status: SHIPPED | OUTPATIENT
Start: 2023-01-16 | End: 2023-02-03

## 2023-01-16 RX ORDER — POTASSIUM CHLORIDE 1.5 G/1.77G
40 POWDER, FOR SOLUTION ORAL AS NEEDED
Status: DISCONTINUED | OUTPATIENT
Start: 2023-01-16 | End: 2023-01-16 | Stop reason: HOSPADM

## 2023-01-16 RX ORDER — FERROUS SULFATE 325(65) MG
325 TABLET ORAL EVERY OTHER DAY
Qty: 30 TABLET | Refills: 3 | Status: SHIPPED | OUTPATIENT
Start: 2023-01-17

## 2023-01-16 RX ORDER — POTASSIUM CHLORIDE 750 MG/1
40 TABLET, FILM COATED, EXTENDED RELEASE ORAL AS NEEDED
Status: DISCONTINUED | OUTPATIENT
Start: 2023-01-16 | End: 2023-01-16 | Stop reason: HOSPADM

## 2023-01-16 RX ORDER — POTASSIUM CHLORIDE 7.45 MG/ML
10 INJECTION INTRAVENOUS
Status: DISCONTINUED | OUTPATIENT
Start: 2023-01-16 | End: 2023-01-16 | Stop reason: HOSPADM

## 2023-01-16 RX ADMIN — NEBIVOLOL 5 MG: 5 TABLET ORAL at 09:46

## 2023-01-16 RX ADMIN — Medication 2 PACKET: at 13:37

## 2023-01-16 RX ADMIN — HYDRALAZINE HYDROCHLORIDE 100 MG: 50 TABLET, FILM COATED ORAL at 09:46

## 2023-01-16 RX ADMIN — FUROSEMIDE 40 MG: 40 TABLET ORAL at 09:46

## 2023-01-16 RX ADMIN — TAZOBACTAM SODIUM AND PIPERACILLIN SODIUM 3.38 G: 375; 3 INJECTION, SOLUTION INTRAVENOUS at 05:50

## 2023-01-16 RX ADMIN — Medication 1000 UNITS: at 09:46

## 2023-01-16 RX ADMIN — CITALOPRAM 10 MG: 10 TABLET, FILM COATED ORAL at 09:46

## 2023-01-16 RX ADMIN — AMLODIPINE BESYLATE 5 MG: 5 TABLET ORAL at 09:46

## 2023-01-16 RX ADMIN — TAZOBACTAM SODIUM AND PIPERACILLIN SODIUM 3.38 G: 375; 3 INJECTION, SOLUTION INTRAVENOUS at 13:37

## 2023-01-16 RX ADMIN — Medication 10 ML: at 09:48

## 2023-01-16 NOTE — PROGRESS NOTES
Exercise Oximetry    Patient Name:Vonda Jay   MRN: [id number]   Date: 01/16/23             ROOM AIR BASELINE   SpO2% 94   Heart Rate    Blood Pressure      EXERCISE ON ROOM AIR SpO2% EXERCISE ON O2 @  LPM SpO2%   1 MINUTE  94 1 MINUTE    2 MINUTES  93 2 MINUTES    3 MINUTES  92 3 MINUTES    4 MINUTES  94  4 MINUTES    5 MINUTES  95 5 MINUTES    6 MINUTES  94  6 MINUTES               Distance Walked   Distance Walked   Dyspnea (Sonny Scale)   Dyspnea (Sonny Scale)   Fatigue (Sonny Scale)   Fatigue (Sonny Scale)   SpO2% Post Exercise   SpO2% Post Exercise   HR Post Exercise   HR Post Exercise   Time to Recovery   Time to Recovery     Comments: Patient did not require any oxygen during the walk.

## 2023-01-16 NOTE — PLAN OF CARE
Problem: Adult Inpatient Plan of Care  Goal: Plan of Care Review  Flowsheets (Taken 1/16/2023 0648)  Outcome Evaluation: IV abx given per MD orders. Pt denies pain, n/v/d. AOx4. Up with assistance with rollator to BSC.   Goal Outcome Evaluation:              Outcome Evaluation: IV abx given per MD orders. Pt denies pain, n/v/d. AOx4. Up with assistance with rollator to BSC.

## 2023-01-16 NOTE — PROGRESS NOTES
Discharge Planning Assessment  [facility]     Patient Name: Vonda Jay  MRN: [id number]  Today's Date: 1/16/2023    Admit Date: 1/12/2023    Plan: [facility] skilled rehab has accepted for skilled rehab today   Discharge Needs Assessment    No documentation.                Discharge Plan     Row Name 01/16/23 1426       Plan    Plan [facility] skilled rehab has accepted for skilled rehab today    Plan Comments Spoke with son Angus via phone for discharge planning, he would like skilled rehab at [facility] at discharge from [facility]. New referral called to [facility] [phone] she has accepted the patient today and has a bed available. Spoke with son he is agreeable and will transport to [facility] today. Walking oximetry done she did not qualify for oxygen. Bert MAZARIEGOS              Continued Care and Services - Admitted Since 1/12/2023     Destination Coordination complete.    Service Provider Request Status Selected Services Address Phone Fax Patient Preferred    [facility]  Selected Skilled Nursing [address] [address] [phone] --    [place] - TRUE Pending - Request Sent N/A [address] [facility] [id number] [phone] --    [facility] - No Request Sent N/A [address] [facility] [address] [phone] -- --          Home Medical Care     Service Provider Request Status Selected Services Address Phone Fax Patient Preferred     [facility]  Selected Home Health Services [address] [place] [id number] [phone] --              Expected Discharge Date and Time     Expected Discharge Date Expected Discharge Time    Jan 16, 2023          Demographic Summary    No documentation.                Functional Status    No documentation.                Psychosocial    No documentation.                Abuse/Neglect    No  documentation.                Legal    No documentation.                Substance Abuse    No documentation.                Patient Forms    No documentation.                   Yesenia Wells RN

## 2023-01-16 NOTE — PROGRESS NOTES
Name: Vonda Jay ADMIT: 2023   : 1935  PCP: Ambrose Ashley MD    MRN: [id number] LOS: 4 days   AGE/SEX: 87 y.o. female  ROOM: RUST     Subjective   Subjective   Patient seen and examined this morning. Hospital day 4. At time of my examination, patient is awake, alert, resting comfortably in bed.  Underwent nocturnal oximetry last night, patient found to have oxygen desaturation down to as low as 86%, subsequently placed on 2 L O2 which led to improvement.  At time of my examination this morning, patient is awake, alert oriented x3, resting in bed.  She called her son while I was in the room and I spoke at length with them.  Initial plan was for patient to be discharged home with home health, however, today when speaking with his son, he stated that he would like for his mother to get SNF if at all possible and he plans to discuss this with CCP.    Review of Systems   Constitutional: Positive for fatigue. Negative for chills and fever.   Respiratory: Negative for shortness of breath.    Cardiovascular: Negative for chest pain and palpitations.   Gastrointestinal: Negative for abdominal pain.   Genitourinary: Negative for difficulty urinating and frequency.   Neurological: Positive for weakness.        Objective   Objective   Vital Signs  Temp:  [98 °F (36.7 °C)-99.5 °F (37.5 °C)] 98 °F (36.7 °C)  Heart Rate:  [67-72] 69  Resp:  [18-24] 24  BP: (132-146)/(56-65) 146/56  SpO2:  [92 %-96 %] 92 %  on  Flow (L/min):  [2] 2;   Device (Oxygen Therapy): nasal cannula  Body mass index is 31.56 kg/m².  Physical Exam  Vitals and nursing note reviewed.   Constitutional:       General: She is not in acute distress.     Appearance: She is obese. She is ill-appearing.   Cardiovascular:      Rate and Rhythm: Normal rate and regular rhythm.      Pulses: Normal pulses.      Heart sounds: Normal heart sounds.   Pulmonary:      Effort: Pulmonary effort is normal. No respiratory distress.      Breath sounds:  Normal breath sounds.      Comments: On 2L O2 via NC  Abdominal:      General: Bowel sounds are normal. There is no distension.      Palpations: Abdomen is soft.   Skin:     General: Skin is warm and dry.   Neurological:      Mental Status: She is alert and oriented to person, place, and time.       Results Review:  I reviewed the patient's new clinical results.  I personally viewed and interpreted the patient's EKG/Telemetry data    Results from last 7 days   Lab Units 01/16/23 0544 01/15/23  0626 01/14/23  0550 01/13/23  0615   WBC 10*3/mm3 10.86* 11.80* 13.17* 14.66*   HEMOGLOBIN g/dL 8.2* 8.3* 8.6* 9.4*   PLATELETS 10*3/mm3 275 250 235 223     Results from last 7 days   Lab Units 01/16/23  0544 01/15/23  0626 01/14/23  0550 01/13/23  0615   SODIUM mmol/L 134* 133* 131* 136   POTASSIUM mmol/L 4.2 3.3* 3.9 3.4*   CHLORIDE mmol/L 100 97* 96* 98   CO2 mmol/L 25.9 27.0 25.8 25.4   BUN mg/dL 24* 27* 30* 33*   CREATININE mg/dL 1.48* 1.38* 1.63* 1.60*   GLUCOSE mg/dL 163* 134* 113* 98   EGFR mL/min/1.73 34.1* 37.1* 30.4* 31.1*     Results from last 7 days   Lab Units 01/13/23 0615 01/12/23  1212   ALBUMIN g/dL 3.2* 4.0   BILIRUBIN mg/dL 0.3 0.3   ALK PHOS U/L 58 60   AST (SGOT) U/L 10 12   ALT (SGPT) U/L 8 9     Results from last 7 days   Lab Units 01/16/23 0544 01/15/23  0626 01/14/23  0550 01/13/23  0615 01/12/23  1212   CALCIUM mg/dL 8.2* 8.0* 8.1* 8.8 9.5   ALBUMIN g/dL  --   --   --  3.2* 4.0   MAGNESIUM mg/dL 2.3 3.5* 2.2  --   --    PHOSPHORUS mg/dL 2.4* 2.9 2.6  --   --        No results found for: HGBA1C, POCGLU    CT Abdomen Pelvis Without Contrast    Result Date: 1/12/2023  1. Wall thickening of the left side of the dome of the bladder. There is adjacent sigmoid diverticulosis without evidence of active diverticulitis. There is a tract of tissue bridging the bladder and the adjacent sigmoid colon as well as a tiny extraluminal gas bubble adjacent to the bladder wall. These findings may reflect sequela of  recent episode of diverticulitis. A colovesical fistula cannot be excluded given this appearance. 2. Additional findings as described  Radiation dose reduction techniques were utilized, including automated exposure control and exposure modulation based on body size.  This report was finalized on 1/12/2023 4:02 PM by Dr. Gene Martinez M.D.      XR Chest 1 View    Result Date: 1/12/2023  Likely atelectasis or scarring the right mid and lower lung. Slight pulmonary vascular congestion. Follow-up as clinical indications persist.  This report was finalized on 1/12/2023 1:03 PM by Dr. Clay Jordan M.D.      XR Hip With or Without Pelvis 2 - 3 View Left    Result Date: 1/12/2023   As described.  This report was finalized on 1/12/2023 1:05 PM by Dr. Clay Jordan M.D.      Scheduled Medications  amLODIPine, 5 mg, Oral, Daily  cholecalciferol, 1,000 Units, Oral, Daily  citalopram, 10 mg, Oral, Daily  furosemide, 40 mg, Oral, Daily  hydrALAZINE, 100 mg, Oral, TID  nebivolol, 5 mg, Oral, Daily  piperacillin-tazobactam, 3.375 g, Intravenous, Q8H  sodium chloride, 10 mL, Intravenous, Q12H    Infusions   Diet  Diet: Cardiac Diets; Healthy Heart (2-3 Na+); Texture: Regular Texture (IDDSI 7); Fluid Consistency: Thin (IDDSI 0)       Assessment/Plan     Active Hospital Problems    Diagnosis  POA   • **Acute UTI (urinary tract infection) [N39.0]  Yes   • Hypophosphatemia [E83.39]  No   • Cough [R05.9]  No   • Nocturnal hypoxemia [G47.34]  No   • (HFpEF) heart failure with preserved ejection fraction (HCC) [I50.30]  Yes   • Obesity (BMI 30-39.9) [E66.9]  Yes   • E. coli UTI (urinary tract infection) [N39.0, B96.20]  Yes   • Colovesical fistula - possible [N32.1]  Clinically Undetermined   • Aortic stenosis [I35.0]  Yes   • Anemia due to chronic kidney disease [N18.9, D63.1]  Yes   • Stage 3b chronic kidney disease (HCC) [N18.32]  Yes   • Leukocytosis [D72.829]  Yes   • HTN (hypertension) [I10]  Yes      Resolved Hospital  Problems    Diagnosis Date Resolved POA   • Hypokalemia [E87.6] 2023 No       87 y.o. female admitted with Acute UTI (urinary tract infection).    Acute UTI secondary to E. Coli with leukocytosis  - UA on admission showin+ leukocyte esterase, 21-30 WBC, 4+ bacteria. WBC elevated to 18.01 on admission.  - Started on empiric Zosyn by admitting physician, while awaiting results of infectious workup.  - Urine culture returned + for >100K CFU/mL E. Coli, sensitive to Zosyn.  - WBC remains elevated, but improving. Patient afebrile. Seems to be improving on current regimen.  - Continue Zosyn as prescribed. Today () should be final day of treatment.  - Renally dose medications in setting of CKD  - Acute urinary retention protocol.  - Order repeat CBC in AM for reassessment.    Nocturnal hypoxemia, new diagnosis  - Patient with complaints of ongoing cough on , was COVID and Flu negative on admission, however, family asked for repeat RVP, which was ordered and resulted negative.  Added supportive medications for cough, related to improvement.  - Is on 2L O2 via NC, however, does not have significant respiratory complaints at present and oxygen saturations are well above 90%. Discussed with nursing, concerned that patient is still requiring supplemental oxygen, stating that she does have oxygen desaturations while sleeping.  - Underwent nocturnal oximetry last night, patient found to have oxygen desaturation down to as low as 86%, subsequently placed on 2 L O2 which led to improvement.   - Will make sure order is placed for patient to have home O2 at night after discharge.  - Will also do walking oximetry prior to discharge to see if she needs O2 with activity.  - Continue supportive medications, wean 02 as tolerated.  - Closely monitor, pulse oximetry, telemetry    Normocytic Anemia  - Hemoglobin low at 8.2, stable from prior, but as slightly decreased from 9.4 on admission. Etiology most likely 2/2  "underlying CKD, however, pursued further workup.  -Iron 16, iron saturation 10, transferrin 106, TIBC 158, ferritin 550; B12 and folate within normal limits.  Consistent with anemia of CKD.  However, discussed with pharmacy, and I believe she can benefit from iron  Supplementation.  - Start ferrous sulfate 325 mg every other day.  - Order repeat CBC in AM for reassessment. Continue to monitor, transfuse for hemoglobin <7.    Abnormal CT AP with concern for colovesical fistula  - CT AP on admission reviewed, showing: \"Wall thickening of the left side of the dome of the bladder. There is a tract of tissue bridging the bladder and the adjacent sigmoid colon as well as a tiny extraluminal gas bubble adjacent to the bladder wall. These findings may reflect sequela of recent episode of diverticulitis. A colovesical fistula cannot be excluded given this appearance\"  - Urology consulted following admission, evaluated patient, per note: Patient asymptomatic without fecaluria or pneumaturia, UA not C/F fistula. \"Pt will need cystoscopy on outpatient basis to further evaluate bladder wall thickening and Cherise's. Will schedule outpatient follow up for cystoscopy. Schedulers will call to set up f/u appt\"  - General Surgery consulted. Reviewed their note, recommending medical management at this time, appreciate their help.    Chronic kidney disease stage 3B  - On most recent labs, patient's creatinine found to be 1.48, has been continually improving. Baseline per review of outside records show recent creatinine values ~1.8 to 1.9 on average, with eGFR in low 30's range, however, most recently values have been in high 20's range, concerning for possible progression to CKD stage IV.   - No indications to warrant acute changes and/or intervention at this time.  - Order repeat BMP in AM for reassessment of renal function.   - Patient will need outpatient Nephrology referral for evaluation after discharge.  - Will continue to monitor " and trend creatinine to guide ongoing management decisions. Avoid nephrotoxic medications, IVF, strict I/O, daily weights.    Hypophosphatemia, not POA  - Phosphorus low at 2.4.  Will replete per protocol and follow-up repeat labs to guide ongoing management decisions.    HFpEF  Aortic stenosis  - Most recent 2D Echo (07/22) showing EF 66-70%, mild concentric LVH, Left ventricular diastolic function is consistent with (grade II w/high LAP) pseudonormalization. The left atrial cavity is moderately dilated. Moderate to severe aortic valve stenosis is present. Aortic valve area is 1.02 cm2. Peak velocity of the flow distal to the aortic valve is 373.4 cm/s. Aortic valve maximum pressure gradient is 55.8 mmHg. Aortic valve mean pressure gradient is 31.7 mmHg. Aortic valve dimensionless index is 0.30.  - BNP elevated on admission, however, this is likely skewed in setting of patient's significant renal impairment. Imaging showing slight pulmonary vascular congestion, however, not overtly hypervolemic warranting IV diuresis at present.  - Continue current medications as prescribed. Continue to closely monitor with telemetry, strict I/O, daily weights, low sodium diet.    Hypertension  - BP acceptable acutely. Appears stable. No indications to warrant acute changes/intervention at this time.  - Continue current medications as prescribed. Trend BP to guide ongoing management decisions.    Obesity  - BMI 31.56 kg/m2. Complicating all medical problems.    Hypokalemia, resolved  - K low at 3.3; repleted via electrolyte protocol. Follow up repeat labs to guide ongoing management decisions. Replete PRN per protocol. Continue to monitor       Initial plan was for patient to be discharged home with home health, however, today when speaking with his son, he stated that he would like for his mother to get SNF if at all possible and he plans to discuss this with CCP.    · SCDs for DVT prophylaxis.  · Full code.  · Discussed with  patient, family, nursing staff, CCP and care team on multidisciplinary rounds.  · Anticipate discharge to SNU facility once arrangements have been made.      Armando Mckenzie DO  [facility]  01/16/23

## 2023-01-16 NOTE — DISCHARGE SUMMARY
Patient Name: Vonda Jay  : 1935  MRN: [id number]    Date of Admission: 2023  Date of Discharge:  2023  Primary Care Physician: Ambrose Ashley MD      Chief Complaint:   Weakness - Generalized      Discharge Diagnoses     Active Hospital Problems    Diagnosis  POA   • **Acute UTI (urinary tract infection) [N39.0]  Yes   • Hypophosphatemia [E83.39]  No   • Cough [R05.9]  No   • Nocturnal hypoxemia [G47.34]  No   • (HFpEF) heart failure with preserved ejection fraction (HCC) [I50.30]  Yes   • Obesity (BMI 30-39.9) [E66.9]  Yes   • E. coli UTI (urinary tract infection) [N39.0, B96.20]  Yes   • Colovesical fistula - possible [N32.1]  Clinically Undetermined   • Aortic stenosis [I35.0]  Yes   • Anemia due to chronic kidney disease [N18.9, D63.1]  Yes   • Stage 3b chronic kidney disease (HCC) [N18.32]  Yes   • Leukocytosis [D72.829]  Yes   • HTN (hypertension) [I10]  Yes      Resolved Hospital Problems    Diagnosis Date Resolved POA   • Hypokalemia [E87.6] 2023 No        Hospital Course     Ms. Jay is a 87 y.o. female with a history of HFpEF, aortic stenosis, CKD stage IIIb, hypertension, anemia of CKD who presented to [facility] initially complaining of increased urinary frequency and dysuria.  Please see the admitting history and physical for further details.  She was admitted to the hospital for further evaluation and treatment.     Acute UTI secondary to E. Coli with leukocytosis  - UA on admission showin+ leukocyte esterase, 21-30 WBC, 4+ bacteria. WBC elevated to 18.01 on admission.  - Started on empiric Zosyn by admitting physician, while awaiting results of infectious workup. Urine culture returned + for >100K CFU/mL E. Coli, sensitive to Zosyn. Blood cultures no growth to date.  - WBC remains elevated, but improving each day on labs. Patient afebrile. Urinary symptoms improved on current regimen.  - Discussed with pharmacy, at discharge, will  prescribe Cephalexin 500 mg BID to be taken for 2 days after discharge to complete full 5-day antibiotic course.  - Recommend close follow up with PCP within 1 week for reassessment, repeat UA/culture to guide ongoing management decisions.     Nocturnal hypoxemia, new diagnosis  - Patient with complaints of ongoing cough on 01/14, was COVID and Flu negative on admission, however, family asked for repeat RVP, which was ordered and resulted negative.  Added supportive medications for cough, related to improvement.  - Is on 2L O2 via NC, however, does not have significant respiratory complaints at present and oxygen saturations are well above 90%. Discussed with nursing, concerned that patient is still requiring supplemental oxygen, stating that she does have oxygen desaturations while sleeping.  - Underwent nocturnal oximetry, patient found to have oxygen desaturation down to as low as 86%, subsequently placed on 2 L O2 which led to improvement.   - Order placed for patient to be on 2L oxygen nightly while sleeping at discharge.  - Continue to monitor at facility, oxygen as prescribed.  - Recommend close follow up with PCP to guide ongoing management decisions. Recommend consideration of formal sleep evaluation, as patient could have underlying OCHOA.     Normocytic Anemia  - Hemoglobin low at 8.2, stable from prior, but as slightly decreased from 9.4 on admission. Etiology most likely 2/2 underlying CKD, however, pursued further workup. Iron 16, iron saturation 10, transferrin 106, TIBC 158, ferritin 550; B12 and folate within normal limits.  Consistent with anemia of CKD.  However, discussed with pharmacy, and I believe she can benefit from iron  Supplementation.  - Start ferrous sulfate 325 mg every other day to be continued after discharge. Recommend repeat CBC within 1-2 days at facility and then with PCP within 1 week for reassessment.     Abnormal CT AP with concern for colovesical fistula  - CT AP on admission  "reviewed, showing: \"Wall thickening of the left side of the dome of the bladder. There is a tract of tissue bridging the bladder and the adjacent sigmoid colon as well as a tiny extraluminal gas bubble adjacent to the bladder wall. These findings may reflect sequela of recent episode of diverticulitis. A colovesical fistula cannot be excluded given this appearance\"  - Urology consulted following admission, evaluated patient, per note: Patient asymptomatic without fecaluria or pneumaturia, UA not C/F fistula. \"Pt will need cystoscopy on outpatient basis to further evaluate bladder wall thickening and Cherise's. Will schedule outpatient follow up for cystoscopy. Schedulers will call to set up f/u appt\"  - General Surgery consulted. Reviewed their note, recommending medical management at this time.  - Follow up with Urology after discharge as scheduled.     Chronic kidney disease stage 3B  - On most recent labs, patient's creatinine found to be 1.48, has been continually improving. Baseline per review of outside records show recent creatinine values ~1.8 to 1.9 on average, with eGFR in low 30's range, however, most recently values have been in high 20's range, concerning for possible progression to CKD stage IV.  No indications to warrant acute changes and/or intervention at this time.  - Strongly recommend that patient follow up with Nephrology as outpatient for reassessment and further evaluation. Recommend repeat BMP within 1-2 days at facility and then close follow up with PCP within 1 week with repeat BMP to guide ongoing management decisions.     HFpEF  Aortic stenosis  - Most recent 2D Echo (07/22) showing EF 66-70%, mild concentric LVH, Left ventricular diastolic function is consistent with (grade II w/high LAP) pseudonormalization. The left atrial cavity is moderately dilated. Moderate to severe aortic valve stenosis is present. Aortic valve area is 1.02 cm2. Peak velocity of the flow distal to the aortic valve " is 373.4 cm/s. Aortic valve maximum pressure gradient is 55.8 mmHg. Aortic valve mean pressure gradient is 31.7 mmHg. Aortic valve dimensionless index is 0.30.  - BNP elevated on admission, however, this is likely skewed in setting of patient's significant renal impairment. Imaging showing slight pulmonary vascular congestion, however, not overtly hypervolemic warranting IV diuresis at present.  - Continue current medications as prescribed at time of discharge. Cardiac diet. Follow up with Cardiology as scheduled.     Hypertension  - BP acceptable acutely. Appears stable. No indications to warrant acute changes/intervention at this time.  - Continue current medications as prescribed after discharge. Recommend close follow up with PCP to guide ongoing management decisions. Recommend repeat BMP with PCP within 1 week for reassessment of renal function and electrolytes. After discharge, encourage patient to check blood pressure 2-3 times daily and record those values in log book and take with her to next PCP visit to allow for greater insight into treatment efficacy.    Obesity  - BMI 31.56 kg/m2. Complicating all medical problems.    Hypophosphatemia, not POA  - Phosphorus low at 2.4.  Will repleted per protocol prior to discharge. Recommend repeat level check within 1-2 days at facility and then at PCP follow up.     Hypokalemia, resolved  - K low at 3.3; repleted via electrolyte protocol. Recommend repeat BMP in 1-2 days at facility and with PCP within 1 week for reassessment.    Day of Discharge     Subjective:  Patient seen and examined this morning. Hospital day 4. At time of my examination, patient is awake, alert, resting comfortably in bed.  Underwent nocturnal oximetry last night, patient found to have oxygen desaturation down to as low as 86%, subsequently placed on 2 L O2 which led to improvement.  At time of my examination this morning, patient is awake, alert oriented x3, resting in bed.  She called her  son while I was in the room and I spoke at length with them.  Initial plan was for patient to be discharged home with home health, however, today when speaking with his son, he stated that he would like for his mother to get SNF if at all possible and he plans to discuss this with CCP.    Physical Exam:  Temp:  [98 °F (36.7 °C)-99.5 °F (37.5 °C)] 98.6 °F (37 °C)  Heart Rate:  [67-72] 69  Resp:  [18-24] 18  BP: (132-146)/(51-65) 136/51  Body mass index is 31.56 kg/m².  Physical Exam  Vitals and nursing note reviewed.   Constitutional:       General: She is not in acute distress.     Appearance: She is obese. She is ill-appearing.   Cardiovascular:      Rate and Rhythm: Normal rate and regular rhythm.      Pulses: Normal pulses.      Heart sounds: Normal heart sounds.   Pulmonary:      Effort: Pulmonary effort is normal. No respiratory distress.      Breath sounds: Normal breath sounds.   Abdominal:      General: Bowel sounds are normal. There is no distension.      Palpations: Abdomen is soft.   Skin:     General: Skin is warm and dry.   Neurological:      Mental Status: She is alert and oriented to person, place, and time.         Consultants     Consult Orders (all) (From admission, onward)     Start     Ordered    01/12/23 1839  Inpatient General Surgery Consult  Once        Specialty:  General Surgery  Provider:  Von Garcia MD    01/12/23 1839 01/12/23 1838  Inpatient Urology Consult  Once        Specialty:  Urology  Provider:  Mike Martinez MD    01/12/23 1839 01/12/23 1334  [facility] (on-call MD unless specified) Details  Once        Specialty:  Hospitalist  Provider:  Cyn Shepherd MD    01/12/23 1333              Procedures     * Surgery not found *      Imaging Results (All)     Procedure Component Value Units Date/Time    CT Abdomen Pelvis Without Contrast [[id number]] Collected: 01/12/23 1554     Updated: 01/12/23 1605    Narrative:      CT ABDOMEN AND PELVIS WITHOUT IV CONTRAST     HISTORY:  Leukocytosis, urinary tract infection     TECHNIQUE: Radiation dose reduction techniques were utilized, including  automated exposure control and exposure modulation based on body size.  Axial images were obtained through the abdomen and pelvis without the  administration of IV contrast. Coronal and sagittal reformatted images  were obtained.     COMPARISON: None available     FINDINGS:      ABDOMEN:  Lung bases are clear. Tiny hiatal hernia. The liver is unremarkable.  Prior cholecystectomy. Spleen is unremarkable. The kidneys are  unremarkable. No hydronephrosis. Adrenal glands are unremarkable. The  pancreas is unremarkable.     PELVIS:  There is thickening of the wall of the left-sided dome of the bladder.  There is a tract of tissue bridging the bladder and the adjacent sigmoid  colon as well as a tiny extraluminal gas bubble adjacent to the bladder  wall. Sigmoid diverticulosis is present. There there are no findings to  suggest acute diverticulitis. No free fluid in the pelvis or abscess.  Bone windows show intramedullary maya and screw fixation of the left hip.  Old pubic rami fractures on the left. Areas of sclerosis are seen in the  sacrum bilaterally likely from insufficiency fractures.       Impression:      1. Wall thickening of the left side of the dome of the bladder. There is  adjacent sigmoid diverticulosis without evidence of active  diverticulitis. There is a tract of tissue bridging the bladder and the  adjacent sigmoid colon as well as a tiny extraluminal gas bubble  adjacent to the bladder wall. These findings may reflect sequela of  recent episode of diverticulitis. A colovesical fistula cannot be  excluded given this appearance.  2. Additional findings as described     Radiation dose reduction techniques were utilized, including automated  exposure control and exposure modulation based on body size.     This report was finalized on 1/12/2023 4:02 PM by Dr. Gene Martinez M.D.       XR Hip  With or Without Pelvis 2 - 3 View Left [[id number]] Collected: 01/12/23 1303     Updated: 01/12/23 1309    Narrative:      XR HIP W OR WO PELVIS 2-3 VIEW LEFT-     INDICATIONS: Pain     TECHNIQUE: Frontal views of the pelvis, 3 views of the left hip     COMPARISON: 02/07/2018     FINDINGS:     Deformity of the left inferior pubic ramus appears chronic, but new from  the prior exam, correlate clinically. Surgical rods of the left femur  appear intact. No other evidence of fracture is identified. No  dislocation. Arterial calcification is present. Degenerative changes  seen in the partly included lumbar spine. Follow-up/further evaluation  can be obtained as indications persist.       Impression:         As described.     This report was finalized on 1/12/2023 1:05 PM by Dr. Clay Jordan M.D.       XR Chest 1 View [[id number]] Collected: 01/12/23 1302     Updated: 01/12/23 1306    Narrative:      XR CHEST 1 VW-     HISTORY: Female who is 87 years-old,  cough     TECHNIQUE: Frontal view of the chest     COMPARISON: 9/17/2022     FINDINGS: The heart size is normal. Aorta is calcified. Pulmonary  vasculature is slightly congested, but less than on the prior exam.  Likely atelectasis or scarring the right mid and lower lung. No focal  pulmonary consolidation, pleural effusion, or pneumothorax. No acute  osseous process.       Impression:      Likely atelectasis or scarring the right mid and lower lung.  Slight pulmonary vascular congestion. Follow-up as clinical indications  persist.     This report was finalized on 1/12/2023 1:03 PM by Dr. Clay Jordan M.D.           Results for orders placed during the hospital encounter of 09/17/22    Duplex Renal Artery - Bilateral Complete CAR    Interpretation Summary  · Normal right renal artery.  · Normal left renal artery.    Results for orders placed during the hospital encounter of 07/07/22    Adult Transthoracic Echo Complete w/ Color, Spectral and Contrast if  Necessary Per Protocol    Interpretation Summary  · Left ventricular ejection fraction appears to be 66 - 70%. Left ventricular systolic function is normal.  · Left ventricular wall thickness is consistent with mild concentric hypertrophy  · Left ventricular diastolic function is consistent with (grade II w/high LAP) pseudonormalization.  · The right ventricular cavity is borderline dilated. Normal right ventricular systolic function noted.  · The left atrial cavity is moderately dilated.  · Moderate to severe aortic valve stenosis is present. Aortic valve area is 1.02 cm2. Peak velocity of the flow distal to the aortic valve is 373.4 cm/s. Aortic valve maximum pressure gradient is 55.8 mmHg. Aortic valve mean pressure gradient is 31.7 mmHg. Aortic valve dimensionless index is 0.30  · There is moderate mitral annular calcification.  · Mild mitral valve regurgitation is present.  · Calculated right ventricular systolic pressure from tricuspid regurgitation is 25.0 mmHg.  · There is no evidence of pericardial effusion.    Pertinent Labs     Results from last 7 days   Lab Units 01/16/23  0544 01/15/23  0626 01/14/23  0550 01/13/23  0615   WBC 10*3/mm3 10.86* 11.80* 13.17* 14.66*   HEMOGLOBIN g/dL 8.2* 8.3* 8.6* 9.4*   PLATELETS 10*3/mm3 275 250 235 223     Results from last 7 days   Lab Units 01/16/23  0544 01/15/23  0626 01/14/23  0550 01/13/23  0615   SODIUM mmol/L 134* 133* 131* 136   POTASSIUM mmol/L 4.2 3.3* 3.9 3.4*   CHLORIDE mmol/L 100 97* 96* 98   CO2 mmol/L 25.9 27.0 25.8 25.4   BUN mg/dL 24* 27* 30* 33*   CREATININE mg/dL 1.48* 1.38* 1.63* 1.60*   GLUCOSE mg/dL 163* 134* 113* 98   EGFR mL/min/1.73 34.1* 37.1* 30.4* 31.1*     Results from last 7 days   Lab Units 01/13/23  0615 01/12/23  1212   ALBUMIN g/dL 3.2* 4.0   BILIRUBIN mg/dL 0.3 0.3   ALK PHOS U/L 58 60   AST (SGOT) U/L 10 12   ALT (SGPT) U/L 8 9     Results from last 7 days   Lab Units 01/16/23  0544 01/15/23  0626 01/14/23  0550 01/13/23  0615  01/12/23  1212   CALCIUM mg/dL 8.2* 8.0* 8.1* 8.8 9.5   ALBUMIN g/dL  --   --   --  3.2* 4.0   MAGNESIUM mg/dL 2.3 3.5* 2.2  --   --    PHOSPHORUS mg/dL 2.4* 2.9 2.6  --   --        Results from last 7 days   Lab Units 01/12/23  1212   TROPONIN T ng/mL <0.010   PROBNP pg/mL 2,990.0*           Invalid input(s): LDLCALC  Results from last 7 days   Lab Units 01/12/23  1351 01/12/23  1219   BLOODCX  No growth at 4 days  No growth at 4 days  --    URINECX   --  >100,000 CFU/mL Escherichia coli*     Results from last 7 days   Lab Units 01/14/23  1245   COVID19  Not Detected       Test Results Pending at Discharge     Pending Labs     Order Current Status    Blood Culture - Blood, Arm, Left Preliminary result    Blood Culture - Blood, Arm, Right Preliminary result          Discharge Details        Discharge Medications      New Medications      Instructions Start Date   benzonatate 200 MG capsule  Commonly known as: TESSALON   200 mg, Oral, 3 Times Daily PRN      cephalexin 500 MG capsule  Commonly known as: Keflex   500 mg, Oral, 2 Times Daily      ferrous sulfate 325 (65 FE) MG tablet   325 mg, Oral, Every Other Day   Start Date: January 17, 2023     guaiFENesin 200 MG tablet   200 mg, Oral, Every 6 Hours PRN         Changes to Medications      Instructions Start Date   amLODIPine 5 MG tablet  Commonly known as: NORVASC  What changed: when to take this   5 mg, Oral, Every 24 Hours Scheduled         Continue These Medications      Instructions Start Date   alendronate 70 MG tablet  Commonly known as: FOSAMAX   70 mg, Oral, Every 7 Days, Saturday      aspirin 81 MG tablet   81 mg, Oral, Daily      CALCIUM CITRATE PLUS/MAGNESIUM PO   1 tablet, Oral, Daily      cholecalciferol 25 MCG (1000 UT) tablet  Commonly known as: VITAMIN D3   1,000 Units, Oral, Daily      citalopram 10 MG tablet  Commonly known as: CeleXA   10 mg, Oral, Daily      furosemide 40 MG tablet  Commonly known as: LASIX   40 mg, Oral, Daily      hydrALAZINE  100 MG tablet  Commonly known as: APRESOLINE   100 mg, Oral, 3 Times Daily      multivitamin with minerals tablet tablet   1 tablet, Oral, Daily      nebivolol 5 MG tablet  Commonly known as: Bystolic   5 mg, Oral, Daily      pravastatin 40 MG tablet  Commonly known as: PRAVACHOL   40 mg, Oral, Nightly         Stop These Medications    ACID RELIEF PO     docusate sodium 100 MG capsule            No Known Allergies    Discharge Disposition:  Skilled Nursing Facility (DC - External)      Discharge Diet:  Diet Order   Procedures   • Diet: Cardiac Diets; Healthy Heart (2-3 Na+); Texture: Regular Texture (IDDSI 7); Fluid Consistency: Thin (IDDSI 0)       Discharge Activity:   Activity Instructions     Activity as Tolerated      Other Activity Instructions      Activity Instructions: Per physical therapy          CODE STATUS:    Code Status and Medical Interventions:   Ordered at: 01/12/23 1840     Code Status (Patient has no pulse and is not breathing):    CPR (Attempt to Resuscitate)     Medical Interventions (Patient has pulse or is breathing):    Full Support       Future Appointments   Date Time Provider Department Center   2/3/2023  1:40 PM Adam Singleton MD [facility]     Additional Instructions for the Follow-ups that You Need to Schedule     Discharge Follow-up with PCP   As directed       Currently Documented PCP:    Ambrose Ashley MD    PCP Phone Number:    [phone]     Follow Up Details: within 1 week after discharge for reassessmen, symptom check, medication review, repeat BMP, CBC, mag, phos         Discharge Follow-up with Specialty: Urology, Nephrology, Cardiology   As directed      Specialty: Urology, Nephrology, Cardiology    Follow Up Details: Please follow up with Urology, Nephrology, Cardiology after discharge as scheduled. If you do not have an appointment, please call to make one.            Contact information for follow-up providers     Mina Madrigal MD Follow up.     Specialty: Urology  Why: Schedulers will call with follow-up appointment.  Contact information:  [address]  [address]  [place] [address]  [phone]             Ambrose Ashley MD .    Specialty: Family Medicine  Why: within 1 week after discharge for reassessmen, symptom check, medication review, repeat BMP, CBC, mag, phos  Contact information:  [address]  [place] [address]  [phone]                   Contact information for after-discharge care     Destination     [facility] .    Service: Skilled Nursing  Contact information:  [address] [address]  [phone]                 Home Medical Care     [facility] .    Service: Home Health Services  Contact information:  [address] [facility] [address]  [place] [address] [phone]                             Additional Instructions for the Follow-ups that You Need to Schedule     Discharge Follow-up with PCP   As directed       Currently Documented PCP:    Ambrose Ashley MD    PCP Phone Number:    [phone]     Follow Up Details: within 1 week after discharge for reassessmen, symptom check, medication review, repeat BMP, CBC, mag, phos         Discharge Follow-up with Specialty: Urology, Nephrology, Cardiology   As directed      Specialty: Urology, Nephrology, Cardiology    Follow Up Details: Please follow up with Urology, Nephrology, Cardiology after discharge as scheduled. If you do not have an appointment, please call to make one.           Time Spent on Discharge:  Greater than 30 minutes      Armando Mckenzie DO  [facility]  01/16/23  16:06 EST

## 2023-01-16 NOTE — DISCHARGE PLACEMENT REQUEST
"Catrachito Bartlett (87 y.o. Female)     Date of Birth   1935    Social Security Number       Address   [address]  [place] [address]    Home Phone   [phone]    MRN   [id number]       Jain   Shinto    Marital Status                               Admission Date   1/12/23    Admission Type   Emergency    Admitting Provider   Cyn Shepherd MD    Attending Provider   Armando Mckenzie DO    Department, Room/Bed   [address], [address]/1       Discharge Date       Discharge Disposition       Discharge Destination                               Attending Provider: Armando Mckenzie DO    Allergies: No Known Allergies    Isolation: None   Infection: None   Code Status: CPR    Ht: 152.4 cm (60\")   Wt: 73.3 kg (161 lb 9.6 oz)    Admission Cmt: None   Principal Problem: Acute UTI (urinary tract infection) [N39.0]                 Active Insurance as of 1/12/2023     Primary Coverage     Payor Plan Insurance Group Employer/Plan Group    MEDICARE MEDICARE A & B      Payor Plan Address Payor Plan Phone Number Payor Plan Fax Number Effective Dates    [address] [phone]  9/1/2000 - None Entered    [facility] [id number]       Subscriber Name Subscriber Birth Date Member ID       CATRACHITO BARTLETT 1935 [id number]           Secondary Coverage     Payor Plan Insurance Group Employer/Plan Group    [facility] SUPP [id number]     Payor Plan Address Payor Plan Phone Number Payor Plan Fax Number Effective Dates    [address]   12/1/2016 - None Entered    [facility] [id number]       Subscriber Name Subscriber Birth Date Member ID       CATRACHITO BARTLETT 1935 [id number]                 Emergency Contacts      (Rel.) Home Phone Work Phone Mobile Phone    MistyAngus (Son) [phone] -- [phone]    MistyNusrat (Daughter) -- -- [phone]              "

## 2023-01-17 LAB
BACTERIA SPEC AEROBE CULT: NORMAL
BACTERIA SPEC AEROBE CULT: NORMAL

## 2023-01-21 ENCOUNTER — TRANSCRIBE ORDERS (OUTPATIENT)
Dept: HOME HEALTH SERVICES | Facility: HOME HEALTHCARE | Age: 88
End: 2023-01-21
Payer: MEDICARE

## 2023-01-21 ENCOUNTER — HOME HEALTH ADMISSION (OUTPATIENT)
Dept: HOME HEALTH SERVICES | Facility: HOME HEALTHCARE | Age: 88
End: 2023-01-21
Payer: MEDICARE

## 2023-01-21 DIAGNOSIS — S32.502D CLOSED FRACTURE OF LEFT PUBIS WITH ROUTINE HEALING, UNSPECIFIED PORTION OF PUBIS, SUBSEQUENT ENCOUNTER: Primary | ICD-10-CM

## 2023-01-22 ENCOUNTER — HOME CARE VISIT (OUTPATIENT)
Dept: HOME HEALTH SERVICES | Facility: HOME HEALTHCARE | Age: 88
End: 2023-01-22
Payer: MEDICARE

## 2023-01-22 PROCEDURE — G0299 HHS/HOSPICE OF RN EA 15 MIN: HCPCS

## 2023-01-23 ENCOUNTER — HOME CARE VISIT (OUTPATIENT)
Dept: HOME HEALTH SERVICES | Facility: HOME HEALTHCARE | Age: 88
End: 2023-01-23
Payer: MEDICARE

## 2023-01-23 VITALS
RESPIRATION RATE: 16 BRPM | TEMPERATURE: 97 F | DIASTOLIC BLOOD PRESSURE: 70 MMHG | OXYGEN SATURATION: 96 % | HEART RATE: 60 BPM | BODY MASS INDEX: 30.4 KG/M2 | HEIGHT: 61 IN | SYSTOLIC BLOOD PRESSURE: 154 MMHG | WEIGHT: 161 LBS

## 2023-01-23 VITALS
HEART RATE: 72 BPM | DIASTOLIC BLOOD PRESSURE: 59 MMHG | SYSTOLIC BLOOD PRESSURE: 149 MMHG | OXYGEN SATURATION: 97 % | TEMPERATURE: 96.4 F

## 2023-01-23 PROCEDURE — G0151 HHCP-SERV OF PT,EA 15 MIN: HCPCS

## 2023-01-23 NOTE — HOME HEALTH
REASON FOR REFERRAL: decreased ability to ambulate in and out of the home following hospitalization and rehab stay due to UTI    MEDICAL HISTORY: Patient was hospitalized on 1/12/2023 through the ER due to UTI. Was discharged on 1/16/2023 to rehab and discharged home on 1/21/2023. Patient reporting increased back pain since she has been home and reports pain down her right leg and in her right LB. Patient reports that the pain in her leg is new. Patient does have a previously scheduled epidural tomorrow.    SKILLED PHYSICAL THERAPY IS MEDICALLY NECESSARY FOR: remediation of decreased strength, decreased ambulatory ability, increased risk of falls, decreased transfers, pain    FOCUS OF CARE: therapeutic exercises to improve LE strength, gait training to improve ambulatory ability and endurance, transfer training improve ability to perform transfers, fall risk education to decrease risk of falls, pain management to control pain

## 2023-01-23 NOTE — Clinical Note
Please forward to provider = Dr. Ambrose Ashley    [facility] therapy evaluation completed on Vonda Jay on 1/23/2023. Patient was reporting increased back pain and into her right LE today. Patient unable to tolerate much mobilty today. I instructed patient on more comfortable positioning. She does receive an epidural treatment tomorrow. I plan on seeing her 2x/wk for 3 weeks for therapeutic exercises, gait training, transfer training, fall risk education, pain management as patient can tolerate.

## 2023-01-23 NOTE — HOME HEALTH
Patient referred to [facility] following hospitalization and rehab stay r/t iron deficiency anemia, hypertensive heart and kidney disease, diastolic CHF, CKS stage 3, depression, COPD, personal history of UTI's.  She is up in home with supervision for safety/falls risk.  Her son and daughters live nearby and are very invovled in care.  She uses a cane for longer distances.  Some incontinance at night, newly on o2 at bedtime.  Pt is to f/u with a urologist concerning frequent UTI's and possibility of a fistula.  She wears a life alert and is knowledegable on use.  Patient will need labs in 2 weeks.  SN FOC:  teaching and monitoring r/t iron deficiency anemia, hypertensive heart and kidney disease, UTI prevention.

## 2023-01-24 ENCOUNTER — HOME CARE VISIT (OUTPATIENT)
Dept: HOME HEALTH SERVICES | Facility: HOME HEALTHCARE | Age: 88
End: 2023-01-24
Payer: MEDICARE

## 2023-01-25 ENCOUNTER — HOME CARE VISIT (OUTPATIENT)
Dept: HOME HEALTH SERVICES | Facility: HOME HEALTHCARE | Age: 88
End: 2023-01-25
Payer: MEDICARE

## 2023-01-25 VITALS
TEMPERATURE: 96.4 F | DIASTOLIC BLOOD PRESSURE: 57 MMHG | HEART RATE: 72 BPM | SYSTOLIC BLOOD PRESSURE: 139 MMHG | OXYGEN SATURATION: 97 %

## 2023-01-25 PROCEDURE — G0151 HHCP-SERV OF PT,EA 15 MIN: HCPCS

## 2023-01-25 NOTE — HOME HEALTH
"Subjective: \"I'm not having any pain.\" Patient reports having immediate pain relief with the epidural yesterday.    Falls- none    Medication Changes- none    Assessment: Patient did well with new standing exercises and was able to ambulate in home without an assistive device.    Communication: N/A    Plan for next visit:  Gait training  Therapeutic exercises  Fall risk education  Pain management"

## 2023-01-26 ENCOUNTER — HOME CARE VISIT (OUTPATIENT)
Dept: HOME HEALTH SERVICES | Facility: HOME HEALTHCARE | Age: 88
End: 2023-01-26
Payer: MEDICARE

## 2023-01-26 PROCEDURE — G0299 HHS/HOSPICE OF RN EA 15 MIN: HCPCS

## 2023-01-29 VITALS
DIASTOLIC BLOOD PRESSURE: 80 MMHG | HEART RATE: 86 BPM | BODY MASS INDEX: 29.85 KG/M2 | OXYGEN SATURATION: 99 % | WEIGHT: 158 LBS | RESPIRATION RATE: 18 BRPM | TEMPERATURE: 98.5 F | SYSTOLIC BLOOD PRESSURE: 152 MMHG

## 2023-01-30 ENCOUNTER — HOME CARE VISIT (OUTPATIENT)
Dept: HOME HEALTH SERVICES | Facility: HOME HEALTHCARE | Age: 88
End: 2023-01-30
Payer: MEDICARE

## 2023-01-30 VITALS
HEART RATE: 59 BPM | SYSTOLIC BLOOD PRESSURE: 150 MMHG | OXYGEN SATURATION: 99 % | DIASTOLIC BLOOD PRESSURE: 48 MMHG | TEMPERATURE: 97.8 F

## 2023-01-30 PROCEDURE — G0151 HHCP-SERV OF PT,EA 15 MIN: HCPCS

## 2023-01-30 NOTE — HOME HEALTH
"Subjective: \"I'm doing much better. I haven't had a pain pill since Saturday.\"    Falls- none    Medication Changes- none    Assessment: Patient is progressing well standing exercises although did c/o fatigue post activity. Patient is able to increase ambulatory distance in home without an assistive device.    Communication: N/A    Plan for next visit:  Gait training  Therapeutic exercises  Fall risk education  Pain management"

## 2023-01-30 NOTE — HOME HEALTH
Instructed on importance of daily weight checks, monitor weight every morning with the same amount of cloth, before eating/drinking, maintain a log, report any weight gain to MD as indicated above and signs of fluid retention such as edema, sudden cough and SOA. Patient able to teach back.

## 2023-01-31 ENCOUNTER — HOME CARE VISIT (OUTPATIENT)
Dept: HOME HEALTH SERVICES | Facility: HOME HEALTHCARE | Age: 88
End: 2023-01-31
Payer: MEDICARE

## 2023-01-31 PROCEDURE — G0299 HHS/HOSPICE OF RN EA 15 MIN: HCPCS

## 2023-02-01 ENCOUNTER — HOME CARE VISIT (OUTPATIENT)
Dept: HOME HEALTH SERVICES | Facility: HOME HEALTHCARE | Age: 88
End: 2023-02-01
Payer: MEDICARE

## 2023-02-01 VITALS
RESPIRATION RATE: 18 BRPM | SYSTOLIC BLOOD PRESSURE: 122 MMHG | OXYGEN SATURATION: 98 % | HEART RATE: 60 BPM | TEMPERATURE: 97.5 F | DIASTOLIC BLOOD PRESSURE: 64 MMHG

## 2023-02-01 VITALS
DIASTOLIC BLOOD PRESSURE: 71 MMHG | HEART RATE: 62 BPM | SYSTOLIC BLOOD PRESSURE: 158 MMHG | TEMPERATURE: 97.1 F | OXYGEN SATURATION: 97 %

## 2023-02-01 PROCEDURE — G0151 HHCP-SERV OF PT,EA 15 MIN: HCPCS

## 2023-02-01 NOTE — HOME HEALTH
Reviewed all meds with patient. Patient denies any issues at this time. Instructed on meds dosage, scheduling and side effects. Patient voiced back understanding.

## 2023-02-01 NOTE — HOME HEALTH
"Subjective: \"I'm not feeling well today. I don't feel up to doing any exercises.\" Patient states she will probably feel better if she has a bowel movement.    Falls- none    Medication Changes- none    Assessment: Patient not feeling well today and was not up to doing any exercises.    Communication: N/A    Plan for next visit:  Gait training  Therapeutic exercises  Fall risk education  Pain management"

## 2023-02-03 ENCOUNTER — HOME CARE VISIT (OUTPATIENT)
Dept: HOME HEALTH SERVICES | Facility: HOME HEALTHCARE | Age: 88
End: 2023-02-03
Payer: MEDICARE

## 2023-02-03 ENCOUNTER — OFFICE VISIT (OUTPATIENT)
Dept: CARDIOLOGY | Facility: CLINIC | Age: 88
End: 2023-02-03
Payer: MEDICARE

## 2023-02-03 VITALS
OXYGEN SATURATION: 99 % | SYSTOLIC BLOOD PRESSURE: 150 MMHG | WEIGHT: 152 LBS | HEART RATE: 60 BPM | BODY MASS INDEX: 28.7 KG/M2 | HEIGHT: 61 IN | DIASTOLIC BLOOD PRESSURE: 64 MMHG

## 2023-02-03 DIAGNOSIS — I35.0 NONRHEUMATIC AORTIC VALVE STENOSIS: Primary | ICD-10-CM

## 2023-02-03 DIAGNOSIS — I10 PRIMARY HYPERTENSION: ICD-10-CM

## 2023-02-03 DIAGNOSIS — I50.32 CHRONIC HEART FAILURE WITH PRESERVED EJECTION FRACTION: ICD-10-CM

## 2023-02-03 PROCEDURE — G0299 HHS/HOSPICE OF RN EA 15 MIN: HCPCS

## 2023-02-03 PROCEDURE — 99214 OFFICE O/P EST MOD 30 MIN: CPT | Performed by: INTERNAL MEDICINE

## 2023-02-03 NOTE — PROGRESS NOTES
"      CARDIOLOGY    Adam Singleton MD    ENCOUNTER DATE:  02/03/2023    Vonda Jay / 87 y.o. / female        CHIEF COMPLAINT / REASON FOR OFFICE VISIT     Chronic diastolic congestive heart failure  (10/11/2022 Follow up)  Aortic stenosis  Hypertension    HISTORY OF PRESENT ILLNESS       HPI  Vonda Jay is a 87 y.o. female who presents today for reevaluation.  Patient has a history of aortic stenosis hypertension and diastolic heart failure.  Clinically she is actually been doing well.  She denies any types of shortness of breath lower extremity edema chest pain lightheadedness or swelling.      The following portions of the patient's history were reviewed and updated as appropriate: allergies, current medications, past family history, past medical history, past social history, past surgical history and problem list.      VITAL SIGNS     Visit Vitals  /64 (BP Location: Left arm)   Pulse 60   Ht 154.9 cm (61\")   Wt 68.9 kg (152 lb)   SpO2 99%   BMI 28.72 kg/m²         Wt Readings from Last 3 Encounters:   02/03/23 68.9 kg (152 lb)   01/26/23 71.7 kg (158 lb)   01/22/23 73 kg (161 lb)     Body mass index is 28.72 kg/m².      REVIEW OF SYSTEMS   ROS        PHYSICAL EXAMINATION     Constitutional:       Appearance: Not in distress.   Pulmonary:      Effort: Pulmonary effort is normal.   Cardiovascular:      PMI at left midclavicular line. Normal rate. Regular rhythm. Normal S1. Normal S2.      Murmurs: There is a grade 3/6 harsh midsystolic murmur at the URSB, radiating to the neck.      No gallop. No click. No rub.   Pulses:     Intact distal pulses.   Edema:     Peripheral edema absent.   Neurological:      Mental Status: Alert and oriented to person, place and time.           REVIEWED DATA     Procedures    Cardiac Procedures:  1.        Interpretation Summary  7/7/22    • Left ventricular ejection fraction appears to be 66 - 70%. Left ventricular systolic function is normal.  • Left ventricular " wall thickness is consistent with mild concentric hypertrophy  • Left ventricular diastolic function is consistent with (grade II w/high LAP) pseudonormalization.  • The right ventricular cavity is borderline dilated. Normal right ventricular systolic function noted.  • The left atrial cavity is moderately dilated.  • Moderate to severe aortic valve stenosis is present. Aortic valve area is 1.02 cm2. Peak velocity of the flow distal to the aortic valve is 373.4 cm/s. Aortic valve maximum pressure gradient is 55.8 mmHg. Aortic valve mean pressure gradient is 31.7 mmHg. Aortic valve dimensionless index is 0.30  • There is moderate mitral annular calcification.  • Mild mitral valve regurgitation is present.  • Calculated right ventricular systolic pressure from tricuspid regurgitation is 25.0 mmHg.  • There is no evidence of pericardial effusion.             ASSESSMENT & PLAN      Diagnosis Plan   1. Nonrheumatic aortic valve stenosis        2. Chronic heart failure with preserved ejection fraction (HCC)        3. Primary hypertension              SUMMARY/DISCUSSION  1. Hypertension.  Patient's blood pressure little bit elevated but for her age and current diagnosis this is acceptable.  She does check it at home and says it usually runs about 125-150/60-70  2. History of diastolic heart failure.  Currently euvolemic no symptoms.  3. Moderate to severe aortic stenosis.  Currently she is asymptomatic.  4. Patient had a recent UTI and was hospitalized first part of January.  She continues to recover from this and is still a little bit weak.  5. Follow-up 6 months sooner if issues.        MEDICATIONS         Discharge Medications          Accurate as of February 3, 2023  2:52 PM. If you have any questions, ask your nurse or doctor.            Continue These Medications      Instructions Start Date   acetaminophen 500 MG tablet  Commonly known as: TYLENOL   1,000 mg, Oral, Every 6 Hours PRN      alendronate 70 MG  tablet  Commonly known as: FOSAMAX   70 mg, Oral, Every 7 Days, Saturday      aspirin 81 MG tablet   81 mg, Oral, Daily      cholecalciferol 25 MCG (1000 UT) tablet  Commonly known as: VITAMIN D3   1,000 Units, Oral, Daily      citalopram 10 MG tablet  Commonly known as: CeleXA   10 mg, Oral, Daily      ferrous sulfate 325 (65 FE) MG tablet   325 mg, Oral, Every Other Day      furosemide 40 MG tablet  Commonly known as: LASIX   40 mg, Oral, Daily      hydrALAZINE 100 MG tablet  Commonly known as: APRESOLINE   100 mg, Oral, 3 Times Daily      multivitamin with minerals tablet tablet   1 tablet, Oral, Daily      nebivolol 5 MG tablet  Commonly known as: Bystolic   5 mg, Oral, Daily      pravastatin 40 MG tablet  Commonly known as: PRAVACHOL   40 mg, Oral, Nightly         Stop These Medications    amLODIPine 5 MG tablet  Commonly known as: NORVASC  Stopped by: Adam Singleton MD     benzonatate 200 MG capsule  Commonly known as: TESSALON  Stopped by: Adam Singleton MD     CALCIUM CITRATE PLUS/MAGNESIUM PO  Stopped by: Adam Singleton MD     guaiFENesin 200 MG tablet  Stopped by: Adam Singleton MD     O2  Commonly known as: OXYGEN  Stopped by: Adam Singleton MD     predniSONE 10 MG tablet  Commonly known as: DELTASONE  Stopped by: Adam Singleton MD                **Dragon Disclaimer:   Much of this encounter note is an electronic transcription/translation of spoken language to printed text. The electronic translation of spoken language may permit erroneous, or at times, nonsensical words or phrases to be inadvertently transcribed. Although I have reviewed the note for such errors, some may still exist.

## 2023-02-06 ENCOUNTER — HOME CARE VISIT (OUTPATIENT)
Dept: HOME HEALTH SERVICES | Facility: HOME HEALTHCARE | Age: 88
End: 2023-02-06
Payer: MEDICARE

## 2023-02-06 VITALS
BODY MASS INDEX: 28.34 KG/M2 | DIASTOLIC BLOOD PRESSURE: 60 MMHG | HEART RATE: 58 BPM | TEMPERATURE: 97.6 F | OXYGEN SATURATION: 96 % | SYSTOLIC BLOOD PRESSURE: 132 MMHG | RESPIRATION RATE: 18 BRPM | WEIGHT: 150 LBS

## 2023-02-06 VITALS
TEMPERATURE: 96 F | DIASTOLIC BLOOD PRESSURE: 75 MMHG | SYSTOLIC BLOOD PRESSURE: 121 MMHG | OXYGEN SATURATION: 98 % | HEART RATE: 54 BPM

## 2023-02-06 PROCEDURE — G0151 HHCP-SERV OF PT,EA 15 MIN: HCPCS

## 2023-02-06 NOTE — HOME HEALTH
"Subjective: \"I'm doing better today.\"    Falls- none    Medication Changes- none    Assessment: Patient did well with new back exercises and is independent with standing home program.    Communication: N/A     Plan for next visit:  Gait training  Therapeutic exercises  Fall risk education  Pain management"

## 2023-02-08 ENCOUNTER — HOME CARE VISIT (OUTPATIENT)
Dept: HOME HEALTH SERVICES | Facility: HOME HEALTHCARE | Age: 88
End: 2023-02-08
Payer: MEDICARE

## 2023-02-08 VITALS
TEMPERATURE: 95.7 F | DIASTOLIC BLOOD PRESSURE: 58 MMHG | HEART RATE: 72 BPM | SYSTOLIC BLOOD PRESSURE: 142 MMHG | OXYGEN SATURATION: 97 %

## 2023-02-08 PROCEDURE — G0299 HHS/HOSPICE OF RN EA 15 MIN: HCPCS

## 2023-02-08 PROCEDURE — G0151 HHCP-SERV OF PT,EA 15 MIN: HCPCS

## 2023-02-08 NOTE — HOME HEALTH
"Subjective: \"I'm a little sick to my stomach.\"    Falls- none    Medication Changes- none    Assessment: Patient has progressed well with all goals met.    Communication: Case communication to Dr. Ashley; case communication to Mandy Nuñez RN clinical manager, Addie Tracey RN, and Jennifer Easley, ."

## 2023-02-09 VITALS
DIASTOLIC BLOOD PRESSURE: 60 MMHG | SYSTOLIC BLOOD PRESSURE: 142 MMHG | OXYGEN SATURATION: 98 % | RESPIRATION RATE: 18 BRPM | TEMPERATURE: 98 F | HEART RATE: 60 BPM

## 2023-02-13 ENCOUNTER — TELEPHONE (OUTPATIENT)
Dept: ORTHOPEDIC SURGERY | Facility: CLINIC | Age: 88
End: 2023-02-13

## 2023-02-13 NOTE — TELEPHONE ENCOUNTER
Caller: CATRACHITO BARTLETT    Relationship to patient: SELF    Best call back number: [phone] (CALL HOME # FIRST THEN CELL IF NO ANS. CAN LEAVE A VOICE MAIL MSG)    Chief complaint:  REQUESTING APPT. FOR RIGHT SHOULDER INJECTION WITH [facility] AT THE [facility] OFFICE.    Type of visit:  F/U/INJS

## 2023-02-24 ENCOUNTER — OFFICE VISIT (OUTPATIENT)
Dept: ORTHOPEDIC SURGERY | Facility: CLINIC | Age: 88
End: 2023-02-24
Payer: MEDICARE

## 2023-02-24 VITALS — BODY MASS INDEX: 28.87 KG/M2 | HEIGHT: 61 IN | TEMPERATURE: 97.5 F | WEIGHT: 152.9 LBS

## 2023-02-24 DIAGNOSIS — M75.101 ROTATOR CUFF TEAR ARTHROPATHY OF RIGHT SHOULDER: Primary | ICD-10-CM

## 2023-02-24 DIAGNOSIS — M12.811 ROTATOR CUFF TEAR ARTHROPATHY OF RIGHT SHOULDER: Primary | ICD-10-CM

## 2023-02-24 PROCEDURE — 73030 X-RAY EXAM OF SHOULDER: CPT | Performed by: NURSE PRACTITIONER

## 2023-02-24 PROCEDURE — 99213 OFFICE O/P EST LOW 20 MIN: CPT | Performed by: NURSE PRACTITIONER

## 2023-02-24 PROCEDURE — 20610 DRAIN/INJ JOINT/BURSA W/O US: CPT | Performed by: NURSE PRACTITIONER

## 2023-02-24 RX ORDER — LIDOCAINE HYDROCHLORIDE 10 MG/ML
2 INJECTION, SOLUTION EPIDURAL; INFILTRATION; INTRACAUDAL; PERINEURAL
Status: COMPLETED | OUTPATIENT
Start: 2023-02-24 | End: 2023-02-24

## 2023-02-24 RX ORDER — METHYLPREDNISOLONE ACETATE 80 MG/ML
80 INJECTION, SUSPENSION INTRA-ARTICULAR; INTRALESIONAL; INTRAMUSCULAR; SOFT TISSUE
Status: COMPLETED | OUTPATIENT
Start: 2023-02-24 | End: 2023-02-24

## 2023-02-24 RX ADMIN — METHYLPREDNISOLONE ACETATE 80 MG: 80 INJECTION, SUSPENSION INTRA-ARTICULAR; INTRALESIONAL; INTRAMUSCULAR; SOFT TISSUE at 15:35

## 2023-02-24 RX ADMIN — LIDOCAINE HYDROCHLORIDE 2 ML: 10 INJECTION, SOLUTION EPIDURAL; INFILTRATION; INTRACAUDAL; PERINEURAL at 15:35

## 2023-02-24 NOTE — PROGRESS NOTES
Patient: Vonda Jay    YOB: 1935    Medical Record Number: [id number]    Chief Complaints:  Right shoulder pain and weakness    History of Present Illness:     87 y.o. female patient who presents with a complaint of right shoulder pain. She reports that the symptoms first started many years ago.  She last saw Dr. Fleming for an injection in 2021.  Reports the injection helped tremendously.  Reports her symptoms recurred a few months ago, but she has been putting off coming back for evaluation.  Pain is described as moderate to severe, constant and aching. She reports difficulty with routine daily activities at this point.  The symptoms have been getting progressively worse. She denies any alleviating factors. She denies any shooting pain down the arm, weakness, numbness or paresthesias.     Allergies: No Known Allergies    Home Medications:    Current Outpatient Medications:   •  acetaminophen (TYLENOL) 500 MG tablet, Take 1,000 mg by mouth Every 6 (Six) Hours As Needed for Mild Pain or Moderate Pain., Disp: , Rfl:   •  alendronate (FOSAMAX) 70 MG tablet, Take 70 mg by mouth Every 7 (Seven) Days. Saturday, Disp: , Rfl:   •  aspirin 81 MG tablet, Take 81 mg by mouth Daily., Disp: , Rfl:   •  cholecalciferol (VITAMIN D3) 1000 units tablet, Take 1,000 Units by mouth Daily., Disp: , Rfl:   •  citalopram (CeleXA) 10 MG tablet, Take 10 mg by mouth Daily., Disp: , Rfl:   •  ferrous sulfate 325 (65 FE) MG tablet, Take 1 tablet by mouth Every Other Day., Disp: 30 tablet, Rfl: 3  •  furosemide (LASIX) 40 MG tablet, Take 1 tablet by mouth Daily., Disp: 90 tablet, Rfl: 3  •  hydrALAZINE (APRESOLINE) 100 MG tablet, Take 100 mg by mouth 3 (Three) Times a Day., Disp: , Rfl:   •  multivitamin with minerals tablet tablet, Take 1 tablet by mouth Daily., Disp: , Rfl:   •  nebivolol (Bystolic) 5 MG tablet, Take 1 tablet by mouth Daily., Disp: 90 tablet, Rfl: 3  •  pravastatin (PRAVACHOL) 40 MG tablet, Take 40 mg by  mouth Every Night., Disp: , Rfl:     Past Medical History:   Diagnosis Date   • Arthritis    • History of breast cancer    • History of carotid artery disease    • Hyperlipidemia    • Hypertension        Past Surgical History:   Procedure Laterality Date   • BREAST LUMPECTOMY     • CAROTID ARTERY ANGIOPLASTY  ,    • CHOLECYSTECTOMY  1979   • FEMUR IM NAILING/RODDING Left 2018    Procedure: FEMUR INTRAMEDULLARY NAILING;  Surgeon: Zheng Fleming MD;  Location: [facility] MAIN OR;  Service:    • HYSTERECTOMY         Social History     Occupational History   • Not on file   Tobacco Use   • Smoking status: Former     Years: 47.00     Types: Cigarettes     Quit date:      Years since quittin.1   • Smokeless tobacco: Never   • Tobacco comments:     caffeine use- coffee   Vaping Use   • Vaping Use: Never used   Substance and Sexual Activity   • Alcohol use: No   • Drug use: Defer   • Sexual activity: Defer      Social History     Social History Narrative   • Not on file       Family History   Problem Relation Age of Onset   • Hypertension Mother    • Heart valve disorder Sister    • Lung cancer Sister    • Lymphoma Sister    • Skin cancer Sister    • Stroke Brother 57       Review of Systems:      Constitutional: Denies fever, shaking or chills   Eyes: Denies change in visual acuity   HEENT: Denies nasal congestion or sore throat   Respiratory: Denies cough or shortness of breath   Cardiovascular: Denies chest pain or edema  Endocrine: Denies tremors, palpitations, intolerance of heat or cold, polyuria, polydipsia.  GI: Denies abdominal pain, nausea, vomiting, bloody stools or diarrhea  : Denies frequency, urgency, incontinence, retention, or nocturia.  Musculoskeletal: Denies numbness, tingling or loss of motor function except as above  Integument: Denies rash, lesion or ulceration   Neurologic: Denies headache or focal weakness, deficits  Heme: Denies spontaneous or excessive bleeding,  "epistaxis, hematuria, melena, fatigue, enlarged or tender lymph nodes.      All other pertinent positives and negatives as noted above in HPI.    Physical Exam:   87 y.o. female  Vitals:    02/24/23 1604   Temp: 97.5 °F (36.4 °C)   Weight: 69.4 kg (152 lb 14.4 oz)   Height: 154.9 cm (61\")     General:  Patient is awake and alert.  Appears in no acute distress or discomfort.    Psych:  Affect and demeanor are appropriate.    Eyes:  Conjunctiva and sclera appear grossly normal.  Eyes track well and EOM seem to be intact.    Ears:  No gross abnormalities.  Hearing adequate for the exam.    Cardiovascular:  Regular rate and rhythm.    Lungs:  Good chest expansion.  Breathing unlabored.    Spine:  Neck appears grossly normal.  No palpable masses or adenopathy.  Good motion.  Spurling's maneuver is negative for any shoulder or arm symptoms.    Extremities:  Right shoulder is examined.  Skin is benign.  No obvious gross abnormalities.  No palpable masses or adenopathy.  Moderate tenderness noted over anterior glenohumeral joint and rotator interval.  Motion is limited and uncomfortable.  175° FE, 30° ER, IR to back pocket.  No evident instability.  4 out of 5 strength with resistive testing of elevation in the scapular plane and external rotation.  Good motor function in the lower arm and hand including wrist flexion, extension,  and pinch.  Intact sensation.  Palpable radial pulse.  Brisk capillary refill.  Good skin turgor.         Radiology:  AP, scapular Y, and axillary views of the right shoulder are ordered by myself and reviewed to evaluate the patient's complaint.  These are compared to previous x-rays.  The x-rays show significant rotator cuff tear arthropathy with a diminished acromiohumeral interval, joint space narrowing, osteophyte formation, and subchondral sclerosis.      Assessment/Plan:  Right shoulder rotator cuff tear arthropathy    We discussed treatment options in detail including the risks, " benefits, and alternatives of conservative treatment versus surgical options.  Regarding conservative treatment, we discussed appropriate activity modifications, anti-inflammatories, injections, and physical therapy.  We also discussed the option of an arthroplasty and all that would entail.  I have recommended she consider a repeat injection today.  She agreed.  The risks, benefits and alternatives were thoroughly discussed.  She consented and the injection was performed as described below.  Going forward, the patient will follow-up with me on an as-needed basis.    ELVIE Abdi    02/24/2023    CC to Ambrose Ashley MD       Large Joint Arthrocentesis: R subacromial bursa  Date/Time: 2/24/2023 3:35 PM  Consent given by: patient  Site marked: site marked  Timeout: Immediately prior to procedure a time out was called to verify the correct patient, procedure, equipment, support staff and site/side marked as required   Supporting Documentation  Indications: pain   Procedure Details  Location: shoulder - R subacromial bursa  Preparation: Patient was prepped and draped in the usual sterile fashion  Needle gauge: 21G.  Approach: posterior  Medications administered: 80 mg methylPREDNISolone acetate 80 MG/ML; 2 mL lidocaine PF 1% 1 %  Patient tolerance: patient tolerated the procedure well with no immediate complications

## 2023-03-24 ENCOUNTER — OFFICE VISIT (OUTPATIENT)
Dept: ORTHOPEDIC SURGERY | Facility: CLINIC | Age: 88
End: 2023-03-24
Payer: MEDICARE

## 2023-03-24 VITALS — WEIGHT: 157.1 LBS | TEMPERATURE: 97.5 F | HEIGHT: 61 IN | BODY MASS INDEX: 29.66 KG/M2

## 2023-03-24 DIAGNOSIS — M25.511 CHRONIC RIGHT SHOULDER PAIN: Primary | ICD-10-CM

## 2023-03-24 DIAGNOSIS — G89.29 CHRONIC RIGHT SHOULDER PAIN: Primary | ICD-10-CM

## 2023-03-24 PROCEDURE — 1159F MED LIST DOCD IN RCRD: CPT | Performed by: ORTHOPAEDIC SURGERY

## 2023-03-24 PROCEDURE — 1160F RVW MEDS BY RX/DR IN RCRD: CPT | Performed by: ORTHOPAEDIC SURGERY

## 2023-03-24 PROCEDURE — 99213 OFFICE O/P EST LOW 20 MIN: CPT | Performed by: ORTHOPAEDIC SURGERY

## 2023-03-24 RX ORDER — LEVOFLOXACIN 500 MG/1
1 TABLET, FILM COATED ORAL DAILY
COMMUNITY
Start: 2023-03-10

## 2023-03-24 NOTE — PROGRESS NOTES
CC:  Right shoulder pain    Ms. Jay follows up for her right shoulder.  She had an injection about a month ago with [facility].  It did not help significantly.  She is frustrated by her persistent pain and dysfunction.  Most of her pain is lateral.  The pain shoots down her upper arm.  She has had a couple of instances with the pain shooting below the elbow but that is infrequent.  Denies numbness or tingling.    Right shoulder is examined.  Skin is benign.  Mild anterior tenderness.  No effusion.  She can raise her arm up all the way overhead.  She has pain and weakness with resisted abduction and forward elevation, both of which reproduce her typical pain.    Her previous x-rays are reviewed.  She has what appears to be advanced right rotator cuff tear arthropathy    Assessment: Right rotator cuff tear arthropathy    Plan: We talked about her options, both surgical and nonsurgical.  She is very much opposed to the idea of surgery.  I told her that we can only repeat the injections every 3 months.  We could repeat her injection in late May.  I gave her an order for PT as well.    Zheng Fleming MD

## 2023-05-13 ENCOUNTER — APPOINTMENT (OUTPATIENT)
Dept: GENERAL RADIOLOGY | Facility: HOSPITAL | Age: 88
End: 2023-05-13
Payer: MEDICARE

## 2023-05-13 ENCOUNTER — HOSPITAL ENCOUNTER (EMERGENCY)
Facility: HOSPITAL | Age: 88
Discharge: HOME | End: 2023-05-13
Attending: EMERGENCY MEDICINE
Payer: MEDICARE

## 2023-05-13 ENCOUNTER — APPOINTMENT (OUTPATIENT)
Dept: CT IMAGING | Facility: HOSPITAL | Age: 88
End: 2023-05-13
Payer: MEDICARE

## 2023-05-13 VITALS
SYSTOLIC BLOOD PRESSURE: 180 MMHG | DIASTOLIC BLOOD PRESSURE: 81 MMHG | BODY MASS INDEX: 29.83 KG/M2 | OXYGEN SATURATION: 93 % | WEIGHT: 158 LBS | TEMPERATURE: 98.1 F | HEART RATE: 58 BPM | HEIGHT: 61 IN | RESPIRATION RATE: 16 BRPM

## 2023-05-13 DIAGNOSIS — M25.552 LEFT HIP PAIN: ICD-10-CM

## 2023-05-13 DIAGNOSIS — S01.01XA LACERATION OF SCALP, INITIAL ENCOUNTER: Primary | ICD-10-CM

## 2023-05-13 DIAGNOSIS — S09.90XA INJURY OF HEAD, INITIAL ENCOUNTER: ICD-10-CM

## 2023-05-13 LAB
ALBUMIN SERPL-MCNC: 4 G/DL (ref 3.5–5.2)
ALBUMIN/GLOB SERPL: 1.5 G/DL
ALP SERPL-CCNC: 65 U/L (ref 39–117)
ALT SERPL W P-5'-P-CCNC: 14 U/L (ref 1–33)
ANION GAP SERPL CALCULATED.3IONS-SCNC: 10.5 MMOL/L (ref 5–15)
AST SERPL-CCNC: 23 U/L (ref 1–32)
BACTERIA UR QL AUTO: NORMAL /HPF
BASOPHILS # BLD AUTO: 0.03 10*3/MM3 (ref 0–0.2)
BASOPHILS NFR BLD AUTO: 0.3 % (ref 0–1.5)
BILIRUB SERPL-MCNC: 0.2 MG/DL (ref 0–1.2)
BILIRUB UR QL STRIP: NEGATIVE
BUN SERPL-MCNC: 23 MG/DL (ref 8–23)
BUN/CREAT SERPL: 17.6 (ref 7–25)
CALCIUM SPEC-SCNC: 8.8 MG/DL (ref 8.6–10.5)
CHLORIDE SERPL-SCNC: 99 MMOL/L (ref 98–107)
CLARITY UR: CLEAR
CO2 SERPL-SCNC: 25.5 MMOL/L (ref 22–29)
COLOR UR: YELLOW
CREAT SERPL-MCNC: 1.31 MG/DL (ref 0.57–1)
DEPRECATED RDW RBC AUTO: 42.9 FL (ref 37–54)
EGFRCR SERPLBLD CKD-EPI 2021: 39.5 ML/MIN/1.73
EOSINOPHIL # BLD AUTO: 0.21 10*3/MM3 (ref 0–0.4)
EOSINOPHIL NFR BLD AUTO: 1.9 % (ref 0.3–6.2)
ERYTHROCYTE [DISTWIDTH] IN BLOOD BY AUTOMATED COUNT: 12.9 % (ref 12.3–15.4)
GLOBULIN UR ELPH-MCNC: 2.6 GM/DL
GLUCOSE SERPL-MCNC: 115 MG/DL (ref 65–99)
GLUCOSE UR STRIP-MCNC: NEGATIVE MG/DL
HCT VFR BLD AUTO: 34.4 % (ref 34–46.6)
HGB BLD-MCNC: 11.4 G/DL (ref 12–15.9)
HGB UR QL STRIP.AUTO: NEGATIVE
HYALINE CASTS UR QL AUTO: NORMAL /LPF
IMM GRANULOCYTES # BLD AUTO: 0.05 10*3/MM3 (ref 0–0.05)
IMM GRANULOCYTES NFR BLD AUTO: 0.5 % (ref 0–0.5)
KETONES UR QL STRIP: NEGATIVE
LEUKOCYTE ESTERASE UR QL STRIP.AUTO: ABNORMAL
LYMPHOCYTES # BLD AUTO: 1.65 10*3/MM3 (ref 0.7–3.1)
LYMPHOCYTES NFR BLD AUTO: 14.9 % (ref 19.6–45.3)
MCH RBC QN AUTO: 30.2 PG (ref 26.6–33)
MCHC RBC AUTO-ENTMCNC: 33.1 G/DL (ref 31.5–35.7)
MCV RBC AUTO: 91.2 FL (ref 79–97)
MONOCYTES # BLD AUTO: 0.94 10*3/MM3 (ref 0.1–0.9)
MONOCYTES NFR BLD AUTO: 8.5 % (ref 5–12)
NEUTROPHILS NFR BLD AUTO: 73.9 % (ref 42.7–76)
NEUTROPHILS NFR BLD AUTO: 8.23 10*3/MM3 (ref 1.7–7)
NITRITE UR QL STRIP: NEGATIVE
NRBC BLD AUTO-RTO: 0 /100 WBC (ref 0–0.2)
PH UR STRIP.AUTO: 6 [PH] (ref 5–8)
PLATELET # BLD AUTO: 233 10*3/MM3 (ref 140–450)
PMV BLD AUTO: 10 FL (ref 6–12)
POTASSIUM SERPL-SCNC: 4.3 MMOL/L (ref 3.5–5.2)
PROT SERPL-MCNC: 6.6 G/DL (ref 6–8.5)
PROT UR QL STRIP: NEGATIVE
RBC # BLD AUTO: 3.77 10*6/MM3 (ref 3.77–5.28)
RBC # UR STRIP: NORMAL /HPF
REF LAB TEST METHOD: NORMAL
SODIUM SERPL-SCNC: 135 MMOL/L (ref 136–145)
SP GR UR STRIP: 1.01 (ref 1–1.03)
SQUAMOUS #/AREA URNS HPF: NORMAL /HPF
UROBILINOGEN UR QL STRIP: ABNORMAL
WBC # UR STRIP: NORMAL /HPF
WBC NRBC COR # BLD: 11.11 10*3/MM3 (ref 3.4–10.8)

## 2023-05-13 PROCEDURE — 70450 CT HEAD/BRAIN W/O DYE: CPT

## 2023-05-13 PROCEDURE — 73502 X-RAY EXAM HIP UNI 2-3 VIEWS: CPT

## 2023-05-13 PROCEDURE — 81001 URINALYSIS AUTO W/SCOPE: CPT | Performed by: EMERGENCY MEDICINE

## 2023-05-13 PROCEDURE — 36415 COLL VENOUS BLD VENIPUNCTURE: CPT

## 2023-05-13 PROCEDURE — 99284 EMERGENCY DEPT VISIT MOD MDM: CPT

## 2023-05-13 PROCEDURE — 80053 COMPREHEN METABOLIC PANEL: CPT | Performed by: EMERGENCY MEDICINE

## 2023-05-13 PROCEDURE — 85025 COMPLETE CBC W/AUTO DIFF WBC: CPT | Performed by: EMERGENCY MEDICINE

## 2023-05-13 RX ORDER — OXYCODONE HYDROCHLORIDE AND ACETAMINOPHEN 5; 325 MG/1; MG/1
1 TABLET ORAL ONCE
Status: COMPLETED | OUTPATIENT
Start: 2023-05-13 | End: 2023-05-13

## 2023-05-13 RX ADMIN — OXYCODONE AND ACETAMINOPHEN 1 TABLET: 5; 325 TABLET ORAL at 20:45

## 2023-05-13 NOTE — ED NOTES
Pt arrives after falling this morning on a nightstand. Pt has a laceration on her head. Pt hit her head; denies LOC and states she takes baby asa. Pt reports she is having left leg/hip pain at this time. Pt is alert and oriented with family at bedside

## 2023-05-13 NOTE — ED PROVIDER NOTES
EMERGENCY DEPARTMENT ENCOUNTER    Room Number:  37/37  Date seen:  5/13/2023  PCP: Ambrose Ashley MD  Historian: Patient, son and daughter at bedside      HPI:  Chief Complaint: Fall, head injury  A complete HPI/ROS/PMH/PSH/SH/FH are unobtainable due to:   Context: Vonda Jay is a 87 y.o. female who presents to the ED c/o fall, head injury.  Patient tripped and fell while at home alone.  She hit the back of her head and sustained a laceration.  She also complains of pain at the left hip which is worsened with trying to bear weight.  Patient denies recent illness other than some ongoing nausea and decreased appetite.  Son and daughter who are present at bedside are concerned for possible urinary tract infection as the symptoms are identical to symptoms she had in January when admitted with urinary tract infection.  Patient generally lives at home alone and gets around using a rollator walker.  Denies recent fever.  Denies dysuria.  Denies chest pain or trouble breathing      MEDICAL RECORD REVIEW (non ED)  I reviewed prior medical records including admission from January of this year related to urinary tract infection.  Other chronic conditions include aortic stenosis, congestive heart failure and obesity as well as chronic renal disease.  Patient is anticoagulated on aspirin.      PAST MEDICAL HISTORY  Active Ambulatory Problems     Diagnosis Date Noted   • Closed displaced intertrochanteric fracture of left femur 02/07/2018   • SANYA (acute kidney injury) 02/07/2018   • HTN (hypertension) 02/07/2018   • Acute blood loss anemia 02/10/2018   • Leukocytosis 02/10/2018   • Drug-induced constipation 02/12/2018   • Stage 3b chronic kidney disease 09/17/2022   • COVID-19 virus infection 09/17/2022   • Anemia due to chronic kidney disease 09/17/2022   • Acute on chronic diastolic CHF (congestive heart failure) 09/18/2022   • Aortic stenosis 09/18/2022   • Anemia of chronic renal failure, stage 3 (moderate)  2022   • E. coli UTI (urinary tract infection) 2023   • Colovesical fistula - possible 2023   • (HFpEF) heart failure with preserved ejection fraction 2023   • Obesity (BMI 30-39.9) 2023   • Acute UTI (urinary tract infection) 01/15/2023   • Cough 01/15/2023   • Nocturnal hypoxemia 01/15/2023   • Hypophosphatemia 2023     Resolved Ambulatory Problems     Diagnosis Date Noted   • Hyperkalemia 2018   • Acute hypoxemic respiratory failure due to COVID-19 2022   • Hypokalemia 2023     Past Medical History:   Diagnosis Date   • Arthritis    • History of breast cancer    • History of carotid artery disease    • Hyperlipidemia    • Hypertension          PAST SURGICAL HISTORY  Past Surgical History:   Procedure Laterality Date   • BREAST LUMPECTOMY     • CAROTID ARTERY ANGIOPLASTY  ,    • CHOLECYSTECTOMY     • FEMUR IM NAILING/RODDING Left 2018    Procedure: FEMUR INTRAMEDULLARY NAILING;  Surgeon: Zheng Fleming MD;  Location: [facility];  Service:    • HYSTERECTOMY           FAMILY HISTORY  Family History   Problem Relation Age of Onset   • Hypertension Mother    • Heart valve disorder Sister    • Lung cancer Sister    • Lymphoma Sister    • Skin cancer Sister    • Stroke Brother 57         SOCIAL HISTORY  Social History     Socioeconomic History   • Marital status:    Tobacco Use   • Smoking status: Former     Years: 47.00     Types: Cigarettes     Quit date:      Years since quittin.3   • Smokeless tobacco: Never   • Tobacco comments:     caffeine use- coffee   Vaping Use   • Vaping Use: Never used   Substance and Sexual Activity   • Alcohol use: No   • Drug use: Defer   • Sexual activity: Defer         ALLERGIES  Patient has no known allergies.        REVIEW OF SYSTEMS  Review of Systems   Constitutional: Positive for appetite change. Negative for fever.   Respiratory: Negative for shortness of breath.    Cardiovascular:  Negative for chest pain.   Gastrointestinal: Positive for nausea.   Musculoskeletal:        Right hip pain as per HPI   All other systems reviewed and are negative.           PHYSICAL EXAM  ED Triage Vitals [05/13/23 1622]   Temp Heart Rate Resp BP SpO2   98.1 °F (36.7 °C) 63 20 170/74 95 %      Temp src Heart Rate Source Patient Position BP Location FiO2 (%)   Tympanic Monitor Sitting Right arm --       Physical Exam    GENERAL: Alert and pleasant female who looks younger than stated age.  Triage vitals are reviewed notable for temperature 98.1.  Blood pressure 170/74.  O2 sats and heart rate are benign.  HENT: nares patent, examination of the scalp reveals a laceration to the left occipital area with dried blood and no active bleeding.  There is mild scalp swelling.  EYES: no scleral icterus  CV: regular rhythm, regular rate- systolic murmur is appreciated  RESPIRATORY: normal effort, clear to auscultation bilaterally-O2 sats upper 90s on room air  ABDOMEN: soft, nontender to palpation  MUSCULOSKELETAL: Spine-there is no significant tenderness to palpation about the cervical, thoracic or lumbar spine  Upper extremities- no bony deformities or abnormalities  Lower extremities- no noted bony deformity although there is limited range of motion about the left hip secondary to pain.  Examination of the left knee is unremarkable without tenderness to palpation  NEURO: Strength-mild generalized weakness sensation and coordination are grossly intact.  Speech and mentation are unremarkable  SKIN: warm, dry-noted laceration to the left occipital scalp.  There is a superficial skin tear to the left elbow.      Vital signs and nursing notes reviewed.          LAB RESULTS  Recent Results (from the past 24 hour(s))   Comprehensive Metabolic Panel    Collection Time: 05/13/23  5:58 PM    Specimen: Blood   Result Value Ref Range    Glucose 115 (H) 65 - 99 mg/dL    BUN 23 8 - 23 mg/dL    Creatinine 1.31 (H) 0.57 - 1.00 mg/dL     Sodium 135 (L) 136 - 145 mmol/L    Potassium 4.3 3.5 - 5.2 mmol/L    Chloride 99 98 - 107 mmol/L    CO2 25.5 22.0 - 29.0 mmol/L    Calcium 8.8 8.6 - 10.5 mg/dL    Total Protein 6.6 6.0 - 8.5 g/dL    Albumin 4.0 3.5 - 5.2 g/dL    ALT (SGPT) 14 1 - 33 U/L    AST (SGOT) 23 1 - 32 U/L    Alkaline Phosphatase 65 39 - 117 U/L    Total Bilirubin 0.2 0.0 - 1.2 mg/dL    Globulin 2.6 gm/dL    A/G Ratio 1.5 g/dL    BUN/Creatinine Ratio 17.6 7.0 - 25.0    Anion Gap 10.5 5.0 - 15.0 mmol/L    eGFR 39.5 (L) >60.0 mL/min/1.73   CBC Auto Differential    Collection Time: 05/13/23  5:58 PM    Specimen: Blood   Result Value Ref Range    WBC 11.11 (H) 3.40 - 10.80 10*3/mm3    RBC 3.77 3.77 - 5.28 10*6/mm3    Hemoglobin 11.4 (L) 12.0 - 15.9 g/dL    Hematocrit 34.4 34.0 - 46.6 %    MCV 91.2 79.0 - 97.0 fL    MCH 30.2 26.6 - 33.0 pg    MCHC 33.1 31.5 - 35.7 g/dL    RDW 12.9 12.3 - 15.4 %    RDW-SD 42.9 37.0 - 54.0 fl    MPV 10.0 6.0 - 12.0 fL    Platelets 233 140 - 450 10*3/mm3    Neutrophil % 73.9 42.7 - 76.0 %    Lymphocyte % 14.9 (L) 19.6 - 45.3 %    Monocyte % 8.5 5.0 - 12.0 %    Eosinophil % 1.9 0.3 - 6.2 %    Basophil % 0.3 0.0 - 1.5 %    Immature Grans % 0.5 0.0 - 0.5 %    Neutrophils, Absolute 8.23 (H) 1.70 - 7.00 10*3/mm3    Lymphocytes, Absolute 1.65 0.70 - 3.10 10*3/mm3    Monocytes, Absolute 0.94 (H) 0.10 - 0.90 10*3/mm3    Eosinophils, Absolute 0.21 0.00 - 0.40 10*3/mm3    Basophils, Absolute 0.03 0.00 - 0.20 10*3/mm3    Immature Grans, Absolute 0.05 0.00 - 0.05 10*3/mm3    nRBC 0.0 0.0 - 0.2 /100 WBC   Urinalysis With Culture If Indicated - Urine, Clean Catch    Collection Time: 05/13/23  7:20 PM    Specimen: Urine, Clean Catch   Result Value Ref Range    Color, UA Yellow Yellow, Straw    Appearance, UA Clear Clear    pH, UA 6.0 5.0 - 8.0    Specific Gravity, UA 1.007 1.005 - 1.030    Glucose, UA Negative Negative    Ketones, UA Negative Negative    Bilirubin, UA Negative Negative    Blood, UA Negative Negative    Protein, UA  Negative Negative    Leuk Esterase, UA Small (1+) (A) Negative    Nitrite, UA Negative Negative    Urobilinogen, UA 0.2 E.U./dL 0.2 - 1.0 E.U./dL   Urinalysis, Microscopic Only - Urine, Clean Catch    Collection Time: 05/13/23  7:20 PM    Specimen: Urine, Clean Catch   Result Value Ref Range    RBC, UA 0-2 None Seen, 0-2 /HPF    WBC, UA 0-2 None Seen, 0-2 /HPF    Bacteria, UA None Seen None Seen /HPF    Squamous Epithelial Cells, UA 0-2 None Seen, 0-2 /HPF    Hyaline Casts, UA None Seen None Seen /LPF    Methodology Manual Light Microscopy        Ordered the above labs and independently interpreted results. My findings will be discussed in the medical decision making section below        RADIOLOGY  CT Head Without Contrast    Result Date: 5/13/2023  Patient: CATRACHITO BARTLETT  Time Out: 19:38 Exam(s): CT HEAD Without Contrast EXAM:   CT Head Without Intravenous Contrast CLINICAL HISTORY:    Reason for exam: Head trauma, minor (Age >= 65y). TECHNIQUE:   Axial computed tomography images of the head brain without intravenous contrast.  CTDI is 909.5 mGy and DLP is 55.18 mGy-cm.  This CT exam was performed according to the principle of ALARA (As Low As Reasonably Achievable) by using one or more of the following dose reduction techniques: automated exposure control, adjustment of the mA and or kV according to patient size, and or use of iterative reconstruction technique. COMPARISON: June 12, 2022. FINDINGS:   Brain: Moderate volume loss and microvascular changes are present.  No intracranial hemorrhage.  No edema.   Ventricles:  Unremarkable.  No ventriculomegaly.   Bones joints:  Unremarkable.  No acute fracture.   Soft tissues: Scalp soft tissue swelling is present.  There may be a scalp laceration.   Sinuses:  Unremarkable as visualized.  No acute sinusitis.   Mastoid air cells:  Unremarkable as visualized.  No mastoid effusion. IMPRESSION:     Volume loss, with periventricular microvascular changes are present.  No  intracranial hemorrhage.     Electronically signed by Lionel Ambrose D.O. on 05-13-23 at 1938    XR Hip With or Without Pelvis 2 - 3 View Left    Result Date: 5/13/2023  EXAMINATION: PELVIS AND LEFT HIP RADIOGRAPHS  HISTORY: 87-year-old female status post fall with left hip pain.  FINDINGS: AP pelvis with AP and lateral left hip radiographs were obtained and demonstrate an intramedullary nail within the left femur. Alignment is appropriate. There is no evidence for an acute fracture. 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 A remote healed left inferior pubic ramus fracture is noted.      There is no evidence for an acute abnormality of the pelvis or left hip.  This report was finalized on 5/13/2023 7:05 PM by Dr. Lj Asencio M.D.        I ordered and independently reviewed the above noted radiographic studies.      I viewed images of CT head, left hip x-ray which showed no obvious acute traumatic injury per my independent interpretation.    See radiologist's dictation for official interpretation.             PROCEDURES  Procedures          MEDICATIONS GIVEN IN ER  Medications   oxyCODONE-acetaminophen (PERCOCET) 5-325 MG per tablet 1 tablet (1 tablet Oral Given 5/13/23 2045)               MEDICAL DECISION MAKING, PROGRESS, and CONSULTS    All labs have been independently reviewed by me.  All radiology studies have been reviewed by me and I have also reviewed the radiology report.   EKG's independently viewed and interpreted by me.  Discussion below represents my analysis of pertinent findings related to patient's condition, differential diagnosis, treatment plan and final disposition.      Additional sources:  - Discussed/ obtained information from independent historians: Family at bedside, EMS who brought patient    - External (non-ED) record review: Please see documented above    - Chronic or social conditions impacting care: Age 87, history of prior hip fracture, history of heart failure,  history of urinary tract infection    - Shared decision making: I discussed ED evaluation and treatment plan with patient who is in agreement.  Complicated 87-year-old female presents after fall at home.  She sustained injury to her head with a laceration.  Also pain in the left hip.  Family initially concerned about urinary tract infection as she had a fall related to same in February of this year.    Patient underwent extensive ED testing including CT scan of the brain and x-rays of the left hip.  No obvious acute traumatic injury seen.    I did repair laceration to the scalp with Dermabond as documented above.    Patient was able to ambulate with walker without significant difficulty    ED testing of blood and urine was fairly unremarkable and did not show serious acute medical condition that would require admission.      Orders placed during this visit:  Orders Placed This Encounter   Procedures   • CT Head Without Contrast   • XR Hip With or Without Pelvis 2 - 3 View Left   • Comprehensive Metabolic Panel   • Urinalysis With Culture If Indicated - Urine, Catheter   • CBC Auto Differential   • Urinalysis, Microscopic Only - Urine, Clean Catch   • Wound care   • Ambulate patient   • CBC & Differential           Differential diagnosis:    Please see as documented below in ED course      Independent interpretation of labs, radiology studies, and discussions with consultants:  ED Course as of 05/13/23 2117   Sat May 13, 2023   1836 I independently interpreted plain films of the left hip.  I see no obvious pelvic fracture.  There is bony hardware in place in the left hip without apparent bony fracture.    Official radiology interpretation is in agreement with my assessment. [DB]   1837 CMP reviewed shows elevation of serum creatinine of 1.31.  When compared with prior this is very similar and represents chronic renal disease.  I see no significant electrolyte disturbance. [DB]   1838 CBC reviewed shows a mildly  elevated white count of 11.1.  This is also fairly chronic and white counts of been persistently elevated through prior evaluations.  It is unclear whether this represents infection or chronic leukocytosis.  Hemoglobin 11.4 is improved from baseline of around 8 or 9. [DB]   2007 CT scan of the brain shows no obvious fracture or hemorrhage. [DB]   2029 Urinalysis reviewed is pretty unremarkable not consistent with acute urinary tract infection. [DB]   2032 Scalp wound has been cleaned by ED tech.  There is a lot of blood but it looks like a pretty small laceration I will go ahead and closed it with wound glue.  Patient with left hip pain has not been up walking yet.  We will try to ambulate before discharge home.  We will also give Percocet p.o. which she has available at home. [DB]      ED Course User Index  [DB] Louis Conde MD             I used full protective equipment while examining this patient.  This includes face mask, gloves and protective eyewear.  I washed my hands before entering the room and immediately upon leaving the room    DIAGNOSIS  Final diagnoses:   Laceration of scalp, initial encounter   Injury of head, initial encounter   Left hip pain         DISPOSITION  DISCHARGE    Patient discharged in stable condition.    Reviewed implications of results, diagnosis, meds, responsibility to follow up, warning signs and symptoms of possible worsening, potential complications and reasons to return to ER, including worsening symptoms or as needed.    Patient/Family voiced understanding of above instructions.    Discussed plan for discharge, as there is no emergent indication for admission. Patient referred to primary care provider for BP management due to today's BP. Pt/family is agreeable and understands need for follow up and repeat testing.  Pt is aware that discharge does not mean that nothing is wrong but it indicates no emergency is present that requires admission and they must continue care with  follow-up as given below or physician of their choice.     FOLLOW-UP  Ambrose Ashley MD  [address] [place] [address]  [phone]    In 1 week  If Not Better         Medication List      No changes were made to your prescriptions during this visit.                   Latest Documented Vital Signs:  As of 21:17 EDT  BP- 170/74 HR- 63 Temp- 98.1 °F (36.7 °C) (Tympanic) O2 sat- 95%              --    Please note that portions of this were completed with a voice recognition program.       Note Disclaimer: At [facility], we believe that sharing information builds trust and better relationships. You are receiving this note because you are receiving care at [facility] or recently visited. It is possible you will see health information before a provider has talked with you about it. This kind of information can be easy to misunderstand. To help you fully understand what it means for your health, we urge you to discuss this note with your provider.           Louis Conde MD  05/13/23 0392

## 2023-05-13 NOTE — ED NOTES
Pt to ER via personal vehicle from home for a fall. Pt has a laceration to the L side of back of head and pain in groin area. Hx of UTI that resulted in a fall. Pt takes a baby aspirin everyday.    This RN in appropriate PPE while in pt room. Pt wearing mask

## 2023-05-30 RX ORDER — NEBIVOLOL 5 MG/1
TABLET ORAL
Qty: 30 TABLET | Refills: 0 | Status: SHIPPED | OUTPATIENT
Start: 2023-05-30 | End: 2023-06-05 | Stop reason: SDUPTHER

## 2023-06-05 RX ORDER — NEBIVOLOL 5 MG/1
5 TABLET ORAL DAILY
Qty: 90 TABLET | Refills: 3 | Status: SHIPPED | OUTPATIENT
Start: 2023-06-05

## 2023-08-23 ENCOUNTER — OFFICE VISIT (OUTPATIENT)
Dept: CARDIOLOGY | Facility: CLINIC | Age: 88
End: 2023-08-23
Payer: MEDICARE

## 2023-08-23 VITALS
BODY MASS INDEX: 30.82 KG/M2 | HEIGHT: 60 IN | RESPIRATION RATE: 18 BRPM | DIASTOLIC BLOOD PRESSURE: 84 MMHG | HEART RATE: 55 BPM | OXYGEN SATURATION: 99 % | SYSTOLIC BLOOD PRESSURE: 134 MMHG | WEIGHT: 157 LBS

## 2023-08-23 DIAGNOSIS — I10 PRIMARY HYPERTENSION: ICD-10-CM

## 2023-08-23 DIAGNOSIS — N18.30 STAGE 3 CHRONIC KIDNEY DISEASE, UNSPECIFIED WHETHER STAGE 3A OR 3B CKD: ICD-10-CM

## 2023-08-23 DIAGNOSIS — I50.32 CHRONIC HEART FAILURE WITH PRESERVED EJECTION FRACTION: ICD-10-CM

## 2023-08-23 DIAGNOSIS — I35.0 NONRHEUMATIC AORTIC VALVE STENOSIS: Primary | ICD-10-CM

## 2023-08-23 PROCEDURE — 93000 ELECTROCARDIOGRAM COMPLETE: CPT | Performed by: NURSE PRACTITIONER

## 2023-08-23 PROCEDURE — 1160F RVW MEDS BY RX/DR IN RCRD: CPT | Performed by: NURSE PRACTITIONER

## 2023-08-23 PROCEDURE — 99214 OFFICE O/P EST MOD 30 MIN: CPT | Performed by: NURSE PRACTITIONER

## 2023-08-23 PROCEDURE — 1159F MED LIST DOCD IN RCRD: CPT | Performed by: NURSE PRACTITIONER

## 2023-08-23 RX ORDER — NEBIVOLOL 5 MG/1
5 TABLET ORAL DAILY
Qty: 90 TABLET | Refills: 3 | Status: SHIPPED | OUTPATIENT
Start: 2023-08-23

## 2023-08-23 RX ORDER — FUROSEMIDE 40 MG/1
40 TABLET ORAL DAILY
Qty: 90 TABLET | Refills: 3 | Status: SHIPPED | OUTPATIENT
Start: 2023-08-23

## 2023-08-23 RX ORDER — DOCUSATE CALCIUM 240 MG
240 CAPSULE ORAL 2 TIMES DAILY PRN
COMMUNITY

## 2023-08-23 RX ORDER — L. ACIDOPHILUS/L.BULGARICUS 1MM CELL
TABLET ORAL 3 TIMES DAILY
COMMUNITY

## 2023-11-16 NOTE — PROGRESS NOTES
"    CARDIOLOGY        Patient Name: Vonda Jay  :1935  Age: 87 y.o.  Primary Cardiologist: Adam Singleton MD and Juan Clifford MD  Encounter Provider:  ELVIE Archibald    Date of Service: 23      CHIEF COMPLAINT / REASON FOR OFFICE VISIT     Heart Problem (6 Month follow up )      HISTORY OF PRESENT ILLNESS       Hypertension  Associated symptoms include malaise/fatigue.   Vonda Jay is a 87 y.o. female who presents today for semiannual evaluation    Pt has a  history significant for carotid artery disease, hypertension, prior breast cancer, aortic valve stenosis.    Patient reports that she has done well since last assessment.  She does report some chronic but stable generalized fatigue.  She does exercise in the form of walking.  At home blood pressure is averaging in the 120s systolic.  She currently denies chest pain, dyspnea at rest or with exertion, palpitations, lightheadedness, edema..      The following portions of the patient's history were reviewed and updated as appropriate: allergies, current medications, past family history, past medical history, past social history, past surgical history and problem list.      VITAL SIGNS     Visit Vitals  /84 (BP Location: Right arm, Patient Position: Sitting, Cuff Size: Adult)   Pulse 55   Resp 18   Ht 152.4 cm (60\")   Wt 71.2 kg (157 lb)   SpO2 99%   BMI 30.66 kg/mý         Wt Readings from Last 3 Encounters:   23 71.2 kg (157 lb)   23 71.7 kg (158 lb)   23 71.3 kg (157 lb 1.6 oz)     Body mass index is 30.66 kg/mý.      REVIEW OF SYSTEMS   Review of Systems   Constitutional: Positive for malaise/fatigue. Negative for chills, fever, weight gain and weight loss.   Cardiovascular:  Negative for dyspnea on exertion and leg swelling.   Respiratory:  Negative for cough, snoring and wheezing.    Hematologic/Lymphatic: Negative for bleeding problem. Does not bruise/bleed easily.   Skin:  Negative for color change. "   Musculoskeletal:  Negative for falls, joint pain and myalgias.   Gastrointestinal:  Negative for melena.   Genitourinary:  Negative for hematuria.   Neurological:  Negative for excessive daytime sleepiness.   Psychiatric/Behavioral:  Negative for depression. The patient is not nervous/anxious.          PHYSICAL EXAMINATION     Vitals and nursing note reviewed.   Constitutional:       Appearance: Normal appearance. Well-developed.   Eyes:      Conjunctiva/sclera: Conjunctivae normal.   Neck:      Vascular: No carotid bruit.   Pulmonary:      Breath sounds: Normal breath sounds.   Cardiovascular:      Normal rate. Regular rhythm. Normal S1 with normal intensity. Normal S2 with normal intensity.       Murmurs: There is a grade 3/6 systolic murmur at the URSB and ULSB.      No gallop.  No click. No rub.   Edema:     Peripheral edema absent.   Musculoskeletal: Normal range of motion. Skin:     General: Skin is warm and dry.   Neurological:      Mental Status: Alert and oriented to person, place, and time.      GCS: GCS eye subscore is 4. GCS verbal subscore is 5. GCS motor subscore is 6.   Psychiatric:         Speech: Speech normal.         Behavior: Behavior normal.         Thought Content: Thought content normal.         Judgment: Judgment normal.         REVIEWED DATA       ECG 12 Lead    Date/Time: 8/23/2023 10:50 AM  Performed by: Jaye Hough APRN  Authorized by: Jaye Hough APRN   Comparison: compared with previous ECG from 3/25/2022  Rhythm: sinus rhythm  Rate: bradycardic  BPM: 55  Conduction: conduction normal  ST Segments: ST segments normal  T Waves: T waves normal  QRS axis: normal    Clinical impression: normal ECG        Cardiac Procedures:  Echocardiogram 7/7/2022.  LVEF 66-70%.  Mild LVH.  Grade 2 diastolic dysfunction.  Moderate to severe aortic valve stenosis with maximum pressure gradient 55.8 mmHg, mean pressure gradient 31.7 mmHg.  Calculated RVSP 25 mmHg.      BUN   Date Value Ref  Detail Level: Detailed Range Status   05/13/2023 23 8 - 23 mg/dL Final   11/24/2021 44 (H) 7 - 20 mg/dL Final     Creatinine   Date Value Ref Range Status   05/13/2023 1.31 (H) 0.57 - 1.00 mg/dL Final   11/24/2021 1.2 0.7 - 1.5 mg/dL Final     Potassium   Date Value Ref Range Status   05/13/2023 4.3 3.5 - 5.2 mmol/L Final   11/24/2021 5.1 3.5 - 5.1 mmol/L Final     ALT (SGPT)   Date Value Ref Range Status   05/13/2023 14 1 - 33 U/L Final   11/21/2019 9 (L) 13 - 69 U/L Final     AST (SGOT)   Date Value Ref Range Status   05/13/2023 23 1 - 32 U/L Final   11/21/2019 17 15 - 46 U/L Final           ASSESSMENT & PLAN     Diagnoses and all orders for this visit:    1. Nonrheumatic aortic valve stenosis (Primary)  Moderate to severe on echocardiogram 1 year ago  Due for surveillance echocardiogram, this will be repeated in 1 year  Patient reports generalized fatigue but denies dyspnea or lightheadedness  -     Adult Transthoracic Echo Complete W/ Cont if Necessary Per Protocol; Future    2. Chronic heart failure with preserved ejection fraction  Euvolemic on exam  Complains of generalized fatigue but denies lightheadedness or dyspnea  Continue Bystolic and furosemide    3. Primary hypertension  Blood pressure controlled with current regimen  Continue Bystolic and furosemide  -     ECG 12 Lead    4. Stage 3 chronic kidney disease, unspecified whether stage 3a or 3b CKD    Other orders  -     furosemide (LASIX) 40 MG tablet; Take 1 tablet by mouth Daily.  Dispense: 90 tablet; Refill: 3  -     nebivolol (BYSTOLIC) 5 MG tablet; Take 1 tablet by mouth Daily.  Dispense: 90 tablet; Refill: 3        Return in about 1 year (around 8/23/2024) for Dr. Singleton- Routine with echo before.    Future Appointments         Provider Department Center    9/9/2024 11:00 AM Adam Singleton MD [facility]                  MEDICATIONS         Discharge Medications            Accurate as of August 23, 2023 12:17 PM. If you have any  questions, ask your nurse or doctor.                Continue These Medications        Instructions Start Date   acetaminophen 500 MG tablet  Commonly known as: TYLENOL   1,000 mg, Oral, Every 6 Hours PRN      acidophilus tablet tablet   Oral, 3 Times Daily      alendronate 70 MG tablet  Commonly known as: FOSAMAX   70 mg, Oral, Every 7 Days, Saturday      aspirin 81 MG tablet   81 mg, Oral, Daily      CALCIUM MAGNESIUM PO   Oral      cholecalciferol 25 MCG (1000 UT) tablet  Commonly known as: VITAMIN D3   1,000 Units, Oral, Daily      citalopram 10 MG tablet  Commonly known as: CeleXA   10 mg, Oral, Daily      Cranberry 125 MG tablet   Oral      docusate calcium 240 MG capsule  Commonly known as: SURFAK   240 mg, Oral, 2 Times Daily PRN      ferrous sulfate 325 (65 FE) MG tablet   325 mg, Oral, Every Other Day      furosemide 40 MG tablet  Commonly known as: LASIX   40 mg, Oral, Daily      hydrALAZINE 100 MG tablet  Commonly known as: APRESOLINE   100 mg, Oral, 3 Times Daily      multivitamin with minerals tablet tablet   1 tablet, Oral, Daily      nebivolol 5 MG tablet  Commonly known as: BYSTOLIC   5 mg, Oral, Daily      pravastatin 40 MG tablet  Commonly known as: PRAVACHOL   40 mg, Oral, Nightly                   **Dragon Disclaimer:   Much of this encounter note is an electronic transcription/translation of spoken language to printed text. The electronic translation of spoken language may permit erroneous, or at times, nonsensical words or phrases to be inadvertently transcribed. Although I have reviewed the note for such errors, some may still exist.

## 2023-12-01 ENCOUNTER — OFFICE VISIT (OUTPATIENT)
Dept: ORTHOPEDIC SURGERY | Facility: CLINIC | Age: 88
End: 2023-12-01
Payer: MEDICARE

## 2023-12-01 VITALS — BODY MASS INDEX: 29.81 KG/M2 | WEIGHT: 157.9 LBS | TEMPERATURE: 96.9 F | HEIGHT: 61 IN

## 2023-12-01 DIAGNOSIS — R52 PAIN: Primary | ICD-10-CM

## 2023-12-01 DIAGNOSIS — M70.62 TROCHANTERIC BURSITIS OF LEFT HIP: ICD-10-CM

## 2023-12-01 RX ORDER — LIDOCAINE HYDROCHLORIDE 10 MG/ML
2 INJECTION, SOLUTION EPIDURAL; INFILTRATION; INTRACAUDAL; PERINEURAL
Status: COMPLETED | OUTPATIENT
Start: 2023-12-01 | End: 2023-12-01

## 2023-12-01 RX ORDER — METHYLPREDNISOLONE ACETATE 80 MG/ML
80 INJECTION, SUSPENSION INTRA-ARTICULAR; INTRALESIONAL; INTRAMUSCULAR; SOFT TISSUE
Status: COMPLETED | OUTPATIENT
Start: 2023-12-01 | End: 2023-12-01

## 2023-12-01 RX ADMIN — METHYLPREDNISOLONE ACETATE 80 MG: 80 INJECTION, SUSPENSION INTRA-ARTICULAR; INTRALESIONAL; INTRAMUSCULAR; SOFT TISSUE at 10:26

## 2023-12-01 RX ADMIN — LIDOCAINE HYDROCHLORIDE 2 ML: 10 INJECTION, SOLUTION EPIDURAL; INFILTRATION; INTRACAUDAL; PERINEURAL at 10:26

## 2023-12-01 NOTE — PROGRESS NOTES
Chief complaint: Left hip pain    Ms. Jay is seen today for complaint of left hip pain.  She reports 10 out of 10 pain when she gets up out of bed or when she stands from a seated position.  She says that when she gets moving the hip feels fine.  Localizes the pain to her low back, buttock and the lateral aspect of her hip.  Denies any shooting pain down the leg.  Denies any weakness, numbness, bowel or bladder dysfunction.  She says the symptoms have been ongoing for couple of months.  She does not recall any specific injury, inciting event or factor.    Her back and left hip are examined.  Skin is benign.  I do not see any gross abnormalities on inspection.  She has well-healed surgical incisions from her previous hip nailing.  She is exquisitely tender over the trochanteric bursa.  No effusion or increased warmth.  Negative Stinchfield maneuver.  Negative straight leg raise maneuver.  She can flex her hip, extend her knee and wiggle her toes and foot without difficulty.  Hip abduction is mildly uncomfortable.    AP and lateral views left hip are ordered and reviewed evaluate her complaint.  These are compared to her previous x-rays from 4 years ago.  She has some chronic changes around the hip consistent with her surgical history.  She has some mild degenerative changes but nothing profound.  She has advanced degenerative disc disease in her lower lumbar spine with some degenerative scoliosis.    Assessment: 1.  Left trochanteric bursitis 2.  Lumbar degenerative disc disease    Plan: Her exam suggest that a lot of her pain is probably coming from the bursa.  I suggest that we try an injection for both diagnostic and therapeutic purposes.  If the shot does not really help then I think we need to investigate her back further.  She says that she has had lumbar epidurals in the past.  We may have to consider that option again.    The risk, benefits and alternatives to a left trochanteric bursal injection were  discussed.  She consented and injection was performed as described below.  I told her to call me in a couple of weeks if no better.    Large Joint Arthrocentesis: L greater trochanteric bursa  Date/Time: 12/1/2023 10:26 AM  Consent given by: patient  Site marked: site marked  Timeout: Immediately prior to procedure a time out was called to verify the correct patient, procedure, equipment, support staff and site/side marked as required   Supporting Documentation  Indications: pain   Procedure Details  Location: hip - L greater trochanteric bursa  Preparation: Patient was prepped and draped in the usual sterile fashion  Needle gauge: 21G.  Approach: lateral  Medications administered: 80 mg methylPREDNISolone acetate 80 MG/ML; 2 mL lidocaine PF 1% 1 %  Patient tolerance: patient tolerated the procedure well with no immediate complications

## 2024-01-29 ENCOUNTER — TELEPHONE (OUTPATIENT)
Dept: ORTHOPEDIC SURGERY | Facility: CLINIC | Age: 89
End: 2024-01-29

## 2024-01-29 RX ORDER — NEBIVOLOL 5 MG/1
5 TABLET ORAL DAILY
Qty: 90 TABLET | Refills: 3 | Status: SHIPPED | OUTPATIENT
Start: 2024-01-29

## 2024-01-29 NOTE — TELEPHONE ENCOUNTER
Caller: Vonda Jay    Relationship to patient: Self    Best call back number: [phone]    Chief complaint: RIGHT SHOULDER     Type of visit: CORTISONE INJECTION     Requested date: THIS AFTERNOON  OR TOMORROW     If rescheduling, when is the original appointment: SHE WILL BE GOING OUT OF TOWN VERY EARLY WEDNESDAY MORNING AND IS ASKING IF SHE COULD BE WORKED IN THIS AFTERNOON OR SOMETIME TOMORROW FOR AN INJECTION. ATTEMPTED TO WT DUE TO OPENINGS THIS AFTERNOON BUT THERE WAS NO ANSWER

## 2024-02-12 RX ORDER — AMLODIPINE BESYLATE 5 MG/1
5 TABLET ORAL DAILY
Qty: 90 TABLET | Refills: 3 | OUTPATIENT
Start: 2024-02-12

## 2024-02-14 ENCOUNTER — OFFICE VISIT (OUTPATIENT)
Dept: ORTHOPEDIC SURGERY | Facility: CLINIC | Age: 89
End: 2024-02-14
Payer: MEDICARE

## 2024-02-14 VITALS — HEIGHT: 61 IN | TEMPERATURE: 98.3 F | BODY MASS INDEX: 29.64 KG/M2 | WEIGHT: 157 LBS

## 2024-02-14 DIAGNOSIS — G89.29 CHRONIC RIGHT SHOULDER PAIN: Primary | ICD-10-CM

## 2024-02-14 DIAGNOSIS — M25.511 CHRONIC RIGHT SHOULDER PAIN: Primary | ICD-10-CM

## 2024-02-14 RX ORDER — METHYLPREDNISOLONE ACETATE 80 MG/ML
80 INJECTION, SUSPENSION INTRA-ARTICULAR; INTRALESIONAL; INTRAMUSCULAR; SOFT TISSUE
Status: COMPLETED | OUTPATIENT
Start: 2024-02-14 | End: 2024-02-14

## 2024-02-14 RX ORDER — AMLODIPINE BESYLATE 5 MG/1
5 TABLET ORAL DAILY
COMMUNITY
Start: 2024-02-13

## 2024-02-14 RX ORDER — LIDOCAINE HYDROCHLORIDE 10 MG/ML
2 INJECTION, SOLUTION EPIDURAL; INFILTRATION; INTRACAUDAL; PERINEURAL
Status: COMPLETED | OUTPATIENT
Start: 2024-02-14 | End: 2024-02-14

## 2024-02-14 RX ADMIN — LIDOCAINE HYDROCHLORIDE 2 ML: 10 INJECTION, SOLUTION EPIDURAL; INFILTRATION; INTRACAUDAL; PERINEURAL at 11:39

## 2024-02-14 RX ADMIN — METHYLPREDNISOLONE ACETATE 80 MG: 80 INJECTION, SUSPENSION INTRA-ARTICULAR; INTRALESIONAL; INTRAMUSCULAR; SOFT TISSUE at 11:39

## 2024-03-16 ENCOUNTER — APPOINTMENT (OUTPATIENT)
Dept: CT IMAGING | Facility: HOSPITAL | Age: 89
End: 2024-03-16
Payer: MEDICARE

## 2024-03-16 ENCOUNTER — HOSPITAL ENCOUNTER (EMERGENCY)
Facility: HOSPITAL | Age: 89
Discharge: HOME | End: 2024-03-16
Attending: EMERGENCY MEDICINE
Payer: MEDICARE

## 2024-03-16 VITALS
HEART RATE: 65 BPM | DIASTOLIC BLOOD PRESSURE: 59 MMHG | BODY MASS INDEX: 29.64 KG/M2 | OXYGEN SATURATION: 93 % | TEMPERATURE: 98 F | HEIGHT: 61 IN | SYSTOLIC BLOOD PRESSURE: 147 MMHG | RESPIRATION RATE: 18 BRPM | WEIGHT: 157 LBS

## 2024-03-16 DIAGNOSIS — N39.0 ACUTE UTI: ICD-10-CM

## 2024-03-16 DIAGNOSIS — R10.30 LOWER ABDOMINAL PAIN: Primary | ICD-10-CM

## 2024-03-16 LAB
ALBUMIN SERPL-MCNC: 4.4 G/DL (ref 3.5–5.2)
ALBUMIN/GLOB SERPL: 1.6 G/DL
ALP SERPL-CCNC: 85 U/L (ref 39–117)
ALT SERPL W P-5'-P-CCNC: 12 U/L (ref 1–33)
ANION GAP SERPL CALCULATED.3IONS-SCNC: 12 MMOL/L (ref 5–15)
AST SERPL-CCNC: 16 U/L (ref 1–32)
BACTERIA UR QL AUTO: ABNORMAL /HPF
BASOPHILS # BLD AUTO: 0.07 10*3/MM3 (ref 0–0.2)
BASOPHILS NFR BLD AUTO: 0.5 % (ref 0–1.5)
BILIRUB SERPL-MCNC: 0.2 MG/DL (ref 0–1.2)
BILIRUB UR QL STRIP: NEGATIVE
BUN SERPL-MCNC: 38 MG/DL (ref 8–23)
BUN/CREAT SERPL: 25.3 (ref 7–25)
CALCIUM SPEC-SCNC: 9.5 MG/DL (ref 8.6–10.5)
CHLORIDE SERPL-SCNC: 98 MMOL/L (ref 98–107)
CLARITY UR: ABNORMAL
CO2 SERPL-SCNC: 29 MMOL/L (ref 22–29)
COLOR UR: YELLOW
CREAT SERPL-MCNC: 1.5 MG/DL (ref 0.57–1)
DEPRECATED RDW RBC AUTO: 40.7 FL (ref 37–54)
EGFRCR SERPLBLD CKD-EPI 2021: 33.4 ML/MIN/1.73
EOSINOPHIL # BLD AUTO: 0.3 10*3/MM3 (ref 0–0.4)
EOSINOPHIL NFR BLD AUTO: 2 % (ref 0.3–6.2)
ERYTHROCYTE [DISTWIDTH] IN BLOOD BY AUTOMATED COUNT: 12.4 % (ref 12.3–15.4)
GLOBULIN UR ELPH-MCNC: 2.8 GM/DL
GLUCOSE SERPL-MCNC: 126 MG/DL (ref 65–99)
GLUCOSE UR STRIP-MCNC: NEGATIVE MG/DL
HCT VFR BLD AUTO: 36.1 % (ref 34–46.6)
HGB BLD-MCNC: 11.7 G/DL (ref 12–15.9)
HGB UR QL STRIP.AUTO: ABNORMAL
HYALINE CASTS UR QL AUTO: ABNORMAL /LPF
IMM GRANULOCYTES # BLD AUTO: 0.1 10*3/MM3 (ref 0–0.05)
IMM GRANULOCYTES NFR BLD AUTO: 0.7 % (ref 0–0.5)
KETONES UR QL STRIP: NEGATIVE
LEUKOCYTE ESTERASE UR QL STRIP.AUTO: ABNORMAL
LIPASE SERPL-CCNC: 41 U/L (ref 13–60)
LYMPHOCYTES # BLD AUTO: 1.69 10*3/MM3 (ref 0.7–3.1)
LYMPHOCYTES NFR BLD AUTO: 11.1 % (ref 19.6–45.3)
MCH RBC QN AUTO: 29.3 PG (ref 26.6–33)
MCHC RBC AUTO-ENTMCNC: 32.4 G/DL (ref 31.5–35.7)
MCV RBC AUTO: 90.5 FL (ref 79–97)
MONOCYTES # BLD AUTO: 0.99 10*3/MM3 (ref 0.1–0.9)
MONOCYTES NFR BLD AUTO: 6.5 % (ref 5–12)
NEUTROPHILS NFR BLD AUTO: 12.04 10*3/MM3 (ref 1.7–7)
NEUTROPHILS NFR BLD AUTO: 79.2 % (ref 42.7–76)
NITRITE UR QL STRIP: NEGATIVE
NRBC BLD AUTO-RTO: 0 /100 WBC (ref 0–0.2)
PH UR STRIP.AUTO: 8 [PH] (ref 5–8)
PLATELET # BLD AUTO: 253 10*3/MM3 (ref 140–450)
PMV BLD AUTO: 10.4 FL (ref 6–12)
POTASSIUM SERPL-SCNC: 4.1 MMOL/L (ref 3.5–5.2)
PROT SERPL-MCNC: 7.2 G/DL (ref 6–8.5)
PROT UR QL STRIP: ABNORMAL
RBC # BLD AUTO: 3.99 10*6/MM3 (ref 3.77–5.28)
RBC # UR STRIP: ABNORMAL /HPF
REF LAB TEST METHOD: ABNORMAL
SODIUM SERPL-SCNC: 139 MMOL/L (ref 136–145)
SP GR UR STRIP: 1.02 (ref 1–1.03)
SQUAMOUS #/AREA URNS HPF: ABNORMAL /HPF
UROBILINOGEN UR QL STRIP: ABNORMAL
WBC # UR STRIP: ABNORMAL /HPF
WBC NRBC COR # BLD AUTO: 15.19 10*3/MM3 (ref 3.4–10.8)

## 2024-03-16 PROCEDURE — 74177 CT ABD & PELVIS W/CONTRAST: CPT

## 2024-03-16 PROCEDURE — 80053 COMPREHEN METABOLIC PANEL: CPT | Performed by: NURSE PRACTITIONER

## 2024-03-16 PROCEDURE — P9612 CATHETERIZE FOR URINE SPEC: HCPCS

## 2024-03-16 PROCEDURE — 25510000001 IOPAMIDOL 61 % SOLUTION: Performed by: EMERGENCY MEDICINE

## 2024-03-16 PROCEDURE — 85025 COMPLETE CBC W/AUTO DIFF WBC: CPT | Performed by: NURSE PRACTITIONER

## 2024-03-16 PROCEDURE — 36415 COLL VENOUS BLD VENIPUNCTURE: CPT

## 2024-03-16 PROCEDURE — 99285 EMERGENCY DEPT VISIT HI MDM: CPT

## 2024-03-16 PROCEDURE — 25010000002 ONDANSETRON PER 1 MG: Performed by: NURSE PRACTITIONER

## 2024-03-16 PROCEDURE — 25010000002 CEFTRIAXONE PER 250 MG: Performed by: PHYSICIAN ASSISTANT

## 2024-03-16 PROCEDURE — 81001 URINALYSIS AUTO W/SCOPE: CPT | Performed by: NURSE PRACTITIONER

## 2024-03-16 PROCEDURE — 87086 URINE CULTURE/COLONY COUNT: CPT | Performed by: EMERGENCY MEDICINE

## 2024-03-16 PROCEDURE — 25810000003 SODIUM CHLORIDE 0.9 % SOLUTION: Performed by: NURSE PRACTITIONER

## 2024-03-16 PROCEDURE — 83690 ASSAY OF LIPASE: CPT | Performed by: NURSE PRACTITIONER

## 2024-03-16 PROCEDURE — 96375 TX/PRO/DX INJ NEW DRUG ADDON: CPT

## 2024-03-16 PROCEDURE — 87186 SC STD MICRODIL/AGAR DIL: CPT | Performed by: EMERGENCY MEDICINE

## 2024-03-16 PROCEDURE — 96361 HYDRATE IV INFUSION ADD-ON: CPT

## 2024-03-16 PROCEDURE — 87147 CULTURE TYPE IMMUNOLOGIC: CPT | Performed by: EMERGENCY MEDICINE

## 2024-03-16 PROCEDURE — 96365 THER/PROPH/DIAG IV INF INIT: CPT

## 2024-03-16 RX ORDER — ONDANSETRON 2 MG/ML
4 INJECTION INTRAMUSCULAR; INTRAVENOUS ONCE
Status: COMPLETED | OUTPATIENT
Start: 2024-03-16 | End: 2024-03-16

## 2024-03-16 RX ORDER — CEFDINIR 300 MG/1
300 CAPSULE ORAL DAILY
Qty: 7 CAPSULE | Refills: 0 | Status: SHIPPED | OUTPATIENT
Start: 2024-03-16

## 2024-03-16 RX ORDER — SODIUM CHLORIDE 0.9 % (FLUSH) 0.9 %
10 SYRINGE (ML) INJECTION AS NEEDED
Status: DISCONTINUED | OUTPATIENT
Start: 2024-03-16 | End: 2024-03-16 | Stop reason: HOSPADM

## 2024-03-16 RX ADMIN — CEFTRIAXONE SODIUM 1000 MG: 1 INJECTION, POWDER, FOR SOLUTION INTRAMUSCULAR; INTRAVENOUS at 06:18

## 2024-03-16 RX ADMIN — IOPAMIDOL 85 ML: 612 INJECTION, SOLUTION INTRAVENOUS at 06:10

## 2024-03-16 RX ADMIN — SODIUM CHLORIDE 500 ML: 9 INJECTION, SOLUTION INTRAVENOUS at 04:26

## 2024-03-16 RX ADMIN — ONDANSETRON 4 MG: 2 INJECTION INTRAMUSCULAR; INTRAVENOUS at 04:27

## 2024-03-16 NOTE — ED PROVIDER NOTES
EMERGENCY DEPARTMENT ENCOUNTER  Room Number:  02/02  PCP: Ambrose Ashley MD  Independent Historians: Patient      HPI:  Chief Complaint: had concerns including Abdominal Pain.     A complete HPI/ROS/PMH/PSH/SH/FH are unobtainable due to: None    Chronic or social conditions impacting patient care (Social Determinants of Health): None      Context: Vonda Jay is a 88 y.o. female with a medical history of SANYA, hypertension, anemia, constipation, CKD, UTI, nocturnal hypoxemia, hypophosphatemia, who presents to the emergency department complaining of lower abdominal pain.  Patient also verbalizes complaints of associated nausea and vomiting.  Patient states she vomited 1 time prior to ED arrival.  Pain is described as an ache and radiates throughout abdomen.  She is unaware of any alleviating or aggravating factors.  Patient informs she believes she may be constipated, she took milk of magnesia without any relief. Patient denies fever, chills, headache, lightheadedness, dizziness, numbness, tingling, unilateral extremity weakness, syncope, shortness of breath, chest pain, abdominal pain, nausea, vomiting, diarrhea, dysuria, hematuria, or other complaints.      Review of prior external notes (non-ED) -and- Review of prior external test results outside of this encounter:   12/08/2023: Podiatry office visit. Patient was seen for tinea unguium.    PAST MEDICAL HISTORY  Active Ambulatory Problems     Diagnosis Date Noted    Closed displaced intertrochanteric fracture of left femur 02/07/2018    SANYA (acute kidney injury) 02/07/2018    HTN (hypertension) 02/07/2018    Acute blood loss anemia 02/10/2018    Leukocytosis 02/10/2018    Drug-induced constipation 02/12/2018    Stage 3b chronic kidney disease 09/17/2022    COVID-19 virus infection 09/17/2022    Anemia due to chronic kidney disease 09/17/2022    Acute on chronic diastolic CHF (congestive heart failure) 09/18/2022    Aortic stenosis 09/18/2022    Anemia  of chronic renal failure, stage 3 (moderate) 2022    E. coli UTI (urinary tract infection) 2023    Colovesical fistula - possible 2023    (HFpEF) heart failure with preserved ejection fraction 2023    Obesity (BMI 30-39.9) 2023    Acute UTI (urinary tract infection) 01/15/2023    Cough 01/15/2023    Nocturnal hypoxemia 01/15/2023    Hypophosphatemia 2023     Resolved Ambulatory Problems     Diagnosis Date Noted    Hyperkalemia 2018    Acute hypoxemic respiratory failure due to COVID-19 2022    Hypokalemia 2023     Past Medical History:   Diagnosis Date    Arthritis     History of breast cancer     History of carotid artery disease     Hyperlipidemia     Hypertension          PAST SURGICAL HISTORY  Past Surgical History:   Procedure Laterality Date    BREAST LUMPECTOMY  1998    CAROTID ARTERY ANGIOPLASTY  ,     CHOLECYSTECTOMY  1979    FEMUR IM NAILING/RODDING Left 2018    Procedure: FEMUR INTRAMEDULLARY NAILING;  Surgeon: Zheng Fleming MD;  Location: [facility];  Service:     HYSTERECTOMY           FAMILY HISTORY  Family History   Problem Relation Age of Onset    Hypertension Mother     Heart valve disorder Sister     Lung cancer Sister     Lymphoma Sister     Skin cancer Sister     Stroke Brother 57         SOCIAL HISTORY  Social History     Socioeconomic History    Marital status:    Tobacco Use    Smoking status: Former     Current packs/day: 0.00     Types: Cigarettes     Start date:      Quit date:      Years since quittin.2     Passive exposure: Past    Smokeless tobacco: Never    Tobacco comments:     caffeine use- coffee   Vaping Use    Vaping status: Never Used   Substance and Sexual Activity    Alcohol use: No    Drug use: Defer    Sexual activity: Defer         ALLERGIES  Patient has no known allergies.      REVIEW OF SYSTEMS  Included in HPI  All systems reviewed and negative except for those discussed in  HPI.      PHYSICAL EXAM    I have reviewed the triage vital signs and nursing notes.    ED Triage Vitals   Temp Heart Rate Resp BP SpO2   03/16/24 0351 03/16/24 0347 03/16/24 0347 03/16/24 0347 03/16/24 0347   98 °F (36.7 °C) 58 20 165/75 98 %      Temp src Heart Rate Source Patient Position BP Location FiO2 (%)   03/16/24 0351 -- -- -- --   Tympanic           Physical Exam      NIH:                                                             LAB RESULTS  Recent Results (from the past 24 hour(s))   Comprehensive Metabolic Panel    Collection Time: 03/16/24  4:28 AM    Specimen: Blood   Result Value Ref Range    Glucose 126 (H) 65 - 99 mg/dL    BUN 38 (H) 8 - 23 mg/dL    Creatinine 1.50 (H) 0.57 - 1.00 mg/dL    Sodium 139 136 - 145 mmol/L    Potassium 4.1 3.5 - 5.2 mmol/L    Chloride 98 98 - 107 mmol/L    CO2 29.0 22.0 - 29.0 mmol/L    Calcium 9.5 8.6 - 10.5 mg/dL    Total Protein 7.2 6.0 - 8.5 g/dL    Albumin 4.4 3.5 - 5.2 g/dL    ALT (SGPT) 12 1 - 33 U/L    AST (SGOT) 16 1 - 32 U/L    Alkaline Phosphatase 85 39 - 117 U/L    Total Bilirubin 0.2 0.0 - 1.2 mg/dL    Globulin 2.8 gm/dL    A/G Ratio 1.6 g/dL    BUN/Creatinine Ratio 25.3 (H) 7.0 - 25.0    Anion Gap 12.0 5.0 - 15.0 mmol/L    eGFR 33.4 (L) >60.0 mL/min/1.73   Lipase    Collection Time: 03/16/24  4:28 AM    Specimen: Blood   Result Value Ref Range    Lipase 41 13 - 60 U/L   Urinalysis With Microscopic If Indicated (No Culture) - Urine, Catheter    Collection Time: 03/16/24  4:28 AM    Specimen: Urine, Catheter   Result Value Ref Range    Color, UA Yellow Yellow, Straw    Appearance, UA Cloudy (A) Clear    pH, UA 8.0 5.0 - 8.0    Specific Gravity, UA 1.016 1.005 - 1.030    Glucose, UA Negative Negative    Ketones, UA Negative Negative    Bilirubin, UA Negative Negative    Blood, UA Trace (A) Negative    Protein,  mg/dL (2+) (A) Negative    Leuk Esterase, UA Moderate (2+) (A) Negative    Nitrite, UA Negative Negative    Urobilinogen, UA 0.2 E.U./dL 0.2 -  1.0 E.U./dL   CBC Auto Differential    Collection Time: 03/16/24  4:28 AM    Specimen: Blood   Result Value Ref Range    WBC 15.19 (H) 3.40 - 10.80 10*3/mm3    RBC 3.99 3.77 - 5.28 10*6/mm3    Hemoglobin 11.7 (L) 12.0 - 15.9 g/dL    Hematocrit 36.1 34.0 - 46.6 %    MCV 90.5 79.0 - 97.0 fL    MCH 29.3 26.6 - 33.0 pg    MCHC 32.4 31.5 - 35.7 g/dL    RDW 12.4 12.3 - 15.4 %    RDW-SD 40.7 37.0 - 54.0 fl    MPV 10.4 6.0 - 12.0 fL    Platelets 253 140 - 450 10*3/mm3    Neutrophil % 79.2 (H) 42.7 - 76.0 %    Lymphocyte % 11.1 (L) 19.6 - 45.3 %    Monocyte % 6.5 5.0 - 12.0 %    Eosinophil % 2.0 0.3 - 6.2 %    Basophil % 0.5 0.0 - 1.5 %    Immature Grans % 0.7 (H) 0.0 - 0.5 %    Neutrophils, Absolute 12.04 (H) 1.70 - 7.00 10*3/mm3    Lymphocytes, Absolute 1.69 0.70 - 3.10 10*3/mm3    Monocytes, Absolute 0.99 (H) 0.10 - 0.90 10*3/mm3    Eosinophils, Absolute 0.30 0.00 - 0.40 10*3/mm3    Basophils, Absolute 0.07 0.00 - 0.20 10*3/mm3    Immature Grans, Absolute 0.10 (H) 0.00 - 0.05 10*3/mm3    nRBC 0.0 0.0 - 0.2 /100 WBC   Urinalysis, Microscopic Only - Urine, Catheter    Collection Time: 03/16/24  4:28 AM    Specimen: Urine, Catheter   Result Value Ref Range    RBC, UA 6-10 (A) None Seen, 0-2 /HPF    WBC, UA Too Numerous to Count (A) None Seen, 0-2 /HPF    Bacteria, UA 4+ (A) None Seen /HPF    Squamous Epithelial Cells, UA 3-6 (A) None Seen, 0-2 /HPF    Hyaline Casts, UA 3-6 None Seen /LPF    Methodology Automated Microscopy          RADIOLOGY  CT SCAN OF THE ABDOMEN AND PELVIS WITH INTRAVENOUS CONTRAST      HISTORY: Lower abdominal pain. Some nausea and vomiting. Constipation.  Urinary tract infection.     The CT scan was performed as an emergency procedure through the abdomen  and pelvis with intravenous contrast. It is compared to previous exams  including CT scans of the abdomen and pelvis dated 01/12/2023. The  following findings are present:     1. The kidneys are normal in size and there is no evidence of  renal  obstruction. There is no CT evidence of pyelonephritis.     2. The lung bases are clear. The liver, spleen, pancreas, and both  adrenal glands are unremarkable. The gallbladder has been removed.     3. There is extensive atheromatous calcification of the aorta and both  common iliac arteries with some slight luminal narrowing. There is no  evidence of aneurysm and there is no retroperitoneal lymphadenopathy.     4. There is a moderately large amount of fluid scattered in both large  and small bowel loops which is nonspecific but suggests an element of  gastroenteritis. There is some mild sigmoid diverticulosis without  evidence of diverticulitis. The urinary bladder is not distended which  limits evaluation for possible bladder wall thickening.              Radiation dose reduction techniques were utilized, including automated  exposure control and exposure modulation based on body size.        This report was finalized on 3/16/2024 8:20 AM by Dr. Mo Parker M.D  on Workstation: [id number]      MEDICATIONS GIVEN IN ER  Medications   sodium chloride 0.9 % bolus 500 mL (0 mL Intravenous Stopped 3/16/24 0526)   ondansetron (ZOFRAN) injection 4 mg (4 mg Intravenous Given 3/16/24 4827)   iopamidol (ISOVUE-300) 61 % injection 100 mL (85 mL Intravenous Given by Other 3/16/24 0610)   cefTRIAXone (ROCEPHIN) 1,000 mg in sodium chloride 0.9 % 100 mL MBP (0 mg Intravenous Stopped 3/16/24 0637)           OUTPATIENT MEDICATION MANAGEMENT:  No current Epic-ordered facility-administered medications on file.     Current Outpatient Medications Ordered in Epic   Medication Sig Dispense Refill    acetaminophen (TYLENOL) 500 MG tablet Take 2 tablets by mouth Every 6 (Six) Hours As Needed for Mild Pain or Moderate Pain.      acidophilus (FLORANEX) tablet tablet Take  by mouth 3 (Three) Times a Day.      alendronate (FOSAMAX) 70 MG tablet Take 1 tablet by mouth Every 7 (Seven) Days. Saturday      amLODIPine (NORVASC) 5 MG  tablet Take 1 tablet by mouth Daily.      aspirin 81 MG tablet Take 1 tablet by mouth Daily.      Calcium-Magnesium-Vitamin D (CALCIUM MAGNESIUM PO) Take  by mouth.      cefdinir (OMNICEF) 300 MG capsule Take 1 capsule by mouth Daily. 7 capsule 0    cholecalciferol (VITAMIN D3) 1000 units tablet Take 1 tablet by mouth Daily.      citalopram (CeleXA) 10 MG tablet Take 1 tablet by mouth Daily.      Cranberry 125 MG tablet Take  by mouth.      CRANBERRY PO Take 1 tablet by mouth.      docusate calcium (SURFAK) 240 MG capsule Take 1 capsule by mouth 2 (Two) Times a Day As Needed for Constipation.      ferrous sulfate 325 (65 FE) MG tablet Take 1 tablet by mouth Every Other Day. 30 tablet 3    furosemide (LASIX) 40 MG tablet Take 1 tablet by mouth Daily. 90 tablet 3    hydrALAZINE (APRESOLINE) 100 MG tablet Take 1 tablet by mouth 3 (Three) Times a Day.      multivitamin with minerals tablet tablet Take 1 tablet by mouth Daily.      nebivolol (BYSTOLIC) 5 MG tablet Take 1 tablet by mouth Daily. 90 tablet 3    pravastatin (PRAVACHOL) 40 MG tablet Take 1 tablet by mouth Every Night.             PROGRESS, DATA ANALYSIS, CONSULTS, AND MEDICAL DECISION MAKING  ORDERS PLACED DURING THIS VISIT:  Orders Placed This Encounter   Procedures    Urine Culture - Urine,    CT Abdomen Pelvis With Contrast    Comprehensive Metabolic Panel    Lipase    Urinalysis With Microscopic If Indicated (No Culture) - Urine, Catheter    CBC Auto Differential    Urinalysis, Microscopic Only - Urine, Clean Catch    Monitor Blood Pressure    Pulse Oximetry, Continuous    Insert Peripheral IV    CBC & Differential       All labs have been independently interpreted by me.  All radiology studies have been reviewed by me. All EKG's have been independently viewed and interpreted by me.  Discussion below represents my analysis of pertinent findings related to patient's condition, differential diagnosis, treatment plan and final disposition.    Differential  diagnosis includes but is not limited to:   Constipation, colitis, diverticulitis, etc.    ED Course:  ED Course as of 03/18/24 2146   Sat Mar 16, 2024   0526 I discussed the case with Dr. Jaime and they agree to evaluate the patient at the bedside.    [AR]   0526 Creatinine(!): 1.50  IVF ordered. [AR]   0530 The patient's urine is consistent with a UTI.  Will send off her urine culture and treat her with Rocephin.  We have treated her with IV fluids. [GP]   0631 The patient CT scan shows fluid in her colon but no obstructions or blockages. [GP]   0635 On repeat examination the patient is feeling much better.  I spoke with her in regards to admission versus discharge home.  The patient states she is feeling well and would like to go home.  She states she has been taking lots of milk of magnesia.  At this time I advised her believe is stable for her to go home on oral antibiotics and that she likely will have diarrhea for the next 24 to 48 hours but to follow-up with her PCP if not better by Monday or return if worse.  The patient understands and agrees with the plan. [GP]      ED Course User Index  [AR] Salma Larios APRN  [GP] Aramis Jaime MD             MDM:  Patient is an 88 y.o. female who presents to the ED with c/o lower abdominal pain. Pain relieved in ED after patient had a bowel movement. Found to have UTI, IV Rocephin given in ED. CT scan findings discussed with patient by Dr. Jaime, shared decision making about admission versus discharge done by Dr. Jaime and the patient. Patient wanted to be discharged home. Strict return precautions given.      COMPLEXITY OF CARE  Admission was considered but after careful review of the patient's presentation, physical examination, diagnostic results, and response to treatment the patient may be safely discharged with outpatient follow-up.        DIAGNOSIS  Final diagnoses:   Lower abdominal pain   Acute UTI         DISPOSITION  ED Disposition       ED  Disposition   Discharge    Condition   Stable    Comment   --                      Please note that portions of this document were completed with a voice recognition program.    Note Disclaimer: At [facility], we believe that sharing information builds trust and better relationships. You are receiving this note because you recently visited [facility]. It is possible you will see health information before a provider has talked with you about it. This kind of information can be easy to misunderstand. To help you fully understand what it means for your health, we urge you to discuss this note with your provider.       Salma Larios, APRN  03/18/24 1532

## 2024-03-16 NOTE — ED PROVIDER NOTES
MD ATTESTATION NOTE      Brief HPI: 88-year-old female who presents the emergency room via EMS for several days of lower abdominal pain that became worse about 11:00 last night.  She describes the pain as cramping.  She states she has been constipated.    General : Pleasant 88-year-old patient is awake alert and oriented  HEENT: NCAT  CV: Heart is regular with no murmurs  Respiratory: CTA bilaterally  Abd: Soft with mild lower abdominal tenderness but no guarding or rebound and diminished bowel sounds  Ext: No acute abnormalities  Skin: No rash  Neuro: Awake with a nonfocal neuro exam  Psych: Normal mood and affect      Plan: Will obtain labs, urinalysis and CT scan for further evaluation    While in the emergency room the patient had a bowel movement and states she feels much better.    The patient's white count is 15 and she does have too numerous to count WBCs in her urine with 4+ bacteria.  She has chronic kidney disease with a creatinine of 1.5.  We will treat her with Rocephin and IV fluids.  We will send off a urine culture    ED Course as of 03/16/24 0639   Sat Mar 16, 2024   0526 I discussed the case with Dr. Jaime and they agree to evaluate the patient at the bedside.    [AR]   0526 Creatinine(!): 1.50  IVF ordered. [AR]   0530 The patient's urine is consistent with a UTI.  Will send off her urine culture and treat her with Rocephin.  We have treated her with IV fluids. [GP]   0631 The patient CT scan shows fluid in her colon but no obstructions or blockages. [GP]   0635 On repeat examination the patient is feeling much better.  I spoke with her in regards to admission versus discharge home.  The patient states she is feeling well and would like to go home.  She states she has been taking lots of milk of magnesia.  At this time I advised her believe is stable for her to go home on oral antibiotics and that she likely will have diarrhea for the next 24 to 48 hours but to follow-up with her PCP if not  better by Monday or return if worse.  The patient understands and agrees with the plan. [GP]      ED Course User Index  [AR] Salma Larios APRN  [GP] Aramis Jaime MD     Final diagnoses:   Lower abdominal pain   Acute UTI     Disposition: Discharged      SHARED VISIT: This visit was performed by BOTH a physician and an APC. The substantive portion of the medical decision making was performed by this attesting physician who made or approved the management plan and takes responsibility for patient management. All studies in the APC note (if performed) were independently interpreted by me.         Aramis Jaime MD  03/16/24 9293

## 2024-03-16 NOTE — DISCHARGE INSTRUCTIONS
Go home and rest today.  Push fluids.  Start your antibiotics today.  Expect to have loose stools for the next 24 to 48 hours.  Follow-up with your doctor if not better by Monday or return if worse.

## 2024-03-16 NOTE — ED NOTES
"Pt states she had a BM when going to the restroom to obtain urine sample and states \"I feel so much better\"  "

## 2024-03-18 LAB
BACTERIA SPEC AEROBE CULT: ABNORMAL
BACTERIA SPEC AEROBE CULT: ABNORMAL

## 2024-07-10 RX ORDER — NEBIVOLOL 5 MG/1
5 TABLET ORAL DAILY
Qty: 90 TABLET | Refills: 0 | Status: SHIPPED | OUTPATIENT
Start: 2024-07-10

## 2024-08-08 ENCOUNTER — TELEPHONE (OUTPATIENT)
Dept: CARDIOLOGY | Facility: CLINIC | Age: 89
End: 2024-08-08

## 2024-08-08 NOTE — TELEPHONE ENCOUNTER
Hub staff attempted to follow warm transfer process and was unsuccessful     Caller: Vonda Jay    Relationship to patient: Self    Best call back number: [phone]    Patient is needing: NEEDS TO RESCHEDULE HER ECHO PLEASE RESCHEDULE AND CALL WITH NEW DATE AND TIME.

## 2024-08-09 NOTE — TELEPHONE ENCOUNTER
Hub staff attempted to follow warm transfer process and was unsuccessful     Caller: Vonda Jay    Relationship to patient: Self    Best call back number: [phone]    Patient is needing: PATIENT STATED THAT SHE NEEDS TO RESCHEDULE ECHO THAT IS ON 08.23.24. PATIENT STATED THAT IT WOULD BE BETTER TO CONTACT HER AFTER 12 PM TODAY.

## 2024-08-13 NOTE — TELEPHONE ENCOUNTER
Caller: Vonda Jay    Relationship to patient: Self    Best call back number: [phone]    Patient is needing: PT HAS CALLED IN MULTIPLE TIMES TO GET HER ECHO RESCHEDULED ON 8.23.24. LAST NOTE WAS MADE ON 8.9.24 AND PT STILL HASN'T RECEIVED A CALL IN REGARDS TO THIS APPOINTMENT. CAN SOMEBODY PLEASE CALL HER TO GET THIS RESCHEDULED?

## 2024-08-15 NOTE — TELEPHONE ENCOUNTER
PT HAS STILL YET TO HEAR BACK FROM ANYONE TO RESCHEDULE HER ECHO. PT HAS BEEN TRANSFERRED MULTIPLE TIMES WITH NO ANSWER. PLEASE ADVISE.

## 2024-08-28 ENCOUNTER — HOSPITAL ENCOUNTER (OUTPATIENT)
Dept: CARDIOLOGY | Facility: HOSPITAL | Age: 89
Discharge: HOME | End: 2024-08-28
Admitting: NURSE PRACTITIONER
Payer: MEDICARE

## 2024-08-28 VITALS
SYSTOLIC BLOOD PRESSURE: 132 MMHG | WEIGHT: 157 LBS | HEIGHT: 61 IN | DIASTOLIC BLOOD PRESSURE: 46 MMHG | BODY MASS INDEX: 29.64 KG/M2

## 2024-08-28 DIAGNOSIS — I35.0 NONRHEUMATIC AORTIC VALVE STENOSIS: ICD-10-CM

## 2024-08-28 LAB
AORTIC DIMENSIONLESS INDEX: 0.2 (DI)
BH CV ECHO MEAS - ACS: 1 CM
BH CV ECHO MEAS - AO MAX PG: 97 MMHG
BH CV ECHO MEAS - AO MEAN PG: 61 MMHG
BH CV ECHO MEAS - AO ROOT DIAM: 2.33 CM
BH CV ECHO MEAS - AO V2 MAX: 492.5 CM/SEC
BH CV ECHO MEAS - AO V2 VTI: 140.7 CM
BH CV ECHO MEAS - AVA(I,D): 0.75 CM2
BH CV ECHO MEAS - EDV(CUBED): 105.5 ML
BH CV ECHO MEAS - EDV(MOD-SP2): 66 ML
BH CV ECHO MEAS - EDV(MOD-SP4): 60 ML
BH CV ECHO MEAS - EF(MOD-BP): 69.8 %
BH CV ECHO MEAS - EF(MOD-SP2): 74.2 %
BH CV ECHO MEAS - EF(MOD-SP4): 66.7 %
BH CV ECHO MEAS - ESV(CUBED): 18.9 ML
BH CV ECHO MEAS - ESV(MOD-SP2): 17 ML
BH CV ECHO MEAS - ESV(MOD-SP4): 20 ML
BH CV ECHO MEAS - FS: 43.7 %
BH CV ECHO MEAS - IVS/LVPW: 0.94 CM
BH CV ECHO MEAS - IVSD: 0.79 CM
BH CV ECHO MEAS - LAT PEAK E' VEL: 8.9 CM/SEC
BH CV ECHO MEAS - LV DIASTOLIC VOL/BSA (35-75): 35.2 CM2
BH CV ECHO MEAS - LV MASS(C)D: 125.4 GRAMS
BH CV ECHO MEAS - LV MAX PG: 5.1 MMHG
BH CV ECHO MEAS - LV MEAN PG: 2.9 MMHG
BH CV ECHO MEAS - LV SYSTOLIC VOL/BSA (12-30): 11.7 CM2
BH CV ECHO MEAS - LV V1 MAX: 113.3 CM/SEC
BH CV ECHO MEAS - LV V1 VTI: 32.7 CM
BH CV ECHO MEAS - LVIDD: 4.7 CM
BH CV ECHO MEAS - LVIDS: 2.7 CM
BH CV ECHO MEAS - LVOT AREA: 3.2 CM2
BH CV ECHO MEAS - LVOT DIAM: 2.03 CM
BH CV ECHO MEAS - LVPWD: 0.83 CM
BH CV ECHO MEAS - MED PEAK E' VEL: 6.2 CM/SEC
BH CV ECHO MEAS - MR MAX PG: 62.2 MMHG
BH CV ECHO MEAS - MR MAX VEL: 394.4 CM/SEC
BH CV ECHO MEAS - MV A DUR: 0.11 SEC
BH CV ECHO MEAS - MV A MAX VEL: 130.2 CM/SEC
BH CV ECHO MEAS - MV DEC SLOPE: 628.4 CM/SEC2
BH CV ECHO MEAS - MV DEC TIME: 0.24 SEC
BH CV ECHO MEAS - MV E MAX VEL: 145 CM/SEC
BH CV ECHO MEAS - MV E/A: 1.11
BH CV ECHO MEAS - MV MAX PG: 9.5 MMHG
BH CV ECHO MEAS - MV MEAN PG: 3.2 MMHG
BH CV ECHO MEAS - MV P1/2T: 70.9 MSEC
BH CV ECHO MEAS - MV V2 VTI: 50.7 CM
BH CV ECHO MEAS - MVA(P1/2T): 3.1 CM2
BH CV ECHO MEAS - MVA(VTI): 2.08 CM2
BH CV ECHO MEAS - PA ACC TIME: 0.12 SEC
BH CV ECHO MEAS - PA V2 MAX: 120.5 CM/SEC
BH CV ECHO MEAS - PULM A REVS DUR: 0.11 SEC
BH CV ECHO MEAS - PULM A REVS VEL: 28.1 CM/SEC
BH CV ECHO MEAS - PULM DIAS VEL: 47.1 CM/SEC
BH CV ECHO MEAS - PULM S/D: 1.4
BH CV ECHO MEAS - PULM SYS VEL: 66.2 CM/SEC
BH CV ECHO MEAS - RAP SYSTOLE: 3 MMHG
BH CV ECHO MEAS - RV MAX PG: 3.7 MMHG
BH CV ECHO MEAS - RV V1 MAX: 96.7 CM/SEC
BH CV ECHO MEAS - RV V1 VTI: 26.2 CM
BH CV ECHO MEAS - SV(LVOT): 105.6 ML
BH CV ECHO MEAS - SV(MOD-SP2): 49 ML
BH CV ECHO MEAS - SV(MOD-SP4): 40 ML
BH CV ECHO MEAS - SVI(LVOT): 62 ML/M2
BH CV ECHO MEAS - SVI(MOD-SP2): 28.7 ML/M2
BH CV ECHO MEAS - SVI(MOD-SP4): 23.5 ML/M2
BH CV ECHO MEAS - TAPSE (>1.6): 2.49 CM
BH CV ECHO MEASUREMENTS AVERAGE E/E' RATIO: 19.21
BH CV XLRA - RV BASE: 3.4 CM
BH CV XLRA - RV LENGTH: 6.9 CM
BH CV XLRA - RV MID: 2.08 CM
BH CV XLRA - TDI S': 16.3 CM/SEC
LEFT ATRIUM VOLUME INDEX: 27.9 ML/M2
SINUS: 2.45 CM
STJ: 2.49 CM

## 2024-08-28 PROCEDURE — 93306 TTE W/DOPPLER COMPLETE: CPT

## 2024-08-29 NOTE — PROGRESS NOTES
Left message for patient advising her to call the office to review echocardiogram results.  She was also made aware that this will be reviewed at her upcoming appointment with Dr. Singleton.

## 2024-09-09 ENCOUNTER — OFFICE VISIT (OUTPATIENT)
Dept: CARDIOLOGY | Facility: CLINIC | Age: 89
End: 2024-09-09
Payer: MEDICARE

## 2024-09-09 VITALS
DIASTOLIC BLOOD PRESSURE: 78 MMHG | BODY MASS INDEX: 29.07 KG/M2 | SYSTOLIC BLOOD PRESSURE: 138 MMHG | HEART RATE: 57 BPM | HEIGHT: 61 IN | WEIGHT: 154 LBS

## 2024-09-09 DIAGNOSIS — I35.0 NONRHEUMATIC AORTIC VALVE STENOSIS: Primary | ICD-10-CM

## 2024-09-09 DIAGNOSIS — I10 PRIMARY HYPERTENSION: ICD-10-CM

## 2024-09-09 PROCEDURE — 99214 OFFICE O/P EST MOD 30 MIN: CPT | Performed by: INTERNAL MEDICINE

## 2024-09-09 PROCEDURE — 93000 ELECTROCARDIOGRAM COMPLETE: CPT | Performed by: INTERNAL MEDICINE

## 2024-09-09 NOTE — PROGRESS NOTES
"      CARDIOLOGY    Adam Singleton MD    ENCOUNTER DATE:  09/09/2024    Vonda Jay / 89 y.o. / female        CHIEF COMPLAINT / REASON FOR OFFICE VISIT     Nonrheumatic aortic valve stenosis (08/23/2023 Follow up)      HISTORY OF PRESENT ILLNESS       HPI  Vonda Jay is a 89 y.o. female who presents today for reevaluation.  Clinically patient is doing well.  Patient denies having chest pain shortness of breath palpitations lightheadedness swelling or fatigue      The following portions of the patient's history were reviewed and updated as appropriate: allergies, current medications, past family history, past medical history, past social history, past surgical history and problem list.      VITAL SIGNS     Visit Vitals  /78 (BP Location: Left arm)   Pulse 57   Ht 154.9 cm (61\")   Wt 69.9 kg (154 lb)   LMP  (LMP Unknown)   BMI 29.10 kg/m²         Wt Readings from Last 3 Encounters:   09/09/24 69.9 kg (154 lb)   08/28/24 71.2 kg (157 lb)   03/16/24 71.2 kg (157 lb)     Body mass index is 29.1 kg/m².      REVIEW OF SYSTEMS   ROS        PHYSICAL EXAMINATION     Vitals reviewed.   Cardiovascular:      PMI at left midclavicular line. Normal rate. Regular rhythm. Normal S1. Normal S2.       Murmurs: There is a grade 4/6 harsh midsystolic murmur at the URSB.      No gallop.  No click. No rub.   Pulses:     Intact distal pulses.   Edema:     Peripheral edema absent.           REVIEWED DATA       ECG 12 Lead    Date/Time: 9/9/2024 3:43 PM  Performed by: Adam Singleton MD    Authorized by: Adam Singleton MD  Comparison: compared with previous ECG from 8/23/2023  Similar to previous ECG  Rhythm: sinus rhythm    Clinical impression: normal ECG        Interpretation Summary  8/28/24         Left ventricular systolic function is normal. Calculated left ventricular EF = 69.8%    Left ventricular diastolic function is consistent with (grade I) impaired relaxation.    Severe aortic valve stenosis is " present. Aortic valve area is 0.8 cm2.    Peak velocity of the flow distal to the aortic valve is 492.5 cm/s. Aortic valve maximum pressure gradient is 97 mmHg. Aortic valve mean pressure gradient is 61 mmHg. Aortic valve dimensionless index is 0.2 .    Calculated right ventricular systolic pressure from tricuspid regurgitation is 0 mmHg.    Mild mitral valve regurgitation is present     Cardiac Procedures:            ASSESSMENT & PLAN      Diagnosis Plan   1. Nonrheumatic aortic valve stenosis        2. Primary hypertension              SUMMARY/DISCUSSION  Hypertension blood pressures good  Aortic stenosis.  Patient with very severe aortic stenosis.  I had a long discussion with the patient.  Patient says she really does not want anything done.  She said she feels fine really not having any issues.  She said she is not afraid to die.  I did try to call her daughter Nusrat but she did not answer so that I called her son Angus and talk to him for a few minutes.  I explained the condition to him and the understanding.  He confirmed she really does not want anything done at the current time but I want to make sure all of the children understood the situation.  Follow-up 6 months sooner if issues occur.        MEDICATIONS         Discharge Medications            Accurate as of September 9, 2024  3:38 PM. If you have any questions, ask your nurse or doctor.                Continue These Medications        Instructions Start Date   acetaminophen 500 MG tablet  Commonly known as: TYLENOL   1,000 mg, Oral, Every 6 Hours PRN      acidophilus tablet tablet   Oral, 3 Times Daily      alendronate 70 MG tablet  Commonly known as: FOSAMAX   70 mg, Oral, Every 7 Days, Saturday      amLODIPine 5 MG tablet  Commonly known as: NORVASC   5 mg, Oral, Daily      aspirin 81 MG tablet   81 mg, Oral, Daily      CALCIUM MAGNESIUM PO   Oral      cefdinir 300 MG capsule  Commonly known as: OMNICEF   300 mg, Oral, Daily      cholecalciferol 25  MCG (1000 UT) tablet  Commonly known as: VITAMIN D3   1,000 Units, Oral, Daily      citalopram 10 MG tablet  Commonly known as: CeleXA   10 mg, Oral, Daily      Cranberry 125 MG tablet   Oral      CRANBERRY PO   1 tablet, Oral      docusate calcium 240 MG capsule  Commonly known as: SURFAK   240 mg, Oral, 2 Times Daily PRN      ferrous sulfate 325 (65 FE) MG tablet   325 mg, Oral, Every Other Day      furosemide 40 MG tablet  Commonly known as: LASIX   40 mg, Oral, Daily      hydrALAZINE 100 MG tablet  Commonly known as: APRESOLINE   100 mg, Oral, 3 Times Daily      multivitamin with minerals tablet tablet   1 tablet, Oral, Daily      nebivolol 5 MG tablet  Commonly known as: BYSTOLIC   5 mg, Oral, Daily      pravastatin 40 MG tablet  Commonly known as: PRAVACHOL   40 mg, Oral, Nightly                   **Dragon Disclaimer:   Much of this encounter note is an electronic transcription/translation of spoken language to printed text. The electronic translation of spoken language may permit erroneous, or at times, nonsensical words or phrases to be inadvertently transcribed. Although I have reviewed the note for such errors, some may still exist.

## 2024-09-13 ENCOUNTER — OFFICE VISIT (OUTPATIENT)
Dept: ORTHOPEDIC SURGERY | Facility: CLINIC | Age: 89
End: 2024-09-13
Payer: MEDICARE

## 2024-09-13 VITALS — TEMPERATURE: 97.7 F | HEIGHT: 61 IN | WEIGHT: 156.8 LBS | BODY MASS INDEX: 29.6 KG/M2

## 2024-09-13 DIAGNOSIS — G89.29 CHRONIC RIGHT SHOULDER PAIN: Primary | ICD-10-CM

## 2024-09-13 DIAGNOSIS — M12.811 ROTATOR CUFF TEAR ARTHROPATHY OF RIGHT SHOULDER: ICD-10-CM

## 2024-09-13 DIAGNOSIS — M75.101 ROTATOR CUFF TEAR ARTHROPATHY OF RIGHT SHOULDER: ICD-10-CM

## 2024-09-13 DIAGNOSIS — M25.511 CHRONIC RIGHT SHOULDER PAIN: Primary | ICD-10-CM

## 2024-09-13 RX ORDER — METHYLPREDNISOLONE ACETATE 80 MG/ML
80 INJECTION, SUSPENSION INTRA-ARTICULAR; INTRALESIONAL; INTRAMUSCULAR; SOFT TISSUE
Status: COMPLETED | OUTPATIENT
Start: 2024-09-13 | End: 2024-09-13

## 2024-09-13 RX ORDER — LIDOCAINE HYDROCHLORIDE 10 MG/ML
2 INJECTION, SOLUTION EPIDURAL; INFILTRATION; INTRACAUDAL; PERINEURAL
Status: COMPLETED | OUTPATIENT
Start: 2024-09-13 | End: 2024-09-13

## 2024-09-13 RX ADMIN — METHYLPREDNISOLONE ACETATE 80 MG: 80 INJECTION, SUSPENSION INTRA-ARTICULAR; INTRALESIONAL; INTRAMUSCULAR; SOFT TISSUE at 14:50

## 2024-09-13 RX ADMIN — LIDOCAINE HYDROCHLORIDE 2 ML: 10 INJECTION, SOLUTION EPIDURAL; INFILTRATION; INTRACAUDAL; PERINEURAL at 14:50

## 2024-09-13 NOTE — PROGRESS NOTES
Ms. Jay comes in today for follow-up.  Injections have worked well in the past.  The patient would like to get a repeat injection today.     Imaging:  AP, scapular Y, and axillary views of the right shoulder are ordered by myself and reviewed to evaluate the patient's complaint.  These are compared to previous x-rays.  The x-rays show significant rotator cuff tear arthropathy with a diminished acromiohumeral interval, joint space narrowing, osteophyte formation, and subchondral sclerosis.  No significant changes compared to previous x-rays.    The risks, benefits and alternatives were discussed and the patient consented.  Going forward, the patient will follow-up as needed.    ELVIE Abdi    09/13/2024      Large Joint Arthrocentesis: R subacromial bursa  Date/Time: 9/13/2024 2:50 PM  Consent given by: patient  Site marked: site marked  Timeout: Immediately prior to procedure a time out was called to verify the correct patient, procedure, equipment, support staff and site/side marked as required   Supporting Documentation  Indications: pain   Procedure Details  Location: shoulder - R subacromial bursa  Preparation: Patient was prepped and draped in the usual sterile fashion  Needle gauge: 21G.  Approach: posterior  Medications administered: 80 mg methylPREDNISolone acetate 80 MG/ML; 2 mL lidocaine PF 1% 1 %  Patient tolerance: patient tolerated the procedure well with no immediate complications

## 2024-10-07 ENCOUNTER — TELEPHONE (OUTPATIENT)
Dept: CARDIOLOGY | Facility: CLINIC | Age: 89
End: 2024-10-07

## 2024-10-08 RX ORDER — NEBIVOLOL 5 MG/1
5 TABLET ORAL DAILY
Qty: 90 TABLET | Refills: 0 | Status: SHIPPED | OUTPATIENT
Start: 2024-10-08

## 2024-10-17 ENCOUNTER — OFFICE VISIT (OUTPATIENT)
Dept: CARDIOLOGY | Facility: CLINIC | Age: 89
End: 2024-10-17
Payer: MEDICARE

## 2024-10-17 VITALS
WEIGHT: 160 LBS | HEIGHT: 61 IN | BODY MASS INDEX: 30.21 KG/M2 | DIASTOLIC BLOOD PRESSURE: 70 MMHG | HEART RATE: 55 BPM | OXYGEN SATURATION: 98 % | SYSTOLIC BLOOD PRESSURE: 132 MMHG

## 2024-10-17 DIAGNOSIS — I35.0 NONRHEUMATIC AORTIC VALVE STENOSIS: Primary | ICD-10-CM

## 2024-10-17 NOTE — PROGRESS NOTES
"      CARDIOLOGY    Adam Singleton MD    ENCOUNTER DATE:  10/17/2024    Vonda Jay / 89 y.o. / female        CHIEF COMPLAINT / REASON FOR OFFICE VISIT     Aortic Stenosis      HISTORY OF PRESENT ILLNESS       HPI  Vonda Jay is a 89 y.o. female who presents today for reevaluation.  Last time I saw patient we discussed TAVR.  Patient was quite overwhelmed I did talk to her family.  She brought her daughter in with her today to discuss it again.  She said last time she was so overwhelmed with the concept that she did not quite understand.      The following portions of the patient's history were reviewed and updated as appropriate: allergies, current medications, past family history, past medical history, past social history, past surgical history and problem list.      VITAL SIGNS     Visit Vitals  /70 (BP Location: Right arm)   Pulse 55   Ht 154.9 cm (61\")   Wt 72.6 kg (160 lb)   LMP  (LMP Unknown)   SpO2 98%   BMI 30.23 kg/m²         Wt Readings from Last 3 Encounters:   10/17/24 72.6 kg (160 lb)   09/13/24 71.1 kg (156 lb 12.8 oz)   09/09/24 69.9 kg (154 lb)     Body mass index is 30.23 kg/m².      REVIEW OF SYSTEMS   ROS        PHYSICAL EXAMINATION     Vitals reviewed.   Cardiovascular:      Normal rate. Regular rhythm. Normal S1. Normal S2.       Murmurs: There is no murmur.      No gallop.  No click. No rub.   Pulses:     Intact distal pulses.   Edema:     Peripheral edema absent.           REVIEWED DATA     Procedures    Cardiac Procedures:            ASSESSMENT & PLAN      Diagnosis Plan   1. Nonrheumatic aortic valve stenosis              SUMMARY/DISCUSSION  Patient with severe/critical aortic stenosis.  I had a discussion again with her about replacing her valve.  Her daughter was present during the discussion.  At the current time the patient wants to pursue this.  I referred her to the TAVR team.  Hypertension blood pressure is great  Will follow-up after TAVR " evaluation.        MEDICATIONS         Discharge Medications            Accurate as of October 17, 2024 12:05 PM. If you have any questions, ask your nurse or doctor.                Continue These Medications        Instructions Start Date   acetaminophen 500 MG tablet  Commonly known as: TYLENOL   1,000 mg, Every 6 Hours PRN      acidophilus tablet tablet   3 Times Daily      alendronate 70 MG tablet  Commonly known as: FOSAMAX   70 mg, Every 7 Days      amLODIPine 5 MG tablet  Commonly known as: NORVASC   5 mg, Daily      aspirin 81 MG tablet   81 mg, Daily      CALCIUM MAGNESIUM PO   Take  by mouth.      cefdinir 300 MG capsule  Commonly known as: OMNICEF   300 mg, Oral, Daily      cholecalciferol 25 MCG (1000 UT) tablet  Commonly known as: VITAMIN D3   1,000 Units, Daily      citalopram 10 MG tablet  Commonly known as: CeleXA   10 mg, Daily      Cranberry 125 MG tablet   Take  by mouth.      CRANBERRY PO   1 tablet      docusate calcium 240 MG capsule  Commonly known as: SURFAK   240 mg, 2 Times Daily PRN      ferrous sulfate 325 (65 FE) MG tablet   325 mg, Oral, Every Other Day      furosemide 40 MG tablet  Commonly known as: LASIX   40 mg, Oral, Daily      hydrALAZINE 100 MG tablet  Commonly known as: APRESOLINE   100 mg, 3 Times Daily      multivitamin with minerals tablet tablet   1 tablet, Daily      nebivolol 5 MG tablet  Commonly known as: BYSTOLIC   5 mg, Oral, Daily      pravastatin 40 MG tablet  Commonly known as: PRAVACHOL   40 mg, Nightly                   **Dragon Disclaimer:   Much of this encounter note is an electronic transcription/translation of spoken language to printed text. The electronic translation of spoken language may permit erroneous, or at times, nonsensical words or phrases to be inadvertently transcribed. Although I have reviewed the note for such errors, some may still exist.

## 2024-10-18 ENCOUNTER — TELEPHONE (OUTPATIENT)
Dept: CARDIOLOGY | Facility: HOSPITAL | Age: 89
End: 2024-10-18

## 2024-10-18 RX ORDER — FUROSEMIDE 40 MG
40 TABLET ORAL DAILY
Qty: 90 TABLET | Refills: 3 | Status: SHIPPED | OUTPATIENT
Start: 2024-10-18

## 2024-10-18 NOTE — TELEPHONE ENCOUNTER
"I called and introduced the Structural Heart Team and the evaluation process for Aortic Stenosis.  We discussed that Dr. Singleton saw progression in her aortic stenosis from moderate to now severe. Mrs. Jay stated that she doesn't think she has any symptoms.  She does admit to \"not being able to do everything she did before, but, just thought it was because of her age.      I have messaged our schedulers to set up the TAVR consultation with Dr. Rizvi at his earliest convenience.      I mailed the Shared Decision Tool to her.       All questions answered.                   Mrs Jay has the Structural Heart office contact information and is encouraged to call for any questions or concerns.    "

## 2024-10-31 ENCOUNTER — OFFICE VISIT (OUTPATIENT)
Age: 89
End: 2024-10-31
Payer: MEDICARE

## 2024-10-31 VITALS
HEART RATE: 60 BPM | DIASTOLIC BLOOD PRESSURE: 56 MMHG | WEIGHT: 160 LBS | SYSTOLIC BLOOD PRESSURE: 144 MMHG | BODY MASS INDEX: 30.21 KG/M2 | HEIGHT: 61 IN

## 2024-10-31 DIAGNOSIS — I10 PRIMARY HYPERTENSION: ICD-10-CM

## 2024-10-31 DIAGNOSIS — I35.0 NONRHEUMATIC AORTIC VALVE STENOSIS: Primary | ICD-10-CM

## 2024-10-31 NOTE — PROGRESS NOTES
Vonda Jay  1935  Date of Office Visit: 10/31/24  Encounter Provider: Mike Rizvi MD  Place of Service: [facility] CARDIOLOGY      CHIEF COMPLAINT:  Critical degenerative aortic valve stenosis  Essential hypertension  Hyperlipidemia    HISTORY OF PRESENT ILLNESS:  89-year-old female with a medical history of aortic valve stenosis who presents to see me in consultation.  She follows with Mani.  She also has a medical history of essential hypertension, carotid artery disease, and CKD.  She does complain of fatigue but has not had recent issues with chest pain or syncope.  Her aortic valve stenosis has been followed closely.  Her last transthoracic echocardiogram showed progression of the aortic valve stenosis to critical degenerative aortic valve stenosis.  Her mean pressure gradient across the aortic valve was 60 mmHg with a dimensionless index of 0.2.  Valve area was 0.8 cm².  Her ejection fraction is still preserved.    Review of Systems   Constitutional: Negative for fever and malaise/fatigue.   HENT:  Negative for nosebleeds and sore throat.    Eyes:  Negative for blurred vision and double vision.   Cardiovascular:  Negative for chest pain, claudication, palpitations and syncope.   Respiratory:  Negative for cough, shortness of breath and snoring.    Endocrine: Negative for cold intolerance, heat intolerance and polydipsia.   Skin:  Negative for itching, poor wound healing and rash.   Musculoskeletal:  Negative for joint pain, joint swelling, muscle weakness and myalgias.   Gastrointestinal:  Negative for abdominal pain, melena, nausea and vomiting.   Neurological:  Negative for light-headedness, loss of balance, seizures, vertigo and weakness.   Psychiatric/Behavioral:  Negative for altered mental status and depression.          Past Medical History:   Diagnosis Date    Arthritis     History of breast cancer     History of carotid artery disease      "Hyperlipidemia     Hypertension        The following portions of the patient's history were reviewed and updated as appropriate: Social history , Family history, and Surgical history     Current Outpatient Medications on File Prior to Visit   Medication Sig Dispense Refill    acetaminophen (TYLENOL) 500 MG tablet Take 2 tablets by mouth Every 6 (Six) Hours As Needed for Mild Pain or Moderate Pain.      acidophilus (FLORANEX) tablet tablet Take  by mouth 3 (Three) Times a Day.      alendronate (FOSAMAX) 70 MG tablet Take 1 tablet by mouth Every 7 (Seven) Days. Saturday      amLODIPine (NORVASC) 5 MG tablet Take 1 tablet by mouth Daily.      aspirin 81 MG tablet Take 1 tablet by mouth Daily.      Calcium-Magnesium-Vitamin D (CALCIUM MAGNESIUM PO) Take  by mouth.      cefdinir (OMNICEF) 300 MG capsule Take 1 capsule by mouth Daily. 7 capsule 0    cholecalciferol (VITAMIN D3) 1000 units tablet Take 1 tablet by mouth Daily.      citalopram (CeleXA) 10 MG tablet Take 1 tablet by mouth Daily.      Cranberry 125 MG tablet Take  by mouth.      CRANBERRY PO Take 1 tablet by mouth.      docusate calcium (SURFAK) 240 MG capsule Take 1 capsule by mouth 2 (Two) Times a Day As Needed for Constipation.      ferrous sulfate 325 (65 FE) MG tablet Take 1 tablet by mouth Every Other Day. 30 tablet 3    furosemide (LASIX) 40 MG tablet TAKE 1 TABLET DAILY 90 tablet 3    hydrALAZINE (APRESOLINE) 100 MG tablet Take 1 tablet by mouth 3 (Three) Times a Day.      multivitamin with minerals tablet tablet Take 1 tablet by mouth Daily.      nebivolol (BYSTOLIC) 5 MG tablet TAKE 1 TABLET BY MOUTH EVERY DAY 90 tablet 0    pravastatin (PRAVACHOL) 40 MG tablet Take 1 tablet by mouth Every Night.       No current facility-administered medications on file prior to visit.       No Known Allergies    Vitals:    10/31/24 1254   BP: 144/56   Pulse: 60   Weight: 72.6 kg (160 lb)   Height: 154.9 cm (61\")     Body mass index is 30.23 kg/m².   Constitutional: "       Appearance: Well-developed.   Eyes:      General: No scleral icterus.     Conjunctiva/sclera: Conjunctivae normal.   HENT:      Head: Normocephalic and atraumatic.   Neck:      Thyroid: No thyromegaly.      Vascular: Normal carotid pulses. No carotid bruit, hepatojugular reflux or JVD.      Trachea: No tracheal deviation.   Pulmonary:      Effort: No respiratory distress.      Breath sounds: Normal breath sounds. No decreased breath sounds. No wheezing. No rhonchi. No rales.   Chest:      Chest wall: Not tender to palpatation.   Cardiovascular:      Normal rate. Regular rhythm.      Murmurs: There is a grade 3/6 mid to late systolic murmur.      No gallop.    Pulses:     Carotid: 2+ bilaterally.     Radial: 2+ bilaterally.     Femoral: 2+ bilaterally.     Dorsalis pedis: 2+ bilaterally.     Posterior tibial: 2+ bilaterally.  Edema:     Peripheral edema absent.   Abdominal:      General: Bowel sounds are normal. There is no distension.      Palpations: Abdomen is soft.      Tenderness: There is no abdominal tenderness.   Musculoskeletal:         General: No deformity.      Cervical back: Normal range of motion and neck supple. Skin:     Findings: No erythema or rash.   Neurological:      Mental Status: Alert and oriented to person, place, and time.      Sensory: No sensory deficit.   Psychiatric:         Behavior: Behavior normal.           Lab Results   Component Value Date    WBC 15.19 (H) 03/16/2024    HGB 11.7 (L) 03/16/2024    HCT 36.1 03/16/2024    MCV 90.5 03/16/2024     03/16/2024       Lab Results   Component Value Date    GLUCOSE 126 (H) 03/16/2024    BUN 38 (H) 03/16/2024    CREATININE 1.50 (H) 03/16/2024     03/16/2024    K 4.1 03/16/2024    CL 98 03/16/2024    CALCIUM 9.5 03/16/2024    PROTEINTOT 7.2 03/16/2024    ALBUMIN 4.4 03/16/2024    ALT 12 03/16/2024    AST 16 03/16/2024    ALKPHOS 85 03/16/2024    BILITOT 0.2 03/16/2024    GLOB 2.8 03/16/2024    AGRATIO 1.6 03/16/2024    BCR  25.3 (H) 03/16/2024    ANIONGAP 12.0 03/16/2024    EGFR 33.4 (L) 03/16/2024       Lab Results   Component Value Date    GLUCOSE 126 (H) 03/16/2024    CALCIUM 9.5 03/16/2024     03/16/2024    K 4.1 03/16/2024    CO2 29.0 03/16/2024    CL 98 03/16/2024    BUN 38 (H) 03/16/2024    CREATININE 1.50 (H) 03/16/2024    EGFR 33.4 (L) 03/16/2024    BCR 25.3 (H) 03/16/2024    ANIONGAP 12.0 03/16/2024       Lab Results   Component Value Date    CHLPL 160 09/02/2020    TRIG 66 09/02/2020    HDL 75 09/02/2020    LDL 72 09/02/2020       Procedures       Results for orders placed during the hospital encounter of 08/28/24    Adult Transthoracic Echo Complete W/ Cont if Necessary Per Protocol    Interpretation Summary    Left ventricular systolic function is normal. Calculated left ventricular EF = 69.8%    Left ventricular diastolic function is consistent with (grade I) impaired relaxation.    Severe aortic valve stenosis is present. Aortic valve area is 0.8 cm2.    Peak velocity of the flow distal to the aortic valve is 492.5 cm/s. Aortic valve maximum pressure gradient is 97 mmHg. Aortic valve mean pressure gradient is 61 mmHg. Aortic valve dimensionless index is 0.2 .    Calculated right ventricular systolic pressure from tricuspid regurgitation is 0 mmHg.    Mild mitral valve regurgitation is present      DISCUSSION/SUMMARY  Very pleasant 89-year-old female with a medical history of critical degenerative aortic valve stenosis, fatigue, mild ROBERTSON, essential hypertension, and hyperlipidemia who presents to me for evaluation.  Her main complaint is fatigue with activity.  She denies any chest pain or syncope.  I reviewed her transthoracic echocardiogram and she clearly has critical degenerative aortic valve stenosis with a preserved ejection fraction.    I recommended moving down the path of evaluation for transcatheter aortic valve replacement.  Will start with a left and right heart catheterization.    1.  Critical  degenerative aortic valve stenosis  - Plan on left and right heart catheterization.  After that she will need a CTA chest abdomen and pelvis to evaluate for transfemoral approach.  -We will try to arrange for surgical consultation as well.    2.  Essential hypertension: Mildly elevated today.  Continue amlodipine 5 mg p.o. daily, nebivolol 5 mg p.o. daily, and hydralazine 100 mg p.o. every 8 hours.  Seems to be tolerating this well.    3.  CKD stage III.  Will have to space out her workup and minimize contrast administration during coronary angiography and transcatheter valve.    4.

## 2024-10-31 NOTE — H&P (VIEW-ONLY)
Vonda Jay  1935  Date of Office Visit: 10/31/24  Encounter Provider: Mike Rizvi MD  Place of Service: [facility] CARDIOLOGY      CHIEF COMPLAINT:  Critical degenerative aortic valve stenosis  Essential hypertension  Hyperlipidemia    HISTORY OF PRESENT ILLNESS:  89-year-old female with a medical history of aortic valve stenosis who presents to see me in consultation.  She follows with Mani.  She also has a medical history of essential hypertension, carotid artery disease, and CKD.  She does complain of fatigue but has not had recent issues with chest pain or syncope.  Her aortic valve stenosis has been followed closely.  Her last transthoracic echocardiogram showed progression of the aortic valve stenosis to critical degenerative aortic valve stenosis.  Her mean pressure gradient across the aortic valve was 60 mmHg with a dimensionless index of 0.2.  Valve area was 0.8 cm².  Her ejection fraction is still preserved.    Review of Systems   Constitutional: Negative for fever and malaise/fatigue.   HENT:  Negative for nosebleeds and sore throat.    Eyes:  Negative for blurred vision and double vision.   Cardiovascular:  Negative for chest pain, claudication, palpitations and syncope.   Respiratory:  Negative for cough, shortness of breath and snoring.    Endocrine: Negative for cold intolerance, heat intolerance and polydipsia.   Skin:  Negative for itching, poor wound healing and rash.   Musculoskeletal:  Negative for joint pain, joint swelling, muscle weakness and myalgias.   Gastrointestinal:  Negative for abdominal pain, melena, nausea and vomiting.   Neurological:  Negative for light-headedness, loss of balance, seizures, vertigo and weakness.   Psychiatric/Behavioral:  Negative for altered mental status and depression.          Past Medical History:   Diagnosis Date    Arthritis     History of breast cancer     History of carotid artery disease      "Hyperlipidemia     Hypertension        The following portions of the patient's history were reviewed and updated as appropriate: Social history , Family history, and Surgical history     Current Outpatient Medications on File Prior to Visit   Medication Sig Dispense Refill    acetaminophen (TYLENOL) 500 MG tablet Take 2 tablets by mouth Every 6 (Six) Hours As Needed for Mild Pain or Moderate Pain.      acidophilus (FLORANEX) tablet tablet Take  by mouth 3 (Three) Times a Day.      alendronate (FOSAMAX) 70 MG tablet Take 1 tablet by mouth Every 7 (Seven) Days. Saturday      amLODIPine (NORVASC) 5 MG tablet Take 1 tablet by mouth Daily.      aspirin 81 MG tablet Take 1 tablet by mouth Daily.      Calcium-Magnesium-Vitamin D (CALCIUM MAGNESIUM PO) Take  by mouth.      cefdinir (OMNICEF) 300 MG capsule Take 1 capsule by mouth Daily. 7 capsule 0    cholecalciferol (VITAMIN D3) 1000 units tablet Take 1 tablet by mouth Daily.      citalopram (CeleXA) 10 MG tablet Take 1 tablet by mouth Daily.      Cranberry 125 MG tablet Take  by mouth.      CRANBERRY PO Take 1 tablet by mouth.      docusate calcium (SURFAK) 240 MG capsule Take 1 capsule by mouth 2 (Two) Times a Day As Needed for Constipation.      ferrous sulfate 325 (65 FE) MG tablet Take 1 tablet by mouth Every Other Day. 30 tablet 3    furosemide (LASIX) 40 MG tablet TAKE 1 TABLET DAILY 90 tablet 3    hydrALAZINE (APRESOLINE) 100 MG tablet Take 1 tablet by mouth 3 (Three) Times a Day.      multivitamin with minerals tablet tablet Take 1 tablet by mouth Daily.      nebivolol (BYSTOLIC) 5 MG tablet TAKE 1 TABLET BY MOUTH EVERY DAY 90 tablet 0    pravastatin (PRAVACHOL) 40 MG tablet Take 1 tablet by mouth Every Night.       No current facility-administered medications on file prior to visit.       No Known Allergies    Vitals:    10/31/24 1254   BP: 144/56   Pulse: 60   Weight: 72.6 kg (160 lb)   Height: 154.9 cm (61\")     Body mass index is 30.23 kg/m².   Constitutional: "       Appearance: Well-developed.   Eyes:      General: No scleral icterus.     Conjunctiva/sclera: Conjunctivae normal.   HENT:      Head: Normocephalic and atraumatic.   Neck:      Thyroid: No thyromegaly.      Vascular: Normal carotid pulses. No carotid bruit, hepatojugular reflux or JVD.      Trachea: No tracheal deviation.   Pulmonary:      Effort: No respiratory distress.      Breath sounds: Normal breath sounds. No decreased breath sounds. No wheezing. No rhonchi. No rales.   Chest:      Chest wall: Not tender to palpatation.   Cardiovascular:      Normal rate. Regular rhythm.      Murmurs: There is a grade 3/6 mid to late systolic murmur.      No gallop.    Pulses:     Carotid: 2+ bilaterally.     Radial: 2+ bilaterally.     Femoral: 2+ bilaterally.     Dorsalis pedis: 2+ bilaterally.     Posterior tibial: 2+ bilaterally.  Edema:     Peripheral edema absent.   Abdominal:      General: Bowel sounds are normal. There is no distension.      Palpations: Abdomen is soft.      Tenderness: There is no abdominal tenderness.   Musculoskeletal:         General: No deformity.      Cervical back: Normal range of motion and neck supple. Skin:     Findings: No erythema or rash.   Neurological:      Mental Status: Alert and oriented to person, place, and time.      Sensory: No sensory deficit.   Psychiatric:         Behavior: Behavior normal.           Lab Results   Component Value Date    WBC 15.19 (H) 03/16/2024    HGB 11.7 (L) 03/16/2024    HCT 36.1 03/16/2024    MCV 90.5 03/16/2024     03/16/2024       Lab Results   Component Value Date    GLUCOSE 126 (H) 03/16/2024    BUN 38 (H) 03/16/2024    CREATININE 1.50 (H) 03/16/2024     03/16/2024    K 4.1 03/16/2024    CL 98 03/16/2024    CALCIUM 9.5 03/16/2024    PROTEINTOT 7.2 03/16/2024    ALBUMIN 4.4 03/16/2024    ALT 12 03/16/2024    AST 16 03/16/2024    ALKPHOS 85 03/16/2024    BILITOT 0.2 03/16/2024    GLOB 2.8 03/16/2024    AGRATIO 1.6 03/16/2024    BCR  25.3 (H) 03/16/2024    ANIONGAP 12.0 03/16/2024    EGFR 33.4 (L) 03/16/2024       Lab Results   Component Value Date    GLUCOSE 126 (H) 03/16/2024    CALCIUM 9.5 03/16/2024     03/16/2024    K 4.1 03/16/2024    CO2 29.0 03/16/2024    CL 98 03/16/2024    BUN 38 (H) 03/16/2024    CREATININE 1.50 (H) 03/16/2024    EGFR 33.4 (L) 03/16/2024    BCR 25.3 (H) 03/16/2024    ANIONGAP 12.0 03/16/2024       Lab Results   Component Value Date    CHLPL 160 09/02/2020    TRIG 66 09/02/2020    HDL 75 09/02/2020    LDL 72 09/02/2020       Procedures       Results for orders placed during the hospital encounter of 08/28/24    Adult Transthoracic Echo Complete W/ Cont if Necessary Per Protocol    Interpretation Summary    Left ventricular systolic function is normal. Calculated left ventricular EF = 69.8%    Left ventricular diastolic function is consistent with (grade I) impaired relaxation.    Severe aortic valve stenosis is present. Aortic valve area is 0.8 cm2.    Peak velocity of the flow distal to the aortic valve is 492.5 cm/s. Aortic valve maximum pressure gradient is 97 mmHg. Aortic valve mean pressure gradient is 61 mmHg. Aortic valve dimensionless index is 0.2 .    Calculated right ventricular systolic pressure from tricuspid regurgitation is 0 mmHg.    Mild mitral valve regurgitation is present      DISCUSSION/SUMMARY  Very pleasant 89-year-old female with a medical history of critical degenerative aortic valve stenosis, fatigue, mild ROBERTSON, essential hypertension, and hyperlipidemia who presents to me for evaluation.  Her main complaint is fatigue with activity.  She denies any chest pain or syncope.  I reviewed her transthoracic echocardiogram and she clearly has critical degenerative aortic valve stenosis with a preserved ejection fraction.    I recommended moving down the path of evaluation for transcatheter aortic valve replacement.  Will start with a left and right heart catheterization.    1.  Critical  degenerative aortic valve stenosis  - Plan on left and right heart catheterization.  After that she will need a CTA chest abdomen and pelvis to evaluate for transfemoral approach.  -We will try to arrange for surgical consultation as well.    2.  Essential hypertension: Mildly elevated today.  Continue amlodipine 5 mg p.o. daily, nebivolol 5 mg p.o. daily, and hydralazine 100 mg p.o. every 8 hours.  Seems to be tolerating this well.    3.  CKD stage III.  Will have to space out her workup and minimize contrast administration during coronary angiography and transcatheter valve.    4.

## 2024-11-01 ENCOUNTER — TRANSCRIBE ORDERS (OUTPATIENT)
Dept: CARDIOLOGY | Facility: CLINIC | Age: 89
End: 2024-11-01
Payer: MEDICARE

## 2024-11-01 DIAGNOSIS — Z13.6 SCREENING FOR ISCHEMIC HEART DISEASE: ICD-10-CM

## 2024-11-01 DIAGNOSIS — Z01.810 PRE-OPERATIVE CARDIOVASCULAR EXAMINATION: Primary | ICD-10-CM

## 2024-11-05 ENCOUNTER — LAB (OUTPATIENT)
Facility: HOSPITAL | Age: 89
End: 2024-11-05
Payer: MEDICARE

## 2024-11-05 DIAGNOSIS — Z01.810 PRE-OPERATIVE CARDIOVASCULAR EXAMINATION: ICD-10-CM

## 2024-11-05 DIAGNOSIS — Z13.6 SCREENING FOR ISCHEMIC HEART DISEASE: ICD-10-CM

## 2024-11-05 LAB
ANION GAP SERPL CALCULATED.3IONS-SCNC: 14.1 MMOL/L (ref 5–15)
BASOPHILS # BLD AUTO: 0.06 10*3/MM3 (ref 0–0.2)
BASOPHILS NFR BLD AUTO: 0.5 % (ref 0–1.5)
BUN SERPL-MCNC: 41 MG/DL (ref 8–23)
BUN/CREAT SERPL: 25.5 (ref 7–25)
CALCIUM SPEC-SCNC: 9.4 MG/DL (ref 8.6–10.5)
CHLORIDE SERPL-SCNC: 98 MMOL/L (ref 98–107)
CO2 SERPL-SCNC: 22.9 MMOL/L (ref 22–29)
CREAT SERPL-MCNC: 1.61 MG/DL (ref 0.57–1)
DEPRECATED RDW RBC AUTO: 41.4 FL (ref 37–54)
EGFRCR SERPLBLD CKD-EPI 2021: 30.5 ML/MIN/1.73
EOSINOPHIL # BLD AUTO: 0.37 10*3/MM3 (ref 0–0.4)
EOSINOPHIL NFR BLD AUTO: 3.2 % (ref 0.3–6.2)
ERYTHROCYTE [DISTWIDTH] IN BLOOD BY AUTOMATED COUNT: 12.9 % (ref 12.3–15.4)
GLUCOSE SERPL-MCNC: 136 MG/DL (ref 65–99)
HCT VFR BLD AUTO: 32.8 % (ref 34–46.6)
HGB BLD-MCNC: 11.1 G/DL (ref 12–15.9)
IMM GRANULOCYTES # BLD AUTO: 0.06 10*3/MM3 (ref 0–0.05)
IMM GRANULOCYTES NFR BLD AUTO: 0.5 % (ref 0–0.5)
LYMPHOCYTES # BLD AUTO: 1.8 10*3/MM3 (ref 0.7–3.1)
LYMPHOCYTES NFR BLD AUTO: 15.5 % (ref 19.6–45.3)
MCH RBC QN AUTO: 29.8 PG (ref 26.6–33)
MCHC RBC AUTO-ENTMCNC: 33.8 G/DL (ref 31.5–35.7)
MCV RBC AUTO: 88.2 FL (ref 79–97)
MONOCYTES # BLD AUTO: 0.93 10*3/MM3 (ref 0.1–0.9)
MONOCYTES NFR BLD AUTO: 8 % (ref 5–12)
NEUTROPHILS NFR BLD AUTO: 72.3 % (ref 42.7–76)
NEUTROPHILS NFR BLD AUTO: 8.43 10*3/MM3 (ref 1.7–7)
NRBC BLD AUTO-RTO: 0 /100 WBC (ref 0–0.2)
PLATELET # BLD AUTO: 128 10*3/MM3 (ref 140–450)
PMV BLD AUTO: 12.2 FL (ref 6–12)
POTASSIUM SERPL-SCNC: 4.4 MMOL/L (ref 3.5–5.2)
RBC # BLD AUTO: 3.72 10*6/MM3 (ref 3.77–5.28)
SODIUM SERPL-SCNC: 135 MMOL/L (ref 136–145)
WBC NRBC COR # BLD AUTO: 11.65 10*3/MM3 (ref 3.4–10.8)

## 2024-11-05 PROCEDURE — 80048 BASIC METABOLIC PNL TOTAL CA: CPT

## 2024-11-05 PROCEDURE — 85025 COMPLETE CBC W/AUTO DIFF WBC: CPT

## 2024-11-05 PROCEDURE — 36415 COLL VENOUS BLD VENIPUNCTURE: CPT

## 2024-11-08 ENCOUNTER — HOSPITAL ENCOUNTER (OUTPATIENT)
Facility: HOSPITAL | Age: 89
Setting detail: HOSPITAL OUTPATIENT SURGERY
Discharge: HOME | End: 2024-11-08
Attending: INTERNAL MEDICINE | Admitting: INTERNAL MEDICINE
Payer: MEDICARE

## 2024-11-08 ENCOUNTER — DOCUMENTATION (OUTPATIENT)
Dept: CARDIOLOGY | Facility: HOSPITAL | Age: 89
End: 2024-11-08
Payer: MEDICARE

## 2024-11-08 VITALS
WEIGHT: 159.4 LBS | SYSTOLIC BLOOD PRESSURE: 123 MMHG | HEIGHT: 61 IN | RESPIRATION RATE: 16 BRPM | DIASTOLIC BLOOD PRESSURE: 49 MMHG | TEMPERATURE: 97 F | BODY MASS INDEX: 30.09 KG/M2 | OXYGEN SATURATION: 97 % | HEART RATE: 65 BPM

## 2024-11-08 DIAGNOSIS — I10 PRIMARY HYPERTENSION: ICD-10-CM

## 2024-11-08 DIAGNOSIS — I35.0 NONRHEUMATIC AORTIC VALVE STENOSIS: ICD-10-CM

## 2024-11-08 PROCEDURE — C1894 INTRO/SHEATH, NON-LASER: HCPCS | Performed by: INTERNAL MEDICINE

## 2024-11-08 PROCEDURE — 25010000002 FENTANYL CITRATE (PF) 50 MCG/ML SOLUTION: Performed by: INTERNAL MEDICINE

## 2024-11-08 PROCEDURE — 25010000002 LIDOCAINE 2% SOLUTION: Performed by: INTERNAL MEDICINE

## 2024-11-08 PROCEDURE — 82810 BLOOD GASES O2 SAT ONLY: CPT

## 2024-11-08 PROCEDURE — 93456 R HRT CORONARY ARTERY ANGIO: CPT | Performed by: INTERNAL MEDICINE

## 2024-11-08 PROCEDURE — C1769 GUIDE WIRE: HCPCS | Performed by: INTERNAL MEDICINE

## 2024-11-08 PROCEDURE — 25010000002 HEPARIN (PORCINE) PER 1000 UNITS: Performed by: INTERNAL MEDICINE

## 2024-11-08 PROCEDURE — 85018 HEMOGLOBIN: CPT

## 2024-11-08 PROCEDURE — 25810000003 SODIUM CHLORIDE 0.9 % SOLUTION: Performed by: INTERNAL MEDICINE

## 2024-11-08 PROCEDURE — 25510000001 IOPAMIDOL PER 1 ML: Performed by: INTERNAL MEDICINE

## 2024-11-08 PROCEDURE — 25010000002 MIDAZOLAM PER 1 MG: Performed by: INTERNAL MEDICINE

## 2024-11-08 PROCEDURE — 85014 HEMATOCRIT: CPT

## 2024-11-08 RX ORDER — VERAPAMIL HYDROCHLORIDE 2.5 MG/ML
INJECTION, SOLUTION INTRAVENOUS
Status: DISCONTINUED | OUTPATIENT
Start: 2024-11-08 | End: 2024-11-08 | Stop reason: HOSPADM

## 2024-11-08 RX ORDER — NITROGLYCERIN 0.4 MG/1
0.4 TABLET SUBLINGUAL
Status: DISCONTINUED | OUTPATIENT
Start: 2024-11-08 | End: 2024-11-08 | Stop reason: HOSPADM

## 2024-11-08 RX ORDER — ACETAMINOPHEN 325 MG/1
650 TABLET ORAL EVERY 4 HOURS PRN
Status: DISCONTINUED | OUTPATIENT
Start: 2024-11-08 | End: 2024-11-08 | Stop reason: HOSPADM

## 2024-11-08 RX ORDER — SODIUM CHLORIDE 0.9 % (FLUSH) 0.9 %
10 SYRINGE (ML) INJECTION AS NEEDED
Status: DISCONTINUED | OUTPATIENT
Start: 2024-11-08 | End: 2024-11-08 | Stop reason: HOSPADM

## 2024-11-08 RX ORDER — HEPARIN SODIUM 1000 [USP'U]/ML
INJECTION, SOLUTION INTRAVENOUS; SUBCUTANEOUS
Status: DISCONTINUED | OUTPATIENT
Start: 2024-11-08 | End: 2024-11-08 | Stop reason: HOSPADM

## 2024-11-08 RX ORDER — MIDAZOLAM HYDROCHLORIDE 1 MG/ML
INJECTION, SOLUTION INTRAMUSCULAR; INTRAVENOUS
Status: DISCONTINUED | OUTPATIENT
Start: 2024-11-08 | End: 2024-11-08 | Stop reason: HOSPADM

## 2024-11-08 RX ORDER — LIDOCAINE HYDROCHLORIDE 20 MG/ML
INJECTION, SOLUTION INFILTRATION; PERINEURAL
Status: DISCONTINUED | OUTPATIENT
Start: 2024-11-08 | End: 2024-11-08 | Stop reason: HOSPADM

## 2024-11-08 RX ORDER — SODIUM CHLORIDE 9 MG/ML
75 INJECTION, SOLUTION INTRAVENOUS CONTINUOUS
Status: DISCONTINUED | OUTPATIENT
Start: 2024-11-08 | End: 2024-11-08 | Stop reason: HOSPADM

## 2024-11-08 RX ORDER — IOPAMIDOL 755 MG/ML
INJECTION, SOLUTION INTRAVASCULAR
Status: DISCONTINUED | OUTPATIENT
Start: 2024-11-08 | End: 2024-11-08 | Stop reason: HOSPADM

## 2024-11-08 RX ORDER — HYDROCODONE BITARTRATE AND ACETAMINOPHEN 5; 325 MG/1; MG/1
1 TABLET ORAL EVERY 4 HOURS PRN
Status: DISCONTINUED | OUTPATIENT
Start: 2024-11-08 | End: 2024-11-08 | Stop reason: HOSPADM

## 2024-11-08 RX ORDER — ONDANSETRON 2 MG/ML
4 INJECTION INTRAMUSCULAR; INTRAVENOUS EVERY 6 HOURS PRN
Status: DISCONTINUED | OUTPATIENT
Start: 2024-11-08 | End: 2024-11-08 | Stop reason: HOSPADM

## 2024-11-08 RX ORDER — SODIUM CHLORIDE 9 MG/ML
50 INJECTION, SOLUTION INTRAVENOUS CONTINUOUS
Status: DISCONTINUED | OUTPATIENT
Start: 2024-11-08 | End: 2024-11-08 | Stop reason: HOSPADM

## 2024-11-08 RX ORDER — ONDANSETRON 4 MG/1
4 TABLET, ORALLY DISINTEGRATING ORAL EVERY 6 HOURS PRN
Status: DISCONTINUED | OUTPATIENT
Start: 2024-11-08 | End: 2024-11-08 | Stop reason: HOSPADM

## 2024-11-08 RX ORDER — FENTANYL CITRATE 50 UG/ML
INJECTION, SOLUTION INTRAMUSCULAR; INTRAVENOUS
Status: DISCONTINUED | OUTPATIENT
Start: 2024-11-08 | End: 2024-11-08 | Stop reason: HOSPADM

## 2024-11-08 RX ADMIN — SODIUM CHLORIDE 75 ML/HR: 9 INJECTION, SOLUTION INTRAVENOUS at 09:37

## 2024-11-08 NOTE — Clinical Note
Hemostasis started on the right radial artery. Manual pressure applied to vessel. Manual pressure was held by JT RTR. Manual pressure was held for 5 min. Hemostasis achieved successfully.

## 2024-11-08 NOTE — NURSING NOTE
I met with Mrs Jay and her son Angus while she was here for a cardiac cath with Dr Rizvi. We discussed the evaluation process and she is agreeable to seeing Dr Lagunas in office 11/12/24 at 8:30 We will work on having the TAVR CTA scheduled I have provided them with our contact information and they will call with any further questions

## 2024-11-08 NOTE — DISCHARGE INSTRUCTIONS
[facility]  [address] [place] [address]    Coronary Angiogram (Radial/Ulnar Approach) After Care    Refer to this sheet in the next few weeks. These instructions provide you with information on caring for yourself after your procedure. Your caregiver may also give you more specific instructions. Your treatment has been planned according to current medical practices, but problems sometimes occur. Call your caregiver if you have any problems or questions after your procedure.    Home Care Instructions:  You may shower the day after the procedure. Remove the bandage (dressing) and gently wash the site with plain soap and water. Gently pat the site dry. You may apply a band aid daily for 2 days if desired.    Do not apply powder or lotion to the site.  Do not submerge the affected site in water for 3 to 5 days or until the site is completely healed.   Do not lift, push or pull anything over 5 pounds for 5 days after your procedure or as directed by your physician.  As a reference, a gallon of milk weighs 8 pounds.   Inspect the site at least twice daily. You may notice some bruising at the site and it may be tender for 1 to 2 weeks.     Increase your fluid intake for the next 2 days.    Keep arm elevated for 24 hours. For the remainder of the day, keep your arm in “Pledge of Allegiance” position when up and about.     You may drive 24 hours after the procedure unless otherwise instructed by your caregiver.  Do not operate machinery or power tools for 24 hours.  A responsible adult should be with you for the first 24 hours after you arrive home. Do not make any important legal decisions or sign legal papers for 24 hours.  Do not drink alcohol for 24 hours.    Metformin or any medications containing Metformin should not be taken for 48 hours after your procedure.      Call Your Doctor if:   You have unusual pain at the radial/ulnar (wrist) site.  You have redness, warmth, swelling, or pain at the  radial/ulnar (wrist) site.  You have drainage (other than a small amount of blood on the dressing).  `You have chills or a fever > 101.  Your arm becomes pale or dark, cool, tingly, or numb.  You develop chest pain, shortness of breath, feel faint or pass out.    You have heavy bleeding from the site, hold pressure on the site for 20 minutes.  If the bleeding stops, apply a fresh bandage and call your cardiologist.  However, if you        continue to have bleeding, call 911 and continue to apply pressure to the site.   You have any symptoms of a stroke.  Remember BE FAST  B-balance. Sudden trouble walking or loss of balance.  E-eyes.  Sudden changes in how you see or a sudden onset of a very bad headache.   F-face. Sudden weakness or loss of feeling of the face or facial droop on one side.   A-arms Sudden weakness or numbness in one arm.  One arm drifts down if they are both held out in front of you. This happens suddenly and usually on one side of the body.   S-speech.  Sudden trouble speaking, slurred speech or trouble understanding what are saying.   T-time  Time to call emergency services.  Write down the symptoms and the time they started.

## 2024-11-11 ENCOUNTER — APPOINTMENT (OUTPATIENT)
Dept: GENERAL RADIOLOGY | Facility: HOSPITAL | Age: 89
DRG: 871 | End: 2024-11-11
Payer: MEDICARE

## 2024-11-11 ENCOUNTER — TELEPHONE (OUTPATIENT)
Dept: CARDIAC SURGERY | Facility: CLINIC | Age: 89
End: 2024-11-11
Payer: MEDICARE

## 2024-11-11 ENCOUNTER — HOSPITAL ENCOUNTER (INPATIENT)
Facility: HOSPITAL | Age: 89
LOS: 3 days | Discharge: HOME | DRG: 871 | End: 2024-11-15
Attending: EMERGENCY MEDICINE | Admitting: INTERNAL MEDICINE
Payer: MEDICARE

## 2024-11-11 DIAGNOSIS — J18.9 PNEUMONIA DUE TO INFECTIOUS ORGANISM, UNSPECIFIED LATERALITY, UNSPECIFIED PART OF LUNG: ICD-10-CM

## 2024-11-11 DIAGNOSIS — J18.9 SEPSIS DUE TO PNEUMONIA: ICD-10-CM

## 2024-11-11 DIAGNOSIS — A41.9 SEPSIS DUE TO PNEUMONIA: ICD-10-CM

## 2024-11-11 DIAGNOSIS — I50.9 CONGESTIVE HEART FAILURE, UNSPECIFIED HF CHRONICITY, UNSPECIFIED HEART FAILURE TYPE: ICD-10-CM

## 2024-11-11 DIAGNOSIS — I50.32 DIASTOLIC CHF, CHRONIC: ICD-10-CM

## 2024-11-11 DIAGNOSIS — J96.01 ACUTE HYPOXIC RESPIRATORY FAILURE: ICD-10-CM

## 2024-11-11 DIAGNOSIS — I50.9 ACUTE CONGESTIVE HEART FAILURE, UNSPECIFIED HEART FAILURE TYPE: ICD-10-CM

## 2024-11-11 DIAGNOSIS — R06.03 RESPIRATORY DISTRESS: Primary | ICD-10-CM

## 2024-11-11 LAB
ADV 40+41 DNA STL QL NAA+NON-PROBE: NOT DETECTED
ALBUMIN SERPL-MCNC: 4.1 G/DL (ref 3.5–5.2)
ALBUMIN/GLOB SERPL: 1.1 G/DL
ALP SERPL-CCNC: 95 U/L (ref 39–117)
ALT SERPL W P-5'-P-CCNC: 23 U/L (ref 1–33)
ANION GAP SERPL CALCULATED.3IONS-SCNC: 14 MMOL/L (ref 5–15)
ARTERIAL PATENCY WRIST A: POSITIVE
AST SERPL-CCNC: 29 U/L (ref 1–32)
ASTRO TYP 1-8 RNA STL QL NAA+NON-PROBE: NOT DETECTED
ATMOSPHERIC PRESS: 748.1 MMHG
B PARAPERT DNA SPEC QL NAA+PROBE: NOT DETECTED
B PERT DNA SPEC QL NAA+PROBE: NOT DETECTED
BASE EXCESS BLDA CALC-SCNC: -1.6 MMOL/L (ref 0–2)
BASOPHILS # BLD AUTO: 0.09 10*3/MM3 (ref 0–0.2)
BASOPHILS NFR BLD AUTO: 0.4 % (ref 0–1.5)
BDY SITE: ABNORMAL
BILIRUB SERPL-MCNC: 0.3 MG/DL (ref 0–1.2)
BUN SERPL-MCNC: 30 MG/DL (ref 8–23)
BUN/CREAT SERPL: 20.5 (ref 7–25)
C CAYETANENSIS DNA STL QL NAA+NON-PROBE: NOT DETECTED
C COLI+JEJ+UPSA DNA STL QL NAA+NON-PROBE: NOT DETECTED
C PNEUM DNA NPH QL NAA+NON-PROBE: NOT DETECTED
CALCIUM SPEC-SCNC: 9 MG/DL (ref 8.6–10.5)
CHLORIDE SERPL-SCNC: 97 MMOL/L (ref 98–107)
CO2 BLDA-SCNC: 24.6 MMOL/L (ref 23–27)
CO2 SERPL-SCNC: 22 MMOL/L (ref 22–29)
CREAT SERPL-MCNC: 1.46 MG/DL (ref 0.57–1)
CRYPTOSP DNA STL QL NAA+NON-PROBE: NOT DETECTED
D-LACTATE SERPL-SCNC: 1.5 MMOL/L (ref 0.5–2)
DEPRECATED RDW RBC AUTO: 40.9 FL (ref 37–54)
DEVICE COMMENT 2: ABNORMAL
DEVICE COMMENT: ABNORMAL
E HISTOLYT DNA STL QL NAA+NON-PROBE: NOT DETECTED
EAEC PAA PLAS AGGR+AATA ST NAA+NON-PRB: NOT DETECTED
EC STX1+STX2 GENES STL QL NAA+NON-PROBE: NOT DETECTED
EGFRCR SERPLBLD CKD-EPI 2021: 34.3 ML/MIN/1.73
EOSINOPHIL # BLD AUTO: 0.16 10*3/MM3 (ref 0–0.4)
EOSINOPHIL NFR BLD AUTO: 0.7 % (ref 0.3–6.2)
EPEC EAE GENE STL QL NAA+NON-PROBE: NOT DETECTED
ERYTHROCYTE [DISTWIDTH] IN BLOOD BY AUTOMATED COUNT: 12.6 % (ref 12.3–15.4)
ETEC LTA+ST1A+ST1B TOX ST NAA+NON-PROBE: NOT DETECTED
FLUAV SUBTYP SPEC NAA+PROBE: NOT DETECTED
FLUBV RNA ISLT QL NAA+PROBE: NOT DETECTED
G LAMBLIA DNA STL QL NAA+NON-PROBE: NOT DETECTED
GLOBULIN UR ELPH-MCNC: 3.7 GM/DL
GLUCOSE BLDC GLUCOMTR-MCNC: 130 MG/DL (ref 70–130)
GLUCOSE BLDC GLUCOMTR-MCNC: 145 MG/DL (ref 70–130)
GLUCOSE SERPL-MCNC: 166 MG/DL (ref 65–99)
HADV DNA SPEC NAA+PROBE: NOT DETECTED
HCO3 BLDA-SCNC: 23.4 MMOL/L (ref 22–28)
HCOV 229E RNA SPEC QL NAA+PROBE: NOT DETECTED
HCOV HKU1 RNA SPEC QL NAA+PROBE: NOT DETECTED
HCOV NL63 RNA SPEC QL NAA+PROBE: NOT DETECTED
HCOV OC43 RNA SPEC QL NAA+PROBE: NOT DETECTED
HCT VFR BLD AUTO: 33.7 % (ref 34–46.6)
HCT VFR BLDA CALC: 31 % (ref 38–51)
HCT VFR BLDA CALC: 32 % (ref 38–51)
HCT VFR BLDA CALC: 32 % (ref 38–51)
HEMODILUTION: NO
HGB BLD-MCNC: 11.3 G/DL (ref 12–15.9)
HGB BLDA-MCNC: 10.5 G/DL (ref 12–17)
HGB BLDA-MCNC: 10.9 G/DL (ref 12–17)
HGB BLDA-MCNC: 10.9 G/DL (ref 12–17)
HMPV RNA NPH QL NAA+NON-PROBE: NOT DETECTED
HOLD SPECIMEN: NORMAL
HOLD SPECIMEN: NORMAL
HPIV1 RNA ISLT QL NAA+PROBE: NOT DETECTED
HPIV2 RNA SPEC QL NAA+PROBE: NOT DETECTED
HPIV3 RNA NPH QL NAA+PROBE: NOT DETECTED
HPIV4 P GENE NPH QL NAA+PROBE: NOT DETECTED
IMM GRANULOCYTES # BLD AUTO: 0.21 10*3/MM3 (ref 0–0.05)
IMM GRANULOCYTES NFR BLD AUTO: 1 % (ref 0–0.5)
INHALED O2 CONCENTRATION: 100 %
INR PPP: 1.08 (ref 0.9–1.1)
INSPIRATORY TIME: 1
LYMPHOCYTES # BLD AUTO: 2.06 10*3/MM3 (ref 0.7–3.1)
LYMPHOCYTES NFR BLD AUTO: 9.6 % (ref 19.6–45.3)
M PNEUMO IGG SER IA-ACNC: NOT DETECTED
MAGNESIUM SERPL-MCNC: 2.3 MG/DL (ref 1.6–2.4)
MCH RBC QN AUTO: 29.9 PG (ref 26.6–33)
MCHC RBC AUTO-ENTMCNC: 33.5 G/DL (ref 31.5–35.7)
MCV RBC AUTO: 89.2 FL (ref 79–97)
MODALITY: ABNORMAL
MONOCYTES # BLD AUTO: 2.32 10*3/MM3 (ref 0.1–0.9)
MONOCYTES NFR BLD AUTO: 10.8 % (ref 5–12)
MRSA DNA SPEC QL NAA+PROBE: NORMAL
NEUTROPHILS NFR BLD AUTO: 16.56 10*3/MM3 (ref 1.7–7)
NEUTROPHILS NFR BLD AUTO: 77.5 % (ref 42.7–76)
NOROVIRUS GI+II RNA STL QL NAA+NON-PROBE: NOT DETECTED
NRBC BLD AUTO-RTO: 0 /100 WBC (ref 0–0.2)
NT-PROBNP SERPL-MCNC: 5160 PG/ML (ref 0–1800)
O2 A-A PPRESDIFF RESPIRATORY: 0.7 MMHG
P SHIGELLOIDES DNA STL QL NAA+NON-PROBE: NOT DETECTED
PAW @ PEAK INSP FLOW SETTING VENT: 7 CMH2O
PCO2 BLDA: 39.3 MM HG (ref 35–45)
PH BLDA: 7.38 PH UNITS (ref 7.35–7.45)
PHOSPHATE SERPL-MCNC: 3 MG/DL (ref 2.5–4.5)
PLATELET # BLD AUTO: 299 10*3/MM3 (ref 140–450)
PMV BLD AUTO: 10.6 FL (ref 6–12)
PO2 BLD: 502 MM[HG] (ref 0–500)
PO2 BLDA: 502.4 MM HG (ref 80–100)
POTASSIUM SERPL-SCNC: 3.9 MMOL/L (ref 3.5–5.2)
PROCALCITONIN SERPL-MCNC: 1.06 NG/ML (ref 0–0.25)
PROT SERPL-MCNC: 7.8 G/DL (ref 6–8.5)
PROTHROMBIN TIME: 14.3 SECONDS (ref 11.7–14.2)
PSV: 8 CMH2O
QT INTERVAL: 352 MS
QTC INTERVAL: 466 MS
RBC # BLD AUTO: 3.78 10*6/MM3 (ref 3.77–5.28)
RHINOVIRUS RNA SPEC NAA+PROBE: NOT DETECTED
RSV RNA NPH QL NAA+NON-PROBE: NOT DETECTED
RVA RNA STL QL NAA+NON-PROBE: NOT DETECTED
S ENT+BONG DNA STL QL NAA+NON-PROBE: NOT DETECTED
SAO2 % BLDA: 66 % (ref 95–98)
SAO2 % BLDA: 69 % (ref 95–98)
SAO2 % BLDA: 96 % (ref 95–98)
SAO2 % BLDCOA: 100 % (ref 92–98.5)
SAPO I+II+IV+V RNA STL QL NAA+NON-PROBE: NOT DETECTED
SARS-COV-2 RNA NPH QL NAA+NON-PROBE: NOT DETECTED
SET MECH RESP RATE: 20
SHIGELLA SP+EIEC IPAH ST NAA+NON-PROBE: NOT DETECTED
SODIUM SERPL-SCNC: 133 MMOL/L (ref 136–145)
TOTAL RATE: 22 BREATHS/MINUTE
TROPONIN T SERPL HS-MCNC: 46 NG/L
V CHOL+PARA+VUL DNA STL QL NAA+NON-PROBE: NOT DETECTED
V CHOLERAE DNA STL QL NAA+NON-PROBE: NOT DETECTED
VT ON VENT VENT: 607 ML
WBC NRBC COR # BLD AUTO: 21.4 10*3/MM3 (ref 3.4–10.8)
WHOLE BLOOD HOLD COAG: NORMAL
WHOLE BLOOD HOLD SPECIMEN: NORMAL
Y ENTEROCOL DNA STL QL NAA+NON-PROBE: NOT DETECTED

## 2024-11-11 PROCEDURE — 85025 COMPLETE CBC W/AUTO DIFF WBC: CPT | Performed by: EMERGENCY MEDICINE

## 2024-11-11 PROCEDURE — 84100 ASSAY OF PHOSPHORUS: CPT

## 2024-11-11 PROCEDURE — G0378 HOSPITAL OBSERVATION PER HR: HCPCS

## 2024-11-11 PROCEDURE — 0202U NFCT DS 22 TRGT SARS-COV-2: CPT | Performed by: EMERGENCY MEDICINE

## 2024-11-11 PROCEDURE — 94761 N-INVAS EAR/PLS OXIMETRY MLT: CPT

## 2024-11-11 PROCEDURE — 94660 CPAP INITIATION&MGMT: CPT

## 2024-11-11 PROCEDURE — 25010000002 CEFTRIAXONE PER 250 MG: Performed by: EMERGENCY MEDICINE

## 2024-11-11 PROCEDURE — 83735 ASSAY OF MAGNESIUM: CPT

## 2024-11-11 PROCEDURE — 36600 WITHDRAWAL OF ARTERIAL BLOOD: CPT

## 2024-11-11 PROCEDURE — 83880 ASSAY OF NATRIURETIC PEPTIDE: CPT | Performed by: EMERGENCY MEDICINE

## 2024-11-11 PROCEDURE — 87641 MR-STAPH DNA AMP PROBE: CPT

## 2024-11-11 PROCEDURE — 87507 IADNA-DNA/RNA PROBE TQ 12-25: CPT

## 2024-11-11 PROCEDURE — 25010000002 PIPERACILLIN SOD-TAZOBACTAM PER 1 G

## 2024-11-11 PROCEDURE — 93010 ELECTROCARDIOGRAM REPORT: CPT | Performed by: STUDENT IN AN ORGANIZED HEALTH CARE EDUCATION/TRAINING PROGRAM

## 2024-11-11 PROCEDURE — 74018 RADEX ABDOMEN 1 VIEW: CPT

## 2024-11-11 PROCEDURE — 94799 UNLISTED PULMONARY SVC/PX: CPT

## 2024-11-11 PROCEDURE — 93005 ELECTROCARDIOGRAM TRACING: CPT | Performed by: EMERGENCY MEDICINE

## 2024-11-11 PROCEDURE — 84484 ASSAY OF TROPONIN QUANT: CPT | Performed by: EMERGENCY MEDICINE

## 2024-11-11 PROCEDURE — 36415 COLL VENOUS BLD VENIPUNCTURE: CPT

## 2024-11-11 PROCEDURE — 25010000002 ONDANSETRON PER 1 MG

## 2024-11-11 PROCEDURE — 82948 REAGENT STRIP/BLOOD GLUCOSE: CPT

## 2024-11-11 PROCEDURE — 87040 BLOOD CULTURE FOR BACTERIA: CPT | Performed by: EMERGENCY MEDICINE

## 2024-11-11 PROCEDURE — 99291 CRITICAL CARE FIRST HOUR: CPT

## 2024-11-11 PROCEDURE — 71045 X-RAY EXAM CHEST 1 VIEW: CPT

## 2024-11-11 PROCEDURE — 85610 PROTHROMBIN TIME: CPT

## 2024-11-11 PROCEDURE — 83605 ASSAY OF LACTIC ACID: CPT | Performed by: EMERGENCY MEDICINE

## 2024-11-11 PROCEDURE — 84145 PROCALCITONIN (PCT): CPT

## 2024-11-11 PROCEDURE — 99214 OFFICE O/P EST MOD 30 MIN: CPT | Performed by: INTERNAL MEDICINE

## 2024-11-11 PROCEDURE — 25010000002 FUROSEMIDE PER 20 MG: Performed by: EMERGENCY MEDICINE

## 2024-11-11 PROCEDURE — 80053 COMPREHEN METABOLIC PANEL: CPT | Performed by: EMERGENCY MEDICINE

## 2024-11-11 PROCEDURE — 94760 N-INVAS EAR/PLS OXIMETRY 1: CPT

## 2024-11-11 PROCEDURE — 82803 BLOOD GASES ANY COMBINATION: CPT

## 2024-11-11 RX ORDER — BENZONATATE 100 MG/1
200 CAPSULE ORAL 3 TIMES DAILY PRN
Status: DISCONTINUED | OUTPATIENT
Start: 2024-11-11 | End: 2024-11-15 | Stop reason: HOSPADM

## 2024-11-11 RX ORDER — ACETAMINOPHEN 650 MG/1
650 SUPPOSITORY RECTAL EVERY 4 HOURS PRN
Status: DISCONTINUED | OUTPATIENT
Start: 2024-11-11 | End: 2024-11-15 | Stop reason: HOSPADM

## 2024-11-11 RX ORDER — L.ACID,PARA/B.BIFIDUM/S.THERM 8B CELL
1 CAPSULE ORAL DAILY
Status: DISCONTINUED | OUTPATIENT
Start: 2024-11-12 | End: 2024-11-15 | Stop reason: HOSPADM

## 2024-11-11 RX ORDER — IBUPROFEN 600 MG/1
1 TABLET ORAL
Status: DISCONTINUED | OUTPATIENT
Start: 2024-11-11 | End: 2024-11-11

## 2024-11-11 RX ORDER — ASPIRIN 81 MG/1
81 TABLET ORAL DAILY
Status: DISCONTINUED | OUTPATIENT
Start: 2024-11-11 | End: 2024-11-15 | Stop reason: HOSPADM

## 2024-11-11 RX ORDER — BISACODYL 10 MG
10 SUPPOSITORY, RECTAL RECTAL DAILY PRN
Status: DISCONTINUED | OUTPATIENT
Start: 2024-11-11 | End: 2024-11-15 | Stop reason: HOSPADM

## 2024-11-11 RX ORDER — DEXTROSE MONOHYDRATE 25 G/50ML
25 INJECTION, SOLUTION INTRAVENOUS
Status: DISCONTINUED | OUTPATIENT
Start: 2024-11-11 | End: 2024-11-11

## 2024-11-11 RX ORDER — POLYETHYLENE GLYCOL 3350 17 G/17G
17 POWDER, FOR SOLUTION ORAL DAILY PRN
Status: DISCONTINUED | OUTPATIENT
Start: 2024-11-11 | End: 2024-11-15 | Stop reason: HOSPADM

## 2024-11-11 RX ORDER — ONDANSETRON 2 MG/ML
4 INJECTION INTRAMUSCULAR; INTRAVENOUS EVERY 6 HOURS PRN
Status: DISCONTINUED | OUTPATIENT
Start: 2024-11-11 | End: 2024-11-15 | Stop reason: HOSPADM

## 2024-11-11 RX ORDER — NITROGLYCERIN 0.4 MG/1
0.4 TABLET SUBLINGUAL
Status: DISCONTINUED | OUTPATIENT
Start: 2024-11-11 | End: 2024-11-15 | Stop reason: HOSPADM

## 2024-11-11 RX ORDER — AMOXICILLIN 250 MG
2 CAPSULE ORAL 2 TIMES DAILY
Status: DISCONTINUED | OUTPATIENT
Start: 2024-11-11 | End: 2024-11-15 | Stop reason: HOSPADM

## 2024-11-11 RX ORDER — PRAVASTATIN SODIUM 40 MG
40 TABLET ORAL NIGHTLY
Status: DISCONTINUED | OUTPATIENT
Start: 2024-11-11 | End: 2024-11-15 | Stop reason: HOSPADM

## 2024-11-11 RX ORDER — CITALOPRAM HYDROBROMIDE 10 MG/1
10 TABLET ORAL DAILY
Status: DISCONTINUED | OUTPATIENT
Start: 2024-11-11 | End: 2024-11-15 | Stop reason: HOSPADM

## 2024-11-11 RX ORDER — AMLODIPINE BESYLATE 5 MG/1
5 TABLET ORAL
Status: DISCONTINUED | OUTPATIENT
Start: 2024-11-11 | End: 2024-11-15 | Stop reason: HOSPADM

## 2024-11-11 RX ORDER — ONDANSETRON 2 MG/ML
4 INJECTION INTRAMUSCULAR; INTRAVENOUS ONCE
Status: COMPLETED | OUTPATIENT
Start: 2024-11-11 | End: 2024-11-11

## 2024-11-11 RX ORDER — ONDANSETRON 2 MG/ML
INJECTION INTRAMUSCULAR; INTRAVENOUS
Status: COMPLETED
Start: 2024-11-11 | End: 2024-11-11

## 2024-11-11 RX ORDER — NICOTINE POLACRILEX 4 MG
15 LOZENGE BUCCAL
Status: DISCONTINUED | OUTPATIENT
Start: 2024-11-11 | End: 2024-11-11

## 2024-11-11 RX ORDER — BISACODYL 5 MG/1
5 TABLET, DELAYED RELEASE ORAL DAILY PRN
Status: DISCONTINUED | OUTPATIENT
Start: 2024-11-11 | End: 2024-11-15 | Stop reason: HOSPADM

## 2024-11-11 RX ORDER — FUROSEMIDE 10 MG/ML
80 INJECTION INTRAMUSCULAR; INTRAVENOUS ONCE
Status: COMPLETED | OUTPATIENT
Start: 2024-11-11 | End: 2024-11-11

## 2024-11-11 RX ORDER — ACETAMINOPHEN 325 MG/1
650 TABLET ORAL EVERY 4 HOURS PRN
Status: DISCONTINUED | OUTPATIENT
Start: 2024-11-11 | End: 2024-11-15 | Stop reason: HOSPADM

## 2024-11-11 RX ORDER — SODIUM CHLORIDE 0.9 % (FLUSH) 0.9 %
10 SYRINGE (ML) INJECTION AS NEEDED
Status: DISCONTINUED | OUTPATIENT
Start: 2024-11-11 | End: 2024-11-15 | Stop reason: HOSPADM

## 2024-11-11 RX ADMIN — CEFTRIAXONE SODIUM 1000 MG: 1 INJECTION, POWDER, FOR SOLUTION INTRAMUSCULAR; INTRAVENOUS at 06:13

## 2024-11-11 RX ADMIN — PIPERACILLIN AND TAZOBACTAM 3.38 G: 3; .375 INJECTION, POWDER, FOR SOLUTION INTRAVENOUS at 22:18

## 2024-11-11 RX ADMIN — PRAVASTATIN SODIUM 40 MG: 40 TABLET ORAL at 22:21

## 2024-11-11 RX ADMIN — FUROSEMIDE 80 MG: 10 INJECTION, SOLUTION INTRAMUSCULAR; INTRAVENOUS at 06:13

## 2024-11-11 RX ADMIN — ONDANSETRON 4 MG: 2 INJECTION, SOLUTION INTRAMUSCULAR; INTRAVENOUS at 05:12

## 2024-11-11 RX ADMIN — ONDANSETRON 4 MG: 2 INJECTION INTRAMUSCULAR; INTRAVENOUS at 05:12

## 2024-11-11 RX ADMIN — CITALOPRAM 10 MG: 20 TABLET, FILM COATED ORAL at 08:39

## 2024-11-11 RX ADMIN — PIPERACILLIN AND TAZOBACTAM 3.38 G: 3; .375 INJECTION, POWDER, FOR SOLUTION INTRAVENOUS at 14:34

## 2024-11-11 RX ADMIN — AMLODIPINE BESYLATE 5 MG: 5 TABLET ORAL at 22:21

## 2024-11-11 RX ADMIN — PIPERACILLIN AND TAZOBACTAM 3.38 G: 3; .375 INJECTION, POWDER, FOR SOLUTION INTRAVENOUS at 08:34

## 2024-11-11 RX ADMIN — ASPIRIN 81 MG: 81 TABLET, COATED ORAL at 08:40

## 2024-11-11 NOTE — TELEPHONE ENCOUNTER
Caller: Nusrat Jay    Relationship: Emergency Contact    Best call back number: [phone]    What is the best time to reach you: ANY    Who are you requesting to speak with (clinical staff, provider,  specific staff member): CLINICAL    Do you know the name of the person who called: PTS DAUGHTER    What was the call regarding: PTS DAUGHTER STATES THAT THE PT IS BEING ADMITTED AND HER APPT IS NEEDING TO BE CANCELED. [facility] IS CANCELING APPT BUT PTS DAUGHTER WANTS TO KNOW IF THERE IS ANY WAY DR. SIMEON CAN COME SEE THE PT WHILE SHE IS ALREADY ADMITTED. THANK YOU    Is it okay if the provider responds through [facility]: NO

## 2024-11-11 NOTE — TELEPHONE ENCOUNTER
Notified Nusrat that the appointment has lawrence cancelled and that Dr. Lagunas is aware of her admission.

## 2024-11-11 NOTE — PLAN OF CARE
Goal Outcome Evaluation:              Outcome Evaluation: Pt admitted from ED, A/O x 4, NC-2L. VSS. Bed alarm on, call light reachable

## 2024-11-11 NOTE — PROGRESS NOTES
[facility]: Piperacillin-Tazobactam Consult    Pt Name: Vonda Jay   : 1935    Indication: Empiric, Intra-Abdominal Infection, and Pneumonia    Relevant clinical data and objective history reviewed:    Past Medical History:   Diagnosis Date    Arthritis     Chronic kidney disease     Heart murmur     History of breast cancer     History of carotid artery disease     Hyperlipidemia     Hypertension      Creatinine   Date Value Ref Range Status   2024 1.46 (H) 0.57 - 1.00 mg/dL Final   2024 1.61 (H) 0.57 - 1.00 mg/dL Final   2024 1.50 (H) 0.57 - 1.00 mg/dL Final   2021 1.2 0.7 - 1.5 mg/dL Final   2021 1.1 0.7 - 1.5 mg/dL Final   2021 1.2 0.7 - 1.5 mg/dL Final     BUN   Date Value Ref Range Status   2024 30 (H) 8 - 23 mg/dL Final   2021 44 (H) 7 - 20 mg/dL Final     Estimated Creatinine Clearance: 23.8 mL/min (A) (by C-G formula based on SCr of 1.46 mg/dL (H)).    Lab Results   Component Value Date    WBC 21.40 (H) 2024     Temp Readings from Last 3 Encounters:   24 100.5 °F (38.1 °C) (Tympanic)   24 97 °F (36.1 °C) (Temporal)   24 97.7 °F (36.5 °C)      Assessment/Plan  Estimated CrCl >20 mL/min at this time; BMI 30.12 kg/m2  Will start piperacillin-tazobactam 3.375 g IV every 8 hours     Pharmacy will continue to follow daily while on piperacillin-tazobactam and adjust as needed. Thank you for this consult.    Elver Paz [facility]  Clinical Pharmacist

## 2024-11-11 NOTE — Clinical Note
Level of Care: Critical Care [6]   Diagnosis: Respiratory distress [515461]   Admitting Physician: KAYLIE HAMILTON [[id number]]   Attending Physician: KAYLIE HAMILTON [[id number]]

## 2024-11-11 NOTE — H&P
Group: [facility]         History and Physical    Patient Identification:  Vonda Jay  89 y.o.  female  1935  [id number]            Requesting physician: Dr. Hong    Reason for Consultation: ICU admission    CC: Respiratory failure    History of Present Illness:  Vonda Jay is an 89 year old female with past medical history HTN, HLD, arthritis, CKD 3, aortic valve stenosis who presented to the ED in acute respiratory distress.  She reports she became short of breath before she went to bed last night, could never sleep and it has worsened in the last couple of hours.  Per EMS SpO2 76% on room air when they arrived.  She was placed on NRB, arrived to the ED and per reports continue to feel as if she needed more oxygen.  She was placed on BiPAP with improvement in her tachypnea and respiratory distress.  Chest x-ray revealed vascular congestion, cardiomegaly.  Labs revealed leukocytosis WBC count 21.40, proBNP 5,160, troponin 46.  She reports she has had nausea vomiting and diarrhea since Friday, she last vomited in the ED here tonight and the BiPAP was able to be removed in time, no concern for aspiration.  She denies any abdominal pain, states has had a reductive cough with yellow sputum.  Denies fever, chills at home.  Tmax 100.5 in the ED. she received 80 Mg IV Lasix in the ED.  She reports she sleeps in the bed with 1-2 pillows.  She does not wear oxygen or home NIPPV.  We have been asked to admit to ICU for further management.    She was recently found to have critical aortic valve stenosis, she usually follows with Dr. Singleton.  Dr. Rizvi performed right heart cath 2 days ago; reportedly this went well and she was discharged home same day with no issues, did not experience any nausea or vomiting post heart cath.  Per cath report she had a 50% stenosis of the RCA, 60 to 70% proximal stenosis ramus, mildly elevated right atrial and wedge pressure, mild pulmonary hypertension,  mildly decreased CI 2.08 L/min.  Echo 2024 LVEF 69.8% grade 1 diastolic function, severe aortic valve stenosis, mild mitral valve regurg.  There are plans for TAVR hopefully by the end of the month.  She has visit scheduled with Dr. Lagunas tomorrow.  She follows with Dr. Fagan for CKD 3.  She is a past smoker, half pack per day x 40 years quit in .     Review of Systems  CONSTITUTIONAL:  Denies fevers or chills  EYE:  No new vision changes  EAR:  No change in hearing  CARDIAC:  No chest pain  PULMONARY: Positive for cough, productive thick yellow sputum, severe shortness of breath  GI: Positive for vomiting and diarrhea, no abdominal pain  RENAL:  No dysuria or urinary frequency  MUSCULOSKELETAL:  No musculoskeletal complaints  ENDOCRINE:  No heat or cold intolerance  INTEGUMENTARY: No skin rashes  NEUROLOGICAL:  No dizziness or confusion.  No seizure activity  PSYCHIATRIC:  No new anxiety or depression  12 system review of systems performed and all else negative     Past Medical History:  Past Medical History:   Diagnosis Date    Arthritis     Chronic kidney disease     Heart murmur     History of breast cancer     History of carotid artery disease     Hyperlipidemia     Hypertension        Past Surgical History:  Past Surgical History:   Procedure Laterality Date    BREAST LUMPECTOMY  1998    CAROTID ARTERY ANGIOPLASTY  ,     CHOLECYSTECTOMY  1979    FEMUR IM NAILING/RODDING Left 2018    Procedure: FEMUR INTRAMEDULLARY NAILING;  Surgeon: Zheng Fleming MD;  Location: [facility];  Service:     HYSTERECTOMY          Home Meds:  (Not in a hospital admission)      Allergies:  No Known Allergies    Social History:   Social History     Socioeconomic History    Marital status:    Tobacco Use    Smoking status: Former     Current packs/day: 0.00     Types: Cigarettes     Start date:      Quit date:      Years since quittin.8     Passive exposure: Past    Smokeless  tobacco: Never    Tobacco comments:     caffeine use- coffee   Vaping Use    Vaping status: Never Used   Substance and Sexual Activity    Alcohol use: No    Drug use: Defer    Sexual activity: Defer       Family History:  Family History   Problem Relation Age of Onset    Hypertension Mother     No Known Problems Father     Heart valve disorder Sister     Lung cancer Sister     Lymphoma Sister     Skin cancer Sister     Stroke Brother 57       Physical Exam:  /84 (BP Location: Right arm, Patient Position: Sitting)   Pulse 95   Temp 100.5 °F (38.1 °C) (Tympanic)   Resp (!) 40   LMP  (LMP Unknown)   SpO2 100%  There is no height or weight on file to calculate BMI. 100%      Physical Exam  Constitutional: Elderly female lying in stretcher, tolerating BiPAP without issues, awake and alert  Head: NCAT  Eyes: No pallor, Anicteric conjunctiva, PERRLA.  ENMT:  No oral thrush. Dry MM.   NECK: Trachea midline, no thyromegaly  Heart: RRR, systolic murmur, no pedal edema  Lungs: ROCÍO +, bilateral rales, no wheezing  Abdomen: Soft, nondistended.  No tenderness, guarding or rigidity. No palpable masses.  Extremities: Extremities warm and well perfused. No cyanosis/ clubbing.  All pulses palpable.  Neuro: Alert and oriented x 4, follows commands, moves all extremities equally  Psych: Mood and affect appropriate    PPE recommended per [facility] infectious disease Isolation protocol for the current clinical scenario(as mentioned below) was followed.    LABS:  COVID19   Date Value Ref Range Status   01/14/2023 Not Detected Not Detected - Ref. Range Final       Lab Results   Component Value Date    CALCIUM 9.0 11/11/2024    PHOS 2.4 (L) 01/16/2023     Results from last 7 days   Lab Units 11/11/24  0505 11/05/24  1436   SODIUM mmol/L 133* 135*   POTASSIUM mmol/L 3.9 4.4   CHLORIDE mmol/L 97* 98   CO2 mmol/L 22.0 22.9   BUN mg/dL 30* 41*   CREATININE mg/dL 1.46* 1.61*   GLUCOSE mg/dL 166* 136*   CALCIUM mg/dL 9.0 9.4  "  WBC 10*3/mm3 21.40* 11.65*   HEMOGLOBIN g/dL 11.3* 11.1*   PLATELETS 10*3/mm3 299 128*   ALT (SGPT) U/L 23  --    AST (SGOT) U/L 29  --    PROBNP pg/mL 5,160.0*  --      Lab Results   Component Value Date    CKTOTAL 68 02/08/2018    TROPONINT 46 (H) 11/11/2024     Results from last 7 days   Lab Units 11/11/24  0505   HSTROP T ng/L 46*             Results from last 7 days   Lab Units 11/11/24  0528   PH, ARTERIAL pH units 7.382   PCO2, ARTERIAL mm Hg 39.3   PO2 ART mm Hg 502.4*   O2 SATURATION ART % 100.0*   MODALITY  BiPap                 No results found for: \"TSH\"  Estimated Creatinine Clearance: 23.8 mL/min (A) (by C-G formula based on SCr of 1.46 mg/dL (H)).         Imaging: I personally visualized the images of scans/x-rays performed within last 3 days.      Assessment:  Acute hypoxemic respiratory failure  Critical degenerative aortic valve stenosis  Chronic diastolic heart failure  Mild pulmonary hypertension  Leukocytosis  Vomiting and diarrhea    HTN  HLD  CAD  CKD III  Hyperglycemia    Recommendations:  1.  Acute hypoxemic respiratory failure  -She is tolerating on BiPAP currently without issues, she states respiratory status is much improved.  Will continue current support for now  -She has been compliant with home Lasix, no reports of increased edema.  She has just received Lasix 80 Mg IV x 1 in the ED, chest x-ray with vascular congestion.  Patient has also experienced vomiting and diarrhea for the last 2 days, as well as in the ED approx ~100ccs.  My concern is she may vomit with BiPAP mask on, she currently denies nausea after she has received Zofran.  I discussed this with her and my concern for possible aspiration, hopefully after this dose of Lasix we can trial her off BiPAP with supplemental oxygen only.  May consider NG tube insertion if ongoing nausea  -CXR does not appear with any focal consolidation however cannot totally rule out pulmonary infection, she does have leukocytosis and low-grade " fever 100.5, productive sputum thick yellow.  Add procalcitonin, lactate.  Blood cultures pending, RVP is still pending.  Would like to get noncontrast CT chest but I do not believe she will tolerate laying flat at this time  -She has received IV ceftriaxone, procalcitonin is elevated.  Will start Zosyn to cover for possible pulmonary and/or intra-abdominal process    2.  Critical degenerative aortic valve stenosis, chronic diastolic heart failure, HTN  -Consult Dr. Rizvi to see today, will leave up to him if Dr. Bautista needs to see while inpatient as she is scheduled for appointment with him tomorrow  -Continue ASA 81 Mg, will defer restarting of BP medications to cardiology  -Restart statin    3.  Vomiting and diarrhea  -She denies abdominal pain, abdominal exam is benign.  -Will get KUB to start, as needed Zofran as needed.  She currently denies nausea  -Will collect any stool for GI panel    4.  CKD 3  -Appears kidney function is at baseline currently, will continue to monitor    5.  Hyperglycemia  -Accu-Cheks every 6, LDSSI while NPO.  She denies any history of diabetes  -Add A1c    Discussed CODE STATUS with patient and 2 family members at bedside, she elects for DNR DNI  SCDs  Strict NPO for now    Araceli JULIANA Erickson, APRN  11/11/2024  05:55 EST      Much of this encounter note is an electronic transcription/translation of spoken language to printed text using Dragon Software.

## 2024-11-11 NOTE — ED PROVIDER NOTES
EMERGENCY DEPARTMENT ENCOUNTER    Room Number:  16/16  PCP: Ambrose Ashley MD  Historian: Patient      HPI:  Chief Complaint: Shortness of breath  A complete HPI/ROS/PMH/PSH/SH/FH are unobtainable due to: Shortness of breath  Context: Vonda Jay is a 89 y.o. female who presents to the ED c/o shortness of breath.  Patient has history of critical aortic stenosis.  Has had recent left and right heart cath.  Patient states became short of breath over the last couple hours.  EMS states patient's O2 sat 76%.  Patient placed on oxygen and states she still feels short of breath.  Has had no chest pain pressure tightness.  Has had no vomiting or diarrhea.  No leg swelling            PAST MEDICAL HISTORY  Active Ambulatory Problems     Diagnosis Date Noted    Closed displaced intertrochanteric fracture of left femur 02/07/2018    SANYA (acute kidney injury) 02/07/2018    HTN (hypertension) 02/07/2018    Acute blood loss anemia 02/10/2018    Leukocytosis 02/10/2018    Drug-induced constipation 02/12/2018    Stage 3b chronic kidney disease 09/17/2022    COVID-19 virus infection 09/17/2022    Anemia due to chronic kidney disease 09/17/2022    Acute on chronic diastolic CHF (congestive heart failure) 09/18/2022    Aortic stenosis 09/18/2022    Anemia of chronic renal failure, stage 3 (moderate) 09/19/2022    E. 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 01/12/2023    (HFpEF) heart failure with preserved ejection fraction 01/13/2023    Obesity (BMI 30-39.9) 01/13/2023    Acute UTI (urinary tract infection) 01/15/2023    Cough 01/15/2023    Nocturnal hypoxemia 01/15/2023    Hypophosphatemia 01/16/2023     Resolved Ambulatory Problems     Diagnosis Date Noted    Hyperkalemia 02/07/2018    Acute hypoxemic respiratory failure due to COVID-19 09/17/2022    Hypokalemia 01/13/2023     Past Medical History:   Diagnosis Date    Arthritis     Chronic kidney disease     Heart murmur      History of breast cancer     History of carotid artery disease     Hyperlipidemia     Hypertension          PAST SURGICAL HISTORY  Past Surgical History:   Procedure Laterality Date    BREAST LUMPECTOMY  1998    CAROTID ARTERY ANGIOPLASTY  ,     CHOLECYSTECTOMY  1979    FEMUR IM NAILING/RODDING Left 2018    Procedure: FEMUR INTRAMEDULLARY NAILING;  Surgeon: Zheng Fleming MD;  Location: [facility] OR;  Service:     HYSTERECTOMY           FAMILY HISTORY  Family History   Problem Relation Age of Onset    Hypertension Mother     No Known Problems Father     Heart valve disorder Sister     Lung cancer Sister     Lymphoma Sister     Skin cancer Sister     Stroke Brother 57         SOCIAL HISTORY  Social History     Socioeconomic History    Marital status:    Tobacco Use    Smoking status: Former     Current packs/day: 0.00     Types: Cigarettes     Start date:      Quit date:      Years since quittin.8     Passive exposure: Past    Smokeless tobacco: Never    Tobacco comments:     caffeine use- coffee   Vaping Use    Vaping status: Never Used   Substance and Sexual Activity    Alcohol use: No    Drug use: Defer    Sexual activity: Defer         ALLERGIES  Patient has no known allergies.        REVIEW OF SYSTEMS  Review of Systems   Shortness of breath      PHYSICAL EXAM  ED Triage Vitals [24 0500]   Temp Heart Rate Resp BP SpO2   100.5 °F (38.1 °C) 107 (!) 40 176/84 100 %      Temp src Heart Rate Source Patient Position BP Location FiO2 (%)   Tympanic Monitor Sitting Right arm --       Physical Exam      GENERAL: Patient appears shortness of breath  HENT: nares patent  EYES: no scleral icterus  CV: regular rhythm, normal rate  RESPIRATORY: normal effort.  Rales bilaterally  ABDOMEN: soft  MUSCULOSKELETAL: no deformity  NEURO: alert, moves all extremities, follows commands  PSYCH:  calm, cooperative  SKIN: warm, dry    Vital signs and nursing notes reviewed.          LAB  RESULTS  Recent Results (from the past 24 hours)   ECG 12 Lead ED Triage Standing Order; SOA    Collection Time: 11/11/24  5:00 AM   Result Value Ref Range    QT Interval 352 ms    QTC Interval 466 ms   Comprehensive Metabolic Panel    Collection Time: 11/11/24  5:05 AM    Specimen: Blood   Result Value Ref Range    Glucose 166 (H) 65 - 99 mg/dL    BUN 30 (H) 8 - 23 mg/dL    Creatinine 1.46 (H) 0.57 - 1.00 mg/dL    Sodium 133 (L) 136 - 145 mmol/L    Potassium 3.9 3.5 - 5.2 mmol/L    Chloride 97 (L) 98 - 107 mmol/L    CO2 22.0 22.0 - 29.0 mmol/L    Calcium 9.0 8.6 - 10.5 mg/dL    Total Protein 7.8 6.0 - 8.5 g/dL    Albumin 4.1 3.5 - 5.2 g/dL    ALT (SGPT) 23 1 - 33 U/L    AST (SGOT) 29 1 - 32 U/L    Alkaline Phosphatase 95 39 - 117 U/L    Total Bilirubin 0.3 0.0 - 1.2 mg/dL    Globulin 3.7 gm/dL    A/G Ratio 1.1 g/dL    BUN/Creatinine Ratio 20.5 7.0 - 25.0    Anion Gap 14.0 5.0 - 15.0 mmol/L    eGFR 34.3 (L) >60.0 mL/min/1.73   BNP    Collection Time: 11/11/24  5:05 AM    Specimen: Blood   Result Value Ref Range    proBNP 5,160.0 (H) 0.0 - 1,800.0 pg/mL   Single High Sensitivity Troponin T    Collection Time: 11/11/24  5:05 AM    Specimen: Blood   Result Value Ref Range    HS Troponin T 46 (H) <14 ng/L   Green Top (Gel)    Collection Time: 11/11/24  5:05 AM   Result Value Ref Range    Extra Tube Hold for add-ons.    Lavender Top    Collection Time: 11/11/24  5:05 AM   Result Value Ref Range    Extra Tube hold for add-on    Gold Top - SST    Collection Time: 11/11/24  5:05 AM   Result Value Ref Range    Extra Tube Hold for add-ons.    Light Blue Top    Collection Time: 11/11/24  5:05 AM   Result Value Ref Range    Extra Tube Hold for add-ons.    CBC Auto Differential    Collection Time: 11/11/24  5:05 AM    Specimen: Blood   Result Value Ref Range    WBC 21.40 (H) 3.40 - 10.80 10*3/mm3    RBC 3.78 3.77 - 5.28 10*6/mm3    Hemoglobin 11.3 (L) 12.0 - 15.9 g/dL    Hematocrit 33.7 (L) 34.0 - 46.6 %    MCV 89.2 79.0 - 97.0  fL    MCH 29.9 26.6 - 33.0 pg    MCHC 33.5 31.5 - 35.7 g/dL    RDW 12.6 12.3 - 15.4 %    RDW-SD 40.9 37.0 - 54.0 fl    MPV 10.6 6.0 - 12.0 fL    Platelets 299 140 - 450 10*3/mm3    Neutrophil % 77.5 (H) 42.7 - 76.0 %    Lymphocyte % 9.6 (L) 19.6 - 45.3 %    Monocyte % 10.8 5.0 - 12.0 %    Eosinophil % 0.7 0.3 - 6.2 %    Basophil % 0.4 0.0 - 1.5 %    Immature Grans % 1.0 (H) 0.0 - 0.5 %    Neutrophils, Absolute 16.56 (H) 1.70 - 7.00 10*3/mm3    Lymphocytes, Absolute 2.06 0.70 - 3.10 10*3/mm3    Monocytes, Absolute 2.32 (H) 0.10 - 0.90 10*3/mm3    Eosinophils, Absolute 0.16 0.00 - 0.40 10*3/mm3    Basophils, Absolute 0.09 0.00 - 0.20 10*3/mm3    Immature Grans, Absolute 0.21 (H) 0.00 - 0.05 10*3/mm3    nRBC 0.0 0.0 - 0.2 /100 WBC   Blood Gas, Arterial -    Collection Time: 11/11/24  5:28 AM    Specimen: Arterial Blood   Result Value Ref Range    Site Right Radial     Diego's Test Positive     pH, Arterial 7.382 7.350 - 7.450 pH units    pCO2, Arterial 39.3 35.0 - 45.0 mm Hg    pO2, Arterial 502.4 (H) 80.0 - 100.0 mm Hg    HCO3, Arterial 23.4 22.0 - 28.0 mmol/L    Base Excess, Arterial -1.6 (L) 0.0 - 2.0 mmol/L    O2 Saturation, Arterial 100.0 (H) 92.0 - 98.5 %    A-a DO2 0.7 mmHg    CO2 Content 24.6 23 - 27 mmol/L    Barometric Pressure for Blood Gas 748.1000 mmHg    Modality BiPap     FIO2 100 %    Set Tidal Volume 607     Set Mech Resp Rate 20     Rate 22 Breaths/minute    PSV 8 cmH2O    PIP 7 cmH2O    Hemodilution No     Inspiratory Time 1     Device Comment [id number] MODE ST IPAP 16 EPAP 8 RATE 20 ITIME 1.00     Device Comment 2 RISE 3     PO2/FIO2 502 (H) 0 - 500       Ordered the above labs and reviewed the results.        RADIOLOGY  XR Chest 1 View    Result Date: 11/11/2024  SINGLE VIEW OF THE CHEST  HISTORY: Shortness of breath  COMPARISON: January 12, 2023  FINDINGS: There is cardiomegaly. There is vascular congestion. No pneumothorax is seen. There appears to be some chronic scarring within the right lung.  Blunting the right costophrenic angle was also present on prior exam.      Cardiomegaly with vascular congestion. There is calcification of the aorta.  This report was finalized on 11/11/2024 5:21 AM by Dr. Nedra Rodrigues M.D on Workstation: [id number]       Ordered the above noted radiological studies.  Chest x-ray independently interpreted by me and shows cardiomegaly with vascular congestion          PROCEDURES  Critical Care    Performed by: Dario Hong MD  Authorized by: Leandro Simon MD    Critical care provider statement:     Critical care time (minutes):  35    Critical care time was exclusive of:  Separately billable procedures and treating other patients    Critical care was necessary to treat or prevent imminent or life-threatening deterioration of the following conditions:  Respiratory failure    Critical care was time spent personally by me on the following activities:  Ordering and performing treatments and interventions, ordering and review of laboratory studies, pulse oximetry, re-evaluation of patient's condition and discussions with primary provider      EKG          EKG time: 5 AM  Rhythm/Rate: Sinus tachycardia 105  P waves and ND: Normal P waves  QRS, axis: Normal QRS  ST and T waves: ST segment depressions    Interpreted Contemporaneously by me, independently viewed  Unchanged compared to prior 2/7/18          MEDICATIONS GIVEN IN ER  Medications   sodium chloride 0.9 % flush 10 mL (has no administration in time range)   furosemide (LASIX) injection 80 mg (has no administration in time range)   cefTRIAXone (ROCEPHIN) 1,000 mg in sodium chloride 0.9 % 100 mL MBP (has no administration in time range)   ondansetron (ZOFRAN) injection 4 mg (4 mg Intravenous Given 11/11/24 0512)                   MEDICAL DECISION MAKING, PROGRESS, and CONSULTS    All labs have been independently reviewed by me.  All radiology studies have been reviewed by me and I have also reviewed the radiology report.    EKG's independently viewed and interpreted by me.  Discussion below represents my analysis of pertinent findings related to patient's condition, differential diagnosis, treatment plan and final disposition.      Additional sources:  - Discussed/ obtained information from independent historians: EMS gave significant history as patient was too short of breath.    - External (non-ED) record review: Epic reviewed patient had recent right and left heart cath 11/8/2024 for evaluation of aortic valve stenosis and prep for TAVR    - Chronic or social conditions impacting care: None    - Shared decision making: None      Orders placed during this visit:  Orders Placed This Encounter   Procedures    Respiratory Panel PCR w/COVID-19(SARS-CoV-2) MARKUS/DOTTIE/SANDOVAL/PAD/COR/JAN In-House, NP Swab in UTM/VTM, 2 HR TAT - Swab, Nasopharynx    Blood Culture - Blood,    Blood Culture - Blood,    XR Chest 1 View    [place] Draw    Comprehensive Metabolic Panel    BNP    Single High Sensitivity Troponin T    CBC Auto Differential    Lactic Acid, Plasma    Blood Gas, Arterial -    Procalcitonin    NPO Diet NPO Type: Strict NPO    Undress & Gown    Continuous Pulse Oximetry    Vital Signs    Pulmonology (on-call MD unless specified)    Oxygen Therapy- Nasal Cannula; Titrate 1-6 LPM Per SpO2; 90 - 95%    NIPPV (CPAP or BIPAP)    ECG 12 Lead ED Triage Standing Order; SOA    Insert Peripheral IV    Initiate Observation Status    CBC & Differential    Green Top (Gel)    Lavender Top    Gold Top - SST    Light Blue Top         Additional orders considered but not ordered:  None        Differential diagnosis includes but is not limited to:    CHF versus COPD versus pneumonia      Independent interpretation of labs, radiology studies, and discussions with consultants:  ED Course as of 11/11/24 0606   Mon Nov 11, 2024   0556 05:56 EST  Patient arrives acutely short of breath.  Patient has critical aortic stenosis.  Patient was on nonrebreather and  states she could not breathe.  Was placed on BiPAP and states she feels significantly better.  Patient does have evidence of congestive heart failure on chest x-ray.  Patient also has temperature and white blood cell count.  Will be given both Lasix and Rocephin. Talked with Araceli and will admit to Dr. Simon [SL]      ED Course User Index  [SL] Dario Hong MD                 DIAGNOSIS  Final diagnoses:   Respiratory distress   Acute congestive heart failure, unspecified heart failure type         DISPOSITION  admit            Latest Documented Vital Signs:  As of 06:06 EST  BP- 176/84 HR- 95 Temp- 100.5 °F (38.1 °C) (Tympanic) O2 sat- 100%              --    Please note that portions of this were completed with a voice recognition program.       Note Disclaimer: At [facility], we believe that sharing information builds trust and better relationships. You are receiving this note because you are receiving care at [facility] or recently visited. It is possible you will see health information before a provider has talked with you about it. This kind of information can be easy to misunderstand. To help you fully understand what it means for your health, we urge you to discuss this note with your provider.            Dario Hong MD  11/11/24 0607       Dario Hong MD  11/11/24 0709

## 2024-11-11 NOTE — PROGRESS NOTES
Clinical Pharmacy Services: Medication History    Vonda Jay is a 89 y.o. female presenting to [facility] for   Chief Complaint   Patient presents with    Shortness of Breath       She  has a past medical history of Arthritis, Chronic kidney disease, Heart murmur, History of breast cancer, History of carotid artery disease, Hyperlipidemia, and Hypertension.    Allergies as of 11/11/2024    (No Known Allergies)       Medication information was obtained from: Pharmacy   Pharmacy and Phone Number:   [id number] - [place], [place] - [address] [facility] - [phone]  - [phone] FX  [address]  [facility] [address]  Phone: [phone] Fax: [phone]    [facility] - [place], [place] - [address] [place] [address] - [phone]  - [phone] [address] [address]  Phone: [phone] Fax: [phone]    [id number] - [place], [place] - [address] [facility] - [phone]  - [phone]   [address] [facility] [address]  Phone: [phone] Fax: [phone]        Prior to Admission Medications       Prescriptions Last Dose Informant Patient Reported? Taking?    acetaminophen (TYLENOL) 500 MG tablet  Self Yes Yes    Take 2 tablets by mouth Every 6 (Six) Hours As Needed for Mild Pain or Moderate Pain.    acidophilus (FLORANEX) tablet tablet  Self Yes Yes    Take 1 tablet by mouth 3 (Three) Times a Day.    alendronate (FOSAMAX) 70 MG tablet  Self, Pharmacy Yes Yes    Take 1 tablet by mouth Every 7 (Seven) Days. Saturday    amLODIPine (NORVASC) 5 MG tablet  Pharmacy Yes Yes    Take 1 tablet by mouth Daily.    aspirin 81 MG tablet  Self Yes Yes    Take 1 tablet by mouth Daily.    Calcium-Magnesium-Vitamin D (CALCIUM MAGNESIUM PO)  Self Yes Yes    Take 1 tablet by mouth Daily.    cholecalciferol (VITAMIN D3) 1000 units tablet  Self Yes Yes    Take 1 tablet by mouth Daily.     citalopram (CeleXA) 10 MG tablet  Self, Pharmacy Yes Yes    Take 1 tablet by mouth Daily.    docusate calcium (SURFAK) 240 MG capsule  Self Yes Yes    Take 1 capsule by mouth 2 (Two) Times a Day As Needed for Constipation.    furosemide (LASIX) 40 MG tablet  Pharmacy No Yes    TAKE 1 TABLET DAILY    hydrALAZINE (APRESOLINE) 100 MG tablet  Self, Pharmacy Yes Yes    Take 1 tablet by mouth 3 (Three) Times a Day.    multivitamin with minerals tablet tablet  Self Yes Yes    Take 1 tablet by mouth Daily.    nebivolol (BYSTOLIC) 5 MG tablet  Pharmacy No Yes    TAKE 1 TABLET BY MOUTH EVERY DAY    pravastatin (PRAVACHOL) 40 MG tablet  Self, Pharmacy Yes Yes    Take 1 tablet by mouth Every Night.              Medication notes: Medication list was compiled from fill history of various pharmacies via [employer]. Strict adherence may not apply.      This medication list is complete to the best of my knowledge as of 11/11/2024    Please call if questions.    Jose Scott  Medication History Technician  [phone]    11/11/2024 08:33 EST  \

## 2024-11-11 NOTE — CONSULTS
Cardiology History & Physical / Consultation      Patient Name: Vonda Jay  Age/Sex: 89 y.o. female  : 1935  MRN: [id number]    Date of Admission: 2024  Date of Encounter Visit: 24  Encounter Provider: Adam Singleton MD  Referring Provider: Dario Hong MD  Place of Service: [facility] CARDIOLOGY  Patient Care Team:  Ambrose Ashley MD as PCP - General (Family Medicine)          Subjective:     Chief Complaint: Shortness of breath.    Reason for consultation: Shortness of breath    History of Present Illness:  Vonda Jay is a 89 y.o. female who follows with myself and Dr. Rizvi in the office.  She has a past medical history significant for critical degenerative aortic valve stenosis, hypertension, chronic kidney disease, and hyperlipidemia.    She saw Dr. Rizvi in the office on 10/31/2024 for TAVR workup.  Her echocardiogram on 2024 showed progression of the aortic valve stenosis to critical degenerative aortic valve stenosis.  Her mean pressure gradient was 69 mmHg.  Her EF was still preserved at 70%.  She underwent a heart catheterization on 2024 for preop TAVR.  She is supposed to have an appointment with Dr. Lagunas tomorrow.    She presented to the emergency department this morning with complaints of shortness of breath.  She reported that it got progressively worse over the prior couple hours.  Her oxygen saturation on room air with EMS was 76%.  She was placed on oxygen however still reported that she felt short of breath.    Her initial troponin was 46, proBNP 5160, sodium 133, chloride 97, BUN 30, creatinine 1.46, glucose 166, procalcitonin 1.06, WBC 21.40, hemoglobin 11.3.  EKG is sinus tach with no acute ST-T changes.  Chest x-ray showed cardiomegaly with vascular congestion.  She received 80 mg of IV Lasix in the emergency department.     Patient reportedly had a head cold earlier in the week.   Patient after her heart cath ate a very large meal subsequently had nausea vomiting and some diarrhea.  Initially they thought maybe she ate too much or got some bad food however it reoccurred yesterday.  Patient had ran a fever this morning.  Patient again had nausea and vomiting diarrhea both on Friday as well as yesterday.    Cath 11/8/2024  Conclusions:   1. Left main: Normal  2. LAD: Mild calcification with luminal irregularities mid segment.  3. LCX: Mild calcification but no stenosis.  4. RCA: Dominant.  Discrete 50% mid vessel stenosis  5.  Ramus: Small caliber.  Discrete 60 to 70% proximal stenosis  6.  Mildly elevated right atrial and wedge pressure  7.  Mild pulmonary hypertension  8.  Mildly decreased cardiac index    ECHO 8/28/2024    Left ventricular systolic function is normal. Calculated left ventricular EF = 69.8%    Left ventricular diastolic function is consistent with (grade I) impaired relaxation.    Severe aortic valve stenosis is present. Aortic valve area is 0.8 cm2.    Peak velocity of the flow distal to the aortic valve is 492.5 cm/s. Aortic valve maximum pressure gradient is 97 mmHg. Aortic valve mean pressure gradient is 61 mmHg. Aortic valve dimensionless index is 0.2 .    Calculated right ventricular systolic pressure from tricuspid regurgitation is 0 mmHg.    Mild mitral valve regurgitation is present       Past Medical History:  Past Medical History:   Diagnosis Date    Arthritis     Chronic kidney disease     Heart murmur     History of breast cancer     History of carotid artery disease     Hyperlipidemia     Hypertension        Past Surgical History:   Procedure Laterality Date    BREAST LUMPECTOMY  1998    CARDIAC CATHETERIZATION N/A 11/8/2024    Procedure: Right and Left Heart Cath;  Surgeon: Mike Rizvi MD;  Location: [facility] INVASIVE LOCATION;  Service: Cardiology;  Laterality: N/A;    CARDIAC CATHETERIZATION N/A 11/8/2024    Procedure: Coronary angiography;   Surgeon: Mike Rizvi MD;  Location: [facility] CATH INVASIVE LOCATION;  Service: Cardiology;  Laterality: N/A;    CAROTID ARTERY ANGIOPLASTY  ,     CHOLECYSTECTOMY  1979    FEMUR IM NAILING/RODDING Left 2018    Procedure: FEMUR INTRAMEDULLARY NAILING;  Surgeon: Zheng Fleming MD;  Location: [facility] MAIN OR;  Service:     HYSTERECTOMY  1989       Home Medications:   (Not in a hospital admission)      Allergies:  No Known Allergies    Past Social History:  Social History     Socioeconomic History    Marital status:    Tobacco Use    Smoking status: Former     Current packs/day: 0.00     Types: Cigarettes     Start date:      Quit date:      Years since quittin.8     Passive exposure: Past    Smokeless tobacco: Never    Tobacco comments:     caffeine use- coffee   Vaping Use    Vaping status: Never Used   Substance and Sexual Activity    Alcohol use: No    Drug use: Defer    Sexual activity: Defer       Past Family History: History reviewed. No pertinent family history.   Family History   Problem Relation Age of Onset    Hypertension Mother     No Known Problems Father     Heart valve disorder Sister     Lung cancer Sister     Lymphoma Sister     Skin cancer Sister     Stroke Brother 57       Review of Systems        Objective:     Objective:  Temp:  [100.5 °F (38.1 °C)] 100.5 °F (38.1 °C)  Heart Rate:  [] 73  Resp:  [19-40] 19  BP: (136-176)/(58-84) 136/58    Intake/Output Summary (Last 24 hours) at 2024 0957  Last data filed at 2024 0921  Gross per 24 hour   Intake 100 ml   Output --   Net 100 ml     Body mass index is 30.12 kg/m².      24  0721   Weight: 72.3 kg (159 lb 6.3 oz)           Physical Exam:   Vitals reviewed.   Cardiovascular:      Normal rate. Regular rhythm. Normal S1. Normal S2.       Murmurs: There is a grade 3/6 harsh midsystolic murmur at the URSB, radiating to the neck.      No gallop.  No click. No rub.   Pulses:     Intact distal  pulses.   Edema:     Peripheral edema absent.          Labs:   Lab Review:     Results from last 7 days   Lab Units 11/11/24  0505 11/05/24  1436   SODIUM mmol/L 133* 135*   POTASSIUM mmol/L 3.9 4.4   CHLORIDE mmol/L 97* 98   CO2 mmol/L 22.0 22.9   BUN mg/dL 30* 41*   CREATININE mg/dL 1.46* 1.61*   GLUCOSE mg/dL 166* 136*   CALCIUM mg/dL 9.0 9.4   AST (SGOT) U/L 29  --    ALT (SGPT) U/L 23  --      Results from last 7 days   Lab Units 11/11/24  0505   HSTROP T ng/L 46*     Results from last 7 days   Lab Units 11/11/24  0505   WBC 10*3/mm3 21.40*   HEMOGLOBIN g/dL 11.3*   HEMATOCRIT % 33.7*   PLATELETS 10*3/mm3 299     Results from last 7 days   Lab Units 11/11/24  0505   INR  1.08         Results from last 7 days   Lab Units 11/11/24  0505   MAGNESIUM mg/dL 2.3         Results from last 7 days   Lab Units 11/11/24  0505   PROBNP pg/mL 5,160.0*                 PREVIOUS EKG:      EKG:                       Assessment:       Respiratory distress        Plan:     1.  Aortic stenosis.  Patient is being worked up for TAVR.  Patient now presents with shortness of breath which obviously is concerning that this could be cardiac in origin.  Patient however currently has a fever so at the current time any further workup for her TAVR is on hold.  2.  CHF secondary to problem #1.  3.  Infection could easily be GI in origin with her fever this is obviously concerning.  Workup per other physicians.  4.  Hypertension  5.  Will follow see what evolves.    Thank you for allowing me to participate in the care of Vonda Jay. Feel free to contact me directly with any further questions or concerns.    Adam Singleton MD  [facility]  11/11/24  09:57 EST

## 2024-11-11 NOTE — ED NOTES
Nursing report ED to floor  Vonda Jay  89 y.o.  female    HPI :  HPI  Stated Reason for Visit: Pt presents to ED from home with complaints of increased SOA since last night. Pt had left and right heart cath on 11/8/2024 by MD Rizvi. Pt denies CP. Per EMS was hypoxic with SPO2 of 78% on RA. Pt placed on NRB-SPO2 100%  History Obtained From: patient, EMS    Chief Complaint  Chief Complaint   Patient presents with    Shortness of Breath       Admitting doctor:   Michelle Rock MD    Admitting diagnosis:   The primary encounter diagnosis was Respiratory distress. A diagnosis of Acute congestive heart failure, unspecified heart failure type was also pertinent to this visit.    Code status:   Current Code Status       Date Active Code Status Order ID Comments User Context       11/11/2024 0642 No CPR (Do Not Attempt to Resuscitate) [id number]  Araceli Erickson APRN ED        Question Answer    Code Status (Patient has no pulse and is not breathing) No CPR (Do Not Attempt to Resuscitate)    Medical Interventions (Patient has pulse or is breathing) Limited Support    Medical Intervention Limits: No intubation (DNI)    Level Of Support Discussed With Patient                    Allergies:   Patient has no known allergies.    Isolation:   No active isolations    Intake and Output    Intake/Output Summary (Last 24 hours) at 11/11/2024 1109  Last data filed at 11/11/2024 0921  Gross per 24 hour   Intake 100 ml   Output --   Net 100 ml       Weight:       11/11/24  0721   Weight: 72.3 kg (159 lb 6.3 oz)       Most recent vitals:   Vitals:    11/11/24 1001 11/11/24 1006 11/11/24 1016 11/11/24 1021   BP: 130/52  142/74    BP Location:       Patient Position:       Pulse:  68 71 65   Resp:       Temp:       TempSrc:       SpO2:  96%  95%   Weight:       Height:           Active LDAs/IV Access:   Lines, Drains & Airways       Active LDAs       Name Placement date Placement time Site Days    Peripheral IV 11/11/24 0510 Left  Antecubital 11/11/24  0510  Antecubital  less than 1    Peripheral IV 11/11/24 0500 Posterior;Right Hand 11/11/24  0500  Hand  less than 1                    Labs (abnormal labs have a star):   Labs Reviewed   COMPREHENSIVE METABOLIC PANEL - Abnormal; Notable for the following components:       Result Value    Glucose 166 (*)     BUN 30 (*)     Creatinine 1.46 (*)     Sodium 133 (*)     Chloride 97 (*)     eGFR 34.3 (*)     All other components within normal limits    Narrative:     GFR Normal >60  Chronic Kidney Disease <60  Kidney Failure <15    The GFR formula is only valid for adults with stable renal function between ages 18 and 70.   BNP (IN-HOUSE) - Abnormal; Notable for the following components:    proBNP 5,160.0 (*)     All other components within normal limits    Narrative:     This assay is used as an aid in the diagnosis of individuals suspected of having heart failure. It can be used as an aid in the diagnosis of acute decompensated heart failure (ADHF) in patients presenting with signs and symptoms of ADHF to the emergency department (ED). In addition, NT-proBNP of <300 pg/mL indicates ADHF is not likely.    Age Range Result Interpretation  NT-proBNP Concentration (pg/mL:      <50             Positive            >450                   Gray                 300-450                    Negative             <300    50-75           Positive            >900                  Gray                300-900                  Negative            <300      >75             Positive            >1800                  Gray                300-1800                  Negative            <300   SINGLE HS TROPONIN T - Abnormal; Notable for the following components:    HS Troponin T 46 (*)     All other components within normal limits    Narrative:     High Sensitive Troponin T Reference Range:  <14.0 ng/L- Negative Female for AMI  <22.0 ng/L- Negative Male for AMI  >=14 - Abnormal Female indicating possible myocardial  "injury.  >=22 - Abnormal Male indicating possible myocardial injury.   Clinicians would have to utilize clinical acumen, EKG, Troponin, and serial changes to determine if it is an Acute Myocardial Infarction or myocardial injury due to an underlying chronic condition.        CBC WITH AUTO DIFFERENTIAL - Abnormal; Notable for the following components:    WBC 21.40 (*)     Hemoglobin 11.3 (*)     Hematocrit 33.7 (*)     Neutrophil % 77.5 (*)     Lymphocyte % 9.6 (*)     Immature Grans % 1.0 (*)     Neutrophils, Absolute 16.56 (*)     Monocytes, Absolute 2.32 (*)     Immature Grans, Absolute 0.21 (*)     All other components within normal limits   BLOOD GAS, ARTERIAL - Abnormal; Notable for the following components:    pO2, Arterial 502.4 (*)     Base Excess, Arterial -1.6 (*)     O2 Saturation, Arterial 100.0 (*)     PO2/FIO2 502 (*)     All other components within normal limits   PROCALCITONIN - Abnormal; Notable for the following components:    Procalcitonin 1.06 (*)     All other components within normal limits    Narrative:     As a Marker for Sepsis (Non-Neonates):    1. <0.5 ng/mL represents a low risk of severe sepsis and/or septic shock.  2. >2 ng/mL represents a high risk of severe sepsis and/or septic shock.    As a Marker for Lower Respiratory Tract Infections that require antibiotic therapy:    PCT on Admission    Antibiotic Therapy       6-12 Hrs later    >0.5                Strongly Recommended  >0.25 - <0.5        Recommended   0.1 - 0.25          Discouraged              Remeasure/reassess PCT  <0.1                Strongly Discouraged     Remeasure/reassess PCT    As 28 day mortality risk marker: \"Change in Procalcitonin Result\" (>80% or <=80%) if Day 0 (or Day 1) and Day 4 values are available. Refer to http://www.[facility]-pct-calculator.com    Change in PCT <=80%  A decrease of PCT levels below or equal to 80% defines a positive change in PCT test result representing a higher risk for 28-day all-cause " mortality of patients diagnosed with severe sepsis for septic shock.    Change in PCT >80%  A decrease of PCT levels of more than 80% defines a negative change in PCT result representing a lower risk for 28-day all-cause mortality of patients diagnosed with severe sepsis or septic shock.      PROTIME-INR - Abnormal; Notable for the following components:    Protime 14.3 (*)     All other components within normal limits   POCT GLUCOSE FINGERSTICK - Abnormal; Notable for the following components:    Glucose 145 (*)     All other components within normal limits   RESPIRATORY PANEL PCR W/ COVID-19 (SARS-COV-2), NP SWAB IN UTM/VTP, 2 HR TAT - Normal    Narrative:     In the setting of a positive respiratory panel with a viral infection PLUS a negative procalcitonin without other underlying concern for bacterial infection, consider observing off antibiotics or discontinuation of antibiotics and continue supportive care. If the respiratory panel is positive for atypical bacterial infection (Bordetella pertussis, Chlamydophila pneumoniae, or Mycoplasma pneumoniae), consider antibiotic de-escalation to target atypical bacterial infection.   LACTIC ACID, PLASMA - Normal   MAGNESIUM - Normal   PHOSPHORUS - Normal   BLOOD CULTURE   BLOOD CULTURE   GASTROINTESTINAL PANEL, PCR (PREFERRED) DOES NOT INCLUDE CDIFF   MRSA SCREEN, PCR   RESPIRATORY CULTURE   RAINBOW DRAW    Narrative:     The following orders were created for panel order [place] Draw.  Procedure                               Abnormality         Status                     ---------                               -----------         ------                     Green Top (Gel)[[id number]]                                  Final result               Lavender Top[[id number]]                                     Final result               Gold Top - SST[[id number]]                                   Final result               Light Blue Top[[id number]]                                    Final result                 Please view results for these tests on the individual orders.   POCT GLUCOSE FINGERSTICK   POCT GLUCOSE FINGERSTICK   POCT GLUCOSE FINGERSTICK   POCT GLUCOSE FINGERSTICK   CBC AND DIFFERENTIAL    Narrative:     The following orders were created for panel order CBC & Differential.  Procedure                               Abnormality         Status                     ---------                               -----------         ------                     CBC Auto Differential[[id number]]        Abnormal            Final result                 Please view results for these tests on the individual orders.   GREEN TOP   LAVENDER TOP   GOLD TOP - SST   LIGHT BLUE TOP       EKG:   ECG 12 Lead ED Triage Standing Order; SOA   Final Result   HEART NNTS=944  bpm   RR Wdctbifx=937  ms   MN Wtswauqy=649  ms   P Horizontal Axis=4  deg   P Front Axis=69  deg   QRSD Interval=92  ms   QT Dsrvhdhi=459  ms   XDpI=940  ms   QRS Axis=Invalid  deg   T Wave Axis=64  deg   - BORDERLINE ECG -   Sinus tachycardia   Probable  left atrial enlargement   Minimal ST depression, lateral leads   Electronically Signed By: Milan Knowles ([facility]) 2024-11-11 07:42:56   Date and Time of Study:2024-11-11 05:00:51          Meds given in ED:   Medications   sodium chloride 0.9 % flush 10 mL (has no administration in time range)   nitroglycerin (NITROSTAT) SL tablet 0.4 mg (has no administration in time range)   mupirocin (BACTROBAN) 2 % nasal ointment 1 Application (0 Applications Each Nare Hold 11/11/24 0722)   sennosides-docusate (PERICOLACE) 8.6-50 MG per tablet 2 tablet (2 tablets Oral Not Given 11/11/24 0832)     And   polyethylene glycol (MIRALAX) packet 17 g (has no administration in time range)     And   bisacodyl (DULCOLAX) EC tablet 5 mg (has no administration in time range)     And   bisacodyl (DULCOLAX) suppository 10 mg (has no administration in time range)   acetaminophen (TYLENOL) tablet 650 mg (has no  administration in time range)     Or   acetaminophen (TYLENOL) suppository 650 mg (has no administration in time range)   ondansetron (ZOFRAN) injection 4 mg (has no administration in time range)   citalopram (CeleXA) tablet 10 mg (10 mg Oral Given 11/11/24 0839)   pravastatin (PRAVACHOL) tablet 40 mg (has no administration in time range)   lactobacillus acidophilus (RISAQUAD) capsule 1 capsule (has no administration in time range)   aspirin EC tablet 81 mg (81 mg Oral Given 11/11/24 0840)   dextrose (GLUTOSE) oral gel 15 g (has no administration in time range)   dextrose (D50W) (25 g/50 mL) IV injection 25 g (has no administration in time range)   glucagon (GLUCAGEN) injection 1 mg (has no administration in time range)   insulin regular (humuLIN R,novoLIN R) injection 2-7 Units (0 Units Subcutaneous Hold 11/11/24 0841)   piperacillin-tazobactam (ZOSYN) 3.375 g IVPB in 100 mL NS MBP (CD) (has no administration in time range)   ondansetron (ZOFRAN) injection 4 mg (4 mg Intravenous Given 11/11/24 0512)   furosemide (LASIX) injection 80 mg (80 mg Intravenous Given 11/11/24 0613)   cefTRIAXone (ROCEPHIN) 1,000 mg in sodium chloride 0.9 % 100 mL MBP (0 mg Intravenous Stopped 11/11/24 0713)   piperacillin-tazobactam (ZOSYN) 3.375 g IVPB in 100 mL NS MBP (CD) (0 g Intravenous Stopped 11/11/24 0921)       Imaging results:  XR Abdomen KUB    Result Date: 11/11/2024  Nonspecific, nonobstructive bowel gas pattern.  This report was finalized on 11/11/2024 9:04 AM by Bebeto Neal MD on Workstation: [id number]      XR Chest 1 View    Result Date: 11/11/2024  Cardiomegaly with vascular congestion. There is calcification of the aorta.  This report was finalized on 11/11/2024 5:21 AM by Dr. Nedra Rodrigues M.D on Workstation: [id number]       Ambulatory status:       Social issues:   Social History     Socioeconomic History    Marital status:    Tobacco Use    Smoking status: Former     Current packs/day: 0.00      Types: Cigarettes     Start date:      Quit date:      Years since quittin.8     Passive exposure: Past    Smokeless tobacco: Never    Tobacco comments:     caffeine use- coffee   Vaping Use    Vaping status: Never Used   Substance and Sexual Activity    Alcohol use: No    Drug use: Defer    Sexual activity: Defer       Peripheral Neurovascular  Peripheral Neurovascular (Adult)  Peripheral Neurovascular WDL: WDL    Neuro Cognitive  Neuro Cognitive (Adult)  Cognitive/Neuro/Behavioral WDL: WDL    Learning  Learning Assessment  Learning Readiness and Ability: no barriers identified    Respiratory  Respiratory  Airway WDL: WDL  Respiratory WDL  Respiratory WDL: rhythm/pattern  Rhythm/Pattern, Respiratory: shortness of breath    Abdominal Pain       Pain Assessments  Pain (Adult)  (0-10) Pain Rating: Rest: 0  Response to Pain Interventions: nonverbal indicators absent/decreased    NIH Stroke Scale       Nya Manuel RN  24 11:09 EST

## 2024-11-12 ENCOUNTER — APPOINTMENT (OUTPATIENT)
Dept: GENERAL RADIOLOGY | Facility: HOSPITAL | Age: 89
DRG: 871 | End: 2024-11-12
Payer: MEDICARE

## 2024-11-12 PROBLEM — J96.01 ACUTE HYPOXIC RESPIRATORY FAILURE: Status: ACTIVE | Noted: 2024-11-12

## 2024-11-12 PROBLEM — A41.9 SEPSIS DUE TO PNEUMONIA: Status: ACTIVE | Noted: 2024-11-12

## 2024-11-12 PROBLEM — I50.32 DIASTOLIC CHF, CHRONIC: Status: ACTIVE | Noted: 2024-11-12

## 2024-11-12 PROBLEM — J18.9 SEPSIS DUE TO PNEUMONIA: Status: ACTIVE | Noted: 2024-11-12

## 2024-11-12 LAB
ANION GAP SERPL CALCULATED.3IONS-SCNC: 11 MMOL/L (ref 5–15)
BACTERIA SPEC RESP CULT: NORMAL
BASOPHILS # BLD AUTO: 0.06 10*3/MM3 (ref 0–0.2)
BASOPHILS NFR BLD AUTO: 0.4 % (ref 0–1.5)
BUN SERPL-MCNC: 34 MG/DL (ref 8–23)
BUN/CREAT SERPL: 20.6 (ref 7–25)
CALCIUM SPEC-SCNC: 8 MG/DL (ref 8.6–10.5)
CHLORIDE SERPL-SCNC: 98 MMOL/L (ref 98–107)
CO2 SERPL-SCNC: 22 MMOL/L (ref 22–29)
CREAT SERPL-MCNC: 1.65 MG/DL (ref 0.57–1)
DEPRECATED RDW RBC AUTO: 40.6 FL (ref 37–54)
EGFRCR SERPLBLD CKD-EPI 2021: 29.6 ML/MIN/1.73
EOSINOPHIL # BLD AUTO: 0.23 10*3/MM3 (ref 0–0.4)
EOSINOPHIL NFR BLD AUTO: 1.4 % (ref 0.3–6.2)
ERYTHROCYTE [DISTWIDTH] IN BLOOD BY AUTOMATED COUNT: 12.6 % (ref 12.3–15.4)
GLUCOSE SERPL-MCNC: 106 MG/DL (ref 65–99)
GRAM STN SPEC: NORMAL
HBA1C MFR BLD: 5.3 % (ref 4.8–5.6)
HCT VFR BLD AUTO: 27.7 % (ref 34–46.6)
HGB BLD-MCNC: 9.2 G/DL (ref 12–15.9)
IMM GRANULOCYTES # BLD AUTO: 0.2 10*3/MM3 (ref 0–0.05)
IMM GRANULOCYTES NFR BLD AUTO: 1.2 % (ref 0–0.5)
LYMPHOCYTES # BLD AUTO: 1.56 10*3/MM3 (ref 0.7–3.1)
LYMPHOCYTES NFR BLD AUTO: 9.2 % (ref 19.6–45.3)
MCH RBC QN AUTO: 30 PG (ref 26.6–33)
MCHC RBC AUTO-ENTMCNC: 33.2 G/DL (ref 31.5–35.7)
MCV RBC AUTO: 90.2 FL (ref 79–97)
MONOCYTES # BLD AUTO: 2.29 10*3/MM3 (ref 0.1–0.9)
MONOCYTES NFR BLD AUTO: 13.5 % (ref 5–12)
NEUTROPHILS NFR BLD AUTO: 12.64 10*3/MM3 (ref 1.7–7)
NEUTROPHILS NFR BLD AUTO: 74.3 % (ref 42.7–76)
NRBC BLD AUTO-RTO: 0 /100 WBC (ref 0–0.2)
PLATELET # BLD AUTO: 243 10*3/MM3 (ref 140–450)
PMV BLD AUTO: 11.2 FL (ref 6–12)
POTASSIUM SERPL-SCNC: 3.2 MMOL/L (ref 3.5–5.2)
POTASSIUM SERPL-SCNC: 3.3 MMOL/L (ref 3.5–5.2)
POTASSIUM SERPL-SCNC: 3.3 MMOL/L (ref 3.5–5.2)
RBC # BLD AUTO: 3.07 10*6/MM3 (ref 3.77–5.28)
SODIUM SERPL-SCNC: 131 MMOL/L (ref 136–145)
WBC NRBC COR # BLD AUTO: 16.98 10*3/MM3 (ref 3.4–10.8)

## 2024-11-12 PROCEDURE — 83036 HEMOGLOBIN GLYCOSYLATED A1C: CPT

## 2024-11-12 PROCEDURE — 97530 THERAPEUTIC ACTIVITIES: CPT

## 2024-11-12 PROCEDURE — 94761 N-INVAS EAR/PLS OXIMETRY MLT: CPT

## 2024-11-12 PROCEDURE — 94640 AIRWAY INHALATION TREATMENT: CPT

## 2024-11-12 PROCEDURE — 94760 N-INVAS EAR/PLS OXIMETRY 1: CPT

## 2024-11-12 PROCEDURE — 71045 X-RAY EXAM CHEST 1 VIEW: CPT

## 2024-11-12 PROCEDURE — 97162 PT EVAL MOD COMPLEX 30 MIN: CPT

## 2024-11-12 PROCEDURE — 87205 SMEAR GRAM STAIN: CPT

## 2024-11-12 PROCEDURE — 94799 UNLISTED PULMONARY SVC/PX: CPT

## 2024-11-12 PROCEDURE — 92610 EVALUATE SWALLOWING FUNCTION: CPT | Performed by: SPEECH-LANGUAGE PATHOLOGIST

## 2024-11-12 PROCEDURE — 97166 OT EVAL MOD COMPLEX 45 MIN: CPT

## 2024-11-12 PROCEDURE — 25010000002 PIPERACILLIN SOD-TAZOBACTAM PER 1 G

## 2024-11-12 PROCEDURE — 25010000002 FUROSEMIDE PER 20 MG: Performed by: NURSE PRACTITIONER

## 2024-11-12 PROCEDURE — 99232 SBSQ HOSP IP/OBS MODERATE 35: CPT | Performed by: NURSE PRACTITIONER

## 2024-11-12 PROCEDURE — 84132 ASSAY OF SERUM POTASSIUM: CPT | Performed by: STUDENT IN AN ORGANIZED HEALTH CARE EDUCATION/TRAINING PROGRAM

## 2024-11-12 PROCEDURE — 80048 BASIC METABOLIC PNL TOTAL CA: CPT

## 2024-11-12 PROCEDURE — 36415 COLL VENOUS BLD VENIPUNCTURE: CPT

## 2024-11-12 PROCEDURE — 94664 DEMO&/EVAL PT USE INHALER: CPT

## 2024-11-12 PROCEDURE — 85025 COMPLETE CBC W/AUTO DIFF WBC: CPT

## 2024-11-12 RX ORDER — IPRATROPIUM BROMIDE AND ALBUTEROL SULFATE 2.5; .5 MG/3ML; MG/3ML
3 SOLUTION RESPIRATORY (INHALATION)
Status: DISCONTINUED | OUTPATIENT
Start: 2024-11-12 | End: 2024-11-15 | Stop reason: HOSPADM

## 2024-11-12 RX ORDER — POTASSIUM CHLORIDE 750 MG/1
20 TABLET, FILM COATED, EXTENDED RELEASE ORAL ONCE
Status: COMPLETED | OUTPATIENT
Start: 2024-11-12 | End: 2024-11-12

## 2024-11-12 RX ORDER — FUROSEMIDE 10 MG/ML
40 INJECTION INTRAMUSCULAR; INTRAVENOUS ONCE
Status: COMPLETED | OUTPATIENT
Start: 2024-11-12 | End: 2024-11-12

## 2024-11-12 RX ORDER — FUROSEMIDE 40 MG/1
40 TABLET ORAL DAILY
Status: DISCONTINUED | OUTPATIENT
Start: 2024-11-13 | End: 2024-11-15 | Stop reason: HOSPADM

## 2024-11-12 RX ADMIN — PRAVASTATIN SODIUM 40 MG: 40 TABLET ORAL at 20:08

## 2024-11-12 RX ADMIN — IPRATROPIUM BROMIDE AND ALBUTEROL SULFATE 3 ML: 2.5; .5 SOLUTION RESPIRATORY (INHALATION) at 19:39

## 2024-11-12 RX ADMIN — PIPERACILLIN AND TAZOBACTAM 3.38 G: 3; .375 INJECTION, POWDER, FOR SOLUTION INTRAVENOUS at 22:01

## 2024-11-12 RX ADMIN — IPRATROPIUM BROMIDE AND ALBUTEROL SULFATE 3 ML: 2.5; .5 SOLUTION RESPIRATORY (INHALATION) at 15:31

## 2024-11-12 RX ADMIN — IPRATROPIUM BROMIDE AND ALBUTEROL SULFATE 3 ML: 2.5; .5 SOLUTION RESPIRATORY (INHALATION) at 11:25

## 2024-11-12 RX ADMIN — ACETAMINOPHEN 325MG 650 MG: 325 TABLET ORAL at 00:39

## 2024-11-12 RX ADMIN — PIPERACILLIN AND TAZOBACTAM 3.38 G: 3; .375 INJECTION, POWDER, FOR SOLUTION INTRAVENOUS at 13:27

## 2024-11-12 RX ADMIN — BENZONATATE 200 MG: 100 CAPSULE ORAL at 00:40

## 2024-11-12 RX ADMIN — MUPIROCIN 1 APPLICATION: 20 OINTMENT TOPICAL at 20:09

## 2024-11-12 RX ADMIN — POTASSIUM CHLORIDE 20 MEQ: 750 TABLET, EXTENDED RELEASE ORAL at 15:40

## 2024-11-12 RX ADMIN — ASPIRIN 81 MG: 81 TABLET, COATED ORAL at 08:24

## 2024-11-12 RX ADMIN — CITALOPRAM 10 MG: 20 TABLET, FILM COATED ORAL at 08:24

## 2024-11-12 RX ADMIN — PIPERACILLIN AND TAZOBACTAM 3.38 G: 3; .375 INJECTION, POWDER, FOR SOLUTION INTRAVENOUS at 06:30

## 2024-11-12 RX ADMIN — POTASSIUM CHLORIDE 20 MEQ: 750 TABLET, EXTENDED RELEASE ORAL at 08:24

## 2024-11-12 RX ADMIN — MUPIROCIN 1 APPLICATION: 20 OINTMENT TOPICAL at 12:07

## 2024-11-12 RX ADMIN — BENZONATATE 200 MG: 100 CAPSULE ORAL at 06:26

## 2024-11-12 RX ADMIN — AMLODIPINE BESYLATE 5 MG: 5 TABLET ORAL at 17:19

## 2024-11-12 RX ADMIN — Medication 1 CAPSULE: at 08:24

## 2024-11-12 RX ADMIN — POTASSIUM CHLORIDE 20 MEQ: 750 TABLET, EXTENDED RELEASE ORAL at 22:01

## 2024-11-12 RX ADMIN — BENZONATATE 200 MG: 100 CAPSULE ORAL at 20:08

## 2024-11-12 RX ADMIN — FUROSEMIDE 40 MG: 10 INJECTION, SOLUTION INTRAMUSCULAR; INTRAVENOUS at 10:18

## 2024-11-12 NOTE — PLAN OF CARE
Problem: Adult Inpatient Plan of Care  Goal: Plan of Care Review  Outcome: Progressing   Goal Outcome Evaluation:   Vss.Up to chair, tolerated well. O2 @ 1.5- 2 liters throughout the day.K+ replaced per protocol.No c/o pain or n/v.

## 2024-11-12 NOTE — PROGRESS NOTES
[facility]     Mar/chart reviewed  Follow up AhRF, fever in patient with severe Aortic stenosis  Some cough, had some continued fever    Vital Sign Min/Max for last 24 hours  Temp  Min: 98.4 °F (36.9 °C)  Max: 101.1 °F (38.4 °C)   BP  Min: 124/51  Max: 153/64   Pulse  Min: 65  Max: 80   Resp  Min: 16  Max: 20   SpO2  Min: 94 %  Max: 98 %   Flow (L/min) (Oxygen Therapy)  Min: 2  Max: 50   Weight  Min: 79.2 kg (174 lb 9.7 oz)  Max: 79.2 kg (174 lb 9.7 oz)     Nad, axox3,   perrl, eomi, normal sclera,  mmm, no jvd, trachea midline, neck supple,  chest fair bilaterally, + crackles, no wheezes,   rrr, +faith  soft, nt, nd +bs,  no c/c/ trace edema  Skin warm, dry no rashes    Labs: 11/12: reviewed:  Glucose 106  Bun 34  Cr 1.65  Na 131  Bicarb 22  Wbc 16.9 (down)  Hgb 9.2  Plts 243  Gi pcr neg  Blood cultures no growth    A/P:  AHRF - better, wean oxygen as able suspect large part of AHRF was pulmonary edema -- repeat CXR now, certainly she may have pneumonia as well  Sepsis -- suspect pneumonia, repeat CXR, urine studies pending, continue antibiotics.   Severe Aortic stenosis -- cards following  CKDIII  Hyponatremia  Anemia  N/v/diarrhea - may need ct abd/pelvis is no source of sepsis    Continue antibiotics iv for now as she had fever just after midnight..

## 2024-11-12 NOTE — CASE MANAGEMENT/SOCIAL WORK
Discharge Planning Assessment  [facility]     Patient Name: Vonda Jay  MRN: [id number]  Today's Date: 11/12/2024    Admit Date: 11/11/2024    Plan: Home, lives alone. Referral to [facility] pending. Follow for possible home O2 need at D/C.   Discharge Needs Assessment       Row Name 11/12/24 1450       Living Environment    People in Home alone    Current Living Arrangements home    Potentially Unsafe Housing Conditions none    In the past 12 months has the electric, gas, oil, or water company threatened to shut off services in your home? No    Primary Care Provided by self    Provides Primary Care For no one, unable/limited ability to care for self    Family Caregiver if Needed grandchild(natalio), adult    Quality of Family Relationships involved;supportive    Able to Return to Prior Arrangements yes       Resource/Environmental Concerns    Resource/Environmental Concerns none    Transportation Concerns none       Transportation Needs    In the past 12 months, has lack of transportation kept you from medical appointments or from getting medications? no    In the past 12 months, has lack of transportation kept you from meetings, work, or from getting things needed for daily living? No       Food Insecurity    Within the past 12 months, you worried that your food would run out before you got the money to buy more. Never true    Within the past 12 months, the food you bought just didn't last and you didn't have money to get more. Never true       Transition Planning    Patient/Family Anticipates Transition to home    Patient/Family Anticipated Services at Transition home health care    Transportation Anticipated family or friend will provide       Discharge Needs Assessment    Readmission Within the Last 30 Days no previous admission in last 30 days    Equipment Currently Used at Home cane, straight;rollator;walker, rolling;grab bar;bp cuff;ramp;other (see comments)  Stair Lift    Concerns to be Addressed care  coordination/care conferences;discharge planning    Do you want help finding or keeping work or a job? I do not need or want help    Do you want help with school or training? For example, starting or completing job training or getting a high school diploma, GED or equivalent No    Anticipated Changes Related to Illness none    Equipment Needed After Discharge none    Discharge Facility/Level of Care Needs home with home health;nursing facility, skilled    Provided Post Acute Provider List? Yes    Post Acute Provider List Home Health;Nursing Home    Provided Post Acute Provider Quality & Resource List? Yes    Post Acute Provider Quality and Resource List Home Health;Nursing Home    Delivered To Patient;Support Person    Support Person Daughter    Method of Delivery In person    Offered/Gave Vendor List yes    Current Discharge Risk chronically ill;lives alone                   Discharge Plan       Row Name 11/12/24 1501       Plan    Plan Home, lives alone. Referral to [facility] pending. Follow for possible home O2 need at D/C.    Patient/Family in Agreement with Plan yes    Plan Comments Met with pt and daughter at bedside. Explained roll of . Face sheet and pharmacy verified. Pt lives alone in a two-story home with a basement. Pt stays on the main story and has a stair lift chair to access the laundry in the basement. There is a ramp to enter the home. Home DME includes a cane, straight; rollator; walker, rolling; grab bar; bp cuff; ramp; stair chair lift.  Pt is independent with ADLs. States she uses her rollator when she is out in the community. Pt has been to [facility] twice for Rehab.  She has used [facility] and [facility]. Discussed HH at D/C and pt requests referral to [facility] to see at D/C. Referral to [facility] placed in Epic and pending. Pt's PCP is Ambrose Ashley MD. Enrolled in Meds to Bed. Pt states she drives herself to appointments. At discharge, family will transport. Pt is currently on O2@2LNC and does  not use home O2. Follow for possible home O2 at D/C. Explained that [facility]CP would follow to assess for discharge needs.  Pk Sosa RN-BC                  Continued Care and Services - Admitted Since 11/11/2024       Home Medical Care       Service Provider Request Status Services Address Phone Fax Patient Preferred    [facility] Pending - Request Sent -- [address] [address] [phone] --                  Expected Discharge Date and Time       Expected Discharge Date Expected Discharge Time    Nov 13, 2024            Demographic Summary       Row Name 11/12/24 1449       General Information    Admission Type inpatient    Arrived From emergency department    Required Notices Provided Observation Status Notice    Reason for Consult care coordination/care conference;discharge planning    Preferred Language English                   Functional Status       Row Name 11/12/24 1449       Functional Status    Usual Activity Tolerance moderate    Current Activity Tolerance fair       Functional Status, IADL    Medications assistive equipment    Meal Preparation assistive equipment    Housekeeping assistive equipment    Laundry assistive equipment    Shopping assistive equipment and person    If for any reason you need help with day-to-day activities such as bathing, preparing meals, shopping, managing finances, etc., do you get the help you need? I don't need any help       Mental Status    General Appearance WDL WDL       Mental Status Summary    Recent Changes in Mental Status/Cognitive Functioning no changes       Employment/    Employment Status retired                           Pk Sosa, RN

## 2024-11-12 NOTE — THERAPY EVALUATION
Patient Name: Vonda Jay  : 1935    MRN: [id number]                              Today's Date: 2024       Admit Date: 2024    Visit Dx:     ICD-10-CM ICD-9-CM   1. Respiratory distress  R06.03 786.09   2. Acute congestive heart failure, unspecified heart failure type  I50.9 428.0     Patient Active Problem List   Diagnosis    Closed displaced intertrochanteric fracture of left femur    SANYA (acute kidney injury)    HTN (hypertension)    Acute blood loss anemia    Leukocytosis    Drug-induced constipation    Stage 3b chronic kidney disease    COVID-19 virus infection    Anemia due to chronic kidney disease    Acute on chronic diastolic CHF (congestive heart failure)    Aortic stenosis    Anemia of chronic renal failure, stage 3 (moderate)    E. coli UTI (urinary tract infection)    Colovesical fistula - possible    (HFpEF) heart failure with preserved ejection fraction    Obesity (BMI 30-39.9)    Hypokalemia    Acute UTI (urinary tract infection)    Cough    Nocturnal hypoxemia    Hypophosphatemia    Respiratory distress    CHF (congestive heart failure)    Diastolic CHF, chronic    Acute hypoxic respiratory failure    Sepsis due to pneumonia     Past Medical History:   Diagnosis Date    Arthritis     Chronic kidney disease     Heart murmur     History of breast cancer     History of carotid artery disease     Hyperlipidemia     Hypertension      Past Surgical History:   Procedure Laterality Date    BREAST LUMPECTOMY      CARDIAC CATHETERIZATION N/A 2024    Procedure: Right and Left Heart Cath;  Surgeon: Mike Rizvi MD;  Location:  [facility];  Service: Cardiology;  Laterality: N/A;    CARDIAC CATHETERIZATION N/A 2024    Procedure: Coronary angiography;  Surgeon: Mike Rizvi MD;  Location:  [facility];  Service: Cardiology;  Laterality: N/A;    CAROTID ARTERY ANGIOPLASTY  ,     CHOLECYSTECTOMY  1979    FEMUR IM  NAILING/RODDING Left 2/8/2018    Procedure: FEMUR INTRAMEDULLARY NAILING;  Surgeon: Zheng Fleming MD;  Location: [facility] OR;  Service:     HYSTERECTOMY  1989      General Information       Row Name 11/12/24 1549          Physical Therapy Time and Intention    Document Type evaluation  -CW     Mode of Treatment co-treatment;physical therapy  rollator, has stair lift to access basement  -CW       Row Name 11/12/24 1549          General Information    Patient Profile Reviewed yes  -CW     Prior Level of Function independent:;transfer;all household mobility;bed mobility  -CW     Existing Precautions/Restrictions fall  -CW     Barriers to Rehab medically complex  -CW       Row Name 11/12/24 1549          Living Environment    People in Home alone  -CW       Row Name 11/12/24 1549          Cognition    Orientation Status (Cognition) oriented x 3  -CW       Row Name 11/12/24 1549          Safety Issues/Impairments Affecting Functional Mobility    Impairments Affecting Function (Mobility) balance;endurance/activity tolerance;strength  -CW     Comment, Safety Issues/Impairments (Mobility) Co treatment medically appropriate and necessary due to patient acuity level, activity tolerance and safety of patient and staff. Evaluation established to achieve all goals in POC.  -CW               User Key  (r) = Recorded By, (t) = Taken By, (c) = Cosigned By      Initials Name Provider Type    CW Alexandria Godfrey PT Physical Therapist                   Mobility       Row Name 11/12/24 1551          Bed Mobility    Bed Mobility supine-sit  -CW     Supine-Sit [place] (Bed Mobility) moderate assist (50% patient effort);1 person assist;verbal cues  -CW     Assistive Device (Bed Mobility) bed rails;head of bed elevated  -CW       Row Name 11/12/24 1551          Sit-Stand Transfer    Sit-Stand [place] (Transfers) minimum assist (75% patient effort);verbal cues  -CW     Assistive Device (Sit-Stand Transfers) walker  front-wheeled  -CW     Comment, (Sit-Stand Transfer) STS from EOB  -CW       Row Name 11/12/24 1551          Gait/Stairs (Locomotion)    [place] Level (Gait) minimum assist (75% patient effort);1 person assist;verbal cues  -CW     Assistive Device (Gait) walker, front-wheeled  -CW     Distance in Feet (Gait) 100  -CW               User Key  (r) = Recorded By, (t) = Taken By, (c) = Cosigned By      Initials Name Provider Type    Alexandria Tavarez PT Physical Therapist                   Obj/Interventions       Row Name 11/12/24 1552          Range of Motion Comprehensive    General Range of Motion bilateral lower extremity ROM WFL  -CW       Row Name 11/12/24 1552          Strength Comprehensive (MMT)    Comment, General Manual Muscle Testing (MMT) Assessment generalized weakness present  -       Row Name 11/12/24 1552          Balance    Balance Assessment standing static balance;standing dynamic balance;sitting static balance  -CW     Static Sitting Balance standby assist;supervision  -CW     Position, Sitting Balance sitting edge of bed;unsupported  -CW     Static Standing Balance contact guard;verbal cues  -CW     Dynamic Standing Balance contact guard;verbal cues  -CW     Position/Device Used, Standing Balance supported;walker, front-wheeled  -CW               User Key  (r) = Recorded By, (t) = Taken By, (c) = Cosigned By      Initials Name Provider Type    Alexandria Tavarez PT Physical Therapist                   Goals/Plan       Row Name 11/12/24 9566          Bed Mobility Goal 1 (PT)    Activity/Assistive Device (Bed Mobility Goal 1, PT) bed mobility activities, all  -CW     [place] Level/Cues Needed (Bed Mobility Goal 1, PT) modified independence  -CW     Time Frame (Bed Mobility Goal 1, PT) 2 weeks  -CW       Row Name 11/12/24 8976          Transfer Goal 1 (PT)    Activity/Assistive Device (Transfer Goal 1, PT) sit-to-stand/stand-to-sit;bed-to-chair/chair-to-bed  -CW     [place]  Level/Cues Needed (Transfer Goal 1, PT) modified independence  -CW     Time Frame (Transfer Goal 1, PT) 2 weeks  -CW       Row Name 11/12/24 8366          Gait Training Goal 1 (PT)    Activity/Assistive Device (Gait Training Goal 1, PT) gait (walking locomotion)  -CW     [place] Level (Gait Training Goal 1, PT) modified independence  -CW     Distance (Gait Training Goal 1, PT) 200  -CW     Time Frame (Gait Training Goal 1, PT) 2 weeks  -CW       Row Name 11/12/24 4029          Therapy Assessment/Plan (PT)    Planned Therapy Interventions (PT) balance training;bed mobility training;gait training;postural re-education;transfer training;ROM (range of motion);strengthening;patient/family education  -CW               User Key  (r) = Recorded By, (t) = Taken By, (c) = Cosigned By      Initials Name Provider Type    CW Alexandria Godfrey, PT Physical Therapist                   Clinical Impression       Row Name 11/12/24 8970          Pain    Pretreatment Pain Rating 0/10 - no pain  -CW     Posttreatment Pain Rating 0/10 - no pain  -CW       Row Name 11/12/24 8398          Plan of Care Review    Plan of Care Reviewed With patient  -CW     Outcome Evaluation Pt is a 89 y/o female admitted to [facility] on  respiratory distress. Pt PMHx includes critical aortic stenosis, recent left and right heart cath. At baseline pt reports living alone, uses a cane in home and rollator in the community. She also drives and has chair lift to her basement. Pt completed bed mobility mod A, STS with min A and ambulated 100' cga with walker. Pt may benefit from ongoing skilled PT services to address balance, strength and endurance deficits. Anticipate d/c home.  -CW       Row Name 11/12/24 2159          Therapy Assessment/Plan (PT)    Rehab Potential (PT) good  -CW     Criteria for Skilled Interventions Met (PT) yes  -CW     Therapy Frequency (PT) 5 times/wk  -CW       Row Name 11/12/24 8663          Positioning and Restraints    Pre-Treatment  Position in bed  -CW     Post Treatment Position bed  -CW     In Bed notified nsg;call light within reach;encouraged to call for assist;exit alarm on;fowlers  -               User Key  (r) = Recorded By, (t) = Taken By, (c) = Cosigned By      Initials Name Provider Type    Alexandria Tavarez, PT Physical Therapist                   Outcome Measures       Row Name 11/12/24 1557          How much help from another person do you currently need...    Turning from your back to your side while in flat bed without using bedrails? 4  -CW     Moving from lying on back to sitting on the side of a flat bed without bedrails? 3  -CW     Moving to and from a bed to a chair (including a wheelchair)? 3  -CW     Standing up from a chair using your arms (e.g., wheelchair, bedside chair)? 3  -CW     Climbing 3-5 steps with a railing? 3  -CW     To walk in hospital room? 3  -CW     AM-PAC 6 Clicks Score (PT) 19  -CW     Highest Level of Mobility Goal 6 --> Walk 10 steps or more  -       Row Name 11/12/24 1557 11/12/24 1102       Functional Assessment    Outcome Measure Options AM-PAC 6 Clicks Basic Mobility (PT)  -CW AM-PAC 6 Clicks Daily Activity (OT)  -PP              User Key  (r) = Recorded By, (t) = Taken By, (c) = Cosigned By      Initials Name Provider Type    Alexandria Tavarez, PT Physical Therapist    Rosemarie Trujillo, OT Occupational Therapist                                 Physical Therapy Education       Title: PT OT SLP Therapies (In Progress)       Topic: Physical Therapy (In Progress)       Point: Mobility training (Done)       Learning Progress Summary            Patient Acceptance, E, VU by RUDI at 11/12/2024 4477                      Point: Home exercise program (Not Started)       Learner Progress:  Not documented in this visit.              Point: Body mechanics (Not Started)       Learner Progress:  Not documented in this visit.              Point: Precautions (Not Started)       Learner Progress:  Not  documented in this visit.                              User Key       Initials Effective Dates Name Provider Type Discipline    CW 12/13/22 -  Alexandria Godfrey PT Physical Therapist PT                  PT Recommendation and Plan  Planned Therapy Interventions (PT): balance training, bed mobility training, gait training, postural re-education, transfer training, ROM (range of motion), strengthening, patient/family education  Outcome Evaluation: Pt is a 89 y/o female admitted to [facility] on  respiratory distress. Pt PMHx includes critical aortic stenosis, recent left and right heart cath. At baseline pt reports living alone, uses a cane in home and rollator in the community. She also drives and has chair lift to her basement. Pt completed bed mobility mod A, STS with min A and ambulated 100' cga with walker. Pt may benefit from ongoing skilled PT services to address balance, strength and endurance deficits. Anticipate d/c home.     Time Calculation:         PT Charges       Row Name 11/12/24 1547             Time Calculation    Start Time 0854  -CW      Stop Time 0910  -CW      Time Calculation (min) 16 min  -CW      PT Received On 11/12/24  -CW      PT - Next Appointment 11/13/24  -CW      PT Goal Re-Cert Due Date 11/26/24  -CW         Time Calculation- PT    Total Timed Code Minutes- PT 10 minute(s)  -CW         Timed Charges    12990 - PT Therapeutic Activity Minutes 10  -CW         Total Minutes    Timed Charges Total Minutes 10  -CW       Total Minutes 10  -CW                User Key  (r) = Recorded By, (t) = Taken By, (c) = Cosigned By      Initials Name Provider Type    CW Alexandria Godfrey PT Physical Therapist                  Therapy Charges for Today       Code Description Service Date Service Provider Modifiers Qty    49041269848 HC PT THERAPEUTIC ACT EA 15 MIN 11/12/2024 Alexandria Godfrey, PT GP 1    15751889847 HC PT EVAL MOD COMPLEXITY 2 11/12/2024 Alexandria Godfrey, PT GP 1            PT G-Codes  Outcome  Measure Options: AM-PAC 6 Clicks Basic Mobility (PT)  AM-PAC 6 Clicks Score (PT): 19  AM-PAC 6 Clicks Score (OT): 16  PT Discharge Summary  Anticipated Discharge Disposition (PT): home, home with home health    Alexandria Godfrey, PT  11/12/2024

## 2024-11-12 NOTE — PLAN OF CARE
Goal Outcome Evaluation:  Plan of Care Reviewed With: patient        Progress: no change  Outcome Evaluation: Pt is a 89 y/o female admitted to [facility] on 11/11 for Respiratory distress. Pt PMHx includes critical aortic stenosis, and has had recent left and right heart cath. At baseline pt reports living alone being (I) for ADLs and fxnl mob using cane in home and rollator in the community. She also drives and has walk in shower w/ seat and chair lift to basement for laundry. Today pt is Mod A for bed mob. She reqs Min A for STS from EOB and Cga using rwx for fxnl mob ext household dis into hallway w/ no c/o SOB but noted to sound winded. Pt rec dc to home w/ HH vs rehab pending prog to acute therapy. OT will continue to monitor.    Anticipated Discharge Disposition (OT): home with home health, skilled nursing facility

## 2024-11-12 NOTE — THERAPY EVALUATION
Patient Name: Vonda Jay  : 1935    MRN: [id number]                              Today's Date: 2024       Admit Date: 2024    Visit Dx:     ICD-10-CM ICD-9-CM   1. Respiratory distress  R06.03 786.09   2. Acute congestive heart failure, unspecified heart failure type  I50.9 428.0     Patient Active Problem List   Diagnosis    Closed displaced intertrochanteric fracture of left femur    SANYA (acute kidney injury)    HTN (hypertension)    Acute blood loss anemia    Leukocytosis    Drug-induced constipation    Stage 3b chronic kidney disease    COVID-19 virus infection    Anemia due to chronic kidney disease    Acute on chronic diastolic CHF (congestive heart failure)    Aortic stenosis    Anemia of chronic renal failure, stage 3 (moderate)    E. coli UTI (urinary tract infection)    Colovesical fistula - possible    (HFpEF) heart failure with preserved ejection fraction    Obesity (BMI 30-39.9)    Hypokalemia    Acute UTI (urinary tract infection)    Cough    Nocturnal hypoxemia    Hypophosphatemia    Respiratory distress    CHF (congestive heart failure)    Diastolic CHF, chronic    Acute hypoxic respiratory failure    Sepsis due to pneumonia     Past Medical History:   Diagnosis Date    Arthritis     Chronic kidney disease     Heart murmur     History of breast cancer     History of carotid artery disease     Hyperlipidemia     Hypertension      Past Surgical History:   Procedure Laterality Date    BREAST LUMPECTOMY      CARDIAC CATHETERIZATION N/A 2024    Procedure: Right and Left Heart Cath;  Surgeon: Mike Rizvi MD;  Location:  [facility];  Service: Cardiology;  Laterality: N/A;    CARDIAC CATHETERIZATION N/A 2024    Procedure: Coronary angiography;  Surgeon: Mike Rizvi MD;  Location:  [facility];  Service: Cardiology;  Laterality: N/A;    CAROTID ARTERY ANGIOPLASTY  ,     CHOLECYSTECTOMY  1979    FEMUR IM  NAILING/RODDING Left 2/8/2018    Procedure: FEMUR INTRAMEDULLARY NAILING;  Surgeon: Zheng Fleming MD;  Location: [facility] OR;  Service:     HYSTERECTOMY  1989      General Information       Row Name 11/12/24 1047          OT Time and Intention    Document Type evaluation  -PP     Mode of Treatment occupational therapy;co-treatment;physical therapy  Co-tx d/t pt's activity limitation due to fatigue/weakness and acuity level; pt working on functional balance/mob and strengthening while performing ADL's  -PP     Patient Effort good  -PP       Row Name 11/12/24 1047          General Information    Patient Profile Reviewed yes  -PP     Prior Level of Function independent:;all household mobility;community mobility;driving;ADL's  Pt (I) for ADLs and fxnl mob, drives and has walk-in shower w/ seat. Pt also has chair lift to access basement for laundry.  -PP     Barriers to Rehab medically complex  -PP       Row Name 11/12/24 1047          Living Environment    People in Home alone  -PP       Row Name 11/12/24 1047          Cognition    Orientation Status (Cognition) oriented x 3  -PP       Row Name 11/12/24 1047          Safety Issues/Impairments Affecting Functional Mobility    Impairments Affecting Function (Mobility) balance;endurance/activity tolerance  -PP     Comment, Safety Issues/Impairments (Mobility) gait belt and non skid socks worn for safety  -PP               User Key  (r) = Recorded By, (t) = Taken By, (c) = Cosigned By      Initials Name Provider Type    PP Rosemarie Del Rio OT Occupational Therapist                     Mobility/ADL's       Row Name 11/12/24 1049          Bed Mobility    Bed Mobility supine-sit;sit-supine  -PP     Supine-Sit [place] (Bed Mobility) moderate assist (50% patient effort);1 person assist;verbal cues  -PP     Assistive Device (Bed Mobility) bed rails;head of bed elevated  -PP       Row Name 11/12/24 1049          Transfers    Transfers sit-stand transfer;stand-sit  transfer  -PP       [facility] Name 11/12/24 1049          Sit-Stand Transfer    Sit-Stand [place] (Transfers) minimum assist (75% patient effort)  -PP     Assistive Device (Sit-Stand Transfers) walker, front-wheeled  -PP     Comment, (Sit-Stand Transfer) 1x STS from EOB  -PP       [facility] Name 11/12/24 1049          Functional Mobility    Functional Mobility- Ind. Level contact guard assist  -PP     Functional Mobility- Device walker, front-wheeled  -PP     Functional Mobility- Comment Pt tolerates fxnl mob ext household distance from room into hallway.  -PP       Row Name 11/12/24 1049          Activities of Daily Living    BADL Assessment/Intervention lower body dressing;grooming;toileting  -[facility] Name 11/12/24 1049          Lower Body Dressing Assessment/Training    Comment, (Lower Body Dressing) socks already donned  -[facility] Name 11/12/24 1049          Grooming Assessment/Training    [place] Level (Grooming) grooming skills;set up  -PP       Row Name 11/12/24 1049          Toileting Assessment/Training    Comment, (Toileting) purewick and brief in place, but encouraged to go to BR w/ Nsg assist  -PP               User Key  (r) = Recorded By, (t) = Taken By, (c) = Cosigned By      Initials Name Provider Type    PP Rosemarie Del Rio, OT Occupational Therapist                   Obj/Interventions       [facility] Name 11/12/24 1058          Sensory Assessment (Somatosensory)    Sensory Assessment (Somatosensory) UE sensation intact  -[facility] Name 11/12/24 1058          Vision Assessment/Intervention    Visual Impairment/Limitations WFL  -PP       [facility] Name 11/12/24 1058          Range of Motion Comprehensive    General Range of Motion bilateral upper extremity ROM WFL  -PP       [facility] Name 11/12/24 1058          Motor Skills    Motor Skills functional endurance  -PP     Functional Endurance SOB noted and fatigues quickly w/ fxnl act  -PP       [facility] Name 11/12/24 1058          Balance    Balance Assessment  sitting static balance;standing static balance;standing dynamic balance  -PP     Static Sitting Balance standby assist;supervision  -PP     Position, Sitting Balance unsupported;sitting edge of bed  -PP     Static Standing Balance contact guard  -PP     Dynamic Standing Balance contact guard  -PP     Position/Device Used, Standing Balance walker, front-wheeled  -PP               User Key  (r) = Recorded By, (t) = Taken By, (c) = Cosigned By      Initials Name Provider Type    PP Rosemarie Del Rio, OT Occupational Therapist                   Goals/Plan       Row Name 11/12/24 1101          Bed Mobility Goal 1 (OT)    Activity/Assistive Device (Bed Mobility Goal 1, OT) bed mobility activities, all  -PP     [place] Level/Cues Needed (Bed Mobility Goal 1, OT) modified independence  -PP     Time Frame (Bed Mobility Goal 1, OT) short term goal (STG);2 weeks  -PP       Row Name 11/12/24 1101          Transfer Goal 1 (OT)    Activity/Assistive Device (Transfer Goal 1, OT) transfers, all  -PP     [place] Level/Cues Needed (Transfer Goal 1, OT) modified independence  -PP     Time Frame (Transfer Goal 1, OT) short term goal (STG);2 weeks  -PP     Progress/Outcome (Transfer Goal 1, OT) new goal  -PP       Row Name 11/12/24 1101          Dressing Goal 1 (OT)    Activity/Device (Dressing Goal 1, OT) dressing skills, all;upper body dressing;lower body dressing  -PP     [place]/Cues Needed (Dressing Goal 1, OT) modified independence  -PP     Time Frame (Dressing Goal 1, OT) short term goal (STG);2 weeks  -PP     Progress/Outcome (Dressing Goal 1, OT) new goal  -PP       Row Name 11/12/24 1101          Toileting Goal 1 (OT)    Activity/Device (Toileting Goal 1, OT) toileting skills, all  -PP     [place] Level/Cues Needed (Toileting Goal 1, OT) modified independence  -PP     Time Frame (Toileting Goal 1, OT) short term goal (STG);2 weeks  -PP     Progress/Outcome (Toileting Goal 1, OT) new goal  -PP       Row  Name 11/12/24 1101          Grooming Goal 1 (OT)    Activity/Device (Grooming Goal 1, OT) grooming skills, all  -PP     [place] (Grooming Goal 1, OT) modified independence  -PP     Time Frame (Grooming Goal 1, OT) short term goal (STG);2 weeks  -PP     Progress/Outcome (Grooming Goal 1, OT) new goal  -PP       Row Name 11/12/24 1101          Therapy Assessment/Plan (OT)    Planned Therapy Interventions (OT) activity tolerance training;BADL retraining;functional balance retraining;occupation/activity based interventions;patient/caregiver education/training;strengthening exercise;transfer/mobility retraining  -PP               User Key  (r) = Recorded By, (t) = Taken By, (c) = Cosigned By      Initials Name Provider Type    PP Rosemarie Del Rio OT Occupational Therapist                   Clinical Impression       Row Name 11/12/24 1100          Pain Assessment    Pretreatment Pain Rating 0/10 - no pain  -PP     Posttreatment Pain Rating 0/10 - no pain  -PP       Row Name 11/12/24 1100          Plan of Care Review    Plan of Care Reviewed With patient  -PP     Progress no change  -PP     Outcome Evaluation Pt is a 88 y/o female admitted to [facility] on 11/11 for Respiratory distress. Pt PMHx includes critical aortic stenosis, and has had recent left and right heart cath. At baseline pt reports living alone being (I) for ADLs and fxnl mob using cane in home and rollator in the community. She also drives and has walk in shower w/ seat and chair lift to basement for laundry. Today pt is Mod A for bed mob. She reqs Min A for STS from EOB and Cga using rwx for fxnl mob ext household dis into hallway w/ no c/o SOB but noted to sound winded. Pt rec dc to home w/ HH vs rehab pending prog to acute therapy. OT will continue to monitor.  -PP       Row Name 11/12/24 1100          Therapy Assessment/Plan (OT)    Rehab Potential (OT) good  -PP     Criteria for Skilled Therapeutic Interventions Met (OT) yes;skilled treatment is  necessary  -PP     Therapy Frequency (OT) 5 times/wk  -PP       Row Name 11/12/24 1100          Therapy Plan Review/Discharge Plan (OT)    Anticipated Discharge Disposition (OT) home with home health;skilled nursing facility  -PP       Row Name 11/12/24 1100          Vital Signs    O2 Delivery Pre Treatment supplemental O2  -PP     O2 Delivery Intra Treatment room air  -PP     O2 Delivery Post Treatment supplemental O2  -PP     Pre Patient Position Supine  -PP     Intra Patient Position Standing  -PP     Post Patient Position Supine  -PP       Row Name 11/12/24 1100          Positioning and Restraints    Pre-Treatment Position in bed  -PP     Post Treatment Position bed  -PP     In Bed notified nsg;fowlers;call light within reach;encouraged to call for assist;exit alarm on  -PP               User Key  (r) = Recorded By, (t) = Taken By, (c) = Cosigned By      Initials Name Provider Type    Rosemarie Trujillo OT Occupational Therapist                   Outcome Measures       Row Name 11/12/24 1102          How much help from another is currently needed...    Putting on and taking off regular lower body clothing? 2  -PP     Bathing (including washing, rinsing, and drying) 2  -PP     Toileting (which includes using toilet bed pan or urinal) 2  -PP     Putting on and taking off regular upper body clothing 3  -PP     Taking care of personal grooming (such as brushing teeth) 3  -PP     Eating meals 4  -PP     AM-PAC 6 Clicks Score (OT) 16  -PP       Row Name 11/12/24 1102          Functional Assessment    Outcome Measure Options AM-PAC 6 Clicks Daily Activity (OT)  -PP               User Key  (r) = Recorded By, (t) = Taken By, (c) = Cosigned By      Initials Name Provider Type    Rosemarie Trujillo OT Occupational Therapist                    Occupational Therapy Education       Title: PT OT SLP Therapies (In Progress)       Topic: Occupational Therapy (In Progress)       Point: ADL training (Done)        Description:   Instruct learner(s) on proper safety adaptation and remediation techniques during self care or transfers.   Instruct in proper use of assistive devices.                  Learning Progress Summary            Patient Acceptance, E, VU by PP at 11/12/2024 1102    Comment: Pt Ed on OT role and dc planning.                      Point: Home exercise program (Not Started)       Description:   Instruct learner(s) on appropriate technique for monitoring, assisting and/or progressing therapeutic exercises/activities.                  Learner Progress:  Not documented in this visit.              Point: Precautions (Not Started)       Description:   Instruct learner(s) on prescribed precautions during self-care and functional transfers.                  Learner Progress:  Not documented in this visit.              Point: Body mechanics (Not Started)       Description:   Instruct learner(s) on proper positioning and spine alignment during self-care, functional mobility activities and/or exercises.                  Learner Progress:  Not documented in this visit.                              User Key       Initials Effective Dates Name Provider Type Discipline    PP 06/09/23 -  Rosemarie Del Rio OT Occupational Therapist OT                  OT Recommendation and Plan  Planned Therapy Interventions (OT): activity tolerance training, BADL retraining, functional balance retraining, occupation/activity based interventions, patient/caregiver education/training, strengthening exercise, transfer/mobility retraining  Therapy Frequency (OT): 5 times/wk  Plan of Care Review  Plan of Care Reviewed With: patient  Progress: no change  Outcome Evaluation: Pt is a 88 y/o female admitted to [facility] on 11/11 for Respiratory distress. Pt PMHx includes critical aortic stenosis, and has had recent left and right heart cath. At baseline pt reports living alone being (I) for ADLs and fxnl mob using cane in home and rollator in the  community. She also drives and has walk in shower w/ seat and chair lift to basement for laundry. Today pt is Mod A for bed mob. She reqs Min A for STS from EOB and Cga using rwx for fxnl mob ext household dis into hallway w/ no c/o SOB but noted to sound winded. Pt rec dc to home w/ HH vs rehab pending prog to acute therapy. OT will continue to monitor.     Time Calculation:   Evaluation Complexity (OT)  Review Occupational Profile/Medical/Therapy History Complexity: expanded/moderate complexity  Assessment, Occupational Performance/Identification of Deficit Complexity: 3-5 performance deficits  Clinical Decision Making Complexity (OT): detailed assessment/moderate complexity  Overall Complexity of Evaluation (OT): moderate complexity     Time Calculation- OT       Row Name 11/12/24 1106             Time Calculation- OT    OT Start Time 0854  -PP      OT Stop Time 0910  -PP      OT Time Calculation (min) 16 min  -PP      Total Timed Code Minutes- OT 8 minute(s)  -PP      OT Received On 11/12/24  -PP      OT - Next Appointment 11/13/24  -PP      OT Goal Re-Cert Due Date 11/26/24  -PP         Timed Charges    06735 - OT Therapeutic Activity Minutes 8  -PP         Untimed Charges    OT Eval/Re-eval Minutes 8  -PP         Total Minutes    Timed Charges Total Minutes 8  -PP      Untimed Charges Total Minutes 8  -PP       Total Minutes 16  -PP                User Key  (r) = Recorded By, (t) = Taken By, (c) = Cosigned By      Initials Name Provider Type    PP [place], Porshia, OT Occupational Therapist                  Therapy Charges for Today       Code Description Service Date Service Provider Modifiers Qty    65894039162  OT THERAPEUTIC ACT EA 15 MIN 11/12/2024 [place], Porshia, OT GO 1    [id number] HC OT EVAL MOD COMPLEXITY 2 11/12/2024 [place], Porshia, OT GO 1                 Porshia [place], OT  11/12/2024

## 2024-11-12 NOTE — PLAN OF CARE
Goal Outcome Evaluation:  Plan of Care Reviewed With: patient           Outcome Evaluation: Clinical swallow evaluation completed recommend continue with regular solids and thin liquids, meds whole as tolerated and small bites and sips.    Anticipated Discharge Disposition (SLP): unknown          SLP Swallowing Diagnosis: swallow WFL/no suspected pharyngeal impairment (11/12/24 1200)

## 2024-11-12 NOTE — DISCHARGE PLACEMENT REQUEST
"Misty Catrachito V (89 y.o. Female)       Date of Birth   1935    Social Security Number       Address   [address]  [place] [address]    Home Phone   [phone]    MRN   [id number]       [employer]   [employer]    Marital Status                               Admission Date   11/11/24    Admission Type   Emergency    Admitting Provider   Zheng Fairbanks MD    Attending Provider   Marvin Corey DO    Department, Room/Bed   [address], [address]       Discharge Date       Discharge Disposition       Discharge Destination                                 Attending Provider: Marvin Corey DO    Allergies: No Known Allergies    Isolation: None   Infection: None   Code Status: No CPR    Ht: 154.9 cm (61\")   Wt: 79.2 kg (174 lb 9.7 oz)    Admission Cmt: None   Principal Problem: Respiratory distress [R06.03]                   Active Insurance as of 11/11/2024       Primary Coverage       Payor Plan Insurance Group Employer/Plan Group    MEDICARE MEDICARE A & B        Payor Plan Address Payor Plan Phone Number Payor Plan Fax Number Effective Dates    [address] [phone]  9/1/2000 - None Entered    [facility] [id number]         Subscriber Name Subscriber Birth Date Member ID       CATRACHITO BARTLETT V 1935 [id number]               Secondary Coverage       Payor Plan Insurance Group Employer/Plan Group    [facility] SUPP [id number]       Payor Plan Address Payor Plan Phone Number Payor Plan Fax Number Effective Dates    [address]   12/1/2016 - None Entered    [facility] [id number]         Subscriber Name Subscriber Birth Date Member ID       CATRACHITO BARTLETT V 1935 [id number]                     Emergency Contacts        (Rel.) Home Phone Work Phone Mobile Phone    MistyAngus (Son) [phone] -- [phone]    MistyNusrat (Daughter) -- -- [phone]                "

## 2024-11-12 NOTE — PLAN OF CARE
Goal Outcome Evaluation:  Plan of Care Reviewed With: patient           Outcome Evaluation: Pt is a 89 y/o female admitted to [facility] on  respiratory distress. Pt PMHx includes critical aortic stenosis, recent left and right heart cath. At baseline pt reports living alone, uses a cane in home and rollator in the community. She also drives and has chair lift to her basement. Pt completed bed mobility mod A, STS with min A and ambulated 100' cga with walker. Pt may benefit from ongoing skilled PT services to address balance, strength and endurance deficits. Anticipate d/c home.    Anticipated Discharge Disposition (PT): home, home with home health

## 2024-11-12 NOTE — PLAN OF CARE
No new acute issues this shift, pt spiked a temperature of 101 F this shift with skin warm to the touch, pt administered IV antibiotics as ordered along with one (1) dose of PRN Tylenol with success.  No complaints of chest pain, shortness of breath or difficulty breathing, pt reporting breathing much improved from this admission.  Pt with very good urinary output post IV Lasix administration in ER.  Pt presenting with frequent, non-productive cough, APRN notified with new order for Tessalon Pearls, pt administered one (1) dose with some success.  APRN notified and restarting additional patient home medications per patient and family concern, decision deferred to main primary attending MD.  Pt resting well in bed, no further acute issues, will continue to monitor and observe.     Goal Outcome Evaluation:  Plan of Care Reviewed With: patient         Problem: Adult Inpatient Plan of Care  Goal: Plan of Care Review  Flowsheets (Taken 11/12/2024 5503)  Plan of Care Reviewed With: patient     Problem: Sepsis/Septic Shock  Goal: Optimal Coping  Outcome: Progressing     Problem: Sepsis/Septic Shock  Goal: Absence of Infection Signs and Symptoms  Outcome: Progressing     Problem: Breathing Pattern Ineffective  Goal: Effective Breathing Pattern  Outcome: Progressing     Problem: Noninvasive Ventilation Acute  Goal: Effective Unassisted Ventilation and Oxygenation  Intervention: Monitor and Manage Noninvasive Ventilation  Recent Flowsheet Documentation  Taken 11/11/2024 2221 by Andrea Leonard RN  Airway/Ventilation Management: airway patency maintained

## 2024-11-12 NOTE — CASE MANAGEMENT/SOCIAL WORK
Continued Stay Note  [facility]     Patient Name: Vonda Jay  MRN: [id number]  Today's Date: 11/12/2024    Admit Date: 11/11/2024    Plan: Home, lives alone. Referral to [facility] pending. Follow for possible home O2 need at D/C.   Discharge Plan       Row Name 11/12/24 1501       Plan    Plan Home, lives alone. Referral to [facility] pending. Follow for possible home O2 need at D/C.    Patient/Family in Agreement with Plan yes    Plan Comments Met with pt and daughter at bedside. Explained roll of . Face sheet and pharmacy verified. Pt lives alone in a two-story home with a basement. Pt stays on the main story and has a stair lift chair to access the laundry in the basement. There is a ramp to enter the home. Home DME includes a cane, straight; rollator; walker, rolling; grab bar; bp cuff; ramp; stair chair lift.  Pt is independent with ADLs. States she uses her rollator when she is out in the community. Pt has been to [facility] twice for Rehab.  She has used [facility] and [facility]. Discussed HH at D/C and pt requests referral to [facility] to see at D/C. Referral to [facility] placed in Epic and pending. Pt's PCP is Ambrose Ashley MD. Enrolled in Meds to Bed. Pt states she drives herself to appointments. At discharge, family will transport. Pt is currently on O2@2LNC and does not use home O2. Follow for possible home O2 at D/C. Explained that CCP would follow to assess for discharge needs.  Pk Sosa RN-BC                   Discharge Codes    No documentation.                 Expected Discharge Date and Time       Expected Discharge Date Expected Discharge Time    Nov 13, 2024               Pk Sosa RN

## 2024-11-12 NOTE — CONSULTS
Patient Name:  Vonda Jay  YOB: 1935  MRN:  [id number]  Date of Admission:  11/11/2024  Date of Consult:  11/12/2024  Patient Care Team:  Ambrose Ashley MD as PCP - General (Family Medicine)    Inpatient Internal Medicine Consult  Consult performed by: Anuja Kohler APRN  Consult ordered by: Zheng Fairbanks MD  Reason for consult: Evaluate status and make recommendations regarding treatment for medical management outside of intensive care, including HTN, hypokalemia, anemia, diastolic CHF, acute hypoxic respiratory failure, sepsis, pneumonia, CKD.        Evaluate status and make recommendations regarding treatment for medical management outside of intensive care, including HTN, hypokalemia, anemia, diastolic CHF, acute hypoxic respiratory failure, sepsis, pneumonia, CKD.  Subjective   History of Present Illness  Ms. Jay is a 89 y.o. female with history of severe aortic valve stenosis, chronic diastolic CHF, hypertension that has was admitted to [facility] yesterday 11/11 due to sepsis and possible pneumonia, fluid overload and acute hypoxic respiratory failure initially presenting with SOA onset just prior to arrival. She was found febrile 100.5 with sat of 76% on RA and required NRB and then BIPAP. She was admitted to ICU under care of intensivist with cardiology following. She was started on IV antibiotics, initially Rocephin changed to Zosyn. She received one dose of IV lasix 40 mg and showed quick improvement in her symptoms and oxygenation. She's been weaned to 1.5 liters NC.  Intensivist determined her stable for transfer out of the ICU and we were consulted to take over her medical care with pulmonology and cardiology following.      She had nausea, vomiting, diarrhea at some point with negative acute abdominal films.  Today she denies N/V/D or abdominal pain.  Shortness of breath greatly improved, still winded with exertion.  Denies orthopnea,  Leg swelling, recent weight gain or medication changes.  She has a congested nonproductive cough.  No chest pain, pressure, palpitations.  No lightheadedness or dizziness.  Complaining of general weakness.    She does not wear oxygen at home.      Past Medical History:   Diagnosis Date    Arthritis     Chronic kidney disease     Heart murmur     History of breast cancer     History of carotid artery disease     Hyperlipidemia     Hypertension      Past Surgical History:   Procedure Laterality Date    BREAST LUMPECTOMY      CARDIAC CATHETERIZATION N/A 2024    Procedure: Right and Left Heart Cath;  Surgeon: Mike Rizvi MD;  Location: [facility] INVASIVE LOCATION;  Service: Cardiology;  Laterality: N/A;    CARDIAC CATHETERIZATION N/A 2024    Procedure: Coronary angiography;  Surgeon: Mike Rizvi MD;  Location: [facility] INVASIVE LOCATION;  Service: Cardiology;  Laterality: N/A;    CAROTID ARTERY ANGIOPLASTY  ,     CHOLECYSTECTOMY  1979    FEMUR IM NAILING/RODDING Left 2018    Procedure: FEMUR INTRAMEDULLARY NAILING;  Surgeon: Zheng Fleming MD;  Location: [facility] OR;  Service:     HYSTERECTOMY       Family History   Problem Relation Age of Onset    Hypertension Mother     No Known Problems Father     Heart valve disorder Sister     Lung cancer Sister     Lymphoma Sister     Skin cancer Sister     Stroke Brother 57     Social History     Tobacco Use    Smoking status: Former     Current packs/day: 0.00     Types: Cigarettes     Start date:      Quit date:      Years since quittin.8     Passive exposure: Past    Smokeless tobacco: Never    Tobacco comments:     caffeine use- coffee   Vaping Use    Vaping status: Never Used   Substance Use Topics    Alcohol use: No    Drug use: Defer     Medications Prior to Admission   Medication Sig Dispense Refill Last Dose/Taking    acetaminophen (TYLENOL) 500 MG tablet Take 2 tablets by mouth Every 6 (Six)  Hours As Needed for Mild Pain or Moderate Pain.   11/10/2024 Bedtime    acidophilus (FLORANEX) tablet tablet Take 1 tablet by mouth 3 (Three) Times a Day.   11/10/2024 Morning    alendronate (FOSAMAX) 70 MG tablet Take 1 tablet by mouth Every 7 (Seven) Days. Saturday 11/9/2024 Morning    amLODIPine (NORVASC) 5 MG tablet Take 1 tablet by mouth Daily.   11/10/2024 Morning    aspirin 81 MG tablet Take 1 tablet by mouth Daily.   11/10/2024 Morning    Calcium-Magnesium-Vitamin D (CALCIUM MAGNESIUM PO) Take 1 tablet by mouth Daily.   11/11/2024 Morning    cholecalciferol (VITAMIN D3) 1000 units tablet Take 1 tablet by mouth Daily.   11/10/2024 Morning    citalopram (CeleXA) 10 MG tablet Take 1 tablet by mouth Daily.   11/10/2024 Morning    furosemide (LASIX) 40 MG tablet TAKE 1 TABLET DAILY 90 tablet 3 11/10/2024 Morning    hydrALAZINE (APRESOLINE) 100 MG tablet Take 1 tablet by mouth 3 (Three) Times a Day.   11/10/2024 Bedtime    multivitamin with minerals tablet tablet Take 1 tablet by mouth Daily.   11/10/2024 Morning    nebivolol (BYSTOLIC) 5 MG tablet TAKE 1 TABLET BY MOUTH EVERY DAY 90 tablet 0 11/10/2024 Morning    pravastatin (PRAVACHOL) 40 MG tablet Take 1 tablet by mouth Every Night.   11/10/2024 Bedtime    docusate calcium (SURFAK) 240 MG capsule Take 1 capsule by mouth 2 (Two) Times a Day As Needed for Constipation.        Allergies:  Patient has no known allergies.        Objective      Vital Signs  Temp:  [98.4 °F (36.9 °C)-101.1 °F (38.4 °C)] 98.6 °F (37 °C)  Heart Rate:  [66-80] 79  Resp:  [16-20] 16  BP: (123-144)/(47-74) 123/55  Body mass index is 32.99 kg/m².    Physical Exam  Constitutional:       General: She is not in acute distress.     Appearance: She is not toxic-appearing.      Comments: Obese, elderly, chronically ill-appearing   HENT:      Head: Normocephalic and atraumatic.      Mouth/Throat:      Mouth: Mucous membranes are moist.   Eyes:      Extraocular Movements: Extraocular movements  intact.      Pupils: Pupils are equal, round, and reactive to light.   Cardiovascular:      Rate and Rhythm: Normal rate and regular rhythm.      Heart sounds: Murmur (3-4 systolic) heard.   Pulmonary:      Breath sounds: No rales.      Comments: Oxygen sats in the mid to high 90s on 1.5 L of oxygen nasal cannula.  Significant posterior mid and lower lobe wheezes.  Regular respiratory pattern at rest and labored breathing with mild exertion/turning over in the bed.  Cough is harsh, wet, congested and nonproductive.  Abdominal:      General: Bowel sounds are normal. There is no distension.      Palpations: Abdomen is soft.      Tenderness: There is no abdominal tenderness.   Musculoskeletal:         General: Tenderness (BLE) present. No swelling.      Comments: Chronic discoloration of bilateral lower extremities consistent with chronic vascular   Skin:     General: Skin is warm and dry.      Coloration: Skin is not jaundiced.   Neurological:      General: No focal deficit present.      Mental Status: She is alert and oriented to person, place, and time.         Results Review:   I reviewed the patient's new clinical results.           Assessment/Plan     Active Hospital Problems    Diagnosis  POA    **Respiratory distress [R06.03]  Yes    Diastolic CHF, chronic [I50.32]  Unknown    Acute hypoxic respiratory failure [J96.01]  Unknown    Sepsis due to pneumonia [J18.9, A41.9]  Unknown    Hypokalemia [E87.6]  Yes    Anemia due to chronic kidney disease [N18.9, D63.1]  Yes    Stage 3b chronic kidney disease [N18.32]  Yes    HTN (hypertension) [I10]  Yes     This is a pleasant 89-year-old female with history of diastolic CHF and severe aortic valve stenosis admitted with sepsis and AhRF was requiring BiPAP now weaned to 1-1/2 L.  She showed great improvement after 1 dose of IV Lasix.  White count decreased to 16 from 21. Continues on antibiotics. Undergoing workup for TAVR, cardiology following her and plans for  additional diuretics today.  She was febrile with inconclusive chest x-ray but suspicion for pneumonia.  Chest x-ray being repeated today.  Urine Legionella and strep pneumo pending.  Fever as high as 101.1 today.  BC NGTD.      Continue Zosyn and follow cultures.    I will start her on some DuoNebs for the wheezing.  Follow labs.  Consider steroid, defer to pulmonology.    Creatinine 1.6, close to baseline.  Continue to monitor labs.  She is followed by Dr. Pollo Fagan.  We would consider nephrology consult if continued diuresis and significant climbing creatinine.    Blood pressure acceptable.  Continue Norvasc.    Replace potassium.    Hemoglobin 9.2, slightly down from baseline.  Continue to monitor.    Bowel regimen.    Consult PT/OT/speech.    Thank you very much for asking [facility] to be involved in this patient's care. We will manage her medical care with assistance from cardiology and pulmonology.    ELVIE Hale  [facility]  11/12/24  11:03 EST

## 2024-11-12 NOTE — PROGRESS NOTES
"[facility]    Patient Name: Vonda Jay  :1935  89 y.o.  LOS: 0  Encounter Provider: ELVIE Archibald      Patient Care Team:  Ambrose Ashley MD as PCP - General (Family Medicine)    Chief Complaint: Follow-up dyspnea    Interval History: Dyspnea is improving       Objective   Vital Signs  Temp:  [98.4 °F (36.9 °C)-101.1 °F (38.4 °C)] 98.6 °F (37 °C)  Heart Rate:  [65-80] 78  Resp:  [16-20] 16  BP: (124-144)/(47-74) 144/59    Intake/Output Summary (Last 24 hours) at 2024 0834  Last data filed at 2024 07  Gross per 24 hour   Intake 100 ml   Output 1100 ml   Net -1000 ml     Flowsheet Rows      Flowsheet Row First Filed Value   Admission Height 154.9 cm (61\") Documented at 2024 07   Admission Weight 72.3 kg (159 lb 6.3 oz) Documented at 2024 0721              Vitals and nursing note reviewed.   Constitutional:       Appearance: Normal appearance. Well-developed.   Eyes:      Conjunctiva/sclera: Conjunctivae normal.   Pulmonary:      Breath sounds: Rhonchi present.   Cardiovascular:      Normal rate. Regular rhythm. Normal S1 with normal intensity. Normal S2 with normal intensity.       Murmurs: There is a grade 5/6 systolic murmur.      No gallop.  No click. No rub.   Edema:     Peripheral edema absent.   Musculoskeletal: Normal range of motion. Skin:     General: Skin is warm and dry.   Neurological:      Mental Status: Alert and oriented to person, place, and time.      GCS: GCS eye subscore is 4. GCS verbal subscore is 5. GCS motor subscore is 6.   Psychiatric:         Speech: Speech normal.         Behavior: Behavior normal.         Thought Content: Thought content normal.         Judgment: Judgment normal.           Pertinent Test Results:  Results from last 7 days   Lab Units 24  0334 24  0505 24  1436   SODIUM mmol/L 131* 133* 135*   POTASSIUM mmol/L 3.3* 3.9 4.4   CHLORIDE mmol/L 98 97* 98   CO2 mmol/L 22.0 22.0 22.9 " "  BUN mg/dL 34* 30* 41*   CREATININE mg/dL 1.65* 1.46* 1.61*   GLUCOSE mg/dL 106* 166* 136*   CALCIUM mg/dL 8.0* 9.0 9.4   AST (SGOT) U/L  --  29  --    ALT (SGPT) U/L  --  23  --      Results from last 7 days   Lab Units 11/11/24  0505   HSTROP T ng/L 46*     Results from last 7 days   Lab Units 11/12/24  0334 11/11/24  0505 11/08/24  1148 11/08/24  1142 11/08/24  1138 11/05/24  1436   WBC 10*3/mm3 16.98* 21.40*  --   --   --  11.65*   HEMOGLOBIN g/dL 9.2* 11.3*  --   --   --  11.1*   HEMOGLOBIN, POC g/dL  --   --  10.9* 10.9* 10.5*  --    HEMATOCRIT % 27.7* 33.7*  --   --   --  32.8*   HEMATOCRIT POC %  --   --  32* 32* 31*  --    PLATELETS 10*3/mm3 243 299  --   --   --  128*     Results from last 7 days   Lab Units 11/11/24  0505   INR  1.08     Results from last 7 days   Lab Units 11/11/24  0505   MAGNESIUM mg/dL 2.3           Invalid input(s): \"LDLCALC\"  Results from last 7 days   Lab Units 11/11/24  0505   PROBNP pg/mL 5,160.0*               Medication Review:   amLODIPine, 5 mg, Oral, Q24H  aspirin, 81 mg, Oral, Daily  citalopram, 10 mg, Oral, Daily  lactobacillus acidophilus, 1 capsule, Oral, Daily  mupirocin, 1 Application, Each Nare, BID  piperacillin-tazobactam, 3.375 g, Intravenous, Q8H  pravastatin, 40 mg, Oral, Nightly  senna-docusate sodium, 2 tablet, Oral, BID              Assessment & Plan     Active Hospital Problems    Diagnosis  POA    **Respiratory distress [R06.03]  Yes    CHF (congestive heart failure) [I50.9]  Yes      Resolved Hospital Problems   No resolved problems to display.        Severe aortic valve stenosis  Patient currently in the process of being worked up for TAVR.  She has an outpatient appointment with CT surgery that she will now miss this secondary to being hospitalized  Patient now with dyspnea that is likely related to severe aortic valve stenosis  Maximum pressure gradient 97 mmHg, mean pressure gradient 61 mmHg  Discussed case with Dr. Rizvi, obviously with active " infection patient cannot have TAVR this admission.    Dyspnea  Likely secondary to #1  HFpEF  Echocardiogram 8/24 reveals LVEF of 69% with severe aortic valve stenosis  Chest x-ray in ED revealed pulmonary vascular congestion.  Patient received IV diuretics in the emergency department  Will give dose of IV furosemide today, plan to resume home dose of 40 mg orally tomorrow  Monitor creatinine  Fever/infectious process/leukocytosis  Patient has experienced upper respiratory symptoms as well as GI symptoms  Fever would obviously delay TAVR while she is actively febrile, temp last night around 11 .1  Respiratory pathogen panel negative  WBC as high as 21.4, down to 16.98  On broad-spectrum antibiotics per internal medicine team  HTN  BP acceptable           ELVIE Archibald  [facility]   [facility]  [address] [place] [address]  Office: [phone]    11/12/24  08:34 EST

## 2024-11-12 NOTE — THERAPY EVALUATION
Acute Care - Speech Language Pathology   Swallow Initial Evaluation [facility]     Patient Name: Vonda Jay  : 1935  MRN: [id number]  Today's Date: 2024               Admit Date: 2024    Visit Dx:     ICD-10-CM ICD-9-CM   1. Respiratory distress  R06.03 786.09   2. Acute congestive heart failure, unspecified heart failure type  I50.9 428.0     Patient Active Problem List   Diagnosis    Closed displaced intertrochanteric fracture of left femur    SANYA (acute kidney injury)    HTN (hypertension)    Acute blood loss anemia    Leukocytosis    Drug-induced constipation    Stage 3b chronic kidney disease    COVID-19 virus infection    Anemia due to chronic kidney disease    Acute on chronic diastolic CHF (congestive heart failure)    Aortic stenosis    Anemia of chronic renal failure, stage 3 (moderate)    E. coli UTI (urinary tract infection)    Colovesical fistula - possible    (HFpEF) heart failure with preserved ejection fraction    Obesity (BMI 30-39.9)    Hypokalemia    Acute UTI (urinary tract infection)    Cough    Nocturnal hypoxemia    Hypophosphatemia    Respiratory distress    CHF (congestive heart failure)    Diastolic CHF, chronic    Acute hypoxic respiratory failure    Sepsis due to pneumonia     Past Medical History:   Diagnosis Date    Arthritis     Chronic kidney disease     Heart murmur     History of breast cancer     History of carotid artery disease     Hyperlipidemia     Hypertension      Past Surgical History:   Procedure Laterality Date    BREAST LUMPECTOMY      CARDIAC CATHETERIZATION N/A 2024    Procedure: Right and Left Heart Cath;  Surgeon: Mike Rizvi MD;  Location: [facility] CATH INVASIVE LOC[facility];  Service: Cardiology;  Laterality: N/A;    CARDIAC CATHETERIZATION N/A 2024    Procedure: Coronary angiography;  Surgeon: Mike Rizvi MD;  Location:  [facility] CATH INVASIVE LOCATION;  Service: Cardiology;  Laterality: N/A;    CAROTID ARTERY  ANGIOPLASTY  2000, 2013    CHOLECYSTECTOMY  1979    FEMUR IM NAILING/RODDING Left 2/8/2018    Procedure: FEMUR INTRAMEDULLARY NAILING;  Surgeon: Zheng Fleming MD;  Location: [facility];  Service:     HYSTERECTOMY  1989       SLP Recommendation and Plan  SLP Swallowing Diagnosis: swallow WFL/no suspected pharyngeal impairment (11/12/24 1200)  SLP Diet Recommendation: regular textures, thin liquids (11/12/24 1200)  Recommended Precautions and Strategies: upright posture during/after eating, small bites of food and sips of liquid (11/12/24 1200)  SLP Rec. for Method of Medication Administration: meds whole, as tolerated (11/12/24 1200)     Monitor for Signs of Aspiration: yes, notify SLP if any concerns (11/12/24 1200)     Swallow Criteria for Skilled Therapeutic Interventions Met: demonstrates skilled criteria (11/12/24 1200)  Anticipated Discharge Disposition (SLP): unknown (11/12/24 1200)  Rehab Potential/Prognosis, Swallowing: good, to achieve stated therapy goals (11/12/24 1200)  Therapy Frequency (Swallow): PRN (11/12/24 1200)  Predicted Duration Therapy Intervention (Days): until discharge (11/12/24 1200)  Oral Care Recommendations: Oral Care BID/PRN (11/12/24 1200)                                        Outcome Evaluation: Clinical swallow evaluation completed recommend continue with regular solids and thin liquids, meds whole as tolerated and small bites and sips.      SWALLOW EVALUATION (Last 72 Hours)       SLP Adult Swallow Evaluation       Row Name 11/12/24 1200                   Rehab Evaluation    Document Type evaluation  -KA        Subjective Information no complaints  -KA        Patient Observations alert;cooperative  -KA        Patient Effort good  -KA        Symptoms Noted During/After Treatment none  -KA           General Information    Patient Profile Reviewed yes  -KA        Pertinent History Of Current Problem Patient admitted with acute hypoxic respiratory failure, PNA and CHF  -KA         Current Method of Nutrition regular textures;thin liquids  -KA        Precautions/Limitations, Vision WFL;for purposes of eval  -KA        Precautions/Limitations, Hearing WFL;for purposes of eval  -KA        Prior Level of Function-Swallowing no diet consistency restrictions;safe, efficient swallowing in all situations  -KA        Plans/Goals Discussed with patient  -KA        Barriers to Rehab none identified  -KA        Patient's Goals for Discharge return home  -KA           Pain    Pretreatment Pain Rating 0/10 - no pain  -KA        Posttreatment Pain Rating 0/10 - no pain  -KA           Oral Motor Structure and Function    Dentition Assessment natural, present and adequate  -KA        Secretion Management WNL/WFL  -KA        Mucosal Quality moist, healthy  -KA        Volitional Swallow WFL  -KA           Oral Musculature and Cranial Nerve Assessment    Oral Motor General Assessment WFL  -KA           General Eating/Swallowing Observations    Eating/Swallowing Skills self-fed  -KA        Positioning During Eating upright in chair  -KA        Utensils Used spoon;cup;straw  -KA        Consistencies Trialed regular textures;soft to chew textures;chopped;mixed consistency;pureed;thin liquids  -KA           Clinical Swallow Eval    Clinical Swallow Evaluation Summary Patient demonstrated baseline congested coughing prior to PO. No overt s/s of pen/asp with thins via cup and straw, puree,mechanical soft mixed and regular solids. Mastication WNL and laryngeal elevation adequate upon neck palpation.  -KA           SLP Evaluation Clinical Impression    SLP Swallowing Diagnosis swallow WFL/no suspected pharyngeal impairment  -KA        Functional Impact risk of aspiration/pneumonia  -KA        Rehab Potential/Prognosis, Swallowing good, to achieve stated therapy goals  -KA        Swallow Criteria for Skilled Therapeutic Interventions Met demonstrates skilled criteria  -KA           Recommendations    Therapy Frequency  (Swallow) PRN  -KA        Predicted Duration Therapy Intervention (Days) until discharge  -KA        SLP Diet Recommendation regular textures;thin liquids  -KA        Recommended Precautions and Strategies upright posture during/after eating;small bites of food and sips of liquid  -KA        Oral Care Recommendations Oral Care BID/PRN  -KA        SLP Rec. for Method of Medication Administration meds whole;as tolerated  -KA        Monitor for Signs of Aspiration yes;notify SLP if any concerns  -KA        Anticipated Discharge Disposition (SLP) unknown  -KA           Swallow Goals (SLP)    Swallow STGs diet tolerance goal selection (SLP)  -KA        Diet Tolerance Goal Selection (SLP) Patient will tolerate trials of  -KA           (STG) Patient will tolerate trials of    Consistencies Trialed (Tolerate trials) regular textures;thin liquids  -KA        Desired Outcome (Tolerate trials) without signs/symptoms of aspiration  -KA                  User Key  (r) = Recorded By, (t) = Taken By, (c) = Cosigned By      Initials Name Effective Dates    Aries Christine SLP 01/05/24 -                     EDUCATION  The patient has been educated in the following areas:   Dysphagia (Swallowing Impairment).        SLP GOALS       Row Name 11/12/24 1200             (STG) Patient will tolerate trials of    Consistencies Trialed (Tolerate trials) regular textures;thin liquids  -KA      Desired Outcome (Tolerate trials) without signs/symptoms of aspiration  -KA                User Key  (r) = Recorded By, (t) = Taken By, (c) = Cosigned By      Initials Name Provider Type    Aries Christnie, ADRIAN Speech and Language Pathologist                         Time Calculation:    Time Calculation- SLP       Row Name 11/12/24 1227             Time Calculation- SLP    SLP Start Time 1100  -KA      SLP Received On 11/12/24  -KA         Untimed Charges    [id number] Eval Oral Pharyng Swallow Minutes 55  -KA         Total Minutes    Untimed Charges  Total Minutes 55  -KA       Total Minutes 55  -KA                User Key  (r) = Recorded By, (t) = Taken By, (c) = Cosigned By      Initials Name Provider Type    Aries Christine SLP Speech and Language Pathologist                    Therapy Charges for Today       Code Description Service Date Service Provider Modifiers Qty    [id number]  ST EVAL ORAL PHARYNG SWALLOW 4 11/12/2024 Aries Meza SLP GN 1                 ADRIAN Tucker  11/12/2024

## 2024-11-13 ENCOUNTER — APPOINTMENT (OUTPATIENT)
Dept: CT IMAGING | Facility: HOSPITAL | Age: 89
DRG: 871 | End: 2024-11-13
Payer: MEDICARE

## 2024-11-13 ENCOUNTER — APPOINTMENT (OUTPATIENT)
Dept: GENERAL RADIOLOGY | Facility: HOSPITAL | Age: 89
DRG: 871 | End: 2024-11-13
Payer: MEDICARE

## 2024-11-13 LAB
ANION GAP SERPL CALCULATED.3IONS-SCNC: 11 MMOL/L (ref 5–15)
BACTERIA UR QL AUTO: ABNORMAL /HPF
BASOPHILS # BLD AUTO: 0.07 10*3/MM3 (ref 0–0.2)
BASOPHILS NFR BLD AUTO: 0.4 % (ref 0–1.5)
BILIRUB UR QL STRIP: NEGATIVE
BUN SERPL-MCNC: 28 MG/DL (ref 8–23)
BUN/CREAT SERPL: 17.1 (ref 7–25)
CALCIUM SPEC-SCNC: 7.6 MG/DL (ref 8.6–10.5)
CHLORIDE SERPL-SCNC: 99 MMOL/L (ref 98–107)
CLARITY UR: CLEAR
CO2 SERPL-SCNC: 21 MMOL/L (ref 22–29)
COLOR UR: YELLOW
CREAT SERPL-MCNC: 1.64 MG/DL (ref 0.57–1)
DEPRECATED RDW RBC AUTO: 41.3 FL (ref 37–54)
EGFRCR SERPLBLD CKD-EPI 2021: 29.8 ML/MIN/1.73
EOSINOPHIL # BLD AUTO: 0.28 10*3/MM3 (ref 0–0.4)
EOSINOPHIL NFR BLD AUTO: 1.7 % (ref 0.3–6.2)
ERYTHROCYTE [DISTWIDTH] IN BLOOD BY AUTOMATED COUNT: 12.5 % (ref 12.3–15.4)
GLUCOSE SERPL-MCNC: 115 MG/DL (ref 65–99)
GLUCOSE UR STRIP-MCNC: NEGATIVE MG/DL
HCT VFR BLD AUTO: 25 % (ref 34–46.6)
HGB BLD-MCNC: 8.2 G/DL (ref 12–15.9)
HGB UR QL STRIP.AUTO: NEGATIVE
HYALINE CASTS UR QL AUTO: ABNORMAL /LPF
IMM GRANULOCYTES # BLD AUTO: 0.2 10*3/MM3 (ref 0–0.05)
IMM GRANULOCYTES NFR BLD AUTO: 1.2 % (ref 0–0.5)
KETONES UR QL STRIP: NEGATIVE
L PNEUMO1 AG UR QL IA: NEGATIVE
LEUKOCYTE ESTERASE UR QL STRIP.AUTO: NEGATIVE
LYMPHOCYTES # BLD AUTO: 1.68 10*3/MM3 (ref 0.7–3.1)
LYMPHOCYTES NFR BLD AUTO: 10.2 % (ref 19.6–45.3)
MCH RBC QN AUTO: 29.7 PG (ref 26.6–33)
MCHC RBC AUTO-ENTMCNC: 32.8 G/DL (ref 31.5–35.7)
MCV RBC AUTO: 90.6 FL (ref 79–97)
MONOCYTES # BLD AUTO: 1.93 10*3/MM3 (ref 0.1–0.9)
MONOCYTES NFR BLD AUTO: 11.7 % (ref 5–12)
NEUTROPHILS NFR BLD AUTO: 12.31 10*3/MM3 (ref 1.7–7)
NEUTROPHILS NFR BLD AUTO: 74.8 % (ref 42.7–76)
NITRITE UR QL STRIP: NEGATIVE
NRBC BLD AUTO-RTO: 0 /100 WBC (ref 0–0.2)
OSMOLALITY UR: 362 MOSM/KG
PH UR STRIP.AUTO: 5.5 [PH] (ref 5–8)
PLATELET # BLD AUTO: 269 10*3/MM3 (ref 140–450)
PMV BLD AUTO: 10 FL (ref 6–12)
POTASSIUM SERPL-SCNC: 3.7 MMOL/L (ref 3.5–5.2)
PROT UR QL STRIP: ABNORMAL
RBC # BLD AUTO: 2.76 10*6/MM3 (ref 3.77–5.28)
RBC # UR STRIP: ABNORMAL /HPF
REF LAB TEST METHOD: ABNORMAL
S PNEUM AG SPEC QL LA: NEGATIVE
SODIUM SERPL-SCNC: 131 MMOL/L (ref 136–145)
SODIUM UR-SCNC: <20 MMOL/L
SP GR UR STRIP: 1.02 (ref 1–1.03)
SQUAMOUS #/AREA URNS HPF: ABNORMAL /HPF
UROBILINOGEN UR QL STRIP: ABNORMAL
WBC # UR STRIP: ABNORMAL /HPF
WBC NRBC COR # BLD AUTO: 16.47 10*3/MM3 (ref 3.4–10.8)

## 2024-11-13 PROCEDURE — 25010000002 METHYLPREDNISOLONE PER 40 MG: Performed by: STUDENT IN AN ORGANIZED HEALTH CARE EDUCATION/TRAINING PROGRAM

## 2024-11-13 PROCEDURE — 80048 BASIC METABOLIC PNL TOTAL CA: CPT

## 2024-11-13 PROCEDURE — 94760 N-INVAS EAR/PLS OXIMETRY 1: CPT

## 2024-11-13 PROCEDURE — 87899 AGENT NOS ASSAY W/OPTIC: CPT | Performed by: INTERNAL MEDICINE

## 2024-11-13 PROCEDURE — 94761 N-INVAS EAR/PLS OXIMETRY MLT: CPT

## 2024-11-13 PROCEDURE — 81001 URINALYSIS AUTO W/SCOPE: CPT | Performed by: STUDENT IN AN ORGANIZED HEALTH CARE EDUCATION/TRAINING PROGRAM

## 2024-11-13 PROCEDURE — 36415 COLL VENOUS BLD VENIPUNCTURE: CPT

## 2024-11-13 PROCEDURE — 71045 X-RAY EXAM CHEST 1 VIEW: CPT

## 2024-11-13 PROCEDURE — 87449 NOS EACH ORGANISM AG IA: CPT | Performed by: INTERNAL MEDICINE

## 2024-11-13 PROCEDURE — 94799 UNLISTED PULMONARY SVC/PX: CPT

## 2024-11-13 PROCEDURE — 71250 CT THORAX DX C-: CPT

## 2024-11-13 PROCEDURE — 25010000002 PIPERACILLIN SOD-TAZOBACTAM PER 1 G

## 2024-11-13 PROCEDURE — 85025 COMPLETE CBC W/AUTO DIFF WBC: CPT

## 2024-11-13 PROCEDURE — 84300 ASSAY OF URINE SODIUM: CPT | Performed by: STUDENT IN AN ORGANIZED HEALTH CARE EDUCATION/TRAINING PROGRAM

## 2024-11-13 PROCEDURE — 94664 DEMO&/EVAL PT USE INHALER: CPT

## 2024-11-13 PROCEDURE — 99232 SBSQ HOSP IP/OBS MODERATE 35: CPT | Performed by: INTERNAL MEDICINE

## 2024-11-13 PROCEDURE — 97535 SELF CARE MNGMENT TRAINING: CPT

## 2024-11-13 PROCEDURE — 83935 ASSAY OF URINE OSMOLALITY: CPT | Performed by: STUDENT IN AN ORGANIZED HEALTH CARE EDUCATION/TRAINING PROGRAM

## 2024-11-13 PROCEDURE — 25010000002 PIPERACILLIN SOD-TAZOBACTAM PER 1 G: Performed by: INTERNAL MEDICINE

## 2024-11-13 PROCEDURE — 97530 THERAPEUTIC ACTIVITIES: CPT

## 2024-11-13 RX ORDER — METHYLPREDNISOLONE SODIUM SUCCINATE 40 MG/ML
40 INJECTION, POWDER, LYOPHILIZED, FOR SOLUTION INTRAMUSCULAR; INTRAVENOUS EVERY 8 HOURS
Status: DISCONTINUED | OUTPATIENT
Start: 2024-11-13 | End: 2024-11-14

## 2024-11-13 RX ORDER — BUDESONIDE 0.5 MG/2ML
0.5 INHALANT ORAL
Status: DISCONTINUED | OUTPATIENT
Start: 2024-11-13 | End: 2024-11-15 | Stop reason: HOSPADM

## 2024-11-13 RX ADMIN — BENZONATATE 200 MG: 100 CAPSULE ORAL at 16:37

## 2024-11-13 RX ADMIN — PIPERACILLIN AND TAZOBACTAM 3.38 G: 3; .375 INJECTION, POWDER, FOR SOLUTION INTRAVENOUS at 05:37

## 2024-11-13 RX ADMIN — AMLODIPINE BESYLATE 5 MG: 5 TABLET ORAL at 08:10

## 2024-11-13 RX ADMIN — Medication 1 CAPSULE: at 08:10

## 2024-11-13 RX ADMIN — IPRATROPIUM BROMIDE AND ALBUTEROL SULFATE 3 ML: 2.5; .5 SOLUTION RESPIRATORY (INHALATION) at 14:39

## 2024-11-13 RX ADMIN — BENZONATATE 200 MG: 100 CAPSULE ORAL at 03:36

## 2024-11-13 RX ADMIN — MUPIROCIN 1 APPLICATION: 20 OINTMENT TOPICAL at 22:11

## 2024-11-13 RX ADMIN — MUPIROCIN 1 APPLICATION: 20 OINTMENT TOPICAL at 08:10

## 2024-11-13 RX ADMIN — IPRATROPIUM BROMIDE AND ALBUTEROL SULFATE 3 ML: 2.5; .5 SOLUTION RESPIRATORY (INHALATION) at 10:56

## 2024-11-13 RX ADMIN — PIPERACILLIN AND TAZOBACTAM 3.38 G: 3; .375 INJECTION, POWDER, FOR SOLUTION INTRAVENOUS at 22:31

## 2024-11-13 RX ADMIN — BENZONATATE 200 MG: 100 CAPSULE ORAL at 08:17

## 2024-11-13 RX ADMIN — BUDESONIDE 0.5 MG: 0.5 INHALANT ORAL at 11:22

## 2024-11-13 RX ADMIN — PRAVASTATIN SODIUM 40 MG: 40 TABLET ORAL at 20:51

## 2024-11-13 RX ADMIN — ASPIRIN 81 MG: 81 TABLET, COATED ORAL at 08:10

## 2024-11-13 RX ADMIN — METHYLPREDNISOLONE SODIUM SUCCINATE 40 MG: 40 INJECTION, POWDER, FOR SOLUTION INTRAMUSCULAR; INTRAVENOUS at 12:20

## 2024-11-13 RX ADMIN — CITALOPRAM 10 MG: 20 TABLET, FILM COATED ORAL at 08:10

## 2024-11-13 RX ADMIN — IPRATROPIUM BROMIDE AND ALBUTEROL SULFATE 3 ML: 2.5; .5 SOLUTION RESPIRATORY (INHALATION) at 06:49

## 2024-11-13 RX ADMIN — METHYLPREDNISOLONE SODIUM SUCCINATE 40 MG: 40 INJECTION, POWDER, FOR SOLUTION INTRAMUSCULAR; INTRAVENOUS at 20:51

## 2024-11-13 RX ADMIN — SENNOSIDES AND DOCUSATE SODIUM 2 TABLET: 50; 8.6 TABLET ORAL at 08:10

## 2024-11-13 RX ADMIN — ACETAMINOPHEN 325MG 650 MG: 325 TABLET ORAL at 03:36

## 2024-11-13 RX ADMIN — FUROSEMIDE 40 MG: 40 TABLET ORAL at 08:10

## 2024-11-13 RX ADMIN — PIPERACILLIN AND TAZOBACTAM 3.38 G: 3; .375 INJECTION, POWDER, FOR SOLUTION INTRAVENOUS at 13:18

## 2024-11-13 NOTE — PROGRESS NOTES
[facility]      Mar/chart reviewed  Follow up AhRF, fever in patient with severe Aortic stenosis  Some cough, she says cough is bad    Vital Sign Min/Max for last 24 hours  Temp  Min: 98.6 °F (37 °C)  Max: 100.2 °F (37.9 °C)   BP  Min: 122/57  Max: 153/58   Pulse  Min: 74  Max: 83   Resp  Min: 16  Max: 24   SpO2  Min: 92 %  Max: 100 %   Flow (L/min) (Oxygen Therapy)  Min: 1.5  Max: 4   Weight  Min: 72.1 kg (158 lb 15.2 oz)  Max: 72.1 kg (158 lb 15.2 oz)        Nad, axox3,   perrl, eomi, normal sclera,  mmm, no jvd, trachea midline, neck supple,  chest fair bilaterally, + crackles, no wheezes,   rrr, +faith  soft, nt, nd +bs,  no c/c/ trace edema  Skin warm, dry no rashes       Labs: 11/13: reviewed:  Bun 28  Cr 1.64  Wbc 16  Hgb 8.2  Ptls 269  11/12: CXR: reviewed, rul infiltrate      A/P:  AHRF - better, wean oxygen as able suspect large part of AHRF was pulmonary edema and pneumonia  Sepsis -- due to  pneumonia,  continue antibiotics.   Severe Aortic stenosis -- cards following  CKDIII  Hyponatremia  Anemia  N/v/diarrhea - better, gi panel neg    Improving, continue antibiotics, temp curve improving, probably iv antibitoics again today

## 2024-11-13 NOTE — THERAPY TREATMENT NOTE
Patient Name: Vonda Jay  : 1935    MRN: [id number]                              Today's Date: 2024       Admit Date: 2024    Visit Dx:     ICD-10-CM ICD-9-CM   1. Respiratory distress  R06.03 786.09   2. Acute congestive heart failure, unspecified heart failure type  I50.9 428.0     Patient Active Problem List   Diagnosis    Closed displaced intertrochanteric fracture of left femur    SANYA (acute kidney injury)    HTN (hypertension)    Acute blood loss anemia    Leukocytosis    Drug-induced constipation    Stage 3b chronic kidney disease    COVID-19 virus infection    Anemia due to chronic kidney disease    Acute on chronic diastolic CHF (congestive heart failure)    Aortic stenosis    Anemia of chronic renal failure, stage 3 (moderate)    E. coli UTI (urinary tract infection)    Colovesical fistula - possible    (HFpEF) heart failure with preserved ejection fraction    Obesity (BMI 30-39.9)    Hypokalemia    Acute UTI (urinary tract infection)    Cough    Nocturnal hypoxemia    Hypophosphatemia    Respiratory distress    CHF (congestive heart failure)    Diastolic CHF, chronic    Acute hypoxic respiratory failure    Sepsis due to pneumonia     Past Medical History:   Diagnosis Date    Arthritis     Chronic kidney disease     Heart murmur     History of breast cancer     History of carotid artery disease     Hyperlipidemia     Hypertension      Past Surgical History:   Procedure Laterality Date    BREAST LUMPECTOMY      CARDIAC CATHETERIZATION N/A 2024    Procedure: Right and Left Heart Cath;  Surgeon: Mike Rizvi MD;  Location:  [facility];  Service: Cardiology;  Laterality: N/A;    CARDIAC CATHETERIZATION N/A 2024    Procedure: Coronary angiography;  Surgeon: Mike Rizvi MD;  Location:  [facility];  Service: Cardiology;  Laterality: N/A;    CAROTID ARTERY ANGIOPLASTY  ,     CHOLECYSTECTOMY  1979    FEMUR IM  NAILING/RODDING Left 2/8/2018    Procedure: FEMUR INTRAMEDULLARY NAILING;  Surgeon: Zheng Fleming MD;  [facility];  Service:     HYSTERECTOMY  1989      General Information       Row Name 11/13/24 1220          OT Time and Intention    Subjective Information no complaints  -CE     Document Type therapy note (daily note)  -CE     Mode of Treatment occupational therapy  -CE     Patient Effort good  -CE     Symptoms Noted During/After Treatment none  -CE       Row Name 11/13/24 1220          General Information    Patient Profile Reviewed yes  -CE       Row Name 11/13/24 1220          Cognition    Orientation Status (Cognition) oriented x 4  -CE       Row Name 11/13/24 1220          Safety Issues/Impairments Affecting Functional Mobility    Impairments Affecting Function (Mobility) balance;endurance/activity tolerance;strength  -CE               User Key  (r) = Recorded By, (t) = Taken By, (c) = Cosigned By      Initials Name Provider Type    CE Marai Del Rosario Vanegas OT Occupational Therapist                     Mobility/ADL's       Row Name 11/13/24 1221          Bed Mobility    Bed Mobility supine-sit  -CE     Supine-Sit [place] (Bed Mobility) standby assist  -CE     Assistive Device (Bed Mobility) bed rails;head of bed elevated  -CE     Comment, (Bed Mobility) increaesd time  -CE       Row Name 11/13/24 1221          Transfers    Transfers sit-stand transfer;stand-sit transfer  -CE       Row Name 11/13/24 1221          Sit-Stand Transfer    Sit-Stand [place] (Transfers) minimum assist (75% patient effort);1 person assist  -CE     Assistive Device (Sit-Stand Transfers) walker, front-wheeled  -CE       Row Name 11/13/24 1221          Stand-Sit Transfer    Stand-Sit [place] (Transfers) contact guard;1 person assist  -CE     Assistive Device (Stand-Sit Transfers) walker, front-wheeled  -CE       Row Name 11/13/24 1221          Functional Mobility    Functional Mobility- Comment Pt able to  "ambulate in room and stand at sink for grooming tasks with CGA x 1 using FWW  -CE       Row Name 11/13/24 1221          Activities of Daily Living    BADL Assessment/Intervention lower body dressing;grooming  -CE       Row Name 11/13/24 1221          Lower Body Dressing Assessment/Training    [place] Level (Lower Body Dressing) don;shoes/slippers;set up  -CE     Position (Lower Body Dressing) edge of bed sitting  -CE       Row Name 11/13/24 1221          Grooming Assessment/Training    [place] Level (Grooming) hair care, combing/brushing;oral care regimen;wash face, hands;set up;standby assist;contact guard assist  -CE     Position (Grooming) sink side;supported standing  -CE     Comment, (Grooming) pt able to complete standing at sink without seated rest break >/= 8 min  -CE               User Key  (r) = Recorded By, (t) = Taken By, (c) = Cosigned By      Initials Name Provider Type    CE Maria Del Rosario Vanegas OT Occupational Therapist                   Obj/Interventions       Row Name 11/13/24 1223          Motor Skills    Functional Endurance pt denying SOB with mobility within room and stating \"it feels good to be up on my feet\"  -CE       Row Name 11/13/24 1223          Balance    Static Standing Balance standby assist;contact guard  -CE     Dynamic Standing Balance contact guard  -CE     Position/Device Used, Standing Balance walker, front-wheeled  -CE               User Key  (r) = Recorded By, (t) = Taken By, (c) = Cosigned By      Initials Name Provider Type    CE Maria Del Rosario Vanegas OT Occupational Therapist                   Goals/Plan    No documentation.                  Clinical Impression       Row Name 11/13/24 1224          Pain Assessment    Pretreatment Pain Rating 0/10 - no pain  -CE     Posttreatment Pain Rating 0/10 - no pain  -CE       Row Name 11/13/24 1224          Plan of Care Review    Plan of Care Reviewed With patient  -CE     Progress improving  -CE     Outcome Evaluation Pt seen " for OT tx focusing on functional endurance and ADLs standing level. Pt initially on RA and SpO2 94% at rest. Pt with poor pleth reading when standing at sink but appeared SpO2 dropped to 88% but did recover with cues and standing rest. RN aware and to place pt back on 2Ls post session. Pt able to complete LBD tasks seated EOB with SBA as well. Will con't to follow for stated goals and recommend d/c to home with HHOT.  -CE       Row Name 11/13/24 1224          Therapy Assessment/Plan (OT)    Rehab Potential (OT) good  -CE       Row Name 11/13/24 1224          Therapy Plan Review/Discharge Plan (OT)    Anticipated Discharge Disposition (OT) home with assist;home with home health  -CE       Row Name 11/13/24 1224          Vital Signs    O2 Delivery Pre Treatment room air  -CE     O2 Delivery Intra Treatment room air  -CE     O2 Delivery Post Treatment room air  -CE     Pre Patient Position Supine  -CE     Intra Patient Position Standing  -CE     Post Patient Position Sitting  -CE       Row Name 11/13/24 1224          Positioning and Restraints    Pre-Treatment Position in bed  -CE     Post Treatment Position chair  -CE     In Chair notified nsg;reclined;call light within reach;encouraged to call for assist;exit alarm on  -CE               User Key  (r) = Recorded By, (t) = Taken By, (c) = Cosigned By      Initials Name Provider Type    CE Maria Del Rosario Vanegas, OT Occupational Therapist                   Outcome Measures       Row Name 11/13/24 1226          How much help from another is currently needed...    Putting on and taking off regular lower body clothing? 3  -CE     Bathing (including washing, rinsing, and drying) 3  -CE     Toileting (which includes using toilet bed pan or urinal) 3  -CE     Putting on and taking off regular upper body clothing 4  -CE     Taking care of personal grooming (such as brushing teeth) 4  -CE     Eating meals 4  -CE     AM-PAC 6 Clicks Score (OT) 21  -CE       Row Name 11/13/24 3969           How much help from another person do you currently need...    Turning from your back to your side while in flat bed without using bedrails? 4  -AL     Moving from lying on back to sitting on the side of a flat bed without bedrails? 4  -AL     Moving to and from a bed to a chair (including a wheelchair)? 3  -AL     Standing up from a chair using your arms (e.g., wheelchair, bedside chair)? 3  -AL     Climbing 3-5 steps with a railing? 2  -AL     To walk in hospital room? 3  -AL     AM-PAC 6 Clicks Score (PT) 19  -AL     Highest Level of Mobility Goal 6 --> Walk 10 steps or more  -AL       Row Name 11/13/24 1226          Functional Assessment    Outcome Measure Options AM-PAC 6 Clicks Daily Activity (OT)  -CE               User Key  (r) = Recorded By, (t) = Taken By, (c) = Cosigned By      Initials Name Provider Type    Brad Duron RN Registered Nurse    Maria Del Rosario Henry OT Occupational Therapist                    Occupational Therapy Education       Title: PT OT SLP Therapies (In Progress)       Topic: Occupational Therapy (In Progress)       Point: ADL training (Done)       Description:   Instruct learner(s) on proper safety adaptation and remediation techniques during self care or transfers.   Instruct in proper use of assistive devices.                  Learning Progress Summary            Patient Acceptance, E, VU by PP at 11/12/2024 1102    Comment: Pt Ed on OT role and dc planning.                      Point: Home exercise program (Not Started)       Description:   Instruct learner(s) on appropriate technique for monitoring, assisting and/or progressing therapeutic exercises/activities.                  Learner Progress:  Not documented in this visit.              Point: Precautions (Not Started)       Description:   Instruct learner(s) on prescribed precautions during self-care and functional transfers.                  Learner Progress:  Not documented in this visit.              Point:  Body mechanics (Not Started)       Description:   Instruct learner(s) on proper positioning and spine alignment during self-care, functional mobility activities and/or exercises.                  Learner Progress:  Not documented in this visit.                              User Key       Initials Effective Dates Name Provider Type Discipline    PP 06/09/23 -  Rosemarie Del Rio OT Occupational Therapist OT                  OT Recommendation and Plan     Plan of Care Review  Plan of Care Reviewed With: patient  Progress: improving  Outcome Evaluation: Pt seen for OT tx focusing on functional endurance and ADLs standing level. Pt initially on RA and SpO2 94% at rest. Pt with poor pleth reading when standing at sink but appeared SpO2 dropped to 88% but did recover with cues and standing rest. RN aware and to place pt back on 2Ls post session. Pt able to complete LBD tasks seated EOB with SBA as well. Will con't to follow for stated goals and recommend d/c to home with HHOT.     Time Calculation:         Time Calculation- OT       Row Name 11/13/24 1226             Time Calculation- OT    OT Start Time 0855  -CE      OT Stop Time 0918  -CE      OT Time Calculation (min) 23 min  -CE      Total Timed Code Minutes- OT 23 minute(s)  -CE      OT Received On 11/13/24  -CE      OT - Next Appointment 11/14/24  -CE         Timed Charges    97072 - OT Self Care/Mgmt Minutes 23  -CE         Total Minutes    Timed Charges Total Minutes 23  -CE       Total Minutes 23  -CE                User Key  (r) = Recorded By, (t) = Taken By, (c) = Cosigned By      Initials Name Provider Type    CE Maria Del Rosario Vanegas OT Occupational Therapist                  Therapy Charges for Today       Code Description Service Date Service Provider Modifiers Qty    [id number]  OT SELF CARE/MGMT/TRAIN EA 15 MIN 11/13/2024 Maria Del Rosario Vanegas OT GO 2                 Maria Del Rosario Vanegas OT  11/13/2024

## 2024-11-13 NOTE — PROGRESS NOTES
Name: Vonda Jay ADMIT: 2024   : 1935  PCP: Ambrose Ashley MD    MRN: [id number] LOS: 1 days   AGE/SEX: 89 y.o. female  ROOM: [facility]     Subjective   Subjective   2024  Patient with audible wheezing this morning and oxygen use has increased to 6L NC       Objective   Objective   Vital Signs  Temp:  [98.2 °F (36.8 °C)-100 °F (37.8 °C)] 98.2 °F (36.8 °C)  Heart Rate:  [74-89] 89  Resp:  [16-22] 20  BP: (122-153)/(43-58) 143/49  SpO2:  [96 %-100 %] 97 %  on  Flow (L/min) (Oxygen Therapy):  [1.5-5] 3;   Device (Oxygen Therapy): nasal cannula;humidified  Body mass index is 30.03 kg/m².  Physical Exam  Constitutional:       General: She is not in acute distress.     Appearance: She is obese. She is not toxic-appearing.   HENT:      Head: Normocephalic and atraumatic.      Nose: Nose normal. No congestion.      Mouth/Throat:      Pharynx: Oropharynx is clear. No oropharyngeal exudate.   Eyes:      General: No scleral icterus.  Cardiovascular:      Rate and Rhythm: Normal rate and regular rhythm.      Heart sounds: No murmur heard.     No friction rub. No gallop.   Pulmonary:      Breath sounds: Wheezing and rales present.      Comments: Patient on 6L NC  Abdominal:      General: There is no distension.      Tenderness: There is no abdominal tenderness. There is no guarding.   Musculoskeletal:         General: No swelling or deformity.      Cervical back: Normal range of motion. No rigidity.      Right lower leg: No edema.      Left lower leg: No edema.   Skin:     Coloration: Skin is not jaundiced.      Findings: No bruising or lesion.   Neurological:      General: No focal deficit present.      Mental Status: She is alert and oriented to person, place, and time.      Motor: Weakness present.       Results Review     I reviewed the patient's new clinical results.  Results from last 7 days   Lab Units 24  0255 24  0334 24  0505 24  1148   WBC 10*3/mm3 16.47* 16.98*  21.40*  --    HEMOGLOBIN g/dL 8.2* 9.2* 11.3*  --    HEMOGLOBIN, POC g/dL  --   --   --  10.9*   PLATELETS 10*3/mm3 269 243 299  --      Results from last 7 days   Lab Units 11/13/24  0255 11/12/24 2003 11/12/24  1346 11/12/24  0334 11/11/24  0505   SODIUM mmol/L 131*  --   --  131* 133*   POTASSIUM mmol/L 3.7 3.3* 3.2* 3.3* 3.9   CHLORIDE mmol/L 99  --   --  98 97*   CO2 mmol/L 21.0*  --   --  22.0 22.0   BUN mg/dL 28*  --   --  34* 30*   CREATININE mg/dL 1.64*  --   --  1.65* 1.46*   GLUCOSE mg/dL 115*  --   --  106* 166*   EGFR mL/min/1.73 29.8*  --   --  29.6* 34.3*     Results from last 7 days   Lab Units 11/11/24  0505   ALBUMIN g/dL 4.1   BILIRUBIN mg/dL 0.3   ALK PHOS U/L 95   AST (SGOT) U/L 29   ALT (SGPT) U/L 23     Results from last 7 days   Lab Units 11/13/24  0255 11/12/24 0334 11/11/24  0505   CALCIUM mg/dL 7.6* 8.0* 9.0   ALBUMIN g/dL  --   --  4.1   MAGNESIUM mg/dL  --   --  2.3   PHOSPHORUS mg/dL  --   --  3.0     Results from last 7 days   Lab Units 11/11/24  0603 11/11/24  0505   PROCALCITONIN ng/mL  --  1.06*   LACTATE mmol/L 1.5  --      Hemoglobin A1C   Date/Time Value Ref Range Status   11/12/2024 0334 5.30 4.80 - 5.60 % Final     Glucose   Date/Time Value Ref Range Status   11/11/2024 1645 130 70 - 130 mg/dL Final   11/11/2024 0838 145 (H) 70 - 130 mg/dL Final       XR Chest 1 View    Result Date: 11/13/2024  1. Nodular opacity in the right upper to mid hemithorax appears similar to yesterday. 2. With additional patchy atelectasis, infiltrate or asymmetric edema in the right base also similar to yesterday's study.   This report was finalized on 11/13/2024 11:00 AM by Dr. Wilberto Alfonso M.D on Workstation: [id number]      XR Chest 1 View    Result Date: 11/12/2024  1. Increasing right suprahilar/upper lobe opacity suspicious for infiltrate and this may in part represent loculated fluid in the superior aspect of the right major fissure. 2. Cardiomegaly with pulmonary vascular  franchesca.  This report was finalized on 11/12/2024 2:02 PM by Aramis Nuñez M.D on Workstation: [id number]       I have personally reviewed all medications:  Scheduled Medications  amLODIPine, 5 mg, Oral, Q24H  aspirin, 81 mg, Oral, Daily  budesonide, 0.5 mg, Nebulization, BID - RT  citalopram, 10 mg, Oral, Daily  furosemide, 40 mg, Oral, Daily  ipratropium-albuterol, 3 mL, Nebulization, 4x Daily - RT  lactobacillus acidophilus, 1 capsule, Oral, Daily  methylPREDNISolone sodium succinate, 40 mg, Intravenous, Q8H  mupirocin, 1 Application, Each Nare, BID  piperacillin-tazobactam, 3.375 g, Intravenous, Q8H  pravastatin, 40 mg, Oral, Nightly  senna-docusate sodium, 2 tablet, Oral, BID    Infusions   Diet  Diet: Cardiac; Healthy Heart (2-3 Na+); Fluid Consistency: Thin (IDDSI 0)    I have personally reviewed:  [x]  Laboratory   [x]  Microbiology   [x]  Radiology   [x]  EKG/Telemetry  [x]  Cardiology/Vascular   []  Pathology    []  Records       Assessment/Plan     Active Hospital Problems    Diagnosis  POA    **Respiratory distress [R06.03]  Yes    Diastolic CHF, chronic [I50.32]  Unknown    Acute hypoxic respiratory failure [J96.01]  Unknown    Sepsis due to pneumonia [J18.9, A41.9]  Unknown    Hypokalemia [E87.6]  Yes    Anemia due to chronic kidney disease [N18.9, D63.1]  Yes    Stage 3b chronic kidney disease [N18.32]  Yes    HTN (hypertension) [I10]  Yes      Resolved Hospital Problems   No resolved problems to display.       89 y.o. female admitted with Respiratory distress.    #Acute Hypoxic Respiratory Failure  #Sepsis  #Pneumonia  #Acute on Chronic HFpEF  #Aortic Stenosis  #Hyponatremia  #Hypokalemia  #CKD3a  #Anemia  #HTN  #HLD    - was 76% RA per EMS  - Weaned from Bipap to 1.5L NC, but increased to 6L HFNC today with audible wheezing  - Suspect multi-factorial respiratory failure with evidence of CXR and right sided pneumonia  - lactic 1.5 on admission, monitor  - though met sepsis criteria  (leukocytosis, Hypoxia, pneumonia) fluid bolus not given due to overload   - CT chest to evaluate  - SLP has cleared for diet  - wbc 21.4 > 16.98 > 16.47  - RVP negative, sputum  - MRSA swab negative  - Zosyn, monitor for toxicity, renally dosed  - blood cultures NTD  - echo on 8/28/24 shows EF 69.8% with severe aortic stenosis  - hold further diuresis per cardiology due to ckd  - proBNP 5160 on admission   - sodium 133 > 131 > 131, suspect 2/2 hypervolemia, monitor for response with diuresis  - 40mg IV lasix given on admission and on 11/12, will hold further  - intake and output  - weigh daily  - Cr 1.65 > 1.64, monitor closely  - replace k per protocol  - Hgb 9.2 > 8.2, no overt bleeding, monitor carefully    - resume home aspirin, statin, Celexa    - Started on Pulmicort BID and Solu-medrol 40mg IV q8h on 11/13 due to increased wheezing and oxygen use, has responded well and oxygen de-escalated per nursing    SCDs for DVT prophylaxis.  Limited code (no CPR, no intubation).  Discussed with patient and nursing staff.  Anticipate discharge home with HH vs SNU facility in 1-2 days.  Expected Discharge Date: 11/14/2024; Expected Discharge Time:       DO [facility]  11/13/24  15:30 EST

## 2024-11-13 NOTE — PLAN OF CARE
Goal Outcome Evaluation:  Plan of Care Reviewed With: patient        Progress: no change  Outcome Evaluation: AOx4. 2L NC. No c/o pain, soa, N/V. PRN tylenol given x1 dose for elevated temp. Oral recheck of 98.6. Urine sample sent. Dry, nonproductive cough. PRN tessalon pearls given x2 doses overnight. Potassium replaced following 3.3 recheck; AM labs show currently 3.7. Sodium remains at 131, hgb now 8.2, and WBC with slight improvement. Purewick/brief in place. No BM this shift. IV abx continued.

## 2024-11-13 NOTE — THERAPY DISCHARGE NOTE
Patient Name: Vonda Jay  : 1935    MRN: [id number]                              Today's Date: 2024       Admit Date: 2024    Visit Dx:     ICD-10-CM ICD-9-CM   1. Respiratory distress  R06.03 786.09   2. Acute congestive heart failure, unspecified heart failure type  I50.9 428.0     Patient Active Problem List   Diagnosis    Closed displaced intertrochanteric fracture of left femur    SANYA (acute kidney injury)    HTN (hypertension)    Acute blood loss anemia    Leukocytosis    Drug-induced constipation    Stage 3b chronic kidney disease    COVID-19 virus infection    Anemia due to chronic kidney disease    Acute on chronic diastolic CHF (congestive heart failure)    Aortic stenosis    Anemia of chronic renal failure, stage 3 (moderate)    E. coli UTI (urinary tract infection)    Colovesical fistula - possible    (HFpEF) heart failure with preserved ejection fraction    Obesity (BMI 30-39.9)    Hypokalemia    Acute UTI (urinary tract infection)    Cough    Nocturnal hypoxemia    Hypophosphatemia    Respiratory distress    CHF (congestive heart failure)    Diastolic CHF, chronic    Acute hypoxic respiratory failure    Sepsis due to pneumonia     Past Medical History:   Diagnosis Date    Arthritis     Chronic kidney disease     Heart murmur     History of breast cancer     History of carotid artery disease     Hyperlipidemia     Hypertension      Past Surgical History:   Procedure Laterality Date    BREAST LUMPECTOMY      CARDIAC CATHETERIZATION N/A 2024    Procedure: Right and Left Heart Cath;  Surgeon: Mike Rizvi MD;  Location:  [facility];  Service: Cardiology;  Laterality: N/A;    CARDIAC CATHETERIZATION N/A 2024    Procedure: Coronary angiography;  Surgeon: Mike Rizvi MD;  Location:  [facility];  Service: Cardiology;  Laterality: N/A;    CAROTID ARTERY ANGIOPLASTY  ,     CHOLECYSTECTOMY  1979    FEMUR IM  NAILING/RODDING Left 2/8/2018    Procedure: FEMUR INTRAMEDULLARY NAILING;  Surgeon: Zheng Fleming MD;  Location: [facility] OR;  Service:     HYSTERECTOMY  1989      General Information       Row Name 11/13/24 1444          Physical Therapy Time and Intention    Document Type discharge treatment  -CW     Mode of Treatment individual therapy;physical therapy  -CW       Row Name 11/13/24 1444          General Information    Patient Profile Reviewed yes  -CW     Existing Precautions/Restrictions fall  -CW       Row Name 11/13/24 1444          Cognition    Orientation Status (Cognition) oriented x 4  -CW       Row Name 11/13/24 1444          Safety Issues/Impairments Affecting Functional Mobility    Impairments Affecting Function (Mobility) balance;endurance/activity tolerance;strength  -CW               User Key  (r) = Recorded By, (t) = Taken By, (c) = Cosigned By      Initials Name Provider Type    Alexandria Tavarez PT Physical Therapist                   Mobility       Row Name 11/13/24 1445          Bed Mobility    Bed Mobility sit-supine  -CW     Sit-Supine [place] (Bed Mobility) modified independence  -CW     Assistive Device (Bed Mobility) head of bed elevated  -CW       Row Name 11/13/24 1445          Sit-Stand Transfer    Sit-Stand [place] (Transfers) supervision  -CW     Assistive Device (Sit-Stand Transfers) walker, front-wheeled  -CW       Row Name 11/13/24 1445          Gait/Stairs (Locomotion)    [place] Level (Gait) supervision  -CW     Assistive Device (Gait) walker, front-wheeled  -CW     Distance in Feet (Gait) 300  -CW               User Key  (r) = Recorded By, (t) = Taken By, (c) = Cosigned By      Initials Name Provider Type    Alexandria Tavarez PT Physical Therapist                   Obj/Interventions       Row Name 11/13/24 1446          Balance    Balance Assessment standing static balance;standing dynamic balance  -CW     Static Standing Balance supervision  -CW      Dynamic Standing Balance supervision  -CW     Position/Device Used, Standing Balance walker, front-wheeled;supported  -CW               User Key  (r) = Recorded By, (t) = Taken By, (c) = Cosigned By      Initials Name Provider Type    Alexandria Tavarez, PT Physical Therapist                   Goals/Plan    No documentation.                  Clinical Impression       Row Name 11/13/24 1446          Pain    Pretreatment Pain Rating 0/10 - no pain  -CW     Posttreatment Pain Rating 0/10 - no pain  -CW       Row Name 11/13/24 1446          Plan of Care Review    Plan of Care Reviewed With patient  -CW     Outcome Evaluation Pt participated with PT this PM. Pt up in chair at arrival and eager to mobilize. pt completed STS with supervision and ambulated 300' supervision with RW. Quick pace noted and no overt loss of balance. At end of session pt requested to reurn to bed, completed bed mobility with mod (I). Pt does require 5L O2 for mobility with sats at 93%. Encouraged pt to mobilize with [facility] staff up to chair and ambulate in hallway 3x/day. No further acute PT needs identified. PT signing off, RN notified.  -CW       Row Name 11/13/24 1446          Therapy Assessment/Plan (PT)    Criteria for Skilled Interventions Met (PT) no problems identified which require skilled intervention;no  -CW       [facility] Name 11/13/24 1446          Vital Signs    Pre SpO2 (%) 95  -CW     O2 Delivery Pre Treatment nasal cannula  5L  -CW     Intra SpO2 (%) 93  -CW     O2 Delivery Intra Treatment nasal cannula  -CW     O2 Delivery Post Treatment nasal cannula  5L  -CW       Row Name 11/13/24 1446          Positioning and Restraints    Pre-Treatment Position sitting in chair/recliner  -CW     Post Treatment Position bed  -CW     In Bed notified nsg;call light within reach;encouraged to call for assist;exit alarm on;supine  -CW               User Key  (r) = Recorded By, (t) = Taken By, (c) = Cosigned By      Initials Name Provider Type    CW  Alexandria Godfrey, PT Physical Therapist                   Outcome Measures       Row Name 11/13/24 1449 11/13/24 0810       How much help from another person do you currently need...    Turning from your back to your side while in flat bed without using bedrails? 4  -CW 4  -AL    Moving from lying on back to sitting on the side of a flat bed without bedrails? 4  -CW 4  -AL    Moving to and from a bed to a chair (including a wheelchair)? 4  -CW 3  -AL    Standing up from a chair using your arms (e.g., wheelchair, bedside chair)? 4  -CW 3  -AL    Climbing 3-5 steps with a railing? 3  -CW 2  -AL    To walk in hospital room? 4  -CW 3  -AL    AM-PAC 6 Clicks Score (PT) 23  -CW 19  -AL    Highest Level of Mobility Goal 7 --> Walk 25 feet or more  -CW 6 --> Walk 10 steps or more  -AL      Row Name 11/13/24 1449 11/13/24 1226       Functional Assessment    Outcome Measure Options AM-PAC 6 Clicks Basic Mobility (PT)  -CW AM-PAC 6 Clicks Daily Activity (OT)  -CE              User Key  (r) = Recorded By, (t) = Taken By, (c) = Cosigned By      Initials Name Provider Type    Brad Duron, RN Registered Nurse    Alexandria Tavarez, PT Physical Therapist    Maria Del Rosario Henry, OT Occupational Therapist                                 Physical Therapy Education       Title: PT OT SLP Therapies (In Progress)       Topic: Physical Therapy (In Progress)       Point: Mobility training (Done)       Learning Progress Summary            Patient Acceptance, E, VU by  at 11/12/2024 5227                      Point: Home exercise program (Not Started)       Learner Progress:  Not documented in this visit.              Point: Body mechanics (Not Started)       Learner Progress:  Not documented in this visit.              Point: Precautions (Not Started)       Learner Progress:  Not documented in this visit.                              User Key       Initials Effective Dates Name Provider Type Discipline     12/13/22 -  Epifanio  LIZZY Ibrahim Physical Therapist PT                  PT Recommendation and Plan  Planned Therapy Interventions (PT): balance training, bed mobility training, gait training, postural re-education, transfer training, ROM (range of motion), strengthening, patient/family education  Outcome Evaluation: Pt participated with PT this PM. Pt up in chair at arrival and eager to mobilize. pt completed STS with supervision and ambulated 300' supervision with RW. Quick pace noted and no overt loss of balance. At end of session pt requested to reurn to bed, completed bed mobility with mod (I). Pt does require 5L O2 for mobility with sats at 93%. Encouraged pt to mobilize with [facility] staff up to chair and ambulate in hallway 3x/day. No further acute PT needs identified. PT signing off, RN notified.     Time Calculation:         PT Charges       Row Name 11/13/24 1444             Time Calculation    Start Time 1416  -CW      Stop Time 1435  -CW      Time Calculation (min) 19 min  -CW      PT Received On 11/13/24  -CW                User Key  (r) = Recorded By, (t) = Taken By, (c) = Cosigned By      Initials Name Provider Type    CW Alexandria Godfrey, LIZZY Physical Therapist                  Therapy Charges for Today       Code Description Service Date Service Provider Modifiers Qty    80597247130 HC PT THERAPEUTIC ACT EA 15 MIN 11/12/2024 Alexandria Godfrey, PT GP 1    00825116017 HC PT EVAL MOD COMPLEXITY 2 11/12/2024 Alexandria Godfrey, PT GP 1    88473975327 HC PT THERAPEUTIC ACT EA 15 MIN 11/13/2024 Alexandria Godfrey, PT GP 1            PT G-Codes  Outcome Measure Options: AM-PAC 6 Clicks Basic Mobility (PT)  AM-PAC 6 Clicks Score (PT): 23  AM-PAC 6 Clicks Score (OT): 21  PT Discharge Summary  Anticipated Discharge Disposition (PT): home    Alexandria Godfrey PT  11/13/2024

## 2024-11-13 NOTE — PLAN OF CARE
Goal Outcome Evaluation:  Plan of Care Reviewed With: patient           Outcome Evaluation: Pt participated with PT this PM. Pt up in chair at arrival and eager to mobilize. pt completed STS with supervision and ambulated 300' supervision with RW. Quick pace noted and no overt loss of balance. At end of session pt requested to reurn to bed, completed bed mobility with mod (I). Pt does require 5L O2 for mobility with sats at 93%. Encouraged pt to mobilize with nsg staff up to chair and ambulate in hallway 3x/day. No further acute PT needs identified. PT signing off, RN notified.    Anticipated Discharge Disposition (PT): home

## 2024-11-13 NOTE — PLAN OF CARE
Goal Outcome Evaluation:           Progress: improving  Outcome Evaluation: VSS. Pt on 2L NC- sats drop with exertion and when Pt sleeps on room air. 1 x assist with a walker. Pt receiving IV zosyn for pneumonia. Pt educated on sitting up when eating meals and taking medications. No c/o pain. Will discahrge home with family when ready.

## 2024-11-13 NOTE — PROGRESS NOTES
Hospital Follow Up    LOS: 1  Patient Name: Vonda Jay  Age/Sex: 89 y.o. female  : 1935  MRN: [id number]    Day of Service: 24   Length of Stay: 1  Encounter Provider: Adam Singleton MD  Place of Service: [facility] CARDIOLOGY  Patient Care Team:  Ambrose Ashley MD as PCP - General (Family Medicine)    Subjective:     Chief Complaint: Shortness of breath    Interval History: Patient is much better but still quite ill.  Patient is still short of breath just not as terrible as it was.    Objective:     Objective:  Temp:  [98.6 °F (37 °C)-100.2 °F (37.9 °C)] 99.1 °F (37.3 °C)  Heart Rate:  [74-83] 74  Resp:  [16-24] 20  BP: (122-153)/(43-58) 122/57     Intake/Output Summary (Last 24 hours) at 2024 1105  Last data filed at 2024 0735  Gross per 24 hour   Intake 520 ml   Output 450 ml   Net 70 ml     Body mass index is 30.03 kg/m².      24  0721 24  0634 24  0623   Weight: 72.3 kg (159 lb 6.3 oz) 79.2 kg (174 lb 9.7 oz) 72.1 kg (158 lb 15.2 oz)     Weight change: -0.2 kg (-7.1 oz)      Physical Exam:   General :  Alert, cooperative, in no acute distress.  Neuro:   Alert,cooperative and oriented.  Lungs:  CTAB. Normal respiratory effort and rate.  CV:  Regular rate and rhythm, normal S1 and S2, 4/6 murmurs, gallops or rubs.   ABD:  Soft, nontender, nondistended. Positive bowel sounds.  Extr:  No edema or cyanosis, moves all extremities.    Lab Review:   Results from last 7 days   Lab Units 24  0255 24  1346 24  0334 24  0505   SODIUM mmol/L 131*  --   --  131* 133*   POTASSIUM mmol/L 3.7 3.3*   < > 3.3* 3.9   CHLORIDE mmol/L 99  --   --  98 97*   CO2 mmol/L 21.0*  --   --  22.0 22.0   BUN mg/dL 28*  --   --  34* 30*   CREATININE mg/dL 1.64*  --   --  1.65* 1.46*   GLUCOSE mg/dL 115*  --   --  106* 166*   CALCIUM mg/dL 7.6*  --   --  8.0* 9.0   AST (SGOT) U/L  --   --   --   --  29   ALT  "(SGPT) U/L  --   --   --   --  23    < > = values in this interval not displayed.     Results from last 7 days   Lab Units 11/11/24  0505   HSTROP T ng/L 46*     Results from last 7 days   Lab Units 11/13/24  0255 11/12/24  0334   WBC 10*3/mm3 16.47* 16.98*   HEMOGLOBIN g/dL 8.2* 9.2*   HEMATOCRIT % 25.0* 27.7*   PLATELETS 10*3/mm3 269 243     Results from last 7 days   Lab Units 11/11/24  0505   INR  1.08     Results from last 7 days   Lab Units 11/11/24  0505   MAGNESIUM mg/dL 2.3           Invalid input(s): \"LDLCALC\"  Results from last 7 days   Lab Units 11/11/24  0505   PROBNP pg/mL 5,160.0*           Current Medications:   Scheduled Meds:amLODIPine, 5 mg, Oral, Q24H  aspirin, 81 mg, Oral, Daily  citalopram, 10 mg, Oral, Daily  furosemide, 40 mg, Oral, Daily  ipratropium-albuterol, 3 mL, Nebulization, 4x Daily - RT  lactobacillus acidophilus, 1 capsule, Oral, Daily  mupirocin, 1 Application, Each Nare, BID  piperacillin-tazobactam, 3.375 g, Intravenous, Q8H  pravastatin, 40 mg, Oral, Nightly  senna-docusate sodium, 2 tablet, Oral, BID      Continuous Infusions:     Allergies:  No Known Allergies    Assessment:       Respiratory distress    HTN (hypertension)    Stage 3b chronic kidney disease    Anemia due to chronic kidney disease    Hypokalemia    Diastolic CHF, chronic    Acute hypoxic respiratory failure    Sepsis due to pneumonia        Plan:    Severe aortic stenosis.  Patient obviously needs a TAVR however under current scenario/clinical setting it needs to be delayed until patient is not having active infection.  Dyspnea multifactorial.  Patient getting antibiotics for pneumonia.  Sepsis  Diastolic dysfunction.  Patient's creatinine is elevated so would not give any further IV Lasix.  Unfortunately infection is overriding any issue from a cardiovascular standpoint.  Continue the same for now we will follow creatinine hopefully antibiotics continue to improve patient's pulmonary status.        Adam MAK" MD Mani  11/13/24  11:05 EST

## 2024-11-13 NOTE — PLAN OF CARE
Goal Outcome Evaluation:  Plan of Care Reviewed With: patient        Progress: improving  Outcome Evaluation: Pt seen for OT tx focusing on functional endurance and ADLs standing level. Pt initially on RA and SpO2 94% at rest. Pt with poor pleth reading when standing at sink but appeared SpO2 dropped to 88% but did recover with cues and standing rest. RN aware and to place pt back on 2Ls post session. Pt able to complete LBD tasks seated EOB with SBA as well. Will con't to follow for stated goals and recommend d/c to home with HHOT.    Anticipated Discharge Disposition (OT): home with assist, home with home health

## 2024-11-14 LAB
ANION GAP SERPL CALCULATED.3IONS-SCNC: 16 MMOL/L (ref 5–15)
BASOPHILS # BLD AUTO: 0.01 10*3/MM3 (ref 0–0.2)
BASOPHILS NFR BLD AUTO: 0.1 % (ref 0–1.5)
BUN SERPL-MCNC: 28 MG/DL (ref 8–23)
BUN/CREAT SERPL: 24.1 (ref 7–25)
CALCIUM SPEC-SCNC: 8.1 MG/DL (ref 8.6–10.5)
CHLORIDE SERPL-SCNC: 98 MMOL/L (ref 98–107)
CO2 SERPL-SCNC: 19 MMOL/L (ref 22–29)
CREAT SERPL-MCNC: 1.16 MG/DL (ref 0.57–1)
DEPRECATED RDW RBC AUTO: 41.2 FL (ref 37–54)
EGFRCR SERPLBLD CKD-EPI 2021: 45.2 ML/MIN/1.73
EOSINOPHIL # BLD AUTO: 0 10*3/MM3 (ref 0–0.4)
EOSINOPHIL NFR BLD AUTO: 0 % (ref 0.3–6.2)
ERYTHROCYTE [DISTWIDTH] IN BLOOD BY AUTOMATED COUNT: 12.7 % (ref 12.3–15.4)
GLUCOSE SERPL-MCNC: 159 MG/DL (ref 65–99)
HCT VFR BLD AUTO: 27.3 % (ref 34–46.6)
HGB BLD-MCNC: 9.1 G/DL (ref 12–15.9)
IMM GRANULOCYTES # BLD AUTO: 0.28 10*3/MM3 (ref 0–0.05)
IMM GRANULOCYTES NFR BLD AUTO: 1.8 % (ref 0–0.5)
LYMPHOCYTES # BLD AUTO: 0.53 10*3/MM3 (ref 0.7–3.1)
LYMPHOCYTES NFR BLD AUTO: 3.5 % (ref 19.6–45.3)
MCH RBC QN AUTO: 29.7 PG (ref 26.6–33)
MCHC RBC AUTO-ENTMCNC: 33.3 G/DL (ref 31.5–35.7)
MCV RBC AUTO: 89.2 FL (ref 79–97)
MONOCYTES # BLD AUTO: 0.3 10*3/MM3 (ref 0.1–0.9)
MONOCYTES NFR BLD AUTO: 2 % (ref 5–12)
NEUTROPHILS NFR BLD AUTO: 14.06 10*3/MM3 (ref 1.7–7)
NEUTROPHILS NFR BLD AUTO: 92.6 % (ref 42.7–76)
NRBC BLD AUTO-RTO: 0 /100 WBC (ref 0–0.2)
PLATELET # BLD AUTO: 286 10*3/MM3 (ref 140–450)
PMV BLD AUTO: 10.4 FL (ref 6–12)
POTASSIUM SERPL-SCNC: 3.7 MMOL/L (ref 3.5–5.2)
RBC # BLD AUTO: 3.06 10*6/MM3 (ref 3.77–5.28)
SODIUM SERPL-SCNC: 133 MMOL/L (ref 136–145)
WBC NRBC COR # BLD AUTO: 15.18 10*3/MM3 (ref 3.4–10.8)

## 2024-11-14 PROCEDURE — 85025 COMPLETE CBC W/AUTO DIFF WBC: CPT

## 2024-11-14 PROCEDURE — 36415 COLL VENOUS BLD VENIPUNCTURE: CPT

## 2024-11-14 PROCEDURE — 25010000002 METHYLPREDNISOLONE PER 40 MG: Performed by: STUDENT IN AN ORGANIZED HEALTH CARE EDUCATION/TRAINING PROGRAM

## 2024-11-14 PROCEDURE — 94664 DEMO&/EVAL PT USE INHALER: CPT

## 2024-11-14 PROCEDURE — 94761 N-INVAS EAR/PLS OXIMETRY MLT: CPT

## 2024-11-14 PROCEDURE — 94799 UNLISTED PULMONARY SVC/PX: CPT

## 2024-11-14 PROCEDURE — 25010000002 PIPERACILLIN SOD-TAZOBACTAM PER 1 G: Performed by: INTERNAL MEDICINE

## 2024-11-14 PROCEDURE — 63710000001 PREDNISONE PER 1 MG: Performed by: STUDENT IN AN ORGANIZED HEALTH CARE EDUCATION/TRAINING PROGRAM

## 2024-11-14 PROCEDURE — 80048 BASIC METABOLIC PNL TOTAL CA: CPT

## 2024-11-14 PROCEDURE — 99232 SBSQ HOSP IP/OBS MODERATE 35: CPT | Performed by: NURSE PRACTITIONER

## 2024-11-14 RX ORDER — PREDNISONE 20 MG/1
40 TABLET ORAL
Status: DISCONTINUED | OUTPATIENT
Start: 2024-11-14 | End: 2024-11-15 | Stop reason: HOSPADM

## 2024-11-14 RX ORDER — GUAIFENESIN AND DEXTROMETHORPHAN HYDROBROMIDE 600; 30 MG/1; MG/1
2 TABLET, EXTENDED RELEASE ORAL 2 TIMES DAILY
Status: DISCONTINUED | OUTPATIENT
Start: 2024-11-14 | End: 2024-11-15 | Stop reason: HOSPADM

## 2024-11-14 RX ORDER — HYDROCODONE BITARTRATE AND HOMATROPINE METHYLBROMIDE ORAL SOLUTION 5; 1.5 MG/5ML; MG/5ML
5 LIQUID ORAL EVERY 4 HOURS PRN
Status: DISCONTINUED | OUTPATIENT
Start: 2024-11-14 | End: 2024-11-15 | Stop reason: HOSPADM

## 2024-11-14 RX ADMIN — Medication 1 CAPSULE: at 09:15

## 2024-11-14 RX ADMIN — BENZONATATE 200 MG: 100 CAPSULE ORAL at 00:53

## 2024-11-14 RX ADMIN — BENZONATATE 200 MG: 100 CAPSULE ORAL at 09:15

## 2024-11-14 RX ADMIN — IPRATROPIUM BROMIDE AND ALBUTEROL SULFATE 3 ML: 2.5; .5 SOLUTION RESPIRATORY (INHALATION) at 20:07

## 2024-11-14 RX ADMIN — MUPIROCIN 1 APPLICATION: 20 OINTMENT TOPICAL at 09:15

## 2024-11-14 RX ADMIN — PIPERACILLIN AND TAZOBACTAM 3.38 G: 3; .375 INJECTION, POWDER, FOR SOLUTION INTRAVENOUS at 14:52

## 2024-11-14 RX ADMIN — CITALOPRAM 10 MG: 20 TABLET, FILM COATED ORAL at 09:14

## 2024-11-14 RX ADMIN — MUPIROCIN 1 APPLICATION: 20 OINTMENT TOPICAL at 20:59

## 2024-11-14 RX ADMIN — IPRATROPIUM BROMIDE AND ALBUTEROL SULFATE 3 ML: 2.5; .5 SOLUTION RESPIRATORY (INHALATION) at 07:21

## 2024-11-14 RX ADMIN — METHYLPREDNISOLONE SODIUM SUCCINATE 40 MG: 40 INJECTION, POWDER, FOR SOLUTION INTRAMUSCULAR; INTRAVENOUS at 04:06

## 2024-11-14 RX ADMIN — PIPERACILLIN AND TAZOBACTAM 3.38 G: 3; .375 INJECTION, POWDER, FOR SOLUTION INTRAVENOUS at 22:28

## 2024-11-14 RX ADMIN — BUDESONIDE 0.5 MG: 0.5 INHALANT ORAL at 07:21

## 2024-11-14 RX ADMIN — HYDROCODONE BITARTRATE AND HOMATROPINE METHYLBROMIDE 5 ML: 5; 1.5 SOLUTION ORAL at 20:59

## 2024-11-14 RX ADMIN — GUAIFENESIN AND DEXTROMETHORPHAN HYDROBROMIDE 2 TABLET: 600; 30 TABLET, EXTENDED RELEASE ORAL at 12:06

## 2024-11-14 RX ADMIN — PIPERACILLIN AND TAZOBACTAM 3.38 G: 3; .375 INJECTION, POWDER, FOR SOLUTION INTRAVENOUS at 06:21

## 2024-11-14 RX ADMIN — GUAIFENESIN AND DEXTROMETHORPHAN HYDROBROMIDE 2 TABLET: 600; 30 TABLET, EXTENDED RELEASE ORAL at 20:59

## 2024-11-14 RX ADMIN — HYDROCODONE BITARTRATE AND HOMATROPINE METHYLBROMIDE 5 ML: 5; 1.5 SOLUTION ORAL at 12:05

## 2024-11-14 RX ADMIN — BUDESONIDE 0.5 MG: 0.5 INHALANT ORAL at 20:07

## 2024-11-14 RX ADMIN — ASPIRIN 81 MG: 81 TABLET, COATED ORAL at 09:14

## 2024-11-14 RX ADMIN — PRAVASTATIN SODIUM 40 MG: 40 TABLET ORAL at 20:59

## 2024-11-14 RX ADMIN — IPRATROPIUM BROMIDE AND ALBUTEROL SULFATE 3 ML: 2.5; .5 SOLUTION RESPIRATORY (INHALATION) at 11:23

## 2024-11-14 RX ADMIN — AMLODIPINE BESYLATE 5 MG: 5 TABLET ORAL at 09:15

## 2024-11-14 RX ADMIN — BENZONATATE 200 MG: 100 CAPSULE ORAL at 18:11

## 2024-11-14 RX ADMIN — Medication 2.5 MG: at 00:53

## 2024-11-14 RX ADMIN — IPRATROPIUM BROMIDE AND ALBUTEROL SULFATE 3 ML: 2.5; .5 SOLUTION RESPIRATORY (INHALATION) at 14:43

## 2024-11-14 RX ADMIN — PREDNISONE 40 MG: 20 TABLET ORAL at 12:11

## 2024-11-14 RX ADMIN — FUROSEMIDE 40 MG: 40 TABLET ORAL at 09:15

## 2024-11-14 RX ADMIN — Medication 2.5 MG: at 22:44

## 2024-11-14 RX ADMIN — SENNOSIDES AND DOCUSATE SODIUM 2 TABLET: 50; 8.6 TABLET ORAL at 09:15

## 2024-11-14 NOTE — PROGRESS NOTES
Name: Vonda Jay ADMIT: 2024   : 1935  PCP: Ambrose Ashley MD    MRN: [id number] LOS: 2 days   AGE/SEX: 89 y.o. female  ROOM: [facility]     Subjective   Subjective   2024  Patient with audible wheezing this morning and oxygen use has increased to 6L NC    2024  Patient seen and examined at bedside.  Wheezing improved and currently on 2L NC.       Objective   Objective   Vital Signs  Temp:  [97.7 °F (36.5 °C)-98.2 °F (36.8 °C)] 97.7 °F (36.5 °C)  Heart Rate:  [75-89] 75  Resp:  [18-22] 18  BP: (131-147)/(49-64) 147/59  SpO2:  [97 %-100 %] 97 %  on  Flow (L/min) (Oxygen Therapy):  [2-5] 2;   Device (Oxygen Therapy): humidified;nasal cannula  Body mass index is 30.03 kg/m².  Physical Exam  Constitutional:       General: She is not in acute distress.     Appearance: She is obese. She is not toxic-appearing.   HENT:      Head: Normocephalic and atraumatic.      Nose: Nose normal. No congestion.      Mouth/Throat:      Pharynx: Oropharynx is clear. No oropharyngeal exudate.   Eyes:      General: No scleral icterus.  Cardiovascular:      Rate and Rhythm: Normal rate and regular rhythm.      Heart sounds: No murmur heard.     No friction rub. No gallop.   Pulmonary:      Breath sounds: Rales present.      Comments: Patient on 2L NC  Wheezing much improved  Abdominal:      General: There is no distension.      Tenderness: There is no abdominal tenderness. There is no guarding.   Musculoskeletal:         General: No swelling or deformity.      Cervical back: Normal range of motion. No rigidity.      Right lower leg: No edema.      Left lower leg: No edema.   Skin:     Coloration: Skin is not jaundiced.      Findings: No bruising or lesion.   Neurological:      General: No focal deficit present.      Mental Status: She is alert and oriented to person, place, and time.      Motor: Weakness present.       Results Review     I reviewed the patient's new clinical results.  Results from last 7  days   Lab Units 11/14/24 0346 11/13/24 0255 11/12/24  0334 11/11/24  0505   WBC 10*3/mm3 15.18* 16.47* 16.98* 21.40*   HEMOGLOBIN g/dL 9.1* 8.2* 9.2* 11.3*   PLATELETS 10*3/mm3 286 269 243 299     Results from last 7 days   Lab Units 11/14/24 0346 11/13/24  0255 11/12/24 2003 11/12/24  1346 11/12/24 0334 11/11/24  0505   SODIUM mmol/L 133* 131*  --   --  131* 133*   POTASSIUM mmol/L 3.7 3.7 3.3* 3.2* 3.3* 3.9   CHLORIDE mmol/L 98 99  --   --  98 97*   CO2 mmol/L 19.0* 21.0*  --   --  22.0 22.0   BUN mg/dL 28* 28*  --   --  34* 30*   CREATININE mg/dL 1.16* 1.64*  --   --  1.65* 1.46*   GLUCOSE mg/dL 159* 115*  --   --  106* 166*   EGFR mL/min/1.73 45.2* 29.8*  --   --  29.6* 34.3*     Results from last 7 days   Lab Units 11/11/24  0505   ALBUMIN g/dL 4.1   BILIRUBIN mg/dL 0.3   ALK PHOS U/L 95   AST (SGOT) U/L 29   ALT (SGPT) U/L 23     Results from last 7 days   Lab Units 11/14/24 0346 11/13/24 0255 11/12/24 0334 11/11/24  0505   CALCIUM mg/dL 8.1* 7.6* 8.0* 9.0   ALBUMIN g/dL  --   --   --  4.1   MAGNESIUM mg/dL  --   --   --  2.3   PHOSPHORUS mg/dL  --   --   --  3.0     Results from last 7 days   Lab Units 11/11/24  0603 11/11/24  0505   PROCALCITONIN ng/mL  --  1.06*   LACTATE mmol/L 1.5  --      Hemoglobin A1C   Date/Time Value Ref Range Status   11/12/2024 0334 5.30 4.80 - 5.60 % Final     Glucose   Date/Time Value Ref Range Status   11/11/2024 1645 130 70 - 130 mg/dL Final       XR Chest 1 View    Result Date: 11/13/2024  1. Nodular opacity in the right upper to mid hemithorax appears similar to yesterday. 2. With additional patchy atelectasis, infiltrate or asymmetric edema in the right base also similar to yesterday's study.   This report was finalized on 11/13/2024 11:00 AM by Dr. Wilberto Alfonso M.D on Workstation: [id number]       I have personally reviewed all medications:  Scheduled Medications  amLODIPine, 5 mg, Oral, Q24H  aspirin, 81 mg, Oral, Daily  budesonide, 0.5 mg, Nebulization,  BID - RT  citalopram, 10 mg, Oral, Daily  furosemide, 40 mg, Oral, Daily  guaifenesin-dextromethorphan 600-30 mg, 2 tablet, Oral, BID  ipratropium-albuterol, 3 mL, Nebulization, 4x Daily - RT  lactobacillus acidophilus, 1 capsule, Oral, Daily  mupirocin, 1 Application, Each Nare, BID  piperacillin-tazobactam, 3.375 g, Intravenous, Q8H  pravastatin, 40 mg, Oral, Nightly  predniSONE, 40 mg, Oral, Daily With Breakfast  senna-docusate sodium, 2 tablet, Oral, BID    Infusions   Diet  Diet: Cardiac; Healthy Heart (2-3 Na+); Fluid Consistency: Thin (IDDSI 0)    I have personally reviewed:  [x]  Laboratory   [x]  Microbiology   [x]  Radiology   [x]  EKG/Telemetry  [x]  Cardiology/Vascular   []  Pathology    []  Records       Assessment/Plan     Active Hospital Problems    Diagnosis  POA    **Respiratory distress [R06.03]  Yes    Diastolic CHF, chronic [I50.32]  Unknown    Acute hypoxic respiratory failure [J96.01]  Unknown    Sepsis due to pneumonia [J18.9, A41.9]  Unknown    Hypokalemia [E87.6]  Yes    Anemia due to chronic kidney disease [N18.9, D63.1]  Yes    Stage 3b chronic kidney disease [N18.32]  Yes    HTN (hypertension) [I10]  Yes      Resolved Hospital Problems   No resolved problems to display.       89 y.o. female admitted with Respiratory distress.    #Acute Hypoxic Respiratory Failure  #Sepsis  #Pneumonia    - met sepsis criteria from hypoxia, leukocytosis, pneumonia    - fluid bolus not given as bp stable and to prevent fluid overload    - was 76% RA per EMS on admission     - Weaned from Bipap to 1.5L NC currently on 2L NC    -CT chest shows dense right lower pneumonia    - Zosyn, monitor for toxicity    - blood cultures NTD     - RVP negative    - strep, legionella, sputum    - pulm following    - increased to 6L on 11/13 but down to 2L NC on 11/14, wean as tolerated    - responded to pulmicort and IV steroids when has been de-escalated to PO prednisone    - SLP has cleared for diet    - wbc 21.4 > 16.98 >  16.47      #Acute on Chronic HFpEF  - proBNP 5160 on admission   - echo on 8/28/24 shows EF 69.8% with severe aortic stenosis  - transitioned to Lasix 40mg PO daily  - cardiology has cleared    #Aortic Stenosis    - patient to follow up with cardiology as otpt once infection clears to consider TAVR    #Hyponatremia    - sodium 133 > 131 > 131 > 133,     -suspect 2/2 hypervolemia, monitor for response with diuresis    #Hypokalemia    - replace per protocol    #SANYA on CKD3a    - Cr 1.64 > 1.16    - suspect 2/2 sepsis, possibly due to IV diuretics    #Anemia    - hgb 9.1, trending up, stable    - no overt bleeding, monitor    #HTN    - Norvasc daily    #HLD    - statin        SCDs for DVT prophylaxis.  Limited code (no CPR, no intubation).  Discussed with patient and nursing staff.  Anticipate discharge home with HH vs SNU facility in 1-2 days.  Expected Discharge Date: 11/15/2024; Expected Discharge Time:       Marvin Corey DO  [facility]  11/14/24  11:52 EST

## 2024-11-14 NOTE — PLAN OF CARE
Pt. VS WNL. No c/o pain. Pt. A&O x4. Pt. Up with assist x1, adequate UOP. Pt. Ambulated around rios and up to chair this afternoon. Pt. Working with PT/OT. Pt. Receiving IV abx. Pt. Switched to oral steroids. Pt. Encouraged to use IS. Pt. Weaned down to 2L NC. Pt. With SCDs. Pt. Resting comfortably at present, will continue to monitor closely for the remainder of this RN's shift.      Problem: Adult Inpatient Plan of Care  Goal: Plan of Care Review  Outcome: Progressing  Flowsheets (Taken 11/14/2024 5162)  Progress: improving  Plan of Care Reviewed With: patient

## 2024-11-14 NOTE — PROGRESS NOTES
[facility]      Mar/chart reviewed  Follow up AhRF, fever in patient with severe Aortic stenosis  Some cough, she says cough is severe    Vital Sign Min/Max for last 24 hours  Temp  Min: 97.9 °F (36.6 °C)  Max: 99.1 °F (37.3 °C)   BP  Min: 122/57  Max: 143/49   Pulse  Min: 74  Max: 89   Resp  Min: 20  Max: 22   SpO2  Min: 96 %  Max: 100 %   Flow (L/min) (Oxygen Therapy)  Min: 2  Max: 5   No data recorded     Nad, axox3,   perrl, eomi, normal sclera,  mmm, no jvd, trachea midline, neck supple,  chest fair bilaterally, + crackles, no wheezes,   rrr, +faith  soft, nt, nd +bs,  no c/c/ trace edema  Skin warm, dry no rashes    Labs: 11/14: reviewed:  Glucose 159  Bun 28  Cr 1.16  Na 133  Bicarb 19  Wbc 15  Hgb 9.1  Plts 286    Ct chest: 11/13: reviewed images -- there is fluid located in fissue midlung, there is pneumonia on the right lower lobe, no mass present in that area on ct abd/pelvis from march 2024    A/P:  AHRF - better, wean oxygen as able suspect large part of AHRF was pulmonary edema and pneumonia --suspect she can actually be weaned off oxygen at this point, recorded sats high 90's  Sepsis -- due to  pneumonia,  continue antibiotics.   Severe Aortic stenosis -- cards following  CKDIII  Hyponatremia  Anemia  N/v/diarrhea - better, gi panel neg  Fluid in fissure -- no intervention needed  Abnormal ct chest --- suspect this is just pneumonia but would recommend repeat ct chest in 6 weeks given appearance on ct chest    Patient improving, on less oxygen, temp curve down, wbc down  Continue antibiotics, its a pretty dense pneumonia, its going to take a while for all her symptoms to resolve.     Will trial different cough suppressants for the cough, not sure why she is on solumedrol, defer to ordering provider

## 2024-11-14 NOTE — PLAN OF CARE
Goal Outcome Evaluation:  Plan of Care Reviewed With: patient           Outcome Evaluation: SOA on exertion. Remains on 2L NC. Frequent nonproductive cough. Tessalon perles given prn.  Abx therapy. Small loose stool. CT chest completed. VSS

## 2024-11-14 NOTE — PROGRESS NOTES
"[facility]    Patient Name: Vonda Jay  :1935  89 y.o.  LOS: 2  Encounter Provider: ELVIE Archibald      Patient Care Team:  Ambrose Ashley MD as PCP - General (Family Medicine)    Chief Complaint: Follow-up dyspnea    Interval History: Dyspnea is gradually improving.       Objective   Vital Signs  Temp:  [97.9 °F (36.6 °C)-98.2 °F (36.8 °C)] 97.9 °F (36.6 °C)  Heart Rate:  [75-89] 83  Resp:  [20-22] 20  BP: (131-143)/(49-64) 132/64    Intake/Output Summary (Last 24 hours) at 2024 0816  Last data filed at 2024 0500  Gross per 24 hour   Intake 1360 ml   Output 1250 ml   Net 110 ml     Flowsheet Rows      Flowsheet Row First Filed Value   Admission Height 154.9 cm (61\") Documented at 2024 0721   Admission Weight 72.3 kg (159 lb 6.3 oz) Documented at 2024 0721              Vitals and nursing note reviewed.   Constitutional:       Appearance: Normal appearance. Well-developed.   Eyes:      Conjunctiva/sclera: Conjunctivae normal.   Pulmonary:      Breath sounds: Rhonchi present.   Cardiovascular:      Normal rate. Regular rhythm. Normal S1 with normal intensity. Normal S2 with normal intensity.       Murmurs: There is a grade 5/6 systolic murmur.      No gallop.  No click. No rub.   Edema:     Peripheral edema absent.   Musculoskeletal: Normal range of motion. Skin:     General: Skin is warm and dry.   Neurological:      Mental Status: Alert and oriented to person, place, and time.      GCS: GCS eye subscore is 4. GCS verbal subscore is 5. GCS motor subscore is 6.   Psychiatric:         Speech: Speech normal.         Behavior: Behavior normal.         Thought Content: Thought content normal.         Judgment: Judgment normal.           Pertinent Test Results:  Results from last 7 days   Lab Units 24  0346 24  0255 24  1346 24  0334 24  0505   SODIUM mmol/L 133* 131*  --   --  131* 133*   POTASSIUM " "mmol/L 3.7 3.7 3.3* 3.2* 3.3* 3.9   CHLORIDE mmol/L 98 99  --   --  98 97*   CO2 mmol/L 19.0* 21.0*  --   --  22.0 22.0   BUN mg/dL 28* 28*  --   --  34* 30*   CREATININE mg/dL 1.16* 1.64*  --   --  1.65* 1.46*   GLUCOSE mg/dL 159* 115*  --   --  106* 166*   CALCIUM mg/dL 8.1* 7.6*  --   --  8.0* 9.0   AST (SGOT) U/L  --   --   --   --   --  29   ALT (SGPT) U/L  --   --   --   --   --  23     Results from last 7 days   Lab Units 11/11/24  0505   HSTROP T ng/L 46*     Results from last 7 days   Lab Units 11/14/24  0346 11/13/24  0255 11/12/24  0334 11/11/24  0505 11/08/24  1148 11/08/24  1142 11/08/24  1138   WBC 10*3/mm3 15.18* 16.47* 16.98* 21.40*  --   --   --    HEMOGLOBIN g/dL 9.1* 8.2* 9.2* 11.3*  --   --   --    HEMOGLOBIN, POC g/dL  --   --   --   --  10.9* 10.9* 10.5*   HEMATOCRIT % 27.3* 25.0* 27.7* 33.7*  --   --   --    HEMATOCRIT POC %  --   --   --   --  32* 32* 31*   PLATELETS 10*3/mm3 286 269 243 299  --   --   --      Results from last 7 days   Lab Units 11/11/24  0505   INR  1.08     Results from last 7 days   Lab Units 11/11/24  0505   MAGNESIUM mg/dL 2.3           Invalid input(s): \"LDLCALC\"  Results from last 7 days   Lab Units 11/11/24  0505   PROBNP pg/mL 5,160.0*               Medication Review:   amLODIPine, 5 mg, Oral, Q24H  aspirin, 81 mg, Oral, Daily  budesonide, 0.5 mg, Nebulization, BID - RT  citalopram, 10 mg, Oral, Daily  furosemide, 40 mg, Oral, Daily  ipratropium-albuterol, 3 mL, Nebulization, 4x Daily - RT  lactobacillus acidophilus, 1 capsule, Oral, Daily  methylPREDNISolone sodium succinate, 40 mg, Intravenous, Q8H  mupirocin, 1 Application, Each Nare, BID  piperacillin-tazobactam, 3.375 g, Intravenous, Q8H  pravastatin, 40 mg, Oral, Nightly  senna-docusate sodium, 2 tablet, Oral, BID              Assessment & Plan     Active Hospital Problems    Diagnosis  POA    **Respiratory distress [R06.03]  Yes    Diastolic CHF, chronic [I50.32]  Unknown    Acute hypoxic respiratory failure " [J96.01]  Unknown    Sepsis due to pneumonia [J18.9, A41.9]  Unknown    Hypokalemia [E87.6]  Yes    Anemia due to chronic kidney disease [N18.9, D63.1]  Yes    Stage 3b chronic kidney disease [N18.32]  Yes    HTN (hypertension) [I10]  Yes      Resolved Hospital Problems   No resolved problems to display.        Severe aortic valve stenosis  Patient currently in the process of being worked up for TAVR.  She has an outpatient appointment with CT surgery that she will now miss this secondary to being hospitalized  Patient now with dyspnea that is likely related to severe aortic valve stenosis  Maximum pressure gradient 97 mmHg, mean pressure gradient 61 mmHg  Shady has been discussed case with Dr. Rizvi, obviously with active infection patient cannot have TAVR this admission.    Dyspnea  Likely secondary to #1 & #4  HFpEF  Echocardiogram 8/24 reveals LVEF of 69% with severe aortic valve stenosis  Chest x-ray in ED revealed pulmonary vascular congestion.  Patient received IV diuretics in the emergency department  Diuretics on hold secondary to creatinine.   Monitor creatinine  Fever/infectious process/leukocytosis  Patient has experienced upper respiratory symptoms as well as GI symptoms  Respiratory pathogen panel negative  WBC as high as 21.4, down to 15.18  On broad-spectrum antibiotics per internal medicine team  HTN  BP acceptable    No change to regimen, Continue to monitor.  Ok for discharge from cardiology standpoint.            ELVIE Archibald  [facility]   [facility]  [address] [place] [address]  Office: [phone]    11/14/24  08:16 EST

## 2024-11-15 ENCOUNTER — READMISSION MANAGEMENT (OUTPATIENT)
Dept: CALL CENTER | Facility: HOSPITAL | Age: 89
End: 2024-11-15
Payer: MEDICARE

## 2024-11-15 ENCOUNTER — HOME HEALTH ADMISSION (OUTPATIENT)
Dept: HOME HEALTH SERVICES | Facility: HOME HEALTHCARE | Age: 89
End: 2024-11-15
Payer: MEDICARE

## 2024-11-15 ENCOUNTER — DOCUMENTATION (OUTPATIENT)
Dept: HOME HEALTH SERVICES | Facility: HOME HEALTHCARE | Age: 89
End: 2024-11-15
Payer: MEDICARE

## 2024-11-15 VITALS
TEMPERATURE: 97.9 F | HEIGHT: 61 IN | DIASTOLIC BLOOD PRESSURE: 57 MMHG | WEIGHT: 154.1 LBS | RESPIRATION RATE: 18 BRPM | SYSTOLIC BLOOD PRESSURE: 150 MMHG | OXYGEN SATURATION: 100 % | HEART RATE: 71 BPM | BODY MASS INDEX: 29.09 KG/M2

## 2024-11-15 PROBLEM — J18.9 PNEUMONIA, UNSPECIFIED ORGANISM: Status: ACTIVE | Noted: 2024-11-15

## 2024-11-15 LAB
ANION GAP SERPL CALCULATED.3IONS-SCNC: 11.8 MMOL/L (ref 5–15)
BUN SERPL-MCNC: 32 MG/DL (ref 8–23)
BUN/CREAT SERPL: 27.1 (ref 7–25)
CALCIUM SPEC-SCNC: 8.4 MG/DL (ref 8.6–10.5)
CHLORIDE SERPL-SCNC: 103 MMOL/L (ref 98–107)
CO2 SERPL-SCNC: 23.2 MMOL/L (ref 22–29)
CREAT SERPL-MCNC: 1.18 MG/DL (ref 0.57–1)
DEPRECATED RDW RBC AUTO: 41.5 FL (ref 37–54)
EGFRCR SERPLBLD CKD-EPI 2021: 44.2 ML/MIN/1.73
ERYTHROCYTE [DISTWIDTH] IN BLOOD BY AUTOMATED COUNT: 12.8 % (ref 12.3–15.4)
GLUCOSE SERPL-MCNC: 150 MG/DL (ref 65–99)
HCT VFR BLD AUTO: 28.4 % (ref 34–46.6)
HGB BLD-MCNC: 9 G/DL (ref 12–15.9)
MCH RBC QN AUTO: 28.7 PG (ref 26.6–33)
MCHC RBC AUTO-ENTMCNC: 31.7 G/DL (ref 31.5–35.7)
MCV RBC AUTO: 90.4 FL (ref 79–97)
PLATELET # BLD AUTO: 351 10*3/MM3 (ref 140–450)
PMV BLD AUTO: 9.9 FL (ref 6–12)
POTASSIUM SERPL-SCNC: 3.6 MMOL/L (ref 3.5–5.2)
RBC # BLD AUTO: 3.14 10*6/MM3 (ref 3.77–5.28)
SODIUM SERPL-SCNC: 138 MMOL/L (ref 136–145)
WBC NRBC COR # BLD AUTO: 21.74 10*3/MM3 (ref 3.4–10.8)

## 2024-11-15 PROCEDURE — 94664 DEMO&/EVAL PT USE INHALER: CPT

## 2024-11-15 PROCEDURE — 94761 N-INVAS EAR/PLS OXIMETRY MLT: CPT

## 2024-11-15 PROCEDURE — 94799 UNLISTED PULMONARY SVC/PX: CPT

## 2024-11-15 PROCEDURE — 94618 PULMONARY STRESS TESTING: CPT

## 2024-11-15 PROCEDURE — 99232 SBSQ HOSP IP/OBS MODERATE 35: CPT | Performed by: NURSE PRACTITIONER

## 2024-11-15 PROCEDURE — 25010000002 PIPERACILLIN SOD-TAZOBACTAM PER 1 G: Performed by: INTERNAL MEDICINE

## 2024-11-15 PROCEDURE — 85027 COMPLETE CBC AUTOMATED: CPT | Performed by: STUDENT IN AN ORGANIZED HEALTH CARE EDUCATION/TRAINING PROGRAM

## 2024-11-15 PROCEDURE — 63710000001 PREDNISONE PER 1 MG: Performed by: STUDENT IN AN ORGANIZED HEALTH CARE EDUCATION/TRAINING PROGRAM

## 2024-11-15 PROCEDURE — 80048 BASIC METABOLIC PNL TOTAL CA: CPT | Performed by: STUDENT IN AN ORGANIZED HEALTH CARE EDUCATION/TRAINING PROGRAM

## 2024-11-15 RX ORDER — PREDNISONE 20 MG/1
20 TABLET ORAL
Qty: 7 TABLET | Refills: 0 | Status: SHIPPED | OUTPATIENT
Start: 2024-11-16 | End: 2024-11-23

## 2024-11-15 RX ORDER — AMLODIPINE BESYLATE 5 MG/1
5 TABLET ORAL
Start: 2024-11-16 | End: 2024-11-25 | Stop reason: SDUPTHER

## 2024-11-15 RX ORDER — DOXYCYCLINE 100 MG/1
100 CAPSULE ORAL 2 TIMES DAILY
Qty: 10 CAPSULE | Refills: 0 | Status: SHIPPED | OUTPATIENT
Start: 2024-11-15 | End: 2024-11-20

## 2024-11-15 RX ADMIN — GUAIFENESIN AND DEXTROMETHORPHAN HYDROBROMIDE 2 TABLET: 600; 30 TABLET, EXTENDED RELEASE ORAL at 08:28

## 2024-11-15 RX ADMIN — SENNOSIDES AND DOCUSATE SODIUM 2 TABLET: 50; 8.6 TABLET ORAL at 08:28

## 2024-11-15 RX ADMIN — PIPERACILLIN AND TAZOBACTAM 3.38 G: 3; .375 INJECTION, POWDER, FOR SOLUTION INTRAVENOUS at 14:25

## 2024-11-15 RX ADMIN — AMLODIPINE BESYLATE 5 MG: 5 TABLET ORAL at 08:27

## 2024-11-15 RX ADMIN — IPRATROPIUM BROMIDE AND ALBUTEROL SULFATE 3 ML: 2.5; .5 SOLUTION RESPIRATORY (INHALATION) at 11:36

## 2024-11-15 RX ADMIN — PIPERACILLIN AND TAZOBACTAM 3.38 G: 3; .375 INJECTION, POWDER, FOR SOLUTION INTRAVENOUS at 06:22

## 2024-11-15 RX ADMIN — FUROSEMIDE 40 MG: 40 TABLET ORAL at 08:27

## 2024-11-15 RX ADMIN — HYDROCODONE BITARTRATE AND HOMATROPINE METHYLBROMIDE 5 ML: 5; 1.5 SOLUTION ORAL at 06:22

## 2024-11-15 RX ADMIN — IPRATROPIUM BROMIDE AND ALBUTEROL SULFATE 3 ML: 2.5; .5 SOLUTION RESPIRATORY (INHALATION) at 07:00

## 2024-11-15 RX ADMIN — Medication 1 CAPSULE: at 08:27

## 2024-11-15 RX ADMIN — MUPIROCIN 1 APPLICATION: 20 OINTMENT TOPICAL at 08:28

## 2024-11-15 RX ADMIN — BUDESONIDE 0.5 MG: 0.5 INHALANT ORAL at 06:55

## 2024-11-15 RX ADMIN — ASPIRIN 81 MG: 81 TABLET, COATED ORAL at 08:27

## 2024-11-15 RX ADMIN — IPRATROPIUM BROMIDE AND ALBUTEROL SULFATE 3 ML: 2.5; .5 SOLUTION RESPIRATORY (INHALATION) at 15:30

## 2024-11-15 RX ADMIN — CITALOPRAM 10 MG: 20 TABLET, FILM COATED ORAL at 08:27

## 2024-11-15 RX ADMIN — PREDNISONE 40 MG: 20 TABLET ORAL at 08:27

## 2024-11-15 NOTE — PLAN OF CARE
Goal Outcome Evaluation:              Outcome Evaluation: patient rested well overnight. alert and oriented x4. 2L NC. no complaints of pain or SOA at rest. PRN cough medicine given. VSS.

## 2024-11-15 NOTE — THERAPY DISCHARGE NOTE
Exercise Oximetry    Patient Name:Vonda Jay   MRN: [id number]   Date: 11/15/24             ROOM AIR BASELINE   SpO2% 96   Heart Rate 82   Blood Pressure      EXERCISE ON ROOM AIR SpO2% EXERCISE ON O2 @  LPM SpO2%   1 MINUTE 96 1 MINUTE    2 MINUTES  2 MINUTES     2 LITERS 84   3 MINUTES  3 MINUTES     2 LITERS 88   4 MINUTES  4 MINUTES     2 LITERS 91   5 MINUTES  5 MINUTES     2 LITERS 94   6 MINUTES  6 MINUTES     2 LITERS 94              Distance Walked  300 FT Distance Walked   Dyspnea (Sonny Scale)   Dyspnea (Sonny Scale)   Fatigue (Sonny Scale)   Fatigue (Sonny Scale)   SpO2% Post Exercise  96% SpO2% Post Exercise   HR Post Exercise  87 HR Post Exercise   Time to Recovery  N/A Time to Recovery     Comments: Patient ambulated with her walker from her room, made 2 laps around the nurse's station, and returned to her room.  Patient's oxygen saturation dropped as low 84% up standing from her chair to the door. The patient will need 2L of oxygen at home to sustain her oxygen saturations upon exertion.

## 2024-11-15 NOTE — PLAN OF CARE
Goal Outcome Evaluation:  Plan of Care Reviewed With: patient           Outcome Evaluation: Patient is AAO, no complaint at this time; her breathing appears much better; patient says she would like to get up to the chair for lunch, possible discharge today. will continue to monitor. Patient is being discharged home.

## 2024-11-15 NOTE — PROGRESS NOTES
[facility] will follow post hospital as requested. Patient agreeable to service. Contact information and PCP confirmed. Patient prefers home health use her home # [phone] when calling to schedule visits. Verbal order received from Sharon with PCP, Dr. Ashley, for home health care.

## 2024-11-15 NOTE — CASE MANAGEMENT/SOCIAL WORK
Case Management Discharge Note      Final Note: Home (Lives alone) with [facility]. Pt requiring home O2@2LNC per walking OX. [employer] to supply home O2 and will deliver portable tank prior to D/C. Family to transport.    Provided Post Acute Provider List?: Yes  Post Acute Provider List: Home Health, Nursing Home  Provided Post Acute Provider Quality & Resource List?: Yes  Post Acute Provider Quality and Resource List: Home Health, Nursing Home  Delivered To: Patient, Support Person  Support Person: Daughter  Method of Delivery: In person    Selected Continued Care - Admitted Since 11/11/2024       Destination    No services have been selected for the patient.                Durable Medical Equipment Coordination complete.      Service Provider Services Address Phone Fax Patient Preferred    [facility] [address] [facility] [id number] [phone] --              Dialysis/Infusion    No services have been selected for the patient.                Home Medical Care Coordination complete.      Service Provider Services Address Phone Fax Patient Preferred     [facility] [address] [address] [phone] --              Therapy    No services have been selected for the patient.                Community Resources    No services have been selected for the patient.                Community & DME    No services have been selected for the patient.                    Transportation Services  Private: Car    Final Discharge Disposition Code: 06 - home with home health care

## 2024-11-15 NOTE — PROGRESS NOTES
[facility]      Mar/chart reviewed  Follow up AhRF, fever in patient with severe Aortic stenosis  Some cough, says its better today    Vital Sign Min/Max for last 24 hours  Temp  Min: 97.7 °F (36.5 °C)  Max: 98.2 °F (36.8 °C)   BP  Min: 123/59  Max: 149/57   Pulse  Min: 62  Max: 80   Resp  Min: 18  Max: 18   SpO2  Min: 93 %  Max: 99 %   Flow (L/min) (Oxygen Therapy)  Min: 2  Max: 2   Weight  Min: 69.9 kg (154 lb 1.6 oz)  Max: 69.9 kg (154 lb 1.6 oz)     Nad, axox3,   perrl, eomi, normal sclera,  mmm, no jvd, trachea midline, neck supple,  chest fair bilaterally, + crackles, + wheezes,   rrr, +faith  soft, nt, nd +bs,  no c/c/ trace edema  Skin warm, dry no rashes    Labs; 11/15: reviewed:  Wbc 21  Hgb 9  Plts 351  Glucose 150  Bun 32  Cr 1.18  Bicarb 23  A/P:  AHRF - better, wean oxygen as able suspect large part of AHRF was pulmonary edema and pneumonia --suspect she can actually be weaned off oxygen at this point, recorded sats high 90's  Sepsis -- due to  pneumonia,  continue antibiotics.   Severe Aortic stenosis -- cards following  CKDIII  Hyponatremia  Anemia  N/v/diarrhea - better, gi panel neg  Fluid in fissure -- no intervention needed  Abnormal ct chest --- suspect this is just pneumonia but would recommend repeat ct chest in 6 weeks given appearance on ct chest    Suspect jump in wbc ct is 2/2 steroids started by primary.    No objection to d/c at this point, can finish course of po antibiotics. Agumentin or cefdinir would be fine.  Probably continue the symbicort for now given wheezing, probably doesn't need this long term.

## 2024-11-15 NOTE — PROGRESS NOTES
"[facility]    Patient Name: Vonda Jay  :1935  89 y.o.  LOS: 3  Encounter Provider: ELVIE Archibald      Patient Care Team:  Ambrose Ashley MD as PCP - General (Family Medicine)    Chief Complaint: Follow-up dyspnea    Interval History: Dyspnea is improving, noted that she slept great.  She is hopeful for discharge soon with .        Objective   Vital Signs  Temp:  [97.7 °F (36.5 °C)-98.2 °F (36.8 °C)] 97.7 °F (36.5 °C)  Heart Rate:  [62-80] 70  Resp:  [18] 18  BP: (123-149)/(54-59) 149/57    Intake/Output Summary (Last 24 hours) at 11/15/2024 0826  Last data filed at 11/15/2024 0720  Gross per 24 hour   Intake 240 ml   Output 1850 ml   Net -1610 ml     Flowsheet Rows      Flowsheet Row First Filed Value   Admission Height 154.9 cm (61\") Documented at 2024 0721   Admission Weight 72.3 kg (159 lb 6.3 oz) Documented at 2024 0721              Vitals and nursing note reviewed.   Constitutional:       Appearance: Normal appearance. Well-developed.   Eyes:      Conjunctiva/sclera: Conjunctivae normal.   Pulmonary:      Breath sounds: Rhonchi present.   Cardiovascular:      Normal rate. Regular rhythm. Normal S1 with normal intensity. Normal S2 with normal intensity.       Murmurs: There is a grade 5/6 systolic murmur.      No gallop.  No click. No rub.   Edema:     Peripheral edema absent.   Musculoskeletal: Normal range of motion. Skin:     General: Skin is warm and dry.   Neurological:      Mental Status: Alert and oriented to person, place, and time.      GCS: GCS eye subscore is 4. GCS verbal subscore is 5. GCS motor subscore is 6.   Psychiatric:         Speech: Speech normal.         Behavior: Behavior normal.         Thought Content: Thought content normal.         Judgment: Judgment normal.           Pertinent Test Results:  Results from last 7 days   Lab Units 11/15/24  0414 24  0346 24  0255 24  1346 24  0334 " "11/11/24 0505   SODIUM mmol/L 138 133* 131*  --   --  131* 133*   POTASSIUM mmol/L 3.6 3.7 3.7 3.3* 3.2* 3.3* 3.9   CHLORIDE mmol/L 103 98 99  --   --  98 97*   CO2 mmol/L 23.2 19.0* 21.0*  --   --  22.0 22.0   BUN mg/dL 32* 28* 28*  --   --  34* 30*   CREATININE mg/dL 1.18* 1.16* 1.64*  --   --  1.65* 1.46*   GLUCOSE mg/dL 150* 159* 115*  --   --  106* 166*   CALCIUM mg/dL 8.4* 8.1* 7.6*  --   --  8.0* 9.0   AST (SGOT) U/L  --   --   --   --   --   --  29   ALT (SGPT) U/L  --   --   --   --   --   --  23     Results from last 7 days   Lab Units 11/11/24  0505   HSTROP T ng/L 46*     Results from last 7 days   Lab Units 11/15/24  0414 11/14/24  0346 11/13/24  0255 11/12/24  0334 11/11/24  0505 11/08/24  1148 11/08/24  1142   WBC 10*3/mm3 21.74* 15.18* 16.47* 16.98* 21.40*  --   --    HEMOGLOBIN g/dL 9.0* 9.1* 8.2* 9.2* 11.3*  --   --    HEMOGLOBIN, POC g/dL  --   --   --   --   --  10.9* 10.9*   HEMATOCRIT % 28.4* 27.3* 25.0* 27.7* 33.7*  --   --    HEMATOCRIT POC %  --   --   --   --   --  32* 32*   PLATELETS 10*3/mm3 351 286 269 243 299  --   --      Results from last 7 days   Lab Units 11/11/24  0505   INR  1.08     Results from last 7 days   Lab Units 11/11/24  0505   MAGNESIUM mg/dL 2.3           Invalid input(s): \"LDLCALC\"  Results from last 7 days   Lab Units 11/11/24  0505   PROBNP pg/mL 5,160.0*               Medication Review:   amLODIPine, 5 mg, Oral, Q24H  aspirin, 81 mg, Oral, Daily  budesonide, 0.5 mg, Nebulization, BID - RT  citalopram, 10 mg, Oral, Daily  furosemide, 40 mg, Oral, Daily  guaifenesin-dextromethorphan 600-30 mg, 2 tablet, Oral, BID  ipratropium-albuterol, 3 mL, Nebulization, 4x Daily - RT  lactobacillus acidophilus, 1 capsule, Oral, Daily  mupirocin, 1 Application, Each Nare, BID  piperacillin-tazobactam, 3.375 g, Intravenous, Q8H  pravastatin, 40 mg, Oral, Nightly  predniSONE, 40 mg, Oral, Daily With Breakfast  senna-docusate sodium, 2 tablet, Oral, BID              Assessment & Plan "     Active Hospital Problems    Diagnosis  POA    **Respiratory distress [R06.03]  Yes    Diastolic CHF, chronic [I50.32]  Unknown    Acute hypoxic respiratory failure [J96.01]  Unknown    Sepsis due to pneumonia [J18.9, A41.9]  Unknown    Hypokalemia [E87.6]  Yes    Anemia due to chronic kidney disease [N18.9, D63.1]  Yes    Stage 3b chronic kidney disease [N18.32]  Yes    HTN (hypertension) [I10]  Yes      Resolved Hospital Problems   No resolved problems to display.        Severe aortic valve stenosis  Patient currently in the process of being worked up for TAVR.  She has an outpatient appointment with CT surgery that she will now miss this secondary to being hospitalized  Patient now with dyspnea that is likely related to severe aortic valve stenosis  Maximum pressure gradient 97 mmHg, mean pressure gradient 61 mmHg  Shady has been discussed case with Dr. Rizvi, obviously with active infection patient cannot have TAVR this admission, will make further arrangements as an outpatient.   Dyspnea  Likely secondary to #1 & #4  HFpEF  Echocardiogram 8/24 reveals LVEF of 69% with severe aortic valve stenosis  Chest x-ray in ED revealed pulmonary vascular congestion.  Patient received IV diuretics in the emergency department  Diuretics at furosemide 40 mg/day which is her home dose.  Monitor creatinine, stable at 1.16  Fever/infectious process/leukocytosis  Patient has experienced upper respiratory symptoms as well as GI symptoms  Respiratory pathogen panel negative  Patient on oral steroids  On broad-spectrum antibiotics per internal medicine team  HTN  BP acceptable    No change to regimen, Continue to monitor.  Ok for discharge from cardiology standpoint.  Patient prefers discharge home with [facility].           ELVIE Archibald  [facility]   [facility]  [address] [place] [address]  Office: [phone]    11/15/24  08:26 EST

## 2024-11-15 NOTE — DISCHARGE SUMMARY
Date of Discharge:  11/15/2024    PCP: Ambrose Ashley MD    Discharge Diagnosis:   Active Hospital Problems    Diagnosis  POA    **Respiratory distress [R06.03]  Yes    Diastolic CHF, chronic [I50.32]  Unknown    Acute hypoxic respiratory failure [J96.01]  Unknown    Sepsis due to pneumonia [J18.9, A41.9]  Unknown    Hypokalemia [E87.6]  Yes    Anemia due to chronic kidney disease [N18.9, D63.1]  Yes    Stage 3b chronic kidney disease [N18.32]  Yes    HTN (hypertension) [I10]  Yes      Resolved Hospital Problems   No resolved problems to display.          Consults       Date and Time Order Name Status Description    11/11/2024  1:14 PM Inpatient Internal Medicine Consult Completed     11/11/2024  6:11 AM Inpatient Cardiology Consult Completed     11/11/2024  5:41 AM Pulmonology (on-call MD unless specified)            Hospital Course  89 y.o. female with past medical history significant for CHF, presented to the hospital, has been diagnosed to have acute hypoxic respiratory failure, sepsis due to pneumonia.  Patient also was diagnosed to have acute on chronic diastolic congestive heart failure, patient was initially given IV Lasix, has been transitioned to oral Lasix.  Patient has been cleared by cardiology for discharge.  Patient also has been cleared by pulmonary for discharge.  Patient will be transition from IV Zosyn to oral antibiotics today.  I will prescribe doxycycline 100 mg twice daily.  Patient will finish the course of antibiotics on outpatient basis.  I have seen and examined patient at bedside, total time spent on discharge management is more than 30 minutes.  Plan of care was discussed with the patient and she has verbalized understanding.          Temp:  [97.7 °F (36.5 °C)-98.2 °F (36.8 °C)] 97.9 °F (36.6 °C)  Heart Rate:  [62-80] 79  Resp:  [18] 18  BP: (123-150)/(54-59) 150/57  Body mass index is 29.12 kg/m².    Physical Exam  General, awake and alert.  Head and ENT, normocephalic and  atraumatic.  Lungs, symmetric expansion, equal air entry bilaterally.  Heart, regular rate and rhythm.  Abdomen, soft and nontender.  Extremities, no clubbing or cyanosis.  Neuro, no focal deficits.  Skin: Warm and no rash.  Psych, normal mood and affect.  Musculoskeletal, joint examination is grossly normal.      Disposition: Home or Self Care       Discharge Medications        New Medications        Instructions Start Date   predniSONE 20 MG tablet  Commonly known as: DELTASONE   20 mg, Oral, Daily With Breakfast   Start Date: November 16, 2024            Continue These Medications        Instructions Start Date   acetaminophen 500 MG tablet  Commonly known as: TYLENOL   1,000 mg, Every 6 Hours PRN      acidophilus tablet tablet   1 tablet, 3 Times Daily      alendronate 70 MG tablet  Commonly known as: FOSAMAX   70 mg, Every 7 Days      aspirin 81 MG tablet   81 mg, Daily      CALCIUM MAGNESIUM PO   1 tablet, Daily      cholecalciferol 25 MCG (1000 UT) tablet  Commonly known as: VITAMIN D3   1,000 Units, Daily      citalopram 10 MG tablet  Commonly known as: CeleXA   10 mg, Daily      docusate calcium 240 MG capsule  Commonly known as: SURFAK   240 mg, 2 Times Daily PRN      furosemide 40 MG tablet  Commonly known as: LASIX   40 mg, Oral, Daily      multivitamin with minerals tablet tablet   1 tablet, Daily      pravastatin 40 MG tablet  Commonly known as: PRAVACHOL   40 mg, Nightly                Additional Instructions for the Follow-ups that You Need to Schedule       Discharge Follow-up with PCP   As directed       Currently Documented PCP:    Ambrose Ashley MD    PCP Phone Number:    [phone]     Follow Up Details: Follow-up with PCP in 7 days.               Contact information for follow-up providers       Ambrose Ashley MD .    Specialty: Family Medicine  Why: Follow-up with PCP in 7 days.  Contact information:  [address] [place] [address]  [phone]                        Contact information for after-discharge care       Home Medical Care       [facility] .    Services: Home Health Services, Home Nursing  Contact information:  [address]  [facility] [address] [phone]                                  Future Appointments   Date Time Provider Department Center   11/25/2024 10:20 AM Jaye Hough APRN [facility]   3/18/2025  2:00 PM Adam Singleton MD [facility]     Pending Labs       Order Current Status    Blood Culture - Blood, Arm, Left Preliminary result    Blood Culture - Blood, Arm, Left Preliminary result           Markel Sosa MD  [facility]  11/15/24    Discharge time spent greater than 30 minutes.

## 2024-11-15 NOTE — PLAN OF CARE
Goal Outcome Evaluation:  Plan of Care Reviewed With: patient           Outcome Evaluation: Patient is AAO, no complaint at this time; her breathing appears much better; patient says she would like to get up to the chair for lunch, possible discharge today. will continue to monitor

## 2024-11-16 ENCOUNTER — HOME CARE VISIT (OUTPATIENT)
Dept: HOME HEALTH SERVICES | Facility: HOME HEALTHCARE | Age: 89
End: 2024-11-16
Payer: MEDICARE

## 2024-11-16 ENCOUNTER — LAB REQUISITION (OUTPATIENT)
Dept: LAB | Facility: HOSPITAL | Age: 89
End: 2024-11-16
Payer: MEDICARE

## 2024-11-16 VITALS
OXYGEN SATURATION: 100 % | DIASTOLIC BLOOD PRESSURE: 80 MMHG | SYSTOLIC BLOOD PRESSURE: 172 MMHG | TEMPERATURE: 95.2 F | RESPIRATION RATE: 18 BRPM | HEART RATE: 65 BPM

## 2024-11-16 DIAGNOSIS — J18.9 PNEUMONIA, UNSPECIFIED ORGANISM: ICD-10-CM

## 2024-11-16 LAB
BACTERIA SPEC AEROBE CULT: NORMAL
BACTERIA SPEC AEROBE CULT: NORMAL
C DIFF TOX GENS STL QL NAA+PROBE: NEGATIVE
LACTOFERRIN STL QL LA: NEGATIVE

## 2024-11-16 PROCEDURE — 83630 LACTOFERRIN FECAL (QUAL): CPT | Performed by: INTERNAL MEDICINE

## 2024-11-16 PROCEDURE — 87493 C DIFF AMPLIFIED PROBE: CPT | Performed by: INTERNAL MEDICINE

## 2024-11-16 PROCEDURE — G0299 HHS/HOSPICE OF RN EA 15 MIN: HCPCS

## 2024-11-16 NOTE — HOME HEALTH
Patient presented to the hospital with acute respiratory failure found to have pneumonia and acute on chronic CHF as well as sepsis from the pneumonia.  Patient was diuresed and given IV abx and then transitioned to oral to finish at home.  Focus of care related to pneumonia and subsequent sepsis.  Patient reporting new onset diarrhea, called on call provider with PCP and given verbal order for stool collection as well as immodium and to initiate doxycycline as patient had not yet taken a dose.  Patient instructed to go to ED if diarrhea worsens.  BP elevated at todays assessment, but rest of vitals WNL.  Patient and daughter report discharge med list from hospital is incorrect, report she is supposed to be taking hydralazine and nebivolol per her cardiologist, these were added to med list by this RN as patient/daughter states she is going to contiue to take them despite what discharge list says and confirm with cardiologist Monday.  Agree that discontinuing antihypertensives in the presence of hypertension does not make sense.  Rest of medication being taken as instructed.  See patient for 5 weeks for home health nursing.

## 2024-11-16 NOTE — Clinical Note
Please send to PCP, Ambrose Ashley:    Patient presented to the hospital with acute respiratory failure found to have pneumonia and acute on chronic CHF as well as sepsis from the pneumonia.  Patient was diuresed and given IV abx and then transitioned to oral to finish at home.  Focus of care related to pneumonia and subsequent sepsis.  Patient reporting new onset diarrhea, called on call provider with PCP and given verbal order for stool collection as well as immodium and to initiate doxycycline as patient had not yet taken a dose.  Patient instructed to go to ED if diarrhea worsens.  BP elevated at todays assessment, but rest of vitals WNL.  Patient and daughter report discharge med list from hospital is incorrect, report she is supposed to be taking hydralazine and nebivolol per her cardiologist, these were added to med list by this RN as patient/daughter states she is going to contiue to take them despite what discharge list says and confirm with cardiologist Monday.  Agree that discontinuing antihypertensives in the presence of hypertension does not make sense.  Rest of medication being taken as instructed.  See patient for 5 weeks for home health nursing.

## 2024-11-16 NOTE — OUTREACH NOTE
Prep Survey      Flowsheet Row Responses   Religion facility patient discharged from? [place]   Is LACE score < 7 ? No   Eligibility Readm Mgmt   Discharge diagnosis Respiratory distress -Sepsis due to pneumonia   Does the patient have one of the following disease processes/diagnoses(primary or secondary)? Sepsis   Does the patient have Home health ordered? Yes   What is the Home health agency?  [facility].   Is there a DME ordered? Yes   What DME was ordered? [employer] to supply home O2 a   Prep survey completed? Yes            LAVON HOLDER - Registered Nurse

## 2024-11-18 ENCOUNTER — HOME CARE VISIT (OUTPATIENT)
Dept: HOME HEALTH SERVICES | Facility: HOME HEALTHCARE | Age: 89
End: 2024-11-18
Payer: MEDICARE

## 2024-11-18 VITALS
OXYGEN SATURATION: 96 % | SYSTOLIC BLOOD PRESSURE: 156 MMHG | DIASTOLIC BLOOD PRESSURE: 70 MMHG | TEMPERATURE: 97.7 F | RESPIRATION RATE: 18 BRPM | HEART RATE: 64 BPM

## 2024-11-18 PROCEDURE — G0299 HHS/HOSPICE OF RN EA 15 MIN: HCPCS

## 2024-11-18 NOTE — PROGRESS NOTES
"Enter Query Response Below      Query Response:     Acute hypoxic respiratory failure due to acute CHF     Electronically signed by Markel Sosa MD, 24, 4:22 PM EST.               If applicable, please update the problem list.     Patient: Vonda Jay        : 1935  Account: [id number]           Admit Date:         How to Respond to this query:       a. Click New Note     b. Answer query within the yellow box.                c. Update the Problem List, if applicable.      If you have any questions about this query contact me at: [email or web address]     ,    89 year old female admitted  with acute hypoxic respiratory failure and acute on chronic diastolic CHF.    Per the H&P, \"Rapid turn around suggests most of the problem with respiratory status related to pulm edema.\"  \"Sepsis due to pneumonia\" included on Hospitalist notes beginning  and included on discharge summary    Treatment: IV Zosyn -11/15, discharged on oral doxycycline for 5 days, supplemental oxygen including Bipap.    Please clarify if patient treated/monitored for the following:     Severe sepsis, present on admission, causing acute organ failure- acute hypoxic respiratory failure  Acute hypoxic respiratory failure due to acute CHF  Acute hypoxic respiratory failure due to acute CHF and sepsis  Other (please specify) ___________  Unable to determine    By submitting this query, we are merely seeking further clarification of documentation to accurately reflect all conditions that you are monitoring, evaluating, treating or that extend the hospitalization or utilize additional resources of care. Please utilize your independent clinical judgment when addressing the question(s) above.     This query and your response, once completed, will be entered into the legal medical record.    Sincerely,  Chelsey Calderon RN CCDS  Clinical Documentation Integrity Program     "

## 2024-11-18 NOTE — PROGRESS NOTES
Enter Query Response Below      Query Response:     Bacterial pneumonia unspecified     Electronically signed by Markel Sosa MD, 24, 4:23 PM EST.               If applicable, please update the problem list.     Patient: Vonda Jay        : 1935  Account: [id number]           Admit Date:         How to Respond to this query:       a. Click New Note     b. Answer query within the yellow box.                c. Update the Problem List, if applicable.      If you have any questions about this query contact me at: [email or web address]     ,    89 year old female with history of CHF and CKD stage 3b was admitted  with  acute hypoxic respiratory failure. The patient was diagnosed with pneumonia per Hospitalist note . Screenings for Legionella, MRSA, and streptococcus were negative. No sputum culture.  Treatment: IV Zosyn -11/15, discharged on doxycycline to continue for 5 days.    Please clarify the type of pneumonia the patient was treated/monitored for:    Gram negative pneumonia (excluding Haemophilus influenzae)  Bacterial pneumonia unspecified  Other- specify______    By submitting this query, we are merely seeking further clarification of documentation to accurately reflect all conditions that you are monitoring, evaluating, treating or that extend the hospitalization or utilize additional resources of care. Please utilize your independent clinical judgment when addressing the question(s) above.     This query and your response, once completed, will be entered into the legal medical record.    Sincerely,  Chelsey Calderon RN CCDS  Clinical Documentation Integrity Program

## 2024-11-18 NOTE — PROGRESS NOTES
"Enter Query Response Below      Query Response:   Acute kidney injury (SANYA) was supported     Electronically signed by Markel Sosa MD, 24, 4:22 PM EST.               If applicable, please update the problem list.     Patient: Vonda Jay        : 1935  Account: [id number]           Admit Date:         How to Respond to this query:       a. Click New Note     b. Answer query within the yellow box.                c. Update the Problem List, if applicable.      If you have any questions about this query contact me at: [email or web address]     ,    89 year old female with CKD stage 3 was admitted with acute on chronic diastolic CHF and sepsis secondary to pneumonia.  Creatinine 1.46 (), 1.65 (), 1.18 (11/15).  Per Hospitalist note , \"Creatinine 1.6, close to baseline.\"   The patient was diagnosed with acute kidney injury per the progress note , \"suspect 2/2 sepsis, possibly due to IV diuretics.\"    Creatinine was 1.61  on  per [facility] outpatient laboratory.   Treatment: daily lab monitoring, IV antibiotics    After study, was acute kidney injury (SANYA) clinically supported during this admission?    Acute kidney injury (SANYA) was supported with additional clinical indicators:_______due to sepsis  Acute kidney injury (SANYA) was supported with additional clinical indicators:_______not due to sepsis  Acute kidney injury (SANYA) was not supported  Other- specify_____________  Unable to determine         SANYA is defined by KDIGO as any of the following:  Increase in creatinine > 0.3 mg/dl within 48 hours or less; or   Increase in creatinine level to > 1.5x baseline (historical or measure), which is known or presumed to have occurred within the prior 7 days   Urine volume <0.5 ml/kg/h for 6 hours.  Reference article: http://www.kdigo.org/clinical_practice_guidelines/pdf/KDIGO%20AKI%20Guideline.pdf (as published in [employer] Supplements, The " Official Journal of the International Society of Nephrology, vol. 2, issue 1, March 2012.)      By submitting this query, we are merely seeking further clarification of documentation to accurately reflect all conditions that you are monitoring, evaluating, treating or that extend the hospitalization or utilize additional resources of care. Please utilize your independent clinical judgment when addressing the question(s) above.     This query and your response, once completed, will be entered into the legal medical record.    Sincerely,  Chelsey Calderon RN CCDS  Clinical Documentation Integrity Program

## 2024-11-19 ENCOUNTER — HOME CARE VISIT (OUTPATIENT)
Dept: HOME HEALTH SERVICES | Facility: HOME HEALTHCARE | Age: 89
End: 2024-11-19
Payer: MEDICARE

## 2024-11-19 VITALS
OXYGEN SATURATION: 96 % | DIASTOLIC BLOOD PRESSURE: 62 MMHG | SYSTOLIC BLOOD PRESSURE: 160 MMHG | HEART RATE: 67 BPM | RESPIRATION RATE: 18 BRPM

## 2024-11-19 PROCEDURE — G0151 HHCP-SERV OF PT,EA 15 MIN: HCPCS

## 2024-11-19 NOTE — HOME HEALTH
Pt is a pleasant 88yo F recently hospitalized secondary to PNA, sepsis, and was also found to have C diff.    Pt reports her BP is low when she took it today, but then fluctuates throughout the day.  Presented walking without device, reporting she is doing ok.  She reports she is blessed she has helpful family.   PLOF: lives alone in a 1 story home with chair lift to basement, I with functional mobility, wears a Life Alert, driving short distances, cane in community.  Pt reports her oxygen level is always good and the SOA is related to aortic valve.  She plans to have a replacement soon.  Pt tolerated PT evaluation, home assessment, and instruction in HEP.  No further visits necessary at this time.    PT EVAL ONLY.

## 2024-11-19 NOTE — HOME HEALTH
Patient reports diarrhea is improving, much less frequent.  Stool culture negative for lactoferin or c. diff.  No reported issues taking doxycycline.  Blood pressure improved today, still slightly high though, patient reports daughter has call out to cardiologist to confirm medication regimen.  Rest of CP assessment WNL.  No med changes or other questions from patient at this time.  Continued to encourage immodium as needed and hydrate with water.  Next visit: check to ensure patient is on proper blood pressure medication regimen from daughter/cardiology.

## 2024-11-20 ENCOUNTER — READMISSION MANAGEMENT (OUTPATIENT)
Dept: CALL CENTER | Facility: HOSPITAL | Age: 89
End: 2024-11-20
Payer: MEDICARE

## 2024-11-20 ENCOUNTER — HOME CARE VISIT (OUTPATIENT)
Dept: HOME HEALTH SERVICES | Facility: HOME HEALTHCARE | Age: 89
End: 2024-11-20
Payer: MEDICARE

## 2024-11-20 VITALS
RESPIRATION RATE: 17 BRPM | HEART RATE: 61 BPM | OXYGEN SATURATION: 98 % | TEMPERATURE: 98.1 F | SYSTOLIC BLOOD PRESSURE: 150 MMHG | DIASTOLIC BLOOD PRESSURE: 62 MMHG

## 2024-11-20 PROCEDURE — G0299 HHS/HOSPICE OF RN EA 15 MIN: HCPCS

## 2024-11-20 NOTE — OUTREACH NOTE
Sepsis Week 1 Survey      Flowsheet Row Responses   [facility] patient discharged from? [place]   Does the patient have one of the following disease processes/diagnoses(primary or secondary)? Sepsis   Week 1 attempt successful? Yes   Call start time [id number]   Call end time [phone]   Discharge diagnosis Respiratory distress -Sepsis due to pneumonia   Person spoke with today (if not patient) and relationship patient   Meds reviewed with patient/caregiver? Yes   Is the patient having any side effects they believe may be caused by any medication additions or changes? No   Does the patient have all medications related to this admission filled (includes all antibiotics, inhalers, nebulizers,steroids,etc.) Yes   Is the patient taking all medications as directed (includes completed medication regime)? Yes   Comments regarding appointments Has a cardiology followup with cardiology on 11/25/2024   Does the patient have a primary care provider?  Yes   Does the patient have an appointment with their PCP within 7 days of discharge? Yes   Has the patient kept scheduled appointments due by today? Yes   What is the Home health agency?  [facility].   [facility]   Has all DME been delivered? Yes   DME comments Patient reports she is not requiring 02 any longer and is [facility] .   Psychosocial issues? No   Did the patient receive a copy of their discharge instructions? Yes   Nursing interventions Reviewed instructions with patient   What is the patient's perception of their health status since discharge? Improving   Nursing interventions Nurse provided patient education   Is the patient/caregiver able to teach back TIME? T emperature - higher or lower than normal, I nfection - may have signs and symptoms of an infection, M ental Decline - confused, sleepy, difficult to arouse, E xtremely Ill - severe pain, discomfort, shortness of breath   Nursing interventions Nurse provided  reassurance to patient, Nurse provided patient education   Is patient/caregiver able to teach back steps to recovery at home? Rest and regain strength, Set small, achievable goals for return to baseline health   Is the patient/caregiver able to teach back signs and symptoms of worsening condition: Fever, Rapid heart rate (>90), Shortness of breath/rapid respiratory rate, Altered mental status(confusion/coma)   If the patient is a current smoker, are they able to teach back resources for cessation? Not a smoker   Is the patient/caregiver able to teach back the hierarchy of who to call/visit for symptoms/problems? PCP, Specialist, Home health nurse, Urgent Care, ED, 911 Yes   Week 1 call completed? Yes   Graduated Yes   Is the patient interested in additional calls from an ambulatory ? No   Would this patient benefit from a Referral to Amb Social Work? No   Wrap up additional comments Patient is feeling much better. No needs or issues.   Call end time [id number]            Jeferson HOLDER - Registered Nurse

## 2024-11-25 ENCOUNTER — OFFICE VISIT (OUTPATIENT)
Dept: CARDIOLOGY | Facility: CLINIC | Age: 89
End: 2024-11-25
Payer: MEDICARE

## 2024-11-25 VITALS
HEIGHT: 61 IN | DIASTOLIC BLOOD PRESSURE: 50 MMHG | OXYGEN SATURATION: 98 % | SYSTOLIC BLOOD PRESSURE: 100 MMHG | HEART RATE: 66 BPM | WEIGHT: 153 LBS | BODY MASS INDEX: 28.89 KG/M2

## 2024-11-25 DIAGNOSIS — I35.0 SEVERE AORTIC VALVE STENOSIS: ICD-10-CM

## 2024-11-25 DIAGNOSIS — I10 PRIMARY HYPERTENSION: ICD-10-CM

## 2024-11-25 DIAGNOSIS — I50.32 CHRONIC DIASTOLIC CONGESTIVE HEART FAILURE: ICD-10-CM

## 2024-11-25 DIAGNOSIS — I50.32 CHRONIC DIASTOLIC CONGESTIVE HEART FAILURE: Primary | ICD-10-CM

## 2024-11-25 DIAGNOSIS — I35.0 SEVERE AORTIC VALVE STENOSIS: Primary | ICD-10-CM

## 2024-11-25 RX ORDER — HYDRALAZINE HYDROCHLORIDE 50 MG/1
50 TABLET, FILM COATED ORAL 3 TIMES DAILY PRN
Qty: 90 TABLET | Refills: 3 | Status: SHIPPED | OUTPATIENT
Start: 2024-11-25

## 2024-11-25 RX ORDER — AMLODIPINE BESYLATE 5 MG/1
5 TABLET ORAL
Qty: 90 TABLET | Refills: 3 | Status: SHIPPED | OUTPATIENT
Start: 2024-11-25

## 2024-11-25 RX ORDER — FUROSEMIDE 40 MG/1
40 TABLET ORAL DAILY
Qty: 90 TABLET | Refills: 3 | Status: SHIPPED | OUTPATIENT
Start: 2024-11-25

## 2024-11-25 RX ORDER — NEBIVOLOL 5 MG/1
5 TABLET ORAL DAILY
Qty: 90 TABLET | Refills: 3 | Status: SHIPPED | OUTPATIENT
Start: 2024-11-25

## 2024-11-25 NOTE — Clinical Note
Patient was recently hospitalized for pneumonia, her appointment with CT surgery team as well as her CT chest was missed.  Can you please try to reschedule.  She is very anxious to proceed with TAVR.

## 2024-11-25 NOTE — PROGRESS NOTES
CARDIOLOGY        Patient Name: Vonda Jay  :1935  Age: 89 y.o.  Primary Cardiologist: Adam Singleton MD and Juan Clifford MD  Encounter Provider:  ELVIE Archibald    Date of Service: 24      CHIEF COMPLAINT / REASON FOR OFFICE VISIT     Hospital Follow Up Visit, Cardiac Valve Problem, Congestive Heart Failure, and Hypertension      HISTORY OF PRESENT ILLNESS       HPI  Vonda Jay is a 89 y.o. female who presents today for hospital follow-up.    Pt has a  history significant for carotid artery disease, hypertension, prior breast cancer, severe aortic valve stenosis.    Patient has been following the structural heart team for possible TAVR.    Medical record review reveals that patient was hospitalized discharge date 11/15/2024.  Patient presented with respiratory distress, she was found to have pneumonia with sepsis.  Because of acute illness patient also was found to have acute on chronic diastolic CHF.  She received IV diuretics and then transition to oral Lasix.  Patient was treated with IV antibiotics during hospitalization.  Structural heart team was aware of admission and plans to reach out to patient to reschedule appointment to hopefully proceed with plans for TAVR.    Patient presents today with her son.  Notes that she feels that she has recovered from pneumonia.  She does note some residual shortness of breath that was present before pneumonia.  She notes that she is in need of having her CT scan rescheduled for TAVR workup and she also needs to schedule an appointment with the CT surgery team.  Denies chest pain, fever, cough.    The following portions of the patient's history were reviewed and updated as appropriate: allergies, current medications, past family history, past medical history, past social history, past surgical history and problem list.      VITAL SIGNS     Visit Vitals  /50 (BP Location: Left arm, Patient Position: Sitting, Cuff Size: Adult)  "  Pulse 66   Ht 154.9 cm (61\")   Wt 69.4 kg (153 lb)   LMP  (LMP Unknown)   SpO2 98%   BMI 28.91 kg/m²         Wt Readings from Last 3 Encounters:   11/25/24 69.4 kg (153 lb)   11/15/24 69.9 kg (154 lb 1.6 oz)   11/08/24 72.3 kg (159 lb 6.4 oz)     Body mass index is 28.91 kg/m².      REVIEW OF SYSTEMS   Review of Systems   Constitutional: Negative for chills, fever, weight gain and weight loss.   Cardiovascular:  Negative for dyspnea on exertion and leg swelling.   Respiratory:  Positive for shortness of breath. Negative for cough, snoring and wheezing.    Hematologic/Lymphatic: Negative for bleeding problem. Does not bruise/bleed easily.   Skin:  Negative for color change.   Musculoskeletal:  Negative for falls, joint pain and myalgias.   Gastrointestinal:  Negative for melena.   Genitourinary:  Negative for hematuria.   Neurological:  Negative for excessive daytime sleepiness.   Psychiatric/Behavioral:  Negative for depression. The patient is not nervous/anxious.            PHYSICAL EXAMINATION     Vitals and nursing note reviewed.   Constitutional:       Appearance: Normal appearance. Well-developed.   Eyes:      Conjunctiva/sclera: Conjunctivae normal.   Neck:      Vascular: No carotid bruit.   Pulmonary:      Breath sounds: Normal breath sounds.   Cardiovascular:      Normal rate. Regular rhythm. Normal S1 with normal intensity. Normal S2 with normal intensity.       Murmurs: There is a grade 4/6 systolic murmur at the URSB and ULSB.      No gallop.  No click. No rub.   Edema:     Peripheral edema absent.   Musculoskeletal: Normal range of motion. Skin:     General: Skin is warm and dry.   Neurological:      Mental Status: Alert and oriented to person, place, and time.      GCS: GCS eye subscore is 4. GCS verbal subscore is 5. GCS motor subscore is 6.   Psychiatric:         Speech: Speech normal.         Behavior: Behavior normal.         Thought Content: Thought content normal.         Judgment: Judgment " normal.           REVIEWED DATA     Procedures    Cardiac Procedures:  Echocardiogram 7/7/2022.  LVEF 66-70%.  Mild LVH.  Grade 2 diastolic dysfunction.  Moderate to severe aortic valve stenosis with maximum pressure gradient 55.8 mmHg, mean pressure gradient 31.7 mmHg.  Calculated RVSP 25 mmHg.  Echocardiogram 8/28/2024.  LVEF 69%.  Severe aortic valve stenosis.  Peak velocity of the flow distal to the aortic valve is 492.5 cm/s.  Aortic valve maximum pressure gradient of 97 mmHg.  Mean pressure gradient 61 mmHg.  Mild mitral valve regurgitation.  Cardiac catheterization 11/8/2024.  Mild pulmonary hypertension.  Mild decreased cardiac index.  Left main is normal.  LAD with mild calcification with luminal irregularities in the midsegment.  Circumflex with mild calcification but no stenosis.  RCA is dominant with discrete 50% mid vessel stenosis.  Ramus is small caliber with discrete 60-70% proximal stenosis.      BUN   Date Value Ref Range Status   11/15/2024 32 (H) 8 - 23 mg/dL Final   11/24/2021 44 (H) 7 - 20 mg/dL Final     Creatinine   Date Value Ref Range Status   11/15/2024 1.18 (H) 0.57 - 1.00 mg/dL Final   11/24/2021 1.2 0.7 - 1.5 mg/dL Final     Potassium   Date Value Ref Range Status   11/15/2024 3.6 3.5 - 5.2 mmol/L Final   11/24/2021 5.1 3.5 - 5.1 mmol/L Final     ALT (SGPT)   Date Value Ref Range Status   11/11/2024 23 1 - 33 U/L Final   11/21/2019 9 (L) 13 - 69 U/L Final     AST (SGOT)   Date Value Ref Range Status   11/11/2024 29 1 - 32 U/L Final   11/21/2019 17 15 - 46 U/L Final           ASSESSMENT & PLAN     Diagnoses and all orders for this visit:    1. Severe aortic valve stenosis (Primary)  Assessment & Plan:  Patient with severe aortic valve stenosis who is currently in the process of being worked up for possible TAVR.  She has had her cardiac catheterization but needs to meet with CT surgery team and have her CT chest performed.  Patient with recent hospitalization for pneumonia and  respiratory failure, symptoms are now improved, will send message to structural heart team nurse navigator to have these appointments rescheduled      2. Primary hypertension  Assessment & Plan:  BP controlled at 100/50  Patient denies lightheadedness, near syncope  Regimen as follows:  Continue amlodipine 5 mg/day  Continue furosemide 40 mg/day  Continue Bystolic 5 mg/day  Change hydralazine from 100 mg 3 times daily to 50 mg 3 times daily as needed only for systolic blood pressure greater than 150    Orders:  -     amLODIPine (NORVASC) 5 MG tablet; Take 1 tablet by mouth Daily. Indications: High Blood Pressure  Dispense: 90 tablet; Refill: 3  -     furosemide (LASIX) 40 MG tablet; Take 1 tablet by mouth Daily. Indications: Cardiac Failure  Dispense: 90 tablet; Refill: 3  -     hydrALAZINE (APRESOLINE) 50 MG tablet; Take 1 tablet by mouth 3 (Three) Times a Day As Needed (SBP >150). Indications: High Blood Pressure  Dispense: 90 tablet; Refill: 3  -     nebivolol (BYSTOLIC) 5 MG tablet; Take 1 tablet by mouth Daily. States cardiologist wants her on this medication, it was not on discharge medication list though  Indications: High Blood Pressure  Dispense: 90 tablet; Refill: 3    3. Chronic diastolic congestive heart failure  Assessment & Plan:  Euvolemic on exam  Continue furosemide        Orders:  -     furosemide (LASIX) 40 MG tablet; Take 1 tablet by mouth Daily. Indications: Cardiac Failure  Dispense: 90 tablet; Refill: 3          Future Appointments         Provider Department Center    3/18/2025 2:00 PM Adam Singleton MD [facility] CARDIOLOGY [place]              MEDICATIONS         Discharge Medications            Accurate as of November 25, 2024 12:11 PM. If you have any questions, ask your nurse or doctor.                Changes to Medications        Instructions Start Date   hydrALAZINE 50 MG tablet  Commonly known as: APRESOLINE  What changed:   medication strength  how much to  take  when to take this  reasons to take this  additional instructions  Changed by: Salma Hough, APRN   50 mg, Oral, 3 Times Daily PRN             Continue These Medications        Instructions Start Date   acetaminophen 500 MG tablet  Commonly known as: TYLENOL   1,000 mg, Every 6 Hours PRN      alendronate 70 MG tablet  Commonly known as: FOSAMAX   70 mg, Every 7 Days      amLODIPine 5 MG tablet  Commonly known as: NORVASC   5 mg, Oral, Every 24 Hours Scheduled      aspirin 81 MG tablet   81 mg, Daily      CALCIUM MAGNESIUM PO   1 tablet, Daily      cholecalciferol 25 MCG (1000 UT) tablet  Commonly known as: VITAMIN D3   1,000 Units, Daily      citalopram 10 MG tablet  Commonly known as: CeleXA   10 mg, Daily      docusate calcium 240 MG capsule  Commonly known as: SURFAK   240 mg, 2 Times Daily PRN      furosemide 40 MG tablet  Commonly known as: LASIX   40 mg, Oral, Daily      multivitamin with minerals tablet tablet   1 tablet, Daily      nebivolol 5 MG tablet  Commonly known as: BYSTOLIC   5 mg, Oral, Daily, States cardiologist wants her on this medication, it was not on discharge medication list though      pravastatin 40 MG tablet  Commonly known as: PRAVACHOL   40 mg, Nightly                   **Dragon Disclaimer:   Much of this encounter note is an electronic transcription/translation of spoken language to printed text. The electronic translation of spoken language may permit erroneous, or at times, nonsensical words or phrases to be inadvertently transcribed. Although I have reviewed the note for such errors, some may still exist.

## 2024-11-25 NOTE — ASSESSMENT & PLAN NOTE
BP controlled at 100/50  Patient denies lightheadedness, near syncope  Regimen as follows:  Continue amlodipine 5 mg/day  Continue furosemide 40 mg/day  Continue Bystolic 5 mg/day  Change hydralazine from 100 mg 3 times daily to 50 mg 3 times daily as needed only for systolic blood pressure greater than 150

## 2024-11-25 NOTE — ASSESSMENT & PLAN NOTE
Patient with severe aortic valve stenosis who is currently in the process of being worked up for possible TAVR.  She has had her cardiac catheterization but needs to meet with CT surgery team and have her CT chest performed.  Patient with recent hospitalization for pneumonia and respiratory failure, symptoms are now improved, will send message to structural heart team nurse navigator to have these appointments rescheduled

## 2024-12-05 ENCOUNTER — TELEPHONE (OUTPATIENT)
Dept: CARDIOLOGY | Facility: CLINIC | Age: 89
End: 2024-12-05
Payer: MEDICARE

## 2024-12-05 ENCOUNTER — HOSPITAL ENCOUNTER (OUTPATIENT)
Dept: RADIOLOGY | Facility: HOSPITAL | Age: 89
Discharge: HOME | End: 2024-12-05
Payer: MEDICARE

## 2024-12-05 ENCOUNTER — HOSPITAL ENCOUNTER (OUTPATIENT)
Dept: CT IMAGING | Facility: HOSPITAL | Age: 89
Discharge: HOME | End: 2024-12-05
Payer: MEDICARE

## 2024-12-05 DIAGNOSIS — I50.32 CHRONIC DIASTOLIC CONGESTIVE HEART FAILURE: ICD-10-CM

## 2024-12-05 DIAGNOSIS — I35.0 SEVERE AORTIC VALVE STENOSIS: ICD-10-CM

## 2024-12-05 DIAGNOSIS — I50.32 CHRONIC DIASTOLIC CONGESTIVE HEART FAILURE: Primary | ICD-10-CM

## 2024-12-05 PROCEDURE — 96360 HYDRATION IV INFUSION INIT: CPT

## 2024-12-05 PROCEDURE — 96361 HYDRATE IV INFUSION ADD-ON: CPT

## 2024-12-05 PROCEDURE — 71275 CT ANGIOGRAPHY CHEST: CPT

## 2024-12-05 PROCEDURE — 82565 ASSAY OF CREATININE: CPT

## 2024-12-05 PROCEDURE — 74174 CTA ABD&PLVS W/CONTRAST: CPT

## 2024-12-05 PROCEDURE — 25510000001 IOPAMIDOL PER 1 ML: Performed by: INTERNAL MEDICINE

## 2024-12-05 PROCEDURE — 25810000003 SODIUM CHLORIDE 0.9 % SOLUTION: Performed by: INTERNAL MEDICINE

## 2024-12-05 RX ORDER — IOPAMIDOL 755 MG/ML
100 INJECTION, SOLUTION INTRAVASCULAR
Status: COMPLETED | OUTPATIENT
Start: 2024-12-05 | End: 2024-12-05

## 2024-12-05 RX ORDER — SODIUM CHLORIDE 9 MG/ML
100 INJECTION, SOLUTION INTRAVENOUS CONTINUOUS
Status: ACTIVE | OUTPATIENT
Start: 2024-12-05 | End: 2024-12-05

## 2024-12-05 RX ADMIN — SODIUM CHLORIDE 100 ML/HR: 9 INJECTION, SOLUTION INTRAVENOUS at 11:48

## 2024-12-05 RX ADMIN — IOPAMIDOL 100 ML: 755 INJECTION, SOLUTION INTRAVENOUS at 11:33

## 2024-12-05 NOTE — TELEPHONE ENCOUNTER
Torin with CT dep't called because pt is there for a CTA for TAVR workup.  They did a POC GFR/Creatinine which falls below their threshold to proceed with scanning.    Creatinine 1.8  GFR 27    The cutoff is 30 GFR.  She wants to know if you want to reschedule her CTA at a later time, maybe with some fluids?  Or proceed?    Let me know how you wish to move forward.    Callback is [phone]    Thank you,    Darcy PHAM RN  Triage [facility] [place]  12/05/24 10:09 EST

## 2024-12-06 ENCOUNTER — TELEPHONE (OUTPATIENT)
Dept: CARDIOLOGY | Facility: HOSPITAL | Age: 89
End: 2024-12-06
Payer: MEDICARE

## 2024-12-06 LAB — CREAT BLDA-MCNC: 1.8 MG/DL (ref 0.6–1.3)

## 2024-12-06 NOTE — TELEPHONE ENCOUNTER
I left a message for Mrs Jay asking her to return my call to discuss a follow up appointment with Dr Lagunas

## 2024-12-06 NOTE — TELEPHONE ENCOUNTER
I spoke with Ms Jay and she is agreeable to seeing Dr Lagunas in office 12/11/24 at 12:00 We will work to arrange Pre admission testing at this time which is agreeable to her

## 2024-12-09 ENCOUNTER — PREP FOR SURGERY (OUTPATIENT)
Dept: OTHER | Facility: HOSPITAL | Age: 89
End: 2024-12-09
Payer: MEDICARE

## 2024-12-09 DIAGNOSIS — R79.9 ABNORMAL FINDING OF BLOOD CHEMISTRY, UNSPECIFIED: ICD-10-CM

## 2024-12-09 DIAGNOSIS — I35.0 SEVERE AORTIC VALVE STENOSIS: Primary | ICD-10-CM

## 2024-12-09 DIAGNOSIS — I11.0 HYPERTENSIVE HEART DISEASE WITH HEART FAILURE: ICD-10-CM

## 2024-12-09 DIAGNOSIS — R79.1 ABNORMAL COAGULATION PROFILE: ICD-10-CM

## 2024-12-09 RX ORDER — CHLORHEXIDINE GLUCONATE ORAL RINSE 1.2 MG/ML
15 SOLUTION DENTAL ONCE
Status: CANCELLED | OUTPATIENT
Start: 2024-12-09 | End: 2024-12-09

## 2024-12-09 RX ORDER — CHLORHEXIDINE GLUCONATE ORAL RINSE 1.2 MG/ML
15 SOLUTION DENTAL EVERY 12 HOURS
Status: CANCELLED | OUTPATIENT
Start: 2024-12-09 | End: 2024-12-10

## 2024-12-10 ENCOUNTER — TELEPHONE (OUTPATIENT)
Dept: CARDIOLOGY | Facility: CLINIC | Age: 89
End: 2024-12-10
Payer: MEDICARE

## 2024-12-10 NOTE — TELEPHONE ENCOUNTER
Martita with radiology called because Dr. Ayala (Radiologist) wanted to make sure CS saw results of CTA TAVR done on 12/5/24.    No call back requested.    Thank you,    Darcy PHAM , RN  Triage [facility] [place]  12/10/24 11:18 EST

## 2024-12-11 ENCOUNTER — OFFICE VISIT (OUTPATIENT)
Dept: CARDIAC SURGERY | Facility: CLINIC | Age: 89
End: 2024-12-11
Payer: MEDICARE

## 2024-12-11 VITALS
RESPIRATION RATE: 19 BRPM | HEIGHT: 61 IN | DIASTOLIC BLOOD PRESSURE: 76 MMHG | BODY MASS INDEX: 28.32 KG/M2 | SYSTOLIC BLOOD PRESSURE: 154 MMHG | HEART RATE: 54 BPM | OXYGEN SATURATION: 92 % | WEIGHT: 150 LBS

## 2024-12-11 DIAGNOSIS — I35.0 SEVERE AORTIC VALVE STENOSIS: ICD-10-CM

## 2024-12-11 DIAGNOSIS — R91.8 LUNG MASS: ICD-10-CM

## 2024-12-11 DIAGNOSIS — Z85.3 HISTORY OF BREAST CANCER: Primary | ICD-10-CM

## 2024-12-11 DIAGNOSIS — R91.8 LUNG MASS: Primary | ICD-10-CM

## 2024-12-12 ENCOUNTER — PATIENT ROUNDING (BHMG ONLY) (OUTPATIENT)
Dept: CARDIAC SURGERY | Facility: CLINIC | Age: 89
End: 2024-12-12
Payer: MEDICARE

## 2024-12-12 NOTE — PROGRESS NOTES
A My Chart message has been sent to the patient for PATIENT ROUNDING with [facility] [place]

## 2024-12-16 ENCOUNTER — TELEPHONE (OUTPATIENT)
Dept: CARDIOLOGY | Facility: HOSPITAL | Age: 89
End: 2024-12-16
Payer: MEDICARE

## 2024-12-16 NOTE — TELEPHONE ENCOUNTER
Patient calls wanting to be sure a consult was placed concerning her lung mass I verified that it has been ordered and that she should receive a call soon to schedule an appointment

## 2024-12-19 ENCOUNTER — OFFICE VISIT (OUTPATIENT)
Dept: OTHER | Facility: HOSPITAL | Age: 89
End: 2024-12-19
Payer: MEDICARE

## 2024-12-19 VITALS
WEIGHT: 153.4 LBS | DIASTOLIC BLOOD PRESSURE: 56 MMHG | HEART RATE: 52 BPM | BODY MASS INDEX: 28.98 KG/M2 | OXYGEN SATURATION: 99 % | SYSTOLIC BLOOD PRESSURE: 130 MMHG

## 2024-12-19 DIAGNOSIS — R91.1 LUNG NODULE: Primary | ICD-10-CM

## 2024-12-19 PROCEDURE — 99205 OFFICE O/P NEW HI 60 MIN: CPT | Performed by: SURGERY

## 2024-12-19 PROCEDURE — G0463 HOSPITAL OUTPT CLINIC VISIT: HCPCS | Performed by: SURGERY

## 2024-12-26 NOTE — PROGRESS NOTES
"Chief Complaint  Aortic Stenosis    Subjective        Vonda Jay presents to [facility] CARDIAC SURGERY  History of Present Illness  89 years old female with hypertension, history of carotid artery disease, prior breast cancer, comes for evaluation about her aortic valve stenosis.  She has shortness of breath with exertion over the last few months.  She was hospitalized 11/15/2024 with acute on chronic diastolic congestive heart failure.  Echocardiogram on 8/28/2024 showed severe aortic valve stenosis with an aortic mean pressure gradient of 61 mmHg.  Cardiac cath on 11/8/2024 showed small caliber ramus with a 60-70 proximal stenosis.      Objective   Vital Signs:  /76 (BP Location: Left arm, Patient Position: Sitting, Cuff Size: Large Adult)   Pulse 54   Resp 19   Ht 154.9 cm (61\")   Wt 68 kg (150 lb)   SpO2 92%   BMI 28.34 kg/m²   Estimated body mass index is 28.34 kg/m² as calculated from the following:    Height as of this encounter: 154.9 cm (61\").    Weight as of this encounter: 68 kg (150 lb).          Physical Exam  Constitutional:       Appearance: Normal appearance. She is normal weight.   Cardiovascular:      Rate and Rhythm: Normal rate and regular rhythm.      Heart sounds: Murmur heard.   Pulmonary:      Effort: Pulmonary effort is normal.      Breath sounds: Normal breath sounds.   Neurological:      General: No focal deficit present.      Mental Status: She is alert and oriented to person, place, and time. Mental status is at baseline.   Psychiatric:         Mood and Affect: Mood normal.         Behavior: Behavior normal.         Thought Content: Thought content normal.         Judgment: Judgment normal.      Result Review :                Assessment and Plan   There are no diagnoses linked to this encounter.    -Prior breast cancer.  TAVR CTA showed a lung mass.  Referred to oncology  -Aortic valve stenosis.  For TAVR.  On hold until prognosis from cancer    89 " years old female with severe symptomatic aortic valve stenosis.  In the TAVR CTA she had a finding of lung mass probably metastatic disease.  I think even her valve needs treatment we need to know prognosis about her cancer.  If the prognosis is good I think we can offer her a TAVR to treat her aortic valve stenosis.  She is not a surgical candidate.  I explained risk and benefits of the procedure to the patient and she agree.  We will put the TAVR procedure on hold until oncology recommends treatment and explained prognosis       I spent 60 minutes caring for Vonda on this date of service. This time includes time spent by me in the following activities:preparing for the visit, reviewing tests, obtaining and/or reviewing a separately obtained history, performing a medically appropriate examination and/or evaluation , counseling and educating the patient/family/caregiver, ordering medications, tests, or procedures, referring and communicating with other health care professionals , documenting information in the medical record, independently interpreting results and communicating that information with the patient/family/caregiver, and care coordination  Follow Up   No follow-ups on file.  Patient was given instructions and counseling regarding her condition or for health maintenance advice. Please see specific information pulled into the AVS if appropriate.

## 2025-01-01 ENCOUNTER — HOSPITAL ENCOUNTER (INPATIENT)
Facility: HOSPITAL | Age: OVER 89
LOS: 6 days | Discharge: NURSING, REHAB OR LONG-TERM CARE | End: 2025-04-29
Attending: EMERGENCY MEDICINE | Admitting: INTERNAL MEDICINE
Payer: MEDICARE

## 2025-01-01 ENCOUNTER — HOSPITAL ENCOUNTER (INPATIENT)
Facility: HOSPITAL | Age: OVER 89
LOS: 2 days | End: 2025-05-01
Attending: HOSPITALIST | Admitting: HOSPITALIST
Payer: COMMERCIAL

## 2025-01-01 ENCOUNTER — APPOINTMENT (OUTPATIENT)
Dept: GENERAL RADIOLOGY | Facility: HOSPITAL | Age: OVER 89
End: 2025-01-01
Payer: MEDICARE

## 2025-01-01 ENCOUNTER — APPOINTMENT (OUTPATIENT)
Dept: CT IMAGING | Facility: HOSPITAL | Age: OVER 89
End: 2025-01-01
Payer: MEDICARE

## 2025-01-01 VITALS
HEART RATE: 64 BPM | HEIGHT: 61 IN | OXYGEN SATURATION: 97 % | TEMPERATURE: 99.2 F | RESPIRATION RATE: 16 BRPM | SYSTOLIC BLOOD PRESSURE: 126 MMHG | WEIGHT: 144.3 LBS | DIASTOLIC BLOOD PRESSURE: 53 MMHG | BODY MASS INDEX: 27.24 KG/M2

## 2025-01-01 VITALS — TEMPERATURE: 99 F | OXYGEN SATURATION: 96 % | SYSTOLIC BLOOD PRESSURE: 102 MMHG | DIASTOLIC BLOOD PRESSURE: 89 MMHG

## 2025-01-01 DIAGNOSIS — R11.2 NAUSEA VOMITING AND DIARRHEA: ICD-10-CM

## 2025-01-01 DIAGNOSIS — R10.10 PAIN OF UPPER ABDOMEN: Primary | ICD-10-CM

## 2025-01-01 DIAGNOSIS — R19.7 NAUSEA VOMITING AND DIARRHEA: ICD-10-CM

## 2025-01-01 DIAGNOSIS — K52.9 COLITIS: ICD-10-CM

## 2025-01-01 LAB
ADV 40+41 DNA STL QL NAA+NON-PROBE: NOT DETECTED
ALBUMIN SERPL-MCNC: 3.1 G/DL (ref 3.5–5.2)
ALBUMIN SERPL-MCNC: 3.2 G/DL (ref 3.5–5.2)
ALBUMIN SERPL-MCNC: 3.7 G/DL (ref 3.5–5.2)
ALBUMIN/GLOB SERPL: 1.1 G/DL
ALP SERPL-CCNC: 54 U/L (ref 39–117)
ALP SERPL-CCNC: 56 U/L (ref 39–117)
ALP SERPL-CCNC: 65 U/L (ref 39–117)
ALT SERPL W P-5'-P-CCNC: 5 U/L (ref 1–33)
ALT SERPL W P-5'-P-CCNC: 8 U/L (ref 1–33)
ALT SERPL W P-5'-P-CCNC: 8 U/L (ref 1–33)
ANION GAP SERPL CALCULATED.3IONS-SCNC: 10 MMOL/L (ref 5–15)
ANION GAP SERPL CALCULATED.3IONS-SCNC: 13 MMOL/L (ref 5–15)
ANION GAP SERPL CALCULATED.3IONS-SCNC: 13.4 MMOL/L (ref 5–15)
ANION GAP SERPL CALCULATED.3IONS-SCNC: 14 MMOL/L (ref 5–15)
ANION GAP SERPL CALCULATED.3IONS-SCNC: 16 MMOL/L (ref 5–15)
AST SERPL-CCNC: 12 U/L (ref 1–32)
AST SERPL-CCNC: 15 U/L (ref 1–32)
AST SERPL-CCNC: 16 U/L (ref 1–32)
ASTRO TYP 1-8 RNA STL QL NAA+NON-PROBE: NOT DETECTED
BACTERIA UR QL AUTO: ABNORMAL /HPF
BASOPHILS # BLD AUTO: 0.02 10*3/MM3 (ref 0–0.2)
BASOPHILS # BLD AUTO: 0.04 10*3/MM3 (ref 0–0.2)
BASOPHILS NFR BLD AUTO: 0.3 % (ref 0–1.5)
BASOPHILS NFR BLD AUTO: 0.4 % (ref 0–1.5)
BASOPHILS NFR BLD AUTO: 0.5 % (ref 0–1.5)
BASOPHILS NFR BLD AUTO: 0.6 % (ref 0–1.5)
BILIRUB SERPL-MCNC: 0.2 MG/DL (ref 0–1.2)
BILIRUB SERPL-MCNC: 0.2 MG/DL (ref 0–1.2)
BILIRUB SERPL-MCNC: 0.3 MG/DL (ref 0–1.2)
BILIRUB UR QL STRIP: NEGATIVE
BUN SERPL-MCNC: 26 MG/DL (ref 8–23)
BUN SERPL-MCNC: 31 MG/DL (ref 8–23)
BUN SERPL-MCNC: 31 MG/DL (ref 8–23)
BUN SERPL-MCNC: 34 MG/DL (ref 8–23)
BUN SERPL-MCNC: 35 MG/DL (ref 8–23)
BUN/CREAT SERPL: 17.9 (ref 7–25)
BUN/CREAT SERPL: 19 (ref 7–25)
BUN/CREAT SERPL: 19 (ref 7–25)
BUN/CREAT SERPL: 20.1 (ref 7–25)
BUN/CREAT SERPL: 20.1 (ref 7–25)
C CAYETANENSIS DNA STL QL NAA+NON-PROBE: NOT DETECTED
C COLI+JEJ+UPSA DNA STL QL NAA+NON-PROBE: NOT DETECTED
C DIFF TOX GENS STL QL NAA+PROBE: NEGATIVE
CALCIUM SPEC-SCNC: 8.3 MG/DL (ref 8.6–10.5)
CALCIUM SPEC-SCNC: 8.4 MG/DL (ref 8.6–10.5)
CALCIUM SPEC-SCNC: 8.5 MG/DL (ref 8.6–10.5)
CALCIUM SPEC-SCNC: 8.6 MG/DL (ref 8.6–10.5)
CALCIUM SPEC-SCNC: 9.3 MG/DL (ref 8.6–10.5)
CHLORIDE SERPL-SCNC: 94 MMOL/L (ref 98–107)
CHLORIDE SERPL-SCNC: 96 MMOL/L (ref 98–107)
CHLORIDE SERPL-SCNC: 97 MMOL/L (ref 98–107)
CHLORIDE SERPL-SCNC: 99 MMOL/L (ref 98–107)
CHLORIDE SERPL-SCNC: 99 MMOL/L (ref 98–107)
CLARITY UR: CLEAR
CO2 SERPL-SCNC: 21 MMOL/L (ref 22–29)
CO2 SERPL-SCNC: 22 MMOL/L (ref 22–29)
CO2 SERPL-SCNC: 23.6 MMOL/L (ref 22–29)
CO2 SERPL-SCNC: 24 MMOL/L (ref 22–29)
CO2 SERPL-SCNC: 25 MMOL/L (ref 22–29)
COLOR UR: YELLOW
CREAT SERPL-MCNC: 1.37 MG/DL (ref 0.57–1)
CREAT SERPL-MCNC: 1.54 MG/DL (ref 0.57–1)
CREAT SERPL-MCNC: 1.54 MG/DL (ref 0.57–1)
CREAT SERPL-MCNC: 1.84 MG/DL (ref 0.57–1)
CREAT SERPL-MCNC: 1.9 MG/DL (ref 0.57–1)
CREAT UR-MCNC: 120.4 MG/DL
CRYPTOSP DNA STL QL NAA+NON-PROBE: NOT DETECTED
CYTO UR: NORMAL
D-LACTATE SERPL-SCNC: 0.8 MMOL/L (ref 0.5–2)
DEPRECATED RDW RBC AUTO: 41.3 FL (ref 37–54)
DEPRECATED RDW RBC AUTO: 42.1 FL (ref 37–54)
DEPRECATED RDW RBC AUTO: 42.2 FL (ref 37–54)
DEPRECATED RDW RBC AUTO: 43 FL (ref 37–54)
E HISTOLYT DNA STL QL NAA+NON-PROBE: NOT DETECTED
EAEC PAA PLAS AGGR+AATA ST NAA+NON-PRB: NOT DETECTED
EC STX1+STX2 GENES STL QL NAA+NON-PROBE: NOT DETECTED
EGFRCR SERPLBLD CKD-EPI 2021: 25 ML/MIN/1.73
EGFRCR SERPLBLD CKD-EPI 2021: 26 ML/MIN/1.73
EGFRCR SERPLBLD CKD-EPI 2021: 32.1 ML/MIN/1.73
EGFRCR SERPLBLD CKD-EPI 2021: 32.1 ML/MIN/1.73
EGFRCR SERPLBLD CKD-EPI 2021: 37 ML/MIN/1.73
EOSINOPHIL # BLD AUTO: 0.14 10*3/MM3 (ref 0–0.4)
EOSINOPHIL # BLD AUTO: 0.2 10*3/MM3 (ref 0–0.4)
EOSINOPHIL # BLD AUTO: 0.31 10*3/MM3 (ref 0–0.4)
EOSINOPHIL # BLD AUTO: 0.43 10*3/MM3 (ref 0–0.4)
EOSINOPHIL NFR BLD AUTO: 1.9 % (ref 0.3–6.2)
EOSINOPHIL NFR BLD AUTO: 3.3 % (ref 0.3–6.2)
EOSINOPHIL NFR BLD AUTO: 4.8 % (ref 0.3–6.2)
EOSINOPHIL NFR BLD AUTO: 4.8 % (ref 0.3–6.2)
EPEC EAE GENE STL QL NAA+NON-PROBE: NOT DETECTED
ERYTHROCYTE [DISTWIDTH] IN BLOOD BY AUTOMATED COUNT: 13.2 % (ref 12.3–15.4)
ERYTHROCYTE [DISTWIDTH] IN BLOOD BY AUTOMATED COUNT: 13.4 % (ref 12.3–15.4)
ETEC LTA+ST1A+ST1B TOX ST NAA+NON-PROBE: NOT DETECTED
G LAMBLIA DNA STL QL NAA+NON-PROBE: NOT DETECTED
GLOBULIN UR ELPH-MCNC: 2.7 GM/DL
GLOBULIN UR ELPH-MCNC: 2.8 GM/DL
GLOBULIN UR ELPH-MCNC: 3.3 GM/DL
GLUCOSE SERPL-MCNC: 102 MG/DL (ref 65–99)
GLUCOSE SERPL-MCNC: 65 MG/DL (ref 65–99)
GLUCOSE SERPL-MCNC: 71 MG/DL (ref 65–99)
GLUCOSE SERPL-MCNC: 81 MG/DL (ref 65–99)
GLUCOSE SERPL-MCNC: 97 MG/DL (ref 65–99)
GLUCOSE UR STRIP-MCNC: NEGATIVE MG/DL
HCT VFR BLD AUTO: 25.3 % (ref 34–46.6)
HCT VFR BLD AUTO: 25.6 % (ref 34–46.6)
HCT VFR BLD AUTO: 26.6 % (ref 34–46.6)
HCT VFR BLD AUTO: 29.2 % (ref 34–46.6)
HGB BLD-MCNC: 8.2 G/DL (ref 12–15.9)
HGB BLD-MCNC: 8.5 G/DL (ref 12–15.9)
HGB BLD-MCNC: 8.9 G/DL (ref 12–15.9)
HGB BLD-MCNC: 9.6 G/DL (ref 12–15.9)
HGB UR QL STRIP.AUTO: NEGATIVE
HOLD SPECIMEN: NORMAL
HOLD SPECIMEN: NORMAL
HYALINE CASTS UR QL AUTO: ABNORMAL /LPF
IMM GRANULOCYTES # BLD AUTO: 0.01 10*3/MM3 (ref 0–0.05)
IMM GRANULOCYTES # BLD AUTO: 0.02 10*3/MM3 (ref 0–0.05)
IMM GRANULOCYTES # BLD AUTO: 0.02 10*3/MM3 (ref 0–0.05)
IMM GRANULOCYTES # BLD AUTO: 0.11 10*3/MM3 (ref 0–0.05)
IMM GRANULOCYTES NFR BLD AUTO: 0.2 % (ref 0–0.5)
IMM GRANULOCYTES NFR BLD AUTO: 0.3 % (ref 0–0.5)
IMM GRANULOCYTES NFR BLD AUTO: 0.3 % (ref 0–0.5)
IMM GRANULOCYTES NFR BLD AUTO: 1.2 % (ref 0–0.5)
INR PPP: 1.21 (ref 0.9–1.1)
KETONES UR QL STRIP: ABNORMAL
LAB AP CASE REPORT: NORMAL
LEUKOCYTE ESTERASE UR QL STRIP.AUTO: NEGATIVE
LIPASE SERPL-CCNC: 11 U/L (ref 13–60)
LYMPHOCYTES # BLD AUTO: 0.96 10*3/MM3 (ref 0.7–3.1)
LYMPHOCYTES # BLD AUTO: 1.18 10*3/MM3 (ref 0.7–3.1)
LYMPHOCYTES # BLD AUTO: 1.49 10*3/MM3 (ref 0.7–3.1)
LYMPHOCYTES # BLD AUTO: 1.5 10*3/MM3 (ref 0.7–3.1)
LYMPHOCYTES NFR BLD AUTO: 13 % (ref 19.6–45.3)
LYMPHOCYTES NFR BLD AUTO: 13 % (ref 19.6–45.3)
LYMPHOCYTES NFR BLD AUTO: 23.4 % (ref 19.6–45.3)
LYMPHOCYTES NFR BLD AUTO: 24.6 % (ref 19.6–45.3)
MAGNESIUM SERPL-MCNC: 2.5 MG/DL (ref 1.6–2.4)
MAGNESIUM SERPL-MCNC: 2.8 MG/DL (ref 1.6–2.4)
MCH RBC QN AUTO: 28.2 PG (ref 26.6–33)
MCH RBC QN AUTO: 28.7 PG (ref 26.6–33)
MCH RBC QN AUTO: 28.9 PG (ref 26.6–33)
MCH RBC QN AUTO: 29.3 PG (ref 26.6–33)
MCHC RBC AUTO-ENTMCNC: 32 G/DL (ref 31.5–35.7)
MCHC RBC AUTO-ENTMCNC: 32.9 G/DL (ref 31.5–35.7)
MCHC RBC AUTO-ENTMCNC: 33.5 G/DL (ref 31.5–35.7)
MCHC RBC AUTO-ENTMCNC: 33.6 G/DL (ref 31.5–35.7)
MCV RBC AUTO: 86.4 FL (ref 79–97)
MCV RBC AUTO: 87.2 FL (ref 79–97)
MCV RBC AUTO: 87.4 FL (ref 79–97)
MCV RBC AUTO: 88 FL (ref 79–97)
MONOCYTES # BLD AUTO: 1.06 10*3/MM3 (ref 0.1–0.9)
MONOCYTES # BLD AUTO: 1.11 10*3/MM3 (ref 0.1–0.9)
MONOCYTES # BLD AUTO: 1.12 10*3/MM3 (ref 0.1–0.9)
MONOCYTES # BLD AUTO: 1.23 10*3/MM3 (ref 0.1–0.9)
MONOCYTES NFR BLD AUTO: 11.7 % (ref 5–12)
MONOCYTES NFR BLD AUTO: 15.2 % (ref 5–12)
MONOCYTES NFR BLD AUTO: 18.3 % (ref 5–12)
MONOCYTES NFR BLD AUTO: 19.2 % (ref 5–12)
NEUTROPHILS NFR BLD AUTO: 3.21 10*3/MM3 (ref 1.7–7)
NEUTROPHILS NFR BLD AUTO: 3.31 10*3/MM3 (ref 1.7–7)
NEUTROPHILS NFR BLD AUTO: 5.09 10*3/MM3 (ref 1.7–7)
NEUTROPHILS NFR BLD AUTO: 51.8 % (ref 42.7–76)
NEUTROPHILS NFR BLD AUTO: 53.2 % (ref 42.7–76)
NEUTROPHILS NFR BLD AUTO: 6.23 10*3/MM3 (ref 1.7–7)
NEUTROPHILS NFR BLD AUTO: 68.9 % (ref 42.7–76)
NEUTROPHILS NFR BLD AUTO: 69.1 % (ref 42.7–76)
NITRITE UR QL STRIP: NEGATIVE
NOROVIRUS GI+II RNA STL QL NAA+NON-PROBE: NOT DETECTED
NRBC BLD AUTO-RTO: 0 /100 WBC (ref 0–0.2)
NT-PROBNP SERPL-MCNC: 6141 PG/ML (ref 0–1800)
P SHIGELLOIDES DNA STL QL NAA+NON-PROBE: NOT DETECTED
PATH REPORT.FINAL DX SPEC: NORMAL
PATH REPORT.GROSS SPEC: NORMAL
PH UR STRIP.AUTO: 6 [PH] (ref 5–8)
PHOSPHATE SERPL-MCNC: 2.5 MG/DL (ref 2.5–4.5)
PHOSPHATE SERPL-MCNC: 3.1 MG/DL (ref 2.5–4.5)
PHOSPHATE SERPL-MCNC: 3.1 MG/DL (ref 2.5–4.5)
PHOSPHATE SERPL-MCNC: 3.5 MG/DL (ref 2.5–4.5)
PLATELET # BLD AUTO: 312 10*3/MM3 (ref 140–450)
PLATELET # BLD AUTO: 350 10*3/MM3 (ref 140–450)
PLATELET # BLD AUTO: 360 10*3/MM3 (ref 140–450)
PLATELET # BLD AUTO: 384 10*3/MM3 (ref 140–450)
PMV BLD AUTO: 10 FL (ref 6–12)
PMV BLD AUTO: 10.3 FL (ref 6–12)
PMV BLD AUTO: 9.5 FL (ref 6–12)
PMV BLD AUTO: 9.8 FL (ref 6–12)
POTASSIUM SERPL-SCNC: 3.5 MMOL/L (ref 3.5–5.2)
POTASSIUM SERPL-SCNC: 3.9 MMOL/L (ref 3.5–5.2)
POTASSIUM SERPL-SCNC: 3.9 MMOL/L (ref 3.5–5.2)
POTASSIUM SERPL-SCNC: 4 MMOL/L (ref 3.5–5.2)
POTASSIUM SERPL-SCNC: 4.3 MMOL/L (ref 3.5–5.2)
PROT ?TM UR-MCNC: 429.9 MG/DL
PROT SERPL-MCNC: 5.8 G/DL (ref 6–8.5)
PROT SERPL-MCNC: 5.9 G/DL (ref 6–8.5)
PROT SERPL-MCNC: 7 G/DL (ref 6–8.5)
PROT UR QL STRIP: ABNORMAL
PROT/CREAT UR: 3570.6 MG/G CREA (ref 0–200)
PROTHROMBIN TIME: 15.3 SECONDS (ref 11.7–14.2)
RBC # BLD AUTO: 2.9 10*6/MM3 (ref 3.77–5.28)
RBC # BLD AUTO: 2.91 10*6/MM3 (ref 3.77–5.28)
RBC # BLD AUTO: 3.08 10*6/MM3 (ref 3.77–5.28)
RBC # BLD AUTO: 3.34 10*6/MM3 (ref 3.77–5.28)
RBC # UR STRIP: ABNORMAL /HPF
REF LAB TEST METHOD: ABNORMAL
RVA RNA STL QL NAA+NON-PROBE: NOT DETECTED
S ENT+BONG DNA STL QL NAA+NON-PROBE: NOT DETECTED
SAPO I+II+IV+V RNA STL QL NAA+NON-PROBE: NOT DETECTED
SHIGELLA SP+EIEC IPAH ST NAA+NON-PROBE: NOT DETECTED
SODIUM SERPL-SCNC: 131 MMOL/L (ref 136–145)
SODIUM SERPL-SCNC: 132 MMOL/L (ref 136–145)
SODIUM SERPL-SCNC: 134 MMOL/L (ref 136–145)
SODIUM SERPL-SCNC: 134 MMOL/L (ref 136–145)
SODIUM SERPL-SCNC: 136 MMOL/L (ref 136–145)
SP GR UR STRIP: 1.02 (ref 1–1.03)
SQUAMOUS #/AREA URNS HPF: ABNORMAL /HPF
TSH SERPL DL<=0.05 MIU/L-ACNC: 1.32 UIU/ML (ref 0.27–4.2)
UROBILINOGEN UR QL STRIP: ABNORMAL
V CHOL+PARA+VUL DNA STL QL NAA+NON-PROBE: NOT DETECTED
V CHOLERAE DNA STL QL NAA+NON-PROBE: NOT DETECTED
WBC # UR STRIP: ABNORMAL /HPF
WBC NRBC COR # BLD AUTO: 6.05 10*3/MM3 (ref 3.4–10.8)
WBC NRBC COR # BLD AUTO: 6.4 10*3/MM3 (ref 3.4–10.8)
WBC NRBC COR # BLD AUTO: 7.37 10*3/MM3 (ref 3.4–10.8)
WBC NRBC COR # BLD AUTO: 9.05 10*3/MM3 (ref 3.4–10.8)
WHOLE BLOOD HOLD COAG: NORMAL
WHOLE BLOOD HOLD SPECIMEN: NORMAL
Y ENTEROCOL DNA STL QL NAA+NON-PROBE: NOT DETECTED

## 2025-01-01 PROCEDURE — 97530 THERAPEUTIC ACTIVITIES: CPT

## 2025-01-01 PROCEDURE — 25010000002 GLYCOPYRROLATE 0.2 MG/ML SOLUTION: Performed by: HOSPITALIST

## 2025-01-01 PROCEDURE — 87507 IADNA-DNA/RNA PROBE TQ 12-25: CPT | Performed by: EMERGENCY MEDICINE

## 2025-01-01 PROCEDURE — 99285 EMERGENCY DEPT VISIT HI MDM: CPT

## 2025-01-01 PROCEDURE — 99232 SBSQ HOSP IP/OBS MODERATE 35: CPT | Performed by: SURGERY

## 2025-01-01 PROCEDURE — 25010000002 HYDROMORPHONE PER 4 MG: Performed by: INTERNAL MEDICINE

## 2025-01-01 PROCEDURE — 25010000002 ONDANSETRON PER 1 MG: Performed by: INTERNAL MEDICINE

## 2025-01-01 PROCEDURE — 25810000003 SODIUM CHLORIDE 0.9 % SOLUTION: Performed by: SURGERY

## 2025-01-01 PROCEDURE — 25810000003 LACTATED RINGERS SOLUTION: Performed by: EMERGENCY MEDICINE

## 2025-01-01 PROCEDURE — 25010000002 LORAZEPAM PER 2 MG: Performed by: HOSPITALIST

## 2025-01-01 PROCEDURE — 74018 RADEX ABDOMEN 1 VIEW: CPT

## 2025-01-01 PROCEDURE — 25010000002 HYDROMORPHONE 1 MG/ML SOLUTION: Performed by: HOSPITALIST

## 2025-01-01 PROCEDURE — 83880 ASSAY OF NATRIURETIC PEPTIDE: CPT | Performed by: INTERNAL MEDICINE

## 2025-01-01 PROCEDURE — 74176 CT ABD & PELVIS W/O CONTRAST: CPT

## 2025-01-01 PROCEDURE — 80053 COMPREHEN METABOLIC PANEL: CPT | Performed by: INTERNAL MEDICINE

## 2025-01-01 PROCEDURE — 83735 ASSAY OF MAGNESIUM: CPT | Performed by: INTERNAL MEDICINE

## 2025-01-01 PROCEDURE — 45331 SIGMOIDOSCOPY AND BIOPSY: CPT | Performed by: SURGERY

## 2025-01-01 PROCEDURE — 25010000002 ONDANSETRON PER 1 MG: Performed by: EMERGENCY MEDICINE

## 2025-01-01 PROCEDURE — 25510000002 DIATRIZOATE MEGLUMINE & SODIUM PER 1 ML: Performed by: STUDENT IN AN ORGANIZED HEALTH CARE EDUCATION/TRAINING PROGRAM

## 2025-01-01 PROCEDURE — 83690 ASSAY OF LIPASE: CPT | Performed by: EMERGENCY MEDICINE

## 2025-01-01 PROCEDURE — 25010000002 MORPHINE PER 10 MG: Performed by: EMERGENCY MEDICINE

## 2025-01-01 PROCEDURE — 25010000002 ENOXAPARIN PER 10 MG: Performed by: INTERNAL MEDICINE

## 2025-01-01 PROCEDURE — 25010000002 CALCIUM GLUCONATE-NACL 1-0.675 GM/50ML-% SOLUTION: Performed by: INTERNAL MEDICINE

## 2025-01-01 PROCEDURE — 25010000002 HYDROMORPHONE PER 4 MG: Performed by: HOSPITALIST

## 2025-01-01 PROCEDURE — 74270 X-RAY XM COLON 1CNTRST STD: CPT

## 2025-01-01 PROCEDURE — 0DBN8ZX EXCISION OF SIGMOID COLON, VIA NATURAL OR ARTIFICIAL OPENING ENDOSCOPIC, DIAGNOSTIC: ICD-10-PCS | Performed by: SURGERY

## 2025-01-01 PROCEDURE — 25010000002 ENOXAPARIN PER 10 MG: Performed by: SURGERY

## 2025-01-01 PROCEDURE — 84100 ASSAY OF PHOSPHORUS: CPT | Performed by: INTERNAL MEDICINE

## 2025-01-01 PROCEDURE — 85025 COMPLETE CBC W/AUTO DIFF WBC: CPT | Performed by: EMERGENCY MEDICINE

## 2025-01-01 PROCEDURE — 85025 COMPLETE CBC W/AUTO DIFF WBC: CPT | Performed by: INTERNAL MEDICINE

## 2025-01-01 PROCEDURE — 88305 TISSUE EXAM BY PATHOLOGIST: CPT | Performed by: SURGERY

## 2025-01-01 PROCEDURE — 25010000002 ONDANSETRON PER 1 MG: Performed by: HOSPITALIST

## 2025-01-01 PROCEDURE — 80069 RENAL FUNCTION PANEL: CPT | Performed by: INTERNAL MEDICINE

## 2025-01-01 PROCEDURE — 25010000002 ONDANSETRON PER 1 MG: Performed by: SURGERY

## 2025-01-01 PROCEDURE — 25810000003 SODIUM CHLORIDE 0.9 % SOLUTION: Performed by: INTERNAL MEDICINE

## 2025-01-01 PROCEDURE — 85025 COMPLETE CBC W/AUTO DIFF WBC: CPT | Performed by: SURGERY

## 2025-01-01 PROCEDURE — 99222 1ST HOSP IP/OBS MODERATE 55: CPT | Performed by: SURGERY

## 2025-01-01 PROCEDURE — 99232 SBSQ HOSP IP/OBS MODERATE 35: CPT

## 2025-01-01 PROCEDURE — 25010000002 HYDROMORPHONE 1 MG/ML SOLUTION: Performed by: SURGERY

## 2025-01-01 PROCEDURE — 80053 COMPREHEN METABOLIC PANEL: CPT | Performed by: EMERGENCY MEDICINE

## 2025-01-01 PROCEDURE — 85610 PROTHROMBIN TIME: CPT | Performed by: INTERNAL MEDICINE

## 2025-01-01 PROCEDURE — 81001 URINALYSIS AUTO W/SCOPE: CPT | Performed by: EMERGENCY MEDICINE

## 2025-01-01 PROCEDURE — 87493 C DIFF AMPLIFIED PROBE: CPT | Performed by: EMERGENCY MEDICINE

## 2025-01-01 PROCEDURE — 84443 ASSAY THYROID STIM HORMONE: CPT | Performed by: INTERNAL MEDICINE

## 2025-01-01 PROCEDURE — 97162 PT EVAL MOD COMPLEX 30 MIN: CPT

## 2025-01-01 PROCEDURE — 97166 OT EVAL MOD COMPLEX 45 MIN: CPT

## 2025-01-01 PROCEDURE — 82570 ASSAY OF URINE CREATININE: CPT | Performed by: INTERNAL MEDICINE

## 2025-01-01 PROCEDURE — 25010000002 HYDROMORPHONE PER 4 MG: Performed by: SURGERY

## 2025-01-01 PROCEDURE — 84156 ASSAY OF PROTEIN URINE: CPT | Performed by: INTERNAL MEDICINE

## 2025-01-01 PROCEDURE — 97535 SELF CARE MNGMENT TRAINING: CPT

## 2025-01-01 PROCEDURE — 83605 ASSAY OF LACTIC ACID: CPT | Performed by: EMERGENCY MEDICINE

## 2025-01-01 RX ORDER — LORAZEPAM 2 MG/ML
1 CONCENTRATE ORAL
Status: DISCONTINUED | OUTPATIENT
Start: 2025-01-01 | End: 2025-01-01

## 2025-01-01 RX ORDER — GLYCOPYRROLATE 0.2 MG/ML
0.4 INJECTION INTRAMUSCULAR; INTRAVENOUS
Status: DISCONTINUED | OUTPATIENT
Start: 2025-01-01 | End: 2025-01-01 | Stop reason: HOSPADM

## 2025-01-01 RX ORDER — LORAZEPAM 2 MG/ML
0.5 INJECTION INTRAMUSCULAR
Status: DISCONTINUED | OUTPATIENT
Start: 2025-01-01 | End: 2025-01-01 | Stop reason: HOSPADM

## 2025-01-01 RX ORDER — CLONIDINE HYDROCHLORIDE 0.1 MG/1
0.1 TABLET ORAL EVERY 6 HOURS PRN
Status: CANCELLED | OUTPATIENT
Start: 2025-01-01

## 2025-01-01 RX ORDER — EPHEDRINE SULFATE 50 MG/ML
10 INJECTION, SOLUTION INTRAVENOUS ONCE AS NEEDED
Status: DISCONTINUED | OUTPATIENT
Start: 2025-01-01 | End: 2025-01-01 | Stop reason: HOSPADM

## 2025-01-01 RX ORDER — ONDANSETRON 2 MG/ML
4 INJECTION INTRAMUSCULAR; INTRAVENOUS EVERY 6 HOURS PRN
Status: CANCELLED | OUTPATIENT
Start: 2025-01-01

## 2025-01-01 RX ORDER — ONDANSETRON 2 MG/ML
4 INJECTION INTRAMUSCULAR; INTRAVENOUS EVERY 6 HOURS PRN
Status: DISCONTINUED | OUTPATIENT
Start: 2025-01-01 | End: 2025-01-01

## 2025-01-01 RX ORDER — SODIUM CHLORIDE 0.9 % (FLUSH) 0.9 %
10 SYRINGE (ML) INJECTION AS NEEDED
Status: CANCELLED | OUTPATIENT
Start: 2025-01-01

## 2025-01-01 RX ORDER — ENOXAPARIN SODIUM 100 MG/ML
30 INJECTION SUBCUTANEOUS NIGHTLY
Status: DISCONTINUED | OUTPATIENT
Start: 2025-01-01 | End: 2025-01-01

## 2025-01-01 RX ORDER — NALOXONE HCL 0.4 MG/ML
0.4 VIAL (ML) INJECTION
Status: CANCELLED | OUTPATIENT
Start: 2025-01-01

## 2025-01-01 RX ORDER — LORAZEPAM 2 MG/ML
1 INJECTION INTRAMUSCULAR
Status: DISCONTINUED | OUTPATIENT
Start: 2025-01-01 | End: 2025-01-01

## 2025-01-01 RX ORDER — CLONIDINE HYDROCHLORIDE 0.1 MG/1
0.1 TABLET ORAL EVERY 6 HOURS PRN
Status: DISCONTINUED | OUTPATIENT
Start: 2025-01-01 | End: 2025-01-01 | Stop reason: HOSPADM

## 2025-01-01 RX ORDER — LORAZEPAM 2 MG/ML
0.5 CONCENTRATE ORAL
Status: DISCONTINUED | OUTPATIENT
Start: 2025-01-01 | End: 2025-01-01 | Stop reason: HOSPADM

## 2025-01-01 RX ORDER — ONDANSETRON 4 MG/1
4 TABLET, ORALLY DISINTEGRATING ORAL EVERY 4 HOURS PRN
Status: CANCELLED | OUTPATIENT
Start: 2025-01-01

## 2025-01-01 RX ORDER — SODIUM CHLORIDE 9 MG/ML
40 INJECTION, SOLUTION INTRAVENOUS AS NEEDED
Status: DISCONTINUED | OUTPATIENT
Start: 2025-01-01 | End: 2025-01-01 | Stop reason: HOSPADM

## 2025-01-01 RX ORDER — DIPHENHYDRAMINE HYDROCHLORIDE 50 MG/ML
12.5 INJECTION, SOLUTION INTRAMUSCULAR; INTRAVENOUS
Status: DISCONTINUED | OUTPATIENT
Start: 2025-01-01 | End: 2025-01-01 | Stop reason: HOSPADM

## 2025-01-01 RX ORDER — AMLODIPINE BESYLATE 5 MG/1
5 TABLET ORAL
Status: DISCONTINUED | OUTPATIENT
Start: 2025-01-01 | End: 2025-01-01

## 2025-01-01 RX ORDER — PRAVASTATIN SODIUM 40 MG
40 TABLET ORAL NIGHTLY
Status: DISCONTINUED | OUTPATIENT
Start: 2025-01-01 | End: 2025-01-01

## 2025-01-01 RX ORDER — ACETAMINOPHEN 325 MG/1
650 TABLET ORAL EVERY 4 HOURS PRN
Status: DISCONTINUED | OUTPATIENT
Start: 2025-01-01 | End: 2025-01-01 | Stop reason: HOSPADM

## 2025-01-01 RX ORDER — CALCIUM CARBONATE 500 MG/1
2 TABLET, CHEWABLE ORAL 2 TIMES DAILY PRN
Status: CANCELLED | OUTPATIENT
Start: 2025-01-01

## 2025-01-01 RX ORDER — NALOXONE HCL 0.4 MG/ML
0.4 VIAL (ML) INJECTION
Status: DISCONTINUED | OUTPATIENT
Start: 2025-01-01 | End: 2025-01-01 | Stop reason: HOSPADM

## 2025-01-01 RX ORDER — TERAZOSIN 2 MG/1
2 CAPSULE ORAL NIGHTLY
Status: DISCONTINUED | OUTPATIENT
Start: 2025-01-01 | End: 2025-01-01

## 2025-01-01 RX ORDER — HYDROMORPHONE HYDROCHLORIDE 1 MG/ML
0.5 INJECTION, SOLUTION INTRAMUSCULAR; INTRAVENOUS; SUBCUTANEOUS
Status: DISCONTINUED | OUTPATIENT
Start: 2025-01-01 | End: 2025-01-01

## 2025-01-01 RX ORDER — SODIUM CHLORIDE 0.9 % (FLUSH) 0.9 %
10 SYRINGE (ML) INJECTION AS NEEDED
Status: DISCONTINUED | OUTPATIENT
Start: 2025-01-01 | End: 2025-01-01 | Stop reason: HOSPADM

## 2025-01-01 RX ORDER — POTASSIUM CHLORIDE 1500 MG/1
40 TABLET, EXTENDED RELEASE ORAL ONCE
Status: COMPLETED | OUTPATIENT
Start: 2025-01-01 | End: 2025-01-01

## 2025-01-01 RX ORDER — TORSEMIDE 20 MG/1
20 TABLET ORAL DAILY
Status: DISCONTINUED | OUTPATIENT
Start: 2025-01-01 | End: 2025-01-01

## 2025-01-01 RX ORDER — HYDROMORPHONE HYDROCHLORIDE 2 MG/ML
1.5 INJECTION, SOLUTION INTRAMUSCULAR; INTRAVENOUS; SUBCUTANEOUS
Status: DISCONTINUED | OUTPATIENT
Start: 2025-01-01 | End: 2025-01-01 | Stop reason: HOSPADM

## 2025-01-01 RX ORDER — GLYCOPYRROLATE 0.2 MG/ML
0.2 INJECTION INTRAMUSCULAR; INTRAVENOUS
Status: DISCONTINUED | OUTPATIENT
Start: 2025-01-01 | End: 2025-01-01 | Stop reason: HOSPADM

## 2025-01-01 RX ORDER — FENTANYL CITRATE 50 UG/ML
50 INJECTION, SOLUTION INTRAMUSCULAR; INTRAVENOUS
Status: DISCONTINUED | OUTPATIENT
Start: 2025-01-01 | End: 2025-01-01 | Stop reason: HOSPADM

## 2025-01-01 RX ORDER — DIATRIZOATE MEGLUMINE AND DIATRIZOATE SODIUM 660; 100 MG/ML; MG/ML
840 SOLUTION ORAL; RECTAL
Status: COMPLETED | OUTPATIENT
Start: 2025-01-01 | End: 2025-01-01

## 2025-01-01 RX ORDER — HYDROMORPHONE HYDROCHLORIDE 1 MG/ML
0.5 INJECTION, SOLUTION INTRAMUSCULAR; INTRAVENOUS; SUBCUTANEOUS
Refills: 0 | Status: CANCELLED | OUTPATIENT
Start: 2025-01-01

## 2025-01-01 RX ORDER — CALCIUM CARBONATE 500 MG/1
2 TABLET, CHEWABLE ORAL 2 TIMES DAILY PRN
Status: DISCONTINUED | OUTPATIENT
Start: 2025-01-01 | End: 2025-01-01 | Stop reason: HOSPADM

## 2025-01-01 RX ORDER — MORPHINE SULFATE 2 MG/ML
2 INJECTION, SOLUTION INTRAMUSCULAR; INTRAVENOUS ONCE
Status: COMPLETED | OUTPATIENT
Start: 2025-01-01 | End: 2025-01-01

## 2025-01-01 RX ORDER — ONDANSETRON 4 MG/1
4 TABLET, ORALLY DISINTEGRATING ORAL EVERY 6 HOURS PRN
Status: DISCONTINUED | OUTPATIENT
Start: 2025-01-01 | End: 2025-01-01

## 2025-01-01 RX ORDER — ONDANSETRON 2 MG/ML
4 INJECTION INTRAMUSCULAR; INTRAVENOUS EVERY 6 HOURS PRN
Status: DISCONTINUED | OUTPATIENT
Start: 2025-01-01 | End: 2025-01-01 | Stop reason: HOSPADM

## 2025-01-01 RX ORDER — LORAZEPAM 2 MG/ML
1 INJECTION INTRAMUSCULAR
Status: DISCONTINUED | OUTPATIENT
Start: 2025-01-01 | End: 2025-01-01 | Stop reason: HOSPADM

## 2025-01-01 RX ORDER — TERAZOSIN 1 MG/1
1 CAPSULE ORAL NIGHTLY
Status: DISCONTINUED | OUTPATIENT
Start: 2025-01-01 | End: 2025-01-01

## 2025-01-01 RX ORDER — ONDANSETRON 4 MG/1
4 TABLET, ORALLY DISINTEGRATING ORAL EVERY 4 HOURS PRN
Status: DISCONTINUED | OUTPATIENT
Start: 2025-01-01 | End: 2025-01-01 | Stop reason: HOSPADM

## 2025-01-01 RX ORDER — HYDROMORPHONE HYDROCHLORIDE 1 MG/ML
0.25 INJECTION, SOLUTION INTRAMUSCULAR; INTRAVENOUS; SUBCUTANEOUS
Status: DISPENSED | OUTPATIENT
Start: 2025-01-01 | End: 2025-01-01

## 2025-01-01 RX ORDER — NITROGLYCERIN 0.4 MG/1
0.4 TABLET SUBLINGUAL
Status: DISCONTINUED | OUTPATIENT
Start: 2025-01-01 | End: 2025-01-01 | Stop reason: HOSPADM

## 2025-01-01 RX ORDER — LORAZEPAM 2 MG/ML
1 INJECTION INTRAMUSCULAR
Status: CANCELLED | OUTPATIENT
Start: 2025-01-01 | End: 2025-05-02

## 2025-01-01 RX ORDER — DIPHENOXYLATE HYDROCHLORIDE AND ATROPINE SULFATE 2.5; .025 MG/1; MG/1
1 TABLET ORAL
Status: CANCELLED | OUTPATIENT
Start: 2025-01-01

## 2025-01-01 RX ORDER — CARVEDILOL 12.5 MG/1
12.5 TABLET ORAL ONCE
Status: DISCONTINUED | OUTPATIENT
Start: 2025-01-01 | End: 2025-01-01

## 2025-01-01 RX ORDER — HYDROMORPHONE HYDROCHLORIDE 1 MG/ML
0.5 INJECTION, SOLUTION INTRAMUSCULAR; INTRAVENOUS; SUBCUTANEOUS
Status: DISCONTINUED | OUTPATIENT
Start: 2025-01-01 | End: 2025-01-01 | Stop reason: HOSPADM

## 2025-01-01 RX ORDER — SODIUM CHLORIDE 0.9 % (FLUSH) 0.9 %
10 SYRINGE (ML) INJECTION EVERY 12 HOURS SCHEDULED
Status: CANCELLED | OUTPATIENT
Start: 2025-01-01

## 2025-01-01 RX ORDER — SODIUM CHLORIDE 0.9 % (FLUSH) 0.9 %
10 SYRINGE (ML) INJECTION EVERY 12 HOURS SCHEDULED
Status: DISCONTINUED | OUTPATIENT
Start: 2025-01-01 | End: 2025-01-01 | Stop reason: HOSPADM

## 2025-01-01 RX ORDER — CARVEDILOL 12.5 MG/1
12.5 TABLET ORAL 2 TIMES DAILY WITH MEALS
Status: DISCONTINUED | OUTPATIENT
Start: 2025-01-01 | End: 2025-01-01 | Stop reason: HOSPADM

## 2025-01-01 RX ORDER — SCOPOLAMINE 1 MG/3D
1 PATCH, EXTENDED RELEASE TRANSDERMAL
Status: DISCONTINUED | OUTPATIENT
Start: 2025-01-01 | End: 2025-01-01 | Stop reason: HOSPADM

## 2025-01-01 RX ORDER — ACETAMINOPHEN 160 MG/5ML
650 SOLUTION ORAL EVERY 4 HOURS PRN
Status: DISCONTINUED | OUTPATIENT
Start: 2025-01-01 | End: 2025-01-01 | Stop reason: HOSPADM

## 2025-01-01 RX ORDER — FLUMAZENIL 0.1 MG/ML
0.2 INJECTION INTRAVENOUS AS NEEDED
Status: DISCONTINUED | OUTPATIENT
Start: 2025-01-01 | End: 2025-01-01 | Stop reason: HOSPADM

## 2025-01-01 RX ORDER — LORAZEPAM 2 MG/ML
1 CONCENTRATE ORAL
Status: CANCELLED | OUTPATIENT
Start: 2025-01-01 | End: 2025-05-02

## 2025-01-01 RX ORDER — DICYCLOMINE HYDROCHLORIDE 10 MG/1
20 CAPSULE ORAL 3 TIMES DAILY
Status: DISCONTINUED | OUTPATIENT
Start: 2025-01-01 | End: 2025-01-01 | Stop reason: HOSPADM

## 2025-01-01 RX ORDER — ACETAMINOPHEN 650 MG/1
650 SUPPOSITORY RECTAL EVERY 4 HOURS PRN
Status: CANCELLED | OUTPATIENT
Start: 2025-01-01

## 2025-01-01 RX ORDER — LORAZEPAM 2 MG/ML
0.5 INJECTION INTRAMUSCULAR
Status: CANCELLED | OUTPATIENT
Start: 2025-01-01 | End: 2025-05-02

## 2025-01-01 RX ORDER — LABETALOL HYDROCHLORIDE 5 MG/ML
10 INJECTION, SOLUTION INTRAVENOUS
Status: DISCONTINUED | OUTPATIENT
Start: 2025-01-01 | End: 2025-01-01 | Stop reason: HOSPADM

## 2025-01-01 RX ORDER — ACETAMINOPHEN 325 MG/1
650 TABLET ORAL EVERY 4 HOURS PRN
Status: DISCONTINUED | OUTPATIENT
Start: 2025-01-01 | End: 2025-01-01 | Stop reason: SDUPTHER

## 2025-01-01 RX ORDER — TERAZOSIN 5 MG/1
5 CAPSULE ORAL NIGHTLY
Status: DISCONTINUED | OUTPATIENT
Start: 2025-01-01 | End: 2025-01-01

## 2025-01-01 RX ORDER — DIPHENOXYLATE HYDROCHLORIDE AND ATROPINE SULFATE 2.5; .025 MG/1; MG/1
1 TABLET ORAL
Status: DISCONTINUED | OUTPATIENT
Start: 2025-01-01 | End: 2025-01-01 | Stop reason: HOSPADM

## 2025-01-01 RX ORDER — SODIUM CHLORIDE 9 MG/ML
100 INJECTION, SOLUTION INTRAVENOUS CONTINUOUS
Status: ACTIVE | OUTPATIENT
Start: 2025-01-01 | End: 2025-01-01

## 2025-01-01 RX ORDER — ONDANSETRON 2 MG/ML
4 INJECTION INTRAMUSCULAR; INTRAVENOUS ONCE
Status: COMPLETED | OUTPATIENT
Start: 2025-01-01 | End: 2025-01-01

## 2025-01-01 RX ORDER — AMLODIPINE BESYLATE 2.5 MG/1
2.5 TABLET ORAL ONCE
Status: COMPLETED | OUTPATIENT
Start: 2025-01-01 | End: 2025-01-01

## 2025-01-01 RX ORDER — DROPERIDOL 2.5 MG/ML
0.62 INJECTION, SOLUTION INTRAMUSCULAR; INTRAVENOUS ONCE AS NEEDED
Status: DISCONTINUED | OUTPATIENT
Start: 2025-01-01 | End: 2025-01-01 | Stop reason: HOSPADM

## 2025-01-01 RX ORDER — TERAZOSIN 2 MG/1
2 CAPSULE ORAL DAILY
Status: DISCONTINUED | OUTPATIENT
Start: 2025-01-01 | End: 2025-01-01 | Stop reason: HOSPADM

## 2025-01-01 RX ORDER — SODIUM CHLORIDE 9 MG/ML
40 INJECTION, SOLUTION INTRAVENOUS AS NEEDED
Status: CANCELLED | OUTPATIENT
Start: 2025-01-01

## 2025-01-01 RX ORDER — CITALOPRAM HYDROBROMIDE 20 MG/1
10 TABLET ORAL DAILY
Status: DISCONTINUED | OUTPATIENT
Start: 2025-01-01 | End: 2025-01-01 | Stop reason: HOSPADM

## 2025-01-01 RX ORDER — NITROGLYCERIN 0.4 MG/1
0.4 TABLET SUBLINGUAL
Status: CANCELLED | OUTPATIENT
Start: 2025-01-01

## 2025-01-01 RX ORDER — ACETAMINOPHEN 650 MG/1
650 SUPPOSITORY RECTAL EVERY 4 HOURS PRN
Status: DISCONTINUED | OUTPATIENT
Start: 2025-01-01 | End: 2025-01-01 | Stop reason: SDUPTHER

## 2025-01-01 RX ORDER — LORAZEPAM 2 MG/ML
1 CONCENTRATE ORAL
Status: DISCONTINUED | OUTPATIENT
Start: 2025-01-01 | End: 2025-01-01 | Stop reason: HOSPADM

## 2025-01-01 RX ORDER — ACETAMINOPHEN 650 MG/1
650 SUPPOSITORY RECTAL EVERY 4 HOURS PRN
Status: DISCONTINUED | OUTPATIENT
Start: 2025-01-01 | End: 2025-01-01 | Stop reason: HOSPADM

## 2025-01-01 RX ORDER — ASPIRIN 81 MG/1
81 TABLET ORAL DAILY
Status: DISCONTINUED | OUTPATIENT
Start: 2025-01-01 | End: 2025-01-01

## 2025-01-01 RX ORDER — SODIUM CHLORIDE 0.9 % (FLUSH) 0.9 %
10 SYRINGE (ML) INJECTION AS NEEDED
Status: DISCONTINUED | OUTPATIENT
Start: 2025-01-01 | End: 2025-01-01

## 2025-01-01 RX ORDER — NALOXONE HCL 0.4 MG/ML
0.2 VIAL (ML) INJECTION AS NEEDED
Status: DISCONTINUED | OUTPATIENT
Start: 2025-01-01 | End: 2025-01-01 | Stop reason: HOSPADM

## 2025-01-01 RX ORDER — ACETAMINOPHEN 160 MG/5ML
650 SOLUTION ORAL EVERY 4 HOURS PRN
Status: CANCELLED | OUTPATIENT
Start: 2025-01-01

## 2025-01-01 RX ORDER — HYDRALAZINE HYDROCHLORIDE 25 MG/1
100 TABLET, FILM COATED ORAL EVERY 8 HOURS SCHEDULED
Status: DISCONTINUED | OUTPATIENT
Start: 2025-01-01 | End: 2025-01-01 | Stop reason: HOSPADM

## 2025-01-01 RX ORDER — ONDANSETRON 2 MG/ML
4 INJECTION INTRAMUSCULAR; INTRAVENOUS ONCE AS NEEDED
Status: DISCONTINUED | OUTPATIENT
Start: 2025-01-01 | End: 2025-01-01 | Stop reason: HOSPADM

## 2025-01-01 RX ORDER — AMLODIPINE BESYLATE 5 MG/1
7.5 TABLET ORAL
Status: DISCONTINUED | OUTPATIENT
Start: 2025-01-01 | End: 2025-01-01 | Stop reason: HOSPADM

## 2025-01-01 RX ORDER — HYDROMORPHONE HYDROCHLORIDE 1 MG/ML
0.5 INJECTION, SOLUTION INTRAMUSCULAR; INTRAVENOUS; SUBCUTANEOUS
Status: DISCONTINUED | OUTPATIENT
Start: 2025-01-01 | End: 2025-01-01 | Stop reason: SDUPTHER

## 2025-01-01 RX ORDER — ACETAMINOPHEN 160 MG/5ML
650 SOLUTION ORAL EVERY 4 HOURS PRN
Status: DISCONTINUED | OUTPATIENT
Start: 2025-01-01 | End: 2025-01-01 | Stop reason: SDUPTHER

## 2025-01-01 RX ORDER — CARVEDILOL 12.5 MG/1
12.5 TABLET ORAL EVERY 8 HOURS SCHEDULED
Status: DISCONTINUED | OUTPATIENT
Start: 2025-01-01 | End: 2025-01-01

## 2025-01-01 RX ORDER — ACETAMINOPHEN 325 MG/1
650 TABLET ORAL EVERY 4 HOURS PRN
Status: CANCELLED | OUTPATIENT
Start: 2025-01-01

## 2025-01-01 RX ORDER — CALCIUM GLUCONATE 20 MG/ML
1000 INJECTION, SOLUTION INTRAVENOUS ONCE
Status: COMPLETED | OUTPATIENT
Start: 2025-01-01 | End: 2025-01-01

## 2025-01-01 RX ORDER — HYDRALAZINE HYDROCHLORIDE 20 MG/ML
10 INJECTION INTRAMUSCULAR; INTRAVENOUS
Status: DISCONTINUED | OUTPATIENT
Start: 2025-01-01 | End: 2025-01-01 | Stop reason: HOSPADM

## 2025-01-01 RX ORDER — SODIUM CHLORIDE 9 MG/ML
75 INJECTION, SOLUTION INTRAVENOUS CONTINUOUS
Status: DISCONTINUED | OUTPATIENT
Start: 2025-01-01 | End: 2025-01-01

## 2025-01-01 RX ORDER — FENTANYL CITRATE 50 UG/ML
25 INJECTION, SOLUTION INTRAMUSCULAR; INTRAVENOUS
Status: DISCONTINUED | OUTPATIENT
Start: 2025-01-01 | End: 2025-01-01 | Stop reason: HOSPADM

## 2025-01-01 RX ORDER — NALOXONE HCL 0.4 MG/ML
0.4 VIAL (ML) INJECTION
Status: DISCONTINUED | OUTPATIENT
Start: 2025-01-01 | End: 2025-01-01 | Stop reason: SDUPTHER

## 2025-01-01 RX ORDER — NALOXONE HCL 0.4 MG/ML
0.4 VIAL (ML) INJECTION
Status: DISCONTINUED | OUTPATIENT
Start: 2025-01-01 | End: 2025-01-01

## 2025-01-01 RX ORDER — LORAZEPAM 2 MG/ML
2 INJECTION INTRAMUSCULAR
Status: DISCONTINUED | OUTPATIENT
Start: 2025-01-01 | End: 2025-01-01 | Stop reason: HOSPADM

## 2025-01-01 RX ORDER — LORAZEPAM 2 MG/ML
0.5 CONCENTRATE ORAL
Status: CANCELLED | OUTPATIENT
Start: 2025-01-01 | End: 2025-05-02

## 2025-01-01 RX ADMIN — HYDROMORPHONE HYDROCHLORIDE 0.5 MG: 1 INJECTION, SOLUTION INTRAMUSCULAR; INTRAVENOUS; SUBCUTANEOUS at 16:45

## 2025-01-01 RX ADMIN — ASPIRIN 81 MG: 81 TABLET, COATED ORAL at 08:38

## 2025-01-01 RX ADMIN — HYDRALAZINE HYDROCHLORIDE 100 MG: 50 TABLET ORAL at 06:03

## 2025-01-01 RX ADMIN — HYDROMORPHONE HYDROCHLORIDE 0.5 MG: 1 INJECTION, SOLUTION INTRAMUSCULAR; INTRAVENOUS; SUBCUTANEOUS at 14:17

## 2025-01-01 RX ADMIN — ENOXAPARIN SODIUM 30 MG: 100 INJECTION SUBCUTANEOUS at 21:29

## 2025-01-01 RX ADMIN — HYDROMORPHONE HYDROCHLORIDE 0.5 MG: 1 INJECTION, SOLUTION INTRAMUSCULAR; INTRAVENOUS; SUBCUTANEOUS at 11:31

## 2025-01-01 RX ADMIN — CITALOPRAM HYDROBROMIDE 10 MG: 10 TABLET ORAL at 08:38

## 2025-01-01 RX ADMIN — LORAZEPAM 0.5 MG: 2 INJECTION INTRAMUSCULAR; INTRAVENOUS at 20:16

## 2025-01-01 RX ADMIN — LORAZEPAM 0.5 MG: 2 INJECTION INTRAMUSCULAR; INTRAVENOUS at 11:14

## 2025-01-01 RX ADMIN — Medication 10 ML: at 20:17

## 2025-01-01 RX ADMIN — GLYCOPYRROLATE 0.4 MG: 0.2 INJECTION INTRAMUSCULAR; INTRAVENOUS at 10:12

## 2025-01-01 RX ADMIN — HYDROMORPHONE HYDROCHLORIDE 0.5 MG: 1 INJECTION, SOLUTION INTRAMUSCULAR; INTRAVENOUS; SUBCUTANEOUS at 06:06

## 2025-01-01 RX ADMIN — DICYCLOMINE HYDROCHLORIDE 20 MG: 10 CAPSULE ORAL at 10:35

## 2025-01-01 RX ADMIN — HYDROMORPHONE HYDROCHLORIDE 1 MG: 1 INJECTION, SOLUTION INTRAMUSCULAR; INTRAVENOUS; SUBCUTANEOUS at 15:56

## 2025-01-01 RX ADMIN — Medication 10 ML: at 20:42

## 2025-01-01 RX ADMIN — CALCIUM CARBONATE-VITAMIN D TAB 500 MG-200 UNIT 2 TABLET: 500-200 TAB at 21:30

## 2025-01-01 RX ADMIN — CARVEDILOL 12.5 MG: 12.5 TABLET, FILM COATED ORAL at 22:22

## 2025-01-01 RX ADMIN — DICYCLOMINE HYDROCHLORIDE 20 MG: 10 CAPSULE ORAL at 20:34

## 2025-01-01 RX ADMIN — CALCIUM CARBONATE-VITAMIN D TAB 500 MG-200 UNIT 2 TABLET: 500-200 TAB at 09:33

## 2025-01-01 RX ADMIN — HYDROMORPHONE HYDROCHLORIDE 1.5 MG: 2 INJECTION, SOLUTION INTRAMUSCULAR; INTRAVENOUS; SUBCUTANEOUS at 14:36

## 2025-01-01 RX ADMIN — CALCIUM CARBONATE-VITAMIN D TAB 500 MG-200 UNIT 2 TABLET: 500-200 TAB at 08:13

## 2025-01-01 RX ADMIN — HYDROMORPHONE HYDROCHLORIDE 1.5 MG: 2 INJECTION, SOLUTION INTRAMUSCULAR; INTRAVENOUS; SUBCUTANEOUS at 16:49

## 2025-01-01 RX ADMIN — HYDROMORPHONE HYDROCHLORIDE 0.5 MG: 1 INJECTION, SOLUTION INTRAMUSCULAR; INTRAVENOUS; SUBCUTANEOUS at 16:10

## 2025-01-01 RX ADMIN — ONDANSETRON 4 MG: 2 INJECTION, SOLUTION INTRAMUSCULAR; INTRAVENOUS at 06:58

## 2025-01-01 RX ADMIN — HYDROMORPHONE HYDROCHLORIDE 1.5 MG: 2 INJECTION, SOLUTION INTRAMUSCULAR; INTRAVENOUS; SUBCUTANEOUS at 18:34

## 2025-01-01 RX ADMIN — PRAVASTATIN SODIUM 40 MG: 40 TABLET ORAL at 21:29

## 2025-01-01 RX ADMIN — HYDROMORPHONE HYDROCHLORIDE 0.5 MG: 1 INJECTION, SOLUTION INTRAMUSCULAR; INTRAVENOUS; SUBCUTANEOUS at 05:10

## 2025-01-01 RX ADMIN — HYDROMORPHONE HYDROCHLORIDE 1 MG: 1 INJECTION, SOLUTION INTRAMUSCULAR; INTRAVENOUS; SUBCUTANEOUS at 01:27

## 2025-01-01 RX ADMIN — ONDANSETRON 4 MG: 2 INJECTION, SOLUTION INTRAMUSCULAR; INTRAVENOUS at 11:36

## 2025-01-01 RX ADMIN — HYDRALAZINE HYDROCHLORIDE 100 MG: 50 TABLET ORAL at 16:26

## 2025-01-01 RX ADMIN — SODIUM CHLORIDE 100 ML/HR: 9 INJECTION, SOLUTION INTRAVENOUS at 20:56

## 2025-01-01 RX ADMIN — HYDROMORPHONE HYDROCHLORIDE 0.5 MG: 1 INJECTION, SOLUTION INTRAMUSCULAR; INTRAVENOUS; SUBCUTANEOUS at 03:09

## 2025-01-01 RX ADMIN — ENOXAPARIN SODIUM 30 MG: 100 INJECTION SUBCUTANEOUS at 20:53

## 2025-01-01 RX ADMIN — Medication 10 ML: at 21:07

## 2025-01-01 RX ADMIN — GLYCOPYRROLATE 0.4 MG: 0.2 INJECTION INTRAMUSCULAR; INTRAVENOUS at 14:36

## 2025-01-01 RX ADMIN — HYDROMORPHONE HYDROCHLORIDE 0.5 MG: 1 INJECTION, SOLUTION INTRAMUSCULAR; INTRAVENOUS; SUBCUTANEOUS at 17:48

## 2025-01-01 RX ADMIN — HYDROMORPHONE HYDROCHLORIDE 1 MG: 1 INJECTION, SOLUTION INTRAMUSCULAR; INTRAVENOUS; SUBCUTANEOUS at 20:16

## 2025-01-01 RX ADMIN — TERAZOSIN 2 MG: 2 CAPSULE ORAL at 08:12

## 2025-01-01 RX ADMIN — TERAZOSIN 1 MG: 1 CAPSULE ORAL at 23:12

## 2025-01-01 RX ADMIN — Medication 10 ML: at 21:30

## 2025-01-01 RX ADMIN — ONDANSETRON 4 MG: 2 INJECTION, SOLUTION INTRAMUSCULAR; INTRAVENOUS at 04:57

## 2025-01-01 RX ADMIN — GLYCOPYRROLATE 0.4 MG: 0.2 INJECTION INTRAMUSCULAR; INTRAVENOUS at 12:43

## 2025-01-01 RX ADMIN — HYDROMORPHONE HYDROCHLORIDE 1 MG: 1 INJECTION, SOLUTION INTRAMUSCULAR; INTRAVENOUS; SUBCUTANEOUS at 09:14

## 2025-01-01 RX ADMIN — Medication 10 ML: at 09:18

## 2025-01-01 RX ADMIN — AMLODIPINE BESYLATE 5 MG: 5 TABLET ORAL at 08:38

## 2025-01-01 RX ADMIN — HYDRALAZINE HYDROCHLORIDE 100 MG: 50 TABLET ORAL at 11:36

## 2025-01-01 RX ADMIN — MORPHINE SULFATE 2 MG: 2 INJECTION, SOLUTION INTRAMUSCULAR; INTRAVENOUS at 11:30

## 2025-01-01 RX ADMIN — ONDANSETRON 4 MG: 2 INJECTION, SOLUTION INTRAMUSCULAR; INTRAVENOUS at 11:29

## 2025-01-01 RX ADMIN — HYDROMORPHONE HYDROCHLORIDE 1 MG: 1 INJECTION, SOLUTION INTRAMUSCULAR; INTRAVENOUS; SUBCUTANEOUS at 04:28

## 2025-01-01 RX ADMIN — AMLODIPINE BESYLATE 2.5 MG: 2.5 TABLET ORAL at 10:35

## 2025-01-01 RX ADMIN — HYDRALAZINE HYDROCHLORIDE 100 MG: 50 TABLET ORAL at 15:50

## 2025-01-01 RX ADMIN — TERAZOSIN 2 MG: 2 CAPSULE ORAL at 21:29

## 2025-01-01 RX ADMIN — DICYCLOMINE HYDROCHLORIDE 20 MG: 10 CAPSULE ORAL at 17:49

## 2025-01-01 RX ADMIN — TERAZOSIN 2 MG: 2 CAPSULE ORAL at 08:37

## 2025-01-01 RX ADMIN — HYDROMORPHONE HYDROCHLORIDE 1 MG: 1 INJECTION, SOLUTION INTRAMUSCULAR; INTRAVENOUS; SUBCUTANEOUS at 05:48

## 2025-01-01 RX ADMIN — ONDANSETRON 4 MG: 2 INJECTION, SOLUTION INTRAMUSCULAR; INTRAVENOUS at 17:48

## 2025-01-01 RX ADMIN — HYDROMORPHONE HYDROCHLORIDE 1 MG: 1 INJECTION, SOLUTION INTRAMUSCULAR; INTRAVENOUS; SUBCUTANEOUS at 12:58

## 2025-01-01 RX ADMIN — HYDROMORPHONE HYDROCHLORIDE 1 MG: 1 INJECTION, SOLUTION INTRAMUSCULAR; INTRAVENOUS; SUBCUTANEOUS at 10:13

## 2025-01-01 RX ADMIN — HYDROMORPHONE HYDROCHLORIDE 1.5 MG: 2 INJECTION, SOLUTION INTRAMUSCULAR; INTRAVENOUS; SUBCUTANEOUS at 20:41

## 2025-01-01 RX ADMIN — HYDRALAZINE HYDROCHLORIDE 100 MG: 50 TABLET ORAL at 06:55

## 2025-01-01 RX ADMIN — Medication 10 ML: at 22:24

## 2025-01-01 RX ADMIN — HYDROMORPHONE HYDROCHLORIDE 1 MG: 1 INJECTION, SOLUTION INTRAMUSCULAR; INTRAVENOUS; SUBCUTANEOUS at 08:14

## 2025-01-01 RX ADMIN — CALCIUM GLUCONATE 1000 MG: 20 INJECTION, SOLUTION INTRAVENOUS at 18:23

## 2025-01-01 RX ADMIN — DICYCLOMINE HYDROCHLORIDE 20 MG: 10 CAPSULE ORAL at 22:23

## 2025-01-01 RX ADMIN — HYDRALAZINE HYDROCHLORIDE 100 MG: 50 TABLET ORAL at 22:22

## 2025-01-01 RX ADMIN — LORAZEPAM 0.5 MG: 2 INJECTION INTRAMUSCULAR; INTRAVENOUS at 08:50

## 2025-01-01 RX ADMIN — HYDRALAZINE HYDROCHLORIDE 100 MG: 50 TABLET ORAL at 21:30

## 2025-01-01 RX ADMIN — LORAZEPAM 1 MG: 2 INJECTION INTRAMUSCULAR; INTRAVENOUS at 12:44

## 2025-01-01 RX ADMIN — LORAZEPAM 1 MG: 2 INJECTION INTRAMUSCULAR; INTRAVENOUS at 10:12

## 2025-01-01 RX ADMIN — HYDRALAZINE HYDROCHLORIDE 100 MG: 50 TABLET ORAL at 05:47

## 2025-01-01 RX ADMIN — Medication 10 ML: at 20:24

## 2025-01-01 RX ADMIN — HYDROMORPHONE HYDROCHLORIDE 1.5 MG: 2 INJECTION, SOLUTION INTRAMUSCULAR; INTRAVENOUS; SUBCUTANEOUS at 22:43

## 2025-01-01 RX ADMIN — LORAZEPAM 2 MG: 2 INJECTION INTRAMUSCULAR; INTRAVENOUS at 14:36

## 2025-01-01 RX ADMIN — GLYCOPYRROLATE 0.4 MG: 0.2 INJECTION INTRAMUSCULAR; INTRAVENOUS at 20:41

## 2025-01-01 RX ADMIN — ENOXAPARIN SODIUM 30 MG: 100 INJECTION SUBCUTANEOUS at 20:29

## 2025-01-01 RX ADMIN — HYDRALAZINE HYDROCHLORIDE 100 MG: 50 TABLET ORAL at 22:23

## 2025-01-01 RX ADMIN — LORAZEPAM 2 MG: 2 INJECTION INTRAMUSCULAR; INTRAVENOUS at 16:48

## 2025-01-01 RX ADMIN — HYDROMORPHONE HYDROCHLORIDE 0.5 MG: 1 INJECTION, SOLUTION INTRAMUSCULAR; INTRAVENOUS; SUBCUTANEOUS at 06:58

## 2025-01-01 RX ADMIN — HYDROMORPHONE HYDROCHLORIDE 1 MG: 1 INJECTION, SOLUTION INTRAMUSCULAR; INTRAVENOUS; SUBCUTANEOUS at 12:44

## 2025-01-01 RX ADMIN — HYDROMORPHONE HYDROCHLORIDE 0.25 MG: 1 INJECTION, SOLUTION INTRAMUSCULAR; INTRAVENOUS; SUBCUTANEOUS at 08:13

## 2025-01-01 RX ADMIN — GLYCOPYRROLATE 0.4 MG: 0.2 INJECTION INTRAMUSCULAR; INTRAVENOUS at 16:48

## 2025-01-01 RX ADMIN — Medication 10 ML: at 08:15

## 2025-01-01 RX ADMIN — ASPIRIN 81 MG: 81 TABLET, COATED ORAL at 09:32

## 2025-01-01 RX ADMIN — LORAZEPAM 2 MG: 2 INJECTION INTRAMUSCULAR; INTRAVENOUS at 22:43

## 2025-01-01 RX ADMIN — Medication 10 ML: at 09:33

## 2025-01-01 RX ADMIN — ASPIRIN 81 MG: 81 TABLET, COATED ORAL at 08:13

## 2025-01-01 RX ADMIN — CITALOPRAM HYDROBROMIDE 10 MG: 10 TABLET ORAL at 09:32

## 2025-01-01 RX ADMIN — CALCIUM CARBONATE-VITAMIN D TAB 500 MG-200 UNIT 2 TABLET: 500-200 TAB at 09:31

## 2025-01-01 RX ADMIN — CLONIDINE HYDROCHLORIDE 0.1 MG: 0.1 TABLET ORAL at 08:21

## 2025-01-01 RX ADMIN — Medication 10 ML: at 09:32

## 2025-01-01 RX ADMIN — LORAZEPAM 1 MG: 2 INJECTION INTRAMUSCULAR; INTRAVENOUS at 11:31

## 2025-01-01 RX ADMIN — SODIUM CHLORIDE, POTASSIUM CHLORIDE, SODIUM LACTATE AND CALCIUM CHLORIDE 500 ML: 600; 310; 30; 20 INJECTION, SOLUTION INTRAVENOUS at 11:31

## 2025-01-01 RX ADMIN — HYDROMORPHONE HYDROCHLORIDE 1 MG: 1 INJECTION, SOLUTION INTRAMUSCULAR; INTRAVENOUS; SUBCUTANEOUS at 09:08

## 2025-01-01 RX ADMIN — TERAZOSIN 3 MG: 2 CAPSULE ORAL at 20:23

## 2025-01-01 RX ADMIN — HYDROMORPHONE HYDROCHLORIDE 1 MG: 1 INJECTION, SOLUTION INTRAMUSCULAR; INTRAVENOUS; SUBCUTANEOUS at 17:04

## 2025-01-01 RX ADMIN — Medication 10 ML: at 18:20

## 2025-01-01 RX ADMIN — AMLODIPINE BESYLATE 5 MG: 5 TABLET ORAL at 10:35

## 2025-01-01 RX ADMIN — CITALOPRAM HYDROBROMIDE 10 MG: 10 TABLET ORAL at 09:10

## 2025-01-01 RX ADMIN — DIATRIZOATE MEGLUMINE AND DIATRIZOATE SODIUM 840 ML: 660; 100 LIQUID ORAL; RECTAL at 09:30

## 2025-01-01 RX ADMIN — SODIUM CHLORIDE 100 ML/HR: 9 INJECTION, SOLUTION INTRAVENOUS at 09:32

## 2025-01-01 RX ADMIN — HYDROMORPHONE HYDROCHLORIDE 1 MG: 1 INJECTION, SOLUTION INTRAMUSCULAR; INTRAVENOUS; SUBCUTANEOUS at 22:36

## 2025-01-01 RX ADMIN — Medication 10 ML: at 22:23

## 2025-01-01 RX ADMIN — CALCIUM CARBONATE-VITAMIN D TAB 500 MG-200 UNIT 2 TABLET: 500-200 TAB at 08:36

## 2025-01-01 RX ADMIN — HYDRALAZINE HYDROCHLORIDE 100 MG: 50 TABLET ORAL at 22:03

## 2025-01-01 RX ADMIN — HYDROMORPHONE HYDROCHLORIDE 1 MG: 1 INJECTION, SOLUTION INTRAMUSCULAR; INTRAVENOUS; SUBCUTANEOUS at 06:20

## 2025-01-01 RX ADMIN — CARVEDILOL 12.5 MG: 12.5 TABLET, FILM COATED ORAL at 08:37

## 2025-01-01 RX ADMIN — SODIUM CHLORIDE 75 ML/HR: 9 INJECTION, SOLUTION INTRAVENOUS at 05:28

## 2025-01-01 RX ADMIN — HYDROMORPHONE HYDROCHLORIDE 0.5 MG: 1 INJECTION, SOLUTION INTRAMUSCULAR; INTRAVENOUS; SUBCUTANEOUS at 20:28

## 2025-01-01 RX ADMIN — Medication 10 ML: at 20:29

## 2025-01-01 RX ADMIN — PRAVASTATIN SODIUM 40 MG: 40 TABLET ORAL at 22:22

## 2025-01-01 RX ADMIN — CALCIUM CARBONATE-VITAMIN D TAB 500 MG-200 UNIT 2 TABLET: 500-200 TAB at 22:22

## 2025-01-01 RX ADMIN — HYDROMORPHONE HYDROCHLORIDE 1 MG: 1 INJECTION, SOLUTION INTRAMUSCULAR; INTRAVENOUS; SUBCUTANEOUS at 18:56

## 2025-01-01 RX ADMIN — Medication 10 ML: at 08:14

## 2025-01-01 RX ADMIN — HYDROMORPHONE HYDROCHLORIDE 1 MG: 1 INJECTION, SOLUTION INTRAMUSCULAR; INTRAVENOUS; SUBCUTANEOUS at 14:17

## 2025-01-01 RX ADMIN — HYDROMORPHONE HYDROCHLORIDE 0.5 MG: 1 INJECTION, SOLUTION INTRAMUSCULAR; INTRAVENOUS; SUBCUTANEOUS at 07:27

## 2025-01-01 RX ADMIN — ONDANSETRON 4 MG: 2 INJECTION, SOLUTION INTRAMUSCULAR; INTRAVENOUS at 10:58

## 2025-01-01 RX ADMIN — GLYCOPYRROLATE 0.4 MG: 0.2 INJECTION INTRAMUSCULAR; INTRAVENOUS at 18:34

## 2025-01-01 RX ADMIN — HYDROMORPHONE HYDROCHLORIDE 0.5 MG: 1 INJECTION, SOLUTION INTRAMUSCULAR; INTRAVENOUS; SUBCUTANEOUS at 02:50

## 2025-01-01 RX ADMIN — TERAZOSIN 3 MG: 2 CAPSULE ORAL at 22:23

## 2025-01-01 RX ADMIN — CARVEDILOL 12.5 MG: 12.5 TABLET, FILM COATED ORAL at 08:13

## 2025-01-01 RX ADMIN — LORAZEPAM 2 MG: 2 INJECTION INTRAMUSCULAR; INTRAVENOUS at 20:41

## 2025-01-01 RX ADMIN — DICYCLOMINE HYDROCHLORIDE 20 MG: 10 CAPSULE ORAL at 20:24

## 2025-01-01 RX ADMIN — CARVEDILOL 12.5 MG: 12.5 TABLET, FILM COATED ORAL at 17:49

## 2025-01-01 RX ADMIN — Medication 10 ML: at 08:39

## 2025-01-01 RX ADMIN — DICYCLOMINE HYDROCHLORIDE 20 MG: 10 CAPSULE ORAL at 09:10

## 2025-01-01 RX ADMIN — HYDROMORPHONE HYDROCHLORIDE 1 MG: 1 INJECTION, SOLUTION INTRAMUSCULAR; INTRAVENOUS; SUBCUTANEOUS at 22:24

## 2025-01-01 RX ADMIN — POTASSIUM CHLORIDE 40 MEQ: 1500 TABLET, EXTENDED RELEASE ORAL at 10:35

## 2025-01-01 RX ADMIN — DICYCLOMINE HYDROCHLORIDE 20 MG: 10 CAPSULE ORAL at 09:31

## 2025-01-01 RX ADMIN — CARVEDILOL 12.5 MG: 12.5 TABLET, FILM COATED ORAL at 18:06

## 2025-01-01 RX ADMIN — AMLODIPINE BESYLATE 7.5 MG: 2.5 TABLET ORAL at 09:31

## 2025-01-01 RX ADMIN — CITALOPRAM HYDROBROMIDE 10 MG: 10 TABLET ORAL at 09:33

## 2025-01-01 RX ADMIN — CARVEDILOL 12.5 MG: 12.5 TABLET, FILM COATED ORAL at 21:37

## 2025-01-01 RX ADMIN — HYDROMORPHONE HYDROCHLORIDE 0.5 MG: 1 INJECTION, SOLUTION INTRAMUSCULAR; INTRAVENOUS; SUBCUTANEOUS at 20:56

## 2025-01-01 RX ADMIN — CITALOPRAM HYDROBROMIDE 10 MG: 10 TABLET ORAL at 08:13

## 2025-01-01 RX ADMIN — AMLODIPINE BESYLATE 5 MG: 5 TABLET ORAL at 11:36

## 2025-01-01 RX ADMIN — MORPHINE SULFATE 2 MG: 2 INJECTION, SOLUTION INTRAMUSCULAR; INTRAVENOUS at 14:05

## 2025-01-01 RX ADMIN — DICYCLOMINE HYDROCHLORIDE 20 MG: 10 CAPSULE ORAL at 16:27

## 2025-01-01 RX ADMIN — HYDROMORPHONE HYDROCHLORIDE 1 MG: 1 INJECTION, SOLUTION INTRAMUSCULAR; INTRAVENOUS; SUBCUTANEOUS at 01:03

## 2025-01-01 RX ADMIN — LORAZEPAM 2 MG: 2 INJECTION INTRAMUSCULAR; INTRAVENOUS at 18:34

## 2025-01-10 ENCOUNTER — HOSPITAL ENCOUNTER (OUTPATIENT)
Dept: PET IMAGING | Facility: HOSPITAL | Age: OVER 89
Discharge: HOME | End: 2025-01-10
Payer: MEDICARE

## 2025-01-10 DIAGNOSIS — R91.1 LUNG NODULE: ICD-10-CM

## 2025-01-10 LAB — GLUCOSE BLDC GLUCOMTR-MCNC: 91 MG/DL (ref 70–130)

## 2025-01-10 PROCEDURE — 34310000005 FLUDEOXYGLUCOSE F18 SOLUTION: Performed by: SURGERY

## 2025-01-10 PROCEDURE — A9552 F18 FDG: HCPCS | Performed by: SURGERY

## 2025-01-10 PROCEDURE — 82948 REAGENT STRIP/BLOOD GLUCOSE: CPT

## 2025-01-10 PROCEDURE — 78815 PET IMAGE W/CT SKULL-THIGH: CPT

## 2025-01-10 RX ADMIN — FLUDEOXYGLUCOSE F 18 1 DOSE: 200 INJECTION, SOLUTION INTRAVENOUS at 12:30

## 2025-01-12 DIAGNOSIS — I10 PRIMARY HYPERTENSION: ICD-10-CM

## 2025-01-13 RX ORDER — NEBIVOLOL 5 MG/1
5 TABLET ORAL DAILY
Qty: 90 TABLET | Refills: 0 | Status: SHIPPED | OUTPATIENT
Start: 2025-01-13

## 2025-01-16 ENCOUNTER — OFFICE VISIT (OUTPATIENT)
Dept: OTHER | Facility: HOSPITAL | Age: OVER 89
End: 2025-01-16
Payer: MEDICARE

## 2025-01-16 VITALS — OXYGEN SATURATION: 98 % | DIASTOLIC BLOOD PRESSURE: 58 MMHG | HEART RATE: 53 BPM | SYSTOLIC BLOOD PRESSURE: 145 MMHG

## 2025-01-16 DIAGNOSIS — R91.1 LUNG NODULE: Primary | ICD-10-CM

## 2025-01-16 PROCEDURE — G0463 HOSPITAL OUTPT CLINIC VISIT: HCPCS | Performed by: SURGERY

## 2025-01-16 PROCEDURE — 99215 OFFICE O/P EST HI 40 MIN: CPT | Performed by: SURGERY

## 2025-01-27 ENCOUNTER — TELEPHONE (OUTPATIENT)
Dept: CARDIOLOGY | Facility: HOSPITAL | Age: OVER 89
End: 2025-01-27
Payer: MEDICARE

## 2025-01-27 DIAGNOSIS — I35.0 SEVERE AORTIC VALVE STENOSIS: Primary | ICD-10-CM

## 2025-01-27 NOTE — TELEPHONE ENCOUNTER
I spoke with Mrs Jay and her daughter Nusrat We discussed scheduling the TAVR procedure now that her work -up for the lung nodule has been completed She would like to schedule the TAVR as soon as possible Arrangements will be made for 2/4/25 and I'll call her with arrival times and times for the preadmission testing.

## 2025-01-27 NOTE — PROGRESS NOTES
THORACIC SURGERY CLINIC CONSULT NOTE    REASON FOR CONSULT: Right lower lobe mass, mediastinal and hilar lymphadenopathy.    REFERRING PROVIDER: Mike Rizvi MD    Subjective   HISTORY OF PRESENTING ILLNESS:   Vonda Jay is a 89 y.o. female who has significant medical problems as mentioned in the medical chart.     History of Present Illness  The patient presents for evaluation of a spot on her lung. She is accompanied by her daughter.    She has been diagnosed with severe aortic stenosis by her cardiologist, who had planned a TAVR procedure. However, a prerequisite CT scan revealed the need for an aortic valve replacement.  CT scan revealed 5 x 2.6 cm heterogeneous mass in the superior segment of the right lower lobe extending the right upper lobe involving the right hilum likely represent primary lung malignancy.  She also had bulky mediastinal and right hilar lymphadenopathy.  She has a history of smoking for 47 years, having quit in 1999. She reports no history of histoplasmosis or chest surgery. She has not undergone a chest CT scan in the past 5 years. She reports no pain and maintains an active lifestyle, including driving and living independently.    She was recently hospitalized for pneumonia, which was complicated by fluid accumulation around her heart, attributed to her aortic condition. No drainage procedures were performed during her hospital stay.    Supplemental Information  Her medical history includes breast cancer, treated with a lumpectomy in the 1990s.    SOCIAL HISTORY  The patient smoked for 47 years but has not smoked since 1999.    MEDICATIONS  Lasix    Past Medical History:   Diagnosis Date    Arthritis     Chronic kidney disease     Heart murmur     History of breast cancer     History of carotid artery disease     Hyperlipidemia     Hypertension        Past Surgical History:   Procedure Laterality Date    BREAST LUMPECTOMY  1998    CARDIAC CATHETERIZATION N/A 11/8/2024     Procedure: Right and Left Heart Cath;  Surgeon: Mike Rizvi MD;  Location:  [facility];  Service: Cardiology;  Laterality: N/A;    CARDIAC CATHETERIZATION N/A 2024    Procedure: Coronary angiography;  Surgeon: Mike Rizvi MD;  Location:  [facility]VAS[facility];  Service: Cardiology;  Laterality: N/A;    CAROTID ARTERY ANGIOPLASTY  ,     CHOLECYSTECTOMY  1979    FEMUR IM NAILING/RODDING Left 2018    Procedure: FEMUR INTRAMEDULLARY NAILING;  Surgeon: Zheng Fleming MD;  Location: [facility] MAIN OR;  Service:     HYSTERECTOMY         Family History   Problem Relation Age of Onset    Hypertension Mother     No Known Problems Father     Heart valve disorder Sister     Lung cancer Sister     Lymphoma Sister     Skin cancer Sister     Stroke Brother 57       Social History     Socioeconomic History    Marital status:    Tobacco Use    Smoking status: Former     Current packs/day: 0.00     Types: Cigarettes     Start date:      Quit date:      Years since quittin.0     Passive exposure: Past    Smokeless tobacco: Never    Tobacco comments:     caffeine use- coffee   Vaping Use    Vaping status: Never Used   Substance and Sexual Activity    Alcohol use: No    Drug use: Defer    Sexual activity: Defer         Current Outpatient Medications:     acetaminophen (TYLENOL) 500 MG tablet, Take 2 tablets by mouth Every 6 (Six) Hours As Needed for Mild Pain or Moderate Pain., Disp: , Rfl:     alendronate (FOSAMAX) 70 MG tablet, Take 1 tablet by mouth Every 7 (Seven) Days. Saturday, Disp: , Rfl:     amLODIPine (NORVASC) 5 MG tablet, Take 1 tablet by mouth Daily. Indications: High Blood Pressure, Disp: 90 tablet, Rfl: 3    aspirin 81 MG tablet, Take 1 tablet by mouth Daily., Disp: , Rfl:     Calcium-Magnesium-Vitamin D (CALCIUM MAGNESIUM PO), Take 1 tablet by mouth Daily., Disp: , Rfl:     cholecalciferol (VITAMIN D3) 1000 units tablet, Take 1 tablet  by mouth Daily., Disp: , Rfl:     citalopram (CeleXA) 10 MG tablet, Take 1 tablet by mouth Daily., Disp: , Rfl:     docusate calcium (SURFAK) 240 MG capsule, Take 1 capsule by mouth 2 (Two) Times a Day As Needed for Constipation., Disp: , Rfl:     furosemide (LASIX) 40 MG tablet, Take 1 tablet by mouth Daily. Indications: Cardiac Failure, Disp: 90 tablet, Rfl: 3    hydrALAZINE (APRESOLINE) 50 MG tablet, Take 1 tablet by mouth 3 (Three) Times a Day As Needed (SBP >150). Indications: High Blood Pressure (Patient taking differently: Take 1 tablet by mouth As Needed (SBP >150). PATIENT WAS TOLD TO JUST TAKE AS NEEDED   Indications: High Blood Pressure), Disp: 90 tablet, Rfl: 3    multivitamin with minerals tablet tablet, Take 1 tablet by mouth Daily., Disp: , Rfl:     pravastatin (PRAVACHOL) 40 MG tablet, Take 1 tablet by mouth Every Night., Disp: , Rfl:     nebivolol (BYSTOLIC) 5 MG tablet, TAKE 1 TABLET BY MOUTH EVERY DAY, Disp: 90 tablet, Rfl: 0     No Known Allergies          Objective    OBJECTIVE:     VITAL SIGNS:  /56 (BP Location: Right arm, Patient Position: Sitting)   Pulse 52   Wt 69.6 kg (153 lb 6.4 oz)   LMP  (LMP Unknown)   SpO2 99%   BMI 28.98 kg/m²     PHYSICAL EXAM:  Normal appearance.   Head is normocephalic.   Nose appears normal.   No obvious deformity of the mouth and throat.  Conjunctivae normal.   Heart rate and rhythm is normal.  Pulmonary effort is normal.   Moving all 4 extremities.  Extremities warm.  No focal deficit present.   Alert and oriented to person, place, and time.     RESULTS REVIEW:  I have reviewed the patient's all relevant laboratory and imaging findings.     Assessment & Plan    ASSESSMENT & PLAN:  Vonda Jay is a 89 y.o. female with significant medical conditions as mentioned above presented to my clinic.    Assessment & Plan  1. Pulmonary nodule.  The CT scan reveals a partially expanded area in the lower lung region, which could be indicative of infection,  inflammation, or scarring. There is also an abnormality in the right lower lobe, which could be associated with pneumonia, as part of the lung appears congested. This area measures 3.6 cm. The presence of scar tissue is a possibility, given her history. Histoplasmosis is another potential diagnosis, given its prevalence in this region. The likelihood of cancer can not be definitively assessed at this time. A PET scan will be ordered to further investigate the nature of the spot on her lung. The results of this scan will guide the decision on whether a biopsy is necessary. If the PET scan does not reveal any concerning findings, a watchful waiting approach with a CT scan at a shorter interval of 2 to 3 months may be recommended. She can then proceed with the TAVR procedure if desired.    2. Pneumonia.  She was recently hospitalized for pneumonia and fluid around her heart. The pneumonia was treated with Lasix, and no drainage procedures were performed.    I discussed the patients findings and my recommendations with the patient/family/caregiver. The patient/family/caregiver was given adequate time to ask questions and all questions were answered to patient satisfaction. Thank you for this consult and allowing us to participate in the care of your patient.      Edi Tomas MD  Thoracic Surgeon  [facility]        Dictated utilizing Dragon dictation    I spent 60 minutes caring for Vonda on this date of service. This time includes time spent by me in the following activities:preparing for the visit, reviewing tests, obtaining and/or reviewing a separately obtained history, performing a medically appropriate examination and/or evaluation, counseling and educating the patient/family/caregiver, ordering medications, tests, or procedures, referring and communicating with other health care professionals , documenting information in the medical record, independently interpreting results and  communicating that information with the patient/family/caregiver, and care coordination and more than half the time was spent in direct face to face evaluation and decision making.     Patient or patient representative verbalized consent for the use of Ambient Listening during the visit with  Edi Tomas MD for chart documentation. 1/27/2025  15:34 EST

## 2025-01-27 NOTE — PROGRESS NOTES
THORACIC SURGERY CLINIC CONSULT NOTE    REASON FOR CONSULT: Right lower lobe mass, mediastinal and hilar lymphadenopathy.     REFERRING PROVIDER: Mike Rizvi MD    Subjective   HISTORY OF PRESENTING ILLNESS:   Vonda Jay is a 89 y.o. female who has significant medical problems as mentioned in the medical chart.     History of Present Illness  She has been diagnosed with severe aortic stenosis by her cardiologist, who had planned a TAVR procedure. However, a prerequisite CT scan revealed the need for an aortic valve replacement.  CT scan revealed 5 x 2.6 cm heterogeneous mass in the superior segment of the right lower lobe extending the right upper lobe involving the right hilum likely represent primary lung malignancy.  She also had bulky mediastinal and right hilar lymphadenopathy.  She has a history of smoking for 47 years, having quit in 1999. She reports no history of histoplasmosis or chest surgery. She has not undergone a chest CT scan in the past 5 years. She reports no pain and maintains an active lifestyle, including driving and living independently.     She was recently hospitalized for pneumonia, which was complicated by fluid accumulation around her heart, attributed to her aortic condition. No drainage procedures were performed during her hospital stay.    At the time of patient consultation, I recommended PET CT scan for further characterization of the mass which was performed on 1/10/2025.  The PET/CT reported masslike consolidation and volume loss centered within the superior aspect of the right lower lobe which appeared similar to prior CT scan.  There was focal area of intense uptake in the rib consideration and the findings were concerning for malignancy.  There were mild metabolic hypermetabolic hilar medius lymphadenopathy.    She came to clinic for follow-up visit.  She reports no current issues with swallowing or experiencing acid reflux. She has been prescribed Pepcid AC,  which she uses sparingly. She underwent a PET scan following a COVID-19 infection during the  period. A CT scan was performed in 2024 in preparation for a TAVR procedure, which is currently on hold pending further evaluation of her lung condition. She recalls a previous lung scan that revealed scar tissue and is curious about its potential impact on her current condition. She reports no chest pain or leg swelling. She has a history of pneumonia, for which she was hospitalized.      Past Medical History:   Diagnosis Date    Arthritis     Chronic kidney disease     Heart murmur     History of breast cancer     History of carotid artery disease     Hyperlipidemia     Hypertension        Past Surgical History:   Procedure Laterality Date    BREAST LUMPECTOMY      CARDIAC CATHETERIZATION N/A 2024    Procedure: Right and Left Heart Cath;  Surgeon: Mike Rizvi MD;  Location: [facility] CATH INVASIVE LOCATION;  Service: Cardiology;  Laterality: N/A;    CARDIAC CATHETERIZATION N/A 2024    Procedure: Coronary angiography;  Surgeon: Mike Rizvi MD;  Location: [facility] CATH INVASIVE LOCATION;  Service: Cardiology;  Laterality: N/A;    CAROTID ARTERY ANGIOPLASTY  ,     CHOLECYSTECTOMY  1979    FEMUR IM NAILING/RODDING Left 2018    Procedure: FEMUR INTRAMEDULLARY NAILING;  Surgeon: Zheng Fleming MD;  Location: [facility] MAIN OR;  Service:     HYSTERECTOMY         Family History   Problem Relation Age of Onset    Hypertension Mother     No Known Problems Father     Heart valve disorder Sister     Lung cancer Sister     Lymphoma Sister     Skin cancer Sister     Stroke Brother 57       Social History     Socioeconomic History    Marital status:    Tobacco Use    Smoking status: Former     Current packs/day: 0.00     Types: Cigarettes     Start date:      Quit date:      Years since quittin.0     Passive exposure: Past    Smokeless tobacco: Never     Tobacco comments:     caffeine use- coffee   Vaping Use    Vaping status: Never Used   Substance and Sexual Activity    Alcohol use: No    Drug use: Defer    Sexual activity: Defer         Current Outpatient Medications:     acetaminophen (TYLENOL) 500 MG tablet, Take 2 tablets by mouth Every 6 (Six) Hours As Needed for Mild Pain or Moderate Pain., Disp: , Rfl:     alendronate (FOSAMAX) 70 MG tablet, Take 1 tablet by mouth Every 7 (Seven) Days. Saturday, Disp: , Rfl:     amLODIPine (NORVASC) 5 MG tablet, Take 1 tablet by mouth Daily. Indications: High Blood Pressure, Disp: 90 tablet, Rfl: 3    aspirin 81 MG tablet, Take 1 tablet by mouth Daily., Disp: , Rfl:     Calcium-Magnesium-Vitamin D (CALCIUM MAGNESIUM PO), Take 1 tablet by mouth Daily., Disp: , Rfl:     cholecalciferol (VITAMIN D3) 1000 units tablet, Take 1 tablet by mouth Daily., Disp: , Rfl:     citalopram (CeleXA) 10 MG tablet, Take 1 tablet by mouth Daily., Disp: , Rfl:     docusate calcium (SURFAK) 240 MG capsule, Take 1 capsule by mouth 2 (Two) Times a Day As Needed for Constipation., Disp: , Rfl:     furosemide (LASIX) 40 MG tablet, Take 1 tablet by mouth Daily. Indications: Cardiac Failure, Disp: 90 tablet, Rfl: 3    hydrALAZINE (APRESOLINE) 50 MG tablet, Take 1 tablet by mouth 3 (Three) Times a Day As Needed (SBP >150). Indications: High Blood Pressure (Patient taking differently: Take 1 tablet by mouth As Needed (SBP >150). PATIENT WAS TOLD TO JUST TAKE AS NEEDED   Indications: High Blood Pressure), Disp: 90 tablet, Rfl: 3    multivitamin with minerals tablet tablet, Take 1 tablet by mouth Daily., Disp: , Rfl:     nebivolol (BYSTOLIC) 5 MG tablet, TAKE 1 TABLET BY MOUTH EVERY DAY, Disp: 90 tablet, Rfl: 0    pravastatin (PRAVACHOL) 40 MG tablet, Take 1 tablet by mouth Every Night., Disp: , Rfl:      No Known Allergies          Objective    OBJECTIVE:     VITAL SIGNS:  /58   Pulse 53   LMP  (LMP Unknown)   SpO2 98%     PHYSICAL EXAM:  Normal  appearance.   Head is normocephalic.   Nose appears normal.   No obvious deformity of the mouth and throat.  Conjunctivae normal.   Heart rate and rhythm is normal.  Pulmonary effort is normal.   Moving all 4 extremities.  Extremities warm.  No focal deficit present.   Alert and oriented to person, place, and time.     RESULTS REVIEW:  I have reviewed the patient's all relevant laboratory and imaging findings.     Assessment & Plan    ASSESSMENT & PLAN:  Vonda Jay is a 89 y.o. female with significant medical conditions as mentioned above presented to my clinic.    Assessment & Plan  1. Right lung mass.  The PET scan results indicate a potential abnormality in the right lung, which could be attributed to either pneumonia or malignancy. The PET CT scan reveals increased metabolic activity in the suspicious area of the lung, as well as in some adjacent lymph nodes. This could be indicative of an infection, inflammation, or malignancy. The CT scan from November 2024, conducted during her pneumonia episode, shows a significant area of concern, which has improved in the subsequent scan from December 2024. However, the area remains unchanged in the last month, raising concerns about a potential underlying cancer that may have been masked by the pneumonia. The radiologist's report describes a mass-like consolidation, which is grossly similar to the scan from December 5, but better than the one from November. The persistence of this area after two months is concerning. The patient has been informed that scar tissue would not exhibit increased sugar uptake.  The patient has been informed about the risks associated with a biopsy, including a 15 to 20 percent chance of lung collapse if performed with CT guidance under mild sedation, and a 5 percent chance if done robotic navigational bronchoscopy under general anesthesia. The patient will need clearance from her cardiologist for general anesthesia.  I think her severe  stenosis puts her at high risk for general anesthesia.  If the biopsy results are negative for cancer, the patient can proceed with the TAVR procedure. If the patient opts against a biopsy, a scan will be scheduled in two months. If the scan shows improvement, the patient can proceed with the TAVR procedure. If the scan shows no change, a biopsy will be necessary before proceeding with the TAVR procedure. A telephonic consultation will be arranged for the following week to discuss the next steps.    I discussed the patients findings and my recommendations with the patient/family/caregiver. The patient/family/caregiver was given adequate time to ask questions and all questions were answered to patient satisfaction. Thank you for this consult and allowing us to participate in the care of your patient.      Edi Tomas MD  Thoracic Surgeon  [facility]        Dictated utilizing Dragon dictation    I spent 40 minutes caring for Vonda on this date of service. This time includes time spent by me in the following activities:preparing for the visit, reviewing tests, obtaining and/or reviewing a separately obtained history, performing a medically appropriate examination and/or evaluation, counseling and educating the patient/family/caregiver, ordering medications, tests, or procedures, referring and communicating with other health care professionals , documenting information in the medical record, independently interpreting results and communicating that information with the patient/family/caregiver, and care coordination and more than half the time was spent in direct face to face evaluation and decision making.     Patient or patient representative verbalized consent for the use of Ambient Listening during the visit with  Edi Tomas MD for chart documentation. 1/27/2025  15:45 EST

## 2025-01-27 NOTE — H&P (VIEW-ONLY)
THORACIC SURGERY CLINIC CONSULT NOTE    REASON FOR CONSULT: Right lower lobe mass, mediastinal and hilar lymphadenopathy.     REFERRING PROVIDER: Mike Rizvi MD    Subjective   HISTORY OF PRESENTING ILLNESS:   Vonda Jay is a 89 y.o. female who has significant medical problems as mentioned in the medical chart.     History of Present Illness  She has been diagnosed with severe aortic stenosis by her cardiologist, who had planned a TAVR procedure. However, a prerequisite CT scan revealed the need for an aortic valve replacement.  CT scan revealed 5 x 2.6 cm heterogeneous mass in the superior segment of the right lower lobe extending the right upper lobe involving the right hilum likely represent primary lung malignancy.  She also had bulky mediastinal and right hilar lymphadenopathy.  She has a history of smoking for 47 years, having quit in 1999. She reports no history of histoplasmosis or chest surgery. She has not undergone a chest CT scan in the past 5 years. She reports no pain and maintains an active lifestyle, including driving and living independently.     She was recently hospitalized for pneumonia, which was complicated by fluid accumulation around her heart, attributed to her aortic condition. No drainage procedures were performed during her hospital stay.    At the time of patient consultation, I recommended PET CT scan for further characterization of the mass which was performed on 1/10/2025.  The PET/CT reported masslike consolidation and volume loss centered within the superior aspect of the right lower lobe which appeared similar to prior CT scan.  There was focal area of intense uptake in the rib consideration and the findings were concerning for malignancy.  There were mild metabolic hypermetabolic hilar medius lymphadenopathy.    She came to clinic for follow-up visit.  She reports no current issues with swallowing or experiencing acid reflux. She has been prescribed Pepcid AC,  which she uses sparingly. She underwent a PET scan following a COVID-19 infection during the  period. A CT scan was performed in 2024 in preparation for a TAVR procedure, which is currently on hold pending further evaluation of her lung condition. She recalls a previous lung scan that revealed scar tissue and is curious about its potential impact on her current condition. She reports no chest pain or leg swelling. She has a history of pneumonia, for which she was hospitalized.      Past Medical History:   Diagnosis Date    Arthritis     Chronic kidney disease     Heart murmur     History of breast cancer     History of carotid artery disease     Hyperlipidemia     Hypertension        Past Surgical History:   Procedure Laterality Date    BREAST LUMPECTOMY      CARDIAC CATHETERIZATION N/A 2024    Procedure: Right and Left Heart Cath;  Surgeon: Mike Rizvi MD;  Location: [facility] CATH INVASIVE LOCATION;  Service: Cardiology;  Laterality: N/A;    CARDIAC CATHETERIZATION N/A 2024    Procedure: Coronary angiography;  Surgeon: Mike Rizvi MD;  Location: [facility] CATH INVASIVE LOCATION;  Service: Cardiology;  Laterality: N/A;    CAROTID ARTERY ANGIOPLASTY  ,     CHOLECYSTECTOMY  1979    FEMUR IM NAILING/RODDING Left 2018    Procedure: FEMUR INTRAMEDULLARY NAILING;  Surgeon: Zheng Fleming MD;  Location: [facility] MAIN OR;  Service:     HYSTERECTOMY         Family History   Problem Relation Age of Onset    Hypertension Mother     No Known Problems Father     Heart valve disorder Sister     Lung cancer Sister     Lymphoma Sister     Skin cancer Sister     Stroke Brother 57       Social History     Socioeconomic History    Marital status:    Tobacco Use    Smoking status: Former     Current packs/day: 0.00     Types: Cigarettes     Start date:      Quit date:      Years since quittin.0     Passive exposure: Past    Smokeless tobacco: Never     Tobacco comments:     caffeine use- coffee   Vaping Use    Vaping status: Never Used   Substance and Sexual Activity    Alcohol use: No    Drug use: Defer    Sexual activity: Defer         Current Outpatient Medications:     acetaminophen (TYLENOL) 500 MG tablet, Take 2 tablets by mouth Every 6 (Six) Hours As Needed for Mild Pain or Moderate Pain., Disp: , Rfl:     alendronate (FOSAMAX) 70 MG tablet, Take 1 tablet by mouth Every 7 (Seven) Days. Saturday, Disp: , Rfl:     amLODIPine (NORVASC) 5 MG tablet, Take 1 tablet by mouth Daily. Indications: High Blood Pressure, Disp: 90 tablet, Rfl: 3    aspirin 81 MG tablet, Take 1 tablet by mouth Daily., Disp: , Rfl:     Calcium-Magnesium-Vitamin D (CALCIUM MAGNESIUM PO), Take 1 tablet by mouth Daily., Disp: , Rfl:     cholecalciferol (VITAMIN D3) 1000 units tablet, Take 1 tablet by mouth Daily., Disp: , Rfl:     citalopram (CeleXA) 10 MG tablet, Take 1 tablet by mouth Daily., Disp: , Rfl:     docusate calcium (SURFAK) 240 MG capsule, Take 1 capsule by mouth 2 (Two) Times a Day As Needed for Constipation., Disp: , Rfl:     furosemide (LASIX) 40 MG tablet, Take 1 tablet by mouth Daily. Indications: Cardiac Failure, Disp: 90 tablet, Rfl: 3    hydrALAZINE (APRESOLINE) 50 MG tablet, Take 1 tablet by mouth 3 (Three) Times a Day As Needed (SBP >150). Indications: High Blood Pressure (Patient taking differently: Take 1 tablet by mouth As Needed (SBP >150). PATIENT WAS TOLD TO JUST TAKE AS NEEDED   Indications: High Blood Pressure), Disp: 90 tablet, Rfl: 3    multivitamin with minerals tablet tablet, Take 1 tablet by mouth Daily., Disp: , Rfl:     nebivolol (BYSTOLIC) 5 MG tablet, TAKE 1 TABLET BY MOUTH EVERY DAY, Disp: 90 tablet, Rfl: 0    pravastatin (PRAVACHOL) 40 MG tablet, Take 1 tablet by mouth Every Night., Disp: , Rfl:      No Known Allergies          Objective    OBJECTIVE:     VITAL SIGNS:  /58   Pulse 53   LMP  (LMP Unknown)   SpO2 98%     PHYSICAL EXAM:  Normal  appearance.   Head is normocephalic.   Nose appears normal.   No obvious deformity of the mouth and throat.  Conjunctivae normal.   Heart rate and rhythm is normal.  Pulmonary effort is normal.   Moving all 4 extremities.  Extremities warm.  No focal deficit present.   Alert and oriented to person, place, and time.     RESULTS REVIEW:  I have reviewed the patient's all relevant laboratory and imaging findings.     Assessment & Plan    ASSESSMENT & PLAN:  Vonda Jya is a 89 y.o. female with significant medical conditions as mentioned above presented to my clinic.    Assessment & Plan  1. Right lung mass.  The PET scan results indicate a potential abnormality in the right lung, which could be attributed to either pneumonia or malignancy. The PET CT scan reveals increased metabolic activity in the suspicious area of the lung, as well as in some adjacent lymph nodes. This could be indicative of an infection, inflammation, or malignancy. The CT scan from November 2024, conducted during her pneumonia episode, shows a significant area of concern, which has improved in the subsequent scan from December 2024. However, the area remains unchanged in the last month, raising concerns about a potential underlying cancer that may have been masked by the pneumonia. The radiologist's report describes a mass-like consolidation, which is grossly similar to the scan from December 5, but better than the one from November. The persistence of this area after two months is concerning. The patient has been informed that scar tissue would not exhibit increased sugar uptake.  The patient has been informed about the risks associated with a biopsy, including a 15 to 20 percent chance of lung collapse if performed with CT guidance under mild sedation, and a 5 percent chance if done robotic navigational bronchoscopy under general anesthesia. The patient will need clearance from her cardiologist for general anesthesia.  I think her severe  stenosis puts her at high risk for general anesthesia.  If the biopsy results are negative for cancer, the patient can proceed with the TAVR procedure. If the patient opts against a biopsy, a scan will be scheduled in two months. If the scan shows improvement, the patient can proceed with the TAVR procedure. If the scan shows no change, a biopsy will be necessary before proceeding with the TAVR procedure. A telephonic consultation will be arranged for the following week to discuss the next steps.    I discussed the patients findings and my recommendations with the patient/family/caregiver. The patient/family/caregiver was given adequate time to ask questions and all questions were answered to patient satisfaction. Thank you for this consult and allowing us to participate in the care of your patient.      Edi Tomas MD  Thoracic Surgeon  [facility]        Dictated utilizing Dragon dictation    I spent 40 minutes caring for Vonda on this date of service. This time includes time spent by me in the following activities:preparing for the visit, reviewing tests, obtaining and/or reviewing a separately obtained history, performing a medically appropriate examination and/or evaluation, counseling and educating the patient/family/caregiver, ordering medications, tests, or procedures, referring and communicating with other health care professionals , documenting information in the medical record, independently interpreting results and communicating that information with the patient/family/caregiver, and care coordination and more than half the time was spent in direct face to face evaluation and decision making.     Patient or patient representative verbalized consent for the use of Ambient Listening during the visit with  Edi Tomas MD for chart documentation. 1/27/2025  15:45 EST

## 2025-01-28 ENCOUNTER — APPOINTMENT (OUTPATIENT)
Dept: CT IMAGING | Facility: HOSPITAL | Age: OVER 89
End: 2025-01-28
Payer: MEDICARE

## 2025-01-28 ENCOUNTER — TELEPHONE (OUTPATIENT)
Dept: ORTHOPEDIC SURGERY | Facility: CLINIC | Age: OVER 89
End: 2025-01-28
Payer: MEDICARE

## 2025-01-28 ENCOUNTER — HOSPITAL ENCOUNTER (EMERGENCY)
Facility: HOSPITAL | Age: OVER 89
Discharge: HOME | End: 2025-01-29
Attending: EMERGENCY MEDICINE | Admitting: EMERGENCY MEDICINE
Payer: MEDICARE

## 2025-01-28 ENCOUNTER — TELEPHONE (OUTPATIENT)
Dept: OTHER | Facility: HOSPITAL | Age: OVER 89
End: 2025-01-28
Payer: MEDICARE

## 2025-01-28 ENCOUNTER — PREP FOR SURGERY (OUTPATIENT)
Dept: OTHER | Facility: HOSPITAL | Age: OVER 89
End: 2025-01-28
Payer: MEDICARE

## 2025-01-28 VITALS
OXYGEN SATURATION: 100 % | TEMPERATURE: 97.8 F | HEIGHT: 61 IN | HEART RATE: 67 BPM | DIASTOLIC BLOOD PRESSURE: 60 MMHG | WEIGHT: 154 LBS | SYSTOLIC BLOOD PRESSURE: 177 MMHG | RESPIRATION RATE: 18 BRPM | BODY MASS INDEX: 29.07 KG/M2

## 2025-01-28 DIAGNOSIS — S39.012A LUMBOSACRAL STRAIN, INITIAL ENCOUNTER: ICD-10-CM

## 2025-01-28 DIAGNOSIS — I35.0 SEVERE AORTIC VALVE STENOSIS: Primary | ICD-10-CM
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
## 2025-01-28 DIAGNOSIS — R79.9 ABNORMAL FINDING OF BLOOD CHEMISTRY, UNSPECIFIED: ICD-10-CM

## 2025-01-28 DIAGNOSIS — R79.1 ABNORMAL COAGULATION PROFILE: ICD-10-CM

## 2025-01-28 DIAGNOSIS — I11.0 HYPERTENSIVE HEART DISEASE WITH HEART FAILURE: ICD-10-CM

## 2025-01-28 DIAGNOSIS — M54.50 LOW BACK PAIN, UNSPECIFIED BACK PAIN LATERALITY, UNSPECIFIED CHRONICITY, UNSPECIFIED WHETHER SCIATICA PRESENT: Primary | ICD-10-CM

## 2025-01-28 LAB
BILIRUB UR QL STRIP: NEGATIVE
CLARITY UR: CLEAR
COLOR UR: YELLOW
GLUCOSE UR STRIP-MCNC: NEGATIVE MG/DL
HGB UR QL STRIP.AUTO: NEGATIVE
KETONES UR QL STRIP: NEGATIVE
LEUKOCYTE ESTERASE UR QL STRIP.AUTO: ABNORMAL
NITRITE UR QL STRIP: NEGATIVE
PH UR STRIP.AUTO: 7 [PH] (ref 5–8)
PROT UR QL STRIP: NEGATIVE
SP GR UR STRIP: 1.02 (ref 1–1.03)
UROBILINOGEN UR QL STRIP: ABNORMAL

## 2025-01-28 PROCEDURE — 72192 CT PELVIS W/O DYE: CPT

## 2025-01-28 PROCEDURE — 81003 URINALYSIS AUTO W/O SCOPE: CPT | Performed by: EMERGENCY MEDICINE

## 2025-01-28 PROCEDURE — 99284 EMERGENCY DEPT VISIT MOD MDM: CPT

## 2025-01-28 PROCEDURE — 72128 CT CHEST SPINE W/O DYE: CPT

## 2025-01-28 PROCEDURE — 72131 CT LUMBAR SPINE W/O DYE: CPT

## 2025-01-28 RX ORDER — CHLORHEXIDINE GLUCONATE ORAL RINSE 1.2 MG/ML
15 SOLUTION DENTAL EVERY 12 HOURS
Status: CANCELLED | OUTPATIENT
Start: 2025-01-28 | End: 2025-01-29

## 2025-01-28 RX ORDER — CHLORHEXIDINE GLUCONATE ORAL RINSE 1.2 MG/ML
15 SOLUTION DENTAL ONCE
OUTPATIENT
Start: 2025-01-28 | End: 2025-01-28

## 2025-01-28 NOTE — TELEPHONE ENCOUNTER
Provider: DR CHOWDARY     Caller: Vonda Jay    Relationship to Patient: Self    Phone Number: [phone]    Reason for Call: THIS PATIENT WOULD LIKE DR CHOWDARY TO REFER HER TO [facility]AILON TO FOR BACK PAIN AFTER A FALL IN THE [employer] PARKING LOT LAST WEEK. SHE IS IN A GREAT DEAL OF PAIN. PLEASE ADVISE.

## 2025-01-29 PROCEDURE — 99284 EMERGENCY DEPT VISIT MOD MDM: CPT | Performed by: EMERGENCY MEDICINE

## 2025-01-29 RX ORDER — LIDOCAINE 50 MG/G
1 PATCH TOPICAL DAILY PRN
Qty: 5 PATCH | Refills: 0 | Status: SHIPPED | OUTPATIENT
Start: 2025-01-29

## 2025-01-29 NOTE — DISCHARGE INSTRUCTIONS
May take tylenol as directed pain  Recommend outpatient physical therapy referral per primary provider  Return ED abdominal pain, bowel or bladder dysfunction, motor or sensory loss, saddle anesthesia, worse condition, any other concerns

## 2025-01-29 NOTE — FSED PROVIDER NOTE
Subjective   History of Present Illness  88yo female pmh significant htn/hyperlipidemia presents ED c/o 4d hx diffuse lower back pain s/p ground level fall in parking lot.  Pt reports pain resolution when lying supine, however, has low back pain with movement.  Denies chest pain/soa/abd pain/dysuria/hematuria/bowel or bladder dysfunction/motor or sensory loss/saddle anesthesia.    History provided by:  Patient  Back Injury      Review of Systems   Constitutional: Negative.    HENT: Negative.     Eyes: Negative.    Respiratory: Negative.     Cardiovascular: Negative.    Gastrointestinal: Negative.    Genitourinary: Negative.    Musculoskeletal:  Positive for back pain.   Neurological: Negative.    All other systems reviewed and are negative.      Past Medical History:   Diagnosis Date    Arthritis     Chronic kidney disease     Heart murmur     History of breast cancer     History of carotid artery disease     Hyperlipidemia     Hypertension        No Known Allergies    Past Surgical History:   Procedure Laterality Date    BREAST LUMPECTOMY  1998    CARDIAC CATHETERIZATION N/A 11/8/2024    Procedure: Right and Left Heart Cath;  Surgeon: Mike Rizvi MD;  Location: [facility] CATH INVASIVE LOCATION;  Service: Cardiology;  Laterality: N/A;    CARDIAC CATHETERIZATION N/A 11/8/2024    Procedure: Coronary angiography;  Surgeon: Mike Rizvi MD;  Location: [facility] INVASIVE LOCATION;  Service: Cardiology;  Laterality: N/A;    CAROTID ARTERY ANGIOPLASTY  2000, 2013    CHOLECYSTECTOMY  1979    FEMUR IM NAILING/RODDING Left 2/8/2018    Procedure: FEMUR INTRAMEDULLARY NAILING;  Surgeon: Zheng Fleming MD;  Location: [facility] OR;  Service:     HYSTERECTOMY  1989       Family History   Problem Relation Age of Onset    Hypertension Mother     No Known Problems Father     Heart valve disorder Sister     Lung cancer Sister     Lymphoma Sister     Skin cancer Sister     Stroke Brother 57       Social History      Socioeconomic History    Marital status:    Tobacco Use    Smoking status: Former     Current packs/day: 0.00     Types: Cigarettes     Start date:      Quit date:      Years since quittin.0     Passive exposure: Past    Smokeless tobacco: Never    Tobacco comments:     caffeine use- coffee   Vaping Use    Vaping status: Never Used   Substance and Sexual Activity    Alcohol use: No    Drug use: Defer    Sexual activity: Defer           Objective   Physical Exam  Vitals and nursing note reviewed.   Constitutional:       Appearance: Normal appearance.   HENT:      Head: Normocephalic and atraumatic.      Right Ear: External ear normal.      Left Ear: External ear normal.      Nose: Nose normal.      Mouth/Throat:      Mouth: Mucous membranes are moist.      Pharynx: Oropharynx is clear.   Eyes:      Pupils: Pupils are equal, round, and reactive to light.   Cardiovascular:      Rate and Rhythm: Normal rate and regular rhythm.      Heart sounds: S1 normal and S2 normal. Murmur heard.      Systolic murmur is present with a grade of 4/6.   Pulmonary:      Effort: Pulmonary effort is normal. No respiratory distress.      Breath sounds: Normal breath sounds. No wheezing, rhonchi or rales.   Abdominal:      General: Abdomen is flat. Bowel sounds are normal. There is no distension.      Palpations: Abdomen is soft.      Tenderness: There is no abdominal tenderness. There is no guarding or rebound.   Musculoskeletal:         General: Tenderness present. No swelling or deformity.      Cervical back: Normal range of motion and neck supple. No rigidity or tenderness.      Thoracic back: No tenderness or bony tenderness.      Lumbar back: Tenderness present. No bony tenderness.        Back:       Right lower leg: No edema.      Left lower leg: No edema.   Lymphadenopathy:      Cervical: No cervical adenopathy.   Skin:     General: Skin is warm and dry.   Neurological:      General: No focal deficit present.       Mental Status: She is alert and oriented to person, place, and time.      GCS: GCS eye subscore is 4. GCS verbal subscore is 5. GCS motor subscore is 6.      Sensory: Sensation is intact.      Motor: Motor function is intact.      Coordination: Coordination is intact.      Gait: Gait is intact.         Procedures           ED Course      Labs Reviewed   URINALYSIS WITHOUT MICROSCOPIC (NO CULTURE) - Abnormal; Notable for the following components:       Result Value    Leuk Esterase, UA Small (1+) (*)     All other components within normal limits     CT Thoracic Spine Without Contrast, CT Lumbar Spine Without Contrast    Result Date: 1/28/2025  Narrative: CT THORACIC, & LUMBAR SPINE WITHOUT CONTRAST  INDICATION: The patient is being seen in the ED for trauma and is determined to have a high energy mechanism and/or high risk for missed injuries due to presence of associated injuries or distracting pain. The patient will need imaging of the thoracic and lumbar spine, given diagnoses such as thoracic and lumbar spinal fractures cannot be excluded clinically. The diagnostic information gained in this study exceeds the estimated risk of radiation induced injury at the time of order placement. Mechanism of injury: Fall, upper and lower back pain  COMPARISON: Chest CT 11/13/2024, CT abdomen pelvis 3/16/2024.  TECHNIQUE: Axial unenhanced thoracic and lumbar spine CT, with multiplanar reformats. Automated dose control was used for this exam.  FINDINGS:  Thoracic spine: No acute fracture or subluxation.  Alignment is within normal limits.  There are degenerative changes but there is no lytic or blastic bone lesion.  Lumbar spine: No acute fracture or subluxation. There is degenerative scoliosis. There are degenerative changes but there is no lytic or blastic bone lesion.  Paraspinous soft tissues are remarkable for chronic changes in the lungs.      Impression:  No acute fracture, or other acute abnormality, of the  thoracic or lumbar spine.  No lytic or blastic bone lesion.  This report was finalized on 1/28/2025 11:56 PM by Dr. Con Caraballo M.D on Workstation: [id number]      CT Pelvis Without Contrast    Result Date: 1/28/2025  Narrative: CT pelvis  COMPARISON: None.  HISTORY: pelvic pain after fall  TECHNIQUE: Multidetector helical CT acquisition through the Pelvis is provided without IV contrast. Coronal and sagittal multiplanar reformatted images are provided. Radiation dose reduction techniques were utilized, including automated exposure control, and exposure modulation based on body size.  FINDINGS: There is no acute fracture or acute lytic or blastic bone lesion. Chronic areas of mixed sclerotic density seen in the sacral ala bilaterally, and pubis and inferior ischiopubic ramus, all likely representing prior healed fractures.  The pelvic soft tissues show no acute abnormality.      Impression: Chronic changes, no acute findings.   This report was finalized on 1/28/2025 11:46 PM by Dr. Con Caraballo M.D on Workstation: [id number]      NM PET/CT Skull Base to Mid Thigh    Result Date: 1/15/2025  Narrative: F-18 FDG PET FROM SKULL BASE TO MID THIGH WITH PET/CT FUSION  HISTORY: Lung nodule/mass  TECHNIQUE: Radiation dose reduction techniques were utilized, including automated exposure control and exposure modulation based on body size. Blood glucose level at time of injection was 91 mg/dL. 6.7 mCi of F-18 FDG were injected and PET was performed from skull base to mid thigh. CT was obtained for localization and attenuation correction. Time at injection 1230. PET start time 1406. Normalization method: Patient weight  Compared with CT chest dated back to 11/13/2024  FINDINGS:  No cervical adenopathy demonstrating suspicious uptake. The thyroid, submandibular and parotid gland demonstrate roughly symmetric uptake.  Long segment of mild to moderate increased uptake within the mid to distal esophagus. Hilar and  mediastinal adenopathy is hypermetabolic. Index right hilar node demonstrates max SUV of 3.1 and measures 1.3 cm in short axis dimension, as before. Index right retrotracheal node along the right aspect of the proximal esophagus demonstrates a max SUV of 3 and measures 1 cm in short axis dimension, as before. No pneumothorax. Irregular area of pulmonary consolidation and volume loss standard within the superior aspect of the right lower lobe persists, grossly similar that seen on 12/5/2024. There is a focal intense area of uptake in this area demonstrating a max SUV of 6.3 and measuring approximately 2 cm on pet imaging. Irregular consolidation and volume loss projects through the right upper lobe anteriorly, as before. Scattered subcentimeter pulm nodules as well. Index nodule within the right lower lobe appears to have minimally increased in size since 11/13/2024, measuring 0.5 cm in average axial dimension (previously 0.3 cm). No axillary adenopathy demonstrating uptake significantly above that of blood pool. Postsurgical changes from partial left mastectomy and axillary lymph node dissection are present.  No abdominal pelvic adenopathy demonstrating uptake significant above that of blood pool. There is colonic diverticulosis. The appendix is not seen. Gallbladder surgically absent. No suspicious hypermetabolic abnormality seen in the pancreas, spleen, adrenal glands or kidneys. Uptake within the liver is heterogenous and speckled, limiting overall evaluation for focal hypermetabolic metastasis; however, no definite suspicious area of uptake significantly above that of other areas of parenchyma is seen.  No hydronephrosis. No free intraperitoneal air is seen  Streak artifact from left femoral intramedullary maya and dynamic hip screw close evaluation of the pelvis. There are bilateral sacral insufficiency fractures, as before. Incompletely noted and healing left obturator ring fractures are present, as seen since  1/12/2023. No definite focal suspicious lytic or blastic osseous lesion demonstrating uptake significantly above that of other areas of marrow is seen.      Impression: 1.  Masslike consolidation and volume loss centered within the superior aspect the right lower lobe persists, grossly similar to that seen since 12/5/2024.. There is a focal area of intense uptake in the area of consolidation which given the overall persistence is concerning for malignancy. Tissue sampling is recommended. Mildly hypermetabolic hilar and mediastinal adenopathy with index nodes as above concerning for metastatic disease. 2.  A few scattered subcentimeter pulmonary nodules are present with index nodes as above. One index nodule within the right lower lobe appears mildly more prominent since prior CT; however may be related differences technique. Findings are indeterminate and continued close attention follow-up is recommended to exclude metastatic disease. 3.  Findings suggestive of healing left  ring fractures and sacral ala fractures, as seen on prior CTs. 4.  Long segment of mild to moderate increased uptake within the mid to distal esophagus suggestive of esophagitis in appropriate context. Correlation with patient history is recommended with follow-up endoscopy if clinically indicated. 5.  Please note other findings and limitations as above.  This report was finalized on 1/15/2025 2:55 PM by Dr. Colin Knowles M.D on Workstation: [id number]                                          Medical Decision Making  CT thoracic spine: neg.  CT lumbar spine: neg.  CT pelvis: neg.  UA: LE 1+ (asymptomatic).  Pt ambulatory in ED with walker.  Neurologic intact. Negative cauda equina.  No evidence thoracolumbar fracture or pelvic fracture. Stable dc with pmd followup.  Plan lidoderm 5% patch daily prn/tylenol as directed prn.  Outpatient physical therapy referral.  Strict return precautions.    Problems Addressed:  Fall, initial encounter:  complicated acute illness or injury  Lumbosacral strain, initial encounter: complicated acute illness or injury    Amount and/or Complexity of Data Reviewed  Radiology: ordered.    Risk  Prescription drug management.        Final diagnoses:   Fall, initial encounter   Lumbosacral strain, initial encounter       ED Disposition  ED Disposition       ED Disposition   Discharge    Condition   Good    Comment   --               Ambrose Ashley MD  [address] [id number] [phone]    In 1 day           Medication List        New Prescriptions      lidocaine 5 %  Commonly known as: Lidoderm  Place 1 patch on the skin as directed by provider Daily As Needed for Mild Pain. Remove & Discard patch within 12 hours or as directed by MD            Changed      hydrALAZINE 50 MG tablet  Commonly known as: APRESOLINE  Take 1 tablet by mouth 3 (Three) Times a Day As Needed (SBP >150). Indications: High Blood Pressure  What changed:   when to take this  additional instructions               Where to Get Your Medications        These medications were sent to [id number] - [place], [place] - [address] AT [facility] [address] [facility] - [phone] [facility] [phone] [address] [address]      Phone: [phone]   lidocaine 5 %

## 2025-01-31 ENCOUNTER — ANESTHESIA EVENT (OUTPATIENT)
Dept: PERIOP | Facility: HOSPITAL | Age: OVER 89
DRG: 267 | End: 2025-01-31
Payer: MEDICARE

## 2025-01-31 ENCOUNTER — DOCUMENTATION (OUTPATIENT)
Dept: CARDIOLOGY | Facility: HOSPITAL | Age: OVER 89
End: 2025-01-31
Payer: MEDICARE

## 2025-01-31 ENCOUNTER — PRE-ADMISSION TESTING (OUTPATIENT)
Dept: PREADMISSION TESTING | Facility: HOSPITAL | Age: OVER 89
End: 2025-01-31
Payer: MEDICARE

## 2025-01-31 ENCOUNTER — HOSPITAL ENCOUNTER (OUTPATIENT)
Dept: GENERAL RADIOLOGY | Facility: HOSPITAL | Age: OVER 89
Discharge: HOME | End: 2025-01-31
Payer: MEDICARE

## 2025-01-31 VITALS
SYSTOLIC BLOOD PRESSURE: 148 MMHG | TEMPERATURE: 97 F | HEART RATE: 61 BPM | OXYGEN SATURATION: 98 % | WEIGHT: 155 LBS | RESPIRATION RATE: 18 BRPM | DIASTOLIC BLOOD PRESSURE: 67 MMHG | HEIGHT: 60 IN | BODY MASS INDEX: 30.43 KG/M2

## 2025-01-31 DIAGNOSIS — R79.1 ABNORMAL COAGULATION PROFILE: ICD-10-CM

## 2025-01-31 DIAGNOSIS — I11.0 HYPERTENSIVE HEART DISEASE WITH HEART FAILURE: ICD-10-CM

## 2025-01-31 DIAGNOSIS — I35.0 SEVERE AORTIC VALVE STENOSIS: ICD-10-CM

## 2025-01-31 DIAGNOSIS — R79.9 ABNORMAL FINDING OF BLOOD CHEMISTRY, UNSPECIFIED: ICD-10-CM

## 2025-01-31 LAB
ABO GROUP BLD: NORMAL
ALBUMIN SERPL-MCNC: 4.2 G/DL (ref 3.5–5.2)
ALBUMIN/GLOB SERPL: 1.2 G/DL
ALP SERPL-CCNC: 69 U/L (ref 39–117)
ALT SERPL W P-5'-P-CCNC: 10 U/L (ref 1–33)
ANION GAP SERPL CALCULATED.3IONS-SCNC: 14.1 MMOL/L (ref 5–15)
APTT PPP: 30.1 SECONDS (ref 22.7–35.4)
AST SERPL-CCNC: 19 U/L (ref 1–32)
BACTERIA UR QL AUTO: ABNORMAL /HPF
BASOPHILS # BLD AUTO: 0.1 10*3/MM3 (ref 0–0.2)
BASOPHILS NFR BLD AUTO: 1 % (ref 0–1.5)
BILIRUB SERPL-MCNC: 0.2 MG/DL (ref 0–1.2)
BILIRUB UR QL STRIP: NEGATIVE
BLD GP AB SCN SERPL QL: NEGATIVE
BUN SERPL-MCNC: 28 MG/DL (ref 8–23)
BUN/CREAT SERPL: 17.1 (ref 7–25)
CALCIUM SPEC-SCNC: 9.4 MG/DL (ref 8.6–10.5)
CHLORIDE SERPL-SCNC: 99 MMOL/L (ref 98–107)
CLARITY UR: CLEAR
CLOSE TME COLL+ADP + EPINEP PNL BLD: 96 % (ref 86–100)
CO2 SERPL-SCNC: 23.9 MMOL/L (ref 22–29)
COLOR UR: YELLOW
CREAT SERPL-MCNC: 1.64 MG/DL (ref 0.57–1)
DEPRECATED RDW RBC AUTO: 44.7 FL (ref 37–54)
EGFRCR SERPLBLD CKD-EPI 2021: 29.8 ML/MIN/1.73
EOSINOPHIL # BLD AUTO: 0.46 10*3/MM3 (ref 0–0.4)
EOSINOPHIL NFR BLD AUTO: 4.5 % (ref 0.3–6.2)
ERYTHROCYTE [DISTWIDTH] IN BLOOD BY AUTOMATED COUNT: 13.9 % (ref 12.3–15.4)
GLOBULIN UR ELPH-MCNC: 3.4 GM/DL
GLUCOSE SERPL-MCNC: 91 MG/DL (ref 65–99)
GLUCOSE UR STRIP-MCNC: NEGATIVE MG/DL
HBA1C MFR BLD: 5.1 % (ref 4.8–5.6)
HCT VFR BLD AUTO: 34.1 % (ref 34–46.6)
HGB BLD-MCNC: 11.6 G/DL (ref 12–15.9)
HGB UR QL STRIP.AUTO: NEGATIVE
HYALINE CASTS UR QL AUTO: ABNORMAL /LPF
IMM GRANULOCYTES # BLD AUTO: 0.06 10*3/MM3 (ref 0–0.05)
IMM GRANULOCYTES NFR BLD AUTO: 0.6 % (ref 0–0.5)
INR PPP: 1.03 (ref 0.9–1.1)
KETONES UR QL STRIP: NEGATIVE
LEUKOCYTE ESTERASE UR QL STRIP.AUTO: ABNORMAL
LYMPHOCYTES # BLD AUTO: 1.84 10*3/MM3 (ref 0.7–3.1)
LYMPHOCYTES NFR BLD AUTO: 18.1 % (ref 19.6–45.3)
MCH RBC QN AUTO: 30.3 PG (ref 26.6–33)
MCHC RBC AUTO-ENTMCNC: 34 G/DL (ref 31.5–35.7)
MCV RBC AUTO: 89 FL (ref 79–97)
MONOCYTES # BLD AUTO: 1.08 10*3/MM3 (ref 0.1–0.9)
MONOCYTES NFR BLD AUTO: 10.6 % (ref 5–12)
NEUTROPHILS NFR BLD AUTO: 6.63 10*3/MM3 (ref 1.7–7)
NEUTROPHILS NFR BLD AUTO: 65.2 % (ref 42.7–76)
NITRITE UR QL STRIP: NEGATIVE
NRBC BLD AUTO-RTO: 0 /100 WBC (ref 0–0.2)
NT-PROBNP SERPL-MCNC: 2737 PG/ML (ref 0–1800)
PH UR STRIP.AUTO: 5.5 [PH] (ref 5–8)
PLATELET # BLD AUTO: 276 10*3/MM3 (ref 140–450)
PMV BLD AUTO: 11 FL (ref 6–12)
POTASSIUM SERPL-SCNC: 3.9 MMOL/L (ref 3.5–5.2)
PROT SERPL-MCNC: 7.6 G/DL (ref 6–8.5)
PROT UR QL STRIP: ABNORMAL
PROTHROMBIN TIME: 13.7 SECONDS (ref 11.7–14.2)
RBC # BLD AUTO: 3.83 10*6/MM3 (ref 3.77–5.28)
RBC # UR STRIP: ABNORMAL /HPF
REF LAB TEST METHOD: ABNORMAL
RH BLD: POSITIVE
SODIUM SERPL-SCNC: 137 MMOL/L (ref 136–145)
SP GR UR STRIP: 1.02 (ref 1–1.03)
SQUAMOUS #/AREA URNS HPF: ABNORMAL /HPF
T&S EXPIRATION DATE: NORMAL
UROBILINOGEN UR QL STRIP: ABNORMAL
WBC # UR STRIP: ABNORMAL /HPF
WBC NRBC COR # BLD AUTO: 10.17 10*3/MM3 (ref 3.4–10.8)

## 2025-01-31 PROCEDURE — 80053 COMPREHEN METABOLIC PANEL: CPT

## 2025-01-31 PROCEDURE — 81001 URINALYSIS AUTO W/SCOPE: CPT

## 2025-01-31 PROCEDURE — 83036 HEMOGLOBIN GLYCOSYLATED A1C: CPT

## 2025-01-31 PROCEDURE — 85025 COMPLETE CBC W/AUTO DIFF WBC: CPT

## 2025-01-31 PROCEDURE — 63710000001 MUPIROCIN 2 % OINTMENT: Performed by: PHYSICIAN ASSISTANT

## 2025-01-31 PROCEDURE — A9270 NON-COVERED ITEM OR SERVICE: HCPCS | Performed by: PHYSICIAN ASSISTANT

## 2025-01-31 PROCEDURE — 85610 PROTHROMBIN TIME: CPT

## 2025-01-31 PROCEDURE — 86900 BLOOD TYPING SEROLOGIC ABO: CPT

## 2025-01-31 PROCEDURE — 63710000001 CHLORHEXIDINE 0.12 % SOLUTION: Performed by: PHYSICIAN ASSISTANT

## 2025-01-31 PROCEDURE — 93005 ELECTROCARDIOGRAM TRACING: CPT

## 2025-01-31 PROCEDURE — 85730 THROMBOPLASTIN TIME PARTIAL: CPT

## 2025-01-31 PROCEDURE — 87086 URINE CULTURE/COLONY COUNT: CPT

## 2025-01-31 PROCEDURE — 36415 COLL VENOUS BLD VENIPUNCTURE: CPT

## 2025-01-31 PROCEDURE — 86850 RBC ANTIBODY SCREEN: CPT

## 2025-01-31 PROCEDURE — 83880 ASSAY OF NATRIURETIC PEPTIDE: CPT

## 2025-01-31 PROCEDURE — 71046 X-RAY EXAM CHEST 2 VIEWS: CPT

## 2025-01-31 PROCEDURE — 86901 BLOOD TYPING SEROLOGIC RH(D): CPT

## 2025-01-31 PROCEDURE — 85576 BLOOD PLATELET AGGREGATION: CPT

## 2025-01-31 RX ORDER — CHLORHEXIDINE GLUCONATE ORAL RINSE 1.2 MG/ML
15 SOLUTION DENTAL EVERY 12 HOURS
Status: DISPENSED | OUTPATIENT
Start: 2025-01-31 | End: 2025-02-01

## 2025-01-31 NOTE — NURSING NOTE
I met with Mrs Jay and her son Angus while she was here for her preadmission testing. She was provided oral and nasal medications and instruction for use prior to her TAVR procedure scheduled for 2/4/25 with a 5am arrival time. Labs were reviewed  and the KCCQ and 15' walk test were completed.We discussed the TAVR procedure in detail and post op expectations Mrs Jay does not want to have a sternotomy. . She lives in her home alone and does not require an assistive device Today she is using a Rollator due to a recent fall resulting in back pain.She does not require supplemental oxygen. Her skin is dry and shows no signs of infection specifically in her groin access/site. She has not been hospitalized during the last twelve months for heart failure.

## 2025-01-31 NOTE — DISCHARGE INSTRUCTIONS
Take the following medications the morning of surgery with a small sip of water:    AS DIRECTED PER CARDIAC NURSE    If you are on prescription narcotic pain medication to control your pain you may also take that medication the morning of surgery.    General Instructions:  Do not eat or drink anything after midnight the night before surgery.  Infants may have breast milk up to four hours before surgery.  Infants drinking formula may drink formula up to six hours before surgery.   Patients who avoid smoking, chewing tobacco and alcohol for 4 weeks prior to surgery have a reduced risk of post-operative complications.  Quit smoking as many days before surgery as you can.  Do not smoke, use chewing tobacco or drink alcohol the day of surgery.   If applicable bring your C-PAP/ BI-PAP machine in with you to preop day of surgery.  Bring any papers given to you in the doctor’s office.  Wear clean comfortable clothes.  Do not wear contact lenses, false eyelashes or make-up.  Bring a case for your glasses.   Bring crutches or walker if applicable.  Remove all piercings.  Leave jewelry and any other valuables at home.  Hair extensions with metal clips must be removed prior to surgery.  The Pre-Admission Testing nurse will instruct you to bring medications if unable to obtain an accurate list in Pre-Admission Testing.        If you were given a blood bank ID arm band remember to bring it with you the day of surgery.    Preventing a Surgical Site Infection:  For 2 to 3 days before surgery, avoid shaving with a razor because the razor can irritate skin and make it easier to develop an infection.    Any areas of open skin can increase the risk of a post-operative wound infection by allowing bacteria to enter and travel throughout the body.  Notify your surgeon if you have any skin wounds / rashes even if it is not near the expected surgical site.  The area will need assessed to determine if surgery should be delayed until it is  healed.  The night prior to surgery shower using a fresh bar of anti-bacterial soap (such as Dial) and clean washcloth.  Sleep in a clean bed with clean clothing.  Do not allow pets to sleep with you.  Shower on the morning of surgery using a fresh bar of anti-bacterial soap (such as Dial) and clean washcloth.  Dry with a clean towel and dress in clean clothing.  Ask your surgeon if you will be receiving antibiotics prior to surgery.  Make sure you, your family, and all healthcare providers clean their hands with soap and water or an alcohol based hand  before caring for you or your wound.    Day of surgery:  Your arrival time is approximately two hours before your scheduled surgery time.  Please note if you have an early arrival time the surgery doors do not open before 5:00 AM.  Upon arrival, a Pre-op nurse and Anesthesiologist will review your health history, obtain vital signs, and answer questions you may have.  The only belongings needed at this time will be your home medications and if applicable your C-PAP/BI-PAP machine.  A Pre-op nurse will start an IV and you may receive medication in preparation for surgery, including something to help you relax.      Please be aware that surgery does come with discomfort.  We want to make every effort to control your discomfort so please discuss any uncontrolled symptoms with your nurse.   Your doctor will most likely have prescribed pain medications.      If you are going home after surgery you will receive individualized written care instructions before being discharged.  A responsible adult must drive you to and from the hospital on the day of your surgery and ideally stay with you through the night.  Discharge prescriptions can be filled by the hospital pharmacy during regular pharmacy hours.  If you are having surgery late in the day/evening your prescription may be e-prescribed to your pharmacy.  Please verify your pharmacy hours or chose a 24 hour  pharmacy to avoid not having access to your prescription because your pharmacy has closed for the day.    If you are staying overnight following surgery, you will be transported to your hospital room following the recovery period.  [facility] has all private rooms.    If you have any questions please call Pre-Admission Testing at [phone].  Deductibles and co-payments are collected on the day of service. Please be prepared to pay the required co-pay, deductible or deposit on the day of service as defined by your plan.    Call your surgeon immediately if you experience any of the following symptoms:  Sore Throat  Shortness of Breath or difficulty breathing  Cough  Chills  Body soreness or muscle pain  Headache  Fever  New loss of taste or smell  Do not arrive for your surgery ill.  Your procedure will need to be rescheduled to another time.  You will need to call your physician before the day of surgery to avoid any unnecessary exposure to hospital staff as well as other patients.          BACTROBAN NASAL OINTMENT  There are many germs normally in your nose. Bactroban is an ointment that will help reduce these germs. Please follow these instructions for Bactroban use:          ____The day before surgery in the evening              Date__2/3______    ____The day of surgery in the morning    Date__2/4______    **Squirt ½ package of Bactroban Ointment onto a cotton applicator and apply to inside of 1st nostril.  Squirt the remaining Bactroban and apply to the inside of the other nostril.    PERIDEX- ORAL:  Use only if your surgeon has ordered  Use the night before and morning of surgery - Swish, gargle, and spit - do not swallow.

## 2025-02-01 LAB — BACTERIA SPEC AEROBE CULT: NORMAL

## 2025-02-02 LAB
QT INTERVAL: 540 MS
QTC INTERVAL: 566 MS

## 2025-02-04 ENCOUNTER — ANCILLARY PROCEDURE (OUTPATIENT)
Dept: PERIOP | Facility: HOSPITAL | Age: OVER 89
DRG: 267 | End: 2025-02-04
Payer: MEDICARE

## 2025-02-04 ENCOUNTER — ANESTHESIA (OUTPATIENT)
Dept: PERIOP | Facility: HOSPITAL | Age: OVER 89
DRG: 267 | End: 2025-02-04
Payer: MEDICARE

## 2025-02-04 ENCOUNTER — HOSPITAL ENCOUNTER (INPATIENT)
Facility: HOSPITAL | Age: OVER 89
LOS: 1 days | Discharge: HOME | DRG: 267 | End: 2025-02-05
Attending: THORACIC SURGERY (CARDIOTHORACIC VASCULAR SURGERY) | Admitting: THORACIC SURGERY (CARDIOTHORACIC VASCULAR SURGERY)
Payer: MEDICARE

## 2025-02-04 DIAGNOSIS — I50.32 CHRONIC DIASTOLIC CONGESTIVE HEART FAILURE: ICD-10-CM

## 2025-02-04 DIAGNOSIS — Z95.2 S/P TAVR (TRANSCATHETER AORTIC VALVE REPLACEMENT): ICD-10-CM

## 2025-02-04 DIAGNOSIS — I35.0 NONRHEUMATIC AORTIC VALVE STENOSIS: Primary | ICD-10-CM

## 2025-02-04 DIAGNOSIS — I35.0 SEVERE AORTIC VALVE STENOSIS: ICD-10-CM

## 2025-02-04 DIAGNOSIS — I35.0 SEVERE AORTIC VALVE STENOSIS: Primary | ICD-10-CM

## 2025-02-04 DIAGNOSIS — I10 PRIMARY HYPERTENSION: ICD-10-CM

## 2025-02-04 LAB
ACT BLD: 118 SECONDS (ref 82–152)
ACT BLD: 147 SECONDS (ref 82–152)
ACT BLD: 250 SECONDS (ref 82–152)
ACT BLD: 256 SECONDS (ref 82–152)
ACT BLD: 394 SECONDS (ref 82–152)
AV MEAN PRESS GRAD SYS DOP V1V2: 5.1 MMHG
AV VMAX SYS DOP: 150.9 CM/SEC
BASE EXCESS BLDA CALC-SCNC: -2 MMOL/L (ref -5–5)
BH CV ECHO MEAS - AO MAX PG: 9.1 MMHG
BH CV ECHO MEAS - AO V2 VTI: 39.1 CM
BH CV ECHO MEAS - AVA(I,D): 1.84 CM2
BH CV ECHO MEAS - LV MAX PG: 5.4 MMHG
BH CV ECHO MEAS - LV MEAN PG: 3.1 MMHG
BH CV ECHO MEAS - LV V1 MAX: 115.8 CM/SEC
BH CV ECHO MEAS - LV V1 VTI: 33 CM
BH CV ECHO MEAS - LVOT AREA: 2.18 CM2
BH CV ECHO MEAS - LVOT DIAM: 1.67 CM
BH CV ECHO MEAS - SV(LVOT): 72 ML
CO2 BLDA-SCNC: 26 MMOL/L (ref 24–29)
GLUCOSE BLDC GLUCOMTR-MCNC: 103 MG/DL (ref 70–130)
HCO3 BLDA-SCNC: 24.3 MMOL/L (ref 22–26)
HCT VFR BLDA CALC: 31 % (ref 38–51)
HGB BLDA-MCNC: 10.5 G/DL (ref 12–17)
PCO2 BLDA: 46.7 MM HG (ref 35–45)
PH BLDA: 7.33 PH UNITS (ref 7.35–7.6)
PO2 BLDA: 100 MMHG (ref 80–105)
POTASSIUM BLDA-SCNC: 3.9 MMOL/L (ref 3.5–4.9)
SAO2 % BLDA: 97 % (ref 95–98)

## 2025-02-04 PROCEDURE — 25010000002 HYDRALAZINE PER 20 MG: Performed by: ANESTHESIOLOGY

## 2025-02-04 PROCEDURE — 25510000001 IOPAMIDOL PER 1 ML: Performed by: INTERNAL MEDICINE

## 2025-02-04 PROCEDURE — C1760 CLOSURE DEV, VASC: HCPCS | Performed by: THORACIC SURGERY (CARDIOTHORACIC VASCULAR SURGERY)

## 2025-02-04 PROCEDURE — 82947 ASSAY GLUCOSE BLOOD QUANT: CPT

## 2025-02-04 PROCEDURE — C1889 IMPLANT/INSERT DEVICE, NOC: HCPCS | Performed by: THORACIC SURGERY (CARDIOTHORACIC VASCULAR SURGERY)

## 2025-02-04 PROCEDURE — C1894 INTRO/SHEATH, NON-LASER: HCPCS | Performed by: THORACIC SURGERY (CARDIOTHORACIC VASCULAR SURGERY)

## 2025-02-04 PROCEDURE — C1725 CATH, TRANSLUMIN NON-LASER: HCPCS | Performed by: THORACIC SURGERY (CARDIOTHORACIC VASCULAR SURGERY)

## 2025-02-04 PROCEDURE — 25010000002 PROPOFOL 10 MG/ML EMULSION: Performed by: ANESTHESIOLOGY

## 2025-02-04 PROCEDURE — 02RF38Z REPLACEMENT OF AORTIC VALVE WITH ZOOPLASTIC TISSUE, PERCUTANEOUS APPROACH: ICD-10-PCS | Performed by: THORACIC SURGERY (CARDIOTHORACIC VASCULAR SURGERY)

## 2025-02-04 PROCEDURE — 25010000002 CEFAZOLIN PER 500 MG: Performed by: PHYSICIAN ASSISTANT

## 2025-02-04 PROCEDURE — C1769 GUIDE WIRE: HCPCS | Performed by: THORACIC SURGERY (CARDIOTHORACIC VASCULAR SURGERY)

## 2025-02-04 PROCEDURE — 25010000002 FENTANYL CITRATE (PF) 50 MCG/ML SOLUTION: Performed by: ANESTHESIOLOGY

## 2025-02-04 PROCEDURE — 85018 HEMOGLOBIN: CPT

## 2025-02-04 PROCEDURE — 33361 REPLACE AORTIC VALVE PERQ: CPT | Performed by: THORACIC SURGERY (CARDIOTHORACIC VASCULAR SURGERY)

## 2025-02-04 PROCEDURE — 25010000002 PROTAMINE SULFATE PER 10 MG: Performed by: ANESTHESIOLOGY

## 2025-02-04 PROCEDURE — 25010000002 LIDOCAINE 2% SOLUTION: Performed by: THORACIC SURGERY (CARDIOTHORACIC VASCULAR SURGERY)

## 2025-02-04 PROCEDURE — 93010 ELECTROCARDIOGRAM REPORT: CPT | Performed by: INTERNAL MEDICINE

## 2025-02-04 PROCEDURE — 25810000003 SODIUM CHLORIDE 0.9 % SOLUTION: Performed by: INTERNAL MEDICINE

## 2025-02-04 PROCEDURE — 85347 COAGULATION TIME ACTIVATED: CPT

## 2025-02-04 PROCEDURE — 85014 HEMATOCRIT: CPT

## 2025-02-04 PROCEDURE — 93325 DOPPLER ECHO COLOR FLOW MAPG: CPT

## 2025-02-04 PROCEDURE — 93005 ELECTROCARDIOGRAM TRACING: CPT | Performed by: INTERNAL MEDICINE

## 2025-02-04 PROCEDURE — 33361 REPLACE AORTIC VALVE PERQ: CPT | Performed by: INTERNAL MEDICINE

## 2025-02-04 PROCEDURE — 25010000002 HEPARIN (PORCINE) PER 1000 UNITS: Performed by: ANESTHESIOLOGY

## 2025-02-04 PROCEDURE — 93321 DOPPLER ECHO F-UP/LMTD STD: CPT

## 2025-02-04 PROCEDURE — 82803 BLOOD GASES ANY COMBINATION: CPT

## 2025-02-04 PROCEDURE — 93308 TTE F-UP OR LMTD: CPT

## 2025-02-04 DEVICE — VLV EVOLUTFX-26 COMM US
Type: IMPLANTABLE DEVICE | Site: HEART | Status: FUNCTIONAL
Brand: EVOLUT™ FX

## 2025-02-04 RX ORDER — ACETAMINOPHEN 325 MG/1
650 TABLET ORAL EVERY 4 HOURS PRN
Status: DISCONTINUED | OUTPATIENT
Start: 2025-02-04 | End: 2025-02-05 | Stop reason: HOSPADM

## 2025-02-04 RX ORDER — HEPARIN SODIUM 1000 [USP'U]/ML
INJECTION, SOLUTION INTRAVENOUS; SUBCUTANEOUS AS NEEDED
Status: DISCONTINUED | OUTPATIENT
Start: 2025-02-04 | End: 2025-02-04 | Stop reason: SURG

## 2025-02-04 RX ORDER — CHLORHEXIDINE GLUCONATE ORAL RINSE 1.2 MG/ML
15 SOLUTION DENTAL ONCE
Status: COMPLETED | OUTPATIENT
Start: 2025-02-04 | End: 2025-02-04

## 2025-02-04 RX ORDER — DIPHENHYDRAMINE HYDROCHLORIDE 50 MG/ML
12.5 INJECTION INTRAMUSCULAR; INTRAVENOUS
Status: DISCONTINUED | OUTPATIENT
Start: 2025-02-04 | End: 2025-02-04 | Stop reason: HOSPADM

## 2025-02-04 RX ORDER — FENTANYL CITRATE 50 UG/ML
25 INJECTION, SOLUTION INTRAMUSCULAR; INTRAVENOUS
Status: DISCONTINUED | OUTPATIENT
Start: 2025-02-04 | End: 2025-02-04 | Stop reason: HOSPADM

## 2025-02-04 RX ORDER — FUROSEMIDE 40 MG/1
40 TABLET ORAL DAILY
Status: DISCONTINUED | OUTPATIENT
Start: 2025-02-04 | End: 2025-02-05 | Stop reason: HOSPADM

## 2025-02-04 RX ORDER — ASPIRIN 81 MG/1
81 TABLET ORAL DAILY
Status: DISCONTINUED | OUTPATIENT
Start: 2025-02-04 | End: 2025-02-05 | Stop reason: HOSPADM

## 2025-02-04 RX ORDER — PROPOFOL 10 MG/ML
VIAL (ML) INTRAVENOUS AS NEEDED
Status: DISCONTINUED | OUTPATIENT
Start: 2025-02-04 | End: 2025-02-04 | Stop reason: SURG

## 2025-02-04 RX ORDER — LIDOCAINE HYDROCHLORIDE 20 MG/ML
INJECTION, SOLUTION INFILTRATION; PERINEURAL AS NEEDED
Status: DISCONTINUED | OUTPATIENT
Start: 2025-02-04 | End: 2025-02-04 | Stop reason: HOSPADM

## 2025-02-04 RX ORDER — TIZANIDINE 2 MG/1
2 TABLET ORAL EVERY 6 HOURS PRN
Status: DISCONTINUED | OUTPATIENT
Start: 2025-02-04 | End: 2025-02-05 | Stop reason: HOSPADM

## 2025-02-04 RX ORDER — NITROGLYCERIN 0.4 MG/1
0.4 TABLET SUBLINGUAL
Status: DISCONTINUED | OUTPATIENT
Start: 2025-02-04 | End: 2025-02-05 | Stop reason: HOSPADM

## 2025-02-04 RX ORDER — SODIUM CHLORIDE 0.9 % (FLUSH) 0.9 %
3-10 SYRINGE (ML) INJECTION AS NEEDED
Status: DISCONTINUED | OUTPATIENT
Start: 2025-02-04 | End: 2025-02-04

## 2025-02-04 RX ORDER — IPRATROPIUM BROMIDE AND ALBUTEROL SULFATE 2.5; .5 MG/3ML; MG/3ML
3 SOLUTION RESPIRATORY (INHALATION) ONCE AS NEEDED
Status: DISCONTINUED | OUTPATIENT
Start: 2025-02-04 | End: 2025-02-04 | Stop reason: HOSPADM

## 2025-02-04 RX ORDER — AMLODIPINE BESYLATE 5 MG/1
5 TABLET ORAL
Status: DISCONTINUED | OUTPATIENT
Start: 2025-02-04 | End: 2025-02-05 | Stop reason: HOSPADM

## 2025-02-04 RX ORDER — SODIUM CHLORIDE, SODIUM LACTATE, POTASSIUM CHLORIDE, CALCIUM CHLORIDE 600; 310; 30; 20 MG/100ML; MG/100ML; MG/100ML; MG/100ML
9 INJECTION, SOLUTION INTRAVENOUS CONTINUOUS
Status: ACTIVE | OUTPATIENT
Start: 2025-02-04 | End: 2025-02-05

## 2025-02-04 RX ORDER — FENTANYL CITRATE 50 UG/ML
INJECTION, SOLUTION INTRAMUSCULAR; INTRAVENOUS AS NEEDED
Status: DISCONTINUED | OUTPATIENT
Start: 2025-02-04 | End: 2025-02-04 | Stop reason: SURG

## 2025-02-04 RX ORDER — SODIUM CHLORIDE 9 MG/ML
50 INJECTION, SOLUTION INTRAVENOUS CONTINUOUS
Status: ACTIVE | OUTPATIENT
Start: 2025-02-04 | End: 2025-02-04

## 2025-02-04 RX ORDER — ONDANSETRON 2 MG/ML
4 INJECTION INTRAMUSCULAR; INTRAVENOUS EVERY 6 HOURS PRN
Status: DISCONTINUED | OUTPATIENT
Start: 2025-02-04 | End: 2025-02-05 | Stop reason: HOSPADM

## 2025-02-04 RX ORDER — DEXMEDETOMIDINE HYDROCHLORIDE 100 UG/ML
INJECTION, SOLUTION INTRAVENOUS AS NEEDED
Status: DISCONTINUED | OUTPATIENT
Start: 2025-02-04 | End: 2025-02-04 | Stop reason: SURG

## 2025-02-04 RX ORDER — PROTAMINE SULFATE 10 MG/ML
INJECTION, SOLUTION INTRAVENOUS AS NEEDED
Status: DISCONTINUED | OUTPATIENT
Start: 2025-02-04 | End: 2025-02-04 | Stop reason: SURG

## 2025-02-04 RX ORDER — NOREPINEPHRINE BITARTRATE 1 MG/ML
INJECTION, SOLUTION INTRAVENOUS CONTINUOUS PRN
Status: DISCONTINUED | OUTPATIENT
Start: 2025-02-04 | End: 2025-02-04 | Stop reason: SURG

## 2025-02-04 RX ORDER — TIZANIDINE 2 MG/1
2 TABLET ORAL EVERY 6 HOURS PRN
COMMUNITY
Start: 2025-01-31

## 2025-02-04 RX ORDER — IOPAMIDOL 755 MG/ML
INJECTION, SOLUTION INTRAVASCULAR AS NEEDED
Status: DISCONTINUED | OUTPATIENT
Start: 2025-02-04 | End: 2025-02-04 | Stop reason: HOSPADM

## 2025-02-04 RX ORDER — MULTIPLE VITAMINS W/ MINERALS TAB 9MG-400MCG
1 TAB ORAL DAILY
Status: DISCONTINUED | OUTPATIENT
Start: 2025-02-04 | End: 2025-02-05 | Stop reason: HOSPADM

## 2025-02-04 RX ORDER — HYDRALAZINE HYDROCHLORIDE 20 MG/ML
5 INJECTION INTRAMUSCULAR; INTRAVENOUS
Status: DISCONTINUED | OUTPATIENT
Start: 2025-02-04 | End: 2025-02-04 | Stop reason: HOSPADM

## 2025-02-04 RX ORDER — CITALOPRAM HYDROBROMIDE 10 MG/1
10 TABLET ORAL DAILY
Status: DISCONTINUED | OUTPATIENT
Start: 2025-02-04 | End: 2025-02-05 | Stop reason: HOSPADM

## 2025-02-04 RX ORDER — PRAVASTATIN SODIUM 40 MG
40 TABLET ORAL NIGHTLY
Status: DISCONTINUED | OUTPATIENT
Start: 2025-02-04 | End: 2025-02-05 | Stop reason: HOSPADM

## 2025-02-04 RX ORDER — TEMAZEPAM 15 MG/1
15 CAPSULE ORAL NIGHTLY PRN
Status: DISCONTINUED | OUTPATIENT
Start: 2025-02-04 | End: 2025-02-05 | Stop reason: HOSPADM

## 2025-02-04 RX ORDER — LIDOCAINE HYDROCHLORIDE 10 MG/ML
0.5 INJECTION, SOLUTION INFILTRATION; PERINEURAL ONCE AS NEEDED
Status: DISCONTINUED | OUTPATIENT
Start: 2025-02-04 | End: 2025-02-04

## 2025-02-04 RX ORDER — NALOXONE HCL 0.4 MG/ML
0.2 VIAL (ML) INJECTION AS NEEDED
Status: DISCONTINUED | OUTPATIENT
Start: 2025-02-04 | End: 2025-02-04 | Stop reason: HOSPADM

## 2025-02-04 RX ORDER — FAMOTIDINE 10 MG/ML
20 INJECTION, SOLUTION INTRAVENOUS ONCE
Status: DISCONTINUED | OUTPATIENT
Start: 2025-02-04 | End: 2025-02-04

## 2025-02-04 RX ORDER — LABETALOL HYDROCHLORIDE 5 MG/ML
5 INJECTION, SOLUTION INTRAVENOUS
Status: DISCONTINUED | OUTPATIENT
Start: 2025-02-04 | End: 2025-02-04 | Stop reason: HOSPADM

## 2025-02-04 RX ORDER — NALOXONE HCL 0.4 MG/ML
0.4 VIAL (ML) INJECTION
Status: DISCONTINUED | OUTPATIENT
Start: 2025-02-04 | End: 2025-02-05 | Stop reason: HOSPADM

## 2025-02-04 RX ORDER — EPHEDRINE SULFATE 50 MG/ML
5 INJECTION, SOLUTION INTRAVENOUS ONCE AS NEEDED
Status: DISCONTINUED | OUTPATIENT
Start: 2025-02-04 | End: 2025-02-04 | Stop reason: HOSPADM

## 2025-02-04 RX ORDER — SODIUM CHLORIDE 0.9 % (FLUSH) 0.9 %
3 SYRINGE (ML) INJECTION EVERY 12 HOURS SCHEDULED
Status: DISCONTINUED | OUTPATIENT
Start: 2025-02-04 | End: 2025-02-04

## 2025-02-04 RX ORDER — FLUMAZENIL 0.1 MG/ML
0.2 INJECTION INTRAVENOUS AS NEEDED
Status: DISCONTINUED | OUTPATIENT
Start: 2025-02-04 | End: 2025-02-04 | Stop reason: HOSPADM

## 2025-02-04 RX ORDER — SODIUM CHLORIDE 9 MG/ML
INJECTION, SOLUTION INTRAVENOUS CONTINUOUS PRN
Status: DISCONTINUED | OUTPATIENT
Start: 2025-02-04 | End: 2025-02-04 | Stop reason: SURG

## 2025-02-04 RX ORDER — HYDROCODONE BITARTRATE AND ACETAMINOPHEN 5; 325 MG/1; MG/1
1 TABLET ORAL EVERY 4 HOURS PRN
Status: DISCONTINUED | OUTPATIENT
Start: 2025-02-04 | End: 2025-02-05 | Stop reason: HOSPADM

## 2025-02-04 RX ORDER — ATORVASTATIN CALCIUM 80 MG/1
40 TABLET, FILM COATED ORAL NIGHTLY
Status: DISCONTINUED | OUTPATIENT
Start: 2025-02-04 | End: 2025-02-04

## 2025-02-04 RX ORDER — ONDANSETRON 4 MG/1
4 TABLET, ORALLY DISINTEGRATING ORAL EVERY 6 HOURS PRN
Status: DISCONTINUED | OUTPATIENT
Start: 2025-02-04 | End: 2025-02-05 | Stop reason: HOSPADM

## 2025-02-04 RX ORDER — MORPHINE SULFATE 2 MG/ML
2 INJECTION, SOLUTION INTRAMUSCULAR; INTRAVENOUS
Status: DISCONTINUED | OUTPATIENT
Start: 2025-02-04 | End: 2025-02-05 | Stop reason: HOSPADM

## 2025-02-04 RX ORDER — ONDANSETRON 2 MG/ML
4 INJECTION INTRAMUSCULAR; INTRAVENOUS ONCE AS NEEDED
Status: DISCONTINUED | OUTPATIENT
Start: 2025-02-04 | End: 2025-02-04 | Stop reason: HOSPADM

## 2025-02-04 RX ORDER — ACETAMINOPHEN 500 MG
1000 TABLET ORAL EVERY 6 HOURS PRN
Status: DISCONTINUED | OUTPATIENT
Start: 2025-02-04 | End: 2025-02-05 | Stop reason: HOSPADM

## 2025-02-04 RX ORDER — CHOLECALCIFEROL (VITAMIN D3) 25 MCG
1000 TABLET ORAL DAILY
Status: DISCONTINUED | OUTPATIENT
Start: 2025-02-04 | End: 2025-02-05 | Stop reason: HOSPADM

## 2025-02-04 RX ORDER — ATROPINE SULFATE 0.4 MG/ML
0.4 INJECTION, SOLUTION INTRAMUSCULAR; INTRAVENOUS; SUBCUTANEOUS ONCE AS NEEDED
Status: DISCONTINUED | OUTPATIENT
Start: 2025-02-04 | End: 2025-02-04 | Stop reason: HOSPADM

## 2025-02-04 RX ADMIN — FENTANYL CITRATE 25 MCG: 50 INJECTION, SOLUTION INTRAMUSCULAR; INTRAVENOUS at 09:31

## 2025-02-04 RX ADMIN — FENTANYL CITRATE 50 MCG: 50 INJECTION, SOLUTION INTRAMUSCULAR; INTRAVENOUS at 07:04

## 2025-02-04 RX ADMIN — PROPOFOL 10 MG: 10 INJECTION, EMULSION INTRAVENOUS at 07:18

## 2025-02-04 RX ADMIN — DEXMEDETOMIDINE HCL 12 MCG: 100 INJECTION INTRAVENOUS at 07:10

## 2025-02-04 RX ADMIN — DEXMEDETOMIDINE HCL 12 MCG: 100 INJECTION INTRAVENOUS at 07:05

## 2025-02-04 RX ADMIN — HYDRALAZINE HYDROCHLORIDE 5 MG: 20 INJECTION INTRAMUSCULAR; INTRAVENOUS at 12:48

## 2025-02-04 RX ADMIN — PRAVASTATIN SODIUM 40 MG: 40 TABLET ORAL at 20:25

## 2025-02-04 RX ADMIN — PROPOFOL 10 MG: 10 INJECTION, EMULSION INTRAVENOUS at 07:10

## 2025-02-04 RX ADMIN — DEXMEDETOMIDINE HCL 8 MCG: 100 INJECTION INTRAVENOUS at 07:47

## 2025-02-04 RX ADMIN — Medication 1 TABLET: at 16:59

## 2025-02-04 RX ADMIN — DEXMEDETOMIDINE HCL 8 MCG: 100 INJECTION INTRAVENOUS at 07:30

## 2025-02-04 RX ADMIN — SODIUM CHLORIDE 50 ML/HR: 9 INJECTION, SOLUTION INTRAVENOUS at 09:54

## 2025-02-04 RX ADMIN — FENTANYL CITRATE 25 MCG: 50 INJECTION, SOLUTION INTRAMUSCULAR; INTRAVENOUS at 11:27

## 2025-02-04 RX ADMIN — FENTANYL CITRATE 25 MCG: 50 INJECTION, SOLUTION INTRAMUSCULAR; INTRAVENOUS at 09:18

## 2025-02-04 RX ADMIN — HYDROCODONE BITARTRATE AND ACETAMINOPHEN 1 TABLET: 5; 325 TABLET ORAL at 14:29

## 2025-02-04 RX ADMIN — TIZANIDINE 2 MG: 2 TABLET ORAL at 21:41

## 2025-02-04 RX ADMIN — HEPARIN SODIUM 3000 UNITS: 1000 INJECTION, SOLUTION INTRAVENOUS; SUBCUTANEOUS at 08:06

## 2025-02-04 RX ADMIN — PROPOFOL 10 MCG/KG/MIN: 10 INJECTION, EMULSION INTRAVENOUS at 07:04

## 2025-02-04 RX ADMIN — ASPIRIN 81 MG: 81 TABLET, COATED ORAL at 16:59

## 2025-02-04 RX ADMIN — PROPOFOL 10 MG: 10 INJECTION, EMULSION INTRAVENOUS at 07:47

## 2025-02-04 RX ADMIN — SODIUM CHLORIDE 1 MCG/KG/HR: 9 INJECTION, SOLUTION INTRAVENOUS at 07:04

## 2025-02-04 RX ADMIN — FENTANYL CITRATE 25 MCG: 50 INJECTION, SOLUTION INTRAMUSCULAR; INTRAVENOUS at 12:05

## 2025-02-04 RX ADMIN — HEPARIN SODIUM 4000 UNITS: 1000 INJECTION, SOLUTION INTRAVENOUS; SUBCUTANEOUS at 08:30

## 2025-02-04 RX ADMIN — SODIUM CHLORIDE: 9 INJECTION, SOLUTION INTRAVENOUS at 07:01

## 2025-02-04 RX ADMIN — FENTANYL CITRATE 25 MCG: 50 INJECTION, SOLUTION INTRAMUSCULAR; INTRAVENOUS at 13:04

## 2025-02-04 RX ADMIN — HEPARIN SODIUM 10000 UNITS: 1000 INJECTION, SOLUTION INTRAVENOUS; SUBCUTANEOUS at 07:54

## 2025-02-04 RX ADMIN — PROPOFOL 10 MG: 10 INJECTION, EMULSION INTRAVENOUS at 07:30

## 2025-02-04 RX ADMIN — TIZANIDINE 2 MG: 2 TABLET ORAL at 12:36

## 2025-02-04 RX ADMIN — AMLODIPINE BESYLATE 5 MG: 5 TABLET ORAL at 16:59

## 2025-02-04 RX ADMIN — FUROSEMIDE 40 MG: 40 TABLET ORAL at 16:59

## 2025-02-04 RX ADMIN — NOREPINEPHRINE BITARTRATE 0.02 MCG/KG/MIN: 1 SOLUTION INTRAVENOUS at 07:20

## 2025-02-04 RX ADMIN — SODIUM CHLORIDE 2 G: 900 INJECTION INTRAVENOUS at 07:17

## 2025-02-04 RX ADMIN — PROTAMINE SULFATE 100 MG: 10 INJECTION, SOLUTION INTRAVENOUS at 08:39

## 2025-02-04 RX ADMIN — 0.12% CHLORHEXIDINE GLUCONATE 15 ML: 1.2 RINSE ORAL at 06:23

## 2025-02-04 RX ADMIN — HYDROCODONE BITARTRATE AND ACETAMINOPHEN 1 TABLET: 5; 325 TABLET ORAL at 20:25

## 2025-02-04 RX ADMIN — HYDRALAZINE HYDROCHLORIDE 5 MG: 20 INJECTION INTRAMUSCULAR; INTRAVENOUS at 13:01

## 2025-02-04 NOTE — ANESTHESIA PREPROCEDURE EVALUATION
Anesthesia Evaluation     NPO Solid Status: > 8 hours  NPO Liquid Status: > 2 hours           Airway   Mallampati: II  TM distance: >3 FB  Neck ROM: full  Dental      Pulmonary    (+) pneumonia resolved , lung cancer (RLL mass suspicious for malignancy, no biopsy completed as of yet),  Cardiovascular   Exercise tolerance: poor (<4 METS)    Rhythm: regular  Rate: normal    (+) hypertension, valvular problems/murmurs AS, CHF , murmur, hyperlipidemia    ROS comment: ·  Left ventricular systolic function is normal. Calculated left ventricular EF = 69.8%  ·  Left ventricular diastolic function is consistent with (grade I) impaired relaxation.  ·  Severe aortic valve stenosis is present. Aortic valve area is 0.8 cm2.  ·  Peak velocity of the flow distal to the aortic valve is 492.5 cm/s. Aortic valve maximum pressure gradient is 97 mmHg. Aortic valve mean pressure gradient is 61 mmHg. Aortic valve dimensionless index is 0.2 .  ·  Calculated right ventricular systolic pressure from tricuspid regurgitation is 0 mmHg.  ·  Mild mitral valve regurgitation is present         Neuro/Psych  GI/Hepatic/Renal/Endo    (+) obesity, morbid obesity, renal disease- CRI    Musculoskeletal     Abdominal    Substance History      OB/GYN          Other   arthritis,                     Anesthesia Plan    ASA 4     MAC and Latoya     intravenous induction     Anesthetic plan, risks, benefits, and alternatives have been provided, discussed and informed consent has been obtained with: patient.    CODE STATUS:

## 2025-02-04 NOTE — ANESTHESIA POSTPROCEDURE EVALUATION
Patient: Vonda Jay    Procedure Summary       Date: 02/04/25 Room / Location: [address] CARDIOVASCULAR OPERATING ROOM    Anesthesia Start: 0701 Anesthesia Stop: 0911    Procedures:       TTE TRANSFEMORAL TRANSCATHETER AORTIC VALVE REPLACEMENT PERCUTANEOUS APPROACH (Chest)      Transfemoral Transcatheter Aortic Valve Replacement with intraoperative transthoracic echocardiogram and possible open surgical rescue Diagnosis:       Severe aortic valve stenosis      (Severe aortic valve stenosis [I35.0])    Surgeons: Ebenezer Albert MD; Mike Rizvi MD Provider: Jaye Ny MD    Anesthesia Type: MAC, Latoya ASA Status: 4            Anesthesia Type: MAC, [place]    Vitals  Vitals Value Taken Time   /51 02/04/25 1015   Temp 36.4 °C (97.6 °F) 02/04/25 0911   Pulse 56 02/04/25 1026   Resp 14 02/04/25 1000   SpO2 100 % 02/04/25 1026   Vitals shown include unfiled device data.        Post Anesthesia Care and Evaluation    Patient location during evaluation: bedside  Patient participation: complete - patient participated  Level of consciousness: awake  Pain management: adequate    Airway patency: patent  Anesthetic complications: No anesthetic complications    Cardiovascular status: acceptable  Respiratory status: acceptable  Hydration status: acceptable    Comments: /52   Pulse 54   Temp 36.4 °C (97.6 °F) (Oral)   Resp 14   LMP  (LMP Unknown)   SpO2 98%

## 2025-02-04 NOTE — PLAN OF CARE
Goal Outcome Evaluation:         Pt assessment done alert and oriented and follows commands. Anthony groin site CDI no hematoma and bleeding noted. Pt up and walking with walker and OOB to chair after bedrest is over. Vitals stable plan of care in progress

## 2025-02-05 ENCOUNTER — APPOINTMENT (OUTPATIENT)
Dept: CARDIOLOGY | Facility: HOSPITAL | Age: OVER 89
DRG: 267 | End: 2025-02-05
Payer: MEDICARE

## 2025-02-05 ENCOUNTER — READMISSION MANAGEMENT (OUTPATIENT)
Dept: CALL CENTER | Facility: HOSPITAL | Age: OVER 89
End: 2025-02-05
Payer: MEDICARE

## 2025-02-05 VITALS
BODY MASS INDEX: 30.43 KG/M2 | HEART RATE: 61 BPM | TEMPERATURE: 97.8 F | OXYGEN SATURATION: 97 % | DIASTOLIC BLOOD PRESSURE: 65 MMHG | WEIGHT: 155 LBS | HEIGHT: 60 IN | RESPIRATION RATE: 16 BRPM | SYSTOLIC BLOOD PRESSURE: 143 MMHG

## 2025-02-05 LAB
ANION GAP SERPL CALCULATED.3IONS-SCNC: 11 MMOL/L (ref 5–15)
AV MEAN PRESS GRAD SYS DOP V1V2: 9.1 MMHG
AV VMAX SYS DOP: 205.2 CM/SEC
BH CV ECHO AV AORTIC VALVE AT ACCEL TIME CALCULATED: NORMAL MSEC
BH CV ECHO MEAS - ACS: 1.78 CM
BH CV ECHO MEAS - AO MAX PG: 16.8 MMHG
BH CV ECHO MEAS - AO ROOT DIAM: 2.18 CM
BH CV ECHO MEAS - AO V2 VTI: 45.5 CM
BH CV ECHO MEAS - AT: 61 SEC
BH CV ECHO MEAS - AVA(I,D): 2.19 CM2
BH CV ECHO MEAS - EDV(CUBED): 80.2 ML
BH CV ECHO MEAS - EDV(MOD-SP2): 84 ML
BH CV ECHO MEAS - EDV(MOD-SP4): 92 ML
BH CV ECHO MEAS - EF(MOD-SP2): 67.9 %
BH CV ECHO MEAS - EF(MOD-SP4): 67.4 %
BH CV ECHO MEAS - ESV(CUBED): 20.3 ML
BH CV ECHO MEAS - ESV(MOD-SP2): 27 ML
BH CV ECHO MEAS - ESV(MOD-SP4): 30 ML
BH CV ECHO MEAS - FS: 36.7 %
BH CV ECHO MEAS - IVS/LVPW: 1.12 CM
BH CV ECHO MEAS - IVSD: 1.02 CM
BH CV ECHO MEAS - LAT PEAK E' VEL: 6.3 CM/SEC
BH CV ECHO MEAS - LV DIASTOLIC VOL/BSA (35-75): 54.9 CM2
BH CV ECHO MEAS - LV MASS(C)D: 136.9 GRAMS
BH CV ECHO MEAS - LV MAX PG: 7.5 MMHG
BH CV ECHO MEAS - LV MEAN PG: 4.8 MMHG
BH CV ECHO MEAS - LV SYSTOLIC VOL/BSA (12-30): 17.9 CM2
BH CV ECHO MEAS - LV V1 MAX: 136.7 CM/SEC
BH CV ECHO MEAS - LV V1 VTI: 32.1 CM
BH CV ECHO MEAS - LVIDD: 4.3 CM
BH CV ECHO MEAS - LVIDS: 2.7 CM
BH CV ECHO MEAS - LVOT AREA: 3.1 CM2
BH CV ECHO MEAS - LVOT DIAM: 1.99 CM
BH CV ECHO MEAS - LVPWD: 0.91 CM
BH CV ECHO MEAS - MED PEAK E' VEL: 6.2 CM/SEC
BH CV ECHO MEAS - MR MAX PG: 31.4 MMHG
BH CV ECHO MEAS - MR MAX VEL: 280.2 CM/SEC
BH CV ECHO MEAS - MV A DUR: 0.14 SEC
BH CV ECHO MEAS - MV A MAX VEL: 116.4 CM/SEC
BH CV ECHO MEAS - MV DEC SLOPE: 547.1 CM/SEC2
BH CV ECHO MEAS - MV DEC TIME: 0.23 SEC
BH CV ECHO MEAS - MV E MAX VEL: 135 CM/SEC
BH CV ECHO MEAS - MV E/A: 1.16
BH CV ECHO MEAS - MV MAX PG: 6.2 MMHG
BH CV ECHO MEAS - MV MEAN PG: 2.8 MMHG
BH CV ECHO MEAS - MV P1/2T: 66.9 MSEC
BH CV ECHO MEAS - MV V2 VTI: 45.7 CM
BH CV ECHO MEAS - MVA(P1/2T): 3.3 CM2
BH CV ECHO MEAS - MVA(VTI): 2.18 CM2
BH CV ECHO MEAS - PA ACC TIME: 0.07 SEC
BH CV ECHO MEAS - PA V2 MAX: 117 CM/SEC
BH CV ECHO MEAS - PULM A REVS DUR: 0.11 SEC
BH CV ECHO MEAS - PULM A REVS VEL: 23.8 CM/SEC
BH CV ECHO MEAS - PULM DIAS VEL: 45.7 CM/SEC
BH CV ECHO MEAS - PULM S/D: 1.43
BH CV ECHO MEAS - PULM SYS VEL: 65.2 CM/SEC
BH CV ECHO MEAS - RV MAX PG: 3.6 MMHG
BH CV ECHO MEAS - RV V1 MAX: 95.5 CM/SEC
BH CV ECHO MEAS - RV V1 VTI: 20.5 CM
BH CV ECHO MEAS - SV(LVOT): 99.7 ML
BH CV ECHO MEAS - SV(MOD-SP2): 57 ML
BH CV ECHO MEAS - SV(MOD-SP4): 62 ML
BH CV ECHO MEAS - SVI(LVOT): 59.5 ML/M2
BH CV ECHO MEAS - SVI(MOD-SP2): 34 ML/M2
BH CV ECHO MEAS - SVI(MOD-SP4): 37 ML/M2
BH CV ECHO MEAS - TAPSE (>1.6): 1.94 CM
BH CV ECHO MEASUREMENTS AVERAGE E/E' RATIO: 21.6
BH CV XLRA - RV BASE: 3.5 CM
BH CV XLRA - RV LENGTH: 7.2 CM
BH CV XLRA - RV MID: 2.9 CM
BH CV XLRA - TDI S': 15.7 CM/SEC
BUN SERPL-MCNC: 26 MG/DL (ref 8–23)
BUN/CREAT SERPL: 18.2 (ref 7–25)
CALCIUM SPEC-SCNC: 8.1 MG/DL (ref 8.6–10.5)
CHLORIDE SERPL-SCNC: 102 MMOL/L (ref 98–107)
CHOLEST SERPL-MCNC: 125 MG/DL (ref 0–200)
CO2 SERPL-SCNC: 22 MMOL/L (ref 22–29)
CREAT SERPL-MCNC: 1.43 MG/DL (ref 0.57–1)
DEPRECATED RDW RBC AUTO: 44.5 FL (ref 37–54)
EGFRCR SERPLBLD CKD-EPI 2021: 35.1 ML/MIN/1.73
ERYTHROCYTE [DISTWIDTH] IN BLOOD BY AUTOMATED COUNT: 13.9 % (ref 12.3–15.4)
GLUCOSE SERPL-MCNC: 99 MG/DL (ref 65–99)
HCT VFR BLD AUTO: 27.8 % (ref 34–46.6)
HDLC SERPL-MCNC: 52 MG/DL (ref 40–60)
HGB BLD-MCNC: 9.2 G/DL (ref 12–15.9)
LDLC SERPL CALC-MCNC: 56 MG/DL (ref 0–100)
LDLC/HDLC SERPL: 1.06 {RATIO}
LEFT ATRIUM VOLUME INDEX: 36 ML/M2
LV EF BIPLANE MOD: 67.9 %
MCH RBC QN AUTO: 29.6 PG (ref 26.6–33)
MCHC RBC AUTO-ENTMCNC: 33.1 G/DL (ref 31.5–35.7)
MCV RBC AUTO: 89.4 FL (ref 79–97)
PLATELET # BLD AUTO: 203 10*3/MM3 (ref 140–450)
PMV BLD AUTO: 10.3 FL (ref 6–12)
POTASSIUM SERPL-SCNC: 3.8 MMOL/L (ref 3.5–5.2)
QT INTERVAL: 435 MS
QT INTERVAL: 463 MS
QTC INTERVAL: 444 MS
QTC INTERVAL: 462 MS
RBC # BLD AUTO: 3.11 10*6/MM3 (ref 3.77–5.28)
SINUS: 2.42 CM
SODIUM SERPL-SCNC: 135 MMOL/L (ref 136–145)
STJ: 1.97 CM
TRIGL SERPL-MCNC: 89 MG/DL (ref 0–150)
VLDLC SERPL-MCNC: 17 MG/DL (ref 5–40)
WBC NRBC COR # BLD AUTO: 10.73 10*3/MM3 (ref 3.4–10.8)

## 2025-02-05 PROCEDURE — 93306 TTE W/DOPPLER COMPLETE: CPT | Performed by: INTERNAL MEDICINE

## 2025-02-05 PROCEDURE — 99232 SBSQ HOSP IP/OBS MODERATE 35: CPT | Performed by: INTERNAL MEDICINE

## 2025-02-05 PROCEDURE — 80048 BASIC METABOLIC PNL TOTAL CA: CPT | Performed by: INTERNAL MEDICINE

## 2025-02-05 PROCEDURE — 93306 TTE W/DOPPLER COMPLETE: CPT

## 2025-02-05 PROCEDURE — 93005 ELECTROCARDIOGRAM TRACING: CPT | Performed by: INTERNAL MEDICINE

## 2025-02-05 PROCEDURE — 63710000001 ONDANSETRON ODT 4 MG TABLET DISPERSIBLE: Performed by: INTERNAL MEDICINE

## 2025-02-05 PROCEDURE — 80061 LIPID PANEL: CPT | Performed by: INTERNAL MEDICINE

## 2025-02-05 PROCEDURE — 93010 ELECTROCARDIOGRAM REPORT: CPT | Performed by: INTERNAL MEDICINE

## 2025-02-05 PROCEDURE — 85027 COMPLETE CBC AUTOMATED: CPT | Performed by: INTERNAL MEDICINE

## 2025-02-05 RX ADMIN — AMLODIPINE BESYLATE 5 MG: 5 TABLET ORAL at 08:33

## 2025-02-05 RX ADMIN — CITALOPRAM HYDROBROMIDE 10 MG: 10 TABLET ORAL at 08:33

## 2025-02-05 RX ADMIN — ONDANSETRON 4 MG: 4 TABLET, ORALLY DISINTEGRATING ORAL at 04:38

## 2025-02-05 RX ADMIN — FUROSEMIDE 40 MG: 40 TABLET ORAL at 08:33

## 2025-02-05 RX ADMIN — Medication 1 TABLET: at 08:33

## 2025-02-05 RX ADMIN — TIZANIDINE 2 MG: 2 TABLET ORAL at 08:33

## 2025-02-05 RX ADMIN — HYDROCODONE BITARTRATE AND ACETAMINOPHEN 1 TABLET: 5; 325 TABLET ORAL at 04:36

## 2025-02-05 RX ADMIN — Medication 1000 UNITS: at 08:33

## 2025-02-05 RX ADMIN — ASPIRIN 81 MG: 81 TABLET, COATED ORAL at 08:33

## 2025-02-05 NOTE — DISCHARGE SUMMARY
Date of Admission:   Date of Discharge:  2/5/2025    Discharge Diagnosis:     Critical AS s/p TAVR,  CKD 3, CHF, diastolic, class III, advanced arthritis   Presenting Problem/History of Present Illness  Severe aortic valve stenosis [I35.0]  Aortic stenosis [I35.0]     Hospital Course  Patient is a 89 y.o. female presented for scheduled   Transcatheter aortic valve replacement (TAVR) utilizing a 26 mm Medtronic Evolut Pro valve by Dr. Rizvi and Dr. Albert.  Post operatively she did well.  Post TAVR echo revealed a well functioning TAVR valve.  On post operative day 1 she was evaluated by Dr. Rizvi and deemed ready for discharge home. Patient was provided with appropriate discharge education.  She was instructed to call Dr. Rizvi's office with any questions or concerns.      Procedures Performed  Procedure(s):  TTE TRANSFEMORAL TRANSCATHETER AORTIC VALVE REPLACEMENT PERCUTANEOUS APPROACH  Transfemoral Transcatheter Aortic Valve Replacement with intraoperative transthoracic echocardiogram and possible open surgical rescue       Consults:   Consults       No orders found for last 30 day(s).            Pertinent Test Results:    Lab Results   Component Value Date    WBC 10.73 02/05/2025    HGB 9.2 (L) 02/05/2025    HCT 27.8 (L) 02/05/2025    MCV 89.4 02/05/2025     02/05/2025      Lab Results   Component Value Date    GLUCOSE 99 02/05/2025    CALCIUM 8.1 (L) 02/05/2025     (L) 02/05/2025    K 3.8 02/05/2025    CO2 22.0 02/05/2025     02/05/2025    BUN 26 (H) 02/05/2025    CREATININE 1.43 (H) 02/05/2025    EGFRIFNONA 48 (L) 02/12/2018    BCR 18.2 02/05/2025    ANIONGAP 11.0 02/05/2025     Lab Results   Component Value Date    INR 1.03 01/31/2025    PROTIME 13.7 01/31/2025         Condition on Discharge:  good    Vital Signs  Temp:  [97.6 °F (36.4 °C)-97.9 °F (36.6 °C)] 97.8 °F (36.6 °C)  Heart Rate:  [57-75] 59  Resp:  [12-16] 16  BP: (126-175)/() 143/65  Arterial Line BP: (147)/(47)  147/47      Discharge Disposition  Home or Self Care    Discharge Medications     Discharge Medications        Continue These Medications        Instructions Start Date   acetaminophen 500 MG tablet  Commonly known as: TYLENOL   1,000 mg, Every 6 Hours PRN      alendronate 70 MG tablet  Commonly known as: FOSAMAX   70 mg, Every 7 Days      amLODIPine 5 MG tablet  Commonly known as: NORVASC   5 mg, Oral, Every 24 Hours Scheduled      aspirin 81 MG tablet   81 mg, Daily      CALCIUM MAGNESIUM PO   1 tablet, Daily      cholecalciferol 25 MCG (1000 UT) tablet  Commonly known as: VITAMIN D3   1,000 Units, Daily      citalopram 10 MG tablet  Commonly known as: CeleXA   10 mg, Daily      D-MANNOSE PO   Take  by mouth. HOLD PRIOR TO SURG      docusate calcium 240 MG capsule  Commonly known as: SURFAK   240 mg, 2 Times Daily PRN      furosemide 40 MG tablet  Commonly known as: LASIX   40 mg, Oral, Daily      hydrALAZINE 50 MG tablet  Commonly known as: APRESOLINE   50 mg, Oral, 3 Times Daily PRN      lidocaine 5 %  Commonly known as: Lidoderm   1 patch, Transdermal, Daily PRN, Remove & Discard patch within 12 hours or as directed by MD      multivitamin with minerals tablet tablet   1 tablet, Daily      nebivolol 5 MG tablet  Commonly known as: BYSTOLIC   5 mg, Oral, Daily      pravastatin 40 MG tablet  Commonly known as: PRAVACHOL   40 mg, Nightly      PROBIOTIC DAILY PO   Take  by mouth. HOLD PRIOR TO SURG      tiZANidine 2 MG tablet  Commonly known as: ZANAFLEX   2 mg, Every 6 Hours PRN      VITAMIN D-VITAMIN K PO   Take  by mouth. HOLD PRIOR TO SURG               Discharge Diet:     Activity at Discharge:   Activity Instructions       Activity as Tolerated      Measure Weight      Daily weight, notify if over 3 lbs in one day                    Follow-up Appointments  Future Appointments   Date Time Provider Department Center   2/11/2025 11:30 AM Omar Yee APRN [facility]   3/13/2025  9:15 AM [facility] POLLACK  ECHO/VAS BACK RM [facility] ECHO [facility]   3/13/2025 10:00 AM Mike Rizvi MD [facility] None   3/14/2025  2:30 PM Zheng Fleming MD [facility] L100 [facility]   3/18/2025  2:00 PM Adam Singleton MD [facility]   12/11/2025  2:45 PM [facility] ECHO/VAS RM 1 [facility] ECHO [facility]   12/11/2025  3:20 PM Mike Rizvi MD [facility] None     Additional Instructions for the Follow-ups that You Need to Schedule       Ambulatory Referral to Cardiac Rehab   As directed      Discharge Follow-up with Specialty: Dr. Rizvi; 1 Month   As directed      Specialty: Dr. Rizvi   Follow Up: 1 Month        Discharge Follow-up with Specified Provider: Cardiology NP 1 week   As directed      To: Cardiology NP 1 week                Test Results Pending at Discharge       ELVIE Helm  02/05/25  10:41 EST

## 2025-02-05 NOTE — PLAN OF CARE
Goal Outcome Evaluation:  Plan of Care Reviewed With: patient, child        Progress: improving  Outcome Evaluation: Patient s/p TAVR with bilateral groin sites and left radial site.Patient with c/o pain in her back which improves with heat, Norco and muscle relaxants. Patient b/p 120-150's/40's HR 60-70's remains SR. Plans for echo this am. Will continue to monitor and await discharge plans.

## 2025-02-05 NOTE — PROGRESS NOTES
Patient Care Team:  Ambrose Ashley MD as PCP - General (Family Medicine)    Chief Complaint: Severe degenerative aortic valve stenosis, status post transcatheter aortic valve replacement    Interval History:   No new issues this morning.  Some back pain last night.    Objective   Vital Signs  Temp:  [97.6 °F (36.4 °C)-97.9 °F (36.6 °C)] 97.8 °F (36.6 °C)  Heart Rate:  [54-75] 59  Resp:  [12-20] 16  BP: (126-175)/() 143/65  Arterial Line BP: (129-153)/(44-50) 147/47    Intake/Output Summary (Last 24 hours) at 2/5/2025 0945  Last data filed at 2/5/2025 0839  Gross per 24 hour   Intake 600 ml   Output 1550 ml   Net -950 ml         Temp:  [97.6 °F (36.4 °C)-97.9 °F (36.6 °C)] 97.8 °F (36.6 °C)  Heart Rate:  [54-75] 59  Resp:  [12-20] 16  BP: (126-175)/() 143/65  Arterial Line BP: (129-153)/(44-50) 147/47    Intake/Output Summary (Last 24 hours) at 2/5/2025 0945  Last data filed at 2/5/2025 0839  Gross per 24 hour   Intake 600 ml   Output 1550 ml   Net -950 ml         General Appearance:    Alert, cooperative, in no acute distress   Head:    Normocephalic, without obvious abnormality, atraumatic       Neck/Lymph   No adenopathy, supple, no thyromegaly, no carotid bruit, no    JVD   Lungs:     Clear to auscultation bilaterally, no wheezes, rales, or     rhonchi    Cardiac:    Normal rate, regular rhythm, no murmur, no rub, no gallop   Chest Wall:    No abnormalities observed   GI:     Normal bowel sounds, soft, nontender, nondistended,            no rebound tenderness   Extremities:   No cyanosis, clubbing, or edema   Circulatory/Peripheral Vascular :   Pulses palpable and equal bilaterally   Integumentary:   No bleeding or rash. Normal temperature                amLODIPine, 5 mg, Oral, Q24H  aspirin, 81 mg, Oral, Daily  cholecalciferol, 1,000 Units, Oral, Daily  citalopram, 10 mg, Oral, Daily  furosemide, 40 mg, Oral, Daily  multivitamin with minerals, 1 tablet, Oral, Daily  pravastatin, 40 mg, Oral,  Nightly             Results Review:    Results from last 7 days   Lab Units 02/05/25  0329   SODIUM mmol/L 135*   POTASSIUM mmol/L 3.8   CHLORIDE mmol/L 102   CO2 mmol/L 22.0   BUN mg/dL 26*   CREATININE mg/dL 1.43*   GLUCOSE mg/dL 99   CALCIUM mg/dL 8.1*         Results from last 7 days   Lab Units 02/05/25  0329   WBC 10*3/mm3 10.73   HEMOGLOBIN g/dL 9.2*   HEMATOCRIT % 27.8*   PLATELETS 10*3/mm3 203     Results from last 7 days   Lab Units 01/31/25  0728   INR  1.03   APTT seconds 30.1     Results from last 7 days   Lab Units 02/05/25  0329   CHOLESTEROL mg/dL 125         Results from last 7 days   Lab Units 02/05/25  0329   CHOLESTEROL mg/dL 125   TRIGLYCERIDES mg/dL 89   HDL CHOL mg/dL 52   LDL CHOL mg/dL 56     @LABRCNT(bnp)@  I reviewed the patient's new clinical results.  I personally viewed and interpreted the patient's EKG/Telemetry data            Assessment & Plan   1.  Severe degenerative aortic valve stenosis: Status post transcatheter aortic valve replacement with 26 mm Medtronic Evolut Pro  2.  Chronic kidney disease stage III  3.  Acute on chronic heart failure with preserved ejection fraction  4.  Essential hypertension  5.  Severe arthritis    -Creatinine elevated but stable.  - Hemoglobin 9.2.  No indication for transfusion.  - Transthoracic echocardiogram pending.  - Electrocardiogram has been reviewed.  Normal sinus rhythm.  No significant conduction abnormalities.  -She should be able to go home after echo today.

## 2025-02-05 NOTE — OP NOTE
TAVR OPERATIVE REPORT     CO-SURGEON: Ebenezer Albert MD  CO-SURGEON: Mauri Rizvi MD     DIAGNOSIS: Severe symptomatic aortic stenosis.      POSTOP DIAGNOSIS: Severe symptomatic aortic stenosis.      PRESENT CO-MORBIDITIES: Critical AS, renal, CKD 3, CHF, diastolic, class III, advanced arthritis PROCEDURE: Elective procedure in hybrid operating room      1. Transcatheter aortic valve replacement (TAVR) utilizing a 26 mm Medtronic Evolut Pro valve and a 14 Scottish sheath  2. Percutaneous right femoral arterial and venous access   3. Percutaneous left femoral arterial   4. Abdominal aortography and bilateral lower extremity angiography  5. Transvenous pacing using a 5-Scottish balloon-tip pacer  6. Supravalvular aortogram  7. Transthoracic echocardiogram  8. Arterial line placement  9.  Balloon aortic valvuloplasty with a 20 Scottish true balloon      INDICATIONS FOR OPERATION: The patient is a 89-year-old with [place] Heart Association Class 3 symptoms and STS score of 22.1%. The patient was determined to be best suited for TAVR by the multidisciplinary heart team due to elevated surgical risk.     FDA TAVR INDICATIONS: The patient was judged to be suitable for TAVR by the multidisciplinary team as reviewed by two cardiac surgeons, an interventional cardiologist, and the rest of the TAVR team.  The patient, family and team were in agreement that the case would not convert to an open surgical AVR in the event of unsuccessful TAVR. The patient’s co-morbidities would not preclude the expected benefit from correction of the aortic stenosis by TAVR based on the team discussion. The patient will be enrolled in the STS/ACC TAVR TVT registry.      DESCRIPTION: Dr. iRzvi (interventional cardiologist) and Dr. Albert (cardiothoracic surgeon) performed the procedure together in all preoperative and operative aspects. The patient was taken to the hybrid OR. A radial art line and central venous access were inserted preoperatively.  "Anesthesia was administered without apparent complication. The patient was prepped and draped in the usual sterile fashion.       Using a micropuncture technique, access was obtained in the left femoral artery and a microsheath was inserted.  Angiography through the microsheath confirmed good position of the arterial access point.  Two Perclose sutures were deployed in offset manner in the left femoral artery using the \"Preclose\" technique.  An 8 Austrian sheath was inserted.       A similar technique was used to obtain access in the right common femoral artery.    A 6 Austrian sheath was inserted.  Access was obtained in the left femoral vein and a 6 Austrian sheath was inserted.   A 5 Austrian pacing catheter was directed to the RV apex and was tested.  A 5 Austrian pigtail was directed to the non-coronary cusp.  Angiography was performed using 20 cc of contrast.      Heparin was used for anticoagulation.  From the right femoral approach, a 6 Austrian AL-1 catheter was directed to the ascending aorta.  We crossed the aortic valve with a straight wire and exchanged for a pigtail catheter.  We advanced the pigtail to the LV apex and exchanged for a Confida wire.  TTE demonstrated good position for the wire, without entanglement in the mitral valve apparatus.  Following, a 12 Austrian sheath was placed in the left common femoral artery and then a 20 Austrian true balloon was placed and a valvuloplasty was performed under rapid pacing without difficulty.  The patient was stable and the sheath was exchanged for the percutaneous valve.     A 26-mm Medtronic Evolut Pro valve was prepped and was inspected using fluoroscopy, then was loaded into the delivery system.  Sequential dilation of the right groin was performed, then the valve delivery system was easily inserted to the descending aorta.     The valve was advanced easily across the aortic arch and was placed near the non-coronary cusp.  The valve was carefully deployed until " annular contact was made.  Ventricular pacing was performed to stabilize the position.  Position was confirmed using angiography.  The valve was then deployed until the valve leaftets began to function.     Proper valve placement, orientation and degree of regurgitation was then evaluated by transthoracic echocardiogram and aortography. The co-operators agreed that no further intervention was necessary.     We then withdrew the delivery system to the descending aorta and withdrew the nosecone.  Over the wire, it was removed and hemostasis was obtained with the perclose sutures.  Angiography from the distal aorta.  All parties were in agreement that there was no flow-limiting vascular trauma or extravasation of dye, at which point closure of the arteriotomy was completed with our two Perclose devices.       Finally, our contralateral access was removed and closed using a 6 Icelandic Angioseal.  The femoral venous sheath was removed.  The patient left in the care of the anesthesia team for extubation and hemodynamic monitoring. The patient was transported to the Open Heart Recovery Unit for further monitoring and care.         1. Hemodynamics:  Post TAVR aortic gradient: 5 mmHg.  Post TAVR AI: None. Post MAN: 1.9 point cm2.      CONTRAST USED: 150 mL.      FLUROSCOPY TIME: 16.6 min     Air KERMA: 583 mGray     EBL: minimal     SPECIMENS: none     CONCLUSIONS:  Successful deployment of a 26 mm Medtronic Evolut flex transcatheter aortic heart valve.

## 2025-02-06 ENCOUNTER — TELEPHONE (OUTPATIENT)
Dept: ORTHOPEDIC SURGERY | Facility: CLINIC | Age: OVER 89
End: 2025-02-06
Payer: MEDICARE

## 2025-02-06 NOTE — OUTREACH NOTE
Prep Survey      Flowsheet Row Responses   [facility] patient discharged from? [place]   Is LACE score < 7 ? No   Eligibility Readm Mgmt   Discharge diagnosis Severe aortic valve stenosis, TRANSFEMORAL TRANSCATHETER AORTIC VALVE REPLACEMENT PERCUTANEOUS APPROACH   Does the patient have one of the following disease processes/diagnoses(primary or secondary)? Cardiothoracic surgery   Does the patient have Home health ordered? No   Is there a DME ordered? No   Prep survey completed? Yes            Celine Roberts Registered Nurse

## 2025-02-06 NOTE — TELEPHONE ENCOUNTER
Caller: Angus Jay    Relationship to patient: Emergency Contact    Best call back number: [phone]    Chief complaint: LOWER BACK PAIN    Type of visit: NEW PROB, WITH [facility]AIL[facility]    Requested date: ASAP     Additional notes: PT HAD A FALL RECENTLY AND INJURED HER BACK. NOTES IN CHART FROM ER VISIT ON 01/28/25. PT WOULD LIKE TO BE REFERRED TO [facility] IF POSSIBLE. PLEASE CONTACT PT'S SON ABOVE TO DISCUSS/SCHEDULE.

## 2025-02-07 ENCOUNTER — OFFICE VISIT (OUTPATIENT)
Dept: ORTHOPEDIC SURGERY | Facility: CLINIC | Age: OVER 89
End: 2025-02-07
Payer: MEDICARE

## 2025-02-07 VITALS — BODY MASS INDEX: 31.8 KG/M2 | TEMPERATURE: 96 F | WEIGHT: 162 LBS | HEIGHT: 60 IN

## 2025-02-07 DIAGNOSIS — M46.1 SACROILIAC INFLAMMATION: ICD-10-CM

## 2025-02-07 DIAGNOSIS — M53.3 SACRAL BACK PAIN: Primary | ICD-10-CM

## 2025-02-07 PROCEDURE — 1160F RVW MEDS BY RX/DR IN RCRD: CPT | Performed by: NURSE PRACTITIONER

## 2025-02-07 PROCEDURE — 99213 OFFICE O/P EST LOW 20 MIN: CPT | Performed by: NURSE PRACTITIONER

## 2025-02-07 PROCEDURE — 1159F MED LIST DOCD IN RCRD: CPT | Performed by: NURSE PRACTITIONER
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
## 2025-02-11 ENCOUNTER — OFFICE VISIT (OUTPATIENT)
Dept: CARDIOLOGY | Facility: CLINIC | Age: OVER 89
End: 2025-02-11
Payer: MEDICARE

## 2025-02-11 ENCOUNTER — TELEPHONE (OUTPATIENT)
Dept: CARDIOLOGY | Facility: HOSPITAL | Age: OVER 89
End: 2025-02-11
Payer: MEDICARE

## 2025-02-11 ENCOUNTER — READMISSION MANAGEMENT (OUTPATIENT)
Dept: CALL CENTER | Facility: HOSPITAL | Age: OVER 89
End: 2025-02-11
Payer: MEDICARE

## 2025-02-11 VITALS
HEIGHT: 60 IN | SYSTOLIC BLOOD PRESSURE: 138 MMHG | WEIGHT: 161 LBS | OXYGEN SATURATION: 94 % | DIASTOLIC BLOOD PRESSURE: 64 MMHG | BODY MASS INDEX: 31.61 KG/M2

## 2025-02-11 DIAGNOSIS — I50.32 CHRONIC DIASTOLIC CONGESTIVE HEART FAILURE: Primary | ICD-10-CM

## 2025-02-11 DIAGNOSIS — Z95.2 S/P TAVR (TRANSCATHETER AORTIC VALVE REPLACEMENT): Primary | ICD-10-CM

## 2025-02-11 DIAGNOSIS — I10 PRIMARY HYPERTENSION: ICD-10-CM

## 2025-02-11 DIAGNOSIS — N18.30 STAGE 3 CHRONIC KIDNEY DISEASE, UNSPECIFIED WHETHER STAGE 3A OR 3B CKD: ICD-10-CM

## 2025-02-11 DIAGNOSIS — I50.32 CHRONIC DIASTOLIC CONGESTIVE HEART FAILURE: ICD-10-CM

## 2025-02-11 DIAGNOSIS — I35.0 SEVERE AORTIC VALVE STENOSIS: ICD-10-CM

## 2025-02-11 RX ORDER — FUROSEMIDE 40 MG/1
40 TABLET ORAL 2 TIMES DAILY
Qty: 180 TABLET | Refills: 3 | Status: SHIPPED | OUTPATIENT
Start: 2025-02-11

## 2025-02-11 NOTE — TELEPHONE ENCOUNTER
Mrs Jay calls concerned that she is feeling poorly She reports she has gained nearly 11 lbs in a week and has increased swelling in both legs and ankles. She also reports her oxygenation rate has dwindled from 97-98% down to 90-91% I spoke with Dr Rizvi and he recommends increasing her lasix to 40 mg twice a day and having labs drawn on 2/17 She is agreeable to this and will call if she has more questions or her condition changes

## 2025-02-11 NOTE — OUTREACH NOTE
CT Surgery Week 1 Survey      Flowsheet Row Responses   [facility] [place], operated by [facility] patient discharged from? [place]   Does the patient have one of the following disease processes/diagnoses(primary or secondary)? Cardiothoracic surgery   Week 1 attempt successful? No   Unsuccessful attempts Attempt 1              Lara SMALLWOOD - Registered Nurse

## 2025-02-12 ENCOUNTER — READMISSION MANAGEMENT (OUTPATIENT)
Dept: CALL CENTER | Facility: HOSPITAL | Age: OVER 89
End: 2025-02-12
Payer: MEDICARE

## 2025-02-12 NOTE — OUTREACH NOTE
CT Surgery Week 1 Survey      Flowsheet Row Responses   [facility] patient discharged from? [place]   Does the patient have one of the following disease processes/diagnoses(primary or secondary)? Cardiothoracic surgery   Week 1 attempt successful? No   Call start time 0926   Unsuccessful attempts Attempt 2              Yamini Roberts Registered Nurse

## 2025-02-13 ENCOUNTER — TELEPHONE (OUTPATIENT)
Dept: CARDIAC REHAB | Facility: HOSPITAL | Age: OVER 89
End: 2025-02-13
Payer: MEDICARE

## 2025-02-13 NOTE — TELEPHONE ENCOUNTER
I just spoke with your patient re: recent referral to cardiac rehab s/p TAVR.  She states that she is scheduled to start rehab for her back at [facility] on Tuesday, Feb 18th.  She doesn't think she would be able to do both rehabs at one time.     I encouraged her to proceed with KORT because I do think her back would likely limit her ability to progress in cardiac rehab.      Thank you for the referral!

## 2025-02-17 ENCOUNTER — LAB (OUTPATIENT)
Dept: LAB | Facility: HOSPITAL | Age: OVER 89
End: 2025-02-17
Payer: MEDICARE

## 2025-02-17 DIAGNOSIS — I50.32 CHRONIC DIASTOLIC CONGESTIVE HEART FAILURE: ICD-10-CM

## 2025-02-17 DIAGNOSIS — I35.0 SEVERE AORTIC VALVE STENOSIS: ICD-10-CM

## 2025-02-17 DIAGNOSIS — Z95.2 S/P TAVR (TRANSCATHETER AORTIC VALVE REPLACEMENT): ICD-10-CM

## 2025-02-17 LAB
ANION GAP SERPL CALCULATED.3IONS-SCNC: 12.1 MMOL/L (ref 5–15)
BUN SERPL-MCNC: 28 MG/DL (ref 8–23)
BUN/CREAT SERPL: 14.8 (ref 7–25)
CALCIUM SPEC-SCNC: 9.5 MG/DL (ref 8.6–10.5)
CHLORIDE SERPL-SCNC: 96 MMOL/L (ref 98–107)
CO2 SERPL-SCNC: 26.9 MMOL/L (ref 22–29)
CREAT SERPL-MCNC: 1.89 MG/DL (ref 0.57–1)
DEPRECATED RDW RBC AUTO: 43.1 FL (ref 37–54)
EGFRCR SERPLBLD CKD-EPI 2021: 25.1 ML/MIN/1.73
ERYTHROCYTE [DISTWIDTH] IN BLOOD BY AUTOMATED COUNT: 13.5 % (ref 12.3–15.4)
GLUCOSE SERPL-MCNC: 105 MG/DL (ref 65–99)
HCT VFR BLD AUTO: 31.2 % (ref 34–46.6)
HGB BLD-MCNC: 10.3 G/DL (ref 12–15.9)
MCH RBC QN AUTO: 29.3 PG (ref 26.6–33)
MCHC RBC AUTO-ENTMCNC: 33 G/DL (ref 31.5–35.7)
MCV RBC AUTO: 88.6 FL (ref 79–97)
NT-PROBNP SERPL-MCNC: 2999 PG/ML (ref 0–1800)
PLATELET # BLD AUTO: 375 10*3/MM3 (ref 140–450)
PMV BLD AUTO: 9.8 FL (ref 6–12)
POTASSIUM SERPL-SCNC: 5 MMOL/L (ref 3.5–5.2)
RBC # BLD AUTO: 3.52 10*6/MM3 (ref 3.77–5.28)
SODIUM SERPL-SCNC: 135 MMOL/L (ref 136–145)
WBC NRBC COR # BLD AUTO: 10.13 10*3/MM3 (ref 3.4–10.8)

## 2025-02-17 PROCEDURE — 83880 ASSAY OF NATRIURETIC PEPTIDE: CPT

## 2025-02-17 PROCEDURE — 85027 COMPLETE CBC AUTOMATED: CPT

## 2025-02-17 PROCEDURE — 36415 COLL VENOUS BLD VENIPUNCTURE: CPT

## 2025-02-17 PROCEDURE — 80048 BASIC METABOLIC PNL TOTAL CA: CPT

## 2025-02-21 ENCOUNTER — TELEPHONE (OUTPATIENT)
Dept: CARDIOLOGY | Facility: CLINIC | Age: OVER 89
End: 2025-02-21
Payer: MEDICARE

## 2025-02-21 ENCOUNTER — APPOINTMENT (OUTPATIENT)
Dept: CT IMAGING | Facility: HOSPITAL | Age: OVER 89
End: 2025-02-21
Payer: MEDICARE

## 2025-02-21 ENCOUNTER — APPOINTMENT (OUTPATIENT)
Dept: GENERAL RADIOLOGY | Facility: HOSPITAL | Age: OVER 89
End: 2025-02-21
Payer: MEDICARE

## 2025-02-21 ENCOUNTER — HOSPITAL ENCOUNTER (EMERGENCY)
Facility: HOSPITAL | Age: OVER 89
Discharge: HOME | End: 2025-02-21
Attending: EMERGENCY MEDICINE
Payer: MEDICARE

## 2025-02-21 VITALS
DIASTOLIC BLOOD PRESSURE: 58 MMHG | SYSTOLIC BLOOD PRESSURE: 169 MMHG | OXYGEN SATURATION: 96 % | TEMPERATURE: 98.1 F | HEART RATE: 73 BPM | RESPIRATION RATE: 30 BRPM

## 2025-02-21 DIAGNOSIS — I10 PRIMARY HYPERTENSION: ICD-10-CM

## 2025-02-21 DIAGNOSIS — I50.32 CHRONIC DIASTOLIC CONGESTIVE HEART FAILURE: ICD-10-CM

## 2025-02-21 DIAGNOSIS — R11.2 NAUSEA AND VOMITING, UNSPECIFIED VOMITING TYPE: Primary | ICD-10-CM

## 2025-02-21 DIAGNOSIS — R51.9 NONINTRACTABLE HEADACHE, UNSPECIFIED CHRONICITY PATTERN, UNSPECIFIED HEADACHE TYPE: ICD-10-CM

## 2025-02-21 DIAGNOSIS — I10 HYPERTENSION, UNSPECIFIED TYPE: ICD-10-CM

## 2025-02-21 LAB
ALBUMIN SERPL-MCNC: 4.2 G/DL (ref 3.5–5.2)
ALBUMIN/GLOB SERPL: 1.3 G/DL
ALP SERPL-CCNC: 89 U/L (ref 39–117)
ALT SERPL W P-5'-P-CCNC: 9 U/L (ref 1–33)
ANION GAP SERPL CALCULATED.3IONS-SCNC: 17 MMOL/L (ref 5–15)
AST SERPL-CCNC: 21 U/L (ref 1–32)
B PARAPERT DNA SPEC QL NAA+PROBE: NOT DETECTED
B PERT DNA SPEC QL NAA+PROBE: NOT DETECTED
BACTERIA UR QL AUTO: ABNORMAL /HPF
BASOPHILS # BLD AUTO: 0.08 10*3/MM3 (ref 0–0.2)
BASOPHILS NFR BLD AUTO: 0.8 % (ref 0–1.5)
BILIRUB SERPL-MCNC: 0.3 MG/DL (ref 0–1.2)
BILIRUB UR QL STRIP: NEGATIVE
BUN SERPL-MCNC: 25 MG/DL (ref 8–23)
BUN/CREAT SERPL: 15.2 (ref 7–25)
C PNEUM DNA NPH QL NAA+NON-PROBE: NOT DETECTED
CALCIUM SPEC-SCNC: 10.3 MG/DL (ref 8.6–10.5)
CHLORIDE SERPL-SCNC: 93 MMOL/L (ref 98–107)
CLARITY UR: ABNORMAL
CO2 SERPL-SCNC: 23 MMOL/L (ref 22–29)
COLOR UR: YELLOW
CREAT SERPL-MCNC: 1.64 MG/DL (ref 0.57–1)
DEPRECATED RDW RBC AUTO: 45.3 FL (ref 37–54)
EGFRCR SERPLBLD CKD-EPI 2021: 29.8 ML/MIN/1.73
EOSINOPHIL # BLD AUTO: 0.62 10*3/MM3 (ref 0–0.4)
EOSINOPHIL NFR BLD AUTO: 6.3 % (ref 0.3–6.2)
ERYTHROCYTE [DISTWIDTH] IN BLOOD BY AUTOMATED COUNT: 13.9 % (ref 12.3–15.4)
FLUAV SUBTYP SPEC NAA+PROBE: NOT DETECTED
FLUBV RNA ISLT QL NAA+PROBE: NOT DETECTED
GEN 5 1HR TROPONIN T REFLEX: 27 NG/L
GLOBULIN UR ELPH-MCNC: 3.3 GM/DL
GLUCOSE SERPL-MCNC: 133 MG/DL (ref 65–99)
GLUCOSE UR STRIP-MCNC: NEGATIVE MG/DL
HADV DNA SPEC NAA+PROBE: NOT DETECTED
HCOV 229E RNA SPEC QL NAA+PROBE: NOT DETECTED
HCOV HKU1 RNA SPEC QL NAA+PROBE: NOT DETECTED
HCOV NL63 RNA SPEC QL NAA+PROBE: NOT DETECTED
HCOV OC43 RNA SPEC QL NAA+PROBE: NOT DETECTED
HCT VFR BLD AUTO: 34 % (ref 34–46.6)
HGB BLD-MCNC: 11.1 G/DL (ref 12–15.9)
HGB UR QL STRIP.AUTO: NEGATIVE
HMPV RNA NPH QL NAA+NON-PROBE: NOT DETECTED
HOLD SPECIMEN: NORMAL
HOLD SPECIMEN: NORMAL
HPIV1 RNA ISLT QL NAA+PROBE: NOT DETECTED
HPIV2 RNA SPEC QL NAA+PROBE: NOT DETECTED
HPIV3 RNA NPH QL NAA+PROBE: NOT DETECTED
HPIV4 P GENE NPH QL NAA+PROBE: NOT DETECTED
HYALINE CASTS UR QL AUTO: ABNORMAL /LPF
IMM GRANULOCYTES # BLD AUTO: 0.06 10*3/MM3 (ref 0–0.05)
IMM GRANULOCYTES NFR BLD AUTO: 0.6 % (ref 0–0.5)
KETONES UR QL STRIP: ABNORMAL
LEUKOCYTE ESTERASE UR QL STRIP.AUTO: ABNORMAL
LYMPHOCYTES # BLD AUTO: 1.29 10*3/MM3 (ref 0.7–3.1)
LYMPHOCYTES NFR BLD AUTO: 13.1 % (ref 19.6–45.3)
M PNEUMO IGG SER IA-ACNC: NOT DETECTED
MAGNESIUM SERPL-MCNC: 2.2 MG/DL (ref 1.6–2.4)
MCH RBC QN AUTO: 29.1 PG (ref 26.6–33)
MCHC RBC AUTO-ENTMCNC: 32.6 G/DL (ref 31.5–35.7)
MCV RBC AUTO: 89.2 FL (ref 79–97)
MONOCYTES # BLD AUTO: 0.71 10*3/MM3 (ref 0.1–0.9)
MONOCYTES NFR BLD AUTO: 7.2 % (ref 5–12)
NEUTROPHILS NFR BLD AUTO: 7.08 10*3/MM3 (ref 1.7–7)
NEUTROPHILS NFR BLD AUTO: 72 % (ref 42.7–76)
NITRITE UR QL STRIP: NEGATIVE
NRBC BLD AUTO-RTO: 0 /100 WBC (ref 0–0.2)
PH UR STRIP.AUTO: >9 [PH] (ref 5–8)
PHOSPHATE SERPL-MCNC: 3.3 MG/DL (ref 2.5–4.5)
PLATELET # BLD AUTO: 329 10*3/MM3 (ref 140–450)
PMV BLD AUTO: 9.7 FL (ref 6–12)
POTASSIUM SERPL-SCNC: 3.9 MMOL/L (ref 3.5–5.2)
PROT SERPL-MCNC: 7.5 G/DL (ref 6–8.5)
PROT UR QL STRIP: ABNORMAL
QT INTERVAL: 446 MS
QTC INTERVAL: 484 MS
RBC # BLD AUTO: 3.81 10*6/MM3 (ref 3.77–5.28)
RBC # UR STRIP: ABNORMAL /HPF
REF LAB TEST METHOD: ABNORMAL
RHINOVIRUS RNA SPEC NAA+PROBE: NOT DETECTED
RSV RNA NPH QL NAA+NON-PROBE: NOT DETECTED
SARS-COV-2 RNA NPH QL NAA+NON-PROBE: NOT DETECTED
SODIUM SERPL-SCNC: 133 MMOL/L (ref 136–145)
SP GR UR STRIP: 1.02 (ref 1–1.03)
SQUAMOUS #/AREA URNS HPF: ABNORMAL /HPF
TROPONIN T % DELTA: -4
TROPONIN T NUMERIC DELTA: -1 NG/L
TROPONIN T SERPL HS-MCNC: 28 NG/L
UROBILINOGEN UR QL STRIP: ABNORMAL
WBC # UR STRIP: ABNORMAL /HPF
WBC NRBC COR # BLD AUTO: 9.84 10*3/MM3 (ref 3.4–10.8)
WHOLE BLOOD HOLD COAG: NORMAL
WHOLE BLOOD HOLD SPECIMEN: NORMAL

## 2025-02-21 PROCEDURE — 93010 ELECTROCARDIOGRAM REPORT: CPT | Performed by: INTERNAL MEDICINE

## 2025-02-21 PROCEDURE — 25010000002 ONDANSETRON PER 1 MG: Performed by: EMERGENCY MEDICINE

## 2025-02-21 PROCEDURE — 70450 CT HEAD/BRAIN W/O DYE: CPT

## 2025-02-21 PROCEDURE — 84484 ASSAY OF TROPONIN QUANT: CPT | Performed by: EMERGENCY MEDICINE

## 2025-02-21 PROCEDURE — 96361 HYDRATE IV INFUSION ADD-ON: CPT

## 2025-02-21 PROCEDURE — 25010000002 HYDRALAZINE PER 20 MG: Performed by: EMERGENCY MEDICINE

## 2025-02-21 PROCEDURE — 81001 URINALYSIS AUTO W/SCOPE: CPT | Performed by: EMERGENCY MEDICINE

## 2025-02-21 PROCEDURE — 0202U NFCT DS 22 TRGT SARS-COV-2: CPT | Performed by: EMERGENCY MEDICINE

## 2025-02-21 PROCEDURE — 96375 TX/PRO/DX INJ NEW DRUG ADDON: CPT

## 2025-02-21 PROCEDURE — 99284 EMERGENCY DEPT VISIT MOD MDM: CPT

## 2025-02-21 PROCEDURE — 93005 ELECTROCARDIOGRAM TRACING: CPT | Performed by: EMERGENCY MEDICINE

## 2025-02-21 PROCEDURE — 25810000003 SODIUM CHLORIDE 0.9 % SOLUTION: Performed by: EMERGENCY MEDICINE

## 2025-02-21 PROCEDURE — 36415 COLL VENOUS BLD VENIPUNCTURE: CPT

## 2025-02-21 PROCEDURE — 96374 THER/PROPH/DIAG INJ IV PUSH: CPT

## 2025-02-21 PROCEDURE — 84100 ASSAY OF PHOSPHORUS: CPT | Performed by: EMERGENCY MEDICINE

## 2025-02-21 PROCEDURE — 74176 CT ABD & PELVIS W/O CONTRAST: CPT

## 2025-02-21 PROCEDURE — 80053 COMPREHEN METABOLIC PANEL: CPT | Performed by: EMERGENCY MEDICINE

## 2025-02-21 PROCEDURE — 85025 COMPLETE CBC W/AUTO DIFF WBC: CPT | Performed by: EMERGENCY MEDICINE

## 2025-02-21 PROCEDURE — 71045 X-RAY EXAM CHEST 1 VIEW: CPT

## 2025-02-21 PROCEDURE — 83735 ASSAY OF MAGNESIUM: CPT | Performed by: EMERGENCY MEDICINE

## 2025-02-21 RX ORDER — SODIUM CHLORIDE 0.9 % (FLUSH) 0.9 %
10 SYRINGE (ML) INJECTION AS NEEDED
Status: DISCONTINUED | OUTPATIENT
Start: 2025-02-21 | End: 2025-02-21 | Stop reason: HOSPADM

## 2025-02-21 RX ORDER — ONDANSETRON 4 MG/1
4 TABLET, ORALLY DISINTEGRATING ORAL 4 TIMES DAILY PRN
Qty: 16 TABLET | Refills: 0 | Status: SHIPPED | OUTPATIENT
Start: 2025-02-21

## 2025-02-21 RX ORDER — FUROSEMIDE 40 MG/1
40 TABLET ORAL DAILY
Start: 2025-02-21

## 2025-02-21 RX ORDER — ONDANSETRON 2 MG/ML
4 INJECTION INTRAMUSCULAR; INTRAVENOUS ONCE
Status: COMPLETED | OUTPATIENT
Start: 2025-02-21 | End: 2025-02-21

## 2025-02-21 RX ORDER — HYDRALAZINE HYDROCHLORIDE 20 MG/ML
20 INJECTION INTRAMUSCULAR; INTRAVENOUS ONCE
Status: DISCONTINUED | OUTPATIENT
Start: 2025-02-21 | End: 2025-02-21

## 2025-02-21 RX ORDER — HYDRALAZINE HYDROCHLORIDE 20 MG/ML
20 INJECTION INTRAMUSCULAR; INTRAVENOUS ONCE
Status: COMPLETED | OUTPATIENT
Start: 2025-02-21 | End: 2025-02-21

## 2025-02-21 RX ORDER — ACETAMINOPHEN 500 MG
1000 TABLET ORAL ONCE
Status: COMPLETED | OUTPATIENT
Start: 2025-02-21 | End: 2025-02-21

## 2025-02-21 RX ADMIN — SODIUM CHLORIDE 500 ML: 9 INJECTION, SOLUTION INTRAVENOUS at 02:44

## 2025-02-21 RX ADMIN — ONDANSETRON 4 MG: 2 INJECTION, SOLUTION INTRAMUSCULAR; INTRAVENOUS at 02:42

## 2025-02-21 RX ADMIN — HYDRALAZINE HYDROCHLORIDE 20 MG: 20 INJECTION INTRAMUSCULAR; INTRAVENOUS at 03:27

## 2025-02-21 RX ADMIN — ACETAMINOPHEN 1000 MG: 500 TABLET, FILM COATED ORAL at 03:26

## 2025-02-21 NOTE — ED PROVIDER NOTES
EMERGENCY DEPARTMENT ENCOUNTER  Room Number:  13/13  PCP: Ambrose Ashley MD  Independent Historians: Patient  Date of encounter:  2/21/2025  Patient Care Team:  Ambrose Ashley MD as PCP - General (Family Medicine)     HPI:  Chief Complaint: had concerns including Nausea and Hypertension.     A complete HPI/ROS/PMH/PSH/SH/FH are unobtainable due to: None    Chronic or social conditions impacting patient care (Social Determinants of Health): None    Context: Vonda Jay is a 89 y.o. female with a medical history of hypertension, CKD, CHF, aortic valve replacement who presents to the ED c/o acute weakness, nausea and vomiting.  Patient said 3 to 4 days of persistent nausea.  She began vomiting today.  She also states she feels generally weak and dehydrated.  She had poor p.o. intake secondary to this nausea.  She is still recovering from her recent aortic valve replacement denies any shortness of breath or chest pain.  No fevers or chills.  No diarrhea or constipation.  No urinary complaint.  No abdominal pain.    Review of prior external notes (non-ED) -and- Review of prior external test results outside of this encounter:  Discharge summary from 2/5/2025 reviewed.  Patient was admitted for TAVR without postoperative complications.    PAST MEDICAL HISTORY  Active Ambulatory Problems     Diagnosis Date Noted    Closed displaced intertrochanteric fracture of left femur 02/07/2018    SANYA (acute kidney injury) 02/07/2018    HTN (hypertension) 02/07/2018    Acute blood loss anemia 02/10/2018    Leukocytosis 02/10/2018    Drug-induced constipation 02/12/2018    Stage 3b chronic kidney disease 09/17/2022    COVID-19 virus infection 09/17/2022    Anemia due to chronic kidney disease 09/17/2022    Acute on chronic diastolic CHF (congestive heart failure) 09/18/2022    Severe aortic valve stenosis 09/18/2022    Anemia of chronic renal failure, stage 3 (moderate) 09/19/2022    E. 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 01/12/2023    (HFpEF) heart failure with preserved ejection fraction 01/13/2023    Obesity (BMI 30-39.9) 01/13/2023    Hypokalemia 01/13/2023    Acute UTI (urinary tract infection) 01/15/2023    Cough 01/15/2023    Nocturnal hypoxemia 01/15/2023    Hypophosphatemia 01/16/2023    Respiratory distress 11/11/2024    CHF (congestive heart failure) 11/11/2024    Diastolic CHF, chronic 11/12/2024    Acute hypoxic respiratory failure 11/12/2024    Sepsis due to pneumonia 11/12/2024    Pneumonia, unspecified organism 11/15/2024    Aortic stenosis 02/04/2025     Resolved Ambulatory Problems     Diagnosis Date Noted    Hyperkalemia 02/07/2018    Acute hypoxemic respiratory failure due to COVID-19 09/17/2022     Past Medical History:   Diagnosis Date    Aortic valve stenosis     Arthritis     Chronic kidney disease     Heart murmur     History of breast cancer     History of carotid artery disease     History of radiation therapy     Hyperlipidemia     Hypertension     Low back pain        PAST SURGICAL HISTORY  Past Surgical History:   Procedure Laterality Date    AORTIC VALVE REPAIR/REPLACEMENT N/A 2/4/2025    Procedure: TTE TRANSFEMORAL TRANSCATHETER AORTIC VALVE REPLACEMENT PERCUTANEOUS APPROACH;  Surgeon: Ebenezer Albert MD;  Location: [facility];  Service: Cardiothoracic;  Laterality: N/A;    AORTIC VALVE REPAIR/REPLACEMENT N/A 2/4/2025    Procedure: Transfemoral Transcatheter Aortic Valve Replacement with intraoperative transthoracic echocardiogram and possible open surgical rescue;  Surgeon: Mike Rizvi MD;  Location: [facility];  Service: Cardiovascular;  Laterality: N/A;    BREAST LUMPECTOMY  1998    CARDIAC CATHETERIZATION N/A 11/8/2024    Procedure: Right and Left Heart Cath;  Surgeon: Mike Rizvi MD;  Location: [facility] CATH INVASIVE LOCATION;  Service: Cardiology;  Laterality: N/A;    CARDIAC CATHETERIZATION N/A 11/8/2024     Procedure: Coronary angiography;  Surgeon: Mike Rizvi MD;  Location: [facility] CATH INVASIVE LOCATION;  Service: Cardiology;  Laterality: N/A;    CAROTID ARTERY ANGIOPLASTY  ,     CHOLECYSTECTOMY  1979    FEMUR IM NAILING/RODDING Left 2018    Procedure: FEMUR INTRAMEDULLARY NAILING;  Surgeon: Zheng Fleming MD;  Location: [facility] MAIN OR;  Service:     HYSTERECTOMY         FAMILY HISTORY  Family History   Problem Relation Age of Onset    Hypertension Mother     No Known Problems Father     Heart valve disorder Sister     Lung cancer Sister     Lymphoma Sister     Skin cancer Sister     Stroke Brother 57    Malig Hyperthermia Neg Hx        SOCIAL HISTORY  Social History     Socioeconomic History    Marital status:    Tobacco Use    Smoking status: Former     Current packs/day: 0.00     Types: Cigarettes     Start date:      Quit date:      Years since quittin.1     Passive exposure: Past    Smokeless tobacco: Never    Tobacco comments:     caffeine use- coffee   Vaping Use    Vaping status: Never Used   Substance and Sexual Activity    Alcohol use: Yes     Comment: VERY RARE    Drug use: Never    Sexual activity: Defer       ALLERGIES  Nsaids    REVIEW OF SYSTEMS  Review of Systems  Included in HPI  All systems reviewed and negative except for those discussed in HPI.    PHYSICAL EXAM    I have reviewed the triage vital signs and nursing notes.    ED Triage Vitals [25 0211]   Temp Heart Rate Resp BP SpO2   98.1 °F (36.7 °C) 70 (!) 30 (!) 200/76 97 %      Temp src Heart Rate Source Patient Position BP Location FiO2 (%)   Tympanic -- -- -- --       Physical Exam  Constitutional:       General: She is not in acute distress.     Appearance: Normal appearance. She is not ill-appearing or toxic-appearing.   HENT:      Head: Normocephalic and atraumatic.      Nose: Nose normal.      Mouth/Throat:      Mouth: Mucous membranes are dry.      Pharynx: Oropharynx is clear.    Eyes:      Extraocular Movements: Extraocular movements intact.      Conjunctiva/sclera: Conjunctivae normal.      Pupils: Pupils are equal, round, and reactive to light.   Cardiovascular:      Rate and Rhythm: Normal rate and regular rhythm.      Pulses: Normal pulses.      Heart sounds: Normal heart sounds. No murmur heard.     No friction rub. No gallop.   Pulmonary:      Effort: Pulmonary effort is normal. No respiratory distress.      Breath sounds: Normal breath sounds. No stridor. No wheezing, rhonchi or rales.   Abdominal:      General: Abdomen is flat. There is no distension.      Palpations: Abdomen is soft.      Tenderness: There is no abdominal tenderness. There is no guarding or rebound.   Musculoskeletal:      Cervical back: Normal range of motion and neck supple.   Skin:     General: Skin is warm and dry.   Neurological:      General: No focal deficit present.      Mental Status: She is alert and oriented to person, place, and time. Mental status is at baseline.   Psychiatric:         Mood and Affect: Mood normal.         Behavior: Behavior normal.         Thought Content: Thought content normal.         Judgment: Judgment normal.         LAB RESULTS  Recent Results (from the past 24 hours)   ECG 12 Lead ED Triage Standing Order; Weak / Dizzy / AMS    Collection Time: 02/21/25  2:23 AM   Result Value Ref Range    QT Interval 446 ms    QTC Interval 484 ms   Respiratory Panel PCR w/COVID-19(SARS-CoV-2) MARKUS/DOTTIE/SANDOVAL/PAD/COR/JAN In-House, NP Swab in [facility] 2 HR TAT - Swab, Nasopharynx    Collection Time: 02/21/25  2:33 AM    Specimen: Nasopharynx; Swab   Result Value Ref Range    ADENOVIRUS, PCR Not Detected Not Detected    Coronavirus 229E Not Detected Not Detected    Coronavirus HKU1 Not Detected Not Detected    Coronavirus NL63 Not Detected Not Detected    Coronavirus OC43 Not Detected Not Detected    COVID19 Not Detected Not Detected - Ref. Range    Human Metapneumovirus Not Detected Not Detected     Human Rhinovirus/Enterovirus Not Detected Not Detected    Influenza A PCR Not Detected Not Detected    Influenza B PCR Not Detected Not Detected    Parainfluenza Virus 1 Not Detected Not Detected    Parainfluenza Virus 2 Not Detected Not Detected    Parainfluenza Virus 3 Not Detected Not Detected    Parainfluenza Virus 4 Not Detected Not Detected    RSV, PCR Not Detected Not Detected    Bordetella pertussis pcr Not Detected Not Detected    Bordetella parapertussis PCR Not Detected Not Detected    Chlamydophila pneumoniae PCR Not Detected Not Detected    Mycoplasma pneumo by PCR Not Detected Not Detected   Comprehensive Metabolic Panel    Collection Time: 02/21/25  2:34 AM    Specimen: Blood   Result Value Ref Range    Glucose 133 (H) 65 - 99 mg/dL    BUN 25 (H) 8 - 23 mg/dL    Creatinine 1.64 (H) 0.57 - 1.00 mg/dL    Sodium 133 (L) 136 - 145 mmol/L    Potassium 3.9 3.5 - 5.2 mmol/L    Chloride 93 (L) 98 - 107 mmol/L    CO2 23.0 22.0 - 29.0 mmol/L    Calcium 10.3 8.6 - 10.5 mg/dL    Total Protein 7.5 6.0 - 8.5 g/dL    Albumin 4.2 3.5 - 5.2 g/dL    ALT (SGPT) 9 1 - 33 U/L    AST (SGOT) 21 1 - 32 U/L    Alkaline Phosphatase 89 39 - 117 U/L    Total Bilirubin 0.3 0.0 - 1.2 mg/dL    Globulin 3.3 gm/dL    A/G Ratio 1.3 g/dL    BUN/Creatinine Ratio 15.2 7.0 - 25.0    Anion Gap 17.0 (H) 5.0 - 15.0 mmol/L    eGFR 29.8 (L) >60.0 mL/min/1.73   High Sensitivity Troponin T    Collection Time: 02/21/25  2:34 AM    Specimen: Blood   Result Value Ref Range    HS Troponin T 28 (H) <14 ng/L   Magnesium    Collection Time: 02/21/25  2:34 AM    Specimen: Blood   Result Value Ref Range    Magnesium 2.2 1.6 - 2.4 mg/dL   Green Top (Gel)    Collection Time: 02/21/25  2:34 AM   Result Value Ref Range    Extra Tube Hold for add-ons.    Lavender Top    Collection Time: 02/21/25  2:34 AM   Result Value Ref Range    Extra Tube hold for add-on    Gold Top - SST    Collection Time: 02/21/25  2:34 AM   Result Value Ref Range    Extra Tube Hold  for add-ons.    Light Blue Top    Collection Time: 02/21/25  2:34 AM   Result Value Ref Range    Extra Tube Hold for add-ons.    CBC Auto Differential    Collection Time: 02/21/25  2:34 AM    Specimen: Blood   Result Value Ref Range    WBC 9.84 3.40 - 10.80 10*3/mm3    RBC 3.81 3.77 - 5.28 10*6/mm3    Hemoglobin 11.1 (L) 12.0 - 15.9 g/dL    Hematocrit 34.0 34.0 - 46.6 %    MCV 89.2 79.0 - 97.0 fL    MCH 29.1 26.6 - 33.0 pg    MCHC 32.6 31.5 - 35.7 g/dL    RDW 13.9 12.3 - 15.4 %    RDW-SD 45.3 37.0 - 54.0 fl    MPV 9.7 6.0 - 12.0 fL    Platelets 329 140 - 450 10*3/mm3    Neutrophil % 72.0 42.7 - 76.0 %    Lymphocyte % 13.1 (L) 19.6 - 45.3 %    Monocyte % 7.2 5.0 - 12.0 %    Eosinophil % 6.3 (H) 0.3 - 6.2 %    Basophil % 0.8 0.0 - 1.5 %    Immature Grans % 0.6 (H) 0.0 - 0.5 %    Neutrophils, Absolute 7.08 (H) 1.70 - 7.00 10*3/mm3    Lymphocytes, Absolute 1.29 0.70 - 3.10 10*3/mm3    Monocytes, Absolute 0.71 0.10 - 0.90 10*3/mm3    Eosinophils, Absolute 0.62 (H) 0.00 - 0.40 10*3/mm3    Basophils, Absolute 0.08 0.00 - 0.20 10*3/mm3    Immature Grans, Absolute 0.06 (H) 0.00 - 0.05 10*3/mm3    nRBC 0.0 0.0 - 0.2 /100 WBC   Phosphorus    Collection Time: 02/21/25  2:34 AM    Specimen: Blood   Result Value Ref Range    Phosphorus 3.3 2.5 - 4.5 mg/dL   High Sensitivity Troponin T 1Hr    Collection Time: 02/21/25  4:06 AM    Specimen: Blood   Result Value Ref Range    HS Troponin T 27 (H) <14 ng/L    Troponin T Numeric Delta -1 ng/L    Troponin T % Delta -4 Abnormal if >/= 20%   Urinalysis With Microscopic If Indicated (No Culture) - Urine, Clean Catch    Collection Time: 02/21/25  5:40 AM    Specimen: Urine, Clean Catch   Result Value Ref Range    Color, UA Yellow Yellow, Straw    Appearance, UA Cloudy (A) Clear    pH, UA >9.0 (H) 5.0 - 8.0    Specific Gravity, UA 1.020 1.005 - 1.030    Glucose, UA Negative Negative    Ketones, UA 15 mg/dL (1+) (A) Negative    Bilirubin, UA Negative Negative    Blood, UA Negative Negative     Protein, UA >=300 mg/dL (3+) (A) Negative    Leuk Esterase, UA Trace (A) Negative    Nitrite, UA Negative Negative    Urobilinogen, UA 0.2 E.U./dL 0.2 - 1.0 E.U./dL   Urinalysis, Microscopic Only - Urine, Clean Catch    Collection Time: 02/21/25  5:40 AM    Specimen: Urine, Clean Catch   Result Value Ref Range    RBC, UA None Seen None Seen, 0-2 /HPF    WBC, UA 6-10 (A) None Seen, 0-2 /HPF    Bacteria, UA 1+ (A) None Seen /HPF    Squamous Epithelial Cells, UA 7-12 (A) None Seen, 0-2 /HPF    Hyaline Casts, UA None Seen None Seen /LPF    Methodology Manual Light Microscopy        RADIOLOGY  CT Abdomen Pelvis Without Contrast    Result Date: 2/21/2025  CT OF THE ABDOMEN AND PELVIS WITHOUT CONTRAST  HISTORY: Weakness, nausea and vomiting  COMPARISON: January 10, 2025  TECHNIQUE: Axial CT imaging was obtained through the abdomen and pelvis. No IV contrast was administered.  FINDINGS: Images through the lung bases demonstrate some mild interlobular septal thickening. This could reflect some vascular congestion. There is also some mild atelectasis. Subpleural nodule within the right lower lobe measures 3 mm. Calcified granulomata are noted within the liver and spleen. There is a small hiatal hernia. The duodenum, adrenal glands, and pancreas all appear normal. Gallbladder is absent. There are aortoiliac calcifications. No hydronephrosis is seen. Uterus is absent. There is colonic diverticulosis. There is a thick-walled segment of proximal sigmoid colon. This was seen on prior PET. There is some adjacent scarring and tethering of the mesentery, with some associated distortion of the left bladder wall. Patient's PET does suggest some relative increased uptake through this area. Colonoscopy could be considered for further assessment to exclude any underlying lesion, although the patient did have similar appearance on the prior CT from March 16, 2024. There is no bowel obstruction. There is no evidence of appendicitis. There  is lumbar scoliosis, with convexity to the left. There is an intramedullary maya within the left femur. There are old bilateral sacral ala fractures, as well as fractures of the left superior and inferior pubic rami. Compression deformity of the inferior endplate of T12 was present on recent lumbar spine CT.       1. Thick-walled segment of proximal sigmoid colon, with associated soft tissue stranding and tethering of the mesentery, with some distortion of the left bladder wall. This area did have some relative hypermetabolism on recent PET, but the appearance is very similar to the exam from March 16, 2024, which is reassuring, although colonoscopy could be considered for further assessment.  Radiation dose reduction techniques were utilized, including automated exposure control and exposure modulation based on body size.   This report was finalized on 2/21/2025 5:23 AM by Dr. Nedra Rodrigues M.D on Workstation: [email or web address]      CT Head Without Contrast    Result Date: 2/21/2025  CT OF THE HEAD WITHOUT CONTRAST  HISTORY: Weakness.  COMPARISON: May 13, 2023  TECHNIQUE: Axial CT imaging was obtained through the brain. No IV contrast was administered.  FINDINGS: No acute intracranial hemorrhage is seen. There is diffuse atrophy. There is periventricular and deep white matter microangiopathic change. There is no midline shift or mass effect. There are bilateral basal ganglia calcifications. Minimal mucosal thickening is noted within the ethmoid sinuses. The mastoid air cells are clear.      No acute intracranial findings.  Radiation dose reduction techniques were utilized, including automated exposure control and exposure modulation based on body size.   This report was finalized on 2/21/2025 5:11 AM by Dr. Nedra Rodrigues M.D on Workstation: [email or web address]      XR Chest 1 View    Result Date: 2/21/2025  SINGLE VIEW OF THE CHEST  HISTORY: Weakness and dizziness  COMPARISON: January 31, 2025  FINDINGS: There is  cardiomegaly. There may be some vascular congestion. There is some chronic scarring identified within the right upper lobe. No pneumothorax is seen. There is blunting of the right costophrenic angle, and trace effusion is not excluded.      Cardiomegaly with suspected vascular congestion.  This report was finalized on 2/21/2025 3:22 AM by Dr. Nedra Rodrigues M.D on Workstation: [id number]       MEDICATIONS GIVEN IN ER  Medications   sodium chloride 0.9 % flush 10 mL (has no administration in time range)   ondansetron (ZOFRAN) injection 4 mg (4 mg Intravenous Given 2/21/25 0242)   sodium chloride 0.9 % bolus 500 mL (0 mL Intravenous Stopped 2/21/25 0402)   acetaminophen (TYLENOL) tablet 1,000 mg (1,000 mg Oral Given 2/21/25 0326)   hydrALAZINE (APRESOLINE) injection 20 mg (20 mg Intravenous Given 2/21/25 0327)       ORDERS PLACED DURING THIS VISIT:  Orders Placed This Encounter   Procedures    Respiratory Panel PCR w/COVID-19(SARS-CoV-2) MARKUS/DOTTIE/SANDOVAL/PAD/COR/JAN In-House, NP Swab in UTM/VTM, 2 HR TAT - Swab, Nasopharynx    XR Chest 1 View    CT Head Without Contrast    CT Abdomen Pelvis Without Contrast    Outing Draw    Comprehensive Metabolic Panel    High Sensitivity Troponin T    Magnesium    Urinalysis With Microscopic If Indicated (No Culture) - Urine, Clean Catch    CBC Auto Differential    Phosphorus    High Sensitivity Troponin T 1Hr    Urinalysis, Microscopic Only - Urine, Clean Catch    NPO Diet NPO Type: Strict NPO    Undress & Gown    Continuous Pulse Oximetry    Vital Signs    Orthostatic Blood Pressure    Vital Signs Recheck    Oxygen Therapy- Nasal Cannula; Titrate 1-6 LPM Per SpO2; 90 - 95%    POC Glucose Once    ECG 12 Lead ED Triage Standing Order; Weak / Dizzy / AMS    Insert Peripheral IV    Fall Precautions    CBC & Differential    Green Top (Gel)    Lavender Top    Gold Top - SST    Light Blue Top       OUTPATIENT MEDICATION MANAGEMENT:  Current Facility-Administered Medications Ordered in  Epic   Medication Dose Route Frequency Provider Last Rate Last Admin    sodium chloride 0.9 % flush 10 mL  10 mL Intravenous PRN Endy Chauhan MD         Current Outpatient Medications Ordered in Epic   Medication Sig Dispense Refill    acetaminophen (TYLENOL) 500 MG tablet Take 2 tablets by mouth Every 6 (Six) Hours As Needed for Mild Pain or Moderate Pain.      alendronate (FOSAMAX) 70 MG tablet Take 1 tablet by mouth Every 7 (Seven) Days. Saturday      amLODIPine (NORVASC) 5 MG tablet Take 1 tablet by mouth Daily. Indications: High Blood Pressure 90 tablet 3    aspirin 81 MG tablet Take 1 tablet by mouth Daily. INSTRUCTED PT TO FOLLOW MD INSTRUCTIONS REGARDING HOLDING FOR SURGERY      Calcium-Magnesium-Vitamin D (CALCIUM MAGNESIUM PO) Take 1 tablet by mouth Daily. HOLD PRIOR TO SURG      cholecalciferol (VITAMIN D3) 1000 units tablet Take 1 tablet by mouth Daily. HOLD PRIOR TO SURG      citalopram (CeleXA) 10 MG tablet Take 1 tablet by mouth Daily.      D-MANNOSE PO Take  by mouth. HOLD PRIOR TO SURG      furosemide (LASIX) 40 MG tablet Take 1 tablet by mouth 2 (Two) Times a Day. Indications: Cardiac Failure 180 tablet 3    hydrALAZINE (APRESOLINE) 50 MG tablet Take 1 tablet by mouth 3 (Three) Times a Day As Needed (SBP >150). Indications: High Blood Pressure (Patient taking differently: Take 1 tablet by mouth As Needed (SBP >150). PATIENT WAS TOLD TO JUST TAKE AS NEEDED   Indications: High Blood Pressure) 90 tablet 3    lidocaine (Lidoderm) 5 % Place 1 patch on the skin as directed by provider Daily As Needed for Mild Pain. Remove & Discard patch within 12 hours or as directed by MD 5 patch 0    multivitamin with minerals tablet tablet Take 1 tablet by mouth Daily. HOLD PRIOR TO SURG      nebivolol (BYSTOLIC) 5 MG tablet TAKE 1 TABLET BY MOUTH EVERY DAY 90 tablet 0    ondansetron ODT (ZOFRAN-ODT) 4 MG disintegrating tablet Take 1 tablet by mouth 4 (Four) Times a Day As Needed for Nausea or Vomiting. 16 tablet  0    Polyethylene Glycol 3350 (CLEARLAX PO) Take  by mouth.      pravastatin (PRAVACHOL) 40 MG tablet Take 1 tablet by mouth Every Night.      Probiotic Product (PROBIOTIC DAILY PO) Take  by mouth. HOLD PRIOR TO SURG      tiZANidine (ZANAFLEX) 2 MG tablet Take 1 tablet by mouth Every 6 (Six) Hours As Needed for Muscle Spasms.      VITAMIN D-VITAMIN K PO Take  by mouth. HOLD PRIOR TO SURG         PROCEDURES  Procedures    PROGRESS, DATA ANALYSIS, CONSULTS, AND MEDICAL DECISION MAKING  All labs have been independently interpreted by me.  All radiology studies have been reviewed by me. All EKG's have been independently viewed and interpreted by me.  Discussion below represents my analysis of pertinent findings related to patient's condition, differential diagnosis, treatment plan and final disposition.    Differential diagnosis includes but is not limited to dehydration, UTI, colitis, pneumonia, influenza.    Clinical Scores:                   ED Course as of 02/21/25 0709   Fri Feb 21, 2025   0233 EKG interpreted by myself  Time: 0223  Rate: 71  Rhythm: NSR  No ST elevation or depression  Normal intervals  LVH [AB]   0344 Creatinine(!): 1.64  baseline [AB]   0344 HS Troponin T(!): 28 [AB]   0423 BP: 166/57 [AB]   0443 CT Abdomen Pelvis Without Contrast  My independent interpretation of the imaging study is no bowel obstruction [AB]   0443 CT Head Without Contrast  My independent interpretation of the imaging study is no subdural hematoma [AB]   0707 Updated patient and family on results.  Patient states that she feels better and her nausea has resolved.  Blood pressure did improved after single dose of hydralazine.  Workup shows electrolytes are at baseline.  No evidence of UTI.  CT of abdomen stable without signs of active infection or obstruction.  Viral swab negative.  Overall, suspect patient's symptoms could be secondary to her high blood pressure dehydration.  Patient given IV fluids.  Blood pressure improved.   Patient was comfortable discharge home with antiemetics.  Return precautions discussed.  PCP follow-up. [AB]      ED Course User Index  [AB] Endy Chauhan MD             AS OF 07:09 EST VITALS:    BP - 149/54  HR - 63  TEMP - 98.1 °F (36.7 °C) (Tympanic)  O2 SATS - 97%    COMPLEXITY OF CARE  Admission was considered but after careful review of the patient's presentation, physical examination, diagnostic results, and response to treatment the patient may be safely discharged with outpatient follow-up.      DIAGNOSIS  Final diagnoses:   Nausea and vomiting, unspecified vomiting type   Hypertension, unspecified type   Nonintractable headache, unspecified chronicity pattern, unspecified headache type         DISPOSITION  ED Disposition       ED Disposition   Discharge    Condition   Stable    Comment   --                Please note that portions of this document were completed with a voice recognition program.    Note Disclaimer: At [facility], we believe that sharing information builds trust and better relationships. You are receiving this note because you recently visited [facility]. It is possible you will see health information before a provider has talked with you about it. This kind of information can be easy to misunderstand. To help you fully understand what it means for your health, we urge you to discuss this note with your provider.         Endy Chauhan MD  02/21/25 0709

## 2025-02-21 NOTE — TELEPHONE ENCOUNTER
Pt left msg saying she was in the ER today for HTN and nausea and would like to speak with someone about her symptoms.  Thanks,  Raquel

## 2025-02-21 NOTE — ED NOTES
Pt arrives via EMS for complaints of N/V and HTN x 4 days. Pt had aortic valve replacement on 02/04.

## 2025-02-21 NOTE — TELEPHONE ENCOUNTER
Called patient to discuss ER visit this morning.  Patient was resting but I did speak to her daughter. Patient presented to the emergency department with nausea vomiting and weakness as well as an elevated blood pressure.  Patient appeared to be dehydrated.  She was given IV hydralazine and a 1 L fluid bolus as well as Zofran.  Patient was discharged this morning at 7 AM. Instructed patient to reduce diuretic dose back to 40mg daily.  Discussed monitoring her blood pressure to twice a day.  Informed patient to call if systolic remains greater than 160/90.  Will try to arrange follow-up for patient in 1 week to evaluate fluid status and blood pressure.

## 2025-02-21 NOTE — TELEPHONE ENCOUNTER
Called the patient back after discussing case with Dr. Rizvi.  Patient blood pressure was well-controlled leading up to past 3 to 4 days.  Discussed with Dr. Rizvi that I believe that blood pressure was elevated due to acute illness and dehydration.  Blood pressure just prior to me calling patient was 124/45.  Patient currently on furosemide, hydralazine 50 mg 3 times daily, and amlodipine.  Patient reports now that she feels much better after her ER visit she is only required her hydralazine 1 time today.  Discussed no further changes I will call the patient back on Monday to discuss blood pressure readings over the weekend.

## 2025-02-24 ENCOUNTER — TELEPHONE (OUTPATIENT)
Age: OVER 89
End: 2025-02-24
Payer: MEDICARE

## 2025-02-24 DIAGNOSIS — I10 PRIMARY HYPERTENSION: ICD-10-CM

## 2025-02-24 RX ORDER — AMLODIPINE BESYLATE 5 MG/1
10 TABLET ORAL
Qty: 90 TABLET | Refills: 3 | Status: SHIPPED | OUTPATIENT
Start: 2025-02-24 | End: 2025-02-28 | Stop reason: SDUPTHER

## 2025-02-24 NOTE — TELEPHONE ENCOUNTER
Called to discuss blood pressure readings with patient from over the weekend.  Unfortunately blood pressures have remained slightly elevated.  Systolics ranging anywhere from consistently been in the 140s to 160s.  Will increase amlodipine to 10 mg.  Discussed that I will call the patient on Thursday or Friday of this week to discuss blood pressure readings.  Patient verbalized understanding.

## 2025-02-25 ENCOUNTER — READMISSION MANAGEMENT (OUTPATIENT)
Dept: CALL CENTER | Facility: HOSPITAL | Age: OVER 89
End: 2025-02-25
Payer: MEDICARE

## 2025-02-28 ENCOUNTER — TELEPHONE (OUTPATIENT)
Dept: CARDIOLOGY | Facility: CLINIC | Age: OVER 89
End: 2025-02-28
Payer: MEDICARE

## 2025-02-28 DIAGNOSIS — I10 PRIMARY HYPERTENSION: ICD-10-CM

## 2025-02-28 RX ORDER — AMLODIPINE BESYLATE 5 MG/1
10 TABLET ORAL
Qty: 90 TABLET | Refills: 3 | Status: SHIPPED | OUTPATIENT
Start: 2025-02-28

## 2025-02-28 NOTE — TELEPHONE ENCOUNTER
"Called patient to discuss blood pressure readings over the past week.  Patient reports prior to taking medication her systolic blood pressure is consistently in the 150s and 160s.  After taking medication blood pressure dropped as low as 118 and 124.  I also talked to patient's son who reported that overall patient feels \"yucky\".  I put in orders to repeat BMP.  Discussed that if patient has an elevated blood pressure greater than 160/80 that she can take second 5 mg tablet of Bystolic.  Will work on getting her scheduled follow-up with Salma Hough on Tuesday.  I also encouraged her to reach out to Mauri Chester if she concerns with her TAVR.  Patient verbalized understanding and denies having any questions.    "

## 2025-03-03 ENCOUNTER — LAB (OUTPATIENT)
Dept: LAB | Facility: HOSPITAL | Age: OVER 89
End: 2025-03-03
Payer: MEDICARE

## 2025-03-03 DIAGNOSIS — I50.32 CHRONIC DIASTOLIC CONGESTIVE HEART FAILURE: ICD-10-CM

## 2025-03-03 DIAGNOSIS — Z95.2 S/P TAVR (TRANSCATHETER AORTIC VALVE REPLACEMENT): ICD-10-CM

## 2025-03-03 DIAGNOSIS — I10 PRIMARY HYPERTENSION: ICD-10-CM

## 2025-03-03 DIAGNOSIS — I35.0 SEVERE AORTIC VALVE STENOSIS: ICD-10-CM

## 2025-03-03 LAB
ALBUMIN SERPL-MCNC: 4.2 G/DL (ref 3.5–5.2)
ALBUMIN/GLOB SERPL: 1.4 G/DL
ALP SERPL-CCNC: 83 U/L (ref 39–117)
ALT SERPL W P-5'-P-CCNC: 9 U/L (ref 1–33)
ANION GAP SERPL CALCULATED.3IONS-SCNC: 15 MMOL/L (ref 5–15)
AST SERPL-CCNC: 21 U/L (ref 1–32)
BILIRUB SERPL-MCNC: 0.2 MG/DL (ref 0–1.2)
BUN SERPL-MCNC: 35 MG/DL (ref 8–23)
BUN/CREAT SERPL: 14.6 (ref 7–25)
CALCIUM SPEC-SCNC: 9.4 MG/DL (ref 8.6–10.5)
CHLORIDE SERPL-SCNC: 94 MMOL/L (ref 98–107)
CO2 SERPL-SCNC: 24 MMOL/L (ref 22–29)
CREAT SERPL-MCNC: 2.4 MG/DL (ref 0.57–1)
EGFRCR SERPLBLD CKD-EPI 2021: 18.9 ML/MIN/1.73
GLOBULIN UR ELPH-MCNC: 3.1 GM/DL
GLUCOSE SERPL-MCNC: 92 MG/DL (ref 65–99)
POTASSIUM SERPL-SCNC: 4.6 MMOL/L (ref 3.5–5.2)
PROT SERPL-MCNC: 7.3 G/DL (ref 6–8.5)
SODIUM SERPL-SCNC: 133 MMOL/L (ref 136–145)

## 2025-03-03 PROCEDURE — 80053 COMPREHEN METABOLIC PANEL: CPT

## 2025-03-03 PROCEDURE — 36415 COLL VENOUS BLD VENIPUNCTURE: CPT

## 2025-03-03 NOTE — PROGRESS NOTES
RM:________     PCP: Ambrose Ashley W, MD                                     Last EKG : _____________     : 1935  AGE: 89 y.o.    REASON FOR VISIT: __________________________________________    ____________________________________________________________                                                   Wt Readings from Last 3 Encounters:   25 73 kg (161 lb)   25 73.5 kg (162 lb)   25 70.3 kg (155 lb)      BP Readings from Last 3 Encounters:   25 169/58   25 138/64   25 143/65      WT: ____________ HT: ______ BP: __________ HR ______   02% _______               Lipid Panel          2025    03:29   Lipid Panel   Total Cholesterol 125    Triglycerides 89    HDL Cholesterol 52    VLDL Cholesterol 17    LDL Cholesterol  56    LDL/HDL Ratio 1.06

## 2025-03-04 ENCOUNTER — OFFICE VISIT (OUTPATIENT)
Dept: CARDIOLOGY | Facility: CLINIC | Age: OVER 89
End: 2025-03-04
Payer: MEDICARE

## 2025-03-04 VITALS
BODY MASS INDEX: 32.2 KG/M2 | HEART RATE: 73 BPM | DIASTOLIC BLOOD PRESSURE: 78 MMHG | WEIGHT: 164 LBS | HEIGHT: 60 IN | SYSTOLIC BLOOD PRESSURE: 160 MMHG | OXYGEN SATURATION: 97 %

## 2025-03-04 DIAGNOSIS — Z95.2 S/P TAVR (TRANSCATHETER AORTIC VALVE REPLACEMENT): ICD-10-CM

## 2025-03-04 DIAGNOSIS — N18.30 STAGE 3 CHRONIC KIDNEY DISEASE, UNSPECIFIED WHETHER STAGE 3A OR 3B CKD: ICD-10-CM

## 2025-03-04 DIAGNOSIS — I10 PRIMARY HYPERTENSION: Primary | ICD-10-CM

## 2025-03-04 DIAGNOSIS — I35.0 SEVERE AORTIC VALVE STENOSIS: ICD-10-CM

## 2025-03-04 RX ORDER — CARVEDILOL 12.5 MG/1
12.5 TABLET ORAL 2 TIMES DAILY
Qty: 60 TABLET | Refills: 11 | Status: SHIPPED | OUTPATIENT
Start: 2025-03-04

## 2025-03-04 RX ORDER — AMLODIPINE BESYLATE 5 MG/1
5 TABLET ORAL 2 TIMES DAILY
Qty: 180 TABLET | Refills: 3 | Status: SHIPPED | OUTPATIENT
Start: 2025-03-04

## 2025-03-04 NOTE — ASSESSMENT & PLAN NOTE
Creatinine 2.4 which is up trending  Patient euvolemic on exam  Recommend discontinuing furosemide  Repeat BMP in 1-2 weeks

## 2025-03-04 NOTE — ASSESSMENT & PLAN NOTE
Hypertension is uncontrolled  Medication changes per orders.  Dietary sodium restriction.  Ambulatory blood pressure monitoring.  Blood pressure will be reassessedin 2 weeks.      Stop furosemide due to creatinine 2.4, repeat BMP in 2 weeks

## 2025-03-04 NOTE — ASSESSMENT & PLAN NOTE
No murmur on exam  Patient with generalized weakness, nausea  Repeat echocardiogram on 3/13 with appointment with Dr. Rizvi following
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
## 2025-03-05 DIAGNOSIS — I10 PRIMARY HYPERTENSION: Primary | ICD-10-CM

## 2025-03-05 DIAGNOSIS — N18.30 STAGE 3 CHRONIC KIDNEY DISEASE, UNSPECIFIED WHETHER STAGE 3A OR 3B CKD: ICD-10-CM

## 2025-03-07 ENCOUNTER — HOSPITAL ENCOUNTER (OUTPATIENT)
Facility: HOSPITAL | Age: OVER 89
Discharge: HOME | End: 2025-03-07
Payer: MEDICARE

## 2025-03-07 ENCOUNTER — HOSPITAL ENCOUNTER (OUTPATIENT)
Facility: HOSPITAL | Age: OVER 89
Setting detail: OBSERVATION
Discharge: HOME | End: 2025-03-08
Attending: EMERGENCY MEDICINE | Admitting: STUDENT IN AN ORGANIZED HEALTH CARE EDUCATION/TRAINING PROGRAM
Payer: MEDICARE

## 2025-03-07 ENCOUNTER — APPOINTMENT (OUTPATIENT)
Dept: GENERAL RADIOLOGY | Facility: HOSPITAL | Age: OVER 89
End: 2025-03-07
Payer: MEDICARE

## 2025-03-07 ENCOUNTER — TELEPHONE (OUTPATIENT)
Dept: CARDIOLOGY | Facility: CLINIC | Age: OVER 89
End: 2025-03-07
Payer: MEDICARE

## 2025-03-07 DIAGNOSIS — M46.1 SACROILIAC INFLAMMATION: ICD-10-CM

## 2025-03-07 DIAGNOSIS — R60.0 PERIPHERAL EDEMA: ICD-10-CM

## 2025-03-07 DIAGNOSIS — I10 UNCONTROLLED HYPERTENSION: ICD-10-CM

## 2025-03-07 DIAGNOSIS — I10 CHRONIC HYPERTENSION: ICD-10-CM

## 2025-03-07 DIAGNOSIS — M53.3 SACRAL BACK PAIN: ICD-10-CM

## 2025-03-07 DIAGNOSIS — N18.9 ACUTE ON CHRONIC RENAL INSUFFICIENCY: Primary | ICD-10-CM

## 2025-03-07 DIAGNOSIS — N28.9 ACUTE ON CHRONIC RENAL INSUFFICIENCY: Primary | ICD-10-CM

## 2025-03-07 PROBLEM — N17.9 ACUTE KIDNEY INJURY: Status: ACTIVE | Noted: 2025-03-07

## 2025-03-07 LAB
ALBUMIN SERPL-MCNC: 4.2 G/DL (ref 3.5–5.2)
ALBUMIN/GLOB SERPL: 1.3 G/DL
ALP SERPL-CCNC: 83 U/L (ref 39–117)
ALT SERPL W P-5'-P-CCNC: 12 U/L (ref 1–33)
ANION GAP SERPL CALCULATED.3IONS-SCNC: 15 MMOL/L (ref 5–15)
AST SERPL-CCNC: 18 U/L (ref 1–32)
BASOPHILS # BLD AUTO: 0.07 10*3/MM3 (ref 0–0.2)
BASOPHILS NFR BLD AUTO: 0.8 % (ref 0–1.5)
BILIRUB SERPL-MCNC: 0.2 MG/DL (ref 0–1.2)
BUN SERPL-MCNC: 32 MG/DL (ref 8–23)
BUN/CREAT SERPL: 14 (ref 7–25)
CALCIUM SPEC-SCNC: 9.4 MG/DL (ref 8.6–10.5)
CHLORIDE SERPL-SCNC: 95 MMOL/L (ref 98–107)
CO2 SERPL-SCNC: 26 MMOL/L (ref 22–29)
CREAT SERPL-MCNC: 2.28 MG/DL (ref 0.57–1)
DEPRECATED RDW RBC AUTO: 44.6 FL (ref 37–54)
EGFRCR SERPLBLD CKD-EPI 2021: 20.1 ML/MIN/1.73
EOSINOPHIL # BLD AUTO: 1.13 10*3/MM3 (ref 0–0.4)
EOSINOPHIL NFR BLD AUTO: 12.7 % (ref 0.3–6.2)
ERYTHROCYTE [DISTWIDTH] IN BLOOD BY AUTOMATED COUNT: 13.7 % (ref 12.3–15.4)
GEN 5 1HR TROPONIN T REFLEX: 61 NG/L
GLOBULIN UR ELPH-MCNC: 3.2 GM/DL
GLUCOSE SERPL-MCNC: 96 MG/DL (ref 65–99)
HCT VFR BLD AUTO: 31.4 % (ref 34–46.6)
HGB BLD-MCNC: 10.2 G/DL (ref 12–15.9)
HOLD SPECIMEN: NORMAL
HOLD SPECIMEN: NORMAL
IMM GRANULOCYTES # BLD AUTO: 0.04 10*3/MM3 (ref 0–0.05)
IMM GRANULOCYTES NFR BLD AUTO: 0.4 % (ref 0–0.5)
LYMPHOCYTES # BLD AUTO: 2.14 10*3/MM3 (ref 0.7–3.1)
LYMPHOCYTES NFR BLD AUTO: 24 % (ref 19.6–45.3)
MCH RBC QN AUTO: 29 PG (ref 26.6–33)
MCHC RBC AUTO-ENTMCNC: 32.5 G/DL (ref 31.5–35.7)
MCV RBC AUTO: 89.2 FL (ref 79–97)
MONOCYTES # BLD AUTO: 1.01 10*3/MM3 (ref 0.1–0.9)
MONOCYTES NFR BLD AUTO: 11.3 % (ref 5–12)
NEUTROPHILS NFR BLD AUTO: 4.52 10*3/MM3 (ref 1.7–7)
NEUTROPHILS NFR BLD AUTO: 50.8 % (ref 42.7–76)
NRBC BLD AUTO-RTO: 0 /100 WBC (ref 0–0.2)
NT-PROBNP SERPL-MCNC: 3047 PG/ML (ref 0–1800)
PLATELET # BLD AUTO: 291 10*3/MM3 (ref 140–450)
PMV BLD AUTO: 9.2 FL (ref 6–12)
POTASSIUM SERPL-SCNC: 5.2 MMOL/L (ref 3.5–5.2)
PROT SERPL-MCNC: 7.4 G/DL (ref 6–8.5)
RBC # BLD AUTO: 3.52 10*6/MM3 (ref 3.77–5.28)
SODIUM SERPL-SCNC: 136 MMOL/L (ref 136–145)
TROPONIN T % DELTA: 0
TROPONIN T NUMERIC DELTA: 0 NG/L
TROPONIN T SERPL HS-MCNC: 61 NG/L
WBC NRBC COR # BLD AUTO: 8.91 10*3/MM3 (ref 3.4–10.8)
WHOLE BLOOD HOLD COAG: NORMAL
WHOLE BLOOD HOLD SPECIMEN: NORMAL

## 2025-03-07 PROCEDURE — 25810000003 SODIUM CHLORIDE 0.9 % SOLUTION: Performed by: NURSE PRACTITIONER

## 2025-03-07 PROCEDURE — G0378 HOSPITAL OBSERVATION PER HR: HCPCS

## 2025-03-07 PROCEDURE — 93005 ELECTROCARDIOGRAM TRACING: CPT | Performed by: NURSE PRACTITIONER

## 2025-03-07 PROCEDURE — 83880 ASSAY OF NATRIURETIC PEPTIDE: CPT | Performed by: EMERGENCY MEDICINE

## 2025-03-07 PROCEDURE — 36415 COLL VENOUS BLD VENIPUNCTURE: CPT

## 2025-03-07 PROCEDURE — 72195 MRI PELVIS W/O DYE: CPT

## 2025-03-07 PROCEDURE — 93010 ELECTROCARDIOGRAM REPORT: CPT | Performed by: INTERNAL MEDICINE

## 2025-03-07 PROCEDURE — 84484 ASSAY OF TROPONIN QUANT: CPT | Performed by: NURSE PRACTITIONER

## 2025-03-07 PROCEDURE — 85025 COMPLETE CBC W/AUTO DIFF WBC: CPT

## 2025-03-07 PROCEDURE — 25810000003 SODIUM CHLORIDE 0.9 % SOLUTION: Performed by: EMERGENCY MEDICINE

## 2025-03-07 PROCEDURE — 80053 COMPREHEN METABOLIC PANEL: CPT

## 2025-03-07 PROCEDURE — 96361 HYDRATE IV INFUSION ADD-ON: CPT

## 2025-03-07 PROCEDURE — 96360 HYDRATION IV INFUSION INIT: CPT

## 2025-03-07 PROCEDURE — 71045 X-RAY EXAM CHEST 1 VIEW: CPT

## 2025-03-07 PROCEDURE — 99285 EMERGENCY DEPT VISIT HI MDM: CPT

## 2025-03-07 RX ORDER — PRAVASTATIN SODIUM 40 MG
40 TABLET ORAL NIGHTLY
Status: DISCONTINUED | OUTPATIENT
Start: 2025-03-08 | End: 2025-03-08 | Stop reason: HOSPADM

## 2025-03-07 RX ORDER — SODIUM CHLORIDE 9 MG/ML
75 INJECTION, SOLUTION INTRAVENOUS CONTINUOUS
Status: DISCONTINUED | OUTPATIENT
Start: 2025-03-07 | End: 2025-03-08

## 2025-03-07 RX ORDER — CITALOPRAM HYDROBROMIDE 10 MG/1
10 TABLET ORAL DAILY
Status: DISCONTINUED | OUTPATIENT
Start: 2025-03-08 | End: 2025-03-08 | Stop reason: HOSPADM

## 2025-03-07 RX ORDER — ACETAMINOPHEN 500 MG
1000 TABLET ORAL EVERY 6 HOURS PRN
Status: DISCONTINUED | OUTPATIENT
Start: 2025-03-07 | End: 2025-03-08 | Stop reason: HOSPADM

## 2025-03-07 RX ORDER — CHOLECALCIFEROL (VITAMIN D3) 25 MCG
1000 TABLET ORAL DAILY
Status: DISCONTINUED | OUTPATIENT
Start: 2025-03-08 | End: 2025-03-08 | Stop reason: HOSPADM

## 2025-03-07 RX ORDER — ONDANSETRON 2 MG/ML
4 INJECTION INTRAMUSCULAR; INTRAVENOUS EVERY 6 HOURS PRN
Status: DISCONTINUED | OUTPATIENT
Start: 2025-03-07 | End: 2025-03-08 | Stop reason: HOSPADM

## 2025-03-07 RX ORDER — ONDANSETRON 4 MG/1
4 TABLET, ORALLY DISINTEGRATING ORAL EVERY 6 HOURS PRN
Status: DISCONTINUED | OUTPATIENT
Start: 2025-03-07 | End: 2025-03-08 | Stop reason: HOSPADM

## 2025-03-07 RX ORDER — AMLODIPINE BESYLATE 5 MG/1
5 TABLET ORAL 2 TIMES DAILY
Status: DISCONTINUED | OUTPATIENT
Start: 2025-03-08 | End: 2025-03-08 | Stop reason: HOSPADM

## 2025-03-07 RX ORDER — SODIUM CHLORIDE 0.9 % (FLUSH) 0.9 %
10 SYRINGE (ML) INJECTION EVERY 12 HOURS SCHEDULED
Status: DISCONTINUED | OUTPATIENT
Start: 2025-03-07 | End: 2025-03-08 | Stop reason: HOSPADM

## 2025-03-07 RX ORDER — CARVEDILOL 12.5 MG/1
12.5 TABLET ORAL 2 TIMES DAILY
Status: DISCONTINUED | OUTPATIENT
Start: 2025-03-08 | End: 2025-03-08 | Stop reason: HOSPADM

## 2025-03-07 RX ORDER — MULTIPLE VITAMINS W/ MINERALS TAB 9MG-400MCG
1 TAB ORAL DAILY
Status: DISCONTINUED | OUTPATIENT
Start: 2025-03-08 | End: 2025-03-08 | Stop reason: HOSPADM

## 2025-03-07 RX ORDER — ASPIRIN 81 MG/1
81 TABLET ORAL DAILY
Status: DISCONTINUED | OUTPATIENT
Start: 2025-03-08 | End: 2025-03-08 | Stop reason: HOSPADM

## 2025-03-07 RX ORDER — SODIUM CHLORIDE 0.9 % (FLUSH) 0.9 %
10 SYRINGE (ML) INJECTION AS NEEDED
Status: DISCONTINUED | OUTPATIENT
Start: 2025-03-07 | End: 2025-03-08 | Stop reason: HOSPADM

## 2025-03-07 RX ORDER — SODIUM CHLORIDE 9 MG/ML
40 INJECTION, SOLUTION INTRAVENOUS AS NEEDED
Status: DISCONTINUED | OUTPATIENT
Start: 2025-03-07 | End: 2025-03-08 | Stop reason: HOSPADM

## 2025-03-07 RX ORDER — LIDOCAINE 4 G/G
1 PATCH TOPICAL DAILY PRN
Status: DISCONTINUED | OUTPATIENT
Start: 2025-03-07 | End: 2025-03-08 | Stop reason: HOSPADM

## 2025-03-07 RX ADMIN — PRAVASTATIN SODIUM 40 MG: 40 TABLET ORAL at 23:12

## 2025-03-07 RX ADMIN — CARVEDILOL 12.5 MG: 12.5 TABLET, FILM COATED ORAL at 23:13

## 2025-03-07 RX ADMIN — SODIUM CHLORIDE 75 ML/HR: 9 INJECTION, SOLUTION INTRAVENOUS at 21:28

## 2025-03-07 RX ADMIN — Medication 10 ML: at 21:28

## 2025-03-07 RX ADMIN — SODIUM CHLORIDE 500 ML: 9 INJECTION, SOLUTION INTRAVENOUS at 20:20

## 2025-03-07 RX ADMIN — AMLODIPINE BESYLATE 5 MG: 5 TABLET ORAL at 23:13

## 2025-03-07 NOTE — ED TRIAGE NOTES
Patient to ED via pv from home sent by PCP. Patient sent to ED for abnormal kidney function labs, leg swelling and elevated blood pressure.

## 2025-03-07 NOTE — TELEPHONE ENCOUNTER
I tried to call patient several times and was sent to voicemail.  Additionally I tried to call her daughter Nusrat and was sent to voicemail twice.  I then called her son Angus who is an approved healthcare contact.  I was able to speak to Angus and recommended that due to her worsening creatinine, leg swelling, diarrhea, and elevated blood pressure that I talked to Dr. Rizvi and we recommended taking her to the emergency department for evaluation.  Due to patient's elevated creatinine and uncontrolled blood pressure despite several medication changes and with new worsening leg swelling that we are concerned about her heart and kidney function.  He is going to call his brother who currently has his mother at an MRI for her back.  He verbalized understanding and is going to call and relay the message to her.

## 2025-03-08 ENCOUNTER — READMISSION MANAGEMENT (OUTPATIENT)
Dept: CALL CENTER | Facility: HOSPITAL | Age: OVER 89
End: 2025-03-08
Payer: MEDICARE

## 2025-03-08 VITALS
OXYGEN SATURATION: 95 % | WEIGHT: 147.7 LBS | RESPIRATION RATE: 16 BRPM | TEMPERATURE: 98.3 F | BODY MASS INDEX: 27.89 KG/M2 | DIASTOLIC BLOOD PRESSURE: 66 MMHG | HEIGHT: 61 IN | HEART RATE: 62 BPM | SYSTOLIC BLOOD PRESSURE: 166 MMHG

## 2025-03-08 LAB
ANION GAP SERPL CALCULATED.3IONS-SCNC: 10 MMOL/L (ref 5–15)
BUN SERPL-MCNC: 27 MG/DL (ref 8–23)
BUN/CREAT SERPL: 13.2 (ref 7–25)
CALCIUM SPEC-SCNC: 8.4 MG/DL (ref 8.6–10.5)
CHLORIDE SERPL-SCNC: 100 MMOL/L (ref 98–107)
CO2 SERPL-SCNC: 26 MMOL/L (ref 22–29)
CREAT SERPL-MCNC: 2.04 MG/DL (ref 0.57–1)
CREAT UR-MCNC: 85.3 MG/DL
DEPRECATED RDW RBC AUTO: 44 FL (ref 37–54)
EGFRCR SERPLBLD CKD-EPI 2021: 22.9 ML/MIN/1.73
ERYTHROCYTE [DISTWIDTH] IN BLOOD BY AUTOMATED COUNT: 13.6 % (ref 12.3–15.4)
GLUCOSE SERPL-MCNC: 87 MG/DL (ref 65–99)
HCT VFR BLD AUTO: 27.6 % (ref 34–46.6)
HGB BLD-MCNC: 9.2 G/DL (ref 12–15.9)
MCH RBC QN AUTO: 29.5 PG (ref 26.6–33)
MCHC RBC AUTO-ENTMCNC: 33.3 G/DL (ref 31.5–35.7)
MCV RBC AUTO: 88.5 FL (ref 79–97)
PLATELET # BLD AUTO: 238 10*3/MM3 (ref 140–450)
PMV BLD AUTO: 10.8 FL (ref 6–12)
POTASSIUM SERPL-SCNC: 4.3 MMOL/L (ref 3.5–5.2)
PROT ?TM UR-MCNC: 118.4 MG/DL
PROT/CREAT UR: 1388 MG/G CREA (ref 0–200)
QT INTERVAL: 434 MS
QTC INTERVAL: 468 MS
RBC # BLD AUTO: 3.12 10*6/MM3 (ref 3.77–5.28)
SODIUM SERPL-SCNC: 136 MMOL/L (ref 136–145)
SODIUM UR-SCNC: <20 MMOL/L
WBC NRBC COR # BLD AUTO: 8.11 10*3/MM3 (ref 3.4–10.8)

## 2025-03-08 PROCEDURE — 84300 ASSAY OF URINE SODIUM: CPT

## 2025-03-08 PROCEDURE — G0378 HOSPITAL OBSERVATION PER HR: HCPCS

## 2025-03-08 PROCEDURE — 82570 ASSAY OF URINE CREATININE: CPT

## 2025-03-08 PROCEDURE — 25810000003 SODIUM CHLORIDE 0.9 % SOLUTION: Performed by: NURSE PRACTITIONER

## 2025-03-08 PROCEDURE — 84156 ASSAY OF PROTEIN URINE: CPT

## 2025-03-08 PROCEDURE — 80048 BASIC METABOLIC PNL TOTAL CA: CPT | Performed by: NURSE PRACTITIONER

## 2025-03-08 PROCEDURE — 99214 OFFICE O/P EST MOD 30 MIN: CPT

## 2025-03-08 PROCEDURE — 96361 HYDRATE IV INFUSION ADD-ON: CPT

## 2025-03-08 PROCEDURE — 85027 COMPLETE CBC AUTOMATED: CPT | Performed by: NURSE PRACTITIONER

## 2025-03-08 RX ORDER — HYDRALAZINE HYDROCHLORIDE 50 MG/1
50 TABLET, FILM COATED ORAL EVERY 8 HOURS PRN
Status: DISCONTINUED | OUTPATIENT
Start: 2025-03-08 | End: 2025-03-08 | Stop reason: HOSPADM

## 2025-03-08 RX ADMIN — CARVEDILOL 12.5 MG: 12.5 TABLET, FILM COATED ORAL at 09:40

## 2025-03-08 RX ADMIN — HYDRALAZINE HYDROCHLORIDE 50 MG: 50 TABLET ORAL at 00:51

## 2025-03-08 RX ADMIN — ASPIRIN 81 MG: 81 TABLET, COATED ORAL at 09:27

## 2025-03-08 RX ADMIN — SODIUM CHLORIDE 75 ML/HR: 9 INJECTION, SOLUTION INTRAVENOUS at 09:41

## 2025-03-08 RX ADMIN — AMLODIPINE BESYLATE 5 MG: 5 TABLET ORAL at 09:40

## 2025-03-08 RX ADMIN — Medication 1 TABLET: at 09:27

## 2025-03-08 RX ADMIN — Medication 10 ML: at 09:27

## 2025-03-08 RX ADMIN — CITALOPRAM HYDROBROMIDE 10 MG: 10 TABLET ORAL at 09:27

## 2025-03-08 RX ADMIN — Medication 1000 UNITS: at 09:27

## 2025-03-08 NOTE — CONSULTS
[facility] Consult Note      Patient Name: Vonda Jay  : 1935  MRN: [id number]  Primary Care Physician:  Ambrose Ashley MD  Referring Physician: No ref. provider found  Date of admission: 3/7/2025    Subjective     Reason for Consult: SANYA on CKD stage IIIb    HPI:   Vonda Jay is a 89 y.o. female with CKD stage IIIb (baseline SCr 1.6-1.8, followed by Dr. Fagan of our group), recent TAVR (25), CAD, CHF, HTN, and history of breast cancer, who presented to the hospital yesterday under the direction of her cardiologist for persistent hypertension and abnormal labs suggesting SANYA.    Labs on 3/3 showed SCR 2.4, down to 2.3 yesterday, further down to 2.0 today with gentle IVF.  Blood pressure has been uncontrolled lately especially after TAVR, medication has been adjusted by cardiology as of 3/4.  BP 180s to 190s/50s to 70s on admission, today down to 140s/50s.    Patient had N/V/D few days ago, improved now; reports having increased to BLE edema lately about excessive oral liquid intake.  Denies urinary symptoms or NSAID use.      Review of Systems:   14 point review of systems is otherwise negative except for mentioned above on HPI    Personal History     Past Medical History:   Diagnosis Date    Aortic valve stenosis     Arthritis     Chronic kidney disease     Heart murmur     History of breast cancer         History of carotid artery disease     History of radiation therapy     Hyperlipidemia     Hypertension     Low back pain     FROM FALL IN [employer] [address] 2025       Past Surgical History:   Procedure Laterality Date    AORTIC VALVE REPAIR/REPLACEMENT N/A 2025    Procedure: TTE TRANSFEMORAL TRANSCATHETER AORTIC VALVE REPLACEMENT PERCUTANEOUS APPROACH;  Surgeon: Ebenezer Albert MD;  Location: [facility];  Service: Cardiothoracic;  Laterality: N/A;    AORTIC VALVE REPAIR/REPLACEMENT N/A 2025    Procedure: Transfemoral Transcatheter  Aortic Valve Replacement with intraoperative transthoracic echocardiogram and possible open surgical rescue;  Surgeon: Mike Rizvi MD;  Location: [facility] CVOR;  Service: Cardiovascular;  Laterality: N/A;    BREAST LUMPECTOMY  1998    CARDIAC CATHETERIZATION N/A 11/8/2024    Procedure: Right and Left Heart Cath;  Surgeon: Mike Rizvi MD;  Location: [facility] CATH INVASIVE LOCATION;  Service: Cardiology;  Laterality: N/A;    CARDIAC CATHETERIZATION N/A 11/8/2024    Procedure: Coronary angiography;  Surgeon: Mike Rizvi MD;  Location: [facility] CATH INVASIVE LOCATION;  Service: Cardiology;  Laterality: N/A;    CAROTID ARTERY ANGIOPLASTY  2000, 2013    CHOLECYSTECTOMY  1979    FEMUR IM NAILING/RODDING Left 2/8/2018    Procedure: FEMUR INTRAMEDULLARY NAILING;  Surgeon: Zheng Fleming MD;  Location: [facility] OR;  Service:     HYSTERECTOMY  1989       Family History: family history includes Heart valve disorder in her sister; Hypertension in her mother; Lung cancer in her sister; Lymphoma in her sister; No Known Problems in her father; Skin cancer in her sister; Stroke (age of onset: 57) in her brother.    Social History:  reports that she quit smoking about 26 years ago. Her smoking use included cigarettes. She started smoking about 73 years ago. She has been exposed to tobacco smoke. She has never used smokeless tobacco. She reports current alcohol use. She reports that she does not use drugs.    Home Medications:  Prior to Admission medications    Medication Sig Start Date End Date Taking? Authorizing Provider   acetaminophen (TYLENOL) 500 MG tablet Take 2 tablets by mouth Every 6 (Six) Hours As Needed for Mild Pain or Moderate Pain. 1/1/21  Yes Provider, MD Lianne   amLODIPine (NORVASC) 5 MG tablet Take 1 tablet by mouth 2 (Two) Times a Day. Indications: High Blood Pressure 3/4/25  Yes Jaye Hough APRN   aspirin 81 MG tablet Take 1 tablet by mouth Daily. INSTRUCTED PT TO  FOLLOW MD INSTRUCTIONS REGARDING HOLDING FOR SURGERY   Yes Lianne King MD   Calcium-Magnesium-Vitamin D (CALCIUM MAGNESIUM PO) Take 1 tablet by mouth Daily. HOLD PRIOR TO SURG   Yes Lianne King MD   carvedilol (COREG) 12.5 MG tablet Take 1 tablet by mouth 2 (Two) Times a Day. 3/4/25  Yes Jaye Hough APRN   cholecalciferol (VITAMIN D3) 1000 units tablet Take 1 tablet by mouth Daily. HOLD PRIOR TO SURG   Yes Lianne King MD   citalopram (CeleXA) 10 MG tablet Take 1 tablet by mouth Daily.   Yes Lianne King MD   hydrALAZINE (APRESOLINE) 50 MG tablet Take 1 tablet by mouth 3 (Three) Times a Day As Needed (SBP >150). Indications: High Blood Pressure  Patient taking differently: Take 1 tablet by mouth As Needed (SBP >150). PATIENT WAS TOLD TO JUST TAKE AS NEEDED   Indications: High Blood Pressure 11/25/24  Yes Jaye Hough APRN   lidocaine (Lidoderm) 5 % Place 1 patch on the skin as directed by provider Daily As Needed for Mild Pain. Remove & Discard patch within 12 hours or as directed by MD 1/29/25  Yes Jayson Hosue MD   multivitamin with minerals tablet tablet Take 1 tablet by mouth Daily. HOLD PRIOR TO SURG   Yes Lianne King MD   ondansetron ODT (ZOFRAN-ODT) 4 MG disintegrating tablet Take 1 tablet by mouth 4 (Four) Times a Day As Needed for Nausea or Vomiting. 2/21/25  Yes Endy Chauhan MD   pravastatin (PRAVACHOL) 40 MG tablet Take 1 tablet by mouth Every Night.   Yes Lianne King MD   tiZANidine (ZANAFLEX) 2 MG tablet Take 1 tablet by mouth Every 6 (Six) Hours As Needed for Muscle Spasms. 1/31/25  Yes Lianne King MD   VITAMIN D-VITAMIN K PO Take  by mouth. HOLD PRIOR TO SURG   Yes Lianne King MD   alendronate (FOSAMAX) 70 MG tablet Take 1 tablet by mouth Every 7 (Seven) Days. Saturday    Lianne King MD   D-MANNOSE PO Take  by mouth. HOLD PRIOR TO SURG    Lianne King MD   Polyethylene Glycol 3350  (CLEARLAX PO) Take  by mouth.    ProviderLianne MD   Probiotic Product (PROBIOTIC DAILY PO) Take  by mouth. HOLD PRIOR TO SURG    ProviderLianne MD       Allergies:  Allergies   Allergen Reactions    Nsaids Other (See Comments)     Due to kidney's       Objective     Vitals:   Temp:  [97.7 °F (36.5 °C)-98.8 °F (37.1 °C)] 98.1 °F (36.7 °C)  Heart Rate:  [62-73] 62  Resp:  [16-18] 16  BP: (140-193)/(47-76) 148/47    Intake/Output Summary (Last 24 hours) at 3/8/2025 1210  Last data filed at 3/8/2025 0941  Gross per 24 hour   Intake 240 ml   Output 950 ml   Net -710 ml       Physical Exam:   Constitutional: Awake, MO, no acute distress.  HEENT: Sclera anicteric, no conjunctival injection  Neck: Supple, no JVD  Respiratory: Clear to auscultation bilaterally, nonlabored respiration on RA  Cardiovascular: RRR, no rub  Gastrointestinal: BS +, abdomen is soft, nontender and nondistended  : No palpable bladder  Musculoskeletal: Trace BLE edema with chronic venous stasis changes, no clubbing or cyanosis  Psychiatric: Appropriate affect, cooperative  Neurologic: Oriented x3, moving all extremities, normal speech and mental status, no asterixis  Skin: Warm and dry       Scheduled Meds:     amLODIPine, 5 mg, Oral, BID  aspirin, 81 mg, Oral, Daily  carvedilol, 12.5 mg, Oral, BID  cholecalciferol, 1,000 Units, Oral, Daily  citalopram, 10 mg, Oral, Daily  multivitamin with minerals, 1 tablet, Oral, Daily  pravastatin, 40 mg, Oral, Nightly  sodium chloride, 10 mL, Intravenous, Q12H      IV Meds:   sodium chloride, 75 mL/hr, Last Rate: 75 mL/hr (03/08/25 0941)        Results Reviewed:   I have personally reviewed the results from the time of this admission to 3/8/2025 12:10 EST     Lab Results   Component Value Date    GLUCOSE 87 03/08/2025    CALCIUM 8.4 (L) 03/08/2025     03/08/2025    K 4.3 03/08/2025    CO2 26.0 03/08/2025     03/08/2025    BUN 27 (H) 03/08/2025    CREATININE 2.04 (H) 03/08/2025     EGFRIFNONA 48 (L) 02/12/2018    BCR 13.2 03/08/2025    ANIONGAP 10.0 03/08/2025      Lab Results   Component Value Date    MG 2.2 02/21/2025    PHOS 3.3 02/21/2025    ALBUMIN 4.2 03/07/2025           Assessment / Plan       Acute kidney injury      ASSESSMENT:  Nonoliguric SANYA, SCr up to 2.4 on 3/3, 2.3 yesterday, and 2.0 today.  Suspect secondary to recent N/V/D and uncontrolled hypertension with medication adjustments.  electrolytes within acceptable range  CKD stage IIIb, baseline SCr 1.6-1.8, secondary to longstanding hypertensive nephrosclerosis, long-term NSAID use, prior smoking, generalized atherosclerotic disease, and likely less than optimal renal perfusion due to vascular heart disease  HTN with CKD, BP uncontrolled prior to admission, regimen was recently adjusted by cardiology on 3/4.  Improved today on Coreg 12.5 BID, and amlodipine 5 BID, although would try to avoid overcorrection given her advanced age and atherosclerotic disease  Aortic stenosis, s/p TAVR on 2/4, awaiting on cardiology evaluation  Diastolic CHF, echo on 2/5/2025 showed EF 67.9%; no signs of exacerbation of exacerbation.    Bilateral carotid stenosis, s/p bilateral endarterectomy      PLAN:  DC IV fluids given tolerating PO fine  Ok for d/c from renal   F/u in office 1-2 weeks after discharge  Surveillance labs    Thank you for involving us in the care of Vonda Jay.  Please feel free to call with any questions.    ELVIE Linn  03/08/25  12:10 [facility]    [facility]  [phone]

## 2025-03-08 NOTE — PLAN OF CARE
Goal Outcome Evaluation:           Progress: improving        Family present for transport. Patient has all belongings and voices understanding of medications, follow up and discharge. Discharge wheelchair

## 2025-03-08 NOTE — PROGRESS NOTES
DEBBIE BECKHAM Attestation Note    I supervised care provided by the midlevel provider.    The MARY ELLEN and I have discussed this patient's history, physical exam, and treatment plan. I have reviewed the documentation and personally had a face to face interaction with the patient  I affirm the documentation and agree with the treatment and plan. I provided a substantive portion of the care of this patient.  I personally performed the physical exam, in its entirety.  My personal findings are documented in below:    History:  89-year-old female admitted to the observation unit for further evaluation management of elevated creatinine and hypertension.  Workup in the emergency department notable for creatinine of 2.28 which is up trending from previous.  This morning patient reports symptomatic improvement and states she feels ready to go home.    Physical Exam:  General: No acute distress.  HENT: NCAT  Eyes: no scleral icterus.  Neck: Painless range of motion  CV: Pink warm and well-perfused throughout  Respiratory: No distress or increased work of breathing  Abdomen: soft, no focal tenderness or rigidity  Musculoskeletal: no deformity.  Neuro: Alert, speech fluent and easily intelligible  Skin: warm, dry.    Assessment and Plan:  89-year-old female admitted the observation for further evaluation management of elevated creatinine hypertension  - Creatinine 2.28, 2.04  - BNP 3000  - No reported shortness of breath and no lower extremity edema  - Patient has been receiving gentle IV hydration  - Nephrology consulted  - Cardiology consulted  - Symptomatically improved today
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
## 2025-03-08 NOTE — ED PROVIDER NOTES
EMERGENCY DEPARTMENT ENCOUNTER    Room Number:  26/26  PCP: Ambrose Ashley MD  Historian: Patient, family      HPI:  Chief Complaint: Abnormal labs, hypertension  A complete HPI/ROS/PMH/PSH/SH/FH are unobtainable due to: None    Context: Vonda Jay is a 89 y.o. female who presents to the ED via private vehicle c/o acute worsening renal function on recent labs with recently elevated blood pressures.  Was advised to come into the hospital for evaluation by her cardiology team.  Denies any chest pain or shortness of breath, urinating well.  No dizziness or lightheadedness.  Blood pressure currently improved as compared to earlier today.       MEDICAL RECORD REVIEW    External (non-ED) record review: Chart reviewed epic shows a telephone encounter with the cardiology team today, they advised her to come to the ER given recent worsening creatinine, leg swelling, diarrhea, and elevated blood pressures.              PAST MEDICAL HISTORY  Active Ambulatory Problems     Diagnosis Date Noted    Closed displaced intertrochanteric fracture of left femur 02/07/2018    SANYA (acute kidney injury) 02/07/2018    HTN (hypertension) 02/07/2018    Acute blood loss anemia 02/10/2018    Leukocytosis 02/10/2018    Drug-induced constipation 02/12/2018    Stage 3b chronic kidney disease 09/17/2022    COVID-19 virus infection 09/17/2022    Anemia due to chronic kidney disease 09/17/2022    Acute on chronic diastolic CHF (congestive heart failure) 09/18/2022    Severe aortic valve stenosis 09/18/2022    Stage 3 chronic kidney disease 09/19/2022    E. 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 01/12/2023    (HFpEF) heart failure with preserved ejection fraction 01/13/2023    Obesity (BMI 30-39.9) 01/13/2023    Hypokalemia 01/13/2023    Acute UTI (urinary tract infection) 01/15/2023    Cough 01/15/2023    Nocturnal hypoxemia 01/15/2023    Hypophosphatemia 01/16/2023    Respiratory distress  11/11/2024    CHF (congestive heart failure) 11/11/2024    Diastolic CHF, chronic 11/12/2024    Acute hypoxic respiratory failure 11/12/2024    Sepsis due to pneumonia 11/12/2024    Pneumonia, unspecified organism 11/15/2024    Aortic stenosis 02/04/2025    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     Resolved Ambulatory Problems     Diagnosis Date Noted    Hyperkalemia 02/07/2018    Acute hypoxemic respiratory failure due to COVID-19 09/17/2022     Past Medical History:   Diagnosis Date    Aortic valve stenosis     Arthritis     Chronic kidney disease     Heart murmur     History of breast cancer     History of carotid artery disease     History of radiation therapy     Hyperlipidemia     Hypertension     Low back pain          PAST SURGICAL HISTORY  Past Surgical History:   Procedure Laterality Date    AORTIC VALVE REPAIR/REPLACEMENT N/A 2/4/2025    Procedure: TTE TRANSFEMORAL TRANSCATHETER AORTIC VALVE REPLACEMENT PERCUTANEOUS APPROACH;  Surgeon: Ebenezer Albert MD;  Location: [facility];  Service: Cardiothoracic;  Laterality: N/A;    AORTIC VALVE REPAIR/REPLACEMENT N/A 2/4/2025    Procedure: Transfemoral Transcatheter Aortic Valve Replacement with intraoperative transthoracic echocardiogram and possible open surgical rescue;  Surgeon: Mike Rizvi MD;  Location: [facility];  Service: Cardiovascular;  Laterality: N/A;    BREAST LUMPECTOMY  1998    CARDIAC CATHETERIZATION N/A 11/8/2024    Procedure: Right and Left Heart Cath;  Surgeon: Mike Rizvi MD;  Location: [facility] CATH INVASIVE LOCATION;  Service: Cardiology;  Laterality: N/A;    CARDIAC CATHETERIZATION N/A 11/8/2024    Procedure: Coronary angiography;  Surgeon: Mike Rizvi MD;  Location: [facility] CATH INVASIVE LOCATION;  Service: Cardiology;  Laterality: N/A;    CAROTID ARTERY ANGIOPLASTY  2000, 2013    CHOLECYSTECTOMY  1979    FEMUR IM NAILING/RODDING Left 2/8/2018    Procedure: FEMUR INTRAMEDULLARY  NAILING;  Surgeon: Zheng Fleming MD;  Location: [facility] OR;  Service:     HYSTERECTOMY           FAMILY HISTORY  Family History   Problem Relation Age of Onset    Hypertension Mother     No Known Problems Father     Heart valve disorder Sister     Lung cancer Sister     Lymphoma Sister     Skin cancer Sister     Stroke Brother 57    Malig Hyperthermia Neg Hx          SOCIAL HISTORY  Social History     Socioeconomic History    Marital status:    Tobacco Use    Smoking status: Former     Current packs/day: 0.00     Types: Cigarettes     Start date:      Quit date:      Years since quittin.1     Passive exposure: Past    Smokeless tobacco: Never    Tobacco comments:     caffeine use- coffee   Vaping Use    Vaping status: Never Used   Substance and Sexual Activity    Alcohol use: Yes     Comment: VERY RARE    Drug use: Never    Sexual activity: Defer         ALLERGIES  Nsaids        REVIEW OF SYSTEMS  Review of Systems     All systems reviewed and negative except for those discussed in HPI.       PHYSICAL EXAM    I have reviewed the triage vital signs and nursing notes.    ED Triage Vitals   Temp Heart Rate Resp BP SpO2   25 1759 25 1759 25 1759 25 1803 25 1759   97.8 °F (36.6 °C) 71 18 (!) 193/76 96 %      Temp src Heart Rate Source Patient Position BP Location FiO2 (%)   -- -- -- -- --              Physical Exam  General: No acute distress, nontoxic  HEENT: Mucous membranes moist, atraumatic, EOMI  Neck: Full ROM  Pulm: Symmetric chest rise, nonlabored, lungs CTAB  Cardiovascular: Regular rate and rhythm, intact distal pulses, trace pitting edema bilateral lower extremities  GI: Soft, nontender, nondistended, no rebound, no guarding, bowel sounds present  MSK: Full ROM, no deformity  Skin: Warm, dry  Neuro: Awake, alert, oriented x 4, GCS 15, moving all extremities, no focal deficits  Psych: Calm, cooperative        LAB RESULTS  Recent Results (from the past  24 hours)   Comprehensive Metabolic Panel    Collection Time: 03/07/25  6:13 PM    Specimen: Arm, Left; Blood   Result Value Ref Range    Glucose 96 65 - 99 mg/dL    BUN 32 (H) 8 - 23 mg/dL    Creatinine 2.28 (H) 0.57 - 1.00 mg/dL    Sodium 136 136 - 145 mmol/L    Potassium 5.2 3.5 - 5.2 mmol/L    Chloride 95 (L) 98 - 107 mmol/L    CO2 26.0 22.0 - 29.0 mmol/L    Calcium 9.4 8.6 - 10.5 mg/dL    Total Protein 7.4 6.0 - 8.5 g/dL    Albumin 4.2 3.5 - 5.2 g/dL    ALT (SGPT) 12 1 - 33 U/L    AST (SGOT) 18 1 - 32 U/L    Alkaline Phosphatase 83 39 - 117 U/L    Total Bilirubin 0.2 0.0 - 1.2 mg/dL    Globulin 3.2 gm/dL    A/G Ratio 1.3 g/dL    BUN/Creatinine Ratio 14.0 7.0 - 25.0    Anion Gap 15.0 5.0 - 15.0 mmol/L    eGFR 20.1 (L) >60.0 mL/min/1.73   CBC Auto Differential    Collection Time: 03/07/25  6:13 PM    Specimen: Arm, Left; Blood   Result Value Ref Range    WBC 8.91 3.40 - 10.80 10*3/mm3    RBC 3.52 (L) 3.77 - 5.28 10*6/mm3    Hemoglobin 10.2 (L) 12.0 - 15.9 g/dL    Hematocrit 31.4 (L) 34.0 - 46.6 %    MCV 89.2 79.0 - 97.0 fL    MCH 29.0 26.6 - 33.0 pg    MCHC 32.5 31.5 - 35.7 g/dL    RDW 13.7 12.3 - 15.4 %    RDW-SD 44.6 37.0 - 54.0 fl    MPV 9.2 6.0 - 12.0 fL    Platelets 291 140 - 450 10*3/mm3    Neutrophil % 50.8 42.7 - 76.0 %    Lymphocyte % 24.0 19.6 - 45.3 %    Monocyte % 11.3 5.0 - 12.0 %    Eosinophil % 12.7 (H) 0.3 - 6.2 %    Basophil % 0.8 0.0 - 1.5 %    Immature Grans % 0.4 0.0 - 0.5 %    Neutrophils, Absolute 4.52 1.70 - 7.00 10*3/mm3    Lymphocytes, Absolute 2.14 0.70 - 3.10 10*3/mm3    Monocytes, Absolute 1.01 (H) 0.10 - 0.90 10*3/mm3    Eosinophils, Absolute 1.13 (H) 0.00 - 0.40 10*3/mm3    Basophils, Absolute 0.07 0.00 - 0.20 10*3/mm3    Immature Grans, Absolute 0.04 0.00 - 0.05 10*3/mm3    nRBC 0.0 0.0 - 0.2 /100 WBC   Green Top (Gel)    Collection Time: 03/07/25  6:13 PM   Result Value Ref Range    Extra Tube Hold for add-ons.    Lavender Top    Collection Time: 03/07/25  6:13 PM   Result Value  Ref Range    Extra Tube hold for add-on    Gold Top - SST    Collection Time: 03/07/25  6:13 PM   Result Value Ref Range    Extra Tube Hold for add-ons.    Light Blue Top    Collection Time: 03/07/25  6:13 PM   Result Value Ref Range    Extra Tube Hold for add-ons.        Ordered the above labs and independently interpreted results. My findings will be discussed in the medical decision making section below        RADIOLOGY  No Radiology Exams Resulted Within Past 24 Hours    Ordered the above noted radiological studies.  Independently interpreted by me and my independent review of findings can be found in the ED Course.  See dictation for official radiology interpretation.      PROCEDURES    Procedures        MEDICATIONS GIVEN IN ER    Medications   sodium chloride 0.9 % bolus 500 mL (has no administration in time range)         PROGRESS, DATA ANALYSIS, CONSULTS, AND MEDICAL DECISION MAKING    Please note that this section constitutes my independent interpretation of clinical data including lab results, radiology, EKG's.  This constitutes my independent professional opinion regarding differential diagnosis and management of this patient.  It may include any factors such as history from outside sources, review of external records, social determinants of health, management of medications, response to those treatments, and discussions with other providers.    Differential Diagnosis and Plan: Patient with progressively worsening creatinine, is 2.28 today as compared to 2.4 approximately 4 days ago, was 1.6 approximately 2 weeks ago.  Does have some trace pitting edema in the lower extremities, does have a fairly elevated blood pressure.  Will plan for hospitalization today for gentle IV fluids, blood pressure monitoring and management, and specialty consults.    Additional sources:  - Discussed/ obtained information from independent historians:       - (Social Determinants of Health): None     - Shared decision making:   Patient and family at bedside fully updated on and in agreement with the course and plan moving forward    ED Course as of 03/07/25 2010   Fri Mar 07, 2025   1918 WBC: 8.91 [DC]   1918 Hemoglobin(!): 10.2 [DC]   1918 Glucose: 96 [DC]   1918 BUN(!): 32 [DC]   1918 Creatinine(!): 2.28  2.4 four days ago [DC]   1918 Sodium: 136 [DC]   1918 Potassium: 5.2 [DC]   1918 ALT (SGPT): 12 [DC]   1918 AST (SGOT): 18 [DC]   1918 Alkaline Phosphatase: 83 [DC]   1918 Total Bilirubin: 0.2 [DC]   2008 Discussed with Ely Obregon, MARY ELLEN, patient, discussed patient's clinical course and findings today, treatment modalities, and need for hospitalization. [DC]   2009 Patient and family at bedside fully updated on the findings today and plan for hospitalization.  All questions and concerns addressed. [DC]      ED Course User Index  [DC] Matt Pantoja MD       Hospitalization Considered?: yes    Orders Placed During This Visit:  Orders Placed This Encounter   Procedures    Comprehensive Metabolic Panel    [place] Draw    CBC Auto Differential    BNP    CBC & Differential    Green Top (Gel)    Lavender Top    Gold Top - SST    Light Blue Top       Additional orders considered but not placed:      Independent interpretation of labs, radiology studies, and discussions with consultants: See ED Course        AS OF 20:10 EST VITALS:    BP - (!) 189/68  HR - 73  TEMP - 97.8 °F (36.6 °C)  02 SATS - 93%          DIAGNOSIS  Final diagnoses:   Acute on chronic renal insufficiency   Uncontrolled hypertension   Chronic hypertension   Peripheral edema         DISPOSITION  ED Disposition       ED Disposition   Decision to Admit    Condition   --    Comment   --                Please note that portions of this document were completed with a voice recognition program.    Note Disclaimer: At [facility], we believe that sharing information builds trust and better relationships. You are receiving this note because you recently visited [facility]. It  is possible you will see health information before a provider has talked with you about it. This kind of information can be easy to misunderstand. To help you fully understand what it means for your health, we urge you to discuss this note with your provider.                       Matt Pantoja MD  03/07/25 [id number]
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
## 2025-03-08 NOTE — PLAN OF CARE
Goal Outcome Evaluation:      Pt is an 89 yo female admitted to the obs unit for SANYA. A&O x4. RA. NSR. X1 assist w/ rollator. NS @ 75 mL/h. Cardiology and nephrology consulted. Purewick in place. EKG obtained. Bilateral hearing aids worn.

## 2025-03-09 NOTE — CASE MANAGEMENT/SOCIAL WORK
Case Management Discharge Note      Final Note: Home, family to transport         Selected Continued Care - Discharged on 3/8/2025 Admission date: 3/7/2025 - Discharge disposition: Home or Self Care      Destination    No services have been selected for the patient.                Durable Medical Equipment    No services have been selected for the patient.                Dialysis/Infusion    No services have been selected for the patient.                Home Medical Care    No services have been selected for the patient.                Therapy    No services have been selected for the patient.                Community Resources    No services have been selected for the patient.                Community & DME    No services have been selected for the patient.                    Transportation Services  Private: Car    Final Discharge Disposition Code: 01 - home or self-care

## 2025-03-09 NOTE — OUTREACH NOTE
Prep Survey      Flowsheet Row Responses   [facility] patient discharged from? [place]   Is LACE score < 7 ? No   Eligibility Readm Mgmt   Discharge diagnosis Acute kidney injury   Does the patient have one of the following disease processes/diagnoses(primary or secondary)? Other   Does the patient have Home health ordered? No   Is there a DME ordered? No   Prep survey completed? Yes            BETTY ANDREWS - Registered Nurse

## 2025-03-10 ENCOUNTER — TELEPHONE (OUTPATIENT)
Dept: ORTHOPEDIC SURGERY | Facility: CLINIC | Age: OVER 89
End: 2025-03-10
Payer: MEDICARE

## 2025-03-10 NOTE — TELEPHONE ENCOUNTER
Thoracic Surgery     PMD: Tree Dye MD    Pulmonologist: Edouard Jane MD    Requesting Physician: Edouard Jane MD    Reason for Visit: Right pleural effusion    Diagnostics     PET scan 5/28/2021:  IMPRESSION:  1.  Multifocal FDG avid right pleural nodules. Suspicious for pleural  malignancy.  2.  Associated relatively photopenic partially loculated right pleural  effusion, including pocket of fluid along the superior mediastinum. The  pleural effusion has enlarged since CT 04/20/21.  3.   No pathologically avid intrathoracic nodes. No avid extrathoracic  metastasis.  4.  1.6 cm left adrenal nodule has been present dating back to CT 2004 and  non-FDG avid, probably represents benign adenoma.    CT chest w/ contrast 4/20/2021:  IMPRESSION:  1.  A loculated collection of simple fluid is present along the right heart  border, measuring up to 5.6 cm in maximal diameter. This likely corresponds  to the masslike opacity identified on outside imaging and may represent a  pericardial cyst or loculated pleural fluid. Moderate right-sided nodular  pleural thickening, with mild pericardial thickening. These findings may  represent pleuropericarditis, primary pleural malignancy, or metastatic  pleural disease. Recommend correlation with pleural fluid cytology and  additional imaging to assess for primary malignancy, to include breast  imaging.  2.  Indeterminate left adrenal nodule, which could be further evaluated  with adrenal protocol CT abdomen.  3.  Moderate emphysema with mild right greater than left basilar  predominant interstitial edema.  4.  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  5.  A 6 mm subpleural nodule is present along the right lower lobe which is  likely benign. Recommend attention on follow-up.  6.  Multiple additional small nodules scattered throughout the bilateral  lungs, which are likely benign and require follow-up.    IR thoracentesis 4/20/2021:  IMPRESSION:  Successful  Patients son Angus called stating that they have seen the mri report from Friday 3/7. He is wanting to go over that and treatment options.    ultrasound guided right thoracentesis with removal of 1425 mL  clear yellow pleural fluid.    Pathology Results:  IR thoracentesis 4/20/2021:  Cytologic Interpretation   Pleural fluid:  - Negative for malignant cells.     EKG 5/10/2021:   Sinus rhythm   with frequent premature ventricular complexes   Right bundle branch block   Abnormal ECG   When compared with ECG of 12-SEP-2020 13:13,   premature ventricular complexes are now present   Nonspecific T wave abnormality, improved in inferior leads
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
## 2025-03-12 ENCOUNTER — READMISSION MANAGEMENT (OUTPATIENT)
Dept: CALL CENTER | Facility: HOSPITAL | Age: OVER 89
End: 2025-03-12
Payer: MEDICARE

## 2025-03-12 NOTE — OUTREACH NOTE
Medical Week 1 Survey      Flowsheet Row Responses   [facility] patient discharged from? [place]   Does the patient have one of the following disease processes/diagnoses(primary or secondary)? Other   Week 1 attempt successful? Yes   Call start time 1611   Call end time 1616   Discharge diagnosis Acute kidney injury   Meds reviewed with patient/caregiver? Yes   Is the patient having any side effects they believe may be caused by any medication additions or changes? No   Does the patient have all medications ordered at discharge? Yes   Is the patient taking all medications as directed (includes completed medication regime)? Yes   Comments regarding appointments Cards apt on 3/13/25   Does the patient have a primary care provider?  Yes   Has the patient kept scheduled appointments due by today? Yes   Has home health visited the patient within 72 hours of discharge? N/A   Did the patient receive a copy of their discharge instructions? Yes   Nursing interventions Reviewed instructions with patient   What is the patient's perception of their health status since discharge? Improving   Is the patient/caregiver able to teach back signs and symptoms related to disease process for when to call PCP? Yes   Is the patient/caregiver able to teach back signs and symptoms related to disease process for when to call 911? Yes   Is the patient/caregiver able to teach back the hierarchy of who to call/visit for symptoms/problems? PCP, Specialist, Home health nurse, Urgent Care, ED, 911 Yes   If the patient is a current smoker, are they able to teach back resources for cessation? Not a smoker   Week 1 call completed? Yes   Graduated Yes   Did the patient feel the follow up calls were helpful during their recovery period? Yes   Graduated/Revoked comments Pt states her BP has been a little high but she has an apt with her cardiologist tomorrow (3/13/25)   Call end time 1616            Saba ORDOÑEZ - Registered Nurse

## 2025-03-13 ENCOUNTER — OFFICE VISIT (OUTPATIENT)
Age: OVER 89
End: 2025-03-13
Payer: MEDICARE

## 2025-03-13 ENCOUNTER — HOSPITAL ENCOUNTER (OUTPATIENT)
Dept: CARDIOLOGY | Facility: HOSPITAL | Age: OVER 89
Discharge: HOME | End: 2025-03-13
Admitting: INTERNAL MEDICINE
Payer: MEDICARE

## 2025-03-13 ENCOUNTER — TELEPHONE (OUTPATIENT)
Dept: CARDIOLOGY | Facility: CLINIC | Age: OVER 89
End: 2025-03-13
Payer: MEDICARE

## 2025-03-13 VITALS
HEART RATE: 57 BPM | DIASTOLIC BLOOD PRESSURE: 58 MMHG | WEIGHT: 147 LBS | BODY MASS INDEX: 27.75 KG/M2 | HEIGHT: 61 IN | SYSTOLIC BLOOD PRESSURE: 140 MMHG

## 2025-03-13 VITALS
WEIGHT: 147 LBS | HEIGHT: 61 IN | HEART RATE: 82 BPM | DIASTOLIC BLOOD PRESSURE: 70 MMHG | BODY MASS INDEX: 27.75 KG/M2 | SYSTOLIC BLOOD PRESSURE: 136 MMHG

## 2025-03-13 DIAGNOSIS — I35.0 SEVERE AORTIC VALVE STENOSIS: ICD-10-CM

## 2025-03-13 DIAGNOSIS — N18.30 STAGE 3 CHRONIC KIDNEY DISEASE, UNSPECIFIED WHETHER STAGE 3A OR 3B CKD: ICD-10-CM

## 2025-03-13 DIAGNOSIS — I50.32 CHRONIC DIASTOLIC CONGESTIVE HEART FAILURE: ICD-10-CM

## 2025-03-13 DIAGNOSIS — I10 PRIMARY HYPERTENSION: ICD-10-CM

## 2025-03-13 DIAGNOSIS — I35.0 NONRHEUMATIC AORTIC VALVE STENOSIS: Primary | ICD-10-CM

## 2025-03-13 DIAGNOSIS — Z95.2 S/P TAVR (TRANSCATHETER AORTIC VALVE REPLACEMENT): ICD-10-CM

## 2025-03-13 LAB
AORTIC ARCH: 2.1 CM
AORTIC DIMENSIONLESS INDEX: 0.45 (DI)
ASCENDING AORTA: 2.6 CM
AV MEAN PRESS GRAD SYS DOP V1V2: 8 MMHG
AV VMAX SYS DOP: 210.4 CM/SEC
BH CV ECHO AV AORTIC VALVE AT ACCEL TIME CALCULATED: NORMAL MSEC
BH CV ECHO MEAS - AO MAX PG: 17.7 MMHG
BH CV ECHO MEAS - AO ROOT DIAM: 2.29 CM
BH CV ECHO MEAS - AO V2 VTI: 51.7 CM
BH CV ECHO MEAS - AT: 98 SEC
BH CV ECHO MEAS - AVA(I,D): 1.18 CM2
BH CV ECHO MEAS - EDV(CUBED): 132.7 ML
BH CV ECHO MEAS - EDV(MOD-SP2): 98 ML
BH CV ECHO MEAS - EDV(MOD-SP4): 74 ML
BH CV ECHO MEAS - EF(MOD-SP2): 66.3 %
BH CV ECHO MEAS - EF(MOD-SP4): 64.9 %
BH CV ECHO MEAS - ESV(CUBED): 29.6 ML
BH CV ECHO MEAS - ESV(MOD-SP2): 33 ML
BH CV ECHO MEAS - ESV(MOD-SP4): 26 ML
BH CV ECHO MEAS - FS: 39.3 %
BH CV ECHO MEAS - IVS/LVPW: 0.8 CM
BH CV ECHO MEAS - IVSD: 0.8 CM
BH CV ECHO MEAS - LAT PEAK E' VEL: 7.8 CM/SEC
BH CV ECHO MEAS - LV DIASTOLIC VOL/BSA (35-75): 44.6 CM2
BH CV ECHO MEAS - LV MASS(C)D: 163.6 GRAMS
BH CV ECHO MEAS - LV MAX PG: 4 MMHG
BH CV ECHO MEAS - LV MEAN PG: 2 MMHG
BH CV ECHO MEAS - LV SYSTOLIC VOL/BSA (12-30): 15.7 CM2
BH CV ECHO MEAS - LV V1 MAX: 99.8 CM/SEC
BH CV ECHO MEAS - LV V1 VTI: 23.4 CM
BH CV ECHO MEAS - LVIDD: 5.1 CM
BH CV ECHO MEAS - LVIDS: 3.1 CM
BH CV ECHO MEAS - LVOT AREA: 2.6 CM2
BH CV ECHO MEAS - LVOT DIAM: 1.82 CM
BH CV ECHO MEAS - LVPWD: 1 CM
BH CV ECHO MEAS - MED PEAK E' VEL: 8.9 CM/SEC
BH CV ECHO MEAS - MR MAX PG: 97.4 MMHG
BH CV ECHO MEAS - MR MAX VEL: 493.4 CM/SEC
BH CV ECHO MEAS - MV A DUR: 0.13 SEC
BH CV ECHO MEAS - MV A MAX VEL: 125 CM/SEC
BH CV ECHO MEAS - MV DEC SLOPE: 315.3 CM/SEC2
BH CV ECHO MEAS - MV DEC TIME: 0.22 SEC
BH CV ECHO MEAS - MV E MAX VEL: 150 CM/SEC
BH CV ECHO MEAS - MV E/A: 1.2
BH CV ECHO MEAS - MV MAX PG: 9.3 MMHG
BH CV ECHO MEAS - MV MEAN PG: 4.3 MMHG
BH CV ECHO MEAS - MV P1/2T: 129.2 MSEC
BH CV ECHO MEAS - MV V2 VTI: 55.5 CM
BH CV ECHO MEAS - MVA(P1/2T): 1.7 CM2
BH CV ECHO MEAS - MVA(VTI): 1.1 CM2
BH CV ECHO MEAS - PA ACC TIME: 0.16 SEC
BH CV ECHO MEAS - PA V2 MAX: 116.7 CM/SEC
BH CV ECHO MEAS - PULM A REVS DUR: 0.18 SEC
BH CV ECHO MEAS - PULM A REVS VEL: 31.9 CM/SEC
BH CV ECHO MEAS - PULM DIAS VEL: 41.4 CM/SEC
BH CV ECHO MEAS - PULM S/D: 1.71
BH CV ECHO MEAS - PULM SYS VEL: 70.9 CM/SEC
BH CV ECHO MEAS - QP/QS: 1.09
BH CV ECHO MEAS - RAP SYSTOLE: 15 MMHG
BH CV ECHO MEAS - RV MAX PG: 2.46 MMHG
BH CV ECHO MEAS - RV V1 MAX: 78.4 CM/SEC
BH CV ECHO MEAS - RV V1 VTI: 20.4 CM
BH CV ECHO MEAS - RVOT DIAM: 2.03 CM
BH CV ECHO MEAS - RVSP: 34.9 MMHG
BH CV ECHO MEAS - SUP REN AO DIAM: 2 CM
BH CV ECHO MEAS - SV(LVOT): 60.9 ML
BH CV ECHO MEAS - SV(MOD-SP2): 65 ML
BH CV ECHO MEAS - SV(MOD-SP4): 48 ML
BH CV ECHO MEAS - SV(RVOT): 66.1 ML
BH CV ECHO MEAS - SVI(LVOT): 36.8 ML/M2
BH CV ECHO MEAS - SVI(MOD-SP2): 39.2 ML/M2
BH CV ECHO MEAS - SVI(MOD-SP4): 29 ML/M2
BH CV ECHO MEAS - TAPSE (>1.6): 2.44 CM
BH CV ECHO MEAS - TR MAX PG: 19.9 MMHG
BH CV ECHO MEAS - TR MAX VEL: 222.9 CM/SEC
BH CV ECHO MEASUREMENTS AVERAGE E/E' RATIO: 17.96
BH CV XLRA - RV BASE: 3.2 CM
BH CV XLRA - RV LENGTH: 7.5 CM
BH CV XLRA - RV MID: 2.42 CM
BH CV XLRA - TDI S': 15.3 CM/SEC
LEFT ATRIUM VOLUME INDEX: 34.9 ML/M2
LV EF BIPLANE MOD: 66 %
SINUS: 2.12 CM
STJ: 2.22 CM

## 2025-03-13 PROCEDURE — 93306 TTE W/DOPPLER COMPLETE: CPT | Performed by: INTERNAL MEDICINE

## 2025-03-13 PROCEDURE — 93306 TTE W/DOPPLER COMPLETE: CPT

## 2025-03-13 RX ORDER — HYDRALAZINE HYDROCHLORIDE 100 MG/1
100 TABLET, FILM COATED ORAL 3 TIMES DAILY PRN
Qty: 180 TABLET | Refills: 3 | Status: SHIPPED | OUTPATIENT
Start: 2025-03-13

## 2025-03-13 NOTE — TELEPHONE ENCOUNTER
Caller: Vonda Jay    Relationship to patient: Self    Best call back number: [phone]     Additional notes: PT SEEN DR GIL TODAY ABOUT A TAVR AND WAS ADVISED AN APPT WITH DR CARCAMO SO SOON WAS NOT NEEDED AND TO PUSH IT OUT - SHE IS UNSURE HOW FAR OUT AND NO AVAILABILITY UNTIL JULY, PT REQUESTING [facility] LOCATION - PLEASE ADVISE

## 2025-03-13 NOTE — PROGRESS NOTES
Vonda LEE Lukevernwillie  1935  Date of Office Visit: 03/13/25  Encounter Provider: Mike Rizvi MD  Place of Service: [facility] CARDIOLOGY      CHIEF COMPLAINT:  Dyspnea on exertion  Lower extremity edema  History of transcatheter aortic valve replacement    HISTORY OF PRESENT ILLNESS:  89-year-old female with a medical history of critical degenerative aortic valve stenosis, essential hypertension, hyperlipidemia, chronic kidney disease, and chronic heart failure with preserved ejection fraction who presents to me in follow-up.  She underwent evaluation for transcatheter aortic valve replacement.  This included echocardiogram in August 2024 clearly showing critical degenerative aortic valve stenosis with a mean gradient of 61 mmHg across the aortic valve.  She had a heart catheterization on 11/8/2024.  She was noted to have moderate pulmonary hypertension with a PA pressure of 50/15/32.  Cardiac index was decreased to 2.1.  She subsequently underwent transcatheter aortic valve replacement on 2/5/2025 with a 26 mm Medtronic evolute Pro valve.  She had a really nice deployment with no evidence of paravalvular regurgitation.   Her blood pressure has been a little bit elevated as of late typically in the evening with systolics of 160 to 170 mmHg.  She initially states that she has dyspnea but then states that her dyspnea is not bad.  She looks to be comfortable currently.  She has had worsening lower extremity edema as of late but with increased diuretic dosing we worsen her renal function.  Her albumin level is normal.  She continues on amlodipine but is currently taking it twice daily.  Her creatinine is around 2    Review of Systems   Constitutional: Negative for fever and malaise/fatigue.   HENT:  Negative for nosebleeds and sore throat.    Eyes:  Negative for blurred vision and double vision.   Cardiovascular:  Negative for chest pain, claudication, palpitations and syncope.    Respiratory:  Negative for cough, shortness of breath and snoring.    Endocrine: Negative for cold intolerance, heat intolerance and polydipsia.   Skin:  Negative for itching, poor wound healing and rash.   Musculoskeletal:  Negative for joint pain, joint swelling, muscle weakness and myalgias.   Gastrointestinal:  Negative for abdominal pain, melena, nausea and vomiting.   Neurological:  Negative for light-headedness, loss of balance, seizures, vertigo and weakness.   Psychiatric/Behavioral:  Negative for altered mental status and depression.          Past Medical History:   Diagnosis Date    Aortic valve stenosis     Arthritis     Cancer 1999    Breast    Chronic kidney disease     Heart murmur     History of breast cancer     1998    History of carotid artery disease     History of radiation therapy     Hyperlipidemia     Hypertension     Low back pain     FROM F[place] [facility] [address] 1/24/2025       The following portions of the patient's history were reviewed and updated as appropriate: Social history , Family history, and Surgical history     Current Outpatient Medications on File Prior to Visit   Medication Sig Dispense Refill    acetaminophen (TYLENOL) 500 MG tablet Take 2 tablets by mouth Every 6 (Six) Hours As Needed for Mild Pain or Moderate Pain.      alendronate (FOSAMAX) 70 MG tablet Take 1 tablet by mouth Every 7 (Seven) Days. Saturday      amLODIPine (NORVASC) 5 MG tablet Take 1 tablet by mouth 2 (Two) Times a Day. Indications: High Blood Pressure 180 tablet 3    aspirin 81 MG tablet Take 1 tablet by mouth Daily. INSTRUCTED PT TO FOLLOW MD INSTRUCTIONS REGARDING HOLDING FOR SURGERY      Calcium-Magnesium-Vitamin D (CALCIUM MAGNESIUM PO) Take 1 tablet by mouth Daily. HOLD PRIOR TO SURG      carvedilol (COREG) 12.5 MG tablet Take 1 tablet by mouth 2 (Two) Times a Day. 60 tablet 11    cholecalciferol (VITAMIN D3) 1000 units tablet Take 1 tablet by mouth Daily. HOLD PRIOR TO SURG      citalopram  "(CeleXA) 10 MG tablet Take 1 tablet by mouth Daily.      D-MANNOSE PO Take  by mouth. HOLD PRIOR TO SURG      hydrALAZINE (APRESOLINE) 50 MG tablet Take 1 tablet by mouth 3 (Three) Times a Day As Needed (SBP >150). Indications: High Blood Pressure (Patient taking differently: Take 1 tablet by mouth As Needed (SBP >150). PATIENT WAS TOLD TO JUST TAKE AS NEEDED   Indications: High Blood Pressure) 90 tablet 3    lidocaine (Lidoderm) 5 % Place 1 patch on the skin as directed by provider Daily As Needed for Mild Pain. Remove & Discard patch within 12 hours or as directed by MD 5 patch 0    multivitamin with minerals tablet tablet Take 1 tablet by mouth Daily. HOLD PRIOR TO SURG      ondansetron ODT (ZOFRAN-ODT) 4 MG disintegrating tablet Take 1 tablet by mouth 4 (Four) Times a Day As Needed for Nausea or Vomiting. 16 tablet 0    Polyethylene Glycol 3350 (CLEARLAX PO) Take  by mouth.      pravastatin (PRAVACHOL) 40 MG tablet Take 1 tablet by mouth Every Night.      Probiotic Product (PROBIOTIC DAILY PO) Take  by mouth. HOLD PRIOR TO SURG      tiZANidine (ZANAFLEX) 2 MG tablet Take 1 tablet by mouth Every 6 (Six) Hours As Needed for Muscle Spasms.      VITAMIN D-VITAMIN K PO Take  by mouth. HOLD PRIOR TO SURG       No current facility-administered medications on file prior to visit.       Allergies   Allergen Reactions    Nsaids Other (See Comments)     Due to kidney's       Vitals:    03/13/25 1017   BP: 140/58   Pulse: 57   Weight: 66.7 kg (147 lb)   Height: 154.9 cm (61\")     Body mass index is 27.78 kg/m².   Constitutional:       Appearance: Well-developed.   Eyes:      General: No scleral icterus.     Conjunctiva/sclera: Conjunctivae normal.   HENT:      Head: Normocephalic and atraumatic.   Neck:      Thyroid: No thyromegaly.      Vascular: Normal carotid pulses. No carotid bruit, hepatojugular reflux or JVD.      Trachea: No tracheal deviation.   Pulmonary:      Effort: No respiratory distress.      Breath sounds: " Normal breath sounds. No decreased breath sounds. No wheezing. No rhonchi. No rales.   Chest:      Chest wall: Not tender to palpatation.   Cardiovascular:      Normal rate. Regular rhythm.      No gallop.    Pulses:     Carotid: 2+ bilaterally.     Radial: 2+ bilaterally.     Femoral: 2+ bilaterally.     Dorsalis pedis: 2+ bilaterally.     Posterior tibial: 2+ bilaterally.  Edema:     Pretibial: bilateral 2+ edema of the pretibial area.     Ankle: bilateral 2+ edema of the ankle.     Feet: bilateral 2+ edema of the feet.  Abdominal:      General: Bowel sounds are normal. There is no distension.      Palpations: Abdomen is soft.      Tenderness: There is no abdominal tenderness.   Musculoskeletal:         General: No deformity.      Cervical back: Normal range of motion and neck supple. Skin:     Findings: No erythema or rash.   Neurological:      Mental Status: Alert and oriented to person, place, and time.      Sensory: No sensory deficit.   Psychiatric:         Behavior: Behavior normal.           Lab Results   Component Value Date    WBC 8.11 03/08/2025    HGB 9.2 (L) 03/08/2025    HCT 27.6 (L) 03/08/2025    MCV 88.5 03/08/2025     03/08/2025       Lab Results   Component Value Date    GLUCOSE 87 03/08/2025    BUN 27 (H) 03/08/2025    CREATININE 2.04 (H) 03/08/2025     03/08/2025    K 4.3 03/08/2025     03/08/2025    CALCIUM 8.4 (L) 03/08/2025    PROTEINTOT 7.4 03/07/2025    ALBUMIN 4.2 03/07/2025    ALT 12 03/07/2025    AST 18 03/07/2025    ALKPHOS 83 03/07/2025    BILITOT 0.2 03/07/2025    GLOB 3.2 03/07/2025    AGRATIO 1.3 03/07/2025    BCR 13.2 03/08/2025    ANIONGAP 10.0 03/08/2025    EGFR 22.9 (L) 03/08/2025       Lab Results   Component Value Date    GLUCOSE 87 03/08/2025    CALCIUM 8.4 (L) 03/08/2025     03/08/2025    K 4.3 03/08/2025    CO2 26.0 03/08/2025     03/08/2025    BUN 27 (H) 03/08/2025    CREATININE 2.04 (H) 03/08/2025    EGFR 22.9 (L) 03/08/2025    BCR 13.2  03/08/2025    ANIONGAP 10.0 03/08/2025       Lab Results   Component Value Date    CHOL 125 02/05/2025    CHLPL 160 09/02/2020    TRIG 89 02/05/2025    HDL 52 02/05/2025    LDL 56 02/05/2025         ECG 12 Lead    Date/Time: 3/13/2025 11:16 AM  Performed by: Mike Rizvi MD    Authorized by: Mike Rizvi MD  Comparison: compared with previous ECG from 3/7/2025  Similar to previous ECG  Rhythm: sinus rhythm  Rate: normal  QRS axis: normal    Clinical impression: normal ECG           Results for orders placed during the hospital encounter of 02/04/25    Adult Transthoracic Echo Complete W/ Cont if Necessary Per Protocol    Interpretation Summary    Left ventricular systolic function is normal. Calculated left ventricular EF = 67.9%    Left ventricular diastolic function is consistent with (grade II w/high LAP) pseudonormalization.    Left atrial volume is mildly increased.    : Trace aortic valve regurgitation is present. Aortic valve area is 2.19 cm2. Peak velocity of the flow distal to the aortic valve is 205.2 cm/s. Aortic valve maximum pressure gradient is 16.8 mmHg. Aortic valve mean pressure gradient is 9.1 mmHg. There is a 26 mm TAVR valve present. Medtronic The aortic valve peak and mean gradients are within defined limits. The prosthetic aortic valve is normal.      DISCUSSION/SUMMARY  Very pleasant 89-year-old female with a medical history of critical degenerative aortic valve stenosis, essential hypertension, CKD with worsening renal function after transcatheter aortic valve replacement, lower extremity edema who presents to me secondary to report of dyspnea and lower extremity edema.  Her blood pressure is also been problematic as of late.  She has been evaluated by the renal team and follows up with Dr. Fagan    She has clear lungs on examination.  Her proBNP is 2000 points lower than it was prior to her TAVR.  I do not think she is volume overloaded.  She certainly does have lower  extremity edema.  Albumin level is normal.  Right heart function on echo looks fine.    1.  Lower extremity edema: I do not think she is volume overloaded currently.  I would recommend leaving her off diuretics.  - I am going to try to get her off of amlodipine therapy but her blood pressure medication options are limited.  Hydralazine will be increased to 100 mg p.o. every 8 hours.  - Continue carvedilol at current dose.  I can increase that with her current resting heart rate.  - She will call me back in about a week to let me know how things are going with her blood pressure.  -Compression stockings have been recommended  - She has follow-up with renal already scheduled.    2.  History of critical degenerative aortic valve stenosis: Status post transcatheter aortic valve replacement.  Echo looks really good today.  No paravalvular regurgitation.  Left ventricular wall motion and systolic function are normal.    3.  Essential hypertension: Plan as above.  She will reach out to me about a week.

## 2025-03-14 NOTE — TELEPHONE ENCOUNTER
Caller: Vonda Jay    Relationship: Self    Best call back number: [phone]    What was the call regarding: PATIENT CALLED BACK TO SEE IF THERE WAS ANY UPDATES ON THIS PLEASE CALL AND ADVISE.

## 2025-04-10 ENCOUNTER — OFFICE VISIT (OUTPATIENT)
Dept: CARDIOLOGY | Facility: CLINIC | Age: OVER 89
End: 2025-04-10
Payer: MEDICARE

## 2025-04-10 ENCOUNTER — TELEPHONE (OUTPATIENT)
Dept: CARDIOLOGY | Age: OVER 89
End: 2025-04-10
Payer: MEDICARE

## 2025-04-10 VITALS
HEIGHT: 61 IN | OXYGEN SATURATION: 98 % | HEART RATE: 57 BPM | SYSTOLIC BLOOD PRESSURE: 164 MMHG | WEIGHT: 148 LBS | DIASTOLIC BLOOD PRESSURE: 78 MMHG | BODY MASS INDEX: 27.94 KG/M2

## 2025-04-10 DIAGNOSIS — I50.32 CHRONIC HEART FAILURE WITH PRESERVED EJECTION FRACTION: Primary | ICD-10-CM

## 2025-04-10 DIAGNOSIS — I35.0 SEVERE AORTIC VALVE STENOSIS: ICD-10-CM

## 2025-04-10 RX ORDER — AMLODIPINE BESYLATE 5 MG/1
5 TABLET ORAL DAILY
Qty: 30 TABLET | Refills: 11 | Status: SHIPPED | OUTPATIENT
Start: 2025-04-10

## 2025-04-10 RX ORDER — DOXAZOSIN 1 MG/1
1 TABLET ORAL NIGHTLY
Qty: 30 TABLET | Refills: 1 | Status: SHIPPED | OUTPATIENT
Start: 2025-04-10

## 2025-04-10 RX ORDER — TORSEMIDE 20 MG/1
1 TABLET ORAL DAILY
COMMUNITY
Start: 2025-03-20 | End: 2026-03-20

## 2025-04-10 NOTE — TELEPHONE ENCOUNTER
Pt's son, Angus, said the swelling went down after the amlodipine was stopped, but she was also on a larger dose of lasix. Then, she got dehydrated and is on less lasix now. The swelling is completely gone at this point. Her son is worried that if she does get swollen from the amlodipine then it will send her back in the wrong direction.

## 2025-04-10 NOTE — TELEPHONE ENCOUNTER
Dr. Singleton,    Pt's son called the office today. Pt was just taken off of amlodipine by Dr. Rizvi on 3-13-25. Dr. Rizvi thought it may be causing some lower extremity edema.    Do you still want her to go back on amlodipine or is there a different option?    Thank you,    Jackie Red, RN  [place] [facility] [place]  04/10/25 14:19 EDT

## 2025-04-11 RX ORDER — DOXAZOSIN 2 MG/1
2 TABLET ORAL NIGHTLY
COMMUNITY

## 2025-04-11 NOTE — TELEPHONE ENCOUNTER
Notified Angus of Dr. Singleton's recommendations. He verbalized understanding.    Thank you,    Jackie Red, RN  Triage [facility] [place]  04/11/25 10:37 EDT

## 2025-04-11 NOTE — TELEPHONE ENCOUNTER
Notified Angus, pt's son. He verbalized understanding.    Thank you,    Jackie Red, RN  Triage [facility] [place]  04/11/25 09:17 EDT

## 2025-04-11 NOTE — PROGRESS NOTES
"      CARDIOLOGY    Adam Singleton MD    ENCOUNTER DATE:  04/10/2025    Vonda Jay / 89 y.o. / female        CHIEF COMPLAINT / REASON FOR OFFICE VISIT     Nonrheumatic aortic valve stenosis (03/13/2025  Follow up)  TAVR  Hypertension    HISTORY OF PRESENT ILLNESS       HPI  Vonda Jay is a 89 y.o. female who presents today for reevaluation.  Patient underwent a TAVR procedure.  She said she has not had any chest discomfort shortness of breath palpitations or swelling.  Overall she is doing significantly better however her blood pressure has been running on the high side.      The following portions of the patient's history were reviewed and updated as appropriate: allergies, current medications, past family history, past medical history, past social history, past surgical history and problem list.      VITAL SIGNS     Visit Vitals  /78 (BP Location: Left arm)   Pulse 57   Ht 154.9 cm (61\")   Wt 67.1 kg (148 lb)   LMP  (LMP Unknown)   SpO2 98%   BMI 27.96 kg/m²         Wt Readings from Last 3 Encounters:   04/10/25 67.1 kg (148 lb)   03/13/25 66.7 kg (147 lb)   03/13/25 66.7 kg (147 lb)     Body mass index is 27.96 kg/m².      REVIEW OF SYSTEMS   ROS        PHYSICAL EXAMINATION     Vitals reviewed.   Cardiovascular:      Normal rate. Regular rhythm. Normal S1. Normal S2.       Murmurs: There is a grade 1/6 harsh midsystolic murmur at the URSB.      No gallop.  No click. No rub.   Pulses:     Intact distal pulses.   Edema:     Peripheral edema absent.           REVIEWED DATA     Procedures    Cardiac Procedures:      Lipid Panel          2/5/2025    03:29   Lipid Panel   Total Cholesterol 125    Triglycerides 89    HDL Cholesterol 52    VLDL Cholesterol 17    LDL Cholesterol  56    LDL/HDL Ratio 1.06          ASSESSMENT & PLAN      Diagnosis Plan   1. Chronic heart failure with preserved ejection fraction        2. Severe aortic valve stenosis              SUMMARY/DISCUSSION  CHF with normal " ejection fraction probably secondary to severe aortic stenosis.  Patient is doing well after her TAVR no further signs or symptoms.  Status post TAVR.  Hypertension.  Will try some amlodipine again and see if we can get her blood pressure a little bit better.  Follow-up 6 months or sooner if issues.        MEDICATIONS         Discharge Medications            Accurate as of April 10, 2025 11:59 PM. If you have any questions, ask your nurse or doctor.                New Medications        Instructions Start Date   amLODIPine 5 MG tablet  Commonly known as: NORVASC  Started by: Adam Haraden   5 mg, Oral, Daily             Changes to Medications        Instructions Start Date   carvedilol 12.5 MG tablet  Commonly known as: COREG  What changed:   when to take this  reasons to take this   12.5 mg, Oral, 2 Times Daily      doxazosin 1 MG tablet  Commonly known as: Cardura  What changed: You were already taking a medication with the same name, and this prescription was added. Make sure you understand how and when to take each.  Changed by: Adam Haraden   1 mg, Oral, Nightly      doxazosin 2 MG tablet  Commonly known as: CARDURA  What changed: Another medication with the same name was added. Make sure you understand how and when to take each.  Changed by: Adam Haraden   2 mg, Nightly             Continue These Medications        Instructions Start Date   acetaminophen 500 MG tablet  Commonly known as: TYLENOL   1,000 mg, Every 6 Hours PRN      alendronate 70 MG tablet  Commonly known as: FOSAMAX   70 mg, Every 7 Days      aspirin 81 MG tablet   81 mg, Daily      CALCIUM MAGNESIUM PO   1 tablet, Daily      cholecalciferol 25 MCG (1000 UT) tablet  Commonly known as: VITAMIN D3   1,000 Units, Daily      citalopram 10 MG tablet  Commonly known as: CeleXA   10 mg, Daily      CLEARLAX PO   Take  by mouth.      D-MANNOSE PO   Take  by mouth. HOLD PRIOR TO SURG      hydrALAZINE 100 MG tablet  Commonly known as: APRESOLINE    100 mg, Oral, 3 Times Daily PRN      lidocaine 5 %  Commonly known as: Lidoderm   1 patch, Transdermal, Daily PRN, Remove & Discard patch within 12 hours or as directed by MD      multivitamin with minerals tablet tablet   1 tablet, Daily      ondansetron ODT 4 MG disintegrating tablet  Commonly known as: ZOFRAN-ODT   4 mg, Oral, 4 Times Daily PRN      pravastatin 40 MG tablet  Commonly known as: PRAVACHOL   40 mg, Nightly      PROBIOTIC DAILY PO   Take  by mouth. HOLD PRIOR TO SURG      tiZANidine 2 MG tablet  Commonly known as: ZANAFLEX   2 mg, Every 6 Hours PRN      torsemide 20 MG tablet  Commonly known as: DEMADEX   1 tablet, Daily      VITAMIN D-VITAMIN K PO   Take  by mouth. HOLD PRIOR TO SURG                   **Dragon Disclaimer:   Much of this encounter note is an electronic transcription/translation of spoken language to printed text. The electronic translation of spoken language may permit erroneous, or at times, nonsensical words or phrases to be inadvertently transcribed. Although I have reviewed the note for such errors, some may still exist.

## 2025-04-11 NOTE — TELEPHONE ENCOUNTER
Dr. Singleton,    Pt's son called back. He remembered that pt saw the nephrologist on 3-25-25. Nephrology already started pt on Cardura 2 mg daily. Do you want her to have an additional one mg daily?    Thank you,    Jackie Red, RN  Triage [facility] [place]  04/11/25 10:24 EDT

## 2025-04-20 ENCOUNTER — HOSPITAL ENCOUNTER (EMERGENCY)
Facility: HOSPITAL | Age: OVER 89
Discharge: HOME | DRG: 389 | End: 2025-04-20
Attending: EMERGENCY MEDICINE | Admitting: EMERGENCY MEDICINE
Payer: MEDICARE

## 2025-04-20 ENCOUNTER — APPOINTMENT (OUTPATIENT)
Dept: CT IMAGING | Facility: HOSPITAL | Age: OVER 89
DRG: 389 | End: 2025-04-20
Payer: MEDICARE

## 2025-04-20 VITALS
OXYGEN SATURATION: 95 % | RESPIRATION RATE: 16 BRPM | HEART RATE: 64 BPM | DIASTOLIC BLOOD PRESSURE: 52 MMHG | HEIGHT: 61 IN | TEMPERATURE: 97.6 F | SYSTOLIC BLOOD PRESSURE: 133 MMHG | BODY MASS INDEX: 26.81 KG/M2 | WEIGHT: 142 LBS

## 2025-04-20 DIAGNOSIS — K52.9 ENTERITIS: Primary | ICD-10-CM

## 2025-04-20 LAB
ALBUMIN SERPL-MCNC: 3.8 G/DL (ref 3.5–5.2)
ALBUMIN/GLOB SERPL: 1.2 G/DL
ALP SERPL-CCNC: 68 U/L (ref 39–117)
ALT SERPL W P-5'-P-CCNC: 7 U/L (ref 1–33)
ANION GAP SERPL CALCULATED.3IONS-SCNC: 13 MMOL/L (ref 5–15)
AST SERPL-CCNC: 17 U/L (ref 1–32)
BACTERIA UR QL AUTO: ABNORMAL /HPF
BASOPHILS # BLD AUTO: 0.05 10*3/MM3 (ref 0–0.2)
BASOPHILS NFR BLD AUTO: 0.4 % (ref 0–1.5)
BILIRUB SERPL-MCNC: 0.3 MG/DL (ref 0–1.2)
BILIRUB UR QL STRIP: NEGATIVE
BUN SERPL-MCNC: 33 MG/DL (ref 8–23)
BUN/CREAT SERPL: 14.8 (ref 7–25)
CALCIUM SPEC-SCNC: 8.8 MG/DL (ref 8.6–10.5)
CHLORIDE SERPL-SCNC: 95 MMOL/L (ref 98–107)
CLARITY UR: CLEAR
CO2 SERPL-SCNC: 25 MMOL/L (ref 22–29)
COLOR UR: YELLOW
CREAT SERPL-MCNC: 2.23 MG/DL (ref 0.57–1)
D-LACTATE SERPL-SCNC: 1 MMOL/L (ref 0.5–2)
DEPRECATED RDW RBC AUTO: 41.7 FL (ref 37–54)
EGFRCR SERPLBLD CKD-EPI 2021: 20.6 ML/MIN/1.73
EOSINOPHIL # BLD AUTO: 0.33 10*3/MM3 (ref 0–0.4)
EOSINOPHIL NFR BLD AUTO: 2.7 % (ref 0.3–6.2)
ERYTHROCYTE [DISTWIDTH] IN BLOOD BY AUTOMATED COUNT: 13.2 % (ref 12.3–15.4)
GLOBULIN UR ELPH-MCNC: 3.1 GM/DL
GLUCOSE SERPL-MCNC: 132 MG/DL (ref 65–99)
GLUCOSE UR STRIP-MCNC: NEGATIVE MG/DL
HCT VFR BLD AUTO: 28.3 % (ref 34–46.6)
HGB BLD-MCNC: 9.2 G/DL (ref 12–15.9)
HGB UR QL STRIP.AUTO: NEGATIVE
HOLD SPECIMEN: NORMAL
HOLD SPECIMEN: NORMAL
HYALINE CASTS UR QL AUTO: ABNORMAL /LPF
IMM GRANULOCYTES # BLD AUTO: 0.06 10*3/MM3 (ref 0–0.05)
IMM GRANULOCYTES NFR BLD AUTO: 0.5 % (ref 0–0.5)
KETONES UR QL STRIP: NEGATIVE
LEUKOCYTE ESTERASE UR QL STRIP.AUTO: ABNORMAL
LIPASE SERPL-CCNC: 19 U/L (ref 13–60)
LYMPHOCYTES # BLD AUTO: 1.58 10*3/MM3 (ref 0.7–3.1)
LYMPHOCYTES NFR BLD AUTO: 13 % (ref 19.6–45.3)
MCH RBC QN AUTO: 28.5 PG (ref 26.6–33)
MCHC RBC AUTO-ENTMCNC: 32.5 G/DL (ref 31.5–35.7)
MCV RBC AUTO: 87.6 FL (ref 79–97)
MONOCYTES # BLD AUTO: 1.13 10*3/MM3 (ref 0.1–0.9)
MONOCYTES NFR BLD AUTO: 9.3 % (ref 5–12)
NEUTROPHILS NFR BLD AUTO: 74.1 % (ref 42.7–76)
NEUTROPHILS NFR BLD AUTO: 9.05 10*3/MM3 (ref 1.7–7)
NITRITE UR QL STRIP: NEGATIVE
NRBC BLD AUTO-RTO: 0 /100 WBC (ref 0–0.2)
PH UR STRIP.AUTO: 6.5 [PH] (ref 5–8)
PLATELET # BLD AUTO: 312 10*3/MM3 (ref 140–450)
PMV BLD AUTO: 10.1 FL (ref 6–12)
POTASSIUM SERPL-SCNC: 3.8 MMOL/L (ref 3.5–5.2)
PROT SERPL-MCNC: 6.9 G/DL (ref 6–8.5)
PROT UR QL STRIP: ABNORMAL
RBC # BLD AUTO: 3.23 10*6/MM3 (ref 3.77–5.28)
RBC # UR STRIP: ABNORMAL /HPF
REF LAB TEST METHOD: ABNORMAL
SODIUM SERPL-SCNC: 133 MMOL/L (ref 136–145)
SP GR UR STRIP: 1.02 (ref 1–1.03)
SQUAMOUS #/AREA URNS HPF: ABNORMAL /HPF
UROBILINOGEN UR QL STRIP: ABNORMAL
WBC # UR STRIP: ABNORMAL /HPF
WBC NRBC COR # BLD AUTO: 12.2 10*3/MM3 (ref 3.4–10.8)
WHOLE BLOOD HOLD COAG: NORMAL
WHOLE BLOOD HOLD SPECIMEN: NORMAL

## 2025-04-20 PROCEDURE — 96374 THER/PROPH/DIAG INJ IV PUSH: CPT

## 2025-04-20 PROCEDURE — 96361 HYDRATE IV INFUSION ADD-ON: CPT

## 2025-04-20 PROCEDURE — 83690 ASSAY OF LIPASE: CPT | Performed by: EMERGENCY MEDICINE

## 2025-04-20 PROCEDURE — 36415 COLL VENOUS BLD VENIPUNCTURE: CPT

## 2025-04-20 PROCEDURE — 81001 URINALYSIS AUTO W/SCOPE: CPT | Performed by: EMERGENCY MEDICINE

## 2025-04-20 PROCEDURE — 85025 COMPLETE CBC W/AUTO DIFF WBC: CPT | Performed by: EMERGENCY MEDICINE

## 2025-04-20 PROCEDURE — 80053 COMPREHEN METABOLIC PANEL: CPT | Performed by: EMERGENCY MEDICINE

## 2025-04-20 PROCEDURE — 25010000002 ONDANSETRON PER 1 MG: Performed by: EMERGENCY MEDICINE

## 2025-04-20 PROCEDURE — 25810000003 SODIUM CHLORIDE 0.9 % SOLUTION: Performed by: EMERGENCY MEDICINE

## 2025-04-20 PROCEDURE — 99284 EMERGENCY DEPT VISIT MOD MDM: CPT

## 2025-04-20 PROCEDURE — 74176 CT ABD & PELVIS W/O CONTRAST: CPT

## 2025-04-20 PROCEDURE — 83605 ASSAY OF LACTIC ACID: CPT | Performed by: EMERGENCY MEDICINE

## 2025-04-20 RX ORDER — ONDANSETRON 2 MG/ML
4 INJECTION INTRAMUSCULAR; INTRAVENOUS ONCE
Status: COMPLETED | OUTPATIENT
Start: 2025-04-20 | End: 2025-04-20

## 2025-04-20 RX ORDER — SODIUM CHLORIDE 0.9 % (FLUSH) 0.9 %
10 SYRINGE (ML) INJECTION AS NEEDED
Status: DISCONTINUED | OUTPATIENT
Start: 2025-04-20 | End: 2025-04-20 | Stop reason: HOSPADM

## 2025-04-20 RX ADMIN — SODIUM CHLORIDE 500 ML: 9 INJECTION, SOLUTION INTRAVENOUS at 10:05

## 2025-04-20 RX ADMIN — ONDANSETRON 4 MG: 2 INJECTION, SOLUTION INTRAMUSCULAR; INTRAVENOUS at 10:05

## 2025-04-20 NOTE — ED PROVIDER NOTES
EMERGENCY DEPARTMENT ENCOUNTER  Room Number:  27/27  PCP: Ambrose Ashley MD  Independent Historians: Patient  Date of encounter:  4/20/2025  Patient Care Team:  Ambrose Ashley MD as PCP - General (Family Medicine)     HPI:  Chief Complaint: had concerns including Abdominal Pain, Nausea, and Vomiting.     A complete HPI/ROS/PMH/PSH/SH/FH are unobtainable due to: None    Chronic or social conditions impacting patient care (Social Determinants of Health): None    Context: Vonda Jay is a 89 y.o. female with a medical history of CKD, hypertension, CHF who presents to the ED c/o acute abdominal pain, diarrhea and vomiting.  Patient has had several hours of lower abdominal cramping with associated loose stools and vomiting.  No fevers or chills.  No hematochezia, melena or hematemesis.  Patient attended a party last night, but denies any chance of foodborne illness.  No one else from the party got sick.  No recent sick contacts.  No urinary complaint.  No prior abdominal surgeries.    Review of prior external notes (non-ED) -and- Review of prior external test results outside of this encounter:  CT of abdomen pelvis from 2/21/2025 reviewed.  No evidence of acute infection on that imaging.  No evidence of obstruction.    PAST MEDICAL HISTORY  Active Ambulatory Problems     Diagnosis Date Noted    Closed displaced intertrochanteric fracture of left femur 02/07/2018    SANYA (acute kidney injury) 02/07/2018    HTN (hypertension) 02/07/2018    Acute blood loss anemia 02/10/2018    Leukocytosis 02/10/2018    Drug-induced constipation 02/12/2018    Stage 3b chronic kidney disease 09/17/2022    COVID-19 virus infection 09/17/2022    Anemia due to chronic kidney disease 09/17/2022    Acute on chronic diastolic CHF (congestive heart failure) 09/18/2022    Severe aortic valve stenosis 09/18/2022    Stage 3 chronic kidney disease 09/19/2022    E. 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 01/12/2023    (HFpEF) heart failure with preserved ejection fraction 01/13/2023    Obesity (BMI 30-39.9) 01/13/2023    Hypokalemia 01/13/2023    Acute UTI (urinary tract infection) 01/15/2023    Cough 01/15/2023    Nocturnal hypoxemia 01/15/2023    Hypophosphatemia 01/16/2023    Respiratory distress 11/11/2024    CHF (congestive heart failure) 11/11/2024    Diastolic CHF, chronic 11/12/2024    Acute hypoxic respiratory failure 11/12/2024    Sepsis due to pneumonia 11/12/2024    Pneumonia, unspecified organism 11/15/2024    Aortic stenosis 02/04/2025    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    Acute kidney injury 03/07/2025     Resolved Ambulatory Problems     Diagnosis Date Noted    Hyperkalemia 02/07/2018    Acute hypoxemic respiratory failure due to COVID-19 09/17/2022     Past Medical History:   Diagnosis Date    Aortic valve stenosis     Arthritis     Cancer 1999    Chronic kidney disease     Heart murmur     History of breast cancer     History of carotid artery disease     History of radiation therapy     Hyperlipidemia     Hypertension     Low back pain        PAST SURGICAL HISTORY  Past Surgical History:   Procedure Laterality Date    AORTIC VALVE REPAIR/REPLACEMENT N/A 2/4/2025    Procedure: TTE TRANSFEMORAL TRANSCATHETER AORTIC VALVE REPLACEMENT PERCUTANEOUS APPROACH;  Surgeon: Ebenezer Albert MD;  Location: [facility];  Service: Cardiothoracic;  Laterality: N/A;    AORTIC VALVE REPAIR/REPLACEMENT N/A 2/4/2025    Procedure: Transfemoral Transcatheter Aortic Valve Replacement with intraoperative transthoracic echocardiogram and possible open surgical rescue;  Surgeon: Mike Rizvi MD;  Location: [facility];  Service: Cardiovascular;  Laterality: N/A;    BREAST LUMPECTOMY  1998    CARDIAC CATHETERIZATION N/A 11/8/2024    Procedure: Right and Left Heart Cath;  Surgeon: Mike Rizvi MD;  Location: [facility] INVASIVE LOCATION;  Service: Cardiology;   Laterality: N/A;    CARDIAC CATHETERIZATION N/A 2024    Procedure: Coronary angiography;  Surgeon: Mike Rizvi MD;  Location:  [facility];  Service: Cardiology;  Laterality: N/A;    CAROTID ARTERY ANGIOPLASTY  ,     CHOLECYSTECTOMY  1979    FEMUR IM NAILING/RODDING Left 2018    Procedure: FEMUR INTRAMEDULLARY NAILING;  Surgeon: Zheng Fleming MD;  Location: [facility] MAIN OR;  Service:     HYSTERECTOMY         FAMILY HISTORY  Family History   Problem Relation Age of Onset    Hypertension Mother     No Known Problems Father     Heart valve disorder Sister     Lung cancer Sister     Lymphoma Sister     Skin cancer Sister     Stroke Brother 57    Malig Hyperthermia Neg Hx        SOCIAL HISTORY  Social History     Socioeconomic History    Marital status:    Tobacco Use    Smoking status: Former     Current packs/day: 0.00     Types: Cigarettes     Start date:      Quit date:      Years since quittin.3     Passive exposure: Past    Smokeless tobacco: Never    Tobacco comments:     caffeine use- coffee   Vaping Use    Vaping status: Never Used   Substance and Sexual Activity    Alcohol use: Yes     Comment: VERY RARE    Drug use: Never    Sexual activity: Defer       ALLERGIES  Nsaids    REVIEW OF SYSTEMS  Review of Systems  Included in HPI  All systems reviewed and negative except for those discussed in HPI.    PHYSICAL EXAM    I have reviewed the triage vital signs and nursing notes.    ED Triage Vitals   Temp Heart Rate Resp BP SpO2   25 0923 25 0922 25 0922 25 0927 25   97.6 °F (36.4 °C) 68 16 179/58 96 %      Temp src Heart Rate Source Patient Position BP Location FiO2 (%)   -- -- 25 0927 25 --     Sitting Right arm        Physical Exam  Constitutional:       General: She is not in acute distress.     Appearance: Normal appearance. She is not ill-appearing or toxic-appearing.   HENT:      Head:  Normocephalic and atraumatic.      Nose: Nose normal.      Mouth/Throat:      Mouth: Mucous membranes are moist.      Pharynx: Oropharynx is clear.   Eyes:      Extraocular Movements: Extraocular movements intact.      Conjunctiva/sclera: Conjunctivae normal.      Pupils: Pupils are equal, round, and reactive to light.   Cardiovascular:      Rate and Rhythm: Normal rate and regular rhythm.      Pulses: Normal pulses.      Heart sounds: Normal heart sounds. No murmur heard.     No friction rub. No gallop.   Pulmonary:      Effort: Pulmonary effort is normal. No respiratory distress.      Breath sounds: Normal breath sounds. No stridor. No wheezing, rhonchi or rales.   Abdominal:      General: Abdomen is flat. There is no distension.      Palpations: Abdomen is soft.      Tenderness: There is no abdominal tenderness. There is no guarding or rebound.   Musculoskeletal:      Cervical back: Normal range of motion and neck supple.   Skin:     General: Skin is warm and dry.   Neurological:      General: No focal deficit present.      Mental Status: She is alert and oriented to person, place, and time. Mental status is at baseline.   Psychiatric:         Mood and Affect: Mood normal.         Behavior: Behavior normal.         Thought Content: Thought content normal.         Judgment: Judgment normal.         LAB RESULTS  Recent Results (from the past 24 hours)   Comprehensive Metabolic Panel    Collection Time: 04/20/25  9:27 AM    Specimen: Blood   Result Value Ref Range    Glucose 132 (H) 65 - 99 mg/dL    BUN 33 (H) 8 - 23 mg/dL    Creatinine 2.23 (H) 0.57 - 1.00 mg/dL    Sodium 133 (L) 136 - 145 mmol/L    Potassium 3.8 3.5 - 5.2 mmol/L    Chloride 95 (L) 98 - 107 mmol/L    CO2 25.0 22.0 - 29.0 mmol/L    Calcium 8.8 8.6 - 10.5 mg/dL    Total Protein 6.9 6.0 - 8.5 g/dL    Albumin 3.8 3.5 - 5.2 g/dL    ALT (SGPT) 7 1 - 33 U/L    AST (SGOT) 17 1 - 32 U/L    Alkaline Phosphatase 68 39 - 117 U/L    Total Bilirubin 0.3 0.0 -  1.2 mg/dL    Globulin 3.1 gm/dL    A/G Ratio 1.2 g/dL    BUN/Creatinine Ratio 14.8 7.0 - 25.0    Anion Gap 13.0 5.0 - 15.0 mmol/L    eGFR 20.6 (L) >60.0 mL/min/1.73   Lipase    Collection Time: 04/20/25  9:27 AM    Specimen: Blood   Result Value Ref Range    Lipase 19 13 - 60 U/L   Lactic Acid, Plasma    Collection Time: 04/20/25  9:27 AM    Specimen: Blood   Result Value Ref Range    Lactate 1.0 0.5 - 2.0 mmol/L   Green Top (Gel)    Collection Time: 04/20/25  9:27 AM   Result Value Ref Range    Extra Tube Hold for add-ons.    Lavender Top    Collection Time: 04/20/25  9:27 AM   Result Value Ref Range    Extra Tube hold for add-on    CBC Auto Differential    Collection Time: 04/20/25  9:27 AM    Specimen: Blood   Result Value Ref Range    WBC 12.20 (H) 3.40 - 10.80 10*3/mm3    RBC 3.23 (L) 3.77 - 5.28 10*6/mm3    Hemoglobin 9.2 (L) 12.0 - 15.9 g/dL    Hematocrit 28.3 (L) 34.0 - 46.6 %    MCV 87.6 79.0 - 97.0 fL    MCH 28.5 26.6 - 33.0 pg    MCHC 32.5 31.5 - 35.7 g/dL    RDW 13.2 12.3 - 15.4 %    RDW-SD 41.7 37.0 - 54.0 fl    MPV 10.1 6.0 - 12.0 fL    Platelets 312 140 - 450 10*3/mm3    Neutrophil % 74.1 42.7 - 76.0 %    Lymphocyte % 13.0 (L) 19.6 - 45.3 %    Monocyte % 9.3 5.0 - 12.0 %    Eosinophil % 2.7 0.3 - 6.2 %    Basophil % 0.4 0.0 - 1.5 %    Immature Grans % 0.5 0.0 - 0.5 %    Neutrophils, Absolute 9.05 (H) 1.70 - 7.00 10*3/mm3    Lymphocytes, Absolute 1.58 0.70 - 3.10 10*3/mm3    Monocytes, Absolute 1.13 (H) 0.10 - 0.90 10*3/mm3    Eosinophils, Absolute 0.33 0.00 - 0.40 10*3/mm3    Basophils, Absolute 0.05 0.00 - 0.20 10*3/mm3    Immature Grans, Absolute 0.06 (H) 0.00 - 0.05 10*3/mm3    nRBC 0.0 0.0 - 0.2 /100 WBC   Gold Top - SST    Collection Time: 04/20/25 10:05 AM   Result Value Ref Range    Extra Tube Hold for add-ons.    Light Blue Top    Collection Time: 04/20/25 10:05 AM   Result Value Ref Range    Extra Tube Hold for add-ons.    Urinalysis With Microscopic If Indicated (No Culture) - Urine, Clean  Catch    Collection Time: 04/20/25 12:52 PM    Specimen: Urine, Clean Catch   Result Value Ref Range    Color, UA Yellow Yellow, Straw    Appearance, UA Clear Clear    pH, UA 6.5 5.0 - 8.0    Specific Gravity, UA 1.018 1.005 - 1.030    Glucose, UA Negative Negative    Ketones, UA Negative Negative    Bilirubin, UA Negative Negative    Blood, UA Negative Negative    Protein, UA >=300 mg/dL (3+) (A) Negative    Leuk Esterase, UA Trace (A) Negative    Nitrite, UA Negative Negative    Urobilinogen, UA 1.0 E.U./dL 0.2 - 1.0 E.U./dL   Urinalysis, Microscopic Only - Urine, Clean Catch    Collection Time: 04/20/25 12:52 PM    Specimen: Urine, Clean Catch   Result Value Ref Range    RBC, UA 0-2 None Seen, 0-2 /HPF    WBC, UA 6-10 (A) None Seen, 0-2 /HPF    Bacteria, UA Trace (A) None Seen /HPF    Squamous Epithelial Cells, UA 7-12 (A) None Seen, 0-2 /HPF    Hyaline Casts, UA 7-12 None Seen /LPF    Methodology Automated Microscopy        RADIOLOGY  CT Abdomen Pelvis Without Contrast  Result Date: 4/20/2025  CT OF THE ABDOMEN AND PELVIS WITHOUT CONTRAST 04/20/2025  HISTORY: Lower abdominal cramping. Diarrhea. Vomiting.  Axial images were obtained from the lung bases to the symphysis pubis. No intravenous or oral contrast was given.  The gallbladder has been removed. The liver, spleen, pancreas and adrenals appear unremarkable. There are some tiny calcifications in the left renal hilum, probably vascular. Kidneys are otherwise unremarkable. There is aortoiliac calcification. There is colonic diverticulosis. There is mild distention of the colon with slightly high attenuation fluid. Patient reportedly has no history of GI bleed. No bowel wall thickening or bowel dilatation is seen.  Uterus has been removed. Urinary bladder is unremarkable.      1. Status post cholecystectomy and hysterectomy. 2. No bowel wall thickening or bowel dilatation is seen. There is fluid in the colon as discussed just above. There is no evidence of  bowel obstruction.  Radiation dose reduction techniques were utilized, including automated exposure control and exposure modulation based on body size.         MEDICATIONS GIVEN IN ER  Medications   sodium chloride 0.9 % flush 10 mL (has no administration in time range)   sodium chloride 0.9 % bolus 500 mL (0 mL Intravenous Stopped 4/20/25 1144)   ondansetron (ZOFRAN) injection 4 mg (4 mg Intravenous Given 4/20/25 1005)       ORDERS PLACED DURING THIS VISIT:  Orders Placed This Encounter   Procedures    CT Abdomen Pelvis Without Contrast    [place] Draw    Comprehensive Metabolic Panel    Lipase    Urinalysis With Microscopic If Indicated (No Culture) - Urine, Clean Catch    Lactic Acid, Plasma    CBC Auto Differential    Urinalysis, Microscopic Only - Urine, Clean Catch    NPO Diet NPO Type: Strict NPO    Undress & Gown    Insert Peripheral IV    CBC & Differential    Green Top (Gel)    Lavender Top    Gold Top - SST    Light Blue Top       OUTPATIENT MEDICATION MANAGEMENT:  Current Facility-Administered Medications Ordered in Epic   Medication Dose Route Frequency Provider Last Rate Last Admin    sodium chloride 0.9 % flush 10 mL  10 mL Intravenous PRN Endy Chauhan MD         Current Outpatient Medications Ordered in Epic   Medication Sig Dispense Refill    acetaminophen (TYLENOL) 500 MG tablet Take 2 tablets by mouth Every 6 (Six) Hours As Needed for Mild Pain or Moderate Pain.      alendronate (FOSAMAX) 70 MG tablet Take 1 tablet by mouth Every 7 (Seven) Days. Saturday      amLODIPine (NORVASC) 5 MG tablet Take 1 tablet by mouth Daily. 30 tablet 11    aspirin 81 MG tablet Take 1 tablet by mouth Daily. INSTRUCTED PT TO FOLLOW MD INSTRUCTIONS REGARDING HOLDING FOR SURGERY      Calcium-Magnesium-Vitamin D (CALCIUM MAGNESIUM PO) Take 1 tablet by mouth Daily. HOLD PRIOR TO SURG      carvedilol (COREG) 12.5 MG tablet Take 1 tablet by mouth 2 (Two) Times a Day. (Patient taking differently: Take 1 tablet by mouth 3  (Three) Times a Day As Needed.) 60 tablet 11    cholecalciferol (VITAMIN D3) 1000 units tablet Take 1 tablet by mouth Daily. HOLD PRIOR TO SURG      citalopram (CeleXA) 10 MG tablet Take 1 tablet by mouth Daily.      D-MANNOSE PO Take  by mouth. HOLD PRIOR TO SURG      doxazosin (Cardura) 1 MG tablet Take 1 tablet by mouth Every Night. 30 tablet 1    doxazosin (CARDURA) 2 MG tablet Take 1 tablet by mouth Every Night.      hydrALAZINE (APRESOLINE) 100 MG tablet Take 1 tablet by mouth 3 (Three) Times a Day As Needed (SBP >150). Indications: High Blood Pressure 180 tablet 3    lidocaine (Lidoderm) 5 % Place 1 patch on the skin as directed by provider Daily As Needed for Mild Pain. Remove & Discard patch within 12 hours or as directed by MD 5 patch 0    multivitamin with minerals tablet tablet Take 1 tablet by mouth Daily. HOLD PRIOR TO SURG      ondansetron ODT (ZOFRAN-ODT) 4 MG disintegrating tablet Take 1 tablet by mouth 4 (Four) Times a Day As Needed for Nausea or Vomiting. 16 tablet 0    Polyethylene Glycol 3350 (CLEARLAX PO) Take  by mouth.      pravastatin (PRAVACHOL) 40 MG tablet Take 1 tablet by mouth Every Night.      Probiotic Product (PROBIOTIC DAILY PO) Take  by mouth. HOLD PRIOR TO SURG      tiZANidine (ZANAFLEX) 2 MG tablet Take 1 tablet by mouth Every 6 (Six) Hours As Needed for Muscle Spasms.      torsemide (DEMADEX) 20 MG tablet Take 1 tablet by mouth Daily.      VITAMIN D-VITAMIN K PO Take  by mouth. HOLD PRIOR TO SURG         PROCEDURES  Procedures    PROGRESS, DATA ANALYSIS, CONSULTS, AND MEDICAL DECISION MAKING  All labs have been independently interpreted by me.  All radiology studies have been reviewed by me. All EKG's have been independently viewed and interpreted by me.  Discussion below represents my analysis of pertinent findings related to patient's condition, differential diagnosis, treatment plan and final disposition.    Differential diagnosis includes but is not limited to colitis,  diverticulitis, dehydration, UTI.    Clinical Scores:                   ED Course as of 04/20/25 1313   Sun Apr 20, 2025   0939 Hemoglobin(!): 9.2  C/w prior [AB]   0939 WBC(!): 12.20 [AB]   1106 CT Abdomen Pelvis Without Contrast  My independent interpretation of the imaging study is no obstruction, no free air [AB]   1111 Creatinine(!): 2.23  baseline [AB]   1111 Lactate: 1.0 [AB]   1111 Potassium: 3.8 [AB]   1309 Urinalysis With Microscopic If Indicated (No Culture) - Urine, Clean Catch(!) [AB]   1311 Updated patient on results.  Patient has had complete symptom resolution since being here.  She has had no abdominal pain and her nausea has also stopped.  She is tolerated fluids here.  Workup shows likely viral enteritis.  Kidney function is at baseline.  Remainder of lab work unremarkable.  No evidence of UTI.  Discharged with recommendations for bland diet, PCP follow-up. [AB]      ED Course User Index  [AB] Endy Chauhan MD             AS OF 13:13 EDT VITALS:    BP - 133/52  HR - 64  TEMP - 97.6 °F (36.4 °C)  O2 SATS - 95%    COMPLEXITY OF CARE  Admission was considered but after careful review of the patient's presentation, physical examination, diagnostic results, and response to treatment the patient may be safely discharged with outpatient follow-up.      DIAGNOSIS  Final diagnoses:   Enteritis         DISPOSITION  ED Disposition       ED Disposition   Discharge    Condition   Stable    Comment   --                Please note that portions of this document were completed with a voice recognition program.    Note Disclaimer: At [facility], we believe that sharing information builds trust and better relationships. You are receiving this note because you recently visited [facility]. It is possible you will see health information before a provider has talked with you about it. This kind of information can be easy to misunderstand. To help you fully understand what it means for your health, we urge  you to discuss this note with your provider.         Endy Chauhan MD  04/20/25 5322

## 2025-04-23 PROBLEM — I16.0 HYPERTENSIVE URGENCY: Status: ACTIVE | Noted: 2025-01-01

## 2025-04-23 PROBLEM — R63.4 ABNORMAL WEIGHT LOSS: Status: ACTIVE | Noted: 2025-01-01

## 2025-04-23 PROBLEM — R10.9 ABDOMINAL PAIN: Status: ACTIVE | Noted: 2025-01-01

## 2025-04-23 NOTE — ED PROVIDER NOTES
EMERGENCY DEPARTMENT ENCOUNTER  Room Number:  DEVIN/DEVIN  PCP: Ambrose Ashley MD  Independent Historians: Patient and Son      HPI:  Chief Complaint: had concerns including Abdominal Pain, Vomiting, Nausea, and Diarrhea.     A complete HPI/ROS/PMH/PSH/SH/FH are unobtainable due to: None    Chronic or social conditions impacting patient care (Social Determinants of Health): None      Context: The patient is a 89 y.o. female with a medical history of hypertension, SANYA, stage IIIb chronic kidney disease, anemia, CHF, CHF who presents to the ED c/o acute nausea vomiting diarrhea and abdominal pain.  Son is at the bedside and provides some additional history as well.  Started for 5 days ago.  She reportedly had an ER visit and workup looked okay, is felt maybe she had a gastrointestinal virus.  The day after she was feeling better however for the last 3 days she is continue to have nausea vomiting diarrhea.  She also reports intermittent severe left upper abdominal pain.  Nothing makes it worse or better.  She has been able to tolerate very little fluids, no food.  She is generally weak.  She has previously had a cholecystectomy.  She denies any blood in her stool or emesis      Review of prior external notes (non-ED) -and- Review of prior external test results outside of this encounter:  Labs performed 4/20/2025 and creatinine was 2.23, sodium 133, hemoglobin 9.2        PAST MEDICAL HISTORY  Active Ambulatory Problems     Diagnosis Date Noted    Closed displaced intertrochanteric fracture of left femur 02/07/2018    SANYA (acute kidney injury) 02/07/2018    HTN (hypertension) 02/07/2018    Acute blood loss anemia 02/10/2018    Leukocytosis 02/10/2018    Drug-induced constipation 02/12/2018    Stage 3b chronic kidney disease 09/17/2022    COVID-19 virus infection 09/17/2022    Anemia due to chronic kidney disease 09/17/2022    Acute on chronic diastolic CHF (congestive heart failure) 09/18/2022    Severe aortic valve  stenosis 09/18/2022    Stage 3 chronic kidney disease 09/19/2022    E. 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 01/12/2023    (HFpEF) heart failure with preserved ejection fraction 01/13/2023    Obesity (BMI 30-39.9) 01/13/2023    Hypokalemia 01/13/2023    Acute UTI (urinary tract infection) 01/15/2023    Cough 01/15/2023    Nocturnal hypoxemia 01/15/2023    Hypophosphatemia 01/16/2023    Respiratory distress 11/11/2024    CHF (congestive heart failure) 11/11/2024    Diastolic CHF, chronic 11/12/2024    Acute hypoxic respiratory failure 11/12/2024    Sepsis due to pneumonia 11/12/2024    Pneumonia, unspecified organism 11/15/2024    Aortic stenosis 02/04/2025    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    Acute kidney injury 03/07/2025     Resolved Ambulatory Problems     Diagnosis Date Noted    Hyperkalemia 02/07/2018    Acute hypoxemic respiratory failure due to COVID-19 09/17/2022     Past Medical History:   Diagnosis Date    Aortic valve stenosis     Arthritis     Cancer 1999    Chronic kidney disease     Heart murmur     History of breast cancer     History of carotid artery disease     History of radiation therapy     Hyperlipidemia     Hypertension     Low back pain          PAST SURGICAL HISTORY  Past Surgical History:   Procedure Laterality Date    AORTIC VALVE REPAIR/REPLACEMENT N/A 2/4/2025    Procedure: TTE TRANSFEMORAL TRANSCATHETER AORTIC VALVE REPLACEMENT PERCUTANEOUS APPROACH;  Surgeon: Ebenezer Albert MD;  Location: [facility];  Service: Cardiothoracic;  Laterality: N/A;    AORTIC VALVE REPAIR/REPLACEMENT N/A 2/4/2025    Procedure: Transfemoral Transcatheter Aortic Valve Replacement with intraoperative transthoracic echocardiogram and possible open surgical rescue;  Surgeon: Mike Rizvi MD;  Location: [facility];  Service: Cardiovascular;  Laterality: N/A;    BREAST LUMPECTOMY  1998    CARDIAC CATHETERIZATION N/A 11/8/2024     Procedure: Right and Left Heart Cath;  Surgeon: Mike Rizvi MD;  Location:  [facility];  Service: Cardiology;  Laterality: N/A;    CARDIAC CATHETERIZATION N/A 2024    Procedure: Coronary angiography;  Surgeon: Mike Rizvi MD;  Location:  [facility];  Service: Cardiology;  Laterality: N/A;    CAROTID ARTERY ANGIOPLASTY  ,     CHOLECYSTECTOMY  1979    FEMUR IM NAILING/RODDING Left 2018    Procedure: FEMUR INTRAMEDULLARY NAILING;  Surgeon: Zheng Fleming MD;  Location: [facility] MAIN OR;  Service:     HYSTERECTOMY           FAMILY HISTORY  Family History   Problem Relation Age of Onset    Hypertension Mother     No Known Problems Father     Heart valve disorder Sister     Lung cancer Sister     Lymphoma Sister     Skin cancer Sister     Stroke Brother 57    Malig Hyperthermia Neg Hx          SOCIAL HISTORY  Social History     Socioeconomic History    Marital status:    Tobacco Use    Smoking status: Former     Current packs/day: 0.00     Types: Cigarettes     Start date:      Quit date:      Years since quittin.3     Passive exposure: Past    Smokeless tobacco: Never    Tobacco comments:     caffeine use- coffee   Vaping Use    Vaping status: Never Used   Substance and Sexual Activity    Alcohol use: Yes     Comment: VERY RARE    Drug use: Never    Sexual activity: Defer         ALLERGIES  Nsaids      REVIEW OF SYSTEMS  Review of Systems  Included in HPI  All systems reviewed and negative except for those discussed in HPI.      PHYSICAL EXAM    I have reviewed the triage vital signs and nursing notes.    ED Triage Vitals   Temp Heart Rate Resp BP SpO2   25 1017 25 1017 25 1017 25 1022 25 1017   97.7 °F (36.5 °C) 66 16 177/63 96 %      Temp src Heart Rate Source Patient Position BP Location FiO2 (%)   -- -- -- -- --              Physical Exam  GENERAL: alert, no acute distress  SKIN: Warm,  dry  HENT: Normocephalic, atraumatic  EYES: no scleral icterus  CV: regular rhythm, regular rate  RESPIRATORY: normal effort, lungs clear  ABDOMEN: nondistended nontender hyperactive bowel sounds no guarding or rigidity  MUSCULOSKELETAL: no deformity  NEURO: alert, moves all extremities, follows commands            LAB RESULTS  Recent Results (from the past 24 hours)   Comprehensive Metabolic Panel    Collection Time: 04/23/25 11:33 AM    Specimen: Arm, Left; Blood   Result Value Ref Range    Glucose 102 (H) 65 - 99 mg/dL    BUN 34 (H) 8 - 23 mg/dL    Creatinine 1.90 (H) 0.57 - 1.00 mg/dL    Sodium 132 (L) 136 - 145 mmol/L    Potassium 3.9 3.5 - 5.2 mmol/L    Chloride 94 (L) 98 - 107 mmol/L    CO2 24.0 22.0 - 29.0 mmol/L    Calcium 8.5 (L) 8.6 - 10.5 mg/dL    Total Protein 7.0 6.0 - 8.5 g/dL    Albumin 3.7 3.5 - 5.2 g/dL    ALT (SGPT) 8 1 - 33 U/L    AST (SGOT) 12 1 - 32 U/L    Alkaline Phosphatase 65 39 - 117 U/L    Total Bilirubin 0.3 0.0 - 1.2 mg/dL    Globulin 3.3 gm/dL    A/G Ratio 1.1 g/dL    BUN/Creatinine Ratio 17.9 7.0 - 25.0    Anion Gap 14.0 5.0 - 15.0 mmol/L    eGFR 25.0 (L) >60.0 mL/min/1.73   Lipase    Collection Time: 04/23/25 11:33 AM    Specimen: Arm, Left; Blood   Result Value Ref Range    Lipase 11 (L) 13 - 60 U/L   Lactic Acid, Plasma    Collection Time: 04/23/25 11:33 AM    Specimen: Arm, Left; Blood   Result Value Ref Range    Lactate 0.8 0.5 - 2.0 mmol/L   Green Top (Gel)    Collection Time: 04/23/25 11:33 AM   Result Value Ref Range    Extra Tube Hold for add-ons.    Lavender Top    Collection Time: 04/23/25 11:33 AM   Result Value Ref Range    Extra Tube hold for add-on    Gold Top - SST    Collection Time: 04/23/25 11:33 AM   Result Value Ref Range    Extra Tube Hold for add-ons.    Light Blue Top    Collection Time: 04/23/25 11:33 AM   Result Value Ref Range    Extra Tube Hold for add-ons.    CBC Auto Differential    Collection Time: 04/23/25 11:33 AM    Specimen: Arm, Left; Blood   Result  Value Ref Range    WBC 7.37 3.40 - 10.80 10*3/mm3    RBC 3.34 (L) 3.77 - 5.28 10*6/mm3    Hemoglobin 9.6 (L) 12.0 - 15.9 g/dL    Hematocrit 29.2 (L) 34.0 - 46.6 %    MCV 87.4 79.0 - 97.0 fL    MCH 28.7 26.6 - 33.0 pg    MCHC 32.9 31.5 - 35.7 g/dL    RDW 13.2 12.3 - 15.4 %    RDW-SD 42.1 37.0 - 54.0 fl    MPV 9.5 6.0 - 12.0 fL    Platelets 384 140 - 450 10*3/mm3    Neutrophil % 69.1 42.7 - 76.0 %    Lymphocyte % 13.0 (L) 19.6 - 45.3 %    Monocyte % 15.2 (H) 5.0 - 12.0 %    Eosinophil % 1.9 0.3 - 6.2 %    Basophil % 0.5 0.0 - 1.5 %    Immature Grans % 0.3 0.0 - 0.5 %    Neutrophils, Absolute 5.09 1.70 - 7.00 10*3/mm3    Lymphocytes, Absolute 0.96 0.70 - 3.10 10*3/mm3    Monocytes, Absolute 1.12 (H) 0.10 - 0.90 10*3/mm3    Eosinophils, Absolute 0.14 0.00 - 0.40 10*3/mm3    Basophils, Absolute 0.04 0.00 - 0.20 10*3/mm3    Immature Grans, Absolute 0.02 0.00 - 0.05 10*3/mm3    nRBC 0.0 0.0 - 0.2 /100 WBC         RADIOLOGY  CT Abdomen Pelvis Without Contrast  Result Date: 4/23/2025  CT ABDOMEN PELVIS WO CONTRAST-  INDICATION: Abdominal pain, nausea, vomiting, diarrhea  COMPARISON: CT abdomen pelvis April 20, 2025  TECHNIQUE: Routine CT abdomen and pelvis without IV contrast. Coronal and sagittal reformats. Radiation dose reduction techniques were utilized, including automated exposure control and exposure modulation based on body size.  FINDINGS:  Lung bases: Trace left pleural effusion. Smooth interlobular septal thickening seen at the lung bases. Subsegmental atelectasis at the bases. Coronary artery atherosclerotic calcifications. Mild cardiomegaly. TAVR. Small amount of pericardial fluid.  ABDOMEN: Calcifications in the liver and spleen, consistent with prior granulomatous infection. Cholecystectomy. No biliary ductal dilatation. Spleen is normal in size. Mild pancreatic atrophy. No pancreatic ductal dilatation or mass seen. No adrenal nodules. Symmetric perinephric edema. Suspect a small cyst in the left mid kidney.  No urolithiasis or hydronephrosis.  Pelvis: Streak artifact from left hip open reduction internal fixation limits evaluation of the pelvis. Underdistended bladder. No bladder calculus. Masslike thickening seen along the dome of the bladder, series 2, axial mage 107, measuring approximately 4.8 x 4.4 x 2.6 cm, series 4, sagittal mage 103, containing a focus of gas along the anterior superior margin, series 2, axial mage 104, with surrounding inflammatory changes extending to the adjacent sigmoid colon. Mild free fluid seen in the cul-de-sac, extends into the adnexa and into the right paracolic gutter. Small amount of free fluid seen around the liver and in the left paracolic gutter.  Bowel: Small hiatal hernia. Stomach is under distended. Rectum is collapsed. Colonic diverticulosis. Suggestion of wall thickening in the mid/distal sigmoid colon, with the bowel appearing collapsed and with some adjacent stranding and some ill-defined adjacent fluid present. Colon upstream from the sigmoid colon appears moderately distended and containing loose stool. Areas of colonic pneumatosis versus gas between colonic bowel contents and the colonic wall. For example, pneumatosis versus gas between colonic contents and colonic wall at the splenic flecture, series 2, axial mage 26. Similarly, pneumatosis versus gas between colonic contents and bowel wall at the hepatic flexure of the colon, series 2, axial mage 47. Appendix not identified. Prominent gas and fluid-filled small bowel loops. For example, prominent gas and fluid-filled small bowel loop in the midabdomen, series 2, axial mage 55, measuring 2.7 cm. Distal bowel does not appear collapsed. No transition point seen. Mild wall thickening in a short loop of small bowel seen in the anterior pelvis, series 2, axial mage 90.  Abdominal wall: Rectus diastases. Tiny fat-containing umbilical hernia. Periumbilical abdominal wall scarring.  Retroperitoneum: No lymphadenopathy.   Vasculature: Severe aortoiliac atherosclerotic calcification. No abdominal aortic aneurysm.  Osseous structures: Open reduction internal fixation of the left femur with intramedullary nail and interlocking femoral neck dynamic compression screw. Old left superior and inferior pubic rami fractures. Old H-shaped sacral insufficiency fracture. Old inferior plate compression deformity seen at T12, with 40% collapse, stable. Severe spondylosis/degenerative disc disease seen at L2/L3 and L4/L5. Lumbar facet degenerative arthropathy.       1. Large amount of loose appearing stool seen in the colon with moderate distention, with transition seen in the sigmoid colon which appears collapsed. Associated masslike appearance to the bladder dome with surrounding inflammation and containing a foci of gas with inflammation extending to the adjacent sigmoid colon. Overall findings may indicate a distal colonic obstruction which could be due to a sigmoid colonic stricture or mass with a potential colovesical fistula and phlegmonous change along the bladder dome versus tumor. 2. Areas of pneumatosis at the hepatic flexure and splenic flexure of the colon versus gas positioned between the colonic contents and the bowel wall. Recommend correlation with lactic acid levels. 3. Prominent gas and fluid-filled small bowel loops, without a small bowel obstructive pattern. Finding may be related to a distal colonic obstruction or ileus. In addition, mild wall thickening in a small bowel loop seen in the anterior pelvis. 4. Mild free fluid in the abdomen and pelvis, new. 5. Trace left pleural effusion, new. 6. Interlobular septal thickening at the lung bases. Suspect interstitial pulmonary edema.  Call Report: Dr. Shepard was notified by telephone of the above findings on April 23, 2025 at 2:04 p.m.    This report was finalized on 4/23/2025 2:08 PM by Dr. Jamel Viera M.D on Workstation: [id number]          MEDICATIONS GIVEN IN  ER  Medications   sodium chloride 0.9 % flush 10 mL (has no administration in time range)   hydrALAZINE (APRESOLINE) tablet 100 mg (100 mg Oral Given 4/23/25 1550)   lactated ringers bolus 500 mL (0 mL Intravenous Stopped 4/23/25 1306)   ondansetron (ZOFRAN) injection 4 mg (4 mg Intravenous Given 4/23/25 1129)   morphine injection 2 mg (2 mg Intravenous Given 4/23/25 1130)   morphine injection 2 mg (2 mg Intravenous Given 4/23/25 1405)         ORDERS PLACED DURING THIS VISIT:  Orders Placed This Encounter   Procedures    Clostridioides difficile Toxin - Stool, Per Rectum    Gastrointestinal Panel, PCR - Stool, Per Rectum    Clostridioides difficile Toxin, PCR - Stool, Per Rectum    CT Abdomen Pelvis Without Contrast    FL Barium Enema Water Soluble Single Contrast    [place] Draw    Comprehensive Metabolic Panel    Lipase    Urinalysis With Microscopic If Indicated (No Culture) - Urine, Clean Catch    Lactic Acid, Plasma    CBC Auto Differential    NPO Diet NPO Type: Strict NPO    Undress & Gown    Surgery (on-call MD unless specified)    [facility] (on-call MD unless specified) Details    Patient Isolation Contact Spore    Insert Peripheral IV    Inpatient Admission    CBC & Differential    Green Top (Gel)    Lavender Top    Gold Top - SST    Light Blue Top         OUTPATIENT MEDICATION MANAGEMENT:  Current Facility-Administered Medications Ordered in Epic   Medication Dose Route Frequency Provider Last Rate Last Admin    hydrALAZINE (APRESOLINE) tablet 100 mg  100 mg Oral Q8H Lidia Reyes MD   100 mg at 04/23/25 1550    sodium chloride 0.9 % flush 10 mL  10 mL Intravenous PRN Louis Conde MD         Current Outpatient Medications Ordered in Epic   Medication Sig Dispense Refill    acetaminophen (TYLENOL) 500 MG tablet Take 2 tablets by mouth Every 6 (Six) Hours As Needed for Mild Pain or Moderate Pain.      alendronate (FOSAMAX) 70 MG tablet Take 1 tablet by mouth Every 7 (Seven) Days. Saturday       amLODIPine (NORVASC) 5 MG tablet Take 1 tablet by mouth Daily. 30 tablet 11    aspirin 81 MG tablet Take 1 tablet by mouth Daily. INSTRUCTED PT TO FOLLOW MD INSTRUCTIONS REGARDING HOLDING FOR SURGERY      Calcium-Magnesium-Vitamin D (CALCIUM MAGNESIUM PO) Take 1 tablet by mouth Daily. HOLD PRIOR TO SURG      carvedilol (COREG) 12.5 MG tablet Take 1 tablet by mouth 2 (Two) Times a Day. (Patient taking differently: Take 1 tablet by mouth 3 (Three) Times a Day As Needed.) 60 tablet 11    cholecalciferol (VITAMIN D3) 1000 units tablet Take 1 tablet by mouth Daily. HOLD PRIOR TO SURG      citalopram (CeleXA) 10 MG tablet Take 1 tablet by mouth Daily.      D-MANNOSE PO Take  by mouth. HOLD PRIOR TO SURG      doxazosin (Cardura) 1 MG tablet Take 1 tablet by mouth Every Night. 30 tablet 1    doxazosin (CARDURA) 2 MG tablet Take 1 tablet by mouth Every Night.      hydrALAZINE (APRESOLINE) 100 MG tablet Take 1 tablet by mouth 3 (Three) Times a Day As Needed (SBP >150). Indications: High Blood Pressure 180 tablet 3    lidocaine (Lidoderm) 5 % Place 1 patch on the skin as directed by provider Daily As Needed for Mild Pain. Remove & Discard patch within 12 hours or as directed by MD 5 patch 0    multivitamin with minerals tablet tablet Take 1 tablet by mouth Daily. HOLD PRIOR TO SURG      ondansetron ODT (ZOFRAN-ODT) 4 MG disintegrating tablet Take 1 tablet by mouth 4 (Four) Times a Day As Needed for Nausea or Vomiting. 16 tablet 0    Polyethylene Glycol 3350 (CLEARLAX PO) Take  by mouth.      pravastatin (PRAVACHOL) 40 MG tablet Take 1 tablet by mouth Every Night.      Probiotic Product (PROBIOTIC DAILY PO) Take  by mouth. HOLD PRIOR TO SURG      tiZANidine (ZANAFLEX) 2 MG tablet Take 1 tablet by mouth Every 6 (Six) Hours As Needed for Muscle Spasms.      torsemide (DEMADEX) 20 MG tablet Take 1 tablet by mouth Daily.      VITAMIN D-VITAMIN K PO Take  by mouth. HOLD PRIOR TO SURG            PROCEDURES  Procedures            PROGRESS, DATA ANALYSIS, CONSULTS, AND MEDICAL DECISION MAKING  All labs have been independently interpreted by me.  All radiology studies have been reviewed by me. All EKG's have been independently viewed and interpreted by me.  Discussion below represents my analysis of pertinent findings related to patient's condition, differential diagnosis, treatment plan and final disposition.    DIFFERENTIAL    Differential diagnosis includes but is not limited to:  - hepatobiliary pathology such as cholecystitis, cholangitis, and symptomatic cholelithiasis  - Pancreatitis  - Dyspepsia  - Small bowel obstruction  - Appendicitis  - Diverticulitis  - UTI including pyelonephritis  - Ureteral stone  - Zoster  - Colitis, including infectious and ischemic  - Atypical ACS    Clinical Scores:                  ED Course as of 04/23/25 1604   Wed Apr 23, 2025   1150 WBC: 7.37 [KA]   1150 Hemoglobin(!): 9.6  Stable chronic anemia [KA]   1400 Glucose(!): 102 [KA]   1400 Creatinine(!): 1.90  Stable, improved from prior [KA]   1402 I discussed the patient with Dr. Viera, radiologist. CT pelvis demonstrates fluid and inflammation around the sigmoid colon and extends to the bladder dome with a tiny focus of extraluminal air in the area. Concern for mass, possible colovesicular fistula.   last week I had an extra computer [KA]   1422 Discussed the patient with Dr. Franco, general surgery.  He was reviewed the patient's images.  Does not recommend any antibiotics at this time, does recommend the patient be kept NPO admitted to the hospitalist, he will consult [KA]   1440 I discussed the patient with Dr. Jones, hospitalist including history presentation workup and she agrees to admit [KA]      ED Course User Index  [KA] Ruth Shepard PA-C             AS OF 16:04 EDT VITALS:    BP - (!) 193/69  HR - 72  TEMP - 97.7 °F (36.5 °C)  O2 SATS - 92%    COMPLEXITY OF CARE  The patient requires  admission.      DIAGNOSIS  Final diagnoses:   Pain of upper abdomen   Nausea vomiting and diarrhea   Colitis         DISPOSITION  ED Disposition       ED Disposition   Decision to Admit    Condition   --    Comment   Level of Care: Telemetry [5]   Diagnosis: Abdominal pain [[id number]]   Admitting Physician: KELLEN NAIR [[id number]]   Attending Physician: KELLEN NAIR [[id number]]   Certification: I Certify That Inpatient Hospital Services Are Medically Necessary For Greater Than 2 Midnights                              Please note that portions of this document were completed with a voice recognition program.    Note Disclaimer: At [facility], we believe that sharing information builds trust and better relationships. You are receiving this note because you recently visited [facility]. It is possible you will see health information before a provider has talked with you about it. This kind of information can be easy to misunderstand. To help you fully understand what it means for your health, we urge you to discuss this note with your provider.         Ruth Shepard PA-C  04/23/25 [id number]

## 2025-04-23 NOTE — H&P
PCP: Ambrose Ashley MD    Chief complaint   Chief Complaint   Patient presents with    Abdominal Pain    Vomiting    Nausea    Diarrhea       HPI  Patient is a 89 y.o. female presents with a history of diastolic heart failure, anemia secondary to CKD, hypertension, history of TAVR 2 mo ago, CKD 3B who presents to the ER secondary to nausea vomiting and severe abdominal pain.  Patient has had severe abdominal pain that was excruciating, mid abdomen little bit to the left, with diarrhea, no fevers or chills.  Patient states lives by herself uses a walker.  CT of the abdomen today shows a possible fistula between bladder and colon.    Most recent colonoscopy: More than a decade ago   Of note previous CT scan showed something similar  CT Jan 2023   1. Wall thickening of the left side of the dome of the bladder. There is  adjacent sigmoid diverticulosis without evidence of active  diverticulitis. There is a tract of tissue bridging the bladder and the  adjacent sigmoid colon as well as a tiny extraluminal gas bubble  adjacent to the bladder wall. These findings may reflect sequela of  recent episode of diverticulitis. A colovesical fistula cannot be  excluded given this appearance.    Few days ago son called nephrology and clarified ecrease in urin output, shortness of breath, swelling of legs and ankles , as well as high bp. Pt's son states bp has been running over 200 diastolic mostly in the morning and after taking meds It will go down to around 170-180.   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 01/12/2023    Heart murmur     History of breast cancer     1998    History of carotid artery disease      History of radiation therapy     Hyperlipidemia     Hypertension     Low back pain     FROM FALL IN [employer] [address] 1/24/2025    Stage 3b chronic kidney disease 09/17/2022       PAST SURGICAL HISTORY  Past Surgical History:   Procedure Laterality Date    AORTIC VALVE REPAIR/REPLACEMENT N/A 2/4/2025    Procedure: TTE TRANSFEMORAL TRANSCATHETER AORTIC VALVE REPLACEMENT PERCUTANEOUS APPROACH;  Surgeon: Ebenezer Albert MD;  Location: [facility];  Service: Cardiothoracic;  Laterality: N/A;    AORTIC VALVE REPAIR/REPLACEMENT N/A 2/4/2025    Procedure: Transfemoral Transcatheter Aortic Valve Replacement with intraoperative transthoracic echocardiogram and possible open surgical rescue;  Surgeon: Mike Rizvi MD;  Location: [facility];  Service: Cardiovascular;  Laterality: N/A;    BREAST LUMPECTOMY  1998    CARDIAC CATHETERIZATION N/A 11/8/2024    Procedure: Right and Left Heart Cath;  Surgeon: Mike Rizvi MD;  Location: [facility] CATH INVASIVE LOCATION;  Service: Cardiology;  Laterality: N/A;    CARDIAC CATHETERIZATION N/A 11/8/2024    Procedure: Coronary angiography;  Surgeon: Mike Rizvi MD;  Location: [facility] CATH INVASIVE LOCATION;  Service: Cardiology;  Laterality: N/A;    CAROTID ARTERY ANGIOPLASTY  2000, 2013    CHOLECYSTECTOMY  1979    FEMUR IM NAILING/RODDING Left 2/8/2018    Procedure: FEMUR INTRAMEDULLARY NAILING;  Surgeon: Zheng Fleming MD;  Location: [facility] OR;  Service:     HYSTERECTOMY  1989   Surgical history includes bilateral something oophorectomy and hysterectomy as well as cholecystectomy.     FAMILY HISTORY  Family History   Problem Relation Age of Onset    Hypertension Mother     No Known Problems Father     Heart valve disorder Sister     Lung cancer Sister     Lymphoma Sister     Skin cancer Sister     Stroke Brother 57    Malig Hyperthermia Neg Hx        SOCIAL HISTORY  Social History     Tobacco Use    Smoking status: Former     Current packs/day: 0.00      Types: Cigarettes     Start date:      Quit date:      Years since quittin.3     Passive exposure: Past    Smokeless tobacco: Never    Tobacco comments:     caffeine use- coffee   Vaping Use    Vaping status: Never Used   Substance Use Topics    Alcohol use: Yes     Comment: VERY RARE    Drug use: Never       MEDICATIONS:  Medications Prior to Admission   Medication Sig Dispense Refill Last Dose/Taking    acetaminophen (TYLENOL) 500 MG tablet Take 2 tablets by mouth Every 6 (Six) Hours As Needed for Mild Pain or Moderate Pain.   2025    alendronate (FOSAMAX) 70 MG tablet Take 1 tablet by mouth Every 7 (Seven) Days. Saturday   Past Week    aspirin 81 MG tablet Take 1 tablet by mouth Daily. INSTRUCTED PT TO FOLLOW MD INSTRUCTIONS REGARDING HOLDING FOR SURGERY   2025    Calcium-Magnesium-Vitamin D (CALCIUM MAGNESIUM PO) Take 1 tablet by mouth Daily. HOLD PRIOR TO SURG   2025    carvedilol (COREG) 12.5 MG tablet Take 1 tablet by mouth 2 (Two) Times a Day. (Patient taking differently: Take 1 tablet by mouth 3 times a day.) 60 tablet 11 2025    cholecalciferol (VITAMIN D3) 1000 units tablet Take 1 tablet by mouth 3 (Three) Times a Week. HOLD PRIOR TO SURG Patient is asked to monitor BP at home or work, several times per month and return with written values at next office visit. 2025    citalopram (CeleXA) 10 MG tablet Take 1 tablet by mouth Daily.   2025    D-MANNOSE PO Take 1,000 mg by mouth 2 (Two) Times a Day. HOLD PRIOR TO SURG   2025    doxazosin (Cardura) 1 MG tablet Take 1 tablet by mouth Every Night. (Patient taking differently: Take 4 tablets by mouth Every Night.) 30 tablet 1 2025    hydrALAZINE (APRESOLINE) 100 MG tablet Take 1 tablet by mouth 3 (Three) Times a Day As Needed (SBP >150). Indications: High Blood Pressure 180 tablet 3 2025    multivitamin with minerals tablet tablet Take 1 tablet by mouth Daily. HOLD PRIOR TO SURG    "4/23/2025    pravastatin (PRAVACHOL) 40 MG tablet Take 1 tablet by mouth Every Night.   4/22/2025    torsemide (DEMADEX) 20 MG tablet Take 1 tablet by mouth Daily.   4/23/2025    VITAMIN D-VITAMIN K PO Take  by mouth. HOLD PRIOR TO SURG   4/23/2025    amLODIPine (NORVASC) 5 MG tablet Take 1 tablet by mouth Daily. 30 tablet 11     doxazosin (CARDURA) 2 MG tablet Take 2 tablets by mouth Every Night.       lidocaine (Lidoderm) 5 % Place 1 patch on the skin as directed by provider Daily As Needed for Mild Pain. Remove & Discard patch within 12 hours or as directed by MD 5 patch 0     ondansetron ODT (ZOFRAN-ODT) 4 MG disintegrating tablet Take 1 tablet by mouth 4 (Four) Times a Day As Needed for Nausea or Vomiting. 16 tablet 0     Polyethylene Glycol 3350 (CLEARLAX PO) Take  by mouth.   More than a month    Probiotic Product (PROBIOTIC DAILY PO) Take  by mouth. HOLD PRIOR TO SURG   More than a month    tiZANidine (ZANAFLEX) 2 MG tablet Take 1 tablet by mouth Every 6 (Six) Hours As Needed for Muscle Spasms.   More than a month       Allergies:  Nsaids    Review of Systems:  Fatigue, elevated blood pressure, weight loss,        Vital Signs  Temp:  [97.6 °F (36.4 °C)-97.7 °F (36.5 °C)] 97.6 °F (36.4 °C)  Heart Rate:  [65-73] 72  Resp:  [16-19] 18  BP: (160-193)/(54-77) 193/69  Flowsheet Rows      Flowsheet Row First Filed Value   Admission Height 154.9 cm (60.98\") Documented at 04/23/2025 1157   Admission Weight 64.4 kg (141 lb 15.6 oz) Documented at 04/23/2025 1157           Body mass index is 26.84 kg/m².      Physical Exam:  General Appearance:    Alert, cooperative, in no acute distress, chronically ill-appearing   Head:    Normocephalic, without obvious abnormality, atraumatic   Eyes:         conjunctivae and sclerae normal, no icterus, PERRLA   ENT:    Ears grossly intact, oral mucosa moist,   Neck:   No adenopathy, supple, trachea midline,        Lungs:     Clear to auscultation,respirations regular, even and        "            unlabored    Heart:    Regular rhythm and normal rate,  no murmur, normal S1 and S2,   Abdomen:     Normal bowel sounds, no masses,  soft slightly tender to palpation diffusely but little bit more over the left side-tender, non-distended, obese   Extremities:   Moves all extremities well, no cyanosis, no edema,             Pulses:   Pulses palpable and equal bilaterally   Skin:   No bleeding, rash, bruising    Neurologic:    Psych:   Cranial nerves 2 - 12 grossly intact, sensation grossly intact,     Moves all extremities well, equal bilateral strength 4/5    Alert and Oriented x 3, Normal Affect    I washed/sanitized my hands before entering the room and immediately upon leaving the room.    LABS:  Admission on 04/23/2025   Component Date Value Ref Range Status    Glucose 04/23/2025 102 (H)  65 - 99 mg/dL Final    BUN 04/23/2025 34 (H)  8 - 23 mg/dL Final    Creatinine 04/23/2025 1.90 (H)  0.57 - 1.00 mg/dL Final    Sodium 04/23/2025 132 (L)  136 - 145 mmol/L Final    Potassium 04/23/2025 3.9  3.5 - 5.2 mmol/L Final    Chloride 04/23/2025 94 (L)  98 - 107 mmol/L Final    CO2 04/23/2025 24.0  22.0 - 29.0 mmol/L Final    Calcium 04/23/2025 8.5 (L)  8.6 - 10.5 mg/dL Final    Total Protein 04/23/2025 7.0  6.0 - 8.5 g/dL Final    Albumin 04/23/2025 3.7  3.5 - 5.2 g/dL Final    ALT (SGPT) 04/23/2025 8  1 - 33 U/L Final    AST (SGOT) 04/23/2025 12  1 - 32 U/L Final    Alkaline Phosphatase 04/23/2025 65  39 - 117 U/L Final    Total Bilirubin 04/23/2025 0.3  0.0 - 1.2 mg/dL Final    Globulin 04/23/2025 3.3  gm/dL Final    A/G Ratio 04/23/2025 1.1  g/dL Final    BUN/Creatinine Ratio 04/23/2025 17.9  7.0 - 25.0 Final    Anion Gap 04/23/2025 14.0  5.0 - 15.0 mmol/L Final    eGFR 04/23/2025 25.0 (L)  >60.0 mL/min/1.73 Final    Lipase 04/23/2025 11 (L)  13 - 60 U/L Final    Lactate 04/23/2025 0.8  0.5 - 2.0 mmol/L Final    Extra Tube 04/23/2025 Hold for add-ons.   Final    Auto resulted.    Extra Tube 04/23/2025 hold  for add-on   Final    Auto resulted    Extra Tube 04/23/2025 Hold for add-ons.   Final    Auto resulted.    Extra Tube 04/23/2025 Hold for add-ons.   Final    Auto resulted    WBC 04/23/2025 7.37  3.40 - 10.80 10*3/mm3 Final    RBC 04/23/2025 3.34 (L)  3.77 - 5.28 10*6/mm3 Final    Hemoglobin 04/23/2025 9.6 (L)  12.0 - 15.9 g/dL Final    Hematocrit 04/23/2025 29.2 (L)  34.0 - 46.6 % Final    MCV 04/23/2025 87.4  79.0 - 97.0 fL Final    MCH 04/23/2025 28.7  26.6 - 33.0 pg Final    MCHC 04/23/2025 32.9  31.5 - 35.7 g/dL Final    RDW 04/23/2025 13.2  12.3 - 15.4 % Final    RDW-SD 04/23/2025 42.1  37.0 - 54.0 fl Final    MPV 04/23/2025 9.5  6.0 - 12.0 fL Final    Platelets 04/23/2025 384  140 - 450 10*3/mm3 Final    Neutrophil % 04/23/2025 69.1  42.7 - 76.0 % Final    Lymphocyte % 04/23/2025 13.0 (L)  19.6 - 45.3 % Final    Monocyte % 04/23/2025 15.2 (H)  5.0 - 12.0 % Final    Eosinophil % 04/23/2025 1.9  0.3 - 6.2 % Final    Basophil % 04/23/2025 0.5  0.0 - 1.5 % Final    Immature Grans % 04/23/2025 0.3  0.0 - 0.5 % Final    Neutrophils, Absolute 04/23/2025 5.09  1.70 - 7.00 10*3/mm3 Final    Lymphocytes, Absolute 04/23/2025 0.96  0.70 - 3.10 10*3/mm3 Final    Monocytes, Absolute 04/23/2025 1.12 (H)  0.10 - 0.90 10*3/mm3 Final    Eosinophils, Absolute 04/23/2025 0.14  0.00 - 0.40 10*3/mm3 Final    Basophils, Absolute 04/23/2025 0.04  0.00 - 0.20 10*3/mm3 Final    Immature Grans, Absolute 04/23/2025 0.02  0.00 - 0.05 10*3/mm3 Final    nRBC 04/23/2025 0.0  0.0 - 0.2 /100 WBC Final         DIAGNOSTICS:  CT Abdomen Pelvis Without Contrast  Result Date: 4/23/2025   1. Large amount of loose appearing stool seen in the colon with moderate distention, with transition seen in the sigmoid colon which appears collapsed. Associated masslike appearance to the bladder dome with surrounding inflammation and containing a foci of gas with inflammation extending to the adjacent sigmoid colon. Overall findings may indicate a distal  colonic obstruction which could be due to a sigmoid colonic stricture or mass with a potential colovesical fistula and phlegmonous change along the bladder dome versus tumor. 2. Areas of pneumatosis at the hepatic flexure and splenic flexure of the colon versus gas positioned between the colonic contents and the bowel wall. Recommend correlation with lactic acid levels. 3. Prominent gas and fluid-filled small bowel loops, without a small bowel obstructive pattern. Finding may be related to a distal colonic obstruction or ileus. In addition, mild wall thickening in a small bowel loop seen in the anterior pelvis. 4. Mild free fluid in the abdomen and pelvis, new. 5. Trace left pleural effusion, new. 6. Interlobular septal thickening at the lung bases. Suspect interstitial pulmonary edema.  Call Report: Dr. Shepard was notified by telephone of the above findings on April 23, 2025 at 2:04 p.m.    This report was finalized on 4/23/2025 2:08 PM by Dr. Jamel Viera M.D on Workstation: [id number]      Trace left pleural effusion. Smooth interlobular septal  thickening seen at the lung bases. Subsegmental atelectasis at the  bases.       Results Review:   I reviewed the patient's new clinical results.  Discussed with ER physician  Old records reviewed / Medical Decision Making High Complexity  I personally viewed and interpreted the patient's EKG/Telemetry data- sinus rhythm      ASSESSMENT AND PLAN    Abdominal pain    HTN (hypertension)    Stage 3b chronic kidney disease    Anemia due to chronic kidney disease    Colovesical fistula - possible    (HFpEF) heart failure with preserved ejection fraction    Aortic stenosis    S/P TAVR (transcatheter aortic valve replacement)    Hypertensive urgency    Abnormal weight loss    Hypertensive urgency with difficult to control blood pressure   - Appears that they have been trying to get blood pressure under control for a while.  Her recent note looks like Coreg's only twice  daily not 3 times daily.  But that may have been recently changed.  Also on hydralazine 100 3 times daily  - Appears that patient has nephrotic we will get renal to evaluate    abdominal pain with nausea vomiting and diarrhea  -Differential diagnosis is infectious versus inflammation/autoimmune versus ischemic  - GI panel and C. difficile panel pending  -keep npo  - Consulted surgery       Associated masslike appearance to the bladder dome with surrounding inflammation and containing a foci of gas with inflammation extending to the adjacent sigmoid colon. Overall findings may indicate a distal colonic obstruction which could be due to a sigmoid colonic stricture or mass with a potential colovesical fistula and phlegmonous change along the bladder dome versus tumor.   -Abnormal area seen previously see CT scan scans January 2023    Areas of pneumatosis at the hepatic flexure and splenic flexure of the colon  VS  gas positioned between the colonic contents and the bowel wall.   - lactic acid levels.ok      mild wall thickening in a small bowel loop seen in the anterior pelvis    Trace left pleural effusion/  Interlobular septal thickening at the lung bases. Suspect interstitial pulmonary edema.  -History of CKD 3B and diastolic heart failure,  - Continue medical management       history of TAVR 2 mo ago,  -Continuing to try to get blood pressure control    Weight loss unintentionally  - Patient been in and out of the hospital, get nutrition to evaluate      -History of CKD 3B which is slightly worsened.  Although the patient's had nausea vomiting and diarrhea and I would have thought was more hypovolemic her CT of the chest shows some hypervolemia therefore we will just keep her diuretics at the same dose and will defer to renal      Chronic medical conditions being monitored- stable, continue medical management  -Anemia of chronic disease      +DVT PROPH:    Lovenox 30  + CODE STATUS: dnr        I discussed the  patient's findings and my recommendations with the patient and/or family.  Please reference all orders placed.    Lidia Reyes MD  04/23/25  17:34 EDT      This document is intended for medical expert use only. Reading of this document by patients and/or patient's family without participating in medical staff guidance may result in misinterpretation and unintended morbidity. Any interpretation of such data is the responsibility of the patient and/or family member responsible for the patient in concert with their primary or specialist providers, and NOT to be left for sources of online searches such as [employer], [employer] or similar queries. Relying on these approaches to knowledge may result in misinterpretation, misguided goals of care and even death should patients or family members try recommendations outside of the realm of professional medical care in a supervised way.    Dictated utilizing Voice dictation:  Parts of this note may be an electronic transcription/translation of spoke language to printed text using Dragon dictation system.

## 2025-04-23 NOTE — CONSULTS
Colorectal & General Surgery  Consultation    Patient: Vonda Jay  YOB: 1935  MRN: [id number]      Assessment  Vonda Jay is a 89 y.o. female with extensive past medical history including recent TAVR who presents with abdominal pain.  Clinically, her symptoms sound like gastroenteritis, but CT scan of the abdomen and pelvis demonstrates some abnormalities within the sigmoid colon and the bladder where they almost abut, with some surrounding free fluid.  This is concerning for some kind of inflammatory process either the dome of the bladder or within the sigmoid colon.  There is no overt mass noted and no overt large bowel obstruction.  No air within the bladder to confirm a colovesical fistula.  At this time, her abdominal exam is benign and she has no leukocytosis or tachycardia.  Plan to proceed with water-soluble enema study tomorrow.  Potential colonoscopy Friday versus additional imaging.    For now, I do not think that we need to cover her with antibiotics.  I would keep her on clear liquids for now.    WSBE ordered for tomorrow.    History of Present Illness   Vonda Jay is a 89 y.o. female who presents to the hospital with a few days of mid abdominal pain.  She says became excruciating at home.  She continues to have diarrhea and has vomited a few times.  Otherwise, she denies fevers and chills.  She never had pain like this before.    Surgical history includes bilateral something oophorectomy and hysterectomy as well as cholecystectomy.    Most recent colonoscopy: More than a decade ago    Past Medical History   Past Medical History:   Diagnosis Date    Aortic valve stenosis     Arthritis     Cancer 1999    Breast    Chronic kidney disease     Heart murmur     History of breast cancer     1998    History of carotid artery disease     History of radiation therapy     Hyperlipidemia     Hypertension     Low back pain     FROM FALL IN [facility] LOT 1/24/2025        Past  Surgical History   Past Surgical History:   Procedure Laterality Date    AORTIC VALVE REPAIR/REPLACEMENT N/A 2025    Procedure: TTE TRANSFEMORAL TRANSCATHETER AORTIC VALVE REPLACEMENT PERCUTANEOUS APPROACH;  Surgeon: Ebenezer Albert MD;  Location: [facility];  Service: Cardiothoracic;  Laterality: N/A;    AORTIC VALVE REPAIR/REPLACEMENT N/A 2025    Procedure: Transfemoral Transcatheter Aortic Valve Replacement with intraoperative transthoracic echocardiogram and possible open surgical rescue;  Surgeon: Mike Rizvi MD;  Location: [facility];  Service: Cardiovascular;  Laterality: N/A;    BREAST LUMPECTOMY      CARDIAC CATHETERIZATION N/A 2024    Procedure: Right and Left Heart Cath;  Surgeon: Mike Rizvi MD;  Location: [facility] CATH INVASIVE LOCATION;  Service: Cardiology;  Laterality: N/A;    CARDIAC CATHETERIZATION N/A 2024    Procedure: Coronary angiography;  Surgeon: Mike Rizvi MD;  Location: [facility] CATH INVASIVE LOCATION;  Service: Cardiology;  Laterality: N/A;    CAROTID ARTERY ANGIOPLASTY  ,     CHOLECYSTECTOMY  1979    FEMUR IM NAILING/RODDING Left 2018    Procedure: FEMUR INTRAMEDULLARY NAILING;  Surgeon: Zheng Fleming MD;  Location: [facility] OR;  Service:     HYSTERECTOMY         Social History  Social History     Socioeconomic History    Marital status:    Tobacco Use    Smoking status: Former     Current packs/day: 0.00     Types: Cigarettes     Start date:      Quit date:      Years since quittin.3     Passive exposure: Past    Smokeless tobacco: Never    Tobacco comments:     caffeine use- coffee   Vaping Use    Vaping status: Never Used   Substance and Sexual Activity    Alcohol use: Yes     Comment: VERY RARE    Drug use: Never    Sexual activity: Defer       Family History  Family History   Problem Relation Age of Onset    Hypertension Mother     No Known Problems Father     Heart valve disorder Sister      Lung cancer Sister     Lymphoma Sister     Skin cancer Sister     Stroke Brother 57    Malig Hyperthermia Neg Hx        Colorectal cancer family history: None    Review of Systems  Negative except as documented in the HPI.     Allergies  Allergies   Allergen Reactions    Nsaids Other (See Comments)     Due to kidney's       Medications    Current Facility-Administered Medications:     hydrALAZINE (APRESOLINE) tablet 100 mg, 100 mg, Oral, Q8H, Lidia Reyes MD, 100 mg at 04/23/25 1550    sodium chloride 0.9 % flush 10 mL, 10 mL, Intravenous, PRN, Louis Conde MD    Current Outpatient Medications:     acetaminophen (TYLENOL) 500 MG tablet, Take 2 tablets by mouth Every 6 (Six) Hours As Needed for Mild Pain or Moderate Pain., Disp: , Rfl:     alendronate (FOSAMAX) 70 MG tablet, Take 1 tablet by mouth Every 7 (Seven) Days. Saturday, Disp: , Rfl:     amLODIPine (NORVASC) 5 MG tablet, Take 1 tablet by mouth Daily., Disp: 30 tablet, Rfl: 11    aspirin 81 MG tablet, Take 1 tablet by mouth Daily. INSTRUCTED PT TO FOLLOW MD INSTRUCTIONS REGARDING HOLDING FOR SURGERY, Disp: , Rfl:     Calcium-Magnesium-Vitamin D (CALCIUM MAGNESIUM PO), Take 1 tablet by mouth Daily. HOLD PRIOR TO SURG, Disp: , Rfl:     carvedilol (COREG) 12.5 MG tablet, Take 1 tablet by mouth 2 (Two) Times a Day. (Patient taking differently: Take 1 tablet by mouth 3 (Three) Times a Day As Needed.), Disp: 60 tablet, Rfl: 11    cholecalciferol (VITAMIN D3) 1000 units tablet, Take 1 tablet by mouth Daily. HOLD PRIOR TO SURG, Disp: , Rfl:     citalopram (CeleXA) 10 MG tablet, Take 1 tablet by mouth Daily., Disp: , Rfl:     D-MANNOSE PO, Take  by mouth. HOLD PRIOR TO SURG, Disp: , Rfl:     doxazosin (Cardura) 1 MG tablet, Take 1 tablet by mouth Every Night., Disp: 30 tablet, Rfl: 1    doxazosin (CARDURA) 2 MG tablet, Take 1 tablet by mouth Every Night., Disp: , Rfl:     hydrALAZINE (APRESOLINE) 100 MG tablet, Take 1 tablet by mouth 3 (Three) Times  a Day As Needed (SBP >150). Indications: High Blood Pressure, Disp: 180 tablet, Rfl: 3    lidocaine (Lidoderm) 5 %, Place 1 patch on the skin as directed by provider Daily As Needed for Mild Pain. Remove & Discard patch within 12 hours or as directed by MD, Disp: 5 patch, Rfl: 0    multivitamin with minerals tablet tablet, Take 1 tablet by mouth Daily. HOLD PRIOR TO SURG, Disp: , Rfl:     ondansetron ODT (ZOFRAN-ODT) 4 MG disintegrating tablet, Take 1 tablet by mouth 4 (Four) Times a Day As Needed for Nausea or Vomiting., Disp: 16 tablet, Rfl: 0    Polyethylene Glycol 3350 (CLEARLAX PO), Take  by mouth., Disp: , Rfl:     pravastatin (PRAVACHOL) 40 MG tablet, Take 1 tablet by mouth Every Night., Disp: , Rfl:     Probiotic Product (PROBIOTIC DAILY PO), Take  by mouth. HOLD PRIOR TO SURG, Disp: , Rfl:     tiZANidine (ZANAFLEX) 2 MG tablet, Take 1 tablet by mouth Every 6 (Six) Hours As Needed for Muscle Spasms., Disp: , Rfl:     torsemide (DEMADEX) 20 MG tablet, Take 1 tablet by mouth Daily., Disp: , Rfl:     VITAMIN D-VITAMIN K PO, Take  by mouth. HOLD PRIOR TO SURG, Disp: , Rfl:     Vital Signs  Vitals:    04/23/25 1531   BP: (!) 193/69   Pulse: 72   Resp:    Temp:    SpO2: 92%          Physical Exam  Constitutional: Resting comfortably, no acute distress  Neck: Supple, trachea midline  Respiratory: No increased work of breathing, Symmetric excursion  Cardiovascular: Well pefursed, no jugular venous distention evident   Abdominal:  Soft, mildly distended, not particularly tender  Lymphatics: No cervical or suprascapular adenopathy  Skin: Warm, dry, no rash on visualized skin surfaces  Musculoskeletal: Symmetric strength, no obvious gross abnormalities  Psychiatric: Alert and oriented ×3, normal affect     Laboratory Results  I have personally reviewed CBC with Ocie 7, hemoglobin 9, platelets 34.  Lipase 11.  CMP with creatinine 1.9, bicarb 24, albumin 3.7.  AST 12, ALT 8, total bilirubin 0.3.  Lactate  0.8.    Radiology  I have personally reviewed CT scan of the abdomen pelvis demonstrates some dilation of the colon with some thickening of the sigmoid colon near the dome of the bladder which also appears thickened there.  There is some inflammatory stranding in this area as well as a small amount of free fluid.  No pneumoperitoneum.  I do not appreciate any pneumatosis on my review of the scan.    Endoscopy  None available for review         Anthony Franco MD  Colorectal & General Surgery  [facility]    [address], [address]  [place], [place], [address]  P: [phone]  F: [phone]

## 2025-04-23 NOTE — ED NOTES
Nursing report ED to floor  Vonda Jay  89 y.o.  female    HPI :  HPI  Stated Reason for Visit: abd pain n/v/d    Chief Complaint  Chief Complaint   Patient presents with    Abdominal Pain    Vomiting    Nausea    Diarrhea       Admitting doctor:   Lidia Reyes MD    Admitting diagnosis:   There were no encounter diagnoses.    Code status:   Current Code Status       Date Active Code Status Order ID Comments User Context       Prior            Allergies:   Nsaids    Isolation:   Contact Spore    Intake and Output    Intake/Output Summary (Last 24 hours) at 4/23/2025 1523  Last data filed at 4/23/2025 1306  Gross per 24 hour   Intake 500 ml   Output --   Net 500 ml       Weight:       04/23/25  1157   Weight: 64.4 kg (141 lb 15.6 oz)       Most recent vitals:   Vitals:    04/23/25 1311 04/23/25 1331 04/23/25 1400 04/23/25 1405   BP:  163/61 172/63    BP Location:  Right arm     Patient Position:  Lying     Pulse: 72 71 73 69   Resp:  18     Temp:       SpO2: 92% 96% 91% 91%   Weight:       Height:           Active LDAs/IV Access:   Lines, Drains & Airways       Active LDAs       Name Placement date Placement time Site Days    Peripheral IV 04/23/25 1129 22 G Left Antecubital 04/23/25  1129  Antecubital  less than 1    External Urinary Catheter 04/23/25  1506  --  less than 1                    Labs (abnormal labs have a star):   Labs Reviewed   COMPREHENSIVE METABOLIC PANEL - Abnormal; Notable for the following components:       Result Value    Glucose 102 (*)     BUN 34 (*)     Creatinine 1.90 (*)     Sodium 132 (*)     Chloride 94 (*)     Calcium 8.5 (*)     eGFR 25.0 (*)     All other components within normal limits    Narrative:     GFR Categories in Chronic Kidney Disease (CKD)      GFR Category          GFR (mL/min/1.73)    Interpretation  G1                     90 or greater         Normal or high (1)  G2                      60-89                Mild decrease (1)  G3a                   45-59                 Mild to moderate decrease  G3b                   30-44                Moderate to severe decrease  G4                    15-29                Severe decrease  G5                    14 or less           Kidney failure          (1)In the absence of evidence of kidney disease, neither GFR category G1 or G2 fulfill the criteria for CKD.    eGFR calculation 2021 CKD-EPI creatinine equation, which does not include race as a factor   LIPASE - Abnormal; Notable for the following components:    Lipase 11 (*)     All other components within normal limits   CBC WITH AUTO DIFFERENTIAL - Abnormal; Notable for the following components:    RBC 3.34 (*)     Hemoglobin 9.6 (*)     Hematocrit 29.2 (*)     Lymphocyte % 13.0 (*)     Monocyte % 15.2 (*)     Monocytes, Absolute 1.12 (*)     All other components within normal limits   LACTIC ACID, PLASMA - Normal   CLOSTRIDIOIDES DIFFICILE TOXIN    Narrative:     The following orders were created for panel order Clostridioides difficile Toxin - Stool, Per Rectum.  Procedure                               Abnormality         Status                     ---------                               -----------         ------                     Clostridioides difficile...[[id number]]                                                   Please view results for these tests on the individual orders.   GASTROINTESTINAL PANEL, PCR (PREFERRED) DOES NOT INCLUDE CDIFF   CLOSTRIDIOIDES DIFFICILE TOXIN, PCR   RAINBOW DRAW    Narrative:     The following orders were created for panel order [place] Draw.  Procedure                               Abnormality         Status                     ---------                               -----------         ------                     Green Top (Gel)[[id number]]                                  Final result               Lavender Top[[id number]]                                     Final result               Gold Top - SST[[id number]]                                    Final result               Light Blue Top[[id number]]                                   Final result                 Please view results for these tests on the individual orders.   URINALYSIS W/ MICROSCOPIC IF INDICATED (NO CULTURE)   CBC AND DIFFERENTIAL    Narrative:     The following orders were created for panel order CBC & Differential.  Procedure                               Abnormality         Status                     ---------                               -----------         ------                     CBC Auto Differential[[id number]]        Abnormal            Final result                 Please view results for these tests on the individual orders.   GREEN TOP   LAVENDER TOP   GOLD TOP - SST   LIGHT BLUE TOP       EKG:   No orders to display       Meds given in ED:   Medications   sodium chloride 0.9 % flush 10 mL (has no administration in time range)   lactated ringers bolus 500 mL (0 mL Intravenous Stopped 4/23/25 1306)   ondansetron (ZOFRAN) injection 4 mg (4 mg Intravenous Given 4/23/25 1129)   morphine injection 2 mg (2 mg Intravenous Given 4/23/25 1130)   morphine injection 2 mg (2 mg Intravenous Given 4/23/25 1405)       Imaging results:  CT Abdomen Pelvis Without Contrast  Result Date: 4/23/2025   1. Large amount of loose appearing stool seen in the colon with moderate distention, with transition seen in the sigmoid colon which appears collapsed. Associated masslike appearance to the bladder dome with surrounding inflammation and containing a foci of gas with inflammation extending to the adjacent sigmoid colon. Overall findings may indicate a distal colonic obstruction which could be due to a sigmoid colonic stricture or mass with a potential colovesical fistula and phlegmonous change along the bladder dome versus tumor. 2. Areas of pneumatosis at the hepatic flexure and splenic flexure of the colon versus gas positioned between the colonic contents and the bowel wall. Recommend correlation  with lactic acid levels. 3. Prominent gas and fluid-filled small bowel loops, without a small bowel obstructive pattern. Finding may be related to a distal colonic obstruction or ileus. In addition, mild wall thickening in a small bowel loop seen in the anterior pelvis. 4. Mild free fluid in the abdomen and pelvis, new. 5. Trace left pleural effusion, new. 6. Interlobular septal thickening at the lung bases. Suspect interstitial pulmonary edema.  Call Report: Dr. Shepard was notified by telephone of the above findings on 2025 at 2:04 p.m.    This report was finalized on 2025 2:08 PM by Dr. Jamel Viera M.D on Workstation: [id number]        Ambulatory status:   - SBA    Social issues:   Social History     Socioeconomic History    Marital status:    Tobacco Use    Smoking status: Former     Current packs/day: 0.00     Types: Cigarettes     Start date:      Quit date:      Years since quittin.3     Passive exposure: Past    Smokeless tobacco: Never    Tobacco comments:     caffeine use- coffee   Vaping Use    Vaping status: Never Used   Substance and Sexual Activity    Alcohol use: Yes     Comment: VERY RARE    Drug use: Never    Sexual activity: Defer       Peripheral Neurovascular  Peripheral Neurovascular (Adult)  Peripheral Neurovascular WDL: .WDL except, capillary refill  Capillary Refill, General: greater than 3 secs    Neuro Cognitive  Neuro Cognitive (Adult)  Cognitive/Neuro/Behavioral WDL: WDL    Learning  Learning Assessment  Learning Readiness and Ability: no barriers identified    Respiratory  Respiratory WDL  Respiratory WDL: WDL    Abdominal Pain       Pain Assessments  Pain (Adult)  (0-10) Pain Rating: Rest: 6  (0-10) Pain Rating: Activity: 8  Pain Location: abdomen  Pain Side/Orientation: generalized  Response to Pain Interventions: interventions effective per patient    NIH Stroke Scale       Mer Pierce RN  25 15:23 EDT

## 2025-04-24 PROBLEM — K52.9 COLITIS: Status: ACTIVE | Noted: 2025-01-01

## 2025-04-24 NOTE — PLAN OF CARE
Goal Outcome Evaluation:      NPO since midnight.  Dry heaving this am relieved with PRN zofran. NS at 75 ml/hr started per nephrology. BP elevated.  Problem: Adult Inpatient Plan of Care  Goal: Plan of Care Review  Outcome: Progressing  Goal: Patient-Specific Goal (Individualized)  Outcome: Progressing  Goal: Absence of Hospital-Acquired Illness or Injury  Outcome: Progressing  Intervention: Identify and Manage Fall Risk  Description: Perform standard risk assessment on admission using a validated tool or comprehensive approach appropriate to the patient; reassess fall risk frequently, with change in status or transfer to another level of care.Communicate risk to interprofessional healthcare team; ensure fall risk visible cue.Determine need for increased observation, equipment and environmental modification, as well as use of supportive, nonskid footwear.Adjust safety measures to individual needs and identified risk factors.Reinforce the importance of active participation with fall risk prevention, safety, and physical activity with the patient and family.Perform regular intentional rounding to assess need for position change, pain assessment and personal needs, including assistance with toileting.  Recent Flowsheet Documentation  Taken 4/24/2025 0400 by Jackie Martinez RN  Safety Promotion/Fall Prevention:   activity supervised   assistive device/personal items within reach   clutter free environment maintained   fall prevention program maintained   lighting adjusted   mobility aid in reach   nonskid shoes/slippers when out of bed   room organization consistent   safety round/check completed   toileting scheduled  Taken 4/24/2025 0200 by Jackie Martinez RN  Safety Promotion/Fall Prevention:   activity supervised   assistive device/personal items within reach   clutter free environment maintained   fall prevention program maintained   lighting adjusted   mobility aid in reach   nonskid shoes/slippers when out of  bed   room organization consistent   safety round/check completed   toileting scheduled  Taken 4/24/2025 0000 by Jackie Martinez RN  Safety Promotion/Fall Prevention:   activity supervised   assistive device/personal items within reach   clutter free environment maintained   fall prevention program maintained   lighting adjusted   mobility aid in reach   nonskid shoes/slippers when out of bed   room organization consistent   safety round/check completed   toileting scheduled  Taken 4/23/2025 2200 by Jackie Martinez RN  Safety Promotion/Fall Prevention:   activity supervised   assistive device/personal items within reach   clutter free environment maintained   fall prevention program maintained   lighting adjusted   mobility aid in reach   nonskid shoes/slippers when out of bed   room organization consistent   safety round/check completed   toileting scheduled  Taken 4/23/2025 2000 by Jackie Martinez RN  Safety Promotion/Fall Prevention:   activity supervised   assistive device/personal items within reach   clutter free environment maintained   fall prevention program maintained   lighting adjusted   mobility aid in reach   nonskid shoes/slippers when out of bed   room organization consistent   safety round/check completed   toileting scheduled  Intervention: Prevent Skin Injury  Description: Perform a screening for skin injury risk, such as pressure or moisture-associated skin damage on admission and at regular intervals throughout hospital stay.Keep all areas of skin (especially folds) clean and dry.Maintain adequate skin hydration.Relieve and redistribute pressure and protect bony prominences and skin at risk for injury; implement measures based on patient-specific risk factors.Match turning and repositioning schedule to clinical condition.Encourage weight shift frequently; assist with reposition if unable to complete independently.Float heels off bed; avoid pressure on the Achilles tendon.Keep skin free from  extended contact with medical devices.Optimize nutrition and hydration.Encourage functional activity and mobility, as early as tolerated.Use aids (e.g., slide boards, mechanical lift) during transfer.  Recent Flowsheet Documentation  Taken 4/24/2025 0400 by Jackie Martinez RN  Body Position: position changed independently  Taken 4/24/2025 0000 by Jackie Martinez RN  Body Position:   position changed independently   weight shifting  Taken 4/23/2025 2200 by Jackie Martinez RN  Body Position: position changed independently  Taken 4/23/2025 2000 by Jackie Martinez RN  Body Position: position changed independently  Skin Protection: incontinence pads utilized  Intervention: Prevent and Manage VTE (Venous Thromboembolism) Risk  Description: Assess for VTE (venous thromboembolism) risk.Promote early mobilization; encourage both active and passive leg exercises, if unable to ambulate.Initiate and maintain compression or other therapy, as indicated, based on identified risk in accordance with organizational protocol and provider order.Recognize the patient's individual risk for bleeding before initiating pharmacologic thromboprophylaxis.  Recent Flowsheet Documentation  Taken 4/23/2025 2000 by Jackie Martinez RN  VTE Prevention/Management: (lovenox) other (see comments)  Intervention: Prevent Infection  Description: Maintain skin and mucous membrane integrity; promote hand, oral and pulmonary hygiene.Optimize fluid balance, nutrition, sleep and glycemic control to maximize infection resistance.Identify potential sources of infection early to prevent or mitigate progression of infection (e.g., wound, lines, devices).Evaluate ongoing need for invasive devices; remove promptly when no longer indicated.Review vaccination status.  Recent Flowsheet Documentation  Taken 4/24/2025 0400 by Jackie Martinez RN  Infection Prevention:   environmental surveillance performed   equipment surfaces disinfected   hand hygiene promoted   personal  protective equipment utilized   rest/sleep promoted   single patient room provided  Taken 4/24/2025 0200 by Jackie Martinez RN  Infection Prevention:   environmental surveillance performed   equipment surfaces disinfected   hand hygiene promoted   personal protective equipment utilized   rest/sleep promoted   single patient room provided  Taken 4/24/2025 0000 by Jackie Martinez RN  Infection Prevention:   environmental surveillance performed   equipment surfaces disinfected   hand hygiene promoted   personal protective equipment utilized   rest/sleep promoted   single patient room provided  Taken 4/23/2025 2200 by Jackie Martinez RN  Infection Prevention:   environmental surveillance performed   equipment surfaces disinfected   hand hygiene promoted   personal protective equipment utilized   rest/sleep promoted   single patient room provided  Taken 4/23/2025 2000 by Jackie Martinez RN  Infection Prevention:   environmental surveillance performed   equipment surfaces disinfected   hand hygiene promoted   personal protective equipment utilized   rest/sleep promoted   single patient room provided  Goal: Optimal Comfort and Wellbeing  Outcome: Progressing  Intervention: Provide Person-Centered Care  Description: Use a family-focused approach to care; encourage support system presence and participation.Develop trust and rapport by proactively providing information, encouraging questions, addressing concerns and offering reassurance.Acknowledge emotional response to hospitalization.Recognize and utilize personal coping strategies and strengths; develop goals via shared decision-making.Honor spiritual and cultural preferences.  Recent Flowsheet Documentation  Taken 4/23/2025 2000 by Jackie Martinez RN  Trust Relationship/Rapport:   care explained   choices provided   emotional support provided   empathic listening provided   questions answered   questions encouraged   reassurance provided   thoughts/feelings acknowledged  Goal:  Readiness for Transition of Care  Outcome: Progressing     Problem: Fall Injury Risk  Goal: Absence of Fall and Fall-Related Injury  Outcome: Progressing  Intervention: Identify and Manage Contributors  Description: Develop a fall prevention plan, considering patient-centered interventions and family/caregiver involvement; identify and address patient's facilitators and barriers.Provide reorientation, appropriate sensory stimulation and routines with changes in mental status to decrease risk of fall.Promote use of personal vision and auditory aids.Assess assistance level required for safe and effective self-care; provide support as needed, such as toileting and mobilization. For age 65 and older, implement timed toileting with assistance.Encourage physical activity, such as performance of mobility and self-care at highest level of patient ability, multicomponent exercise program and provision of appropriate assistive devices.If fall occurs, assess the severity of injury; implement fall injury protocol. Determine the cause and revise fall injury prevention plan.Regularly review and advocate for medication adjustment to decrease fall risk; consider administration times, polypharmacy and age.Balance adequate pain management with potential for oversedation.  Recent Flowsheet Documentation  Taken 4/24/2025 0400 by Jackie Martinez RN  Medication Review/Management: medications reviewed  Taken 4/24/2025 0200 by Jackie Martinez RN  Medication Review/Management: medications reviewed  Taken 4/24/2025 0000 by Jackie Martinez RN  Medication Review/Management: medications reviewed  Taken 4/23/2025 2200 by Jackie Martinez RN  Medication Review/Management: medications reviewed  Taken 4/23/2025 2000 by Jackie Martinez RN  Medication Review/Management: medications reviewed  Intervention: Promote Injury-Free Environment  Description: Provide a safe, barrier-free environment that encourages independent activity.Keep care area uncluttered  and well-lighted.Determine need for increased observation or monitoring.Avoid use of devices that minimize mobility, such as restraints or indwelling urinary catheter.  Recent Flowsheet Documentation  Taken 4/24/2025 0400 by Jackie Martinez RN  Safety Promotion/Fall Prevention:   activity supervised   assistive device/personal items within reach   clutter free environment maintained   fall prevention program maintained   lighting adjusted   mobility aid in reach   nonskid shoes/slippers when out of bed   room organization consistent   safety round/check completed   toileting scheduled  Taken 4/24/2025 0200 by Jackie Martinez RN  Safety Promotion/Fall Prevention:   activity supervised   assistive device/personal items within reach   clutter free environment maintained   fall prevention program maintained   lighting adjusted   mobility aid in reach   nonskid shoes/slippers when out of bed   room organization consistent   safety round/check completed   toileting scheduled  Taken 4/24/2025 0000 by Jackie Martinez RN  Safety Promotion/Fall Prevention:   activity supervised   assistive device/personal items within reach   clutter free environment maintained   fall prevention program maintained   lighting adjusted   mobility aid in reach   nonskid shoes/slippers when out of bed   room organization consistent   safety round/check completed   toileting scheduled  Taken 4/23/2025 2200 by Jackie Martinez RN  Safety Promotion/Fall Prevention:   activity supervised   assistive device/personal items within reach   clutter free environment maintained   fall prevention program maintained   lighting adjusted   mobility aid in reach   nonskid shoes/slippers when out of bed   room organization consistent   safety round/check completed   toileting scheduled  Taken 4/23/2025 2000 by Jackie Martinez RN  Safety Promotion/Fall Prevention:   activity supervised   assistive device/personal items within reach   clutter free environment  maintained   fall prevention program maintained   lighting adjusted   mobility aid in reach   nonskid shoes/slippers when out of bed   room organization consistent   safety round/check completed   toileting scheduled

## 2025-04-24 NOTE — THERAPY EVALUATION
Patient Name: Vonda Jay  : 1935    MRN: [id number]                              Today's Date: 2025       Admit Date: 2025    Visit Dx:     ICD-10-CM ICD-9-CM   1. Pain of upper abdomen  R10.10 789.09   2. Nausea vomiting and diarrhea  R11.2 787.91    R19.7 787.01   3. Colitis  K52.9 558.9     Patient Active Problem List   Diagnosis    Closed displaced intertrochanteric fracture of left femur    SANYA (acute kidney injury)    HTN (hypertension)    Acute blood loss anemia    Leukocytosis    Drug-induced constipation    Stage 3b chronic kidney disease    COVID-19 virus infection    Anemia due to chronic kidney disease    Acute on chronic diastolic CHF (congestive heart failure)    Severe aortic valve stenosis    Stage 3 chronic kidney disease    E. coli UTI (urinary tract infection)    Colovesical fistula - possible    (HFpEF) heart failure with preserved ejection fraction    Obesity (BMI 30-39.9)    Hypokalemia    Acute UTI (urinary tract infection)    Cough    Nocturnal hypoxemia    Hypophosphatemia    Respiratory distress    CHF (congestive heart failure)    Diastolic CHF, chronic    Acute hypoxic respiratory failure    Sepsis due to pneumonia    Pneumonia, unspecified organism    Aortic stenosis    S/P TAVR (transcatheter aortic valve replacement)    Acute kidney injury    Abdominal pain    Hypertensive urgency    Abnormal weight loss    Colitis     Past Medical History:   Diagnosis Date    Aortic valve stenosis     Arthritis     Cancer     Breast    Chronic kidney disease     Colovesical fistula - possible 2023    Heart murmur     History of breast cancer         History of carotid artery disease     History of radiation therapy     Hyperlipidemia     Hypertension     Low back pain     FROM FALL IN [employer] [address] 2025    Stage 3b chronic kidney disease 2022     Past Surgical History:   Procedure Laterality Date    AORTIC VALVE REPAIR/REPLACEMENT N/A 2025     Procedure: TTE TRANSFEMORAL TRANSCATHETER AORTIC VALVE REPLACEMENT PERCUTANEOUS APPROACH;  Surgeon: Ebenezer Albert MD;  Location: [facility] CVOR;  Service: Cardiothoracic;  Laterality: N/A;    AORTIC VALVE REPAIR/REPLACEMENT N/A 2/4/2025    Procedure: Transfemoral Transcatheter Aortic Valve Replacement with intraoperative transthoracic echocardiogram and possible open surgical rescue;  Surgeon: Mike Rizvi MD;  Location: [facility] CVOR;  Service: Cardiovascular;  Laterality: N/A;    BREAST LUMPECTOMY  1998    CARDIAC CATHETERIZATION N/A 11/8/2024    Procedure: Right and Left Heart Cath;  Surgeon: Mike Rizvi MD;  Location: [facility] CATH INVASIVE LOCATION;  Service: Cardiology;  Laterality: N/A;    CARDIAC CATHETERIZATION N/A 11/8/2024    Procedure: Coronary angiography;  Surgeon: Mike Rizvi MD;  Location: [facility] CATH INVASIVE LOCATION;  Service: Cardiology;  Laterality: N/A;    CAROTID ARTERY ANGIOPLASTY  2000, 2013    CHOLECYSTECTOMY  1979    FEMUR IM NAILING/RODDING Left 2/8/2018    Procedure: FEMUR INTRAMEDULLARY NAILING;  Surgeon: Zheng Fleming MD;  Location: [facility] MAIN OR;  Service:     HYSTERECTOMY  1989      General Information       Row Name 04/24/25 1429          Physical Therapy Time and Intention    Document Type evaluation  -EM     Mode of Treatment co-treatment;physical therapy;occupational therapy;other (see comments)  -EM       Row Name 04/24/25 1429          General Information    Patient Profile Reviewed yes  -EM     Prior Level of Function independent:  uses rollator at home, cane in community  -EM     Existing Precautions/Restrictions fall  -EM       Row Name 04/24/25 1429          Living Environment    Current Living Arrangements home  -EM     People in Home alone  -EM       Row Name 04/24/25 1429          Home Main Entrance    Number of Stairs, Main Entrance other (see comments)  has ramp to enter home  -EM       Row Name 04/24/25 1429          Stairs Within  Home, Primary    Stairs, Within Home, Primary chair lift to basement  -EM       Row Name 04/24/25 1429          Cognition    Orientation Status (Cognition) oriented x 4  -EM       Row Name 04/24/25 1429          Safety Issues/Impairments Affecting Functional Mobility    Impairments Affecting Function (Mobility) endurance/activity tolerance;strength  -EM     Comment, Safety Issues/Impairments (Mobility) Co treatment medically appropriate and necessary due to patient acuity level, activity tolerance and safety of patient and staff. Evaluation established to achieve all goals in POC.  -EM               User Key  (r) = Recorded By, (t) = Taken By, (c) = Cosigned By      Initials Name Provider Type    Mimi Garcia PT Physical Therapist                   Mobility       Row Name 04/24/25 1431          Bed Mobility    Bed Mobility supine-sit;sit-supine  -EM     Supine-Sit [place] (Bed Mobility) minimum assist (75% patient effort)  -EM     Sit-Supine [place] (Bed Mobility) contact guard  -EM     Assistive Device (Bed Mobility) head of bed elevated;bed rails  -EM       Row Name 04/24/25 1431          Sit-Stand Transfer    Sit-Stand [place] (Transfers) minimum assist (75% patient effort)  -EM       Row Name 04/24/25 1431          Gait/Stairs (Locomotion)    [place] Level (Gait) minimum assist (75% patient effort);2 person assist  -EM     Assistive Device (Gait) walker, front-wheeled  -EM     Distance in Feet (Gait) 10  -EM     Comment, (Gait/Stairs) ambulated to bathroom with rwx, pt has vomiting and diarrhea once in bathroom, then walked back to bed with HHAx2  -EM               User Key  (r) = Recorded By, (t) = Taken By, (c) = Cosigned By      Initials Name Provider Type    Mimi Garcia PT Physical Therapist                   Obj/Interventions       Row Name 04/24/25 1433          Range of Motion Comprehensive    General Range of Motion no range of motion deficits identified  -EM        Row Name 04/24/25 1433          Strength Comprehensive (MMT)    General Manual Muscle Testing (MMT) Assessment other (see comments)  -EM     Comment, General Manual Muscle Testing (MMT) Assessment no focal deficits identified, generalized weakness noted  -EM       Row Name 04/24/25 1433          Balance    Balance Assessment sitting static balance;sitting dynamic balance;standing static balance;standing dynamic balance  -EM     Static Sitting Balance standby assist  -EM     Dynamic Sitting Balance standby assist  -EM     Position, Sitting Balance sitting edge of bed  -EM     Static Standing Balance contact guard  -EM     Dynamic Standing Balance minimal assist  -EM       Row Name 04/24/25 1433          Sensory Assessment (Somatosensory)    Sensory Assessment (Somatosensory) sensation intact  -EM               User Key  (r) = Recorded By, (t) = Taken By, (c) = Cosigned By      Initials Name Provider Type    EM Mimi Porras, PT Physical Therapist                   Goals/Plan       Row Name 04/24/25 1439          Bed Mobility Goal 1 (PT)    Activity/Assistive Device (Bed Mobility Goal 1, PT) bed mobility activities, all  -EM     [place] Level/Cues Needed (Bed Mobility Goal 1, PT) standby assist  -EM     Time Frame (Bed Mobility Goal 1, PT) 1 week  -EM       Row Name 04/24/25 1439          Transfer Goal 1 (PT)    Activity/Assistive Device (Transfer Goal 1, PT) transfers, all;walker, rolling  -EM     [place] Level/Cues Needed (Transfer Goal 1, PT) standby assist  -EM     Time Frame (Transfer Goal 1, PT) 1 week  -EM       Row Name 04/24/25 1433          Gait Training Goal 1 (PT)    Activity/Assistive Device (Gait Training Goal 1, PT) gait (walking locomotion);walker, rolling  -EM     [place] Level (Gait Training Goal 1, PT) contact guard required  -EM     Distance (Gait Training Goal 1, PT) 100  -EM     Time Frame (Gait Training Goal 1, PT) 1 week  -EM       Row Name 04/24/25 1437           Therapy Assessment/Plan (PT)    Planned Therapy Interventions (PT) bed mobility training;gait training;home exercise program;patient/family education;transfer training;strengthening  -EM               User Key  (r) = Recorded By, (t) = Taken By, (c) = Cosigned By      Initials Name Provider Type    EM Mimi Porras, PT Physical Therapist                   Clinical Impression       Row Name 04/24/25 1434          Pain    Pretreatment Pain Rating 0/10 - no pain  -EM     Pre/Posttreatment Pain Comment pt c/o abdominal pain/cramping at times  -EM       Row Name 04/24/25 1434          Plan of Care Review    Plan of Care Reviewed With patient  -EM     Outcome Evaluation Pt is 89 yo female admitted to [facility] with abdominal pain. PMH significant for HTN, heart failure, s/p TAVR, CKD. patient lives alone, independent with rollator or cane at baseline. Patient performed bed mobility with Courtney, sit to stand with Courtney, ambulated to bathroom with rwx and minAx2, ambulated back from bathroom with HHA/minAx2. Patient has impairments consisting of generalized weakness, decreased acitvity tolerance and would benefit from skilled PT. d/c plan is to return home with family assist.  -EM       Row Name 04/24/25 1434          Therapy Assessment/Plan (PT)    Patient/Family Therapy Goals Statement (PT) get better  -EM     Rehab Potential (PT) good  -EM     Criteria for Skilled Interventions Met (PT) yes;skilled treatment is necessary  -EM     Therapy Frequency (PT) 6 times/wk  -EM       Row Name 04/24/25 1434          Vital Signs    Pre Systolic BP Rehab 166  -EM     Pre Treatment Diastolic BP 57  -EM       Row Name 04/24/25 1434          Positioning and Restraints    Pre-Treatment Position in bed  -EM     Post Treatment Position bed  -EM     In Bed supine;call light within reach;exit alarm on;with family/caregiver;notified nsg  -EM               User Key  (r) = Recorded By, (t) = Taken By, (c) = Cosigned By      Initials Name Provider  Type    EM Mimi Porras PT Physical Therapist                   Outcome Measures       Row Name 04/24/25 1440          How much help from another person do you currently need...    Turning from your back to your side while in flat bed without using bedrails? 4  -EM     Moving from lying on back to sitting on the side of a flat bed without bedrails? 3  -EM     Moving to and from a bed to a chair (including a wheelchair)? 3  -EM     Standing up from a chair using your arms (e.g., wheelchair, bedside chair)? 3  -EM     Climbing 3-5 steps with a railing? 2  -EM     To walk in hospital room? 3  -EM     AM-PAC 6 Clicks Score (PT) 18  -EM               User Key  (r) = Recorded By, (t) = Taken By, (c) = Cosigned By      Initials Name Provider Type    EM Mimi Porras PT Physical Therapist                                 Physical Therapy Education       Title: PT OT SLP Therapies (In Progress)       Topic: Physical Therapy (In Progress)       Point: Mobility training (Done)       Learning Progress Summary            Patient Acceptance, E, VU by EM at 4/24/2025 1441                      Point: Home exercise program (Not Started)       Learner Progress:  Not documented in this visit.              Point: Body mechanics (Not Started)       Learner Progress:  Not documented in this visit.              Point: Precautions (Not Started)       Learner Progress:  Not documented in this visit.                              User Key       Initials Effective Dates Name Provider Type Discipline     06/16/21 -  Mimi Porras PT Physical Therapist PT                  PT Recommendation and Plan  Planned Therapy Interventions (PT): bed mobility training, gait training, home exercise program, patient/family education, transfer training, strengthening  Outcome Evaluation: Pt is 89 yo female admitted to [facility] with abdominal pain. PMH significant for HTN, heart failure, s/p TAVR, CKD. patient lives alone, independent with  rollator or cane at baseline. Patient performed bed mobility with [place], sit to stand with [place], ambulated to bathroom with rwx and minAx2, ambulated back from bathroom with HHA/minAx2. Patient has impairments consisting of generalized weakness, decreased acitvity tolerance and would benefit from skilled PT. d/c plan is to return home with family assist.     Time Calculation:         PT Charges       Row Name 04/24/25 1442             Time Calculation    Start Time 1304  -EM      Stop Time 1332  -EM      Time Calculation (min) 28 min  -EM      PT Received On 04/24/25  -EM      PT - Next Appointment 04/25/25  -EM      PT Goal Re-Cert Due Date 05/01/25  -EM         Time Calculation- PT    Total Timed Code Minutes- PT 18 minute(s)  -EM         Timed Charges    25049 - PT Therapeutic Activity Minutes 18  -EM         Total Minutes    Timed Charges Total Minutes 18  -EM       Total Minutes 18  -EM                User Key  (r) = Recorded By, (t) = Taken By, (c) = Cosigned By      Initials Name Provider Type    EM Mimi Porras, PT Physical Therapist                  Therapy Charges for Today       Code Description Service Date Service Provider Modifiers Qty    [id number]  PT THERAPEUTIC ACT EA 15 MIN 4/24/2025 Mimi Porras, PT GP 1    79644468540 HC PT EVAL MOD COMPLEXITY 3 4/24/2025 Mimi Porras PT GP 1            PT G-Codes  AM-PAC 6 Clicks Score (PT): 18  PT Discharge Summary  Anticipated Discharge Disposition (PT): home with assist    Mimi Porras PT  4/24/2025

## 2025-04-24 NOTE — CONSULTS
I was requested to see patient to provide spiritual and emtional support.  Pt was coping with her illness, not feeling well.  She appreciates everyone for what they are doing.  Her son was in the room.  Pt said her children have been wonderful to her.  She is of the [employer].  She says she attends [employer].  We talked for a little while longer and then had prayer.

## 2025-04-24 NOTE — CONSULTS
"Nutrition Services    Patient Name: Vonda Jay  YOB: 1935  MRN: [id number]  Admission date: 4/23/2025    Assessment Date:  04/24/25    NUTRITION EVALUATION      Reason for Encounter MST score 2+, Unintentional Weight Loss   Diagnosis/Problem Admission Diagnosis:  Colitis [K52.9]  Abdominal pain [R10.9]  Nausea vomiting and diarrhea [R11.2, R19.7]  Pain of upper abdomen [R10.10]     Narrative 89 yoF presents with abdominal pain, N/V/D. Possible SBO vs ileus. Surgery following. water-soluble enema study today, possible colonoscopy tomorrow. Poor PO last 3 days.        PO Diet NPO Diet NPO Type: Strict NPO   Allergies NKFA   Supplements n/a   PO Intake % NPO       Chewing/Swallowing Difficulty no issues identified at this time       Medications reviewed   Labs  reviewed, Na 131, BUN 31, Cr 1.54, Mg 2.5       Physical Findings alert, oriented, room air     Edema no edema    GI Function abdominal pain, hyperactive bowel sounds   Skin Status skin intact    Lines/Drains none   I/O reviewed        Height  Weight  BMI  Weight Trend     Height: 154.9 cm (60.98\")  Weight: 65.9 kg (145 lb 3.2 oz) (04/24/25 0512)  Body mass index is 27.45 kg/m².  Loss    Weight change: weight loss of 10 lbs (6.5%) over 3 month(s)    Significant?  No       NFPE Pending, due to: RD visit       Nutrition Problem (PES) Problem: Inadequate Oral Intake  Etiology: Medical Diagnosis - abdominal pain    Signs/Symptoms: NPO       Intervention/Plan ADAT per MD  FU for nutritional interview/NFPE    RD to follow up per protocol.     Results from last 7 days   Lab Units 04/24/25  0450 04/23/25  1133 04/20/25  0927   SODIUM mmol/L 131* 132* 133*   POTASSIUM mmol/L 4.0 3.9 3.8   CHLORIDE mmol/L 96* 94* 95*   CO2 mmol/L 22.0 24.0 25.0   BUN mg/dL 31* 34* 33*   CREATININE mg/dL 1.54* 1.90* 2.23*   CALCIUM mg/dL 8.4* 8.5* 8.8   BILIRUBIN mg/dL 0.2 0.3 0.3   ALK PHOS U/L 56 65 68   ALT (SGPT) U/L 8 8 7   AST (SGOT) U/L 16 12 17   GLUCOSE mg/dL " 71 102* 132*     Results from last 7 days   Lab Units 04/24/25  0450 04/23/25  1133   MAGNESIUM mg/dL 2.5* 2.8*   PHOSPHORUS mg/dL 3.5 3.1   HEMOGLOBIN g/dL 8.9* 9.6*   HEMATOCRIT % 26.6* 29.2*     Lab Results   Component Value Date    HGBA1C 5.10 01/31/2025     Wt Readings from Last 10 Encounters:   04/24/25 65.9 kg (145 lb 3.2 oz)   04/20/25 64.4 kg (142 lb)   04/10/25 67.1 kg (148 lb)   03/13/25 66.7 kg (147 lb)   03/13/25 66.7 kg (147 lb)   03/07/25 67 kg (147 lb 11.2 oz)   03/04/25 74.4 kg (164 lb)   02/11/25 73 kg (161 lb)   02/07/25 73.5 kg (162 lb)   02/05/25 70.3 kg (155 lb)       Electronically signed by:  Margret Chadwick RD  04/24/25 13:13 EDT

## 2025-04-24 NOTE — SIGNIFICANT NOTE
04/24/25 0910   OTHER   Discipline occupational therapist   Rehab Time/Intention   Session Not Performed other (see comments)  (pt off floor for barium enema; will f/u later today or tomorrow as schedule allows)   Therapy Assessment/Plan (PT)   Criteria for Skilled Interventions Met (PT) yes   Recommendation   OT - Next Appointment 04/25/25

## 2025-04-24 NOTE — PROGRESS NOTES
Discharge Planning Assessment  [facility]     Patient Name: Vonda Jay  MRN: [id number]  Today's Date: 4/24/2025    Admit Date: 4/23/2025    Plan: Return home - lives alone   Discharge Needs Assessment       Row Name 04/24/25 1347       Living Environment    People in Home alone    Current Living Arrangements home    Potentially Unsafe Housing Conditions none    Primary Care Provided by self    Provides Primary Care For no one    Family Caregiver if Needed child(natalio), adult    Family Caregiver Names Son Angus [phone] or daughter Nusrat [phone]    Quality of Family Relationships helpful    Able to Return to Prior Arrangements yes       Resource/Environmental Concerns    Resource/Environmental Concerns none    Transportation Concerns none       Transition Planning    Patient/Family Anticipates Transition to home    Patient/Family Anticipated Services at Transition none    Transportation Anticipated family or friend will provide       Discharge Needs Assessment    Readmission Within the Last 30 Days no previous admission in last 30 days    Equipment Currently Used at Home cane, straight;grab bar;shower chair;rollator    Concerns to be Addressed discharge planning    Anticipated Changes Related to Illness none    Equipment Needed After Discharge none                   Discharge Plan       Row Name 04/24/25 2189       Plan    Plan Return home - lives alone    Patient/Family in Agreement with Plan yes    Plan Comments Spoke with patient and son Tom [phone] at bedside.  Patient lives alone, has a ramp, cane, rollator, grab bars, shower chair.  She has used  In the past and has been to [place] for rehab.  PCP is Dr. Ambrose Ashley and pharmacy is [facility].  Patient drives, she has 5 children and states they or a friend will assist if needed.  She plans to return home at [place].  CCP will follow.  Alli MAZARIEGOS                    Expected Discharge Date and Time        Expected Discharge Date Expected Discharge Time    Apr 28, 2025            Demographic Summary       Row Name 04/24/25 1343       General Information    Admission Type inpatient    Arrived From home    Referral Source admission list    Reason for Consult discharge planning    Preferred Language English                   Functional Status       Row Name 04/24/25 1347       Functional Status    Usual Activity Tolerance moderate    Current Activity Tolerance fair       Functional Status, IADL    Medications independent    Meal Preparation independent    Housekeeping independent    Laundry independent    Shopping independent       Mental Status    General Appearance WDL WDL       Mental Status Summary    Recent Changes in Mental Status/Cognitive Functioning no changes                               Becky S. [place], RN

## 2025-04-24 NOTE — CONSULTS
[facility] Consult Note      Patient Name: Vonda Jay  : 1935  MRN: [id number]  Primary Care Physician:  Ambrose Ashley MD  Referring Physician: Lidia Reyes, *  Date of admission: 2025    Subjective     Reason for Consult: Recovering SANYA on CKD 4    HPI:   Vonda Jay is a 89 y.o. female with CKD4 (baseline SCr 1.5-1.8), recent SANYA (3/7-3/8, SCr peaked to 2.3), hx of breast cancer, HTN, CAD, TAVR 2025, whom I follow in the office, admitted yesterday for N/V/D and abdominal pain x 3 days, found to have possible distal colonic obstruction +/- colovesical fistula, and possible small bowel obstruction or ileus.  Upon arrival, /63, sodium 132, SCr 1.9, was given 500 cc IV LR, followed by continuous IV NS at 75 mL/h.  Today, SCr down to 1.5.  Has nephrotic-range proteinuria with UPCR 3.6 g/g.  BP continues to be elevated with SBP 200s.    Acute N/V/D and epigastric abdominal pain led to recent ER visit on , discharged home after symptoms spontaneously resolved, though above symptoms re-presented 1 day later.  Continues to have dry heaves  Very poor oral intake for the past 3 days  Frequent loose stools  No fever  + Slight dizziness in the morning  Denies chest pain, SOA, ROBERTSON, orthopnea  No urinary symptoms  Denies any NSAID use      Review of Systems:   14 point review of systems is otherwise negative except for mentioned above on HPI    Personal History     Past Medical History:   Diagnosis Date    Aortic valve stenosis     Arthritis     Cancer     Breast    Chronic kidney disease     Colovesical fistula - possible 2023    Heart murmur     History of breast cancer     1998    History of carotid artery disease     History of radiation therapy     Hyperlipidemia     Hypertension     Low back pain     FROM FALL IN [employer] [address] 2025    Stage 3b chronic kidney disease 2022       Past Surgical History:   Procedure  Laterality Date    AORTIC VALVE REPAIR/REPLACEMENT N/A 2/4/2025    Procedure: TTE TRANSFEMORAL TRANSCATHETER AORTIC VALVE REPLACEMENT PERCUTANEOUS APPROACH;  Surgeon: Ebenezer Albert MD;  Location: [facility];  Service: Cardiothoracic;  Laterality: N/A;    AORTIC VALVE REPAIR/REPLACEMENT N/A 2/4/2025    Procedure: Transfemoral Transcatheter Aortic Valve Replacement with intraoperative transthoracic echocardiogram and possible open surgical rescue;  Surgeon: Mike Rizvi MD;  Location: [facility];  Service: Cardiovascular;  Laterality: N/A;    BREAST LUMPECTOMY  1998    CARDIAC CATHETERIZATION N/A 11/8/2024    Procedure: Right and Left Heart Cath;  Surgeon: Mike Rizvi MD;  Location: [facility] CATH INVASIVE LOCATION;  Service: Cardiology;  Laterality: N/A;    CARDIAC CATHETERIZATION N/A 11/8/2024    Procedure: Coronary angiography;  Surgeon: Mike Rizvi MD;  Location: [facility] CATH INVASIVE LOCATION;  Service: Cardiology;  Laterality: N/A;    CAROTID ARTERY ANGIOPLASTY  2000, 2013    CHOLECYSTECTOMY  1979    FEMUR IM NAILING/RODDING Left 2/8/2018    Procedure: FEMUR INTRAMEDULLARY NAILING;  Surgeon: Zheng Fleming MD;  Location: [facility] OR;  Service:     HYSTERECTOMY  1989       Family History: family history includes Heart valve disorder in her sister; Hypertension in her mother; Lung cancer in her sister; Lymphoma in her sister; No Known Problems in her father; Skin cancer in her sister; Stroke (age of onset: 57) in her brother.    Social History:  reports that she quit smoking about 26 years ago. Her smoking use included cigarettes. She started smoking about 73 years ago. She has been exposed to tobacco smoke. She has never used smokeless tobacco. She reports current alcohol use. She reports that she does not use drugs.    Home Medications:  Prior to Admission medications    Medication Sig Start Date End Date Taking? Authorizing Provider   acetaminophen (TYLENOL) 500 MG tablet  Take 2 tablets by mouth Every 6 (Six) Hours As Needed for Mild Pain or Moderate Pain. 1/1/21  Yes Lianne King MD   alendronate (FOSAMAX) 70 MG tablet Take 1 tablet by mouth Every 7 (Seven) Days. Saturday   Yes Lianne King MD   aspirin 81 MG tablet Take 1 tablet by mouth Daily. INSTRUCTED PT TO FOLLOW MD INSTRUCTIONS REGARDING HOLDING FOR SURGERY   Yes Lianne King MD   Calcium-Magnesium-Vitamin D (CALCIUM MAGNESIUM PO) Take 1 tablet by mouth Daily. HOLD PRIOR TO SURG   Yes Lianne King MD   carvedilol (COREG) 12.5 MG tablet Take 1 tablet by mouth 2 (Two) Times a Day.  Patient taking differently: Take 1 tablet by mouth 3 times a day. 3/4/25  Yes Jaye Hough APRN   cholecalciferol (VITAMIN D3) 1000 units tablet Take 1 tablet by mouth 3 (Three) Times a Week. HOLD PRIOR TO SURG Patient is asked to monitor BP at home or work, several times per month and return with written values at next office visit. Mon Wed Fri   Yes Lianne King MD   citalopram (CeleXA) 10 MG tablet Take 1 tablet by mouth Daily.   Yes Lianne King MD   D-MANNOSE PO Take 1,000 mg by mouth 2 (Two) Times a Day. HOLD PRIOR TO SURG   Yes Lianne King MD   doxazosin (Cardura) 1 MG tablet Take 1 tablet by mouth Every Night.  Patient taking differently: Take 4 tablets by mouth Every Night. 4/10/25  Yes Adam Singleton MD   hydrALAZINE (APRESOLINE) 100 MG tablet Take 1 tablet by mouth 3 (Three) Times a Day As Needed (SBP >150). Indications: High Blood Pressure 3/13/25  Yes Mike Rizvi MD   multivitamin with minerals tablet tablet Take 1 tablet by mouth Daily. HOLD PRIOR TO SURG   Yes Lianne King MD   pravastatin (PRAVACHOL) 40 MG tablet Take 1 tablet by mouth Every Night.   Yes Lianne King MD   torsemide (DEMADEX) 20 MG tablet Take 1 tablet by mouth Daily. 3/20/25 3/20/26 Yes Lianne King MD   VITAMIN D-VITAMIN K PO Take  by mouth. HOLD PRIOR TO  SURG   Yes Lianne King MD   amLODIPine (NORVASC) 5 MG tablet Take 1 tablet by mouth Daily. 4/10/25   Adam Singleton MD   doxazosin (CARDURA) 2 MG tablet Take 2 tablets by mouth Every Night.    Lianne King MD   lidocaine (Lidoderm) 5 % Place 1 patch on the skin as directed by provider Daily As Needed for Mild Pain. Remove & Discard patch within 12 hours or as directed by MD 1/29/25   Jayson House MD   ondansetron ODT (ZOFRAN-ODT) 4 MG disintegrating tablet Take 1 tablet by mouth 4 (Four) Times a Day As Needed for Nausea or Vomiting. 2/21/25   Endy Chauhan MD   Polyethylene Glycol 3350 (CLEARLAX PO) Take  by mouth.    Lianne King MD   Probiotic Product (PROBIOTIC DAILY PO) Take  by mouth. HOLD PRIOR TO SURG    Lianne King MD   tiZANidine (ZANAFLEX) 2 MG tablet Take 1 tablet by mouth Every 6 (Six) Hours As Needed for Muscle Spasms. 1/31/25   Lianne King MD       Allergies:  Allergies   Allergen Reactions    Nsaids Other (See Comments)     Due to kidney's       Objective     Vitals:   Temp:  [97.6 °F (36.4 °C)-98.5 °F (36.9 °C)] 98.4 °F (36.9 °C)  Heart Rate:  [65-77] 77  Resp:  [17-19] 17  BP: (160-216)/(52-77) 216/71    Intake/Output Summary (Last 24 hours) at 4/24/2025 1028  Last data filed at 4/23/2025 2010  Gross per 24 hour   Intake 740 ml   Output 100 ml   Net 640 ml       Physical Exam:   Constitutional: Awake, appears younger than actual age, NAD  HEENT: Sclera anicteric, no conjunctival injection; bilateral carotid bruits  Neck: Supple, no JVD  Respiratory: Few bibasilar crackles, nonlabored on RA  Cardiovascular: RRR, 2/6M, no rub  Gastrointestinal: BS +, soft, slight tenderness but no G or R, + D  : No palpable bladder  Musculoskeletal: No edema, no clubbing or cyanosis  Psychiatric: Appropriate affect, cooperative, oriented  Neurologic: No asterixis, moving all extremities, normal speech and mental status  Skin: Warm and dry       Scheduled  Meds:     aspirin, 81 mg, Oral, Daily  calcium 500 mg vitamin D 5 mcg (200 UT), 2 tablet, Oral, BID  carvedilol, 12.5 mg, Oral, BID With Meals  citalopram, 10 mg, Oral, Daily  enoxaparin sodium, 30 mg, Subcutaneous, Nightly  hydrALAZINE, 100 mg, Oral, Q8H  pravastatin, 40 mg, Oral, Nightly  sodium chloride, 10 mL, Intravenous, Q12H  terazosin, 1 mg, Oral, Nightly  terazosin, 2 mg, Oral, Daily  [Held by provider] torsemide, 20 mg, Oral, Daily      IV Meds:        Results Reviewed:   I have personally reviewed the results from the time of this admission to 4/24/2025 10:28 EDT     Lab Results   Component Value Date    GLUCOSE 71 04/24/2025    CALCIUM 8.4 (L) 04/24/2025     (L) 04/24/2025    K 4.0 04/24/2025    CO2 22.0 04/24/2025    CL 96 (L) 04/24/2025    BUN 31 (H) 04/24/2025    CREATININE 1.54 (H) 04/24/2025    EGFRIFNONA 48 (L) 02/12/2018    BCR 20.1 04/24/2025    ANIONGAP 13.0 04/24/2025      Lab Results   Component Value Date    MG 2.5 (H) 04/24/2025    PHOS 3.5 04/24/2025    ALBUMIN 3.1 (L) 04/24/2025           Assessment / Plan       Abdominal pain    HTN (hypertension)    Stage 3b chronic kidney disease    Anemia due to chronic kidney disease    Colovesical fistula - possible    (HFpEF) heart failure with preserved ejection fraction    Aortic stenosis    S/P TAVR (transcatheter aortic valve replacement)    Hypertensive urgency    Abnormal weight loss      ASSESSMENT:  Recent SANYA on CKD 4, baseline SCr 1.5-1.8, secondary to longstanding hypertension, long-term NSAID use, prior smoking, generalized atherosclerotic disease, and CHF. SCr slightly up to 2.3 recently, secondary to hypovolemia related to N/V/D.  Scr down to 1.54 today after IVF.  Volume status is stable.  Electrolytes within acceptable range except for mild hyponatremia  Nephrotic-range proteinuria, UPCR 3.6 g/g, although likely falsely elevated due to hypertensive urgency   Hypertensive urgency/HTN with CKD, BP normally runs on the higher  side.  Currently SBP 160s to 170s mostly, in association with N/V and pain.  Home Coreg, terazosin, and hydralazine resumed  N/V/D, in association with # 5 & 6, stool studies pending  Possible colovesical fistula, evaluated by surgery, planning on water-soluble enema study today with potential coloscopy tomorrow versus additional imaging  Possible small bowel obstruction/ileus, on clears, general surgery following  HFpEF, no signs of exacerbation.  Recently was started on low-dose torsemide, currently on hold  Aortic valve stenosis s/p TAVR in 2/2025  History of breast cancer  CAD    PLAN:  Will DC IVF after current bag is finished  If SBP continues to be > 160 today, will add low-dose amlodipine 5 mg daily  Repeat UPCR tomorrow once BP is better controlled  Check Jasmyn  Check bladder scan for completeness' sake  Surveillance labs    Thank you for involving us in the care of Vonda LEE [place].  Please feel free to call with any questions.    Pollo Fagan MD  04/24/25  10:28 EDT    [facility]  [phone]      Please note that portions of this note were completed with a voice recognition program.

## 2025-04-24 NOTE — THERAPY EVALUATION
Patient Name: Vonda Jay  : 1935    MRN: [id number]                              Today's Date: 2025       Admit Date: 2025    Visit Dx:     ICD-10-CM ICD-9-CM   1. Pain of upper abdomen  R10.10 789.09   2. Nausea vomiting and diarrhea  R11.2 787.91    R19.7 787.01   3. Colitis  K52.9 558.9     Patient Active Problem List   Diagnosis    Closed displaced intertrochanteric fracture of left femur    SANYA (acute kidney injury)    HTN (hypertension)    Acute blood loss anemia    Leukocytosis    Drug-induced constipation    Stage 3b chronic kidney disease    COVID-19 virus infection    Anemia due to chronic kidney disease    Acute on chronic diastolic CHF (congestive heart failure)    Severe aortic valve stenosis    Stage 3 chronic kidney disease    E. coli UTI (urinary tract infection)    Colovesical fistula - possible    (HFpEF) heart failure with preserved ejection fraction    Obesity (BMI 30-39.9)    Hypokalemia    Acute UTI (urinary tract infection)    Cough    Nocturnal hypoxemia    Hypophosphatemia    Respiratory distress    CHF (congestive heart failure)    Diastolic CHF, chronic    Acute hypoxic respiratory failure    Sepsis due to pneumonia    Pneumonia, unspecified organism    Aortic stenosis    S/P TAVR (transcatheter aortic valve replacement)    Acute kidney injury    Abdominal pain    Hypertensive urgency    Abnormal weight loss    Colitis     Past Medical History:   Diagnosis Date    Aortic valve stenosis     Arthritis     Cancer     Breast    Chronic kidney disease     Colovesical fistula - possible 2023    Heart murmur     History of breast cancer         History of carotid artery disease     History of radiation therapy     Hyperlipidemia     Hypertension     Low back pain     FROM FALL IN [employer] [address] 2025    Stage 3b chronic kidney disease 2022     Past Surgical History:   Procedure Laterality Date    AORTIC VALVE REPAIR/REPLACEMENT N/A 2025     Procedure: TTE TRANSFEMORAL TRANSCATHETER AORTIC VALVE REPLACEMENT PERCUTANEOUS APPROACH;  Surgeon: Ebenezer Albert MD;  Location: [facility] CVOR;  Service: Cardiothoracic;  Laterality: N/A;    AORTIC VALVE REPAIR/REPLACEMENT N/A 2/4/2025    Procedure: Transfemoral Transcatheter Aortic Valve Replacement with intraoperative transthoracic echocardiogram and possible open surgical rescue;  Surgeon: Mike Rizvi MD;  Location: [facility] CVOR;  Service: Cardiovascular;  Laterality: N/A;    BREAST LUMPECTOMY  1998    CARDIAC CATHETERIZATION N/A 11/8/2024    Procedure: Right and Left Heart Cath;  Surgeon: Mike Rizvi MD;  Location: [facility] CATH INVASIVE LOCATION;  Service: Cardiology;  Laterality: N/A;    CARDIAC CATHETERIZATION N/A 11/8/2024    Procedure: Coronary angiography;  Surgeon: Mike Rizvi MD;  Location: [facility] CATH INVASIVE LOCATION;  Service: Cardiology;  Laterality: N/A;    CAROTID ARTERY ANGIOPLASTY  2000, 2013    CHOLECYSTECTOMY  1979    FEMUR IM NAILING/RODDING Left 2/8/2018    Procedure: FEMUR INTRAMEDULLARY NAILING;  Surgeon: Zheng Fleming MD;  Location: [facility] OR;  Service:     HYSTERECTOMY  1989      General Information       Row Name 04/24/25 1440          OT Time and Intention    Subjective Information no complaints  -CE     Document Type evaluation  -CE     Mode of Treatment co-treatment;physical therapy;occupational therapy;other (see comments)  -CE     Patient Effort good  -CE     Symptoms Noted During/After Treatment nausea;other (see comments);increased pain  -CE     Comment pt with incontinence of stool and abdominal pain  -CE       Row Name 04/24/25 1449          General Information    Patient Profile Reviewed yes  -CE     Prior Level of Function independent:;ADL's;all household mobility  uses rollator at home and cane in community  -CE     Existing Precautions/Restrictions fall  -CE       Row Name 04/24/25 1447          Living Environment    Current Living  Arrangements home  -CE     People in Home alone  -CE       Row Name 04/24/25 1440          Home Main Entrance    Number of Stairs, Main Entrance --  ramp to enter home  -CE       Row Name 04/24/25 1440          Stairs Within Home, Primary    Stairs, Within Home, Primary chair lift to basement and has 2nd story but does not use; bed/bathroom on main floor  -CE       Row Name 04/24/25 1440          Cognition    Orientation Status (Cognition) oriented x 4  -CE       Row Name 04/24/25 1440          Safety Issues/Impairments Affecting Functional Mobility    Impairments Affecting Function (Mobility) endurance/activity tolerance;strength;pain  -CE     Comment, Safety Issues/Impairments (Mobility) co-tx appropriate 2/2 pt activity tolerance and to safely progress mobility  -CE               User Key  (r) = Recorded By, (t) = Taken By, (c) = Cosigned By      Initials Name Provider Type    CE Maria Del Rosario Vanegas OT Occupational Therapist                     Mobility/ADL's       Row Name 04/24/25 1443          Bed Mobility    Bed Mobility supine-sit;sit-supine  -CE     Supine-Sit [place] (Bed Mobility) minimum assist (75% patient effort)  -     Sit-Supine [place] (Bed Mobility) contact guard  -CE     Assistive Device (Bed Mobility) head of bed elevated;bed rails  -       Row Name 04/24/25 1443          Transfers    Transfers sit-stand transfer;stand-sit transfer;toilet transfer  -       Row Name 04/24/25 1443          Sit-Stand Transfer    Sit-Stand [place] (Transfers) minimum assist (75% patient effort)  -       Row Name 04/24/25 1443          Stand-Sit Transfer    Stand-Sit [place] (Transfers) 1 person assist;contact guard  -CE       Row Name 04/24/25 1443          Toilet Transfer    Type (Toilet Transfer) sit-stand;stand-sit  -CE     [place] Level (Toilet Transfer) minimum assist (75% patient effort);1 person assist  -CE     Assistive Device (Toilet Transfer) commode;grab bars/safety frame   -CE       Row Name 04/24/25 1443          Functional Mobility    Functional Mobility- Comment pt able to ambulate to bathroom with Courtney x 1 using FWW; Courtney x 2 for B HHA getting back to bed; assessment somewhat limited as pt actively vomitting and requiring assist for IV, emesis bag, etc.  -CE       Row Name 04/24/25 1443          Activities of Daily Living    BADL Assessment/Intervention lower body dressing;upper body dressing;toileting  -[facility] Name 04/24/25 1443          Lower Body Dressing Assessment/Training    [place] Level (Lower Body Dressing) don;doff;maximum assist (25% patient effort)  brief  -CE     Position (Lower Body Dressing) supported sitting  -CE       Row Name 04/24/25 1443          Upper Body Dressing Assessment/Training    [place] Level (Upper Body Dressing) don;doff;moderate assist (50% patient effort)  gown  -CE     Position (Upper Body Dressing) supported sitting  -CE       [facility] Name 04/24/25 1443          Toileting Assessment/Training    [place] Level (Toileting) change pad/brief;toileting skills;maximum assist (25% patient effort)  -     Assistive Devices (Toileting) commode;grab bar/safety frame  -CE     Position (Toileting) supported standing;unsupported sitting  -CE     Comment, (Toileting) pt requiring increased assist 2/2 nausea/vomitting  -CE               User Key  (r) = Recorded By, (t) = Taken By, (c) = Cosigned By      Initials Name Provider Type    Maria Del Rosario Henry OT Occupational Therapist                   Obj/Interventions       Row Name 04/24/25 1445          Sensory Assessment (Somatosensory)    Sensory Assessment (Somatosensory) sensation intact  -CE       Row Name 04/24/25 1445          Vision Assessment/Intervention    Visual Impairment/Limitations WFL  -[facility] Name 04/24/25 1445          Range of Motion Comprehensive    General Range of Motion no range of motion deficits identified  -[facility] Name 04/24/25 1445          Strength  Comprehensive (MMT)    Comment, General Manual Muscle Testing (MMT) Assessment generalized weakness this date but no focal weakness  -CE       Row Name 04/24/25 1445          Motor Skills    Motor Skills functional endurance  -CE     Functional Endurance fair-  -CE       Row Name 04/24/25 1445          Balance    Dynamic Standing Balance minimal assist  -CE               User Key  (r) = Recorded By, (t) = Taken By, (c) = Cosigned By      Initials Name Provider Type    CE Maria Del Rosario Vanegas, OT Occupational Therapist                   Goals/Plan       Row Name 04/24/25 1451          Bed Mobility Goal 1 (OT)    Activity/Assistive Device (Bed Mobility Goal 1, OT) bed mobility activities, all  -CE     [place] Level/Cues Needed (Bed Mobility Goal 1, OT) modified independence  -CE     Time Frame (Bed Mobility Goal 1, OT) short term goal (STG);2 weeks  -CE       Row Name 04/24/25 1451          Transfer Goal 1 (OT)    Activity/Assistive Device (Transfer Goal 1, OT) transfers, all  -CE     [place] Level/Cues Needed (Transfer Goal 1, OT) modified independence  -CE     Time Frame (Transfer Goal 1, OT) short term goal (STG);2 weeks  -CE       Row Name 04/24/25 1451          Bathing Goal 1 (OT)    Activity/Device (Bathing Goal 1, OT) bathing skills, all  -CE     [place] Level/Cues Needed (Bathing Goal 1, OT) modified independence  -CE     Time Frame (Bathing Goal 1, OT) short term goal (STG);2 weeks  -CE       Row Name 04/24/25 1451          Dressing Goal 1 (OT)    Activity/Device (Dressing Goal 1, OT) dressing skills, all  -CE     [place]/Cues Needed (Dressing Goal 1, OT) modified independence  -CE     Time Frame (Dressing Goal 1, OT) short term goal (STG);2 weeks  -CE       Row Name 04/24/25 1451          Toileting Goal 1 (OT)    Activity/Device (Toileting Goal 1, OT) toileting skills, all  -CE     [place] Level/Cues Needed (Toileting Goal 1, OT) modified independence  -CE     Time Frame (Toileting Goal  1, OT) short term goal (STG);2 weeks  -       Row Name 04/24/25 1455          Strength Goal 1 (OT)    Strength Goal 1 (OT) Pt will be independent with HEP focusing on increasing strength in prep for I/ADLs.  -       Row Name 04/24/25 1457          Therapy Assessment/Plan (OT)    Planned Therapy Interventions (OT) activity tolerance training;BADL retraining;functional balance retraining;strengthening exercise;transfer/mobility retraining  -               User Key  (r) = Recorded By, (t) = Taken By, (c) = Cosigned By      Initials Name Provider Type    CE Maria Del Rosario Vanegas, ERICK Occupational Therapist                   Clinical Impression       Row Name 04/24/25 144          Pain Assessment    Pre/Posttreatment Pain Comment pt with c/o abdominal pain/cramping; did not formally rate; RN aware  -       Row Name 04/24/25 1445          Plan of Care Review    Plan of Care Reviewed With patient;son  -CE     Outcome Evaluation Pt seen for OT eval after admission 2/2 abdominal pain. Pt pleasant and agreeable to mobilize, despite intermittent abdominal pain/cramping. Pt requiring Courtney for bed mobility and STS from EOB. Able to ambulate to/from bathroom with Courtney x 1-2 but limited 2/2 nausea/vomitting and incontinence. Pt returned to bed at end of session and OT will con't to follow for stated goals. Anticipate d/c to home with HH pending further progress medically and with ability to tolerate mobility/ADLs without nausea/vomitting/diarrhea.  -       Row Name 04/24/25 1447          Therapy Assessment/Plan (OT)    Rehab Potential (OT) good  -CE     Therapy Frequency (OT) 3 times/wk  -CE       Row Name 04/24/25 1444          Therapy Plan Review/Discharge Plan (OT)    Anticipated Discharge Disposition (OT) home with assist;home with home health  -       Row Name 04/24/25 1445          Vital Signs    O2 Delivery Pre Treatment room air  -CE     O2 Delivery Intra Treatment room air  -CE     O2 Delivery Post Treatment room  air  -CE     Pre Patient Position Supine  -CE     Intra Patient Position Standing  -CE     Post Patient Position Supine  -CE       Row Name 04/24/25 1446          Positioning and Restraints    Pre-Treatment Position in bed  -CE     Post Treatment Position bed  -CE     In Bed notified nsg;supine;fowlers;with family/caregiver;exit alarm on;encouraged to call for assist;call light within reach  -CE               User Key  (r) = Recorded By, (t) = Taken By, (c) = Cosigned By      Initials Name Provider Type    CE Maria Del Rosario Vanegas OT Occupational Therapist                   Outcome Measures       Row Name 04/24/25 1451          How much help from another is currently needed...    Putting on and taking off regular lower body clothing? 2  -CE     Bathing (including washing, rinsing, and drying) 2  -CE     Toileting (which includes using toilet bed pan or urinal) 2  -CE     Putting on and taking off regular upper body clothing 3  -CE     Taking care of personal grooming (such as brushing teeth) 3  -CE     Eating meals 4  -CE     AM-PAC 6 Clicks Score (OT) 16  -CE       Row Name 04/24/25 1440          How much help from another person do you currently need...    Turning from your back to your side while in flat bed without using bedrails? 4  -EM     Moving from lying on back to sitting on the side of a flat bed without bedrails? 3  -EM     Moving to and from a bed to a chair (including a wheelchair)? 3  -EM     Standing up from a chair using your arms (e.g., wheelchair, bedside chair)? 3  -EM     Climbing 3-5 steps with a railing? 2  -EM     To walk in hospital room? 3  -EM     AM-PAC 6 Clicks Score (PT) 18  -EM       Row Name 04/24/25 1451          Functional Assessment    Outcome Measure Options AM-PAC 6 Clicks Daily Activity (OT)  -CE               User Key  (r) = Recorded By, (t) = Taken By, (c) = Cosigned By      Initials Name Provider Type    EM Mimi Porras, PT Physical Therapist    Marai Del Rosario Henry OT  Occupational Therapist                    Occupational Therapy Education       Title: PT OT SLP Therapies (In Progress)       Topic: Occupational Therapy (Done)       Point: ADL training (Done)       Learning Progress Summary            Patient Acceptance, E, VU by CE at 4/24/2025 1453                      Point: Precautions (Done)       Learning Progress Summary            Patient Acceptance, E, VU by CE at 4/24/2025 1453                                      User Key       Initials Effective Dates Name Provider Type Discipline    CE 10/17/22 -  Maria Del Rosario Vanegas OT Occupational Therapist OT                  OT Recommendation and Plan  Planned Therapy Interventions (OT): activity tolerance training, BADL retraining, functional balance retraining, strengthening exercise, transfer/mobility retraining  Therapy Frequency (OT): 3 times/wk  Plan of Care Review  Plan of Care Reviewed With: patient, son  Outcome Evaluation: Pt seen for OT eval after admission 2/2 abdominal pain. Pt pleasant and agreeable to mobilize, despite intermittent abdominal pain/cramping. Pt requiring Courtney for bed mobility and STS from EOB. Able to ambulate to/from bathroom with Courtney x 1-2 but limited 2/2 nausea/vomitting and incontinence. Pt returned to bed at end of session and OT will con't to follow for stated goals. Anticipate d/c to home with HH pending further progress medically and with ability to tolerate mobility/ADLs without nausea/vomitting/diarrhea.     Time Calculation:   Evaluation Complexity (OT)  Review Occupational Profile/Medical/Therapy History Complexity: expanded/moderate complexity  Assessment, Occupational Performance/Identification of Deficit Complexity: 3-5 performance deficits  Clinical Decision Making Complexity (OT): detailed assessment/moderate complexity  Overall Complexity of Evaluation (OT): moderate complexity     Time Calculation- OT       Row Name 04/24/25 1453 04/24/25 0910          Time Calculation- OT    OT  Start Time 1300  -CE --     OT Stop Time 1332  -CE --     OT Time Calculation (min) 32 min  -CE --     Total Timed Code Minutes- OT 12 minute(s)  -CE --     OT Received On 04/24/25  -CE --     OT - Next Appointment 04/28/25  -CE 04/25/25  -CE     OT Goal Re-Cert Due Date 05/08/25  -CE --        Timed Charges    86061 - OT Self Care/Mgmt Minutes 12  -CE --        Total Minutes    Timed Charges Total Minutes 12  -CE --      Total Minutes 12  -CE --               User Key  (r) = Recorded By, (t) = Taken By, (c) = Cosigned By      Initials Name Provider Type    CE Maria Del Rosario Vanegas OT Occupational Therapist                  Therapy Charges for Today       Code Description Service Date Service Provider Modifiers Qty    [id number]  OT SELF CARE/MGMT/TRAIN EA 15 MIN 4/24/2025 Maria Del Rosario Vanegas OT GO 1    [id number]  OT EVAL MOD COMPLEXITY 3 4/24/2025 Maria Del Rosario Vanegas OT GO 1                 Maria Del Rosario Vanegas OT  4/24/2025

## 2025-04-24 NOTE — PLAN OF CARE
Goal Outcome Evaluation:  Plan of Care Reviewed With: patient, son           Outcome Evaluation: Pt seen for OT eval after admission 2/2 abdominal pain. Pt pleasant and agreeable to mobilize, despite intermittent abdominal pain/cramping. Pt requiring Courtney for bed mobility and STS from EOB. Able to ambulate to/from bathroom with Courtney x 1-2 but limited 2/2 nausea/vomitting and incontinence. Pt returned to bed at end of session and OT will con't to follow for stated goals. Anticipate d/c to home with HH pending further progress medically and with ability to tolerate mobility/ADLs without nausea/vomitting/diarrhea.    Anticipated Discharge Disposition (OT): home with assist, home with home health

## 2025-04-24 NOTE — PROGRESS NOTES
Name: Vonda Jay ADMIT: 2025   : 1935  PCP: Ambrose Ashley MD    MRN: [id number] LOS: 1 days   AGE/SEX: 89 y.o. female  ROOM: [facility]     Subjective   Subjective   No acute events overnight.  Patient continues to have abdominal pain and vomiting.  She has also had some diarrhea.  No chest pain or shortness of breath.  Her son is at bedside.    Objective   Objective     Vital Signs  Temp:  [97.6 °F (36.4 °C)-98.5 °F (36.9 °C)] 98.3 °F (36.8 °C)  Heart Rate:  [] 73  Resp:  [16-18] 16  BP: (167-216)/(46-71) 167/46  SpO2:  [91 %-100 %] 93 %  on   ;   Device (Oxygen Therapy): room air  Body mass index is 27.45 kg/m².    Physical Exam  General: Alert, no acute distress.  Sitting up in bed.  Uncomfortable appearing.  ENT: No conjunctival injection or scleral icterus. Moist mucous membranes.   Neuro: Eyes open and moving in all directions, generalized weakness, face symmetric, no focal deficits.   Lungs: Clear to auscultation bilaterally. No wheeze or crackles. No distress.   Heart: RRR, no murmurs. No edema.  Abdomen: Distended, tender to palpation, high-pitched bowel sounds, tender to palpation  Ext: Warm and well-perfused. No edema.   Skin: No rashes or lesions. IV site without swelling or erythema.     Results Review     I reviewed the patient's new clinical results:  Results from last 7 days   Lab Units 25  0450 25  1133 25  0927   WBC 10*3/mm3 6.40 7.37 12.20*   HEMOGLOBIN g/dL 8.9* 9.6* 9.2*   PLATELETS 10*3/mm3 350 384 312     Results from last 7 days   Lab Units 25  0450 25  1133 25  0927   SODIUM mmol/L 131* 132* 133*   POTASSIUM mmol/L 4.0 3.9 3.8   CHLORIDE mmol/L 96* 94* 95*   CO2 mmol/L 22.0 24.0 25.0   BUN mg/dL 31* 34* 33*   CREATININE mg/dL 1.54* 1.90* 2.23*   GLUCOSE mg/dL 71 102* 132*   EGFR mL/min/1.73 32.1* 25.0* 20.6*     Results from last 7 days   Lab Units 25  0450 25  1133 25  0927   ALBUMIN g/dL 3.1* 3.7 3.8  "  BILIRUBIN mg/dL 0.2 0.3 0.3   ALK PHOS U/L 56 65 68   AST (SGOT) U/L 16 12 17   ALT (SGPT) U/L 8 8 7     Results from last 7 days   Lab Units 04/24/25  0450 04/23/25  1133 04/20/25  0927   CALCIUM mg/dL 8.4* 8.5* 8.8   ALBUMIN g/dL 3.1* 3.7 3.8   MAGNESIUM mg/dL 2.5* 2.8*  --    PHOSPHORUS mg/dL 3.5 3.1  --      Results from last 7 days   Lab Units 04/23/25  1133 04/20/25  0927   LACTATE mmol/L 0.8 1.0     No results found for: \"HGBA1C\", \"POCGLU\"    FL Barium Enema Water Soluble Single Contrast  Result Date: 4/24/2025   1. Proximal sigmoid mucosal irregularity measuring approximately 1.5 cm in length with focal narrowing of approximately 6.1 mm in luminal diameter. Concerning for sigmoid colon mass. 2. At the junction of the descending colon and sigmoid colon, small amount of contrast extravasation is identified during early filling and concerning for colovesical fistula.         CT Abdomen Pelvis Without Contrast  Result Date: 4/23/2025   1. Large amount of loose appearing stool seen in the colon with moderate distention, with transition seen in the sigmoid colon which appears collapsed. Associated masslike appearance to the bladder dome with surrounding inflammation and containing a foci of gas with inflammation extending to the adjacent sigmoid colon. Overall findings may indicate a distal colonic obstruction which could be due to a sigmoid colonic stricture or mass with a potential colovesical fistula and phlegmonous change along the bladder dome versus tumor. 2. Areas of pneumatosis at the hepatic flexure and splenic flexure of the colon versus gas positioned between the colonic contents and the bowel wall. Recommend correlation with lactic acid levels. 3. Prominent gas and fluid-filled small bowel loops, without a small bowel obstructive pattern. Finding may be related to a distal colonic obstruction or ileus. In addition, mild wall thickening in a small bowel loop seen in the anterior pelvis. 4. Mild free " fluid in the abdomen and pelvis, new. 5. Trace left pleural effusion, new. 6. Interlobular septal thickening at the lung bases. Suspect interstitial pulmonary edema.  Call Report: Dr. Shepard was notified by telephone of the above findings on April 23, 2025 at 2:04 p.m.    This report was finalized on 4/23/2025 2:08 PM by Dr. Jamel Viera M.D on Workstation: [id number]        I have personally reviewed all medications:  Scheduled Medications  amLODIPine, 5 mg, Oral, Q24H  aspirin, 81 mg, Oral, Daily  calcium 500 mg vitamin D 5 mcg (200 UT), 2 tablet, Oral, BID  carvedilol, 12.5 mg, Oral, BID With Meals  citalopram, 10 mg, Oral, Daily  enoxaparin sodium, 30 mg, Subcutaneous, Nightly  hydrALAZINE, 100 mg, Oral, Q8H  pravastatin, 40 mg, Oral, Nightly  sodium chloride, 10 mL, Intravenous, Q12H  terazosin, 1 mg, Oral, Nightly  terazosin, 2 mg, Oral, Daily  [Held by provider] torsemide, 20 mg, Oral, Daily    Infusions   Diet  NPO Diet NPO Type: Strict NPO      Intake/Output Summary (Last 24 hours) at 4/24/2025 1419  Last data filed at 4/24/2025 1219  Gross per 24 hour   Intake 240 ml   Output 100 ml   Net 140 ml       Assessment/Plan     Active Hospital Problems    Diagnosis  POA    **Abdominal pain [R10.9]  Yes    Hypertensive urgency [I16.0]  Yes    Abnormal weight loss [R63.4]  Yes    Colitis [K52.9]  Unknown    S/P TAVR (transcatheter aortic valve replacement) [Z95.2]  Not Applicable    Aortic stenosis [I35.0]  Yes    (HFpEF) heart failure with preserved ejection fraction [I50.30]  Yes    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       89 y.o. female with Abdominal pain.    Abdominal pain  Unintentional weight loss  -CT abdomen/pelvis with large amount of loose appearing stool and moderate distention,  transition seen in the sigmoid colon, concern for possible sigmoid colonic stricture or mass.  Also notes areas of pneumatosis along the hepatic and splenic flexures, prominent gas and fluid-filled small bowel lives with no bowel obstructive pattern, mild free fluid in the abdomen and pelvis  - Barium enema today with proximal sigmoid mucosal irregularity concerning for sigmoid colon mass, concern for colovesical fistula  - General Surgery consulted  - Plan for flexible sigmoidoscopy tomorrow  - Have reached out to surgery given patient's worsening abdominal pain and vomiting this afternoon.  Would like to consider placing an NG tube but awaiting their reply.  - N.p.o, no additional IV fluids per nephrology    Hypertensive urgency  Chronic diastolic heart failure  History of TAVR  - Blood pressure trending high again this morning  - Continue Coreg, hydralazine, terazosin  - Amlodipine added by nephrology  - Titrate meds as needed based on BP trends-torsemide on hold    Anemia of CKD  -Hemoglobin decreased 8.9 today, below baseline  -No signs of active bleeding  -Repeat CBC with morning labs, transfuse for Hb less than 7    CKD stage IIIb  Hyponatremia  -Creatinine improved to 1.54 today, consistent with baseline  - Nephrology following  - Sodium low at 131, nephrology holding additional IV fluids  - Repeat BMP with morning labs      SCDs for DVT prophylaxis.  DNR.  Discussed with patient, nursing staff, and consulting provider.  Anticipate discharge TBD    Alison Soriano MD  [facility]  04/24/25  14:19 EDT

## 2025-04-24 NOTE — PLAN OF CARE
Goal Outcome Evaluation:  Plan of Care Reviewed With: patient           Outcome Evaluation: Pt is 89 yo female admitted [facility] with abdominal pain. PMH significant for HTN, heart failure, s/p TAVR, CKD. patient lives alone, independent with rollator or cane at baseline. Patient performed bed mobility with Courtney, sit to stand with Courtney, ambulated to bathroom with rwx and minAx2, ambulated back from bathroom with HHA/minAx2. Patient has impairments consisting of generalized weakness, decreased acitvity tolerance and would benefit from skilled PT. d/c plan is to return home with family assist.    Anticipated Discharge Disposition (PT): home with assist

## 2025-04-24 NOTE — PROGRESS NOTES
Colorectal & General Surgery  Progress Note    Patient: Vonda Jay  YOB: 1935  MRN: [id number]      Assessment  Vonda Jay is a 89 y.o. female with an extensive PMH including TAVR who presented recently with abdominal pain.  Clinically, her symptoms sound like gastroenteritis, but CT scan of the abdomen and pelvis demonstrates some abnormalities within the sigmoid colon of the bladder where they almost abut, with some surrounding free fluid.  This is concerning for some kind of inflammatory process either at the dome of the bladder or within the sigmoid colon.      She is hypertensive.  Her abdominal exam continues to be benign, without any leukocytosis or tachycardia.  Will plan to proceed with water-soluble enema study today.  Potential colonoscopy Friday versus additional imaging.  No need for antibiotics at this time.    Plan  Water-soluble enema study today  Potential colonoscopy Friday versus additional imaging  Continue clear liquid diet      Subjective  Patient lying in bed this morning reporting some nausea and abdominal cramping.  She had 1 loose bowel movement this morning without any signs of hematochezia or melena.    Objective    Vitals:    04/24/25 0755   BP: (!) 216/71   Pulse: 77   Resp:    Temp: 98.4 °F (36.9 °C)   SpO2: 93%       Physical Exam  Constitutional: Well-developed well-nourished, no acute distress  Neck: Supple, trachea midline  Respiratory: No increased work of breathing, Symmetric excursion  Cardiovascular: Well-perfused, hypertensive  Abdominal: Soft, distended, minimally tender  Skin: Warm, dry, no rash on visualized skin surfaces  Psychiatric: Alert and oriented ×3, normal affect     Laboratory Results  I have personally reviewed labs from 4/24/2025.   Creatinine 1.54 Mg 2.5 INR 1.21 Hgb 8.9 from 9.6    Radiology  None to review.  Pending results of barium enema.        Lainey Millard PA-C    [facility] - General Surgery   [address], [address]  [place] [address]    [address], [address]  [place] [address]    Office: [phone]  Fax: [phone]     to be assessed when appropriate

## 2025-04-25 ENCOUNTER — ANESTHESIA (OUTPATIENT)
Dept: GASTROENTEROLOGY | Facility: HOSPITAL | Age: OVER 89
End: 2025-04-25
Payer: MEDICARE

## 2025-04-25 ENCOUNTER — ANESTHESIA EVENT (OUTPATIENT)
Dept: GASTROENTEROLOGY | Facility: HOSPITAL | Age: OVER 89
End: 2025-04-25
Payer: MEDICARE

## 2025-04-25 PROCEDURE — 25010000002 PROPOFOL 1000 MG/100ML EMULSION: Performed by: NURSE ANESTHETIST, CERTIFIED REGISTERED

## 2025-04-25 PROCEDURE — 25010000002 LIDOCAINE 2% SOLUTION: Performed by: NURSE ANESTHETIST, CERTIFIED REGISTERED

## 2025-04-25 PROCEDURE — 25010000002 GLYCOPYRROLATE 0.2 MG/ML SOLUTION: Performed by: NURSE ANESTHETIST, CERTIFIED REGISTERED

## 2025-04-25 RX ORDER — LIDOCAINE HYDROCHLORIDE 20 MG/ML
INJECTION, SOLUTION INFILTRATION; PERINEURAL AS NEEDED
Status: DISCONTINUED | OUTPATIENT
Start: 2025-04-25 | End: 2025-04-25 | Stop reason: SURG

## 2025-04-25 RX ORDER — PROPOFOL 10 MG/ML
INJECTION, EMULSION INTRAVENOUS AS NEEDED
Status: DISCONTINUED | OUTPATIENT
Start: 2025-04-25 | End: 2025-04-25 | Stop reason: SURG

## 2025-04-25 RX ORDER — GLYCOPYRROLATE 0.2 MG/ML
INJECTION INTRAMUSCULAR; INTRAVENOUS AS NEEDED
Status: DISCONTINUED | OUTPATIENT
Start: 2025-04-25 | End: 2025-04-25 | Stop reason: SURG

## 2025-04-25 RX ADMIN — PROPOFOL INJECTABLE EMULSION 30 MG: 10 INJECTION, EMULSION INTRAVENOUS at 07:04

## 2025-04-25 RX ADMIN — GLYCOPYRROLATE 0.2 MG: 0.2 INJECTION INTRAMUSCULAR; INTRAVENOUS at 07:04

## 2025-04-25 RX ADMIN — PROPOFOL INJECTABLE EMULSION 140 MCG/KG/MIN: 10 INJECTION, EMULSION INTRAVENOUS at 07:05

## 2025-04-25 RX ADMIN — LIDOCAINE HYDROCHLORIDE 60 MG: 20 INJECTION, SOLUTION INFILTRATION; PERINEURAL at 07:04

## 2025-04-25 NOTE — ANESTHESIA POSTPROCEDURE EVALUATION
"Patient: Vonda Jay    Procedure Summary       Date: 04/25/25 Room / Location:  [facility]    Anesthesia Start: 0700 Anesthesia Stop: 0722    Procedure: FLEXIBLE SIGMOIDOSCOPY WITH BIOPSY Diagnosis:       Colitis      (Colitis [K52.9])    Surgeons: Reece Franco MD Provider: Gene Fitzpatrick MD    Anesthesia Type: MAC ASA Status: 3            Anesthesia Type: MAC    Vitals  Vitals Value Taken Time   /46 04/25/25 07:31   Temp     Pulse 80 04/25/25 07:32   Resp 20 04/25/25 07:31   SpO2 96 % 04/25/25 07:32   Vitals shown include unfiled device data.        Post Anesthesia Care and Evaluation    Patient location during evaluation: bedside  Patient participation: complete - patient participated  Level of consciousness: awake  Pain management: adequate    Airway patency: patent  Anesthetic complications: No anesthetic complications    Cardiovascular status: acceptable  Respiratory status: acceptable  Hydration status: acceptable    Comments: */46   Pulse 80   Temp 36.8 °C (98.2 °F) (Oral)   Resp 20   Ht 154.9 cm (60.98\")   Wt 65 kg (143 lb 3.2 oz)   LMP  (LMP Unknown)   SpO2 95%   BMI 27.07 kg/m²       "

## 2025-04-25 NOTE — PLAN OF CARE
Goal Outcome Evaluation:      NPO, NG tube to intermittent low wall suction.  Tap water enema given.  Consent signed, ready for procedure.

## 2025-04-25 NOTE — OP NOTE
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  Diverticular sigmoid colon.  At approximately 22 cm, there is a stricture that was not traversable with the endoscope.  No evidence of mucosal abnormality aside from diverticulosis to suggest malignancy.  Biopsies obtained.    RECOMMENDATIONS:   Await pathology results    SURGEON:  Anthony Franco MD    ANESTHESIA:  Monitored anesthesia care    EBL:  None    SPECIMEN:  Sigmoid colon biopsy    OPERATIVE DESCRIPTION:  The patient was brought to the endoscopy suite under the care of the nursing staff.  A preoperative timeout was performed.  Monitored anesthesia care was initiated.  A digital rectal examination was performed.  The endoscope was inserted into the anus and advanced carefully to the sigmoid colon.  The endoscope was then withdrawn and the entire mucosal surface of the colon and rectum were visualized.  Cold forcep biopsy obtained of the area of stricture.  The scope was then withdrawn.    The patient tolerated the procedure well and was transferred to the postanesthesia care unit in stable condition.    Anthony Franco M.D.  Colorectal & General Surgery  [facility]    [address] [place] [address] [address]  [place], [place], [facility]  P: [phone]  F: [phone]

## 2025-04-25 NOTE — ANESTHESIA PREPROCEDURE EVALUATION
Anesthesia Evaluation     no history of anesthetic complications:   NPO Solid Status: > 8 hours  NPO Liquid Status: > 2 hours           Airway   Mallampati: II  Neck ROM: full  no difficulty expected  Comment: NG tube in place  Dental - normal exam     Pulmonary - normal exam   (+) pneumonia , a smoker Former,  (-) COPD, asthma, sleep apnea    ROS comment: H/o Acute hypoxic respiratory failure    PE comment: nonlabored  Cardiovascular - normal exam  Exercise tolerance: poor (<4 METS)    Rhythm: regular  Rate: normal    (+) hypertension, valvular problems/murmurs (severe AS; s/p TAVR) murmur and AS, CHF Diastolic >=55%, hyperlipidemia,  carotid artery disease  (-) past MI, CAD, dysrhythmias, angina    ROS comment: Echo 3/13/25:  ·  Left ventricular systolic function is normal. Left ventricular ejection fraction appears to be 66 - 70%.  ·  Left ventricular diastolic function was normal.  ·  The left atrial cavity is mildly dilated.  ·  Peak velocity of the flow distal to the aortic valve is 210.4 cm/s. Aortic valve maximum pressure gradient is 18 mmHg. Aortic valve mean pressure gradient is 8 mmHg. Aortic valve dimensionless index is 0.45 .  ·  There is a TAVR valve present.  Normal prosthetic valve gradients with mild paravalvular regurgitation.  ·  Mild mitral valve stenosis is present.  Mean transvalvular gradient 4 mmHg with mitral valve area 1.7 cm² by pressure half-time.  ·  Estimated right ventricular systolic pressure from tricuspid regurgitation is mildly elevated (35-45 mmHg). Calculated right ventricular systolic pressure from tricuspid regurgitation is 35 mmHg.         Neuro/Psych- negative ROS  (-) seizures, TIA, CVA  GI/Hepatic/Renal/Endo    (+) renal disease (stage 3b CKD)-  (-) GERD, liver disease, diabetes, no thyroid disorder    ROS Comment: Colovesical fistula - possible    Musculoskeletal     (+) arthralgias  Abdominal    Substance History      OB/GYN          Other   arthritis, blood dyscrasia  anemia,   history of cancer (breast cancer)                  Anesthesia Plan    ASA 3     MAC     (Pt has NO CPR order and wants to remain NO CPR even with procedure.)    Anesthetic plan, risks, benefits, and alternatives have been provided, discussed and informed consent has been obtained with: patient.    CODE STATUS:    Code Status (Patient has no pulse and is not breathing): No CPR (Do Not Attempt to Resuscitate)  Medical Interventions (Patient has pulse or is breathing): Limited Support  Medical Intervention Limits: No intubation (DNI)

## 2025-04-25 NOTE — PROGRESS NOTES
[facility] Progress Note      Patient Name: Vonda Jay  : 1935  MRN: [id number]  Primary Care Physician:  Ambrose Ashley MD  Date of admission: 2025    Subjective     Interval History:   Flex sig with biopsy earlier today; diverticular sigmoid stricture suspected    BP labile, but overall improving  Abdominal pain better; tolerating liquid diet  Has not passed any gas today, but had diarrhea last night (had had enema)  No shortness of breath on 1 L/min    Review of Systems:   As noted above    Objective     Vitals:   Temp:  [98 °F (36.7 °C)-98.7 °F (37.1 °C)] 98 °F (36.7 °C)  Heart Rate:  [71-80] 73  Resp:  [10-20] 18  BP: (130-202)/(38-68) 158/53  Flow (L/min) (Oxygen Therapy):  [1-1.5] 1    Intake/Output Summary (Last 24 hours) at 2025 1444  Last data filed at 2025 1300  Gross per 24 hour   Intake 100 ml   Output 600 ml   Net -500 ml       Physical Exam:    Constitutional: Awake, appears younger than actual age, NAD  HEENT: Sclera anicteric, no conjunctival injection; bilateral carotid bruits  Neck: Supple, no JVD  Respiratory: Mostly clear, nonlabored on 1 L/min  Cardiovascular: RRR, 2/6M, no rub  Gastrointestinal: BS +, soft, slight tenderness but no G or R, less distended than yesterday  : No palpable bladder  Musculoskeletal: No edema, no clubbing or cyanosis  Psychiatric: Appropriate affect, cooperative, oriented  Neurologic: No asterixis, moving all extremities, normal speech and mental status  Skin: Warm and dry     Scheduled Meds:     amLODIPine, 5 mg, Oral, Q24H  aspirin, 81 mg, Oral, Daily  calcium 500 mg vitamin D 5 mcg (200 UT), 2 tablet, Oral, BID  carvedilol, 12.5 mg, Oral, BID With Meals  citalopram, 10 mg, Oral, Daily  enoxaparin sodium, 30 mg, Subcutaneous, Nightly  hydrALAZINE, 100 mg, Oral, Q8H  pravastatin, 40 mg, Oral, Nightly  sodium chloride, 10 mL, Intravenous, Q12H  terazosin, 1 mg, Oral, Nightly  terazosin, 2 mg, Oral,  Daily  [Held by provider] torsemide, 20 mg, Oral, Daily      IV Meds:        Results Reviewed:   I have personally reviewed the results from the time of this admission to 4/25/2025 14:44 EDT     Results from last 7 days   Lab Units 04/25/25  0405 04/24/25  0450 04/23/25  1133   SODIUM mmol/L 134* 131* 132*   POTASSIUM mmol/L 3.9 4.0 3.9   CHLORIDE mmol/L 97* 96* 94*   CO2 mmol/L 21.0* 22.0 24.0   BUN mg/dL 35* 31* 34*   CREATININE mg/dL 1.84* 1.54* 1.90*   CALCIUM mg/dL 8.3* 8.4* 8.5*   BILIRUBIN mg/dL 0.2 0.2 0.3   ALK PHOS U/L 54 56 65   ALT (SGPT) U/L 5 8 8   AST (SGOT) U/L 15 16 12   GLUCOSE mg/dL 65 71 102*       Estimated Creatinine Clearance: 17.9 mL/min (A) (by C-G formula based on SCr of 1.84 mg/dL (H)).    Results from last 7 days   Lab Units 04/24/25  0450 04/23/25  1133   MAGNESIUM mg/dL 2.5* 2.8*   PHOSPHORUS mg/dL 3.5 3.1             Results from last 7 days   Lab Units 04/25/25  0405 04/24/25  0450 04/23/25  1133 04/20/25  0927   WBC 10*3/mm3 6.05 6.40 7.37 12.20*   HEMOGLOBIN g/dL 8.2* 8.9* 9.6* 9.2*   PLATELETS 10*3/mm3 360 350 384 312       Results from last 7 days   Lab Units 04/24/25  0450   INR  1.21*       Assessment / Plan     ASSESSMENT:  CKD 4, baseline SCr 1.5-1.8, stable.  CKD attributed to longstanding hypertension, long-term NSAID use, prior smoking, generalized atherosclerotic disease, and CHF.  Volume status is stable.  Electrolytes fine.    Nephrotic-range proteinuria, UPCR 3.6 g/g, although likely falsely elevated due to concurrent hypertensive urgency   Hypertension of advanced CKD, with improving control  N/V/D, in association with # 5 & 6  Suspected sigmoid stricture by flexible sigmoidoscopy performed 4/25  HFpEF, no signs of exacerbation.  Recently was started on low-dose torsemide, currently on hold  Aortic valve stenosis s/p TAVR in 2/2025  History of breast cancer  CAD    PLAN:  1.  Change terazosin to 2 mg twice daily; amlodipine 5 mg daily added earlier  2.  Surveillance  labs  3.  Discussed with family at bedside    Thank you for involving us in the care of Vonda Jay.  Please feel free to call with any questions.    Pollo Fagan MD  04/25/25  14:44 EDT    [facility]  [phone]    Please note that portions of this note were completed with a voice recognition program.

## 2025-04-25 NOTE — PLAN OF CARE
Goal Outcome Evaluation:               VSS, BP still low and midodrine dose increased. Wound vac remains. Midline removed. Continuing PO abx, awaiting pre-cert. No other issues noted

## 2025-04-25 NOTE — PROGRESS NOTES
Name: Vonda Jay ADMIT: 2025   : 1935  PCP: Ambrose Ashley MD    MRN: [id number] LOS: 2 days   AGE/SEX: 89 y.o. female  ROOM: [facility]/     Subjective   Subjective   No acute events overnight.  Flexible sigmoidoscopy this morning.  NG tube remains in place to low wall suction.  Output is decreased and patient is not feeling nauseous.  Eager to get the NG tube out.    Objective   Objective     Vital Signs  Temp:  [98.2 °F (36.8 °C)-98.7 °F (37.1 °C)] 98.2 °F (36.8 °C)  Heart Rate:  [71-80] 80  Resp:  [10-20] 20  BP: (130-202)/(38-68) 202/68  SpO2:  [93 %-98 %] 98 %  on  Flow (L/min) (Oxygen Therapy):  [1-1.5] 1;   Device (Oxygen Therapy): nasal cannula  Body mass index is 27.07 kg/m².    Physical Exam  General: Alert, no acute distress.  Sitting up in bed.  Much more comfortable appearing today.  ENT: No conjunctival injection or scleral icterus. Moist mucous membranes.  NG tube in place.  Neuro: Eyes open and moving in all directions, generalized weakness, face symmetric, no focal deficits.   Lungs: Clear to auscultation bilaterally. No wheeze or crackles. No distress.   Heart: RRR, systolic murmur  Abdomen: Improved distention, mildly tender to palpation, normal bowel sounds, no rebound or guarding  Ext: Warm and well-perfused. No edema.   Skin: No rashes or lesions. IV site without swelling or erythema.     Results Review     I reviewed the patient's new clinical results:  Results from last 7 days   Lab Units 25  0405 25  0450 25  1133 25  0927   WBC 10*3/mm3 6.05 6.40 7.37 12.20*   HEMOGLOBIN g/dL 8.2* 8.9* 9.6* 9.2*   PLATELETS 10*3/mm3 360 350 384 312     Results from last 7 days   Lab Units 25  0405 25  0450 25  1133 25  0927   SODIUM mmol/L 134* 131* 132* 133*   POTASSIUM mmol/L 3.9 4.0 3.9 3.8   CHLORIDE mmol/L 97* 96* 94* 95*   CO2 mmol/L 21.0* 22.0 24.0 25.0   BUN mg/dL 35* 31* 34* 33*   CREATININE mg/dL 1.84* 1.54* 1.90* 2.23*  "  GLUCOSE mg/dL 65 71 102* 132*   EGFR mL/min/1.73 26.0* 32.1* 25.0* 20.6*     Results from last 7 days   Lab Units 04/25/25  0405 04/24/25  0450 04/23/25  1133 04/20/25  0927   ALBUMIN g/dL 3.1* 3.1* 3.7 3.8   BILIRUBIN mg/dL 0.2 0.2 0.3 0.3   ALK PHOS U/L 54 56 65 68   AST (SGOT) U/L 15 16 12 17   ALT (SGPT) U/L 5 8 8 7     Results from last 7 days   Lab Units 04/25/25  0405 04/24/25  0450 04/23/25  1133 04/20/25  0927   CALCIUM mg/dL 8.3* 8.4* 8.5* 8.8   ALBUMIN g/dL 3.1* 3.1* 3.7 3.8   MAGNESIUM mg/dL  --  2.5* 2.8*  --    PHOSPHORUS mg/dL  --  3.5 3.1  --      Results from last 7 days   Lab Units 04/23/25  1133 04/20/25  0927   LACTATE mmol/L 0.8 1.0     No results found for: \"HGBA1C\", \"POCGLU\"    XR Abdomen KUB  Result Date: 4/24/2025  NG tube tip is in the gastric antrum.  This report was finalized on 4/24/2025 5:14 PM by Dr. Jamel Viera M.D on Workstation: [id number]      FL Barium Enema Water Soluble Single Contrast  Result Date: 4/24/2025   1. Proximal sigmoid mucosal irregularity measuring approximately 1.5 cm in length with focal narrowing of approximately 6.1 mm in luminal diameter. Concerning for sigmoid colon mass. 2. At the junction of the descending colon and sigmoid colon, small amount of contrast extravasation is identified during early filling and concerning for colovesical fistula.           I have personally reviewed all medications:  Scheduled Medications  amLODIPine, 5 mg, Oral, Q24H  aspirin, 81 mg, Oral, Daily  calcium 500 mg vitamin D 5 mcg (200 UT), 2 tablet, Oral, BID  carvedilol, 12.5 mg, Oral, BID With Meals  citalopram, 10 mg, Oral, Daily  enoxaparin sodium, 30 mg, Subcutaneous, Nightly  hydrALAZINE, 100 mg, Oral, Q8H  pravastatin, 40 mg, Oral, Nightly  sodium chloride, 10 mL, Intravenous, Q12H  terazosin, 1 mg, Oral, Nightly  terazosin, 2 mg, Oral, Daily  [Held by provider] torsemide, 20 mg, Oral, Daily    Infusions   Diet  NPO Diet NPO Type: Strict NPO      Intake/Output " Summary (Last 24 hours) at 4/25/2025 1315  Last data filed at 4/25/2025 0900  Gross per 24 hour   Intake 100 ml   Output 600 ml   Net -500 ml       Assessment/Plan     Active Hospital Problems    Diagnosis  POA    **Abdominal pain [R10.9]  Yes    Hypertensive urgency [I16.0]  Yes    Abnormal weight loss [R63.4]  Yes    Colitis [K52.9]  Unknown    S/P TAVR (transcatheter aortic valve replacement) [Z95.2]  Not Applicable    Aortic stenosis [I35.0]  Yes    (HFpEF) heart failure with preserved ejection fraction [I50.30]  Yes    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       89 y.o. female with Abdominal pain.    Abdominal pain  Unintentional weight loss  -CT abdomen/pelvis with large amount of loose appearing stool and moderate distention, transition seen in the sigmoid colon, concern for possible sigmoid colonic stricture or mass.  Also notes areas of pneumatosis along the hepatic and splenic flexures, prominent gas and fluid-filled small bowel lives with no bowel obstructive pattern, mild free fluid in the abdomen and pelvis  - Barium enema with proximal sigmoid mucosal irregularity concerning for sigmoid colon mass, concern for colovesical fistula  - General Surgery consulted  - N.p.o. 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, biopsy pending  - Further plan pending general surgery recommendations    Hypertensive urgency  Chronic diastolic heart failure  History of TAVR  - Blood pressure high this morning, had not received home meds  - Continue Coreg, hydralazine, terazosin  - Amlodipine added by nephrology  - Titrate meds as needed based on BP trends  - Torsemide on hold  - Recheck BP after morning  meds    Anemia of CKD  -Hemoglobin decreased 8.2 today, below baseline  -No signs of active bleeding  -Repeat CBC with morning labs, transfuse for Hb less than 7    CKD stage IIIb  Hyponatremia  -Creatinine improved to 1.84 today, slightly above baseline  - Nephrology following  - Sodium improved to 134, nephrology holding additional IV fluids  - Repeat BMP with morning labs      SCDs for DVT prophylaxis.  DNR.  Discussed with patient, nursing staff, and consulting provider.  Anticipate discharge TBD    Alison Soriano MD  [facility]  04/25/25  13:15 EDT

## 2025-04-26 NOTE — PLAN OF CARE
Goal Outcome Evaluation:  Plan of Care Reviewed With: patient        Progress: improving  Outcome Evaluation: Pt A&O, on 1L NC. Assist x1 to bathroom. No complaints this shift. Rested well.

## 2025-04-26 NOTE — PROGRESS NOTES
Name: Vonda Jay ADMIT: 2025   : 1935  PCP: Ambrose Ashley MD    MRN: [id number] LOS: 3 days   AGE/SEX: 89 y.o. female  ROOM: [facility]     Subjective   Subjective   No acute events overnight.  Patient states she is feeling better today.  NG tube is now out and she is on a clear liquid diet.  She says that anytime she eats or drinks she is still having abdominal cramps.  She denies chest pain or shortness of breath.    Objective   Objective     Vital Signs  Temp:  [98 °F (36.7 °C)-99.2 °F (37.3 °C)] 98 °F (36.7 °C)  Heart Rate:  [70-76] 70  Resp:  [16-18] 16  BP: (156-191)/(44-65) 169/55  SpO2:  [95 %-98 %] 98 %  on  Flow (L/min) (Oxygen Therapy):  [1] 1;   Device (Oxygen Therapy): nasal cannula  Body mass index is 27.54 kg/m².    Physical Exam  General: Alert, no acute distress. Lying in bed, comfortable appearing.  NG tube now out.  ENT: No conjunctival injection or scleral icterus. Moist mucous membranes.  NG tube in place.  Neuro: Eyes open and moving in all directions, generalized weakness, face symmetric, no focal deficits.   Lungs: Clear to auscultation bilaterally. No wheeze or crackles. No distress.   Heart: RRR, systolic murmur  Abdomen: Improved distention, mildly tender to palpation, normal bowel sounds, no rebound or guarding  Ext: Warm and well-perfused. No edema.   Skin: No rashes or lesions. IV site without swelling or erythema.     Results Review     I reviewed the patient's new clinical results:  Results from last 7 days   Lab Units 25  0503 25  0405 25  0450 25  1133   WBC 10*3/mm3 9.05 6.05 6.40 7.37   HEMOGLOBIN g/dL 8.5* 8.2* 8.9* 9.6*   PLATELETS 10*3/mm3 312 360 350 384     Results from last 7 days   Lab Units 25  0503 25  0405 25  0450 25  1133   SODIUM mmol/L 136 134* 131* 132*   POTASSIUM mmol/L 3.5 3.9 4.0 3.9   CHLORIDE mmol/L 99 97* 96* 94*   CO2 mmol/L 23.6 21.0* 22.0 24.0   BUN mg/dL 31* 35* 31* 34*   CREATININE  "mg/dL 1.54* 1.84* 1.54* 1.90*   GLUCOSE mg/dL 81 65 71 102*   EGFR mL/min/1.73 32.1* 26.0* 32.1* 25.0*     Results from last 7 days   Lab Units 04/26/25  0503 04/25/25  0405 04/24/25  0450 04/23/25  1133 04/20/25  0927   ALBUMIN g/dL 3.1* 3.1* 3.1* 3.7 3.8   BILIRUBIN mg/dL  --  0.2 0.2 0.3 0.3   ALK PHOS U/L  --  54 56 65 68   AST (SGOT) U/L  --  15 16 12 17   ALT (SGPT) U/L  --  5 8 8 7     Results from last 7 days   Lab Units 04/26/25  0503 04/25/25  0405 04/24/25  0450 04/23/25  1133   CALCIUM mg/dL 8.6 8.3* 8.4* 8.5*   ALBUMIN g/dL 3.1* 3.1* 3.1* 3.7   MAGNESIUM mg/dL  --   --  2.5* 2.8*   PHOSPHORUS mg/dL 3.1  --  3.5 3.1     Results from last 7 days   Lab Units 04/23/25  1133 04/20/25  0927   LACTATE mmol/L 0.8 1.0     No results found for: \"HGBA1C\", \"POCGLU\"    XR Abdomen KUB  Result Date: 4/24/2025  NG tube tip is in the gastric antrum.  This report was finalized on 4/24/2025 5:14 PM by Dr. Jamel Viera M.D on Workstation: [id number]      FL Barium Enema Water Soluble Single Contrast  Result Date: 4/24/2025   1. Proximal sigmoid mucosal irregularity measuring approximately 1.5 cm in length with focal narrowing of approximately 6.1 mm in luminal diameter. Concerning for sigmoid colon mass. 2. At the junction of the descending colon and sigmoid colon, small amount of contrast extravasation is identified during early filling and concerning for colovesical fistula.           I have personally reviewed all medications:  Scheduled Medications  amLODIPine, 5 mg, Oral, Q24H  aspirin, 81 mg, Oral, Daily  calcium 500 mg vitamin D 5 mcg (200 UT), 2 tablet, Oral, BID  carvedilol, 12.5 mg, Oral, BID With Meals  citalopram, 10 mg, Oral, Daily  enoxaparin sodium, 30 mg, Subcutaneous, Nightly  hydrALAZINE, 100 mg, Oral, Q8H  pravastatin, 40 mg, Oral, Nightly  sodium chloride, 10 mL, Intravenous, Q12H  terazosin, 2 mg, Oral, Daily  terazosin, 2 mg, Oral, Nightly  [Held by provider] torsemide, 20 mg, Oral, " Daily    Infusions   Diet  Diet: Liquid; Clear Liquid; Fluid Consistency: Thin (IDDSI 0)      Intake/Output Summary (Last 24 hours) at 4/26/2025 0930  Last data filed at 4/26/2025 0851  Gross per 24 hour   Intake 358 ml   Output --   Net 358 ml       Assessment/Plan     Active Hospital Problems    Diagnosis  POA    **Abdominal pain [R10.9]  Yes    Hypertensive urgency [I16.0]  Yes    Abnormal weight loss [R63.4]  Yes    Colitis [K52.9]  Unknown    S/P TAVR (transcatheter aortic valve replacement) [Z95.2]  Not Applicable    Aortic stenosis [I35.0]  Yes    (HFpEF) heart failure with preserved ejection fraction [I50.30]  Yes    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       89 y.o. female with Abdominal pain.    Abdominal pain  Unintentional weight loss  -CT abdomen/pelvis with large amount of loose appearing stool and moderate distention, transition seen in the sigmoid colon, concern for possible sigmoid colonic stricture or mass.  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, biopsy pending  - Further plan pending general surgery recommendations  - Start as needed Bentyl to help with cramping    Hypertensive urgency  Chronic diastolic heart failure  History of TAVR  - Blood pressure trend has improved, systolic still running a bit high  - Continue Coreg, hydralazine,  terazosin  - Amlodipine added by nephrology, increased to 7.5 mg today  - Titrate meds as needed based on BP trends  - Torsemide on hold  - Recheck BP after morning meds    Anemia of CKD  -Hemoglobin stable today at 8.5, still below baseline  -No signs of active bleeding  -Repeat CBC with morning labs, transfuse for Hb less than 7    CKD stage IIIb  Hyponatremia  -Creatinine improved to 1.54  - Nephrology following  - Sodium has normalized, now off IV fluids  - Repeat BMP with morning labs      SCDs for DVT prophylaxis.  DNR.  Discussed with patient, nursing staff, and consulting provider.  Anticipate discharge TBD    Alison Soriano MD  [facility]  04/26/25  09:30 EDT

## 2025-04-26 NOTE — PROGRESS NOTES
[facility] Progress Note      Patient Name: Vonda Jay  : 1935  MRN: [id number]  Primary Care Physician:  Ambrose Ashley MD  Date of admission: 2025    Subjective     Interval History:   Flex sig with biopsy ; diverticular sigmoid stricture suspected    Rough night with ongoing abdominal pain  Not passing any gas; no bowel movement.  No N/V  No shortness of breath on 1 L/min  Blood pressures remain high but trend is improving    Review of Systems:   As noted above    Objective     Vitals:   Temp:  [98 °F (36.7 °C)-99.2 °F (37.3 °C)] 98 °F (36.7 °C)  Heart Rate:  [70-76] 70  Resp:  [16-18] 16  BP: (156-191)/(44-65) 169/55  Flow (L/min) (Oxygen Therapy):  [1] 1    Intake/Output Summary (Last 24 hours) at 2025 0815  Last data filed at 2025 1700  Gross per 24 hour   Intake 118 ml   Output --   Net 118 ml       Physical Exam:    Constitutional: Awake, looks younger than age, NAD  HEENT: Sclera anicteric, no conjunctival injection; bilateral carotid bruits  Neck: Supple, no JVD  Respiratory: Mostly clear, nonlabored on 1 L/min  Cardiovascular: RRR, 2/6M, no rub  Gastrointestinal: BS +, soft, + T but no G or R, distended  : No palpable bladder  Musculoskeletal: No edema, no clubbing or cyanosis  Psychiatric: Appropriate affect, cooperative, oriented  Neurologic: No asterixis, moving all extremities, normal speech and mental status  Skin: Warm and dry     Scheduled Meds:     amLODIPine, 5 mg, Oral, Q24H  aspirin, 81 mg, Oral, Daily  calcium 500 mg vitamin D 5 mcg (200 UT), 2 tablet, Oral, BID  carvedilol, 12.5 mg, Oral, BID With Meals  citalopram, 10 mg, Oral, Daily  enoxaparin sodium, 30 mg, Subcutaneous, Nightly  hydrALAZINE, 100 mg, Oral, Q8H  pravastatin, 40 mg, Oral, Nightly  sodium chloride, 10 mL, Intravenous, Q12H  terazosin, 2 mg, Oral, Daily  terazosin, 2 mg, Oral, Nightly  [Held by provider] torsemide, 20 mg, Oral, Daily      IV Meds:         Results Reviewed:   I have personally reviewed the results from the time of this admission to 4/26/2025 08:15 EDT     Results from last 7 days   Lab Units 04/26/25  0503 04/25/25  0405 04/24/25  0450 04/23/25  1133   SODIUM mmol/L 136 134* 131* 132*   POTASSIUM mmol/L 3.5 3.9 4.0 3.9   CHLORIDE mmol/L 99 97* 96* 94*   CO2 mmol/L 23.6 21.0* 22.0 24.0   BUN mg/dL 31* 35* 31* 34*   CREATININE mg/dL 1.54* 1.84* 1.54* 1.90*   CALCIUM mg/dL 8.6 8.3* 8.4* 8.5*   BILIRUBIN mg/dL  --  0.2 0.2 0.3   ALK PHOS U/L  --  54 56 65   ALT (SGPT) U/L  --  5 8 8   AST (SGOT) U/L  --  15 16 12   GLUCOSE mg/dL 81 65 71 102*       Estimated Creatinine Clearance: 21.5 mL/min (A) (by C-G formula based on SCr of 1.54 mg/dL (H)).    Results from last 7 days   Lab Units 04/26/25  0503 04/24/25  0450 04/23/25  1133   MAGNESIUM mg/dL  --  2.5* 2.8*   PHOSPHORUS mg/dL 3.1 3.5 3.1             Results from last 7 days   Lab Units 04/26/25  0503 04/25/25  0405 04/24/25  0450 04/23/25  1133 04/20/25  0927   WBC 10*3/mm3 9.05 6.05 6.40 7.37 12.20*   HEMOGLOBIN g/dL 8.5* 8.2* 8.9* 9.6* 9.2*   PLATELETS 10*3/mm3 312 360 350 384 312       Results from last 7 days   Lab Units 04/24/25  0450   INR  1.21*       Assessment / Plan     ASSESSMENT:  CKD 4, baseline SCr 1.5-1.8, stable.  CKD d/t longstanding hypertension, long-term NSAID use, prior smoking, generalized atherosclerotic disease, and CHF.  Volume status stable.  Low-normal potassium  Nephrotic-range proteinuria, UPCR 3.6 g/g, although obtained when blood pressure very high; repeat pending  Hypertension of advanced CKD, with improving control, all in all.  Exacerbated by pain  N/V/D, in association with sigmoid stricture  Suspected sigmoid stricture by flexible sigmoidoscopy performed 4/25  HFpEF, no signs of exacerbation.  Recently was started on low-dose torsemide, currently on hold  Aortic valve stenosis s/p TAVR in 2/2025  History of breast cancer  CAD    PLAN:  1.  Increase amlodipine to  7.5 mg daily  2.  General Surgery evaluation ongoing  3.  Replace potassium    Thank you for involving us in the care of Vonda JESUS Jay.  Please feel free to call with any questions.    Pollo Fagan MD  04/26/25  08:15 EDT    [facility]  [phone]    Please note that portions of this note were completed with a voice recognition program.

## 2025-04-26 NOTE — PROGRESS NOTES
Colorectal & General Surgery  Progress Note    Patient: Vonda Jay  YOB: 1935  MRN: [id number]      Assessment  Vonda Jay is a 89 y.o. female with multiple comorbidities status post recent TAVR who presents with diverticular stricture with possible colovesical fistula that is causing a partial large bowel obstruction.  Today, she was unable to tolerate liquids and continues to vomit intermittently.    Pathology from flexible sigmoidoscopy pending but almost assuredly benign.    I discussed with her son Tom over the phone that there are 2 ways forward: Surgery with a permanent end colostomy or focusing on her comfort and symptom management with palliative care.  They will think about it as a family and I will [place] back with them tomorrow morning.    For now, recommend clear liquid diet for comfort only.      Subjective  Multiple episodes of emesis this morning after she ate sherbet and some clear liquids.  Refusing nasogastric tube replacement.  Appears somewhat frail    Objective    Vitals:    04/26/25 1207   BP: 156/52   Pulse: 77   Resp: 14   Temp: 99 °F (37.2 °C)   SpO2: 93%       Physical Exam  Constitutional: Well-developed well-nourished, no acute distress  Neck: Supple, trachea midline  Respiratory: No increased work of breathing, Symmetric excursion  Cardiovascular: Well pefursed, no jugular venous distention evident   Abdominal: Mildly tender to palpation, soft, mildly distended  Skin: Warm, dry, no rash on visualized skin surfaces  Psychiatric: Alert and oriented ×3, normal affect     Laboratory Results  I have personally reviewed CBC with Docena, hemoglobin 5, platelet 312.  BMP with creatinine 1.54, bicarb 23.    Radiology  None to review         Anthony Franco MD  Colorectal & General Surgery  [facility]    [address], [address]  [place], [place], [address]  P: [phone]  F: [phone]

## 2025-04-27 NOTE — PROGRESS NOTES
[facility]     LOS: 4 days     Subjective:    CC:Abdominal Pain, Vomiting, Nausea, and Diarrhea    DIET:  Diet Order   Procedures    Diet: Regular/House; Fluid Consistency: Thin (IDDSI 0)       Objective:    Vital Signs:  Temp:  [98.2 °F (36.8 °C)-98.6 °F (37 °C)] 98.4 °F (36.9 °C)  Heart Rate:  [67-76] 74  Resp:  [12-18] 12  BP: (139-183)/(51-66) 160/56    SpO2:  [91 %-100 %] 100 %  on  Flow (L/min) (Oxygen Therapy):  [1] 1;   Device (Oxygen Therapy): nasal cannula  Body mass index is 27.28 kg/m².    Physical Exam  Constitutional:       General: She is not in acute distress.  Pulmonary:      Effort: Pulmonary effort is normal.      Breath sounds: Normal breath sounds.   Abdominal:      General: There is no distension.      Palpations: Abdomen is soft.      Tenderness: There is no abdominal tenderness.   Skin:     General: Skin is warm and dry.   Neurological:      General: No focal deficit present.      Mental Status: She is alert.   Psychiatric:         Mood and Affect: Mood normal.         Behavior: Behavior normal.         Results Review:    Glucose   Date Value Ref Range Status   04/27/2025 97 65 - 99 mg/dL Final   04/26/2025 81 65 - 99 mg/dL Final   04/25/2025 65 65 - 99 mg/dL Final     Results from last 7 days   Lab Units 04/26/25  0503   WBC 10*3/mm3 9.05   HEMOGLOBIN g/dL 8.5*   HEMATOCRIT % 25.3*   PLATELETS 10*3/mm3 312     Results from last 7 days   Lab Units 04/27/25  0542 04/26/25  0503 04/25/25  0405   SODIUM mmol/L 134*   < > 134*   POTASSIUM mmol/L 4.3   < > 3.9   CHLORIDE mmol/L 99   < > 97*   CO2 mmol/L 25.0   < > 21.0*   BUN mg/dL 26*   < > 35*   CREATININE mg/dL 1.37*   < > 1.84*   CALCIUM mg/dL 9.3   < > 8.3*   BILIRUBIN mg/dL  --   --  0.2   ALK PHOS U/L  --   --  54   ALT (SGPT) U/L  --   --  5   AST (SGOT) U/L  --   --  15   GLUCOSE mg/dL 97   < > 65    < > = values in this interval not displayed.     Results from last 7 days   Lab Units 04/24/25  0450   INR   "1.21*     Results from last 7 days   Lab Units 04/24/25  0450   MAGNESIUM mg/dL 2.5*         Cultures:  No results found for: \"BLOODCX\", \"URINECX\", \"WOUNDCX\", \"MRSACX\", \"RESPCX\", \"STOOLCX\"    I have reviewed daily medications and changes in CPOE    Scheduled meds  amLODIPine, 7.5 mg, Oral, Q24H  carvedilol, 12.5 mg, Oral, BID With Meals  citalopram, 10 mg, Oral, Daily  dicyclomine, 20 mg, Oral, TID  hydrALAZINE, 100 mg, Oral, Q8H  sodium chloride, 10 mL, Intravenous, Q12H  terazosin, 2 mg, Oral, Daily  terazosin, 3 mg, Oral, Nightly        sodium chloride, 100 mL/hr, Last Rate: 100 mL/hr (04/27/25 0932)      PRN meds    acetaminophen **OR** acetaminophen **OR** acetaminophen    acetaminophen **OR** acetaminophen **OR** acetaminophen    calcium carbonate    cloNIDine    diphenoxylate-atropine    HYDROmorphone **AND** naloxone    HYDROmorphone **AND** naloxone    LORazepam **OR** LORazepam **OR** LORazepam    LORazepam **OR** LORazepam **OR** LORazepam    nitroglycerin    ondansetron    ondansetron ODT **OR** ondansetron    Polyvinyl Alcohol-Povidone PF    sodium chloride    sodium chloride    sodium chloride        Abdominal pain    HTN (hypertension)    Stage 3b chronic kidney disease    Anemia due to chronic kidney disease    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    IV fluids continue for now    Hold on laboratory test for tomorrow    Ambrose Kate MD  04/27/25  15:14 EDT      "

## 2025-04-27 NOTE — PROGRESS NOTES
Colorectal & General Surgery  Progress Note    Patient: Vonda Jay  YOB: 1935  MRN: [id number]      Assessment  Vonda Jay is a 89 y.o. 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  I met with her and her entire family this morning.  We had a very nice discussion about the potential avenues forward, including surgery with sigmoid colectomy and end colostomy creation and comfort based approaches, including symptom management with a possible PEG for venting purposes.  She very clearly indicated to me that she does not want to undergo surgery and does not want a colostomy.  I informed her and her family that in the absence of any reliable nutrition, she likely has weeks to months to live.  I will ask the palliative care team to come see her to help navigate what comfort care looks like for her and her family.    I will also give her some IV fluids at the request of her family as she is vomiting relatively frequently this morning.    Please call with questions or concerns anytime.    Subjective  Vomited this morning.    Objective    Vitals:    04/27/25 0728   BP: 159/55   Pulse: 76   Resp: 14   Temp: 98.4 °F (36.9 °C)   SpO2: 91%       Physical Exam  Constitutional: No acute distress  Neck: Supple, trachea midline  Respiratory: No increased work of breathing, Symmetric excursion  Cardiovascular: Well pefursed, no jugular venous distention evident   Abdominal: Soft, mildly tender, distended  Skin: Warm, dry, no rash on visualized skin surfaces  Psychiatric: Alert and oriented ×3, normal affect     Laboratory Results  I have personally reviewed BMP with creatinine 1.37, bicarb 25.    Radiology  None to review         Anthony Franco MD  Colorectal & General Surgery  [facility]    [address], [address]  [place], [place], [address]  P: [phone]  F: [phone]

## 2025-04-27 NOTE — PLAN OF CARE
Goal Outcome Evaluation:  Plan of Care Reviewed With: patient        Progress: no change  Outcome Evaluation: Pt on 1L NC. PRN pain meds given. Assist x1 with walker to bathroom. Rested well.

## 2025-04-27 NOTE — PROGRESS NOTES
[facility] Progress Note      Patient Name: Vonda Jay  : 1935  MRN: [id number]  Primary Care Physician:  Ambrose Ashley MD  Date of admission: 2025    Subjective     Interval History:   Flex sig with biopsy ; diverticular sigmoid stricture suspected  Bowel obstruction continues, but patient and family decline surgery  Palliative care being discussed    With pain medicines on board, belly pain minimal; tolerating liquid diet  Not passing any gas; no bowel movement  No shortness of breath on 1 L/min      Review of Systems:   As noted above    Objective     Vitals:   Temp:  [98.2 °F (36.8 °C)-99 °F (37.2 °C)] 98.4 °F (36.9 °C)  Heart Rate:  [67-77] 76  Resp:  [14-18] 14  BP: (139-195)/(51-66) 159/55  Flow (L/min) (Oxygen Therapy):  [1] 1    Intake/Output Summary (Last 24 hours) at 2025 0811  Last data filed at 2025 1813  Gross per 24 hour   Intake 480 ml   Output --   Net 480 ml       Physical Exam:    Constitutional: Awake, looks younger than age, NAD  HEENT: Sclera anicteric, no conjunctival injection; bilateral carotid bruits  Neck: Supple, no JVD  Respiratory: Mostly clear, nonlabored on 1 L/min  Cardiovascular: RRR, 2/6M, no rub  Gastrointestinal: BS+, soft, not tender after pain medication, more distended than yesterday  : No palpable bladder  Musculoskeletal: No edema, no clubbing or cyanosis  Psychiatric: Appropriate affect, cooperative, oriented  Neurologic: No asterixis, moving all extremities, normal speech and mental status  Skin: Warm and dry     Scheduled Meds:     amLODIPine, 7.5 mg, Oral, Q24H  aspirin, 81 mg, Oral, Daily  calcium 500 mg vitamin D 5 mcg (200 UT), 2 tablet, Oral, BID  carvedilol, 12.5 mg, Oral, BID With Meals  citalopram, 10 mg, Oral, Daily  dicyclomine, 20 mg, Oral, TID  enoxaparin sodium, 30 mg, Subcutaneous, Nightly  hydrALAZINE, 100 mg, Oral, Q8H  pravastatin, 40 mg, Oral, Nightly  sodium chloride, 10 mL,  Intravenous, Q12H  terazosin, 2 mg, Oral, Daily  terazosin, 3 mg, Oral, Nightly  [Held by provider] torsemide, 20 mg, Oral, Daily      IV Meds:   sodium chloride, 100 mL/hr        Results Reviewed:   I have personally reviewed the results from the time of this admission to 4/27/2025 08:11 EDT     Results from last 7 days   Lab Units 04/27/25  0542 04/26/25  0503 04/25/25  0405 04/24/25  0450 04/23/25  1133   SODIUM mmol/L 134* 136 134* 131* 132*   POTASSIUM mmol/L 4.3 3.5 3.9 4.0 3.9   CHLORIDE mmol/L 99 99 97* 96* 94*   CO2 mmol/L 25.0 23.6 21.0* 22.0 24.0   BUN mg/dL 26* 31* 35* 31* 34*   CREATININE mg/dL 1.37* 1.54* 1.84* 1.54* 1.90*   CALCIUM mg/dL 9.3 8.6 8.3* 8.4* 8.5*   BILIRUBIN mg/dL  --   --  0.2 0.2 0.3   ALK PHOS U/L  --   --  54 56 65   ALT (SGPT) U/L  --   --  5 8 8   AST (SGOT) U/L  --   --  15 16 12   GLUCOSE mg/dL 97 81 65 71 102*       Estimated Creatinine Clearance: 24.1 mL/min (A) (by C-G formula based on SCr of 1.37 mg/dL (H)).    Results from last 7 days   Lab Units 04/27/25  0542 04/26/25  0503 04/24/25  0450 04/23/25  1133   MAGNESIUM mg/dL  --   --  2.5* 2.8*   PHOSPHORUS mg/dL 2.5 3.1 3.5 3.1             Results from last 7 days   Lab Units 04/26/25  0503 04/25/25  0405 04/24/25  0450 04/23/25  1133 04/20/25  0927   WBC 10*3/mm3 9.05 6.05 6.40 7.37 12.20*   HEMOGLOBIN g/dL 8.5* 8.2* 8.9* 9.6* 9.2*   PLATELETS 10*3/mm3 312 360 350 384 312       Results from last 7 days   Lab Units 04/24/25  0450   INR  1.21*       Assessment / Plan     ASSESSMENT:  CKD 4, baseline SCr 1.5-1.8, stable.  CKD d/t longstanding hypertension, long-term NSAID use, prior smoking, generalized atherosclerotic disease, and CHF.  Volume status stable.  Electrolytes acceptable  Nephrotic-range proteinuria, UPCR 3.6 g/g, although obtained when blood pressure very high  Hypertension of advanced CKD, with improving control, all in all.  Exacerbated by pain  N/V/D, in association with sigmoid stricture and bowel  obstruction  Suspected sigmoid stricture by flexible sigmoidoscopy performed 4/25  HFpEF, no signs of exacerbation.  Recently was started on low-dose torsemide, currently on hold  Aortic valve stenosis s/p TAVR in 2/2025  History of breast cancer  CAD    PLAN:  1.  Goals of care being discussed  2.  Hold diuretic in face of bowel obstruction  3.  Discussed with family at bedside    Thank you for involving us in the care of Vonda Jay.  Please feel free to call with any questions.    Pollo Fagan MD  04/27/25  08:11 EDT    [facility]  [phone]    Please note that portions of this note were completed with a voice recognition program.

## 2025-04-28 NOTE — PROGRESS NOTES
[facility]     LOS: 5 days     Subjective:    CC:Abdominal Pain, Vomiting, Nausea, and Diarrhea    DIET:  Diet Order   Procedures    Diet: Regular/House; Fluid Consistency: Thin (IDDSI 0)       Objective:    Vital Signs:  Temp:  [98.1 °F (36.7 °C)-100.7 °F (38.2 °C)] 100.7 °F (38.2 °C)  Heart Rate:  [65-79] 73  Resp:  [16-18] 16  BP: (132-153)/(51-63) 132/57    SpO2:  [92 %-99 %] 95 %  on  Flow (L/min) (Oxygen Therapy):  [1-1.5] 1.5;   Device (Oxygen Therapy): nasal cannula  Body mass index is 27.28 kg/m².    Physical Exam  Constitutional:       General: She is not in acute distress.  Pulmonary:      Effort: Pulmonary effort is normal.      Breath sounds: Normal breath sounds.   Abdominal:      General: There is no distension.      Palpations: Abdomen is soft.      Tenderness: There is no abdominal tenderness.   Skin:     General: Skin is warm and dry.   Neurological:      General: No focal deficit present.      Mental Status: She is alert.   Psychiatric:         Mood and Affect: Mood normal.         Behavior: Behavior normal.         Results Review:    Glucose   Date Value Ref Range Status   04/27/2025 97 65 - 99 mg/dL Final   04/26/2025 81 65 - 99 mg/dL Final     Results from last 7 days   Lab Units 04/26/25  0503   WBC 10*3/mm3 9.05   HEMOGLOBIN g/dL 8.5*   HEMATOCRIT % 25.3*   PLATELETS 10*3/mm3 312     Results from last 7 days   Lab Units 04/27/25  0542 04/26/25  0503 04/25/25  0405   SODIUM mmol/L 134*   < > 134*   POTASSIUM mmol/L 4.3   < > 3.9   CHLORIDE mmol/L 99   < > 97*   CO2 mmol/L 25.0   < > 21.0*   BUN mg/dL 26*   < > 35*   CREATININE mg/dL 1.37*   < > 1.84*   CALCIUM mg/dL 9.3   < > 8.3*   BILIRUBIN mg/dL  --   --  0.2   ALK PHOS U/L  --   --  54   ALT (SGPT) U/L  --   --  5   AST (SGOT) U/L  --   --  15   GLUCOSE mg/dL 97   < > 65    < > = values in this interval not displayed.     Results from last 7 days   Lab Units 04/24/25  0450   INR  1.21*     Results from last 7  "days   Lab Units 25  0450   MAGNESIUM mg/dL 2.5*         Cultures:  No results found for: \"BLOODCX\", \"URINECX\", \"WOUNDCX\", \"MRSACX\", \"RESPCX\", \"STOOLCX\"    I have reviewed daily medications and changes in CPOE    Scheduled meds  amLODIPine, 7.5 mg, Oral, Q24H  carvedilol, 12.5 mg, Oral, BID With Meals  citalopram, 10 mg, Oral, Daily  dicyclomine, 20 mg, Oral, TID  hydrALAZINE, 100 mg, Oral, Q8H  HYDROmorphone, 1 mg, Intravenous, Q2H  sodium chloride, 10 mL, Intravenous, Q12H  terazosin, 2 mg, Oral, Daily  terazosin, 3 mg, Oral, Nightly             PRN meds    acetaminophen **OR** acetaminophen **OR** acetaminophen    acetaminophen **OR** acetaminophen **OR** acetaminophen    calcium carbonate    cloNIDine    diphenoxylate-atropine    HYDROmorphone **AND** naloxone    HYDROmorphone    LORazepam **OR** LORazepam **OR** LORazepam    LORazepam **OR** LORazepam **OR** LORazepam    [] HYDROmorphone **AND** naloxone    nitroglycerin    ondansetron    ondansetron ODT **OR** ondansetron    Polyvinyl Alcohol-Povidone PF    sodium chloride    sodium chloride        Abdominal pain    HTN (hypertension)    Stage 3b chronic kidney disease    Anemia due to chronic kidney disease    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    IV fluids continue for now    Hold on laboratory test for tomorrow    Patient requiring Dilaudid about every 2 and half to 4 hours.  Family would like to be proactive so plan is to scheduled Dilaudid " every 2 hours.      Ambrose Ktae MD  04/28/25  18:16 EDT

## 2025-04-28 NOTE — PROGRESS NOTES
Nephrology    Chart reviewed  Patient has transitioned to comfort-based care  Will sign off, but please call for any question    --Pollo Fagan MD

## 2025-04-28 NOTE — PLAN OF CARE
Goal Outcome Evaluation:         Pt A&Ox4, 1L n/c at start of shift for comfort but pt later wanted it removed and was not in distress without it so R/A, encouraged and facilitated turns; IV pain medication 0.5 mg Dilaudid given PRN - effective for a few hours, wearing off as shift has progressed, purewick attempted but pt had urine retention and bladder scan showed >444 mL in bladder, alfred placed for comfort care and palliative plans - palliative to see pt later today.  Pt denied nausea.  IV fluids as ordered until early this AM, family asked that they be stopped.  Tinkling bowel sounds in all 4 quadrants but no BM this shift.  Family at bedside with pt.        Problem: Adult Inpatient Plan of Care  Goal: Plan of Care Review  Outcome: Progressing  Goal: Absence of Hospital-Acquired Illness or Injury  Outcome: Progressing  Intervention: Identify and Manage Fall Risk  Description: Perform standard risk assessment on admission using a validated tool or comprehensive approach appropriate to the patient; reassess fall risk frequently, with change in status or transfer to another level of care.Communicate risk to interprofessional healthcare team; ensure fall risk visible cue.Determine need for increased observation, equipment and environmental modification, as well as use of supportive, nonskid footwear.Adjust safety measures to individual needs and identified risk factors.Reinforce the importance of active participation with fall risk prevention, safety, and physical activity with the patient and family.Perform regular intentional rounding to assess need for position change, pain assessment and personal needs, including assistance with toileting.  Recent Flowsheet Documentation  Taken 4/28/2025 0600 by Chasity Eaton, RN  Safety Promotion/Fall Prevention: safety round/check completed  Taken 4/28/2025 0400 by Chasity Eaton, RN  Safety Promotion/Fall Prevention: safety round/check completed  Taken 4/28/2025 0200 by  Wells, Chasity, RN  Safety Promotion/Fall Prevention: safety round/check completed  Taken 4/28/2025 0000 by Chasity Eaton RN  Safety Promotion/Fall Prevention: safety round/check completed  Taken 4/27/2025 2214 by Chasity Etaon RN  Safety Promotion/Fall Prevention: safety round/check completed  Taken 4/27/2025 2011 by Chasity Eaton RN  Safety Promotion/Fall Prevention: safety round/check completed  Intervention: Prevent Skin Injury  Description: Perform a screening for skin injury risk, such as pressure or moisture-associated skin damage on admission and at regular intervals throughout hospital stay.Keep all areas of skin (especially folds) clean and dry.Maintain adequate skin hydration.Relieve and redistribute pressure and protect bony prominences and skin at risk for injury; implement measures based on patient-specific risk factors.Match turning and repositioning schedule to clinical condition.Encourage weight shift frequently; assist with reposition if unable to complete independently.Float heels off bed; avoid pressure on the Achilles tendon.Keep skin free from extended contact with medical devices.Optimize nutrition and hydration.Encourage functional activity and mobility, as early as tolerated.Use aids (e.g., slide boards, mechanical lift) during transfer.  Recent Flowsheet Documentation  Taken 4/28/2025 0600 by Chasity Eaton RN  Body Position:   right   position maintained  Taken 4/28/2025 0400 by Chasity Eaton RN  Body Position:   sitting up in bed   tilted  Taken 4/28/2025 0200 by Chasity Eaton RN  Body Position: right  Taken 4/28/2025 0000 by Chasity Eaton RN  Body Position: (pt refused heels elevated)   sitting up in bed   tilted   right  Taken 4/27/2025 2011 by Chasity Eaton RN  Body Position:   supine   position maintained   patient/family refused   heels elevated  Skin Protection:   incontinence pads utilized   transparent dressing maintained  Goal: Optimal Comfort and  Wellbeing  Outcome: Progressing  Intervention: Monitor Pain and Promote Comfort  Description: Assess pain level, treatment efficacy and patient response at regular intervals using a consistent pain scale.Consider the presence and impact of preexisting chronic pain.Encourage patient and caregiver involvement in pain assessment, interventions and safety measures.Promote activity; balance with sleep and rest to enhance healing.  Recent Flowsheet Documentation  Taken 4/27/2025 2011 by Chasity Eaton, RN  Pain Management Interventions:   care clustered   diversional activity provided   medication offered but refused   pillow support provided   position adjusted  Intervention: Provide Person-Centered Care  Description: Use a family-focused approach to care; encourage support system presence and participation.Develop trust and rapport by proactively providing information, encouraging questions, addressing concerns and offering reassurance.Acknowledge emotional response to hospitalization.Recognize and utilize personal coping strategies and strengths; develop goals via shared decision-making.Honor spiritual and cultural preferences.  Recent Flowsheet Documentation  Taken 4/27/2025 2011 by Chasity Eaton, RN  Trust Relationship/Rapport:   care explained   choices provided   questions encouraged   reassurance provided   thoughts/feelings acknowledged   empathic listening provided   questions answered  Goal: Readiness for Transition of Care  Outcome: Progressing     Problem: Fall Injury Risk  Goal: Absence of Fall and Fall-Related Injury  Outcome: Progressing  Intervention: Identify and Manage Contributors  Description: Develop a fall prevention plan, considering patient-centered interventions and family/caregiver involvement; identify and address patient's facilitators and barriers.Provide reorientation, appropriate sensory stimulation and routines with changes in mental status to decrease risk of fall.Promote use of  personal vision and auditory aids.Assess assistance level required for safe and effective self-care; provide support as needed, such as toileting and mobilization. For age 65 and older, implement timed toileting with assistance.Encourage physical activity, such as performance of mobility and self-care at highest level of patient ability, multicomponent exercise program and provision of appropriate assistive devices.If fall occurs, assess the severity of injury; implement fall injury protocol. Determine the cause and revise fall injury prevention plan.Regularly review and advocate for medication adjustment to decrease fall risk; consider administration times, polypharmacy and age.Balance adequate pain management with potential for oversedation.  Recent Flowsheet Documentation  Taken 4/28/2025 0600 by Chasity Eaton, RN  Medication Review/Management:   medications reviewed   high-risk medications identified  Taken 4/28/2025 0400 by Chasity Eaton, RN  Medication Review/Management:   medications reviewed   high-risk medications identified  Taken 4/28/2025 0200 by Chasity Eaton, RN  Medication Review/Management:   medications reviewed   high-risk medications identified  Taken 4/28/2025 0000 by Chasity Eaton RN  Medication Review/Management:   medications reviewed   high-risk medications identified  Taken 4/27/2025 2214 by Chasity Eaton, RN  Medication Review/Management:   medications reviewed   high-risk medications identified  Taken 4/27/2025 2011 by Chasity Eaton, RN  Medication Review/Management:   medications reviewed   high-risk medications identified  Self-Care Promotion:   independence encouraged   BADL personal objects within reach   adaptive equipment use encouraged  Intervention: Promote Injury-Free Environment  Description: Provide a safe, barrier-free environment that encourages independent activity.Keep care area uncluttered and well-lighted.Determine need for increased observation or  monitoring.Avoid use of devices that minimize mobility, such as restraints or indwelling urinary catheter.  Recent Flowsheet Documentation  Taken 4/28/2025 0600 by Chasity Eaton RN  Safety Promotion/Fall Prevention: safety round/check completed  Taken 4/28/2025 0400 by Chasity Eaton RN  Safety Promotion/Fall Prevention: safety round/check completed  Taken 4/28/2025 0200 by Chasity Eaotn RN  Safety Promotion/Fall Prevention: safety round/check completed  Taken 4/28/2025 0000 by Chasity Eaton RN  Safety Promotion/Fall Prevention: safety round/check completed  Taken 4/27/2025 2214 by Chasity Eaton RN  Safety Promotion/Fall Prevention: safety round/check completed  Taken 4/27/2025 2011 by Chasity Eaton RN  Safety Promotion/Fall Prevention: safety round/check completed     Problem: Skin Injury Risk Increased  Goal: Skin Health and Integrity  Outcome: Progressing  Intervention: Optimize Skin Protection  Description: Perform a full pressure injury risk assessment, as indicated by screening, upon admission to care unit.Reassess skin (full inspection and injury risk, including skin temperature, consistency and color) frequently (e.g., scheduled interval, with change in condition) to provide optimal early detection and prevention.Maintain adequate tissue perfusion (e.g., encourage fluid balance; avoid crossing legs, constrictive clothing or devices) to promote tissue oxygenation.Maintain head of bed at lowest degree of elevation tolerated, considering medical condition and other restrictions. Use positioning supports to prevent sliding and friction. Consider low friction textiles.Avoid positioning onto an area that remains reddened or on bony prominences.Minimize incontinence and moisture (e.g., toileting schedule; moisture-wicking pad, diaper or incontinence collection device; skin moisture barrier).Cleanse skin promptly and gently, when soiled, utilizing a pH-balanced cleanser.Relieve and  redistribute pressure (e.g., scheduled position changes, weight shifts, use of support surface, medical device repositioning, protective dressing application, use of positioning device, microclimate control, use of pressure-injury-monitorEncourage increased activity, such as sitting in a chair at the bedside or early mobilization, when able to tolerate. Avoid prolonged sitting.  Recent Flowsheet Documentation  Taken 4/28/2025 0600 by Chasity Eaton RN  Head of Bed (HOB) Positioning: HOB elevated  Taken 4/28/2025 0400 by Chasity Eaton RN  Head of Bed (HOB) Positioning: HOB elevated  Taken 4/28/2025 0200 by Chasity Eaton, RN  Head of Bed (HOB) Positioning: HOB flat  Taken 4/28/2025 0000 by Chasity Eaton RN  Head of Bed (HOB) Positioning: HOB at 30 degrees  Taken 4/27/2025 2011 by Chasity Eaton, RN  Activity Management: activity encouraged  Pressure Reduction Techniques:   frequent weight shift encouraged   weight shift assistance provided  Head of Bed (HOB) Positioning: HOB lowered  Pressure Reduction Devices:   alternating pressure pump (MARY ELLEN)   pressure-redistributing mattress utilized  Skin Protection:   incontinence pads utilized   transparent dressing maintained  Intervention: Promote and Optimize Oral Intake  Description: Perform a nutrition assessment that includes a nutrition-focused physical exam; identify malnutrition risk.Assess for adequate oral intake; if inadequate, offer oral supplemental food or drinks to enhance calorie and protein intake.Assess for vitamin and mineral deficiencies; supplement if depleted.Assess need for, and assist with, meal set-up and feeding.Adjust diet or meal schedule based on preferences and tolerance.Minimize unnecessary dietary restrictions to increase oral intake.Establish bowel elimination program to increase comfort and appetite.Provide and encourage oral hygiene to enhance desire to eat.Consider enteral nutrition support if oral intake remains inadequate;  provide parenteral nutrition if enteral is contraindicated.  Recent Flowsheet Documentation  Taken 4/27/2025 2011 by Chasity Eaton, RN  Oral Nutrition Promotion: rest periods promoted

## 2025-04-28 NOTE — PLAN OF CARE
Goal Outcome Evaluation:      [place] [facility] [place]  Alert and oriented x 4.  Sense of humor intact despite not feeling well.  Respirations even and non labored.  Voiced complaints of nausea and pain (abdomen).  As needed IV Dilaudid 0.5 mg and Zofran 4 mg given and it was effective.  Supportive son and daughters present upon arrival to floor.  Education provided related to the following:  Palliative Care, Medications and use for symptoms, Dose and frequency of medications, Assessment of pain/anxiety.  They will benefit from further education and support.

## 2025-04-28 NOTE — SIGNIFICANT NOTE
04/28/25 1459   OTHER   Discipline occupational therapist   Rehab Time/Intention   Session Not Performed other (see comments)  (spoke with family who kindly requested OT s/o as pt transferred to palliative care; will complete orders at this time.)

## 2025-04-28 NOTE — PROGRESS NOTES
Colorectal & General Surgery  Progress Note    Patient: Vonda Jay  YOB: 1935  MRN: [id number]      Assessment  Vonda Jay is a 89 y.o. female with diverticular stricture who does not wish for any surgical intervention.    She appears comfortable this morning.  I remain available anytime as needed.    Subjective  No significant events.  Appears pretty comfortable this morning.    Objective    Vitals:    04/28/25 0651   BP: 139/59   Pulse: 79   Resp:    Temp:    SpO2:        Physical Exam  Constitutional: Resting comfortably this morning         Anthony Franco MD  Colorectal & General Surgery  [facility]    [address], [address]  [place], [place], [address]  P: [phone]  F: [phone]

## 2025-04-28 NOTE — PLAN OF CARE
Goal Outcome Evaluation:  Plan of Care Reviewed With: patient, family           Outcome Evaluation: PPS 20%, bedbound taking bites and sips. Intermittent abdominal pain, dilaudid given as needed. Increased dose to 1mg this AM due to 0.5mg dose not lasting very long. Pt denies nausea. Dilaudid scheduled q 2 hours by Dr. Kate, pt/family refused 1700 dose, plan to give 1900 dose. Continue to monitor for comfort.

## 2025-04-28 NOTE — PROGRESS NOTES
Discharge Planning Assessment  [facility]     Patient Name: Vonda Jay  MRN: [id number]  Today's Date: 4/28/2025    Admit Date: 4/23/2025    Plan: palliative and hosp to evaluate   Discharge Needs Assessment    No documentation.                  Discharge Plan       Row Name 04/28/25 1424       Plan    Plan palliative and hosp to evaluate    Plan Comments The patient transferred to [facility] from [facility] on 4/27/25 @ 17:29 for palliative care. [facility] called and they have a meeting on 4/29/25 @ 13:30 to meet with the family. PPS 20%. CCP will continue to follow for any needs. FAZAL Goldberg RN, CCP.                    Expected Discharge Date and Time       Expected Discharge Date Expected Discharge Time    May 2, 2025            Demographic Summary    No documentation.                  Functional Status    No documentation.                  Psychosocial    No documentation.                  Abuse/Neglect    No documentation.                  Legal    No documentation.                  Substance Abuse    No documentation.                  Patient Forms    No documentation.                     Angie Goldberg RN

## 2025-04-28 NOTE — CONSULTS
Patient transferred to the palliative care unit following goals of care and treatment preferences discussion with Dr. Franco and Dr. Kate.  Patient and/or family state goal of care to be comfort and treatment preferences to be geared towards symptom management.  I spoke with patient's son Tom and daughter Dorothy at bedside. Patient is sleeping heavily after she received a dose of dilaudid this morning. Family surprise at how quickly she has declined, but accepting. We discussed [facility] consult, answered all questions and provided support. The palliative care team will follow for further support and discussion as needed and interdisciplinary team will continue to address spiritual and cultural concerns as indicated.

## 2025-04-29 NOTE — PROGRESS NOTES
Case Management Discharge Note      Final Note: admitted to a [facility] bed on 4/29/25. FAZAL Goldberg RN, CCP.         Selected Continued Care - Admitted Since 4/23/2025       Destination Coordination complete.      Service Provider Services Address Phone Fax Patient Preferred    [facility] [address] [facility] [id number] [phone] --              Durable Medical Equipment    No services have been selected for the patient.                Dialysis/Infusion    No services have been selected for the patient.                Home Medical Care    No services have been selected for the patient.                Therapy    No services have been selected for the patient.                Community Resources    No services have been selected for the patient.                Community & DME    No services have been selected for the patient.                         Final Discharge Disposition Code: 51 - hospice medical facility

## 2025-04-29 NOTE — PLAN OF CARE
Goal Outcome Evaluation:      PPS 20%. Flipped to HSB today. Dilaudid given for pain management; ativan given x 1 for agitation this afternoon with good effect. Family at bedside; continue with comfort care.

## 2025-04-29 NOTE — CONSULTS
Visit with patient at bedside alongside her children. Patient is [employer]ian and has recently been anointed. Patient receptive to prayer and supportive conversation. Patient has 4 children, all whom are supportive and helpful to their mother. DIL also at bedside at time of visit. Family is aware that chaplains are available for support.

## 2025-04-29 NOTE — PROGRESS NOTES
Discharge Planning Assessment  [facility]     Patient Name: Vonda Jay  MRN: [id number]  Today's Date: 4/29/2025    Admit Date: 4/23/2025    Plan: admitted to a [facility] scattered bed on 4/29/25. FAZAL Goldberg, RN, CCP.   Discharge Needs Assessment    No documentation.                  Discharge Plan       Row Name 04/29/25 1346       Plan    Plan admitted to a [facility] scattered bed on 4/29/25. FAZAL Goldberg RN, CCP.    Plan Comments admitted to a [facility] bed on 4/29/25. FAZAL Goldberg RN, CCP.    Final Discharge Disposition Code 51 - hospice medical facility    Final Note admitted to a [facility] bed on 4/29/25. FAZAL Goldberg RN, CCP.                  Continued Care and Services - Admitted Since 4/23/2025       Destination Coordination complete.      Service Provider Request Status Services Address Phone Fax Patient Preferred    [facility] [address] [place] [id number] [phone] --                  Expected Discharge Date and Time       Expected Discharge Date Expected Discharge Time    Apr 29, 2025            Demographic Summary    No documentation.                  Functional Status    No documentation.                  Psychosocial    No documentation.                  Abuse/Neglect    No documentation.                  Legal    No documentation.                  Substance Abuse    No documentation.                  Patient Forms    No documentation.                     Angie Goldberg, RN

## 2025-04-29 NOTE — SIGNIFICANT NOTE
04/29/25 0836   OTHER   Discipline physical therapist   Rehab Time/Intention   Session Not Performed other (see comments)  (Per OT, pts family requesting therapy SO as she is now transferred to the palliative floor and is comfort measures. PT will sign-off at this time. Thank you.)

## 2025-04-29 NOTE — NURSING NOTE
Pt PPS 20%. Pt sleeping well, some dilaudid doses not given d/t pt not in pain. Pt able to take pills in chunks. No current needs, plan of care ongoing.

## 2025-04-29 NOTE — PROGRESS NOTES
[facility]     LOS: 6 days     Subjective:    CC:Abdominal Pain, Vomiting, Nausea, and Diarrhea    DIET:  Diet Order   Procedures    Diet: Regular/House; Fluid Consistency: Thin (IDDSI 0)       Objective:    Vital Signs:  Temp:  [98.7 °F (37.1 °C)-100 °F (37.8 °C)] 99.2 °F (37.3 °C)  Heart Rate:  [64-79] 64  Resp:  [16-18] 16  BP: (116-165)/(52-67) 126/53    SpO2:  [96 %-97 %] 97 %  on  Flow (L/min) (Oxygen Therapy):  [2.5] 2.5;   Device (Oxygen Therapy): nasal cannula  Body mass index is 27.28 kg/m².    Physical Exam  Constitutional:       General: She is not in acute distress.  Pulmonary:      Effort: Pulmonary effort is normal.      Breath sounds: Normal breath sounds.   Abdominal:      General: There is no distension.      Palpations: Abdomen is soft.      Tenderness: There is no abdominal tenderness.   Skin:     General: Skin is warm and dry.   Neurological:      General: No focal deficit present.      Mental Status: She is alert.   Psychiatric:         Mood and Affect: Mood normal.         Behavior: Behavior normal.         Results Review:    Glucose   Date Value Ref Range Status   04/27/2025 97 65 - 99 mg/dL Final     Results from last 7 days   Lab Units 04/26/25  0503   WBC 10*3/mm3 9.05   HEMOGLOBIN g/dL 8.5*   HEMATOCRIT % 25.3*   PLATELETS 10*3/mm3 312     Results from last 7 days   Lab Units 04/27/25  0542 04/26/25  0503 04/25/25  0405   SODIUM mmol/L 134*   < > 134*   POTASSIUM mmol/L 4.3   < > 3.9   CHLORIDE mmol/L 99   < > 97*   CO2 mmol/L 25.0   < > 21.0*   BUN mg/dL 26*   < > 35*   CREATININE mg/dL 1.37*   < > 1.84*   CALCIUM mg/dL 9.3   < > 8.3*   BILIRUBIN mg/dL  --   --  0.2   ALK PHOS U/L  --   --  54   ALT (SGPT) U/L  --   --  5   AST (SGOT) U/L  --   --  15   GLUCOSE mg/dL 97   < > 65    < > = values in this interval not displayed.     Results from last 7 days   Lab Units 04/24/25  0450   INR  1.21*     Results from last 7 days   Lab Units 04/24/25  0450  "  MAGNESIUM mg/dL 2.5*         Cultures:  No results found for: \"BLOODCX\", \"URINECX\", \"WOUNDCX\", \"MRSACX\", \"RESPCX\", \"STOOLCX\"    I have reviewed daily medications and changes in CPOE    Scheduled meds  amLODIPine, 7.5 mg, Oral, Q24H  carvedilol, 12.5 mg, Oral, BID With Meals  citalopram, 10 mg, Oral, Daily  dicyclomine, 20 mg, Oral, TID  hydrALAZINE, 100 mg, Oral, Q8H  HYDROmorphone, 1 mg, Intravenous, Q2H  sodium chloride, 10 mL, Intravenous, Q12H  terazosin, 2 mg, Oral, Daily  terazosin, 3 mg, Oral, Nightly             PRN meds    acetaminophen **OR** acetaminophen **OR** acetaminophen    acetaminophen **OR** acetaminophen **OR** acetaminophen    calcium carbonate    cloNIDine    diphenoxylate-atropine    HYDROmorphone **AND** naloxone    HYDROmorphone    LORazepam **OR** LORazepam **OR** LORazepam    LORazepam **OR** LORazepam **OR** LORazepam    [] HYDROmorphone **AND** naloxone    nitroglycerin    ondansetron    ondansetron ODT **OR** ondansetron    Polyvinyl Alcohol-Povidone PF    sodium chloride    sodium chloride        Abdominal pain    HTN (hypertension)    Stage 3b chronic kidney disease    Anemia due to chronic kidney disease    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    IV fluids continue for now    Hold on laboratory test for tomorrow    Patient requiring Dilaudid about every 4 hours.  I have talked to the nurse.  And we will continue with the Dilaudid 1 mg every 2 hours.  Family was concerned because " she had breakthrough pain this afternoon when waiting for hours for the pain medications so encouraged to give even a half dose of Dilaudid every 2 hours if needed to see if this helps level out the pain    Ambrose Kate MD  04/29/25  17:30 EDT

## 2025-04-29 NOTE — CONSULTS
\[facility]\"" Scattered Bed Admission: 4/29/25  \[facility]\"" ID: [id number]  [facility] N32.1  Symptom Management: Pain, Anxiety, Restlessness    Arrived on unit. Assessed patient. Met with Nusrat Greco Danny, and Dorothy (children) in private family room. Explanation of services provided with the focus on inpatient hospice services. Answered all questions and concerns. Discussed medical history, medications, symptom management concerns, and goals of care. Spoke with Bernardo Horne MD (Hospice) and he provided verbal certification for inpatient hospice services. Met with family and they have agreed to services. Consents reviewed and signed with hospice. Admission packet provided and reviewed. Encouraged to call with any needs.     Daily RN hospice visits will begin: 4/30/25    Updated DELILAH Merida and Angie Goldberg RN (Care Coordinator).    Dr. Kate agreeable to place scattered bed orders today.     Benefit change form completed and a copy provided to DELILAH Adams and a copy placed in HIM basket.     Please call us with any questions, concerns, or changes in patient status. Thank you for allowing us the opportunity to participate in the care of this patient/family.     Ophelia Tripathi, RN, BSN, CHPN  Referral and Admission Coordinator  [facility]   [phone]

## 2025-04-30 PROBLEM — Z51.5 END OF LIFE CARE: Status: ACTIVE | Noted: 2025-01-01

## 2025-04-30 NOTE — DISCHARGE SUMMARY
"[facility]  [phone]    DISCHARGE SUMMARY  [facility]    Patient Identification:  Name: Vonda Jay  Age: 89 y.o.  Sex: female  :  1935  MRN: [id number]  Primary Care Physician: Ambrose Ashley MD    Admit date: 2025  Discharge date and time:      Discharge Diagnoses:  End of life care       Hospital Course:     Patient was admitted for bowel obstruction and she declines intervention.  Patient is being admitted to the palliative care.    See progress note for further details    Consults:   Consults       Date and Time Order Name Status Description    2025  7:12 PM [facility] (on-call MD unless specified) Details      2025  9:24 PM Inpatient Nephrology Consult Completed     2025  2:05 PM Surgery (on-call MD unless specified) Completed             Results from last 7 days   Lab Units 25  0503   WBC 10*3/mm3 9.05   HEMOGLOBIN g/dL 8.5*   HEMATOCRIT % 25.3*   PLATELETS 10*3/mm3 312       Results from last 7 days   Lab Units 25  0542   SODIUM mmol/L 134*   POTASSIUM mmol/L 4.3   CHLORIDE mmol/L 99   CO2 mmol/L 25.0   BUN mg/dL 26*   CREATININE mg/dL 1.37*   GLUCOSE mg/dL 97   CALCIUM mg/dL 9.3       Significant Diagnostic Studies: No results found for: \"WBC\", \"HGB\", \"HCT\", \"PLT\"  No results found for: \"NA\", \"K\", \"CL\", \"CO2\", \"BUN\", \"CREATININE\", \"GLUCOSE\"  No results found for: \"CALCIUM\", \"MG\", \"PHOS\"  No results found for: \"AST\", \"ALT\", \"ALKPHOS\"  No results found for: \"APTT\", \"INR\"  No results found for: \"COLORU\", \"CLARITYU\", \"SPECGRAV\", \"PHUR\", \"PROTEINUR\", \"GLUCOSEU\", \"KETONESU\", \"BLOODU\", \"NITRITE\", \"LEUKOCYTESUR\", \"BILIRUBINUR\", \"UROBILINOGEN\", \"RBCUA\", \"WBCUA\", \"BACTERIA\", \"UACOMMENT\"  No results found for: \"TROPONINT\", \"TROPONINI\", \"BNP\"  No components found for: \"HGBA1C;2\"  No components found for: \"TSH;2\"    Imaging Results (All)       None        No results found for: \"SITE\", \"ALLENTEST\", \"PHART\", \"CEF4HMV\", " "\"PO2ART\", \"NOK4MVN\", \"BASEEXCESS\", \"C0DDGEGP\", \"HGBBG\", \"HCTABG\", \"OXYHEMOGLOBI\", \"METHHGBN\", \"CARBOXYHGB\", \"CO2CT\", \"BAROMETRIC\", \"MODALITY\", \"FIO2\"      Signed:  Ambrose Kate MD  4/30/2025  17:05 EDT    "

## 2025-04-30 NOTE — PLAN OF CARE
Goal Outcome Evaluation:  Plan of Care Reviewed With: family        Progress: declining  Outcome Evaluation: PPS 10%, pt mostly unresponsive, pt aroused to pain at the begininning of the shift, unresponsive toward the end of the shift. Pt had a lot of grimacing and restlessness this AM. Pt scheduled dilaudid increased to 1.5 mg q2h and ativan 2 mg and robinul 0.4 mg added as well. Pt seems to be more comfortable with current regimen. Pt assisted with turns, oral care, and alfred care. Will continue with comfort care.

## 2025-04-30 NOTE — PROGRESS NOTES
"    [facility]     LOS: 1 day     Subjective:    CC:No chief complaint on file.    DIET:  Diet Order   Procedures    Diet: Regular/House; Fluid Consistency: Thin (IDDSI 0)     Patient took turn down, she was responsive last night and then since then is no longer verbally responding.  Objective:    Vital Signs:  Temp:  [97.8 °F (36.6 °C)] 97.8 °F (36.6 °C)  Heart Rate:  [68] 68  Resp:  [16] 16  BP: (130)/(65) 130/65    SpO2:  [96 %] 96 %  on  Flow (L/min) (Oxygen Therapy):  [25] 25;   Device (Oxygen Therapy): nasal cannula  There is no height or weight on file to calculate BMI.    Physical Exam  She is resting comfortably, sonorous breath sounds at times, apneic intermittently, lungs relatively clear  Abdomen slightly distended    Results Review:    No results found for: \"GLUCOSE\"    Results from last 7 days   Lab Units 04/26/25  0503   WBC 10*3/mm3 9.05   HEMOGLOBIN g/dL 8.5*   HEMATOCRIT % 25.3*   PLATELETS 10*3/mm3 312     Results from last 7 days   Lab Units 04/27/25  0542 04/26/25  0503 04/25/25  0405   SODIUM mmol/L 134*   < > 134*   POTASSIUM mmol/L 4.3   < > 3.9   CHLORIDE mmol/L 99   < > 97*   CO2 mmol/L 25.0   < > 21.0*   BUN mg/dL 26*   < > 35*   CREATININE mg/dL 1.37*   < > 1.84*   CALCIUM mg/dL 9.3   < > 8.3*   BILIRUBIN mg/dL  --   --  0.2   ALK PHOS U/L  --   --  54   ALT (SGPT) U/L  --   --  5   AST (SGOT) U/L  --   --  15   GLUCOSE mg/dL 97   < > 65    < > = values in this interval not displayed.     Results from last 7 days   Lab Units 04/24/25  0450   INR  1.21*     Results from last 7 days   Lab Units 04/24/25  0450   MAGNESIUM mg/dL 2.5*         Cultures:  No results found for: \"BLOODCX\", \"URINECX\", \"WOUNDCX\", \"MRSACX\", \"RESPCX\", \"STOOLCX\"    I have reviewed daily medications and changes in CPOE    Scheduled meds  HYDROmorphone, 1.5 mg, Intravenous, Q2H  sodium chloride, 10 mL, Intravenous, Q12H             PRN meds    acetaminophen **OR** acetaminophen **OR** " acetaminophen    calcium carbonate    cloNIDine    diphenoxylate-atropine    glycopyrrolate **OR** glycopyrrolate **OR** glycopyrrolate **OR** glycopyrrolate    HYDROmorphone    LORazepam **OR** LORazepam **OR** LORazepam    LORazepam **OR** LORazepam **OR** LORazepam    naloxone    nitroglycerin    ondansetron ODT **OR** ondansetron    Polyvinyl Alcohol-Povidone PF    Scopolamine    sodium chloride        End of life care        Assessment/Plan:    Abdominal pain weight loss  Colonic stricture  -Patient with obstruction  - Patient declined surgery    Hypertension  - Holding on blood pressure medication    Anemia of chronic disease    Hyponatremia    Patient requesting palliative care does not want surgery.    Hold on laboratory testing    Scheduled pain medication    Ambrose Kate MD  04/30/25  16:56 EDT

## 2025-04-30 NOTE — PROGRESS NOTES
Pt was unresponsive and appeared comfortable.  Pt's daughter Nusrat and Julies wife Zee were present.  Pt has 1 son and 3 daughters.  Pt's is Gnosticist and has received anointing.  Pt's   in .  Pt has expressed readiness to die.  Nusrat expressed peace and acceptance with her mom's prognosis and POC.  Nusrat expressed concern for her sister Dorothy who may be struggling more with her grief.  [facility]  facilitated life review and provided prayer of closure and blessing at bed-side.

## 2025-04-30 NOTE — PROGRESS NOTES
SB team SW met with patient and family to explain role of SB team SW and assess for needs. Patient was lying in her hospital bed, unresponsive with no signs of pain or discomfort. Patient's  daughter Nusrat and Nusrat's wife were at the bedside. Patient is currently GIP at [facility] and we are managing her pain and anxiety. SW sat with family to provide supportive presence.   Patient is currently a 10% PPS and is unresponsive, therefore SW unable to complete SB PHQ9. Patient has four adult children that live in the area and are all involved in patient's care. Patient was living at home alone  prior to this hospital stay. Family would like for patient to remain in a SB for EOL care due to her imminent prognosis. If patient were to stabilize and need to be discharged, she would return to her home or to LTC   with hospice to follow. SW educated on bereavement services provided by [facility] and family voiced understanding. Discussed  planning and family has selected [facility] . SW assisted daughter Dorothy with [place] paperwork. Family has signed the ELISE and SW filled out forms and gave back to family. SW will visit on a weekly and PRN basis to offer EOL support and assist with needs.

## 2025-04-30 NOTE — H&P
[facility]   HISTORY AND PHYSICAL    Patient Name: Vonda Jay  : 1935  MRN: [id number]  Primary Care Physician:  Ambrose Ashley MD  Date of admission: 2025    Subjective   Subjective     Chief Complaint: Bowel obstruction    History of Present Illness  As noted the patient was admitted to the hospital with colonic stricture and bowel obstruction.  She declined surgery.  She was adamantly against surgery.  She was seen by general surgery who offered her possibly even gastrostomy tube placement.  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    Heart murmur     History of breast cancer         History of carotid artery disease     History of radiation therapy     Hyperlipidemia     Hypertension     Low back pain     FROM FALL IN [employer] [address] 2025    Stage 3b chronic kidney disease 2022       Past Surgical History:   Procedure Laterality Date    AORTIC VALVE REPAIR/REPLACEMENT N/A 2025    Procedure: TTE TRANSFEMORAL TRANSCATHETER AORTIC VALVE REPLACEMENT PERCUTANEOUS APPROACH;  Surgeon: Ebenezer Albert MD;  Location: [facility];  Service: Cardiothoracic;  Laterality: N/A;    AORTIC VALVE REPAIR/REPLACEMENT N/A 2025    Procedure: Transfemoral Transcatheter Aortic Valve Replacement with intraoperative transthoracic echocardiogram and possible open surgical rescue;  Surgeon: Mike Rizvi MD;  Location: [facility];  Service: Cardiovascular;  Laterality: N/A;    BREAST LUMPECTOMY      CARDIAC CATHETERIZATION N/A 2024    Procedure: Right and Left Heart Cath;  Surgeon: Mike Rizvi MD;  Location: [facility] CATH INVASIVE LOCATION;  Service: Cardiology;  Laterality: N/A;    CARDIAC CATHETERIZATION N/A  11/8/2024    Procedure: Coronary angiography;  Surgeon: Mike Rizvi MD;  Location: [facility] CATH INVASIVE LOCATION;  Service: Cardiology;  Laterality: N/A;    CAROTID ARTERY ANGIOPLASTY  2000, 2013    CHOLECYSTECTOMY  1979    FEMUR IM NAILING/RODDING Left 2/8/2018    Procedure: FEMUR INTRAMEDULLARY NAILING;  Surgeon: Zheng Fleming MD;  Location: [facility] MAIN OR;  Service:     HYSTERECTOMY  1989    SIGMOIDOSCOPY N/A 4/25/2025    Procedure: FLEXIBLE SIGMOIDOSCOPY WITH BIOPSY;  Surgeon: Reece Franco MD;  Location: [facility] ENDOSCOPY;  Service: General;  Laterality: N/A;  pre: abd pain  post: diverticulosis, diverticular stricture       Family History: family history includes Heart valve disorder in her sister; Hypertension in her mother; Lung cancer in her sister; Lymphoma in her sister; No Known Problems in her father; Skin cancer in her sister; Stroke (age of onset: 57) in her brother. Otherwise pertinent FHx was reviewed and not pertinent to current issue.    Social History:  reports that she quit smoking about 26 years ago. Her smoking use included cigarettes. She started smoking about 73 years ago. She has been exposed to tobacco smoke. She has never used smokeless tobacco. She reports current alcohol use. She reports that she does not use drugs.    Home Medications:  Calcium-Magnesium-Vitamin D, D-Mannose, Polyethylene Glycol 3350, Probiotic Product, Vitamin D-Vitamin K, acetaminophen, alendronate, aspirin, carvedilol, cholecalciferol, citalopram, doxazosin, hydrALAZINE, multivitamin with minerals, pravastatin, tiZANidine, and torsemide    Allergies:  Allergies   Allergen Reactions    Nsaids Other (See Comments)     Due to kidney's       Objective    Objective     Vitals:   Temp:  [97.8 °F (36.6 °C)] 97.8 °F (36.6 °C)  Heart Rate:  [68] 68  Resp:  [16] 16  BP: (130)/(65) 130/65  Flow (L/min) (Oxygen Therapy):  [25] 25    Physical Exam  Constitutional:       General: She is not in acute  distress.  Pulmonary:      Effort: Pulmonary effort is normal.      Breath sounds: Normal breath sounds.   Abdominal:      General: There is distension.      Palpations: Abdomen is soft.      Tenderness: There is no abdominal tenderness.   Skin:     General: Skin is warm and dry.   Neurological:      General: No focal deficit present.   Psychiatric:         Mood and Affect: Mood normal.         Behavior: Behavior normal.         Result Review    Result Review:  I have personally reviewed the results from the time of this admission to 4/30/2025 16:58 EDT and agree with these findings:  []  Laboratory list / accordion  []  Microbiology  []  Radiology  []  EKG/Telemetry   []  Cardiology/Vascular   []  Pathology  []  Old records  []  Other:  Most notable findings include: Palliative      Assessment & Plan   Assessment / Plan     Brief Patient Summary:  Vonda Jay is a 89 y.o. female who palliative care    Active Hospital Problems:  Active Hospital Problems    Diagnosis     **End of life care      Plan:       Abdominal pain weight loss  Colonic stricture  -Patient with obstruction  - Patient declined surgery    Hypertension  - Holding on blood pressure medication    Anemia of chronic disease    Hyponatremia    Patient requesting palliative care does not want surgery.    Hold on laboratory testing    Scheduled pain medication    VTE Prophylaxis:  Mechanical VTE prophylaxis orders are present.        CODE STATUS:    Code Status (Patient has no pulse and is not breathing): No CPR (Do Not Attempt to Resuscitate)  Medical Interventions (Patient has pulse or is breathing): Comfort Measures    Admission Status:  I believe this patient meets inpatient status.    Ambrose Kate MD

## 2025-04-30 NOTE — NURSING NOTE
[facility] Visit Report    Vonda Jay  [id number]  4/30/2025    Admission R/T [facility] Dx: yes    Review of Visit: Patient is a 89 y.o. 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 Admitted to [facility] for [facility] for symptom management of pain, dyspnea, restlessness, congestion. No active isolations     PPS: 10%    VS:   Temp:  [97.8 °F (36.6 °C)-99.2 °F (37.3 °C)] 97.8 °F (36.6 °C)  Heart Rate:  [64-68] 68  Resp:  [16] 16  BP: (126-130)/(53-65) 130/65  SpO2: 96% on 2.5L via NC    Medications in 24 hours:  -IV Robinul 0.4mg PRN x2  -IV Dilaudid 1mg scheduled every 2 hours x10  -IV Ativan 0.5mg PRN x2  -IV Ativan 1mg PRN x3    Recommendations:  Patient with labored respirations, tense extremities, moaning, grimacing and easily startled by touch.     Reviewed assessment findings, medication regimen and PRN needs with [facility] provider, Dr. Tevin Yates and received the following recommendations:  -IV Dilaudid 1.5mg scheduled every 2 hours routinely for pain or trouble breathing  -IV Dilaudid 1.5mg available PRN every 1 hour for breakthrough pain or trouble breathing  -IV Ativan 2mg available every 1 hour for breakthrough restlessness or anxiety    Family agreeable to recommendations, secure chat sent to Dr. Kate and [facility] Elida MAZARIEGOS. Family agreeable to recommendations. Continue to monitor for signs of decline and provide comfort measures. Contact [facility] at [phone] with any questions or concerns and at TOD.     Assessment:  Patient is bed bound, oral care only, minimally responsive to pain, PPS 10%. Family report rapid decline since yesterday. Respirations labored with audible congestion, apnea and accessory muscle use initially during visit. Medications administered by [facility] RN and patient turned into recovery position with good results. Expiratory moan continues, discussed causes and nonverbal signs of pain with family. Abdomen is rounded with hypoactive bowel  sounds, no PO intake. Extremities are warm to touch, peripheral pulses palpable. Color is pale with bruising. Philip catheter to bedside drainage with diminished darrell urine, 400mL documented output in 24 hours.     Collaboration:  Spoke with pts family at the bedside with condition update and support provided. Training provided regarding assessment findings, disease progression, symptom management, recommendations and expected length of stay. Family v/u of pts condition, agreeable to recommendations and all questions were answered. Collaborated with [facility] RNElida and Angie WALLS about pts condition and recommendations.    Disposition:  Patient meets GIP criteria, requiring frequent administration and continued titration of parenteral medications to achieve and maintain symptom management. Patient appears to be unsafe for transport at this time, requiring increasing symptom management and frequent RN assessment. If patient were to stabilize she would either  require LTC placement due to increased daily care needs or return home with family and [facility] support depending on level of care needs at that time. Will continue [facility] RN visits to monitor for changes, assess needs and provide support.       Alison Solano, RN  [facility]ospit[facility] Visit Nurse  Scattered Bed Team

## 2025-05-01 NOTE — PROGRESS NOTES
Case Management Discharge Note      Final Note: The patient  on 25 @ 00:50. BLouie Goldberg RN CCP         Selected Continued Care - Discharged on 2025 Admission date: 2025 - Discharge disposition:       Destination Coordination complete.      Service Provider Services Address Phone Fax Patient Preferred    [facility] [address] [place] [address] [phone] --              Durable Medical Equipment    No services have been selected for the patient.                Dialysis/Infusion    No services have been selected for the patient.                Home Medical Care    No services have been selected for the patient.                Therapy    No services have been selected for the patient.                Community Resources    No services have been selected for the patient.                Community & DME    No services have been selected for the patient.                    Selected Continued Care - Prior Encounters Includes continued care and service providers with selected services from prior encounters from 2025 to 2025      Discharged on 2025 Admission date: 2025 - Discharge disposition: Swing Bed w/Planned Readmission      Destination       Service Provider Services Address Phone Fax Patient Preferred    [address] [address] [phone] --                               Final Discharge Disposition Code: 20 -

## 2025-05-08 NOTE — DISCHARGE SUMMARY
"            [facility]  [phone]    DISCHARGE SUMMARY  [facility]    Patient Identification:  Name: Vonda Jay  Age: 89 y.o.  Sex: female  :  1935  MRN: [id number]  Primary Care Physician: Ambrose Ashley MD    Admit date: 2025  Discharge date and time: 2025     Discharge Diagnoses:  End of life care       Hospital Course:     Patient was admitted for bowel obstruction and she declines intervention.  Patient was admitted to the palliative care. Palliative care orders were placed. She was kept comfortable, Family stay with her at her bedside.      [facility] followed her while in the hospital.    See progress note for further details    Consults:   Consults       Date and Time Order Name Status Description    2025  9:24 PM Inpatient Nephrology Consult Completed     2025  2:05 PM Surgery (on-call MD unless specified) Completed                             Significant Diagnostic Studies: No results found for: \"WBC\", \"HGB\", \"HCT\", \"PLT\"  No results found for: \"NA\", \"K\", \"CL\", \"CO2\", \"BUN\", \"CREATININE\", \"GLUCOSE\"  No results found for: \"CALCIUM\", \"MG\", \"PHOS\"  No results found for: \"AST\", \"ALT\", \"ALKPHOS\"  No results found for: \"APTT\", \"INR\"  No results found for: \"COLORU\", \"CLARITYU\", \"SPECGRAV\", \"PHUR\", \"PROTEINUR\", \"GLUCOSEU\", \"KETONESU\", \"BLOODU\", \"NITRITE\", \"LEUKOCYTESUR\", \"BILIRUBINUR\", \"UROBILINOGEN\", \"RBCUA\", \"WBCUA\", \"BACTERIA\", \"UACOMMENT\"  No results found for: \"TROPONINT\", \"TROPONINI\", \"BNP\"  No components found for: \"HGBA1C;2\"  No components found for: \"TSH;2\"    Imaging Results (All)       None        No results found for: \"SITE\", \"ALLENTEST\", \"PHART\", \"XWG1CWY\", \"PO2ART\", \"FYQ1GJW\", \"BASEEXCESS\", \"J4XXWNPL\", \"HGBBG\", \"HCTABG\", \"OXYHEMOGLOBI\", \"METHHGBN\", \"CARBOXYHGB\", \"CO2CT\", \"BAROMETRIC\", \"MODALITY\", \"FIO2\"      Signed:  Ambrose Kate MD  2025  20:10 EDT    "

## (undated) DEVICE — SOL IRR NACL 0.9PCT BO 1000ML

## (undated) DEVICE — DRAPE,REIN 53X77,STERILE: Brand: MEDLINE

## (undated) DEVICE — DRSNG WND GZ CURAD OIL EMULSION 3X8IN LF STRL 1PK

## (undated) DEVICE — CVR PROB 96IN LF STRL

## (undated) DEVICE — KT MANIFLD CARDIAC

## (undated) DEVICE — SHEATH SENTRANT 14F 28CM

## (undated) DEVICE — TOWEL,OR,DSP,ST,BLUE,STD,4/PK,20PK/CS: Brand: MEDLINE

## (undated) DEVICE — PAD,ABDOMINAL,8"X10",ST,LF: Brand: MEDLINE

## (undated) DEVICE — SPONGE,LAP,18"X18",DLX,XR,ST,5/PK,40/PK: Brand: MEDLINE

## (undated) DEVICE — SENSR CERBRL O2 PK/2

## (undated) DEVICE — SPNG GZ WOVN 4X4IN 12PLY 10/BX STRL

## (undated) DEVICE — DRSNG WND GZ PAD BORDERED 4X8IN STRL

## (undated) DEVICE — SKIN PREP TRAY W/CHG: Brand: MEDLINE INDUSTRIES, INC.

## (undated) DEVICE — TAVR: Brand: MEDLINE INDUSTRIES, INC.

## (undated) DEVICE — CONTAINER,SPECIMEN,OR STERILE,4OZ: Brand: MEDLINE

## (undated) DEVICE — DGW .035 FC J3MM 260CM TEF: Brand: EMERALD

## (undated) DEVICE — GLIDESHEATH SLENDER STAINLESS STEEL KIT: Brand: GLIDESHEATH SLENDER

## (undated) DEVICE — BNDG ELAS CO-FLEX SLF ADHR 6IN 5YD LF STRL

## (undated) DEVICE — CATH DIAG IMPULSE AL1 6F 100CM

## (undated) DEVICE — DELIV SYS D-EVOLUTFX-2329: Brand: EVOLUT™ FX

## (undated) DEVICE — SUT MNCRYL 0 CT1 36IN Y946H

## (undated) DEVICE — PINNACLE INTRODUCER SHEATH: Brand: PINNACLE

## (undated) DEVICE — PERCLOSE™ PROSTYLE™ SUTURE-MEDIATED CLOSURE AND REPAIR SYSTEM: Brand: PERCLOSE™ PROSTYLE™

## (undated) DEVICE — CATH ELECTRD PACE TEMP BI NONHEP 5F110CM

## (undated) DEVICE — CATH DIAG IMPULSE PIG145 6F 110CM

## (undated) DEVICE — TRUE™ DILATATION BALLOON VALVULOPLASTY CATHETER, 20 MM X 4.5 CM, 110 CM CATHETER: Brand: TRUE DILATATION

## (undated) DEVICE — SOL ISO/ALC RUB 70PCT 4OZ

## (undated) DEVICE — GW INQWIRE FC PTFE STR .035IN 150

## (undated) DEVICE — 3M™ BAIR HUGGER® UNDERBODY BLANKET, FULL ACCESS, 10 PER CASE 63500: Brand: BAIR HUGGER™

## (undated) DEVICE — 3M™ IOBAN™ 2 ANTIMICROBIAL INCISE DRAPE 6650EZ: Brand: IOBAN™ 2

## (undated) DEVICE — TAPE MICROFM 2IN LF

## (undated) DEVICE — MAT FLR ABSORBENT LG 4FT 10 2.5FT

## (undated) DEVICE — 3M™ TEGADERM™ CHG DRESSING 25/CARTON 4 CARTONS/CASE 1658: Brand: TEGADERM™

## (undated) DEVICE — LOADING SYS L-EVOLUTFX-2329: Brand: EVOLUT™ FX

## (undated) DEVICE — SOL ISO/ALC 70PCT 4OZ

## (undated) DEVICE — PREP SOL POVIDONE/IODINE BT 4OZ

## (undated) DEVICE — CATH DIAG IMPULSE PIG 5F 100CM

## (undated) DEVICE — CATH DIAG IMPULSE FR5 5F 100CM

## (undated) DEVICE — INTRO SHEATH ART/FEM ENGAGE .035 6F12CM

## (undated) DEVICE — SUT MNCRYL 3/0 PS2 18IN MCP497G

## (undated) DEVICE — GLIDESHEATH BASIC HYDROPHILIC COATED INTRODUCER SHEATH: Brand: GLIDESHEATH

## (undated) DEVICE — GLV SURG BIOGEL LTX PF 7 1/2

## (undated) DEVICE — CANN O2 ETCO2 FITS ALL CONN CO2 SMPL A/ 7IN DISP LF

## (undated) DEVICE — GLV SURG TRIUMPH CLASSIC PF LTX 8.5 STRL

## (undated) DEVICE — SHORT SPIKE VENTED W/3-WAY SC: Brand: MEDLINE INDUSTRIES, INC.

## (undated) DEVICE — PK HIP PINNING 40

## (undated) DEVICE — GW FOR TROCH NAIL 3.2X400MM

## (undated) DEVICE — GLV SURG BIOGEL LTX PF 8 1/2

## (undated) DEVICE — APPL CHLORAPREP W/TINT 26ML ORNG

## (undated) DEVICE — SOL IRR H2O BO 1000ML STRL

## (undated) DEVICE — STPLR SKIN VISISTAT WD 35CT

## (undated) DEVICE — DRP INCISE CARDIO W/ADHS 115X85X151IN LG STRL

## (undated) DEVICE — ADAPT CLN BIOGUARD AIR/H2O DISP

## (undated) DEVICE — TBG INJ CONTRL PVCCLR RIGD RA 1200PSI 10

## (undated) DEVICE — INTENDED FOR TISSUE SEPARATION, AND OTHER PROCEDURES THAT REQUIRE A SHARP SURGICAL BLADE TO PUNCTURE OR CUT.: Brand: BARD-PARKER ® CARBON RIB-BACK BLADES

## (undated) DEVICE — Device

## (undated) DEVICE — SUT SILK 2 SUTUPAK TIE 60IN SA8H 2STRAND

## (undated) DEVICE — DISPOSABLE ADAPTER

## (undated) DEVICE — BALN PRESS WEDGE 5F 110CM

## (undated) DEVICE — DECANTER BAG 9": Brand: MEDLINE INDUSTRIES, INC.

## (undated) DEVICE — PK CATH CARD 40

## (undated) DEVICE — TUBING, SUCTION, 1/4" X 10', STRAIGHT: Brand: MEDLINE

## (undated) DEVICE — SINGLE-USE BIOPSY FORCEPS: Brand: RADIAL JAW 4

## (undated) DEVICE — EQUIPMENT COVER BAG TYPE 48” X 36” (122CM X 91CM): Brand: EQUIPMENT COVER BAG TYPE

## (undated) DEVICE — SOL NS 500ML

## (undated) DEVICE — DRSNG ADAPTIC 3X8

## (undated) DEVICE — CATH DIAG IMPULSE FL3.5 5F 100CM

## (undated) DEVICE — SNAP KAP: Brand: UNBRANDED

## (undated) DEVICE — KT ORCA ORCAPOD DISP STRL

## (undated) DEVICE — TBG PRESS 96IN M/F ROT BRAID: Brand: MEDLINE INDUSTRIES, INC.

## (undated) DEVICE — CATH DIAG IMPULSE FR4 5F 100CM

## (undated) DEVICE — CVR HNDL LT SURG ACCSSRY BLU STRL

## (undated) DEVICE — LEGGINGS, PAIR, CLEAR, STERILE: Brand: MEDLINE

## (undated) DEVICE — SENSR O2 OXIMAX FNGR A/ 18IN NONSTR

## (undated) DEVICE — SOL NACL 0.9PCT 1000ML